# Patient Record
Sex: FEMALE | Race: OTHER | HISPANIC OR LATINO | Employment: OTHER | URBAN - METROPOLITAN AREA
[De-identification: names, ages, dates, MRNs, and addresses within clinical notes are randomized per-mention and may not be internally consistent; named-entity substitution may affect disease eponyms.]

---

## 2014-07-01 LAB — HM COLONOSCOPY: NORMAL

## 2017-04-06 ENCOUNTER — GENERIC CONVERSION - ENCOUNTER (OUTPATIENT)
Dept: FAMILY MEDICINE CLINIC | Facility: HOSPITAL | Age: 72
End: 2017-04-06

## 2017-05-16 ENCOUNTER — GENERIC CONVERSION - ENCOUNTER (OUTPATIENT)
Dept: FAMILY MEDICINE CLINIC | Facility: HOSPITAL | Age: 72
End: 2017-05-16

## 2017-10-31 DIAGNOSIS — R30.0 DYSURIA: ICD-10-CM

## 2017-10-31 DIAGNOSIS — Z12.31 ENCOUNTER FOR SCREENING MAMMOGRAM FOR MALIGNANT NEOPLASM OF BREAST: ICD-10-CM

## 2017-11-13 ENCOUNTER — GENERIC CONVERSION - ENCOUNTER (OUTPATIENT)
Dept: OTHER | Facility: OTHER | Age: 72
End: 2017-11-13

## 2017-11-15 ENCOUNTER — APPOINTMENT (OUTPATIENT)
Dept: LAB | Facility: HOSPITAL | Age: 72
End: 2017-11-15
Payer: COMMERCIAL

## 2017-11-15 ENCOUNTER — TRANSCRIBE ORDERS (OUTPATIENT)
Dept: ADMINISTRATIVE | Facility: HOSPITAL | Age: 72
End: 2017-11-15

## 2017-11-15 DIAGNOSIS — R30.0 DYSURIA: ICD-10-CM

## 2017-11-15 LAB
BACTERIA UR QL AUTO: ABNORMAL /HPF
BILIRUB UR QL STRIP: NEGATIVE
CLARITY UR: CLEAR
COLOR UR: YELLOW
GLUCOSE UR STRIP-MCNC: NEGATIVE MG/DL
HGB UR QL STRIP.AUTO: NEGATIVE
HYALINE CASTS #/AREA URNS LPF: ABNORMAL /LPF
KETONES UR STRIP-MCNC: NEGATIVE MG/DL
LEUKOCYTE ESTERASE UR QL STRIP: ABNORMAL
MUCOUS THREADS UR QL AUTO: ABNORMAL
NITRITE UR QL STRIP: NEGATIVE
NON-SQ EPI CELLS URNS QL MICRO: ABNORMAL /HPF
PH UR STRIP.AUTO: 6 [PH] (ref 4.5–8)
PROT UR STRIP-MCNC: NEGATIVE MG/DL
RBC #/AREA URNS AUTO: ABNORMAL /HPF
SP GR UR STRIP.AUTO: 1.02 (ref 1–1.03)
UROBILINOGEN UR QL STRIP.AUTO: 0.2 E.U./DL
WBC #/AREA URNS AUTO: ABNORMAL /HPF

## 2017-11-15 PROCEDURE — 81001 URINALYSIS AUTO W/SCOPE: CPT

## 2018-01-22 VITALS
DIASTOLIC BLOOD PRESSURE: 62 MMHG | HEIGHT: 61 IN | SYSTOLIC BLOOD PRESSURE: 132 MMHG | TEMPERATURE: 97.3 F | WEIGHT: 156 LBS | HEART RATE: 72 BPM | BODY MASS INDEX: 29.45 KG/M2

## 2018-01-29 ENCOUNTER — TELEPHONE (OUTPATIENT)
Dept: FAMILY MEDICINE CLINIC | Facility: HOSPITAL | Age: 73
End: 2018-01-29

## 2018-01-29 PROBLEM — M81.0 OSTEOPOROSIS: Status: ACTIVE | Noted: 2017-11-14

## 2018-01-29 PROBLEM — E66.9 OBESITY: Status: ACTIVE | Noted: 2017-11-14

## 2018-01-29 PROBLEM — H26.9 CATARACT: Status: ACTIVE | Noted: 2017-11-14

## 2018-01-29 PROBLEM — N95.2 ATROPHIC VAGINITIS: Status: ACTIVE | Noted: 2017-11-14

## 2018-01-29 PROBLEM — H40.9 GLAUCOMA: Status: ACTIVE | Noted: 2017-11-13

## 2018-01-29 RX ORDER — GABAPENTIN 600 MG/1
600 TABLET ORAL
Qty: 30 TABLET | Refills: 3 | Status: CANCELLED | OUTPATIENT
Start: 2018-01-29

## 2018-01-29 RX ORDER — MONTELUKAST SODIUM 10 MG/1
10 TABLET ORAL DAILY
Qty: 30 TABLET | Refills: 3 | Status: CANCELLED | OUTPATIENT
Start: 2018-01-29

## 2018-01-29 RX ORDER — TIMOLOL MALEATE 6.8 MG/ML
1 SOLUTION/ DROPS OPHTHALMIC EVERY 12 HOURS
COMMUNITY
Start: 2017-11-13 | End: 2018-01-30 | Stop reason: SDUPTHER

## 2018-01-29 RX ORDER — LOSARTAN POTASSIUM 25 MG/1
1 TABLET ORAL DAILY
COMMUNITY
Start: 2017-11-13 | End: 2018-01-30 | Stop reason: SDUPTHER

## 2018-01-29 RX ORDER — LOSARTAN POTASSIUM 25 MG/1
25 TABLET ORAL DAILY
Qty: 30 TABLET | Refills: 3 | Status: CANCELLED | OUTPATIENT
Start: 2018-01-29

## 2018-01-29 RX ORDER — OMEPRAZOLE 40 MG/1
1 CAPSULE, DELAYED RELEASE ORAL DAILY
COMMUNITY
Start: 2012-08-14 | End: 2018-01-30 | Stop reason: SDUPTHER

## 2018-01-29 RX ORDER — TRAMADOL HYDROCHLORIDE 50 MG/1
TABLET ORAL
COMMUNITY
Start: 2017-11-13 | End: 2018-01-30 | Stop reason: SDUPTHER

## 2018-01-29 RX ORDER — GABAPENTIN 600 MG/1
1 TABLET ORAL
COMMUNITY
Start: 2017-11-13 | End: 2018-01-30 | Stop reason: SDUPTHER

## 2018-01-29 RX ORDER — ALBUTEROL SULFATE 90 UG/1
2 AEROSOL, METERED RESPIRATORY (INHALATION)
COMMUNITY
Start: 2012-10-23 | End: 2018-01-30 | Stop reason: SDUPTHER

## 2018-01-29 RX ORDER — GLYCOPYRROLATE 2 MG/1
2 TABLET ORAL 2 TIMES DAILY
Qty: 30 TABLET | Refills: 3 | Status: CANCELLED | OUTPATIENT
Start: 2018-01-29

## 2018-01-29 RX ORDER — HYDROCHLOROTHIAZIDE 12.5 MG/1
12.5 TABLET ORAL DAILY
Qty: 30 TABLET | Refills: 3 | Status: CANCELLED | OUTPATIENT
Start: 2018-01-29

## 2018-01-29 RX ORDER — ALENDRONATE SODIUM 70 MG/1
1 TABLET ORAL WEEKLY
COMMUNITY
Start: 2017-11-13 | End: 2018-01-30 | Stop reason: SDUPTHER

## 2018-01-29 RX ORDER — ATORVASTATIN CALCIUM 20 MG/1
1 TABLET, FILM COATED ORAL
COMMUNITY
Start: 2017-11-13 | End: 2018-01-30 | Stop reason: SDUPTHER

## 2018-01-29 RX ORDER — GLYCOPYRROLATE 2 MG/1
1 TABLET ORAL 2 TIMES DAILY
COMMUNITY
Start: 2012-09-24 | End: 2018-01-30 | Stop reason: SDUPTHER

## 2018-01-29 RX ORDER — LEVOTHYROXINE SODIUM 88 UG/1
1 TABLET ORAL DAILY
COMMUNITY
Start: 2012-08-28 | End: 2018-01-30 | Stop reason: SDUPTHER

## 2018-01-29 RX ORDER — LEVOTHYROXINE SODIUM 88 UG/1
88 TABLET ORAL DAILY
Qty: 30 TABLET | Refills: 3 | Status: CANCELLED | OUTPATIENT
Start: 2018-01-29

## 2018-01-29 RX ORDER — TRAMADOL HYDROCHLORIDE 50 MG/1
50 TABLET ORAL EVERY 6 HOURS PRN
Qty: 30 TABLET | Refills: 0 | Status: CANCELLED | OUTPATIENT
Start: 2018-01-29

## 2018-01-29 RX ORDER — MONTELUKAST SODIUM 10 MG/1
1 TABLET ORAL DAILY
COMMUNITY
Start: 2012-08-14 | End: 2018-01-30 | Stop reason: SDUPTHER

## 2018-01-29 RX ORDER — HYDROCHLOROTHIAZIDE 12.5 MG/1
1 TABLET ORAL DAILY
COMMUNITY
Start: 2012-09-04 | End: 2018-01-30 | Stop reason: SDUPTHER

## 2018-01-29 RX ORDER — OMEPRAZOLE 40 MG/1
40 CAPSULE, DELAYED RELEASE ORAL DAILY
Qty: 30 CAPSULE | Refills: 3 | Status: CANCELLED | OUTPATIENT
Start: 2018-01-29

## 2018-01-29 RX ORDER — ALBUTEROL SULFATE 90 UG/1
2 AEROSOL, METERED RESPIRATORY (INHALATION) EVERY 4 HOURS PRN
Qty: 1 INHALER | Refills: 1 | Status: CANCELLED | OUTPATIENT
Start: 2018-01-29

## 2018-01-29 RX ORDER — ATORVASTATIN CALCIUM 20 MG/1
20 TABLET, FILM COATED ORAL DAILY
Qty: 30 TABLET | Refills: 3 | Status: CANCELLED | OUTPATIENT
Start: 2018-01-29

## 2018-01-30 ENCOUNTER — OFFICE VISIT (OUTPATIENT)
Dept: FAMILY MEDICINE CLINIC | Facility: HOSPITAL | Age: 73
End: 2018-01-30
Payer: COMMERCIAL

## 2018-01-30 VITALS
SYSTOLIC BLOOD PRESSURE: 142 MMHG | WEIGHT: 156 LBS | TEMPERATURE: 97.7 F | HEIGHT: 60 IN | HEART RATE: 78 BPM | BODY MASS INDEX: 30.63 KG/M2 | DIASTOLIC BLOOD PRESSURE: 78 MMHG

## 2018-01-30 DIAGNOSIS — J45.909 UNCOMPLICATED ASTHMA, UNSPECIFIED ASTHMA SEVERITY, UNSPECIFIED WHETHER PERSISTENT: ICD-10-CM

## 2018-01-30 DIAGNOSIS — M81.0 OSTEOPOROSIS, UNSPECIFIED OSTEOPOROSIS TYPE, UNSPECIFIED PATHOLOGICAL FRACTURE PRESENCE: Primary | ICD-10-CM

## 2018-01-30 DIAGNOSIS — H40.9 GLAUCOMA, UNSPECIFIED GLAUCOMA TYPE, UNSPECIFIED LATERALITY: ICD-10-CM

## 2018-01-30 DIAGNOSIS — M47.816 LUMBAR SPONDYLOSIS: ICD-10-CM

## 2018-01-30 DIAGNOSIS — R73.01 IFG (IMPAIRED FASTING GLUCOSE): ICD-10-CM

## 2018-01-30 DIAGNOSIS — I10 ESSENTIAL HYPERTENSION: ICD-10-CM

## 2018-01-30 DIAGNOSIS — G89.4 CHRONIC PAIN SYNDROME: Primary | ICD-10-CM

## 2018-01-30 DIAGNOSIS — E78.5 DYSLIPIDEMIA: ICD-10-CM

## 2018-01-30 DIAGNOSIS — E03.9 HYPOTHYROIDISM, UNSPECIFIED TYPE: ICD-10-CM

## 2018-01-30 DIAGNOSIS — G89.4 CHRONIC PAIN SYNDROME: ICD-10-CM

## 2018-01-30 DIAGNOSIS — K21.9 GERD WITHOUT ESOPHAGITIS: ICD-10-CM

## 2018-01-30 PROCEDURE — 99214 OFFICE O/P EST MOD 30 MIN: CPT | Performed by: INTERNAL MEDICINE

## 2018-01-30 RX ORDER — ALBUTEROL SULFATE 90 UG/1
2 AEROSOL, METERED RESPIRATORY (INHALATION) EVERY 4 HOURS PRN
Qty: 6.7 G | Refills: 6 | Status: SHIPPED | OUTPATIENT
Start: 2018-01-30 | End: 2018-10-24 | Stop reason: SDUPTHER

## 2018-01-30 RX ORDER — MONTELUKAST SODIUM 10 MG/1
10 TABLET ORAL DAILY
Qty: 30 TABLET | Refills: 6 | Status: SHIPPED | OUTPATIENT
Start: 2018-01-30 | End: 2018-10-24 | Stop reason: SDUPTHER

## 2018-01-30 RX ORDER — ATORVASTATIN CALCIUM 20 MG/1
20 TABLET, FILM COATED ORAL DAILY
Qty: 30 TABLET | Refills: 6 | Status: SHIPPED | OUTPATIENT
Start: 2018-01-30 | End: 2018-03-20 | Stop reason: SDUPTHER

## 2018-01-30 RX ORDER — OMEPRAZOLE 40 MG/1
40 CAPSULE, DELAYED RELEASE ORAL DAILY
Qty: 30 CAPSULE | Refills: 6 | Status: SHIPPED | OUTPATIENT
Start: 2018-01-30 | End: 2018-10-24 | Stop reason: SDUPTHER

## 2018-01-30 RX ORDER — TIMOLOL MALEATE 6.8 MG/ML
1 SOLUTION/ DROPS OPHTHALMIC EVERY 12 HOURS
Qty: 5 ML | Refills: 6 | Status: SHIPPED | OUTPATIENT
Start: 2018-01-30

## 2018-01-30 RX ORDER — GABAPENTIN 800 MG/1
800 TABLET ORAL 2 TIMES DAILY
Qty: 60 TABLET | Refills: 2 | Status: SHIPPED | OUTPATIENT
Start: 2018-01-30 | End: 2018-06-18 | Stop reason: SDUPTHER

## 2018-01-30 RX ORDER — MELOXICAM 7.5 MG/1
7.5 TABLET ORAL DAILY
Qty: 30 TABLET | Refills: 2 | Status: SHIPPED | OUTPATIENT
Start: 2018-01-30 | End: 2018-06-18 | Stop reason: SDUPTHER

## 2018-01-30 RX ORDER — GABAPENTIN 600 MG/1
600 TABLET ORAL
Qty: 30 TABLET | Refills: 6 | Status: SHIPPED | OUTPATIENT
Start: 2018-01-30 | End: 2018-01-30 | Stop reason: SDUPTHER

## 2018-01-30 RX ORDER — GLYCOPYRROLATE 2 MG/1
2 TABLET ORAL 2 TIMES DAILY
Qty: 60 TABLET | Refills: 6 | Status: SHIPPED | OUTPATIENT
Start: 2018-01-30 | End: 2018-10-24 | Stop reason: SDUPTHER

## 2018-01-30 RX ORDER — LOSARTAN POTASSIUM 25 MG/1
25 TABLET ORAL DAILY
Qty: 30 TABLET | Refills: 6 | Status: SHIPPED | OUTPATIENT
Start: 2018-01-30 | End: 2018-03-26 | Stop reason: SDUPTHER

## 2018-01-30 RX ORDER — LEVOTHYROXINE SODIUM 88 UG/1
88 TABLET ORAL DAILY
Qty: 30 TABLET | Refills: 6 | Status: SHIPPED | OUTPATIENT
Start: 2018-01-30 | End: 2018-04-19 | Stop reason: SDUPTHER

## 2018-01-30 RX ORDER — ALENDRONATE SODIUM 70 MG/1
70 TABLET ORAL WEEKLY
Qty: 4 TABLET | Refills: 6 | Status: SHIPPED | OUTPATIENT
Start: 2018-01-30 | End: 2018-10-24 | Stop reason: SDUPTHER

## 2018-01-30 RX ORDER — TRAMADOL HYDROCHLORIDE 50 MG/1
50 TABLET ORAL EVERY 12 HOURS PRN
Qty: 60 TABLET | Refills: 2 | Status: SHIPPED | OUTPATIENT
Start: 2018-01-30 | End: 2018-01-30

## 2018-01-30 RX ORDER — HYDROCHLOROTHIAZIDE 12.5 MG/1
12.5 TABLET ORAL DAILY
Qty: 30 TABLET | Refills: 6 | Status: SHIPPED | OUTPATIENT
Start: 2018-01-30 | End: 2018-10-24 | Stop reason: SDUPTHER

## 2018-01-30 NOTE — ASSESSMENT & PLAN NOTE
BP up today but was good at last appt -may be up d/t pain - con't current meds for now - recheck BP in 6 wks and if still elevated will need to increase Losartan, low sodium diet/exercise/wgt loss enouraged

## 2018-01-30 NOTE — ASSESSMENT & PLAN NOTE
Increase Gabapentin to 800 mg bid and needs to get in with Pain Mgt, will try trial of Meloxicam as pt feels Tramadol is not helping at all

## 2018-01-30 NOTE — ASSESSMENT & PLAN NOTE
Due for labs - order given, low sugar/carb diet encouraged as was increase in exercise/activity level

## 2018-01-30 NOTE — PROGRESS NOTES
Assessment/Plan:    Hypothyroidism  Due for TFT's - order given, con't current Levothyroxine for now    IFG (impaired fasting glucose)  Due for labs - order given, low sugar/carb diet encouraged as was increase in exercise/activity level    Hypertension  BP up today but was good at last appt -may be up d/t pain - con't current meds for now - recheck BP in 6 wks and if still elevated will need to increase Losartan, low sodium diet/exercise/wgt loss enouraged    Lumbar spondylosis  Pain acute on chronic the last week - no benefit with Tramadol so will stop and try trial of Meloxicam, will increase Gabapentin to 800 mg 1 tab PO q am and 1 tab PO q pm, will need to see pain mgt, re-eval in 6 wks, may benefit from PT    Chronic pain syndrome  Increase Gabapentin to 800 mg bid and needs to get in with Pain Mgt, will try trial of Meloxicam as pt feels Tramadol is not helping at all       Diagnoses and all orders for this visit:    Chronic pain syndrome  -     gabapentin (NEURONTIN) 800 mg tablet; Take 1 tablet (800 mg total) by mouth 2 (two) times a day  -     Ambulatory referral to Pain Management; Future  -     CBC and differential  -     meloxicam (MOBIC) 7 5 mg tablet; Take 1 tablet (7 5 mg total) by mouth daily    Lumbar spondylosis    Essential hypertension  -     CBC and differential  -     Comprehensive metabolic panel  -     Lipid panel    IFG (impaired fasting glucose)  -     CBC and differential  -     Comprehensive metabolic panel  -     Hemoglobin A1c  -     Lipid panel    Hypothyroidism, unspecified type  -     TSH, 3rd generation; Future  -     T4, free; Future  -     TSH, 3rd generation  -     T4, free      Order for mammo given    UTD on Dexa till 2019    Number for Dr Jennifer Mejia given for DR [facility]    Subjective:      Patient ID: Polo Barnett is a 74 y o  female  HPI Pt here for follow up appt    Last visit in Oct was her re-establish care visit  She was con't to note chronic LBP    She has had 2 back surgeries in the past -chart reviewed and she has had Lumbar decompression of L4/5/S1  She had MRI in Oct 2016 which showed multilevel spondylitic changes  She has been using Tramadol for her pain twice daily  Even with the Tramadol her pain is still a 10/10  She is taking Gabapentin 800 mg qhs  She feels the medication is not helping at all  She has had worsening pain the past week or so  She notes no recent fall/injury/new activity  Her pain is right at lower spine and goes up and down the L low back and will intermittently go down the LLE  She notes weakness and numbness to the LLE but no pins and needles  She notes no loss of bowel/bladder incontinence  She notes no rashes  Bp up a bit today  She is in pain with above back issues  She notes no Ha's but has had some HA's and double vision  She has not had an eye exam in 2-3 yrs  Chart review showed that her Bw for IFG was July 2017 - she is due now  Diet was reviewed and she does not eat a lot of sugars but does have a high carb diet  She does no formal exercise  Her wgt is unchanged from last appt  D/w pt that previous records had no mammo since April and MRI since Aug 2016  She is due for mammo but is UTD on Dexa    D/w pt and family that no colonoscopy was in the chart either - pt states she had one done  Dgtr thinks she is due now  Review of Systems   Constitutional: Positive for chills  Negative for fatigue, fever and unexpected weight change  HENT: Negative for congestion and sore throat  Eyes: Positive for discharge  Negative for pain  Respiratory: Positive for cough  Negative for shortness of breath and wheezing  Cardiovascular: Negative for chest pain, palpitations and leg swelling  Gastrointestinal: Negative for abdominal pain, blood in stool, constipation, diarrhea and nausea  Endocrine: Negative for polydipsia and polyuria  Genitourinary: Negative for difficulty urinating and dysuria  Musculoskeletal: Positive for back pain  Negative for neck pain  Skin: Negative for rash and wound  Neurological: Positive for dizziness, numbness and headaches  Negative for syncope and light-headedness  Hematological: Negative for adenopathy  Psychiatric/Behavioral: Negative for behavioral problems and confusion  Objective:     Physical Exam   Constitutional: She is oriented to person, place, and time  She appears well-developed and well-nourished  HENT:   Head: Normocephalic and atraumatic  Eyes: Pupils are equal, round, and reactive to light  Neck: Neck supple  Cardiovascular: Normal rate, regular rhythm and normal heart sounds  Exam reveals no friction rub  No murmur heard  Pulmonary/Chest: Effort normal and breath sounds normal  No respiratory distress  She has no wheezes  She has no rales  Abdominal: Soft  She exhibits no distension  There is no tenderness  There is no guarding  Musculoskeletal: She exhibits no edema  Tender to palp along L2-4 lumbar spinous processes and paraspinal musculature B/L, 4/5 B/L hip flexors but 5/5 otherwise LE muscle strength, + SLR test and crossed SLR test B/L LE   Neurological: She is alert and oriented to person, place, and time  She displays normal reflexes  No cranial nerve deficit  2/4 patellar reflexes B/L LE    Sensation intact to light touch B/L LE   Skin: Skin is warm  No rash noted  Psychiatric: She has a normal mood and affect   Her behavior is normal

## 2018-01-30 NOTE — ASSESSMENT & PLAN NOTE
Pain acute on chronic the last week - no benefit with Tramadol so will stop and try trial of Meloxicam, will increase Gabapentin to 800 mg 1 tab PO q am and 1 tab PO q pm, will need to see pain mgt, re-eval in 6 wks, may benefit from PT

## 2018-03-20 DIAGNOSIS — E78.5 DYSLIPIDEMIA: ICD-10-CM

## 2018-03-20 RX ORDER — ATORVASTATIN CALCIUM 20 MG/1
20 TABLET, FILM COATED ORAL DAILY
Qty: 90 TABLET | Refills: 1 | Status: SHIPPED | OUTPATIENT
Start: 2018-03-20 | End: 2018-11-15 | Stop reason: SDUPTHER

## 2018-03-26 DIAGNOSIS — I10 ESSENTIAL HYPERTENSION: ICD-10-CM

## 2018-03-26 RX ORDER — LOSARTAN POTASSIUM 25 MG/1
25 TABLET ORAL DAILY
Qty: 90 TABLET | Refills: 0 | Status: SHIPPED | OUTPATIENT
Start: 2018-03-26 | End: 2018-09-04 | Stop reason: SDUPTHER

## 2018-04-03 LAB
ALBUMIN SERPL-MCNC: 3.9 G/DL (ref 3.6–5.1)
ALBUMIN/GLOB SERPL: 1.4 (CALC) (ref 1–2.5)
ALP SERPL-CCNC: 130 U/L (ref 33–130)
ALT SERPL-CCNC: 13 U/L (ref 6–29)
AST SERPL-CCNC: 14 U/L (ref 10–35)
BASOPHILS # BLD AUTO: 50 CELLS/UL (ref 0–200)
BASOPHILS NFR BLD AUTO: 0.7 %
BILIRUB SERPL-MCNC: 0.4 MG/DL (ref 0.2–1.2)
BUN SERPL-MCNC: 27 MG/DL (ref 7–25)
BUN/CREAT SERPL: 26 (CALC) (ref 6–22)
CALCIUM SERPL-MCNC: 9.7 MG/DL (ref 8.6–10.4)
CHLORIDE SERPL-SCNC: 109 MMOL/L (ref 98–110)
CHOLEST SERPL-MCNC: 128 MG/DL
CHOLEST/HDLC SERPL: 2.7 (CALC)
CO2 SERPL-SCNC: 26 MMOL/L (ref 20–31)
CREAT SERPL-MCNC: 1.02 MG/DL (ref 0.6–0.93)
EOSINOPHIL # BLD AUTO: 475 CELLS/UL (ref 15–500)
EOSINOPHIL NFR BLD AUTO: 6.6 %
ERYTHROCYTE [DISTWIDTH] IN BLOOD BY AUTOMATED COUNT: 12.7 % (ref 11–15)
GLOBULIN SER CALC-MCNC: 2.7 G/DL (CALC) (ref 1.9–3.7)
GLUCOSE SERPL-MCNC: 93 MG/DL (ref 65–99)
HBA1C MFR BLD: 5.6 % OF TOTAL HGB
HCT VFR BLD AUTO: 38.5 % (ref 35–45)
HDLC SERPL-MCNC: 48 MG/DL
HGB BLD-MCNC: 13 G/DL (ref 11.7–15.5)
LDLC SERPL CALC-MCNC: 64 MG/DL (CALC)
LYMPHOCYTES # BLD AUTO: 2815 CELLS/UL (ref 850–3900)
LYMPHOCYTES NFR BLD AUTO: 39.1 %
MCH RBC QN AUTO: 31.6 PG (ref 27–33)
MCHC RBC AUTO-ENTMCNC: 33.8 G/DL (ref 32–36)
MCV RBC AUTO: 93.4 FL (ref 80–100)
MONOCYTES # BLD AUTO: 648 CELLS/UL (ref 200–950)
MONOCYTES NFR BLD AUTO: 9 %
NEUTROPHILS # BLD AUTO: 3211 CELLS/UL (ref 1500–7800)
NEUTROPHILS NFR BLD AUTO: 44.6 %
NONHDLC SERPL-MCNC: 80 MG/DL (CALC)
PLATELET # BLD AUTO: 181 THOUSAND/UL (ref 140–400)
PMV BLD REES-ECKER: 13 FL (ref 7.5–12.5)
POTASSIUM SERPL-SCNC: 4.8 MMOL/L (ref 3.5–5.3)
PROT SERPL-MCNC: 6.6 G/DL (ref 6.1–8.1)
RBC # BLD AUTO: 4.12 MILLION/UL (ref 3.8–5.1)
SL AMB EGFR AFRICAN AMERICAN: 63 ML/MIN/1.73M2
SL AMB EGFR NON AFRICAN AMERICAN: 54 ML/MIN/1.73M2
SODIUM SERPL-SCNC: 141 MMOL/L (ref 135–146)
T4 FREE SERPL-MCNC: 1.3 NG/DL (ref 0.8–1.8)
TRIGL SERPL-MCNC: 75 MG/DL
TSH SERPL-ACNC: 1.34 MIU/L (ref 0.4–4.5)
WBC # BLD AUTO: 7.2 THOUSAND/UL (ref 3.8–10.8)

## 2018-04-10 ENCOUNTER — HOSPITAL ENCOUNTER (OUTPATIENT)
Dept: BONE DENSITY | Facility: IMAGING CENTER | Age: 73
Discharge: HOME | End: 2018-04-10
Payer: COMMERCIAL

## 2018-04-10 DIAGNOSIS — Z12.39 ENCOUNTER FOR OTHER SCREENING FOR MALIGNANT NEOPLASM OF BREAST: ICD-10-CM

## 2018-04-10 PROCEDURE — 77067 SCR MAMMO BI INCL CAD: CPT

## 2018-04-19 ENCOUNTER — TELEPHONE (OUTPATIENT)
Dept: FAMILY MEDICINE CLINIC | Facility: HOSPITAL | Age: 73
End: 2018-04-19

## 2018-04-19 DIAGNOSIS — E03.9 HYPOTHYROIDISM, UNSPECIFIED TYPE: ICD-10-CM

## 2018-04-19 RX ORDER — LEVOTHYROXINE SODIUM 88 UG/1
88 TABLET ORAL DAILY
Qty: 30 TABLET | Refills: 0 | Status: SHIPPED | OUTPATIENT
Start: 2018-04-19 | End: 2018-05-30 | Stop reason: SDUPTHER

## 2018-04-19 NOTE — TELEPHONE ENCOUNTER
Patient needs brand name Synthroid, not generic  I can't put it in to send to you - it keeps bumping it back to the generic  Can you send?  [facility]/T

## 2018-04-24 ENCOUNTER — CONSULT (OUTPATIENT)
Dept: PAIN MEDICINE | Facility: CLINIC | Age: 73
End: 2018-04-24
Payer: COMMERCIAL

## 2018-04-24 VITALS
SYSTOLIC BLOOD PRESSURE: 128 MMHG | HEART RATE: 72 BPM | DIASTOLIC BLOOD PRESSURE: 78 MMHG | HEIGHT: 60 IN | TEMPERATURE: 98.2 F | BODY MASS INDEX: 32.59 KG/M2 | WEIGHT: 166 LBS

## 2018-04-24 DIAGNOSIS — M54.16 LUMBAR RADICULOPATHY: ICD-10-CM

## 2018-04-24 DIAGNOSIS — M25.562 CHRONIC PAIN OF LEFT KNEE: ICD-10-CM

## 2018-04-24 DIAGNOSIS — G89.29 CHRONIC BILATERAL LOW BACK PAIN WITH LEFT-SIDED SCIATICA: Primary | ICD-10-CM

## 2018-04-24 DIAGNOSIS — R29.898 WEAKNESS OF LEFT LEG: ICD-10-CM

## 2018-04-24 DIAGNOSIS — R26.9 GAIT ABNORMALITY: ICD-10-CM

## 2018-04-24 DIAGNOSIS — M17.12 PRIMARY OSTEOARTHRITIS OF LEFT KNEE: ICD-10-CM

## 2018-04-24 DIAGNOSIS — M96.1 LUMBAR POST-LAMINECTOMY SYNDROME: ICD-10-CM

## 2018-04-24 DIAGNOSIS — G89.4 CHRONIC PAIN SYNDROME: ICD-10-CM

## 2018-04-24 DIAGNOSIS — G89.29 CHRONIC PAIN OF LEFT KNEE: ICD-10-CM

## 2018-04-24 DIAGNOSIS — M54.42 CHRONIC BILATERAL LOW BACK PAIN WITH LEFT-SIDED SCIATICA: Primary | ICD-10-CM

## 2018-04-24 DIAGNOSIS — M47.816 LUMBAR SPONDYLOSIS: ICD-10-CM

## 2018-04-24 PROCEDURE — 99204 OFFICE O/P NEW MOD 45 MIN: CPT | Performed by: NURSE PRACTITIONER

## 2018-04-24 RX ORDER — PREDNISONE 10 MG/1
10 TABLET ORAL DAILY
Qty: 21 TABLET | Refills: 0 | Status: SHIPPED | OUTPATIENT
Start: 2018-04-24 | End: 2018-05-29

## 2018-04-24 RX ORDER — NORTRIPTYLINE HYDROCHLORIDE 25 MG/1
CAPSULE ORAL
Qty: 30 CAPSULE | Refills: 1 | Status: SHIPPED | OUTPATIENT
Start: 2018-04-24 | End: 2018-05-29 | Stop reason: SDUPTHER

## 2018-04-24 RX ORDER — TRAMADOL HYDROCHLORIDE 50 MG/1
50 TABLET ORAL EVERY 6 HOURS PRN
COMMUNITY
End: 2018-04-24
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
## 2018-05-01 ENCOUNTER — APPOINTMENT (OUTPATIENT)
Dept: RADIOLOGY | Facility: CLINIC | Age: 73
End: 2018-05-01
Payer: COMMERCIAL

## 2018-05-01 ENCOUNTER — OFFICE VISIT (OUTPATIENT)
Dept: OBGYN CLINIC | Facility: CLINIC | Age: 73
End: 2018-05-01
Payer: COMMERCIAL

## 2018-05-01 VITALS — DIASTOLIC BLOOD PRESSURE: 74 MMHG | SYSTOLIC BLOOD PRESSURE: 130 MMHG | HEIGHT: 60 IN | HEART RATE: 71 BPM

## 2018-05-01 DIAGNOSIS — M25.562 CHRONIC PAIN OF LEFT KNEE: ICD-10-CM

## 2018-05-01 DIAGNOSIS — M17.12 PRIMARY OSTEOARTHRITIS OF LEFT KNEE: ICD-10-CM

## 2018-05-01 DIAGNOSIS — M25.562 LEFT KNEE PAIN, UNSPECIFIED CHRONICITY: ICD-10-CM

## 2018-05-01 DIAGNOSIS — G89.29 CHRONIC PAIN OF LEFT KNEE: ICD-10-CM

## 2018-05-01 DIAGNOSIS — M70.50 ANSERINE BURSITIS: Primary | ICD-10-CM

## 2018-05-01 PROCEDURE — 73564 X-RAY EXAM KNEE 4 OR MORE: CPT

## 2018-05-01 PROCEDURE — 20610 DRAIN/INJ JOINT/BURSA W/O US: CPT | Performed by: ORTHOPAEDIC SURGERY

## 2018-05-01 PROCEDURE — 99203 OFFICE O/P NEW LOW 30 MIN: CPT | Performed by: ORTHOPAEDIC SURGERY

## 2018-05-01 RX ORDER — BETAMETHASONE SODIUM PHOSPHATE AND BETAMETHASONE ACETATE 3; 3 MG/ML; MG/ML
6 INJECTION, SUSPENSION INTRA-ARTICULAR; INTRALESIONAL; INTRAMUSCULAR; SOFT TISSUE
Status: COMPLETED | OUTPATIENT
Start: 2018-05-01 | End: 2018-05-01

## 2018-05-01 RX ORDER — LIDOCAINE HYDROCHLORIDE 10 MG/ML
5 INJECTION, SOLUTION INFILTRATION; PERINEURAL
Status: COMPLETED | OUTPATIENT
Start: 2018-05-01 | End: 2018-05-01

## 2018-05-01 RX ADMIN — LIDOCAINE HYDROCHLORIDE 5 ML: 10 INJECTION, SOLUTION INFILTRATION; PERINEURAL at 11:47

## 2018-05-01 RX ADMIN — BETAMETHASONE SODIUM PHOSPHATE AND BETAMETHASONE ACETATE 6 MG: 3; 3 INJECTION, SUSPENSION INTRA-ARTICULAR; INTRALESIONAL; INTRAMUSCULAR; SOFT TISSUE at 11:47

## 2018-05-01 NOTE — ASSESSMENT & PLAN NOTE
Moderate osteoarthritis of the left knee, primarily of the patellofemoral joint  I think a lot of her locking sensation is coming from the arthritis or from some instability from the pain she is having in her knee, though there is a small chance that she has some underlying meniscal tear  We will see how she does with the bursa injection physical therapy  I will see her back in 6-8 weeks if she fails to have significant improvement

## 2018-05-01 NOTE — ASSESSMENT & PLAN NOTE
40-year-old female with left knee bursitis  I discussed the diagnosis with the patient and her mother  I recommended physical therapy as well as a cortisone injection  Please refer to the procedure note  I will see her back as needed

## 2018-05-01 NOTE — PROGRESS NOTES
Assessment:     1  Anserine bursitis    2  Chronic pain of left knee    3  Primary osteoarthritis of left knee          Plan:     Problem List Items Addressed This Visit        Musculoskeletal and Integument    Primary osteoarthritis of left knee     Moderate osteoarthritis of the left knee, primarily of the patellofemoral joint  I think a lot of her locking sensation is coming from the arthritis or from some instability from the pain she is having in her knee, though there is a small chance that she has some underlying meniscal tear  We will see how she does with the bursa injection physical therapy  I will see her back in 6-8 weeks if she fails to have significant improvement  Relevant Orders    Ambulatory referral to Physical Therapy    Anserine bursitis - Primary     43-year-old female with left knee bursitis  I discussed the diagnosis with the patient and her mother  I recommended physical therapy as well as a cortisone injection  Please refer to the procedure note  I will see her back as needed  Relevant Orders    XR knee 4+ vw left injury    Ambulatory referral to Physical Therapy       Other    Chronic pain of left knee           Patient ID: Erum Gasca is a 74 y o  female  Chief Complaint:  Left knee pain    HPI:  43-year-old female with pain in her left knee  She has had pain for close to two years in that knee  It has been slowly worsening  There was no specific injury  She states that at times she has trouble straightening the knee out another time she has trouble bending it  It hurts her the most around the proximal medial tibia, but diffusely she has pain  Ice has been painful for her as she has not been using it  She has recently seen pain management who started her on prednisone for a short period of time as well as nortriptyline  She feels a heat and burning in her leg at times  She has a lot of difficulty at night  She has been using a cane for the last year    She has not had any injections or done any physical therapy  Patient's medical intake form was reviewed  Allergy:  Allergies   Allergen Reactions    Aspirin GI Intolerance    Iodinated Diagnostic Agents        Medications:  all current active meds have been reviewed    Past Medical History:  Past Medical History:   Diagnosis Date    Abnormal finding on mammography     right    Arthritis     Asthma     Depression     Dyslipidemia     Dysuria     Elevated serum alkaline phosphatase level     Esophageal reflux     History of stomach ulcers     Hypercalcemia     Hypercholesterolemia     Hyperkalemia     Hypertension     Hypothyroid     Lower back pain     Migraine     Osteoarthritis     Osteopenia     Thyroid disease     Tremor        Past Surgical History:  Past Surgical History:   Procedure Laterality Date    BACK SURGERY      lower back    BILATERAL OOPHORECTOMY       SECTION      CHOLECYSTECTOMY      HYSTERECTOMY         Family History:  Family History   Problem Relation Age of Onset    Hypertension Family     Stroke Family     Heart disease Family     Thyroid disease Family        Social History:  History   Alcohol Use    Yes     Comment: social     History   Drug use: Unknown     History   Smoking Status    Former Smoker   Smokeless Tobacco    Never Used           ROS:  Review of Systems   Constitutional: Negative  HENT: Positive for nosebleeds  Eyes: Positive for visual disturbance  Respiratory: Negative  Cardiovascular: Positive for chest pain and palpitations  Gastrointestinal: Negative  Endocrine: Negative  Genitourinary: Negative  Musculoskeletal: Positive for arthralgias and myalgias  Skin: Negative  Allergic/Immunologic: Negative  Neurological: Negative  Hematological: Negative  Psychiatric/Behavioral: The patient is nervous/anxious          Objective:  BP Readings from Last 1 Encounters:   18 130/74      Wt Readings from Last 1 Encounters:   04/24/18 75 3 kg (166 lb)        BMI:   Estimated body mass index is 32 42 kg/m² as calculated from the following:    Height as of 4/24/18: 5' (1 524 m)  Weight as of 4/24/18: 75 3 kg (166 lb)  EXAM:   Physical Exam   Constitutional: She is oriented to person, place, and time  She appears well-developed and well-nourished  No distress  HENT:   Head: Normocephalic and atraumatic  Eyes: Right eye exhibits no discharge  Left eye exhibits no discharge  Neck: Normal range of motion  Neck supple  No tracheal deviation present  Cardiovascular: Normal rate and regular rhythm  Pulmonary/Chest: Effort normal and breath sounds normal  No respiratory distress  She exhibits no tenderness  Abdominal: Soft  She exhibits no distension  There is no tenderness  Musculoskeletal:        Right knee: She exhibits no effusion  Left knee: She exhibits no effusion  Neurological: She is alert and oriented to person, place, and time  Skin: Skin is warm and dry  She is not diaphoretic  No erythema  Psychiatric: She has a normal mood and affect  Her behavior is normal    Vitals reviewed  Right Knee Exam   Right knee exam is normal     Tenderness   The patient is experiencing no tenderness  Range of Motion   The patient has normal right knee ROM  Muscle Strength     The patient has normal right knee strength      Tests   Andrea:  Medial - negative Lateral - negative  Lachman:  Anterior - negative      Drawer:       Posterior - negative  Varus: negative  Valgus: negative  Pivot Shift: negative  Patellar Apprehension: negative    Other   Erythema: absent  Sensation: normal  Pulse: present  Swelling: none  Other tests: no effusion present      Left Knee Exam     Tenderness   Left knee tenderness location: Diffuse tenderness throughout the knee, the most tenderness appears to be around the pes bursa, diffuse weakness throughout the knee on active range of motion, appears to be primarily limited by pain  Range of Motion   Extension: 5   Flexion: 110     Tests   Andrea:  Left knee positive medial Andrea test:  pain with Andrea testing  Lachman:  Anterior - negative      Drawer:       Posterior - negative  Varus: negative  Valgus: negative  Pivot Shift: negative  Patellar Apprehension: negative    Other   Erythema: absent  Sensation: normal  Pulse: present  Swelling: none  Effusion: no effusion present    Comments:  Severe crepitus on knee range of motion the              Radiographs:  I have personally reviewed pertinent films in PACS and my interpretation is Moderate osteoarthritis of the medial compartment and the patellofemoral compartment with narrowing and osteophytes      Large joint arthrocentesis  Date/Time: 5/1/2018 11:47 AM  Consent given by: patient  Timeout: Immediately prior to procedure a time out was called to verify the correct patient, procedure, equipment, support staff and site/side marked as required   Supporting Documentation  Indications: pain   Procedure Details  Location: knee - L knee  Preparation: Patient was prepped and draped in the usual sterile fashion  Needle size: 22 G  Ultrasound guidance: no  Approach: superior  Medications administered: 5 mL lidocaine 1 %; 6 mg betamethasone acetate-betamethasone sodium phosphate 6 (3-3) mg/mL    Patient tolerance: patient tolerated the procedure well with no immediate complications  Dressing:  Sterile dressing applied

## 2018-05-22 ENCOUNTER — HOSPITAL ENCOUNTER (OUTPATIENT)
Dept: MRI IMAGING | Facility: HOSPITAL | Age: 73
Discharge: HOME | End: 2018-05-22
Payer: COMMERCIAL

## 2018-05-22 DIAGNOSIS — M54.42 CHRONIC BILATERAL LOW BACK PAIN WITH LEFT-SIDED SCIATICA: ICD-10-CM

## 2018-05-22 DIAGNOSIS — G89.29 CHRONIC BILATERAL LOW BACK PAIN WITH LEFT-SIDED SCIATICA: ICD-10-CM

## 2018-05-22 DIAGNOSIS — M47.816 LUMBAR SPONDYLOSIS: ICD-10-CM

## 2018-05-22 DIAGNOSIS — M96.1 LUMBAR POST-LAMINECTOMY SYNDROME: ICD-10-CM

## 2018-05-22 DIAGNOSIS — M54.16 LUMBAR RADICULOPATHY: ICD-10-CM

## 2018-05-22 PROCEDURE — 72148 MRI LUMBAR SPINE W/O DYE: CPT

## 2018-05-24 ENCOUNTER — TELEPHONE (OUTPATIENT)
Dept: PAIN MEDICINE | Facility: CLINIC | Age: 73
End: 2018-05-24

## 2018-05-24 NOTE — TELEPHONE ENCOUNTER
s/w pt, per pt, she does not understand english very well and asked that the writer s/w her daughter, Anjum Neri at   Advised pt, will contact isatu as requested

## 2018-05-24 NOTE — TELEPHONE ENCOUNTER
Can you please call the patient and advise her that her lumbar spine MRI did show a worsening disc herniation at the L5-S1 level below her surgery with worsening Right>left foraminal stenosis  The previous surgery site is stable, but it does show mod deg changes with worsening Left foraminal stenosis at L3-4 & L4-5 levels  This could possibly explain her left knee pain, but I still feel that she has a separate knee issue as well  At this point we could try a Left L3 & L4 TFESI to see if that helps and/or we could refer her to one of our nuerosurgeons to make sure her spine is stable and to see if she is a SCStim candidate  Thank you

## 2018-05-24 NOTE — TELEPHONE ENCOUNTER
S/w pt's daughter, Steffi Dru of above and explained options  Advised isatu, if pt had additional questions, this office can cb w/ a  or schedule an ov to discuss further  Isatu verbalized understanding  Will relay the info as discussed and advise the pt to cb to fu

## 2018-05-29 ENCOUNTER — OFFICE VISIT (OUTPATIENT)
Dept: PAIN MEDICINE | Facility: CLINIC | Age: 73
End: 2018-05-29
Payer: COMMERCIAL

## 2018-05-29 VITALS
BODY MASS INDEX: 32.79 KG/M2 | SYSTOLIC BLOOD PRESSURE: 142 MMHG | WEIGHT: 167 LBS | HEIGHT: 60 IN | TEMPERATURE: 98.2 F | HEART RATE: 72 BPM | DIASTOLIC BLOOD PRESSURE: 80 MMHG

## 2018-05-29 DIAGNOSIS — M25.562 CHRONIC PAIN OF LEFT KNEE: ICD-10-CM

## 2018-05-29 DIAGNOSIS — G89.29 CHRONIC BILATERAL LOW BACK PAIN WITH LEFT-SIDED SCIATICA: Primary | ICD-10-CM

## 2018-05-29 DIAGNOSIS — G89.4 CHRONIC PAIN SYNDROME: ICD-10-CM

## 2018-05-29 DIAGNOSIS — M47.816 LUMBAR SPONDYLOSIS: ICD-10-CM

## 2018-05-29 DIAGNOSIS — M17.12 PRIMARY OSTEOARTHRITIS OF LEFT KNEE: ICD-10-CM

## 2018-05-29 DIAGNOSIS — M54.42 CHRONIC BILATERAL LOW BACK PAIN WITH LEFT-SIDED SCIATICA: Primary | ICD-10-CM

## 2018-05-29 DIAGNOSIS — M96.1 LUMBAR POST-LAMINECTOMY SYNDROME: ICD-10-CM

## 2018-05-29 DIAGNOSIS — M54.16 LUMBAR RADICULOPATHY: ICD-10-CM

## 2018-05-29 DIAGNOSIS — G89.29 CHRONIC PAIN OF LEFT KNEE: ICD-10-CM

## 2018-05-29 PROCEDURE — 99215 OFFICE O/P EST HI 40 MIN: CPT | Performed by: NURSE PRACTITIONER

## 2018-05-29 RX ORDER — NORTRIPTYLINE HYDROCHLORIDE 25 MG/1
CAPSULE ORAL
Qty: 60 CAPSULE | Refills: 1 | Status: SHIPPED | OUTPATIENT
Start: 2018-05-29 | End: 2018-07-10 | Stop reason: SDUPTHER

## 2018-05-29 NOTE — PROGRESS NOTES
Assessment:  1  Chronic bilateral low back pain with left-sided sciatica    2  Chronic pain of left knee    3  Primary osteoarthritis of left knee    4  Lumbar spondylosis    5  Lumbar radiculopathy    6  Chronic pain syndrome    7  Lumbar post-laminectomy syndrome        Plan:  While the patient and her daughters were in the office today, I did have a thorough conversation with him regarding her medication regimen and treatment plan options  While the patient was in the office I did thoroughly review the results of her most recent MRI of the lumbar sacral spine and explained to her that at this point it does appear that there is a worsening herniated disc at the L5-S1 level push towards the right, however, this point since she denies very minimal or very occasional right lower extremity radicular symptoms, I am not convinced that is causing her pain  However, her MRI also showed worsening left L3 and L4 foraminal stenosis which could also explain her left lower extremity radicular symptoms and knee pain, especially since she just had a left knee injection with Dr Rickey Agosto, which the patient reported provided minimal to no relief of her left knee pain  At this point time I discussed with the patient that we could consider proceeding with a left L3 and L4 transforaminal epidural steroid injection with Dr Jacque Stevens for both diagnostic and hopefully therapeutic purposes  I did explain to the patient at the pending on the results of the injection it may need to be repeated  The patient was agreeable and verbalized an understanding  Complete risks and benefits including bleeding, infection, tissue reaction, nerve injury and allergic reaction were discussed  The approach was demonstrated using models and literature was provided  Verbal and written consent was obtained        I also discussed with the patient that at this point time I feel would be in her best interest to follow up with Dr Silver Weldon for a neurosurgical opinion to discuss possible surgical options verses her candidacy for spinal cord stimulation in the future  I did briefly review the basic underlying theory of spinal cord stimulation with the patient and her family, however, at this point they are going to proceed with the consultation and we will go from there as appropriate and with what the patient is comfortable with  However, I did make sure that they understand that if spinal cord stimulation is an option, it is not changing the structure, but rather a tool to manage the pain  The patient and her daughters verbalized understanding  With regards to her medication regimen, explained to the patient at this point time since she did notice some relief with the nortriptyline, but was on a subtherapeutic dose, we are going to restart the nortriptyline at 25 mg a day and work up to 50 mg between now and her next office visit in 2 months  I advised the patient that if they experience any side effects or issues with the changes in their medication regiment, they should give our office a call to discuss  I also advised the patient not to drive or operate machinery until they see how the changes in the medication regimen affects them  The patient was agreeable and verbalized an understanding  The patient will follow-up in 8 weeks for medication prescription refill and reevaluation  The patient was advised to contact the office should their symptoms worsen in the interim  The patient was agreeable and verbalized an understanding  I attest that I have spent at least 40 minutes face to face with the patient and her 2 daughters and that at least 50% of the time was educating and/or discussing the patient's symptoms and treatment plan options until the patient and her family were satisfied with the plan  Please note that the patient speaks mostly [place]ian and that both of her daughters help to interpret the entire time        History of Present Illness: The patient is a 74 y o  female last seen on 4/24/18 who presents for a follow up office visit in regards to chronic pain secondary to lumbar post-laminectomy syndrome  The patient currently reports that since her last office visit she continues with the left-sided greater than right low back and left lower extremity radicular symptoms and presents today with her 2 daughters to review the results of her most recent MRI of the lumbosacral spine and discuss her treatment plan options  Since her last office visit she did follow-up with Dr Sherry Estrada and had a left knee injection with minimal relief  At this point Dr Sherry Estrada also feels that her left leg and knee pain is most likely stemming from her lumbar sacral spine  The patient did proceed with the Medrol Dosepak as we had discussed at her last office visit with very minimal improvement  She also tried the Nortriptyline and did feel it helped a little bit, without side effects, however, she ran out of them before her appointment today as she did in understand she was was to call before she was out of the medication  She presents today with her daughters to discuss her treatment plan  I have personally reviewed and/or updated the patient's past medical history, past surgical history, family history, social history, current medications, allergies, and vital signs today  Review of Systems:    Review of Systems   Respiratory: Positive for shortness of breath  Cardiovascular: Positive for chest pain  Gastrointestinal: Positive for constipation  Negative for diarrhea, nausea and vomiting  Musculoskeletal: Positive for gait problem  Negative for arthralgias, joint swelling (joint stiffness) and myalgias  Skin: Negative for rash  Neurological: Positive for dizziness  Negative for seizures and weakness  All other systems reviewed and are negative          Past Medical History:   Diagnosis Date    Abnormal finding on mammography     right  Arthritis     Asthma     Depression     Dyslipidemia     Dysuria     Elevated serum alkaline phosphatase level     Esophageal reflux     History of stomach ulcers     Hypercalcemia     Hypercholesterolemia     Hyperkalemia     Hypertension     Hypothyroid     Lower back pain     Migraine     Osteoarthritis     Osteopenia     Thyroid disease     Tremor        Past Surgical History:   Procedure Laterality Date    BACK SURGERY      lower back    BILATERAL OOPHORECTOMY       SECTION      CHOLECYSTECTOMY      HYSTERECTOMY         Family History   Problem Relation Age of Onset    Hypertension Family     Stroke Family     Heart disease Family     Thyroid disease Family        Social History     Occupational History    unemployed      Social History Main Topics    Smoking status: Former Smoker    Smokeless tobacco: Never Used    Alcohol use Yes      Comment: social    Drug use: Unknown    Sexual activity: Not on file         Current Outpatient Prescriptions:     albuterol (VENTOLIN HFA) 90 mcg/act inhaler, Inhale 2 puffs every 4 (four) hours as needed for wheezing or shortness of breath, Disp: 6 7 g, Rfl: 6    alendronate (FOSAMAX) 70 mg tablet, Take 1 tablet (70 mg total) by mouth once a week, Disp: 4 tablet, Rfl: 6    atorvastatin (LIPITOR) 20 mg tablet, Take 1 tablet (20 mg total) by mouth daily, Disp: 90 tablet, Rfl: 1    bimatoprost (LUMIGAN) 0 01 % ophthalmic drops, Administer 1 drop to both eyes daily, Disp: 5 mL, Rfl: 6    gabapentin (NEURONTIN) 800 mg tablet, Take 1 tablet (800 mg total) by mouth 2 (two) times a day, Disp: 60 tablet, Rfl: 2    glycopyrrolate (ROBINUL) 2 MG tablet, Take 1 tablet (2 mg total) by mouth 2 (two) times a day, Disp: 60 tablet, Rfl: 6    hydrochlorothiazide (HYDRODIURIL) 12 5 mg tablet, Take 1 tablet (12 5 mg total) by mouth daily, Disp: 30 tablet, Rfl: 6    levothyroxine 88 mcg tablet, Take 1 tablet (88 mcg total) by mouth daily, Disp: 30 tablet, Rfl: 0    losartan (COZAAR) 25 mg tablet, Take 1 tablet (25 mg total) by mouth daily, Disp: 90 tablet, Rfl: 0    meloxicam (MOBIC) 7 5 mg tablet, Take 1 tablet (7 5 mg total) by mouth daily, Disp: 30 tablet, Rfl: 2    Misc  Devices ([facility]) MISC, by Does not apply route continuous Rolling walker for gait dysfunction, pain, and left leg/knee weakness  , Disp: 1 each, Rfl: 0    montelukast (SINGULAIR) 10 mg tablet, Take 1 tablet (10 mg total) by mouth daily, Disp: 30 tablet, Rfl: 6    nortriptyline (PAMELOR) 25 mg capsule, Take 1 pill every other night x 1 week, then 1 PO Hs , Disp: 30 capsule, Rfl: 1    omeprazole (PriLOSEC) 40 MG capsule, Take 1 capsule (40 mg total) by mouth daily, Disp: 30 capsule, Rfl: 6    predniSONE 10 mg tablet, Take 1 tablet (10 mg total) by mouth daily Take as directed on sheet given in the office  , Disp: 21 tablet, Rfl: 0    Timolol Maleate 0 5 % (DAILY) SOLN, Apply 1 drop to eye every 12 (twelve) hours, Disp: 5 mL, Rfl: 6    Allergies   Allergen Reactions    Aspirin GI Intolerance    Iodinated Diagnostic Agents        Physical Exam:    There were no vitals taken for this visit  Constitutional:normal, well developed, well nourished, alert, in no distress and non-toxic and no overt pain behavior  Eyes:anicteric  HEENT:grossly intact  Neck:supple, symmetric, trachea midline and no masses   Pulmonary:even and unlabored  Cardiovascular:No edema or pitting edema present  Skin:Normal without rashes or lesions and well hydrated  Psychiatric:Mood and affect appropriate  Neurologic:Cranial Nerves II-XII grossly intact  Musculoskeletal:The patient's gait with slightly antalgic, painful, but steady with the use of a single cane  Imaging  No orders to display         No orders of the defined types were placed in this encounter

## 2018-05-29 NOTE — H&P
Assessment:  1  Chronic bilateral low back pain with left-sided sciatica    2  Chronic pain of left knee    3  Primary osteoarthritis of left knee    4  Lumbar spondylosis    5  Lumbar radiculopathy    6  Chronic pain syndrome    7  Lumbar post-laminectomy syndrome        Plan:  While the patient and her daughters were in the office today, I did have a thorough conversation with him regarding her medication regimen and treatment plan options  While the patient was in the office I did thoroughly review the results of her most recent MRI of the lumbar sacral spine and explained to her that at this point it does appear that there is a worsening herniated disc at the L5-S1 level push towards the right, however, this point since she denies very minimal or very occasional right lower extremity radicular symptoms, I am not convinced that is causing her pain  However, her MRI also showed worsening left L3 and L4 foraminal stenosis which could also explain her left lower extremity radicular symptoms and knee pain, especially since she just had a left knee injection with Dr Joy Haynes, which the patient reported provided minimal to no relief of her left knee pain  At this point time I discussed with the patient that we could consider proceeding with a left L3 and L4 transforaminal epidural steroid injection with Dr Rick Bailey for both diagnostic and hopefully therapeutic purposes  I did explain to the patient at the pending on the results of the injection it may need to be repeated  The patient was agreeable and verbalized an understanding  Complete risks and benefits including bleeding, infection, tissue reaction, nerve injury and allergic reaction were discussed  The approach was demonstrated using models and literature was provided  Verbal and written consent was obtained        I also discussed with the patient that at this point time I feel would be in her best interest to follow up with Dr Fe Garay for a neurosurgical opinion to discuss possible surgical options verses her candidacy for spinal cord stimulation in the future  I did briefly review the basic underlying theory of spinal cord stimulation with the patient and her family, however, at this point they are going to proceed with the consultation and we will go from there as appropriate and with what the patient is comfortable with  However, I did make sure that they understand that if spinal cord stimulation is an option, it is not changing the structure, but rather a tool to manage the pain  The patient and her daughters verbalized understanding  With regards to her medication regimen, explained to the patient at this point time since she did notice some relief with the nortriptyline, but was on a subtherapeutic dose, we are going to restart the nortriptyline at 25 mg a day and work up to 50 mg between now and her next office visit in 2 months  I advised the patient that if they experience any side effects or issues with the changes in their medication regiment, they should give our office a call to discuss  I also advised the patient not to drive or operate machinery until they see how the changes in the medication regimen affects them  The patient was agreeable and verbalized an understanding  The patient will follow-up in 8 weeks for medication prescription refill and reevaluation  The patient was advised to contact the office should their symptoms worsen in the interim  The patient was agreeable and verbalized an understanding  I attest that I have spent at least 40 minutes face to face with the patient and her 2 daughters and that at least 50% of the time was educating and/or discussing the patient's symptoms and treatment plan options until the patient and her family were satisfied with the plan  Please note that the patient speaks mostly Sami and that both of her daughters help to interpret the entire time        History of Present Illness: The patient is a 74 y o  female last seen on 4/24/18 who presents for a follow up office visit in regards to chronic pain secondary to lumbar post-laminectomy syndrome  The patient currently reports that since her last office visit she continues with the left-sided greater than right low back and left lower extremity radicular symptoms and presents today with her 2 daughters to review the results of her most recent MRI of the lumbosacral spine and discuss her treatment plan options  Since her last office visit she did follow-up with Dr Bienvenido Locke and had a left knee injection with minimal relief  At this point Dr Bienvenido Locke also feels that her left leg and knee pain is most likely stemming from her lumbar sacral spine  The patient did proceed with the Medrol Dosepak as we had discussed at her last office visit with very minimal improvement  She also tried the Nortriptyline and did feel it helped a little bit, without side effects, however, she ran out of them before her appointment today as she did in understand she was was to call before she was out of the medication  She presents today with her daughters to discuss her treatment plan  I have personally reviewed and/or updated the patient's past medical history, past surgical history, family history, social history, current medications, allergies, and vital signs today  Review of Systems:    Review of Systems   Respiratory: Positive for shortness of breath  Cardiovascular: Positive for chest pain  Gastrointestinal: Positive for constipation  Negative for diarrhea, nausea and vomiting  Musculoskeletal: Positive for gait problem  Negative for arthralgias, joint swelling (joint stiffness) and myalgias  Skin: Negative for rash  Neurological: Positive for dizziness  Negative for seizures and weakness  All other systems reviewed and are negative          Past Medical History:   Diagnosis Date    Abnormal finding on mammography     right  Arthritis     Asthma     Depression     Dyslipidemia     Dysuria     Elevated serum alkaline phosphatase level     Esophageal reflux     History of stomach ulcers     Hypercalcemia     Hypercholesterolemia     Hyperkalemia     Hypertension     Hypothyroid     Lower back pain     Migraine     Osteoarthritis     Osteopenia     Thyroid disease     Tremor        Past Surgical History:   Procedure Laterality Date    BACK SURGERY      lower back    BILATERAL OOPHORECTOMY       SECTION      CHOLECYSTECTOMY      HYSTERECTOMY         Family History   Problem Relation Age of Onset    Hypertension Family     Stroke Family     Heart disease Family     Thyroid disease Family        Social History     Occupational History    unemployed      Social History Main Topics    Smoking status: Former Smoker    Smokeless tobacco: Never Used    Alcohol use Yes      Comment: social    Drug use: Unknown    Sexual activity: Not on file         Current Outpatient Prescriptions:     albuterol (VENTOLIN HFA) 90 mcg/act inhaler, Inhale 2 puffs every 4 (four) hours as needed for wheezing or shortness of breath, Disp: 6 7 g, Rfl: 6    alendronate (FOSAMAX) 70 mg tablet, Take 1 tablet (70 mg total) by mouth once a week, Disp: 4 tablet, Rfl: 6    atorvastatin (LIPITOR) 20 mg tablet, Take 1 tablet (20 mg total) by mouth daily, Disp: 90 tablet, Rfl: 1    bimatoprost (LUMIGAN) 0 01 % ophthalmic drops, Administer 1 drop to both eyes daily, Disp: 5 mL, Rfl: 6    gabapentin (NEURONTIN) 800 mg tablet, Take 1 tablet (800 mg total) by mouth 2 (two) times a day, Disp: 60 tablet, Rfl: 2    glycopyrrolate (ROBINUL) 2 MG tablet, Take 1 tablet (2 mg total) by mouth 2 (two) times a day, Disp: 60 tablet, Rfl: 6    hydrochlorothiazide (HYDRODIURIL) 12 5 mg tablet, Take 1 tablet (12 5 mg total) by mouth daily, Disp: 30 tablet, Rfl: 6    levothyroxine 88 mcg tablet, Take 1 tablet (88 mcg total) by mouth daily, Disp: 30 tablet, Rfl: 0    losartan (COZAAR) 25 mg tablet, Take 1 tablet (25 mg total) by mouth daily, Disp: 90 tablet, Rfl: 0    meloxicam (MOBIC) 7 5 mg tablet, Take 1 tablet (7 5 mg total) by mouth daily, Disp: 30 tablet, Rfl: 2    Misc  Devices ([facility]) MISC, by Does not apply route continuous Rolling walker for gait dysfunction, pain, and left leg/knee weakness  , Disp: 1 each, Rfl: 0    montelukast (SINGULAIR) 10 mg tablet, Take 1 tablet (10 mg total) by mouth daily, Disp: 30 tablet, Rfl: 6    nortriptyline (PAMELOR) 25 mg capsule, Take 1 pill every other night x 1 week, then 1 PO Hs , Disp: 30 capsule, Rfl: 1    omeprazole (PriLOSEC) 40 MG capsule, Take 1 capsule (40 mg total) by mouth daily, Disp: 30 capsule, Rfl: 6    predniSONE 10 mg tablet, Take 1 tablet (10 mg total) by mouth daily Take as directed on sheet given in the office  , Disp: 21 tablet, Rfl: 0    Timolol Maleate 0 5 % (DAILY) SOLN, Apply 1 drop to eye every 12 (twelve) hours, Disp: 5 mL, Rfl: 6    Allergies   Allergen Reactions    Aspirin GI Intolerance    Iodinated Diagnostic Agents        Physical Exam:    There were no vitals taken for this visit  Constitutional:normal, well developed, well nourished, alert, in no distress and non-toxic and no overt pain behavior  Eyes:anicteric  HEENT:grossly intact  Neck:supple, symmetric, trachea midline and no masses   Pulmonary:even and unlabored  Cardiovascular:No edema or pitting edema present  Skin:Normal without rashes or lesions and well hydrated  Psychiatric:Mood and affect appropriate  Neurologic:Cranial Nerves II-XII grossly intact  Musculoskeletal:The patient's gait with slightly antalgic, painful, but steady with the use of a single cane  Imaging  No orders to display         No orders of the defined types were placed in this encounter

## 2018-05-30 ENCOUNTER — TELEPHONE (OUTPATIENT)
Dept: PAIN MEDICINE | Facility: CLINIC | Age: 73
End: 2018-05-30

## 2018-05-30 DIAGNOSIS — E03.9 HYPOTHYROIDISM, UNSPECIFIED TYPE: ICD-10-CM

## 2018-05-30 RX ORDER — LEVOTHYROXINE SODIUM 88 MCG
TABLET ORAL
Qty: 90 TABLET | Refills: 1 | Status: SHIPPED | OUTPATIENT
Start: 2018-05-30 | End: 2018-12-16 | Stop reason: SDUPTHER

## 2018-06-01 NOTE — TELEPHONE ENCOUNTER
Received ok from Dr Jazmine Best to schedule on 6/7/18  Scheduled pt for L3 L4 Tfesi on 6/7/18  Went over pre-procedure instructions below:    Pts daughter denies pt taking prescription blood thinners    Nothing to eat or drink 1 hr prior to procedure  Need to arrange transportation  Proper clothing for procedure  If ill or placed on antibiotics please call to reschedule

## 2018-06-01 NOTE — TELEPHONE ENCOUNTER
Spoke with pt's daughter to get her scheduled for TFESI  I offered 6/11/18 next day available  Pts daughter stated pt is in severe pain and would like to come sooner  Sent message to Dr Sintia Greenberg to see if they can come for 6/7/18   Awaiting response

## 2018-06-01 NOTE — TELEPHONE ENCOUNTER
Patient's daughter Christa Maciel called to schedule her mom for an injection  Please call Christa Maciel at [phone] as her mom speaks very little english  Thank you!

## 2018-06-07 ENCOUNTER — HOSPITAL ENCOUNTER (OUTPATIENT)
Dept: RADIOLOGY | Facility: CLINIC | Age: 73
Discharge: HOME | End: 2018-06-07
Attending: ANESTHESIOLOGY
Payer: COMMERCIAL

## 2018-06-07 VITALS
SYSTOLIC BLOOD PRESSURE: 159 MMHG | RESPIRATION RATE: 20 BRPM | DIASTOLIC BLOOD PRESSURE: 82 MMHG | HEART RATE: 78 BPM | OXYGEN SATURATION: 98 % | TEMPERATURE: 97.7 F

## 2018-06-07 DIAGNOSIS — M54.42 CHRONIC BILATERAL LOW BACK PAIN WITH LEFT-SIDED SCIATICA: ICD-10-CM

## 2018-06-07 DIAGNOSIS — G89.29 CHRONIC BILATERAL LOW BACK PAIN WITH LEFT-SIDED SCIATICA: ICD-10-CM

## 2018-06-07 DIAGNOSIS — M96.1 LUMBAR POST-LAMINECTOMY SYNDROME: ICD-10-CM

## 2018-06-07 DIAGNOSIS — M54.16 LUMBAR RADICULOPATHY: ICD-10-CM

## 2018-06-07 PROCEDURE — 64483 NJX AA&/STRD TFRM EPI L/S 1: CPT | Performed by: ANESTHESIOLOGY

## 2018-06-07 PROCEDURE — A9579 GAD-BASE MR CONTRAST NOS,1ML: HCPCS | Performed by: ANESTHESIOLOGY

## 2018-06-07 PROCEDURE — 64484 NJX AA&/STRD TFRM EPI L/S EA: CPT | Performed by: ANESTHESIOLOGY

## 2018-06-07 RX ORDER — PAPAVERINE HCL 150 MG
20 CAPSULE, EXTENDED RELEASE ORAL ONCE
Status: COMPLETED | OUTPATIENT
Start: 2018-06-07 | End: 2018-06-07

## 2018-06-07 RX ORDER — LIDOCAINE HYDROCHLORIDE 10 MG/ML
5 INJECTION, SOLUTION EPIDURAL; INFILTRATION; INTRACAUDAL; PERINEURAL ONCE
Status: COMPLETED | OUTPATIENT
Start: 2018-06-07 | End: 2018-06-07

## 2018-06-07 RX ADMIN — LIDOCAINE HYDROCHLORIDE 5 ML: 20 INJECTION, SOLUTION EPIDURAL; INFILTRATION; INTRACAUDAL at 13:18

## 2018-06-07 RX ADMIN — GADOPENTETATE DIMEGLUMINE 1 ML: 469.01 INJECTION INTRAVENOUS at 13:13

## 2018-06-07 RX ADMIN — DEXAMETHASONE SODIUM PHOSPHATE 20 MG: 10 INJECTION, SOLUTION INTRAMUSCULAR; INTRAVENOUS at 13:15

## 2018-06-07 RX ADMIN — LIDOCAINE HYDROCHLORIDE 5 ML: 10 INJECTION, SOLUTION EPIDURAL; INFILTRATION; INTRACAUDAL; PERINEURAL at 13:15

## 2018-06-07 NOTE — DISCHARGE INSTRUCTIONS
Epidural Steroid Injection   WHAT YOU NEED TO KNOW:   An epidural steroid injection (KAYLA) is a procedure to inject steroid medicine into the epidural space  The epidural space is between your spinal cord and vertebrae  Steroids reduce inflammation and fluid buildup in your spine that may be causing pain  You may be given pain medicine along with the steroids  ACTIVITY  · Do not drive or operate machinery today  · No strenuous activity today - bending, lifting, etc   · You may resume normal activites starting tomorrow - start slowly and as tolerated  · You may shower today, but no tub baths or hot tubs  · You may have numbness for several hours from the local anesthetic  Please use caution and common sense, especially with weight-bearing activities  CARE OF THE INJECTION SITE  · If you have soreness or pain, apply ice to the area today (20 minutes on/20 minutes off)  · Starting tomorrow, you may use warm, moist heat or ice if needed  · You may have an increase or change in your discomfort for 36-48 hours after your treatment  · Apply ice and continue with any pain medication you have been prescribed  · Notify [facility] if you have any of the following: redness, drainage, swelling, headache, stiff neck or fever above 100°F     SPECIAL INSTRUCTIONS  · Our office will contact you in approximately 7 days for a progress report  MEDICATIONS  · Continue to take all routine medications  · Our office may have instructed you to hold some medications  If you have a problem specifically related to your procedure, please call our office at [phone]  Problems not related to your procedure should be directed to your primary care physician

## 2018-06-07 NOTE — H&P
History of Present Illness: The patient is a 74 y o  female who presents with complaints of low back and leg pain      Patient Active Problem List   Diagnosis    Tremor    Atrophic vaginitis    Osteoporosis    Osteoarthritis    Obesity    Migraine headache    Hypothyroidism    Hypertension    Glaucoma    Esophageal reflux    Dyslipidemia    Cataract    Asthma    Lumbar spondylosis    IFG (impaired fasting glucose)    Chronic pain syndrome    Chronic bilateral low back pain with left-sided sciatica    Lumbar post-laminectomy syndrome    Lumbar radiculopathy    Chronic pain of left knee    Primary osteoarthritis of left knee    Gait abnormality    Weakness of left leg    Anserine bursitis       Past Medical History:   Diagnosis Date    Abnormal finding on mammography     right    Arthritis     Asthma     Depression     Dyslipidemia     Dysuria     Elevated serum alkaline phosphatase level     Esophageal reflux     History of stomach ulcers     Hypercalcemia     Hypercholesterolemia     Hyperkalemia     Hypertension     Hypothyroid     Lower back pain     Migraine     Osteoarthritis     Osteopenia     Thyroid disease     Tremor        Past Surgical History:   Procedure Laterality Date    BACK SURGERY      lower back    BILATERAL OOPHORECTOMY       SECTION      CHOLECYSTECTOMY      HYSTERECTOMY           Current Outpatient Prescriptions:     albuterol (VENTOLIN HFA) 90 mcg/act inhaler, Inhale 2 puffs every 4 (four) hours as needed for wheezing or shortness of breath, Disp: 6 7 g, Rfl: 6    alendronate (FOSAMAX) 70 mg tablet, Take 1 tablet (70 mg total) by mouth once a week, Disp: 4 tablet, Rfl: 6    atorvastatin (LIPITOR) 20 mg tablet, Take 1 tablet (20 mg total) by mouth daily, Disp: 90 tablet, Rfl: 1    bimatoprost (LUMIGAN) 0 01 % ophthalmic drops, Administer 1 drop to both eyes daily, Disp: 5 mL, Rfl: 6    gabapentin (NEURONTIN) 800 mg tablet, Take 1 tablet (800 mg total) by mouth 2 (two) times a day, Disp: 60 tablet, Rfl: 2    glycopyrrolate (ROBINUL) 2 MG tablet, Take 1 tablet (2 mg total) by mouth 2 (two) times a day, Disp: 60 tablet, Rfl: 6    hydrochlorothiazide (HYDRODIURIL) 12 5 mg tablet, Take 1 tablet (12 5 mg total) by mouth daily, Disp: 30 tablet, Rfl: 6    losartan (COZAAR) 25 mg tablet, Take 1 tablet (25 mg total) by mouth daily, Disp: 90 tablet, Rfl: 0    meloxicam (MOBIC) 7 5 mg tablet, Take 1 tablet (7 5 mg total) by mouth daily, Disp: 30 tablet, Rfl: 2    montelukast (SINGULAIR) 10 mg tablet, Take 1 tablet (10 mg total) by mouth daily, Disp: 30 tablet, Rfl: 6    nortriptyline (PAMELOR) 25 mg capsule, Take 1 pill every other night x 1 week, then 1 PO Hs x 1 week, then 2 PO HS , Disp: 60 capsule, Rfl: 1    omeprazole (PriLOSEC) 40 MG capsule, Take 1 capsule (40 mg total) by mouth daily, Disp: 30 capsule, Rfl: 6    SYNTHROID 88 MCG tablet, TAKE 1 TABLET BY MOUTH ONCE DAILY, Disp: 90 tablet, Rfl: 1    Timolol Maleate 0 5 % (DAILY) SOLN, Apply 1 drop to eye every 12 (twelve) hours, Disp: 5 mL, Rfl: 6    Misc  Devices ([facility]) MISC, by Does not apply route continuous Rolling walker for gait dysfunction, pain, and left leg/knee weakness  , Disp: 1 each, Rfl: 0    Allergies   Allergen Reactions    Iodinated Diagnostic Agents Anaphylaxis    Aspirin GI Intolerance       Physical Exam:   Vitals:    06/07/18 1258   BP: 120/72   Pulse: 73   Resp: 20   Temp: 97 7 °F (36 5 °C)   SpO2: 97%     General: Awake, Alert, Oriented x 3, Mood and affect appropriate  Respiratory: Respirations even and unlabored  Cardiovascular: Peripheral pulses intact; no edema  Musculoskeletal Exam:  Decreased range of motion lumbar spine    ASA Score:III    Patient/Chart Verification  Patient ID Verified: Verbal  ID Band Applied: No  Consents Confirmed: Procedural, To be obtained in the Pre-Procedure area  H&P( within 30 days) Verified:  To be obtained in the Pre-Procedure area  Interval H&P(within 24 hr) Complete (required for Outpatients and Surgery Admit only): To be obtained in the Pre-Procedure area  Allergies Reviewed: Yes  Anticoag/NSAID held?: NA  Currently on antibiotics?: No  Does Patient Have a Prosthetic Device/Implant: Yes, see comment (lumbar fusion screws and plates)    Assessment:   1  Chronic bilateral low back pain with left-sided sciatica    2  Lumbar radiculopathy    3   Lumbar post-laminectomy syndrome        Plan: Left L3 & L4 TFESI

## 2018-06-15 ENCOUNTER — TELEPHONE (OUTPATIENT)
Dept: PAIN MEDICINE | Facility: CLINIC | Age: 73
End: 2018-06-15

## 2018-06-15 NOTE — TELEPHONE ENCOUNTER
S/p she states that after 3 days the pain is back and has no 0% releif with a pain level of 10 is having difficulty walking  Sp LT  L3&4 JORGE w/SL on 06/07/2018 in [place]  F/u with DG on 07/24/2018

## 2018-06-18 DIAGNOSIS — G89.4 CHRONIC PAIN SYNDROME: ICD-10-CM

## 2018-06-19 RX ORDER — GABAPENTIN 800 MG/1
TABLET ORAL
Qty: 60 TABLET | Refills: 0 | Status: SHIPPED | OUTPATIENT
Start: 2018-06-19 | End: 2018-07-16 | Stop reason: SDUPTHER

## 2018-06-19 RX ORDER — MELOXICAM 7.5 MG/1
TABLET ORAL
Qty: 30 TABLET | Refills: 0 | Status: SHIPPED | OUTPATIENT
Start: 2018-06-19 | End: 2018-07-16 | Stop reason: SDUPTHER

## 2018-06-19 RX ORDER — PREDNISONE 10 MG/1
TABLET ORAL
Qty: 21 TABLET | Refills: 0 | OUTPATIENT
Start: 2018-06-19

## 2018-06-26 ENCOUNTER — OFFICE VISIT (OUTPATIENT)
Dept: FAMILY MEDICINE CLINIC | Facility: HOSPITAL | Age: 73
End: 2018-06-26
Payer: COMMERCIAL

## 2018-06-26 VITALS
WEIGHT: 164 LBS | HEIGHT: 59 IN | HEART RATE: 79 BPM | DIASTOLIC BLOOD PRESSURE: 80 MMHG | BODY MASS INDEX: 33.06 KG/M2 | TEMPERATURE: 96.7 F | SYSTOLIC BLOOD PRESSURE: 138 MMHG

## 2018-06-26 DIAGNOSIS — R73.01 IFG (IMPAIRED FASTING GLUCOSE): Primary | ICD-10-CM

## 2018-06-26 DIAGNOSIS — E78.5 DYSLIPIDEMIA: ICD-10-CM

## 2018-06-26 DIAGNOSIS — G89.4 CHRONIC PAIN SYNDROME: ICD-10-CM

## 2018-06-26 DIAGNOSIS — R07.89 ATYPICAL CHEST PAIN: ICD-10-CM

## 2018-06-26 DIAGNOSIS — E03.9 HYPOTHYROIDISM, UNSPECIFIED TYPE: ICD-10-CM

## 2018-06-26 DIAGNOSIS — I10 ESSENTIAL HYPERTENSION: ICD-10-CM

## 2018-06-26 PROBLEM — Z87.11 HISTORY OF STOMACH ULCERS: Status: RESOLVED | Noted: 2018-06-26 | Resolved: 2018-06-26

## 2018-06-26 PROBLEM — E83.52 HYPERCALCEMIA: Status: RESOLVED | Noted: 2018-06-26 | Resolved: 2018-06-26

## 2018-06-26 PROBLEM — F32.A DEPRESSION: Status: RESOLVED | Noted: 2018-06-26 | Resolved: 2018-06-26

## 2018-06-26 PROBLEM — M85.80 OSTEOPENIA: Status: RESOLVED | Noted: 2018-06-26 | Resolved: 2018-06-26

## 2018-06-26 PROCEDURE — 99214 OFFICE O/P EST MOD 30 MIN: CPT | Performed by: INTERNAL MEDICINE

## 2018-06-26 PROCEDURE — 3079F DIAST BP 80-89 MM HG: CPT | Performed by: INTERNAL MEDICINE

## 2018-06-26 PROCEDURE — 93000 ELECTROCARDIOGRAM COMPLETE: CPT | Performed by: INTERNAL MEDICINE

## 2018-06-26 PROCEDURE — 3075F SYST BP GE 130 - 139MM HG: CPT | Performed by: INTERNAL MEDICINE

## 2018-06-26 NOTE — ASSESSMENT & PLAN NOTE
HDL a bit low otherwise FLP at goal - encouraged to keep as active as back pain allows, con't current statin, recheck FLP annually

## 2018-06-26 NOTE — PROGRESS NOTES
Assessment/Plan:    Hypothyroidism  Clinically euthyroid, TFT's nml range, con't current levothyroxine    IFG (impaired fasting glucose)  FBS/A1C back to normal - con't watching sugars/carbs and try and get wgt down, recheck Bw annually    Hypertension  BP at goal, con't current meds    Dyslipidemia  HDL a bit low otherwise FLP at goal - encouraged to keep as active as back pain allows, con't current statin, recheck FLP annually    Chronic pain syndrome  Con't pain meds and f/u as per pain mgt, has upcoming appt with Neurosurgery and will await consult       Diagnoses and all orders for this visit:    IFG (impaired fasting glucose)    Dyslipidemia    Hypothyroidism, unspecified type    Essential hypertension    Chronic pain syndrome    Atypical chest pain  Comments:  Symptoms atypical and likely related to chronic pain, ECG done today and no acute ischemic changes noted, advised to call with new/worse symptoms  Orders:  -     POCT ECG      Mammo done April 2018    Dexa done April 2017    [place] done July 2014 - good for 10 yrs    Subjective:      Patient ID: Paco Griffith is a 74 y o  female  HPI Pt here for follow up appt    BW results from March 2018 were reviewed - FBS/A1C 93/5 6, BUN/Cr up at 27/1 02 (GFR 54), rest of CMP/TSH/FT4/CBC was wnl, FLP with low HDL at 48 but rest of panel wnl  Def of nml vs IFG vs DM was d/w pt in detail  Diet/exercise was reviewed - wgt is down 3 lbs from May 2018  Goal FLP was reviewed  Pt is taking her Atorvastatin daily w/o SE  She notes no recent stroke/TIA symptoms/CP  Pt is taking her Levothyroxine daily as directed  She has had no significant wgt changes/fatigue/hair loss/skin changes/tremor/palp  BP at goal today and meds were reviewed and no changes have occurred  She denies missing doses of meds or SE with the meds  She does not check her BP outside the office  She notes no frequent Ha's/double vision/CP   She has had some dizziness and it seems to occur at any time - even just sitting at rest      Pt has seen Pain Mgt and Ortho for her chronic pain and L knee pain  She had steroid injection of the knee w/o any benefit  She had epidural injection of her back with very short term improvement of her pain  She had MRI of lumbar spine which showed worsening disc herniation of L5-S1 and worsening L foraminal narrowing at L3-4 and L4-5  She was referred to Neurosurgery and has appt early July  She is taking her Gabapentin twice daily with Meloxicam daily and Nortriptyline qhs  She states she has had a little benefit with the pain regimen  Today her pain is a 9/10  She notes numbness and tingling in LLE  She has no loss of bowel or bladder control  She has noted some substernal CP intermittently  The pain primarily occurs when her back pain is really bad  The pain makes her catch her breath and she feels SOB  The pain does not radiate and she denies diaphoresis/N/V  Mammo was done April 2018    Dexa April 2017    She had [place] in [place] in 2014 - good for 10 yrs    Review of Systems   Constitutional: Negative for chills and fever  HENT: Negative for congestion and sore throat  Eyes: Negative for pain and discharge  Respiratory: Positive for shortness of breath  Negative for cough and wheezing  Cardiovascular: Positive for chest pain  Negative for palpitations and leg swelling  Gastrointestinal: Negative for abdominal pain, constipation, diarrhea and nausea  Endocrine: Negative for polydipsia and polyuria  Genitourinary: Negative for difficulty urinating and dysuria  Musculoskeletal: Positive for back pain  Negative for neck pain  Skin: Negative for rash and wound  Neurological: Positive for dizziness  Negative for syncope and headaches  Hematological: Negative for adenopathy  Psychiatric/Behavioral: Negative for behavioral problems and confusion           Objective:    /80   Pulse 79   Temp (!) 96 7 °F (35 9 °C)   Ht 4' 11" (1 499 m)   Wt 74 4 kg (164 lb)   BMI 33 12 kg/m²      Physical Exam   Constitutional: She appears well-developed and well-nourished  No distress  HENT:   Head: Normocephalic and atraumatic  Eyes: Conjunctivae are normal  Right eye exhibits no discharge  Left eye exhibits no discharge  Neck: Neck supple  No tracheal deviation present  Cardiovascular: Normal rate, regular rhythm and normal heart sounds  Exam reveals no friction rub  No murmur heard  Pulmonary/Chest: Effort normal and breath sounds normal  No respiratory distress  She has no wheezes  She has no rales  Abdominal: Soft  She exhibits no distension  There is no tenderness  There is no guarding  Musculoskeletal: She exhibits no edema  Neurological: She is alert  She exhibits normal muscle tone  Skin: Skin is warm  No rash noted  Psychiatric: She has a normal mood and affect  Her behavior is normal    Nursing note and vitals reviewed

## 2018-07-09 ENCOUNTER — OFFICE VISIT (OUTPATIENT)
Dept: NEUROSURGERY | Facility: CLINIC | Age: 73
End: 2018-07-09
Payer: COMMERCIAL

## 2018-07-09 VITALS
SYSTOLIC BLOOD PRESSURE: 168 MMHG | TEMPERATURE: 97.5 F | HEIGHT: 59 IN | DIASTOLIC BLOOD PRESSURE: 69 MMHG | RESPIRATION RATE: 16 BRPM | HEART RATE: 78 BPM | WEIGHT: 164 LBS | BODY MASS INDEX: 33.06 KG/M2

## 2018-07-09 DIAGNOSIS — M54.16 LUMBAR RADICULOPATHY: ICD-10-CM

## 2018-07-09 DIAGNOSIS — M47.816 LUMBAR SPONDYLOSIS: ICD-10-CM

## 2018-07-09 DIAGNOSIS — G89.4 CHRONIC PAIN SYNDROME: ICD-10-CM

## 2018-07-09 DIAGNOSIS — M96.1 POST LAMINECTOMY SYNDROME: Primary | ICD-10-CM

## 2018-07-09 DIAGNOSIS — M96.1 LUMBAR POST-LAMINECTOMY SYNDROME: ICD-10-CM

## 2018-07-09 PROCEDURE — 99203 OFFICE O/P NEW LOW 30 MIN: CPT | Performed by: NEUROLOGICAL SURGERY

## 2018-07-09 NOTE — PROGRESS NOTES
Assessment/Plan:    No problem-specific Assessment & Plan notes found for this encounter  Diagnoses and all orders for this visit:    Post laminectomy syndrome    Lumbar spondylosis  -     Ambulatory referral to Neurosurgery    Lumbar radiculopathy  -     Ambulatory referral to Neurosurgery    Chronic pain syndrome  -     Ambulatory referral to Neurosurgery    Lumbar post-laminectomy syndrome  -     Ambulatory referral to Neurosurgery        Summary: This is a 67-year-old female with chronic pain involving her lower back, bilateral hips, and left greater than right lower extremity  History of two lumbar decompression surgeries  Referred by Dr Asim Caldwell for surgery versus stimulator evaluation  MRI of the lumbar spine with postoperative changes from a wide decompressive laminectomy  There is no appreciable central or foraminal stenosis  Alignment well maintained  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  Her pain pattern is chronic and was unchanged from her surgeries  In addition, I believe her symptoms are consistent with arachnoiditis and post laminectomy syndrome  I do not appreciate a surgical pathology  This was discussed in detail with the patient, her , and her daughter being used as an   She is an appropriate candidate for spinal cord stimulation  Spinal cord stimulation, spinal cord stimulation trial, and expected risks and benefits were reviewed with the patient and family  They will follow up with Dr Asim Caldwell for a trial     There return to see me for permanent implant if successful  Additional discussion also held in reference to her right knee complaints and they report they have scheduled follow-up with an orthopedic surgeon  Subjective:      Patient ID: Makayla Hayes is a 74 y o  female      HPI     This is a very pleasant 67-year-old female with a greater than 3 year history of worsening lower back and left greater than right lower extremity pain  She describes a dull and aching lower back pain which is always present and can be sharp with activity  It radiates into her bilateral hips  She also reports having a left greater than right lateral leg pain that extends to her ankle  She has a non- painful numbness and tingling of her feet  She underwent 2 lumbar decompression surgeries in [facility] in 2016 without relief of her current chronic pain  Her symptoms started approximately 1 year prior to that  She is referred by Dr Maye Eaton for surgery for spinal cord stimulator evaluation  The patient is largely [place]-speaking and her daughter interpreted  She does have baseline right knee pain  There are times where her knee loy and she has fallen  Denies bowel or bladder changes  The following portions of the patient's history were reviewed and updated as appropriate: allergies, current medications, past family history, past medical history, past social history and past surgical history  Review of Systems   Constitutional: Positive for fatigue  HENT: Positive for trouble swallowing  Eyes: Negative  Respiratory:        Controlled asthma   Cardiovascular: Negative  Gastrointestinal: Positive for constipation  Endocrine: Negative  Genitourinary: Negative  Musculoskeletal: Positive for back pain (center LBP across waist worse on left radiating down outer bilateral leg)  Skin: Negative  Allergic/Immunologic: Negative  Neurological: Positive for dizziness, weakness (left leg weakness), numbness (left hand and foot) and headaches  Hematological: Negative  Psychiatric/Behavioral: Negative  Objective:      /69 (BP Location: Left arm, Patient Position: Sitting, Cuff Size: Standard)   Pulse 78   Temp 97 5 °F (36 4 °C) (Tympanic)   Resp 16   Ht 4' 11" (1 499 m)   Wt 74 4 kg (164 lb)   BMI 33 12 kg/m²          Physical Exam   Constitutional: She appears well-developed  HENT:   Head: Normocephalic  Eyes: Pupils are equal, round, and reactive to light  Neck: Normal range of motion  Pulmonary/Chest: Effort normal    Musculoskeletal: Normal range of motion  Neurological: She has normal strength  No sensory deficit  Psychiatric: She has a normal mood and affect  Her behavior is normal          Results:      Reviewed imaging and report of MRI of the lumbar spine May 2018  There are postoperative changes from a wide decompressive laminectomy from L3 through S1  There is a mild central disc protrusion at L5-S1  There is no appreciable central or foraminal stenosis  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

## 2018-07-10 ENCOUNTER — HOSPITAL ENCOUNTER (OUTPATIENT)
Dept: RADIOLOGY | Facility: HOSPITAL | Age: 73
Discharge: HOME | End: 2018-07-10
Payer: COMMERCIAL

## 2018-07-10 ENCOUNTER — OFFICE VISIT (OUTPATIENT)
Dept: PAIN MEDICINE | Facility: CLINIC | Age: 73
End: 2018-07-10
Payer: COMMERCIAL

## 2018-07-10 ENCOUNTER — APPOINTMENT (OUTPATIENT)
Dept: RADIOLOGY | Facility: CLINIC | Age: 73
End: 2018-07-10
Payer: COMMERCIAL

## 2018-07-10 VITALS
WEIGHT: 166 LBS | DIASTOLIC BLOOD PRESSURE: 68 MMHG | SYSTOLIC BLOOD PRESSURE: 154 MMHG | HEIGHT: 59 IN | HEART RATE: 84 BPM | BODY MASS INDEX: 33.47 KG/M2

## 2018-07-10 DIAGNOSIS — M54.16 LUMBAR RADICULOPATHY: ICD-10-CM

## 2018-07-10 DIAGNOSIS — G89.29 CHRONIC PAIN OF LEFT KNEE: ICD-10-CM

## 2018-07-10 DIAGNOSIS — G89.29 CHRONIC BILATERAL LOW BACK PAIN WITH LEFT-SIDED SCIATICA: ICD-10-CM

## 2018-07-10 DIAGNOSIS — M96.1 LUMBAR POST-LAMINECTOMY SYNDROME: ICD-10-CM

## 2018-07-10 DIAGNOSIS — M47.816 LUMBAR SPONDYLOSIS: ICD-10-CM

## 2018-07-10 DIAGNOSIS — G89.4 CHRONIC PAIN SYNDROME: ICD-10-CM

## 2018-07-10 DIAGNOSIS — M54.42 CHRONIC BILATERAL LOW BACK PAIN WITH LEFT-SIDED SCIATICA: ICD-10-CM

## 2018-07-10 DIAGNOSIS — M54.42 CHRONIC BILATERAL LOW BACK PAIN WITH LEFT-SIDED SCIATICA: Primary | ICD-10-CM

## 2018-07-10 DIAGNOSIS — G89.29 CHRONIC BILATERAL LOW BACK PAIN WITH LEFT-SIDED SCIATICA: Primary | ICD-10-CM

## 2018-07-10 DIAGNOSIS — R26.9 GAIT ABNORMALITY: ICD-10-CM

## 2018-07-10 DIAGNOSIS — M17.12 PRIMARY OSTEOARTHRITIS OF LEFT KNEE: ICD-10-CM

## 2018-07-10 DIAGNOSIS — M25.562 CHRONIC PAIN OF LEFT KNEE: ICD-10-CM

## 2018-07-10 PROCEDURE — 72072 X-RAY EXAM THORAC SPINE 3VWS: CPT

## 2018-07-10 PROCEDURE — 99215 OFFICE O/P EST HI 40 MIN: CPT | Performed by: NURSE PRACTITIONER

## 2018-07-10 RX ORDER — NORTRIPTYLINE HYDROCHLORIDE 25 MG/1
CAPSULE ORAL
Qty: 60 CAPSULE | Refills: 1 | Status: SHIPPED | OUTPATIENT
Start: 2018-07-10 | End: 2018-07-31 | Stop reason: SDUPTHER

## 2018-07-10 NOTE — PROGRESS NOTES
Assessment:  1  Chronic bilateral low back pain with left-sided sciatica    2  Chronic pain of left knee    3  Chronic pain syndrome    4  Gait abnormality    5  Lumbar post-laminectomy syndrome    6  Primary osteoarthritis of left knee    7  Lumbar spondylosis    8  Lumbar radiculopathy        Plan:  While the patient and her daughter were in the office today, I did have a very long and thorough conversation with them regarding her medication regimen and treatment plan  I discussed with the patient that at this point time since she is noting significant pain and she did notice at least a week's relief from the previous injection, I feel would be beneficial to proceed with a repeat left L3 and L4 transforaminal epidural steroid injection with Dr Angle Alvarado  I explained to the patient that hopefully this will provide some relief in the meantime while we continue to work on other options such as the spinal cord stimulator  The patient was agreeable and verbalized an understanding  Complete risks and benefits including bleeding, infection, tissue reaction, nerve injury and allergic reaction were discussed  The approach was demonstrated using models and literature was provided  Verbal and written consent was obtained  I also discussed with the patient at this point time since she is on a subtherapeutic dose of the nortriptyline with some improvement, at this point I would like her to increase the nortriptyline to 50 mg daily for the next 2 months and see how she does  She can also continue with the gabapentin as prescribed  I advised the patient that if they experience any side effects or issues with the changes in their medication regiment, they should give our office a call to discuss  I also advised the patient not to drive or operate machinery until they see how the changes in the medication regimen affects them  The patient was agreeable and verbalized an understanding       I had a very thorough conversation with the patient regarding the spinal cord stimulator trial and possible permanent implantation  I explained that there is pre-authorization process, including the need to proceed with an education class and psychological evaluation, both of which must be completed, before we can consider proceeding with the spinal cord stimulator trial pre-authorization process with the insurance company  I spent a significant amount of time reviewing the basic theory of spinal cord stimulation as well as the hope that it would provide at least moderate improvement and improve the patient's pain and overall functions with regards to perfomring activities of daily living and leisure activities with as little pain as possible  I feel that with regards to the stimulator process, I answered the patient's questions to the best of my abilities and until the patient was thoroughly satisfied  While the patient was in the office today, a stimulator booklet was sent home for the patient and family to review  I encouraged that before any decisions regarding the stimulator were made that they also proceed with the stimulator education class and proceed from there with regards to proceeding with the psychological evaluation  I advised the patient that if they are interested in proceeding with the spinal cord stimulator trial they need to then proceed with the psychological evaluation so we can try to expedite the insurance pre-authorization process, which can take at least 4-6 weeks to obtain approval  The patient verbalized an understanding  I also had a thorough conversation with the patient and explained that overall the expectations of the spinal cord stimulator are not to cure the pain, but rather to at least provide moderate/stable relief, allowing the patient to be more active and functional on a daily basis    I stressed that the goal of the stimulator and our treatment is NOT to proved 100% relief of their pain, but, hopefully at least 50% relief or more  I also explained that if they have a successful trial and eventually proceed with a permanent implant, that utilizing the full potential of the stimulator can require continuous re-programming and education and I explained that the stimulator is/will be an ongoing and evolving learning process for the patient  The patient was agreeable and verbalized an understanding  At this point it is the patient's wishes to proceed with the prior authorization process for the spinal cord stimulator trial  While the patient was in the office today, she did sign the consent form and was signed up for the stimulator education class  The patient was also given a prescription for a psychological evaluation and a list of local providers would perform the assessment  I advised the patient that once she has completed the class and the psychological assessment, we can then submit her insurance for authorization, however, I did explain to the patient that it may take 6-8 weeks to get approval and as soon as we do our office will be in touch with her to schedule the trial The patient was agreeable and verbalized an understanding  The patient will follow-up in 8 weeks for medication prescription refill and reevaluation  The patient was advised to contact the office should their symptoms worsen in the interim  The patient was agreeable and verbalized an understanding  History of Present Illness: The patient is a 74 y o  female last seen on 6/7/18 Left L3 & L4 TFESI who presents for a follow up office visit in regards to chronic pain secondary to lumbar postlaminectomy syndrome  The patient currently reports that her pain symptoms have worsened as she did feel that the previous epidural steroid injection was moderately to significantly helpful for about a week and then the pain came back to her baseline and she feels it is actually worsening at this point    Since her last office visit she did follow-up with Dr Gianluca Ng, who at this point does not feel that there are any good surgical options as referred her back to our office to discuss proceeding with the spinal cord stimulator trial   Since her last office visit she did restart the Nortriptyline and is taking 25 mg at bedtime, with minimal relief and is still having issues sleeping at nighttime  She also continues to have left knee pain and had seen Dr Tomas Morales for evaluation and a left knee injection, without relief  At this point even though she continues to have occasional falls because of the knee weakness, she feels her back and leg pain has to be addressed and is not sure how to proceed with what treatment 1st     Please note that the patient speaks mostly [place] and her daughter interpreted for her the entire time  Current pain medications includes:  Gabapentin 800 mg t i d  and nortriptyline 25 mg at bedtime  The patient reports that this regimen is providing 20% pain relief  The patient is reporting no side effects, sleepiness from this pain medication regimen  I have personally reviewed and/or updated the patient's past medical history, past surgical history, family history, social history, current medications, allergies, and vital signs today  Review of Systems:    Review of Systems   Respiratory: Positive for shortness of breath  Cardiovascular: Positive for chest pain  Gastrointestinal: Positive for constipation  Negative for diarrhea, nausea and vomiting  Musculoskeletal: Positive for gait problem  Negative for arthralgias, joint swelling (joint stiffness) and myalgias  Skin: Negative for rash  Neurological: Positive for dizziness  Negative for seizures and weakness  All other systems reviewed and are negative          Past Medical History:   Diagnosis Date    Asthma     Depression     Dyslipidemia     Esophageal reflux     History of stomach ulcers     Hypercalcemia     Hypertension     Osteoarthritis     Osteopenia     Tremor        Past Surgical History:   Procedure Laterality Date    BACK SURGERY      lower back    BILATERAL OOPHORECTOMY       SECTION      CHOLECYSTECTOMY      HYSTERECTOMY         Family History   Problem Relation Age of Onset    Hypertension Family     Stroke Family     Heart disease Family     Thyroid disease Family        Social History     Occupational History    unemployed      Social History Main Topics    Smoking status: Former Smoker    Smokeless tobacco: Never Used    Alcohol use Yes      Comment: social    Drug use: Unknown    Sexual activity: Not on file         Current Outpatient Prescriptions:     albuterol (VENTOLIN HFA) 90 mcg/act inhaler, Inhale 2 puffs every 4 (four) hours as needed for wheezing or shortness of breath, Disp: 6 7 g, Rfl: 6    alendronate (FOSAMAX) 70 mg tablet, Take 1 tablet (70 mg total) by mouth once a week, Disp: 4 tablet, Rfl: 6    atorvastatin (LIPITOR) 20 mg tablet, Take 1 tablet (20 mg total) by mouth daily, Disp: 90 tablet, Rfl: 1    bimatoprost (LUMIGAN) 0 01 % ophthalmic drops, Administer 1 drop to both eyes daily, Disp: 5 mL, Rfl: 6    gabapentin (NEURONTIN) 800 mg tablet, TAKE ONE TABLET BY MOUTH TWICE DAILY, Disp: 60 tablet, Rfl: 0    glycopyrrolate (ROBINUL) 2 MG tablet, Take 1 tablet (2 mg total) by mouth 2 (two) times a day, Disp: 60 tablet, Rfl: 6    hydrochlorothiazide (HYDRODIURIL) 12 5 mg tablet, Take 1 tablet (12 5 mg total) by mouth daily, Disp: 30 tablet, Rfl: 6    losartan (COZAAR) 25 mg tablet, Take 1 tablet (25 mg total) by mouth daily, Disp: 90 tablet, Rfl: 0    meloxicam (MOBIC) 7 5 mg tablet, TAKE ONE TABLET BY MOUTH ONCE DAILY, Disp: 30 tablet, Rfl: 0    Misc  Devices ([facility]) MISC, by Does not apply route continuous Rolling walker for gait dysfunction, pain, and left leg/knee weakness  , Disp: 1 each, Rfl: 0    montelukast (SINGULAIR) 10 mg tablet, Take 1 tablet (10 mg total) by mouth daily, Disp: 30 tablet, Rfl: 6    nortriptyline (PAMELOR) 25 mg capsule, Take 1 pill every other night x 1 week, then 1 PO Hs x 1 week, then 2 PO HS , Disp: 60 capsule, Rfl: 1    omeprazole (PriLOSEC) 40 MG capsule, Take 1 capsule (40 mg total) by mouth daily, Disp: 30 capsule, Rfl: 6    SYNTHROID 88 MCG tablet, TAKE 1 TABLET BY MOUTH ONCE DAILY, Disp: 90 tablet, Rfl: 1    Timolol Maleate 0 5 % (DAILY) SOLN, Apply 1 drop to eye every 12 (twelve) hours, Disp: 5 mL, Rfl: 6    Allergies   Allergen Reactions    Iodinated Diagnostic Agents Anaphylaxis    Aspirin GI Intolerance       Physical Exam:    There were no vitals taken for this visit  Constitutional:normal, well developed, well nourished, alert, in no distress and non-toxic and no overt pain behavior  Eyes:anicteric  HEENT:grossly intact  Neck:supple, symmetric, trachea midline and no masses   Pulmonary:even and unlabored  Cardiovascular:No edema or pitting edema present  Skin:Normal without rashes or lesions and well hydrated  Psychiatric:Mood and affect appropriate  Neurologic:Cranial Nerves II-XII grossly intact  Musculoskeletal:Slightly antalgic, but steady gait with the use of a single cane  Imaging  No orders to display         No orders of the defined types were placed in this encounter

## 2018-07-10 NOTE — H&P
Assessment:  1  Chronic bilateral low back pain with left-sided sciatica    2  Chronic pain of left knee    3  Chronic pain syndrome    4  Gait abnormality    5  Lumbar post-laminectomy syndrome    6  Primary osteoarthritis of left knee    7  Lumbar spondylosis    8  Lumbar radiculopathy        Plan:  While the patient and her daughter were in the office today, I did have a very long and thorough conversation with them regarding her medication regimen and treatment plan  I discussed with the patient that at this point time since she is noting significant pain and she did notice at least a week's relief from the previous injection, I feel would be beneficial to proceed with a repeat left L3 and L4 transforaminal epidural steroid injection with Dr Barbie Kay  I explained to the patient that hopefully this will provide some relief in the meantime while we continue to work on other options such as the spinal cord stimulator  The patient was agreeable and verbalized an understanding  Complete risks and benefits including bleeding, infection, tissue reaction, nerve injury and allergic reaction were discussed  The approach was demonstrated using models and literature was provided  Verbal and written consent was obtained  I also discussed with the patient at this point time since she is on a subtherapeutic dose of the nortriptyline with some improvement, at this point I would like her to increase the nortriptyline to 50 mg daily for the next 2 months and see how she does  She can also continue with the gabapentin as prescribed  I advised the patient that if they experience any side effects or issues with the changes in their medication regiment, they should give our office a call to discuss  I also advised the patient not to drive or operate machinery until they see how the changes in the medication regimen affects them  The patient was agreeable and verbalized an understanding       I had a very thorough conversation with the patient regarding the spinal cord stimulator trial and possible permanent implantation  I explained that there is pre-authorization process, including the need to proceed with an education class and psychological evaluation, both of which must be completed, before we can consider proceeding with the spinal cord stimulator trial pre-authorization process with the insurance company  I spent a significant amount of time reviewing the basic theory of spinal cord stimulation as well as the hope that it would provide at least moderate improvement and improve the patient's pain and overall functions with regards to perfomring activities of daily living and leisure activities with as little pain as possible  I feel that with regards to the stimulator process, I answered the patient's questions to the best of my abilities and until the patient was thoroughly satisfied  While the patient was in the office today, a stimulator booklet was sent home for the patient and family to review  I encouraged that before any decisions regarding the stimulator were made that they also proceed with the stimulator education class and proceed from there with regards to proceeding with the psychological evaluation  I advised the patient that if they are interested in proceeding with the spinal cord stimulator trial they need to then proceed with the psychological evaluation so we can try to expedite the insurance pre-authorization process, which can take at least 4-6 weeks to obtain approval  The patient verbalized an understanding  I also had a thorough conversation with the patient and explained that overall the expectations of the spinal cord stimulator are not to cure the pain, but rather to at least provide moderate/stable relief, allowing the patient to be more active and functional on a daily basis    I stressed that the goal of the stimulator and our treatment is NOT to proved 100% relief of their pain, but, hopefully at least 50% relief or more  I also explained that if they have a successful trial and eventually proceed with a permanent implant, that utilizing the full potential of the stimulator can require continuous re-programming and education and I explained that the stimulator is/will be an ongoing and evolving learning process for the patient  The patient was agreeable and verbalized an understanding  At this point it is the patient's wishes to proceed with the prior authorization process for the spinal cord stimulator trial  While the patient was in the office today, she did sign the consent form and was signed up for the stimulator education class  The patient was also given a prescription for a psychological evaluation and a list of local providers would perform the assessment  I advised the patient that once she has completed the class and the psychological assessment, we can then submit her insurance for authorization, however, I did explain to the patient that it may take 6-8 weeks to get approval and as soon as we do our office will be in touch with her to schedule the trial The patient was agreeable and verbalized an understanding  The patient will follow-up in 8 weeks for medication prescription refill and reevaluation  The patient was advised to contact the office should their symptoms worsen in the interim  The patient was agreeable and verbalized an understanding  History of Present Illness: The patient is a 74 y o  female last seen on 6/7/18 Left L3 & L4 TFESI who presents for a follow up office visit in regards to chronic pain secondary to lumbar postlaminectomy syndrome  The patient currently reports that her pain symptoms have worsened as she did feel that the previous epidural steroid injection was moderately to significantly helpful for about a week and then the pain came back to her baseline and she feels it is actually worsening at this point    Since her last office visit she did follow-up with Dr Winnie Castillo, who at this point does not feel that there are any good surgical options as referred her back to our office to discuss proceeding with the spinal cord stimulator trial   Since her last office visit she did restart the Nortriptyline and is taking 25 mg at bedtime, with minimal relief and is still having issues sleeping at nighttime  She also continues to have left knee pain and had seen Dr Tomeka Delarosa for evaluation and a left knee injection, without relief  At this point even though she continues to have occasional falls because of the knee weakness, she feels her back and leg pain has to be addressed and is not sure how to proceed with what treatment 1st     Please note that the patient speaks mostly Sur[place]amese and her daughter interpreted for her the entire time  Current pain medications includes:  Gabapentin 800 mg t i d  and nortriptyline 25 mg at bedtime  The patient reports that this regimen is providing 20% pain relief  The patient is reporting no side effects, sleepiness from this pain medication regimen  I have personally reviewed and/or updated the patient's past medical history, past surgical history, family history, social history, current medications, allergies, and vital signs today  Review of Systems:    Review of Systems   Respiratory: Positive for shortness of breath  Cardiovascular: Positive for chest pain  Gastrointestinal: Positive for constipation  Negative for diarrhea, nausea and vomiting  Musculoskeletal: Positive for gait problem  Negative for arthralgias, joint swelling (joint stiffness) and myalgias  Skin: Negative for rash  Neurological: Positive for dizziness  Negative for seizures and weakness  All other systems reviewed and are negative          Past Medical History:   Diagnosis Date    Asthma     Depression     Dyslipidemia     Esophageal reflux     History of stomach ulcers     Hypercalcemia     Hypertension     Osteoarthritis     Osteopenia     Tremor        Past Surgical History:   Procedure Laterality Date    BACK SURGERY      lower back    BILATERAL OOPHORECTOMY       SECTION      CHOLECYSTECTOMY      HYSTERECTOMY         Family History   Problem Relation Age of Onset    Hypertension Family     Stroke Family     Heart disease Family     Thyroid disease Family        Social History     Occupational History    unemployed      Social History Main Topics    Smoking status: Former Smoker    Smokeless tobacco: Never Used    Alcohol use Yes      Comment: social    Drug use: Unknown    Sexual activity: Not on file         Current Outpatient Prescriptions:     albuterol (VENTOLIN HFA) 90 mcg/act inhaler, Inhale 2 puffs every 4 (four) hours as needed for wheezing or shortness of breath, Disp: 6 7 g, Rfl: 6    alendronate (FOSAMAX) 70 mg tablet, Take 1 tablet (70 mg total) by mouth once a week, Disp: 4 tablet, Rfl: 6    atorvastatin (LIPITOR) 20 mg tablet, Take 1 tablet (20 mg total) by mouth daily, Disp: 90 tablet, Rfl: 1    bimatoprost (LUMIGAN) 0 01 % ophthalmic drops, Administer 1 drop to both eyes daily, Disp: 5 mL, Rfl: 6    gabapentin (NEURONTIN) 800 mg tablet, TAKE ONE TABLET BY MOUTH TWICE DAILY, Disp: 60 tablet, Rfl: 0    glycopyrrolate (ROBINUL) 2 MG tablet, Take 1 tablet (2 mg total) by mouth 2 (two) times a day, Disp: 60 tablet, Rfl: 6    hydrochlorothiazide (HYDRODIURIL) 12 5 mg tablet, Take 1 tablet (12 5 mg total) by mouth daily, Disp: 30 tablet, Rfl: 6    losartan (COZAAR) 25 mg tablet, Take 1 tablet (25 mg total) by mouth daily, Disp: 90 tablet, Rfl: 0    meloxicam (MOBIC) 7 5 mg tablet, TAKE ONE TABLET BY MOUTH ONCE DAILY, Disp: 30 tablet, Rfl: 0    Misc  Devices ([facility]) MISC, by Does not apply route continuous Rolling walker for gait dysfunction, pain, and left leg/knee weakness  , Disp: 1 each, Rfl: 0    montelukast (SINGULAIR) 10 mg tablet, Take 1 tablet (10 mg total) by mouth daily, Disp: 30 tablet, Rfl: 6    nortriptyline (PAMELOR) 25 mg capsule, Take 1 pill every other night x 1 week, then 1 PO Hs x 1 week, then 2 PO HS , Disp: 60 capsule, Rfl: 1    omeprazole (PriLOSEC) 40 MG capsule, Take 1 capsule (40 mg total) by mouth daily, Disp: 30 capsule, Rfl: 6    SYNTHROID 88 MCG tablet, TAKE 1 TABLET BY MOUTH ONCE DAILY, Disp: 90 tablet, Rfl: 1    Timolol Maleate 0 5 % (DAILY) SOLN, Apply 1 drop to eye every 12 (twelve) hours, Disp: 5 mL, Rfl: 6    Allergies   Allergen Reactions    Iodinated Diagnostic Agents Anaphylaxis    Aspirin GI Intolerance       Physical Exam:    There were no vitals taken for this visit  Constitutional:normal, well developed, well nourished, alert, in no distress and non-toxic and no overt pain behavior  Eyes:anicteric  HEENT:grossly intact  Neck:supple, symmetric, trachea midline and no masses   Pulmonary:even and unlabored  Cardiovascular:No edema or pitting edema present  Skin:Normal without rashes or lesions and well hydrated  Psychiatric:Mood and affect appropriate  Neurologic:Cranial Nerves II-XII grossly intact  Musculoskeletal:Slightly antalgic, but steady gait with the use of a single cane  Imaging  No orders to display         No orders of the defined types were placed in this encounter

## 2018-07-12 ENCOUNTER — TELEPHONE (OUTPATIENT)
Dept: PAIN MEDICINE | Facility: CLINIC | Age: 73
End: 2018-07-12

## 2018-07-12 NOTE — TELEPHONE ENCOUNTER
Can you please call the patient and advise her that her thoracic spine x-rays showed mild deg changes/OA and mild scoliosis  She should proceed with the thoracic spine MRI and the rest of the SCStim trial pre-auth process that we discussed at her recent OV  Thank you

## 2018-07-12 NOTE — TELEPHONE ENCOUNTER
S/w pt, advised of above  Pt requested that the writer s/w her daughter, Shayla Gaitan  S/w effie  Advised of above  Effie verbalized understanding and relayed info as discussed  Per Raghav Cancino, her sisters have been accompanying the pt to her appts  Stated taht her sisters may be calling back to review info  Provided cb number and office hours for any questions pt or her family may have

## 2018-07-13 ENCOUNTER — TELEPHONE (OUTPATIENT)
Dept: FAMILY MEDICINE CLINIC | Facility: HOSPITAL | Age: 73
End: 2018-07-13

## 2018-07-13 NOTE — TELEPHONE ENCOUNTER
DAUGHTER IS STATING THAT HER MOTHER IS VERY FLORENCE AND IRRITABLE - DR NGUYEN HAD PRESCRIBED CYMBALTA FOR HERSELF AND WAS WONDERING IF THIS COULD ALSO HELP HER MOTHER - PLEASE ADVISE

## 2018-07-16 ENCOUNTER — HOSPITAL ENCOUNTER (OUTPATIENT)
Dept: RADIOLOGY | Facility: CLINIC | Age: 73
Discharge: HOME | End: 2018-07-16
Attending: ANESTHESIOLOGY
Payer: COMMERCIAL

## 2018-07-16 VITALS
HEART RATE: 75 BPM | TEMPERATURE: 97.8 F | SYSTOLIC BLOOD PRESSURE: 152 MMHG | OXYGEN SATURATION: 94 % | RESPIRATION RATE: 18 BRPM | DIASTOLIC BLOOD PRESSURE: 74 MMHG

## 2018-07-16 DIAGNOSIS — M54.16 LUMBAR RADICULOPATHY: ICD-10-CM

## 2018-07-16 DIAGNOSIS — M96.1 LUMBAR POST-LAMINECTOMY SYNDROME: ICD-10-CM

## 2018-07-16 DIAGNOSIS — M54.42 CHRONIC BILATERAL LOW BACK PAIN WITH LEFT-SIDED SCIATICA: ICD-10-CM

## 2018-07-16 DIAGNOSIS — G89.29 CHRONIC BILATERAL LOW BACK PAIN WITH LEFT-SIDED SCIATICA: ICD-10-CM

## 2018-07-16 DIAGNOSIS — G89.4 CHRONIC PAIN SYNDROME: ICD-10-CM

## 2018-07-16 PROCEDURE — 64484 NJX AA&/STRD TFRM EPI L/S EA: CPT | Performed by: ANESTHESIOLOGY

## 2018-07-16 PROCEDURE — 64483 NJX AA&/STRD TFRM EPI L/S 1: CPT | Performed by: ANESTHESIOLOGY

## 2018-07-16 PROCEDURE — A9579 GAD-BASE MR CONTRAST NOS,1ML: HCPCS | Performed by: ANESTHESIOLOGY

## 2018-07-16 RX ORDER — LIDOCAINE HYDROCHLORIDE 10 MG/ML
5 INJECTION, SOLUTION EPIDURAL; INFILTRATION; INTRACAUDAL; PERINEURAL ONCE
Status: COMPLETED | OUTPATIENT
Start: 2018-07-16 | End: 2018-07-16

## 2018-07-16 RX ORDER — PAPAVERINE HCL 150 MG
20 CAPSULE, EXTENDED RELEASE ORAL ONCE
Status: COMPLETED | OUTPATIENT
Start: 2018-07-16 | End: 2018-07-16

## 2018-07-16 RX ADMIN — LIDOCAINE HYDROCHLORIDE 2 ML: 20 INJECTION, SOLUTION EPIDURAL; INFILTRATION; INTRACAUDAL at 09:48

## 2018-07-16 RX ADMIN — LIDOCAINE HYDROCHLORIDE 4 ML: 10 INJECTION, SOLUTION EPIDURAL; INFILTRATION; INTRACAUDAL; PERINEURAL at 09:47

## 2018-07-16 RX ADMIN — GADOPENTETATE DIMEGLUMINE 2 ML: 469.01 INJECTION INTRAVENOUS at 09:48

## 2018-07-16 RX ADMIN — DEXAMETHASONE SODIUM PHOSPHATE 20 MG: 10 INJECTION, SOLUTION INTRAMUSCULAR; INTRAVENOUS at 09:49

## 2018-07-16 NOTE — DISCHARGE INSTRUCTIONS
Epidural Steroid Injection   WHAT YOU NEED TO KNOW:   An epidural steroid injection (KAYLA) is a procedure to inject steroid medicine into the epidural space  The epidural space is between your spinal cord and vertebrae  Steroids reduce inflammation and fluid buildup in your spine that may be causing pain  You may be given pain medicine along with the steroids  ACTIVITY  · Do not drive or operate machinery today  · No strenuous activity today - bending, lifting, etc   · You may resume normal activites starting tomorrow - start slowly and as tolerated  · You may shower today, but no tub baths or hot tubs  · You may have numbness for several hours from the local anesthetic  Please use caution and common sense, especially with weight-bearing activities  CARE OF THE INJECTION SITE  · If you have soreness or pain, apply ice to the area today (20 minutes on/20 minutes off)  · Starting tomorrow, you may use warm, moist heat or ice if needed  · You may have an increase or change in your discomfort for 36-48 hours after your treatment  · Apply ice and continue with any pain medication you have been prescribed  · Notify [facility] if you have any of the following: redness, drainage, swelling, headache, stiff neck or fever above 100°F     SPECIAL INSTRUCTIONS  · Our office will contact you in approximately 7 days for a progress report  MEDICATIONS  · Continue to take all routine medications  · Our office may have instructed you to hold some medications  If you have a problem specifically related to your procedure, please call our office at [phone]  Problems not related to your procedure should be directed to your primary care physician

## 2018-07-16 NOTE — H&P
History of Present Illness: The patient is a 74 y o  female who presents with complaints of low back and left leg pain      Patient Active Problem List   Diagnosis    Tremor    Atrophic vaginitis    Osteoporosis    Obesity    Migraine headache    Hypothyroidism    Hypertension    Glaucoma    Esophageal reflux    Dyslipidemia    Cataract    Asthma    Lumbar spondylosis    IFG (impaired fasting glucose)    Chronic pain syndrome    Chronic bilateral low back pain with left-sided sciatica    Lumbar post-laminectomy syndrome    Lumbar radiculopathy    Chronic pain of left knee    Primary osteoarthritis of left knee    Gait abnormality    Weakness of left leg    Anserine bursitis       Past Medical History:   Diagnosis Date    Asthma     Depression     Dyslipidemia     Esophageal reflux     History of stomach ulcers     Hypercalcemia     Hypertension     Osteoarthritis     Osteopenia     Tremor        Past Surgical History:   Procedure Laterality Date    BACK SURGERY      lower back    BILATERAL OOPHORECTOMY       SECTION      CHOLECYSTECTOMY      HYSTERECTOMY           Current Outpatient Prescriptions:     albuterol (VENTOLIN HFA) 90 mcg/act inhaler, Inhale 2 puffs every 4 (four) hours as needed for wheezing or shortness of breath, Disp: 6 7 g, Rfl: 6    alendronate (FOSAMAX) 70 mg tablet, Take 1 tablet (70 mg total) by mouth once a week, Disp: 4 tablet, Rfl: 6    atorvastatin (LIPITOR) 20 mg tablet, Take 1 tablet (20 mg total) by mouth daily, Disp: 90 tablet, Rfl: 1    bimatoprost (LUMIGAN) 0 01 % ophthalmic drops, Administer 1 drop to both eyes daily, Disp: 5 mL, Rfl: 6    gabapentin (NEURONTIN) 800 mg tablet, TAKE ONE TABLET BY MOUTH TWICE DAILY, Disp: 60 tablet, Rfl: 0    glycopyrrolate (ROBINUL) 2 MG tablet, Take 1 tablet (2 mg total) by mouth 2 (two) times a day, Disp: 60 tablet, Rfl: 6    hydrochlorothiazide (HYDRODIURIL) 12 5 mg tablet, Take 1 tablet (12 5 mg total) by mouth daily, Disp: 30 tablet, Rfl: 6    losartan (COZAAR) 25 mg tablet, Take 1 tablet (25 mg total) by mouth daily, Disp: 90 tablet, Rfl: 0    meloxicam (MOBIC) 7 5 mg tablet, TAKE ONE TABLET BY MOUTH ONCE DAILY, Disp: 30 tablet, Rfl: 0    montelukast (SINGULAIR) 10 mg tablet, Take 1 tablet (10 mg total) by mouth daily, Disp: 30 tablet, Rfl: 6    nortriptyline (PAMELOR) 25 mg capsule, Take 1 after dinner and 1 PO HS , Disp: 60 capsule, Rfl: 1    omeprazole (PriLOSEC) 40 MG capsule, Take 1 capsule (40 mg total) by mouth daily, Disp: 30 capsule, Rfl: 6    SYNTHROID 88 MCG tablet, TAKE 1 TABLET BY MOUTH ONCE DAILY, Disp: 90 tablet, Rfl: 1    Timolol Maleate 0 5 % (DAILY) SOLN, Apply 1 drop to eye every 12 (twelve) hours, Disp: 5 mL, Rfl: 6    Current Facility-Administered Medications:     dexamethasone (PF) (DECADRON) injection 20 mg, 20 mg, Intra-articular, Once, Reji Evans DO    lidocaine (PF) (XYLOCAINE-MPF) 1 % injection 5 mL, 5 mL, Intra-articular, Once, Reji Evans,     lidocaine (PF) (XYLOCAINE-MPF) 2 % injection 5 mL, 5 mL, Intra-articular, Once, Reji Evans, DO    Allergies   Allergen Reactions    Iodinated Diagnostic Agents Anaphylaxis    Aspirin GI Intolerance       Physical Exam:   Vitals:    07/16/18 0932   BP: 156/76   Pulse: 74   Resp: 20   Temp: 97 8 °F (36 6 °C)   SpO2: 96%     General: Awake, Alert, Oriented x 3, Mood and affect appropriate  Respiratory: Respirations even and unlabored  Cardiovascular: Peripheral pulses intact; no edema  Musculoskeletal Exam:  Decreased range of motion lumbar spine    ASA Score: III    Patient/Chart Verification  Patient ID Verified: Verbal  ID Band Applied: No  Consents Confirmed: Procedural  H&P( within 30 days) Verified: To be obtained in the Pre-Procedure area  Interval H&P(within 24 hr) Complete (required for Outpatients and Surgery Admit only): To be obtained in the Pre-Procedure area  Allergies Reviewed:  Yes  Anticoag/NSAID held?: NA  Currently on antibiotics?: No    Assessment:   1  Chronic bilateral low back pain with left-sided sciatica    2  Lumbar post-laminectomy syndrome    3   Lumbar radiculopathy        Plan: Left L3 & L4 TEFSI

## 2018-07-17 RX ORDER — MELOXICAM 7.5 MG/1
TABLET ORAL
Qty: 30 TABLET | Refills: 0 | Status: SHIPPED | OUTPATIENT
Start: 2018-07-17 | End: 2018-11-20 | Stop reason: ALTCHOICE

## 2018-07-17 RX ORDER — GABAPENTIN 800 MG/1
TABLET ORAL
Qty: 60 TABLET | Refills: 0 | Status: SHIPPED | OUTPATIENT
Start: 2018-07-17 | End: 2018-10-24 | Stop reason: SDUPTHER

## 2018-07-23 ENCOUNTER — TELEPHONE (OUTPATIENT)
Dept: PAIN MEDICINE | Facility: CLINIC | Age: 73
End: 2018-07-23

## 2018-07-23 NOTE — TELEPHONE ENCOUNTER
Telephone note   Reason for the call    Post Op F/u [facility] [place]    Pt states that she is doing great 0 pain and 100% relief     S/P LT L3& L4 TFESI on 7/16/18 w/[facility] in [place]  Any F/u and What date

## 2018-07-24 ENCOUNTER — OFFICE VISIT (OUTPATIENT)
Dept: FAMILY MEDICINE CLINIC | Facility: HOSPITAL | Age: 73
End: 2018-07-24
Payer: COMMERCIAL

## 2018-07-24 VITALS
WEIGHT: 169 LBS | BODY MASS INDEX: 33.18 KG/M2 | HEIGHT: 60 IN | HEART RATE: 76 BPM | DIASTOLIC BLOOD PRESSURE: 60 MMHG | SYSTOLIC BLOOD PRESSURE: 132 MMHG | TEMPERATURE: 97.9 F

## 2018-07-24 DIAGNOSIS — F41.9 ANXIETY AND DEPRESSION: Primary | ICD-10-CM

## 2018-07-24 DIAGNOSIS — F32.A ANXIETY AND DEPRESSION: Primary | ICD-10-CM

## 2018-07-24 PROCEDURE — 3725F SCREEN DEPRESSION PERFORMED: CPT | Performed by: INTERNAL MEDICINE

## 2018-07-24 PROCEDURE — 99214 OFFICE O/P EST MOD 30 MIN: CPT | Performed by: INTERNAL MEDICINE

## 2018-07-24 RX ORDER — BUPROPION HYDROCHLORIDE 150 MG/1
150 TABLET ORAL DAILY
Qty: 30 TABLET | Refills: 1 | Status: SHIPPED | OUTPATIENT
Start: 2018-07-24 | End: 2018-08-06 | Stop reason: ALTCHOICE

## 2018-07-24 NOTE — TELEPHONE ENCOUNTER
I called pt and she asked if I could call her daughters Carmenza Silver or Mark [place]  I left a message on both lines to cb to schedul a f/u  While documenting this Pt's daughter [place] called and said she will call back to schedule once she is with her mom

## 2018-07-24 NOTE — ASSESSMENT & PLAN NOTE
Pt admits mood is an issue and is agreeable to medication, did d/w pt and dgtr that I am concerned with SI but pt agrees to call with worsening mood/SI or if she develops a plan, will avoid SSRI's and SNRI's d/t risk of Serotonin Syndrome with current TCA as she is having benefit with the TCA, will try trial of WBXL - rx sent to pharmacy, number for Psych given as she needs eval for SCS as well, watch closely and re-eval in 4 wks, again urged to call with worsening mood/SI; d/w pt that she had to take med daily and that it can take 4-6 wks to get max benefit of med, advised not to stop on her own and to call if SE occur

## 2018-07-25 ENCOUNTER — TELEPHONE (OUTPATIENT)
Dept: PAIN MEDICINE | Facility: CLINIC | Age: 73
End: 2018-07-25

## 2018-07-25 NOTE — TELEPHONE ENCOUNTER
Pt to be an [employer] SCS Trial with Dr Kaylin Jonas  Pt signed release of info  Pt completed thoracic MRI  Pt received script for psych eval with list of providers  Pt did not show for education scheduled 7/24/18   Email to Lindsay from [employer] [address] to contact pt to r/s  Clinical info excluding psych eval faxed to [facility]

## 2018-07-31 ENCOUNTER — OFFICE VISIT (OUTPATIENT)
Dept: PAIN MEDICINE | Facility: CLINIC | Age: 73
End: 2018-07-31
Payer: COMMERCIAL

## 2018-07-31 VITALS
HEIGHT: 60 IN | BODY MASS INDEX: 32.79 KG/M2 | DIASTOLIC BLOOD PRESSURE: 60 MMHG | WEIGHT: 167 LBS | SYSTOLIC BLOOD PRESSURE: 138 MMHG | HEART RATE: 80 BPM

## 2018-07-31 DIAGNOSIS — M54.16 LUMBAR RADICULOPATHY: ICD-10-CM

## 2018-07-31 DIAGNOSIS — G89.29 CHRONIC BILATERAL LOW BACK PAIN WITH LEFT-SIDED SCIATICA: Primary | ICD-10-CM

## 2018-07-31 DIAGNOSIS — G89.29 CHRONIC PAIN OF LEFT KNEE: ICD-10-CM

## 2018-07-31 DIAGNOSIS — M96.1 LUMBAR POST-LAMINECTOMY SYNDROME: ICD-10-CM

## 2018-07-31 DIAGNOSIS — R26.9 GAIT ABNORMALITY: ICD-10-CM

## 2018-07-31 DIAGNOSIS — G89.4 CHRONIC PAIN SYNDROME: ICD-10-CM

## 2018-07-31 DIAGNOSIS — M17.12 PRIMARY OSTEOARTHRITIS OF LEFT KNEE: ICD-10-CM

## 2018-07-31 DIAGNOSIS — M25.562 CHRONIC PAIN OF LEFT KNEE: ICD-10-CM

## 2018-07-31 DIAGNOSIS — M54.42 CHRONIC BILATERAL LOW BACK PAIN WITH LEFT-SIDED SCIATICA: Primary | ICD-10-CM

## 2018-07-31 DIAGNOSIS — M47.816 LUMBAR SPONDYLOSIS: ICD-10-CM

## 2018-07-31 PROCEDURE — 99213 OFFICE O/P EST LOW 20 MIN: CPT | Performed by: NURSE PRACTITIONER

## 2018-07-31 RX ORDER — NORTRIPTYLINE HYDROCHLORIDE 25 MG/1
CAPSULE ORAL
Qty: 60 CAPSULE | Refills: 3 | Status: SHIPPED | OUTPATIENT
Start: 2018-07-31 | End: 2018-11-09 | Stop reason: SDUPTHER

## 2018-07-31 NOTE — PROGRESS NOTES
Assessment:  1  Chronic bilateral low back pain with left-sided sciatica    2  Chronic pain syndrome    3  Gait abnormality    4  Lumbar post-laminectomy syndrome    5  Lumbar radiculopathy    6  Lumbar spondylosis    7  Chronic pain of left knee    8  Primary osteoarthritis of left knee        Plan:  While the patient and her family were in the office today, I did explain to the patient that at this point time we will need to reschedule her for the stimulator education class as the education class and the psychological evaluation need to be completed before we can obtain insurance authorization to proceed with the stimulator trial, which is still the patient's wishes  While the patient was in the office today, we did reschedule her for the education class and I explained to them that if they were unable to find a psychiatrist or psychologist that can perform the assessment, they are to let us know before she does the education class as after the education class we can have her come up and perform a written exam and see if her insurance company will except that as well  The patient was agreeable and verbalized an understanding  With regards to her medication regimen, I explained to the patient and her daughter that at this point time since she is noting at least moderate improvement overall with her medication regimen, we will continue the nortriptyline as prescribed she is to continue take the gabapentin as prescribed by her primary care provider  The patient was agreeable and verbalized an understanding  The patient will follow-up in 4 months for medication prescription refill and reevaluation  The patient was advised to contact the office should their symptoms worsen in the interim  The patient was agreeable and verbalized an understanding  History of Present Illness:     The patient is a 74 y o  female last seen on 7/16/18 Left L3&L4 TFESI who presents for a follow up office visit in regards to chronic pain secondary to lumbar post-laminectomy syndrome  The patient currently reports that since her last office visit she does feel that between the repeat left L3 and L4 transforaminal epidural steroid injection with Dr Maye Eaton and the increase in the nortriptyline to the 50 mg a day, she is noting at least moderate overall improvement of her pain symptoms  However, she reports that she continues to have difficulties with activities of daily living and would like to continue to try to work on the spinal cord stimulator  She did proceed with a thoracic spine imaging, however, has not yet completed the stimulator education or psych evaluation  The patient's daughter, who interpreted for her the entire time, explained that they have been trying to find a psychiatrist or psychologist that takes her insurance, however, no one on the list that we gave her takes the patient's insurance  At this point they are going to call her insurance and get some recommendations for psychologist or psychiatrist for her to see for the stim clearance  The patient and her daughter present today to discuss her medication regimen treatment plan  Current pain medications includes:  Nortriptyline 25 mg after dinner and 25 mg at bedtime  She also continues with gabapentin 800 mg t i d  as prescribed by her primary care provider  The patient reports that this regimen is providing 50% pain relief  The patient is reporting no side effects from this pain medication regimen  I have personally reviewed and/or updated the patient's past medical history, past surgical history, family history, social history, current medications, allergies, and vital signs today  Review of Systems:    Review of Systems   Respiratory: Positive for shortness of breath  Cardiovascular: Negative for chest pain  Gastrointestinal: Positive for constipation and nausea  Negative for diarrhea and vomiting     Musculoskeletal: Positive for gait problem and joint swelling (joint stiffness)  Negative for arthralgias and myalgias  Skin: Negative for rash  Neurological: Positive for dizziness  Negative for seizures and weakness  All other systems reviewed and are negative          Past Medical History:   Diagnosis Date    Asthma     Depression     Dyslipidemia     Esophageal reflux     History of stomach ulcers     Hypercalcemia     Hypertension     Osteoarthritis     Osteopenia     Tremor        Past Surgical History:   Procedure Laterality Date    BACK SURGERY      lower back    BILATERAL OOPHORECTOMY       SECTION      CHOLECYSTECTOMY      HYSTERECTOMY         Family History   Problem Relation Age of Onset    Hypertension Family     Stroke Family     Heart disease Family     Thyroid disease Family        Social History     Occupational History    unemployed      Social History Main Topics    Smoking status: Former Smoker    Smokeless tobacco: Never Used    Alcohol use Yes      Comment: social    Drug use: Unknown    Sexual activity: Not on file         Current Outpatient Prescriptions:     albuterol (VENTOLIN HFA) 90 mcg/act inhaler, Inhale 2 puffs every 4 (four) hours as needed for wheezing or shortness of breath, Disp: 6 7 g, Rfl: 6    alendronate (FOSAMAX) 70 mg tablet, Take 1 tablet (70 mg total) by mouth once a week, Disp: 4 tablet, Rfl: 6    atorvastatin (LIPITOR) 20 mg tablet, Take 1 tablet (20 mg total) by mouth daily, Disp: 90 tablet, Rfl: 1    bimatoprost (LUMIGAN) 0 01 % ophthalmic drops, Administer 1 drop to both eyes daily, Disp: 5 mL, Rfl: 6    buPROPion (WELLBUTRIN XL) 150 mg 24 hr tablet, Take 1 tablet (150 mg total) by mouth daily for 30 days, Disp: 30 tablet, Rfl: 1    gabapentin (NEURONTIN) 800 mg tablet, TAKE 1 TABLET BY MOUTH TWICE DAILY, Disp: 60 tablet, Rfl: 0    glycopyrrolate (ROBINUL) 2 MG tablet, Take 1 tablet (2 mg total) by mouth 2 (two) times a day, Disp: 60 tablet, Rfl: 6   hydrochlorothiazide (HYDRODIURIL) 12 5 mg tablet, Take 1 tablet (12 5 mg total) by mouth daily, Disp: 30 tablet, Rfl: 6    losartan (COZAAR) 25 mg tablet, Take 1 tablet (25 mg total) by mouth daily, Disp: 90 tablet, Rfl: 0    meloxicam (MOBIC) 7 5 mg tablet, TAKE 1 TABLET BY MOUTH ONCE DAILY, Disp: 30 tablet, Rfl: 0    montelukast (SINGULAIR) 10 mg tablet, Take 1 tablet (10 mg total) by mouth daily, Disp: 30 tablet, Rfl: 6    nortriptyline (PAMELOR) 25 mg capsule, Take 1 after dinner and 1 PO HS , Disp: 60 capsule, Rfl: 1    omeprazole (PriLOSEC) 40 MG capsule, Take 1 capsule (40 mg total) by mouth daily, Disp: 30 capsule, Rfl: 6    SYNTHROID 88 MCG tablet, TAKE 1 TABLET BY MOUTH ONCE DAILY, Disp: 90 tablet, Rfl: 1    Timolol Maleate 0 5 % (DAILY) SOLN, Apply 1 drop to eye every 12 (twelve) hours, Disp: 5 mL, Rfl: 6    Allergies   Allergen Reactions    Iodinated Diagnostic Agents Anaphylaxis    Aspirin GI Intolerance       Physical Exam:    There were no vitals taken for this visit  Constitutional:normal, well developed, well nourished, alert, in no distress and non-toxic and no overt pain behavior  Eyes:anicteric  HEENT:grossly intact  Neck:supple, symmetric, trachea midline and no masses   Pulmonary:even and unlabored  Cardiovascular:No edema or pitting edema present  Skin:Normal without rashes or lesions and well hydrated  Psychiatric:Mood and affect appropriate  Neurologic:Cranial Nerves II-XII grossly intact  Musculoskeletal:Slightly antalgic, but steady gait with the use of a single cane  Imaging  No orders to display         No orders of the defined types were placed in this encounter

## 2018-08-06 ENCOUNTER — TELEPHONE (OUTPATIENT)
Dept: PAIN MEDICINE | Facility: MEDICAL CENTER | Age: 73
End: 2018-08-06

## 2018-08-06 ENCOUNTER — TELEPHONE (OUTPATIENT)
Dept: PAIN MEDICINE | Facility: CLINIC | Age: 73
End: 2018-08-06

## 2018-08-06 ENCOUNTER — HOSPITAL ENCOUNTER (EMERGENCY)
Facility: HOSPITAL | Age: 73
Discharge: HOME | End: 2018-08-06
Attending: EMERGENCY MEDICINE
Payer: COMMERCIAL

## 2018-08-06 VITALS
SYSTOLIC BLOOD PRESSURE: 146 MMHG | HEIGHT: 60 IN | HEART RATE: 82 BPM | WEIGHT: 167 LBS | TEMPERATURE: 97.2 F | RESPIRATION RATE: 20 BRPM | DIASTOLIC BLOOD PRESSURE: 67 MMHG | BODY MASS INDEX: 32.79 KG/M2 | OXYGEN SATURATION: 98 %

## 2018-08-06 DIAGNOSIS — G89.4 CHRONIC PAIN SYNDROME: ICD-10-CM

## 2018-08-06 DIAGNOSIS — G89.29 CHRONIC PAIN OF LEFT KNEE: ICD-10-CM

## 2018-08-06 DIAGNOSIS — M25.562 CHRONIC PAIN OF LEFT KNEE: ICD-10-CM

## 2018-08-06 DIAGNOSIS — M54.42 CHRONIC BILATERAL LOW BACK PAIN WITH LEFT-SIDED SCIATICA: Primary | ICD-10-CM

## 2018-08-06 DIAGNOSIS — G89.29 ACUTE EXACERBATION OF CHRONIC LOW BACK PAIN: Primary | ICD-10-CM

## 2018-08-06 DIAGNOSIS — M54.50 ACUTE EXACERBATION OF CHRONIC LOW BACK PAIN: Primary | ICD-10-CM

## 2018-08-06 DIAGNOSIS — G89.29 CHRONIC BILATERAL LOW BACK PAIN WITH LEFT-SIDED SCIATICA: Primary | ICD-10-CM

## 2018-08-06 PROCEDURE — 96372 THER/PROPH/DIAG INJ SC/IM: CPT

## 2018-08-06 PROCEDURE — 99283 EMERGENCY DEPT VISIT LOW MDM: CPT

## 2018-08-06 RX ORDER — KETOROLAC TROMETHAMINE 30 MG/ML
15 INJECTION, SOLUTION INTRAMUSCULAR; INTRAVENOUS ONCE
Status: COMPLETED | OUTPATIENT
Start: 2018-08-06 | End: 2018-08-06

## 2018-08-06 RX ORDER — METHOCARBAMOL 500 MG/1
500 TABLET, FILM COATED ORAL ONCE
Status: COMPLETED | OUTPATIENT
Start: 2018-08-06 | End: 2018-08-06

## 2018-08-06 RX ORDER — TRAMADOL HYDROCHLORIDE 50 MG/1
TABLET ORAL
Qty: 45 TABLET | Refills: 1 | Status: SHIPPED | OUTPATIENT
Start: 2018-08-06 | End: 2018-11-09

## 2018-08-06 RX ORDER — PREDNISONE 10 MG/1
TABLET ORAL
Qty: 21 TABLET | Refills: 0 | Status: SHIPPED | OUTPATIENT
Start: 2018-08-06 | End: 2018-08-28

## 2018-08-06 RX ORDER — ACETAMINOPHEN 325 MG/1
650 TABLET ORAL ONCE
Status: COMPLETED | OUTPATIENT
Start: 2018-08-06 | End: 2018-08-06

## 2018-08-06 RX ADMIN — ACETAMINOPHEN 650 MG: 325 TABLET, FILM COATED ORAL at 13:17

## 2018-08-06 RX ADMIN — METHOCARBAMOL 500 MG: 500 TABLET ORAL at 13:17

## 2018-08-06 RX ADMIN — KETOROLAC TROMETHAMINE 15 MG: 30 INJECTION, SOLUTION INTRAMUSCULAR at 13:17

## 2018-08-06 NOTE — DISCHARGE INSTRUCTIONS
Dolor crónico de espalda   LO QUE NECESITA SABER:   Dolor de espalda crónico es aquel que dura 3 meses o más  Puede incluir el dolor que no se ha controlado o no mejore con el tratamiento  Puede que pappas dolor de espalda cause debilidad o dolor que se propaga a frances brazos o piernas  INSTRUCCIONES SOBRE [facility]:   Regrese a la anshul de emergencias si:   · Usted tiene dolor intenso  · Usted tiene signos nuevos de adormecimiento o debilidad, sobre todo en la parte inferior de la espalda, piernas, brazos o genitales  · Usted pierde control de pappas vejiga o heces  · Usted tiene fiebre o pérdida de peso repentina  Pregúntele a pappas Petra Alexandro vitaminas y minerales son adecuados para usted  · Usted tiene un dolor nuevo o peor  · Usted tiene preguntas o inquietudes acerca de pappas condición o cuidado  Medicamentos:   · AINEs (Analgésicos antiinflamatorios no esteroides)  ayudan a disminuir la inflamación y el dolor  Soco medicamento se puede comprar con o sin receta médica  Soco medicamento puede causar sangrado estomacal o problemas en los riñones en ciertas personas  Si usted evens un medicamento anticoagulante, asegúrese de preguntarle a pappas médico si los CASE son seguros para usted  Crystal siempre la etiqueta y siga cuidadosamente las instrucciones antes de usar soco medicamento  · El acetaminofén  [employer]  Está disponible sin receta médica  Pregunte la cantidad y la frecuencia con que debe tomarlos  [address]  El acetaminofén puede causar daño en el hígado cuando no se evens de forma correcta  · Un medicamento con receta para el dolor  también se puede administrar para disminuir el dolor  No espere hasta que el dolor sea severo antes de mary soco medicamento  · Relajantes musculares  ayudan a disminuir los espasmos musculares y el dolor de espalda  · [place] frances medicamentos ricky se le haya indicado    Consulte con pappas médico si usted anna que pappas medicamento no le está ayudando o si presenta efectos secundarios  Infórmele si es alérgico a cualquier medicamento  Mantenga ade lista actualizada de los [place], las vitaminas y los productos herbales que evens  Incluya los siguientes datos de los medicamentos: cantidad, frecuencia y motivo de administración  Traiga con usted la lista o los envases de la píldoras a frances citas de seguimiento  Lleve la lista de los medicamentos con usted en elvi de ade emergencia  Acuda a frances consultas de control con hall médico según le indicaron  Es probable que lo refieran a un especialista en medicina del deporte o un especialista en luis dorsal  Anote frances preguntas para que se acuerde de hacerlas martin frances visitas  Controle hall dolor crónico de espalda:   · El calor  ayuda a disminuir dolor y espasmos musculares  Aplique calor en hall espalda por 15 a 20 minutos cada 2 horas o con la frecuencia posible  · Manténgase activo  lo más que pueda sin causar [employer]  Pregúntele a hall médico cuáles ejercicios son correctos para usted  No se siente ni se acueste martin largos períodos  Debido a que un movimiento brusco podría empeorar hall dolor de espalda  Evite levantar objetos hasta que ya no tenga dolor  · Un fisioterapeuta  puede enseñarle ejercicios para ayudar a mejorar el movimiento y la fuerza, y para disminuir el dolor  © 2017 [address] Information is for End User's use only and may not be sold, redistributed or otherwise used for commercial purposes  All illustrations and images included in CareNotes® are the copyrighted property of A D A M , Inc  or Kash Ceron  Esta información es sólo para uso en educación  Hall intención no es darle un consejo médico sobre enfermedades o tratamientos  Colsulte con hall Cande Quintanillaot farmacéutico antes de seguir cualquier régimen médico para saber si es seguro y efectivo para usted

## 2018-08-06 NOTE — TELEPHONE ENCOUNTER
I am going to order a titrating dose of oral prednisone 10 mg, she is to take 2 PO TID on the first day and decrease it by one pill daily until gone  I e-scribed that to her pharmacy  She is to call once she has completed the prednisone  I can prescribe Tramadol 50 mg BID PRN for pain as well to try to help with the pain  I did e-scribe both of the scripts to the pharmacy  Continue the Nortriptyline and Gabapentin as prescribed  Thank you

## 2018-08-06 NOTE — TELEPHONE ENCOUNTER
Pt's daughter requested that her cell # be added to the pt's contact info - [phone]  schuyler Khan/ administrative staff

## 2018-08-06 NOTE — TELEPHONE ENCOUNTER
Pt daughter is calling back stating she is taking pt to the ER and wanted to advise [facility] office

## 2018-08-06 NOTE — TELEPHONE ENCOUNTER
S/w pt's daughter, Jesus Worrell, in the office  Advised of above  Per Jesus Worrell, pt s scheduled for psych eval and eduction on 8/14  Jesus Worrell is aware - no copay for this

## 2018-08-06 NOTE — TELEPHONE ENCOUNTER
Forwarding to [facility] clerical staff  Pt's daughter requested that her cell # be added to the pt's contact info - [phone]  schuyler Feliciano/ administrative staff

## 2018-08-06 NOTE — ED PROVIDER NOTES
History  Chief Complaint   Patient presents with    Back Pain     Low back pain x 2 days, reports pain x 5 years and has received injections at pain managment  Family called pain management to see if they could see her today, reports being placed on hold and did not want to wait because the pain got too bad     75 yo male w/ hx of chronic back pain secondary to post laminectomy syndrome presents to the Emergency Department for worsening of her chronic LBP x2d  She is under the care of pain management, having received an epidural injection 3 weeks prior, and pain had been improving during that time  She denies traumatic injury or inciting event causing new pain  She reports no change to her chronic L thigh radicular pain, denies paresthesias, weakness, fever, vomiting or loss of bowel/bladder control  Denies hx of cancer or IVDU  Prior to Admission Medications   Prescriptions Last Dose Informant Patient Reported? Taking?    SYNTHROID 88 MCG tablet  Other (Specify) No Yes   Sig: TAKE 1 TABLET BY MOUTH ONCE DAILY   Timolol Maleate 0 5 % (DAILY) SOLN  Other (Specify) No Yes   Sig: Apply 1 drop to eye every 12 (twelve) hours   albuterol (VENTOLIN HFA) 90 mcg/act inhaler  Other (Specify) No Yes   Sig: Inhale 2 puffs every 4 (four) hours as needed for wheezing or shortness of breath   alendronate (FOSAMAX) 70 mg tablet  Other (Specify) No Yes   Sig: Take 1 tablet (70 mg total) by mouth once a week   atorvastatin (LIPITOR) 20 mg tablet  Other (Specify) No Yes   Sig: Take 1 tablet (20 mg total) by mouth daily   bimatoprost (LUMIGAN) 0 01 % ophthalmic drops  Other (Specify) No Yes   Sig: Administer 1 drop to both eyes daily   gabapentin (NEURONTIN) 800 mg tablet  Other (Specify) No Yes   Sig: TAKE 1 TABLET BY MOUTH TWICE DAILY   glycopyrrolate (ROBINUL) 2 MG tablet  Other (Specify) No Yes   Sig: Take 1 tablet (2 mg total) by mouth 2 (two) times a day   hydrochlorothiazide (HYDRODIURIL) 12 5 mg tablet  Other (Specify) No Yes   Sig: Take 1 tablet (12 5 mg total) by mouth daily   losartan (COZAAR) 25 mg tablet  Other (Specify) No Yes   Sig: Take 1 tablet (25 mg total) by mouth daily   meloxicam (MOBIC) 7 5 mg tablet  Other (Specify) No Yes   Sig: TAKE 1 TABLET BY MOUTH ONCE DAILY   montelukast (SINGULAIR) 10 mg tablet  Other (Specify) No Yes   Sig: Take 1 tablet (10 mg total) by mouth daily   nortriptyline (PAMELOR) 25 mg capsule  Other (Specify) No Yes   Sig: Take 1 after dinner and 1 PO HS    omeprazole (PriLOSEC) 40 MG capsule  Other (Specify) No Yes   Sig: Take 1 capsule (40 mg total) by mouth daily      Facility-Administered Medications: None       Past Medical History:   Diagnosis Date    Asthma     Depression     Dyslipidemia     Esophageal reflux     History of stomach ulcers     Hypercalcemia     Hypertension     Osteoarthritis     Osteopenia     Tremor        Past Surgical History:   Procedure Laterality Date    BACK SURGERY      lower back    BILATERAL OOPHORECTOMY       SECTION      CHOLECYSTECTOMY      HYSTERECTOMY         Family History   Problem Relation Age of Onset    Hypertension Family     Stroke Family     Heart disease Family     Thyroid disease Family      I have reviewed and agree with the history as documented  Social History   Substance Use Topics    Smoking status: Former Smoker    Smokeless tobacco: Never Used    Alcohol use Yes      Comment: social        Review of Systems   Constitutional: Negative for appetite change, chills, diaphoresis and fever  Respiratory: Negative for shortness of breath  Cardiovascular: Negative for chest pain  Gastrointestinal: Negative for diarrhea, nausea and vomiting  Genitourinary: Negative for difficulty urinating  Musculoskeletal: Positive for back pain  Negative for arthralgias and myalgias  Skin: Negative for color change and rash  Allergic/Immunologic: Negative for immunocompromised state     Neurological: Negative for weakness and numbness  Psychiatric/Behavioral: Negative for confusion  Physical Exam  Physical Exam   Constitutional: She is oriented to person, place, and time  She appears well-developed and well-nourished  She appears distressed (tearful, in pain)  HENT:   Head: Normocephalic and atraumatic  Eyes: Pupils are equal, round, and reactive to light  No scleral icterus  Cardiovascular: Normal rate and regular rhythm  Exam reveals no gallop and no friction rub  No murmur heard  Pulmonary/Chest: No respiratory distress  She has no wheezes  She has no rales  Abdominal: Soft  Bowel sounds are normal  She exhibits no distension and no mass  There is no tenderness  Musculoskeletal:        Lumbar back: She exhibits decreased range of motion  She exhibits no tenderness, no bony tenderness, no swelling, no edema and no pain  No pain w/ lumbar percussion  No skin changes or erythema  PROM of the BLE elicits increased lumbar pain   Neurological: She is alert and oriented to person, place, and time  She has normal strength  BLE strength 5/5 in all fields and symmetric  BLE sensation intact in all fields and symmetric   Skin: Skin is warm and dry  Capillary refill takes less than 2 seconds  She is not diaphoretic  Psychiatric: She has a normal mood and affect  Her behavior is normal    Vitals reviewed        Vital Signs  ED Triage Vitals [08/06/18 1159]   Temperature Pulse Respirations Blood Pressure SpO2   (!) 97 2 °F (36 2 °C) (!) 110 20 134/62 97 %      Temp Source Heart Rate Source Patient Position - Orthostatic VS BP Location FiO2 (%)   Tympanic Monitor -- Left arm --      Pain Score       Worst Possible Pain           Vitals:    08/06/18 1350 08/06/18 1355 08/06/18 1400 08/06/18 1405   BP:   146/67    Pulse: 89 88 83 82       Visual Acuity      ED Medications  Medications   ketorolac (TORADOL) injection 15 mg (15 mg Intramuscular Given 8/6/18 1317)   acetaminophen (TYLENOL) tablet 650 mg (650 mg Oral Given 8/6/18 1317)   methocarbamol (ROBAXIN) tablet 500 mg (500 mg Oral Given 8/6/18 1317)       Diagnostic Studies  Results Reviewed     None                 No orders to display              Procedures  Procedures       Phone Contacts  ED Phone Contact    ED Course  ED Course as of Aug 06 1952   [facility] Aug 06, 2018   1341 Pt pain improving  Will continue to monitor  MDM  Number of Diagnoses or Management Options  Acute exacerbation of chronic low back pain: established and worsening  Diagnosis management comments: 75 yo female w/ chronic back pain presents w/ acutely worse pain  She did undergo an epidural pain injection 3 weeks ago, however I do not have clinical suspicion for epidural abscess  She has no new neurologic findings warranting emergent imaging  Symptoms improved after treatment in the ED  Discharged to home without new prescriptions, recommend pain management f/u       Amount and/or Complexity of Data Reviewed  Decide to obtain previous medical records or to obtain history from someone other than the patient: yes  Obtain history from someone other than the patient: yes  Review and summarize past medical records: yes      CritCare Time    Disposition  Final diagnoses:   Acute exacerbation of chronic low back pain     Time reflects when diagnosis was documented in both MDM as applicable and the Disposition within this note     Time User Action Codes Description Comment    8/6/2018  1:56 PM Sunny Shah Add [M54 5,  G89 29] Acute exacerbation of chronic low back pain       ED Disposition     ED Disposition Condition Comment    Discharge  Arina Rey discharge to home/self care      Condition at discharge: Stable        Follow-up Information     Follow up With Specialties Details Why Contact Info    [facility], Nurse Practitioner Schedule an appointment as soon as possible for a visit  [address] [place] [id number]  [phone]            Discharge Medication List as of 8/6/2018  1:57 PM      CONTINUE these medications which have NOT CHANGED    Details   albuterol (VENTOLIN HFA) 90 mcg/act inhaler Inhale 2 puffs every 4 (four) hours as needed for wheezing or shortness of breath, Starting Tue 1/30/2018, Normal      alendronate (FOSAMAX) 70 mg tablet Take 1 tablet (70 mg total) by mouth once a week, Starting Tue 1/30/2018, Normal      atorvastatin (LIPITOR) 20 mg tablet Take 1 tablet (20 mg total) by mouth daily, Starting Tue 3/20/2018, Normal      bimatoprost (LUMIGAN) 0 01 % ophthalmic drops Administer 1 drop to both eyes daily, Starting Tue 1/30/2018, Normal      gabapentin (NEURONTIN) 800 mg tablet TAKE 1 TABLET BY MOUTH TWICE DAILY, Normal      glycopyrrolate (ROBINUL) 2 MG tablet Take 1 tablet (2 mg total) by mouth 2 (two) times a day, Starting Tue 1/30/2018, Normal      hydrochlorothiazide (HYDRODIURIL) 12 5 mg tablet Take 1 tablet (12 5 mg total) by mouth daily, Starting Tue 1/30/2018, Normal      losartan (COZAAR) 25 mg tablet Take 1 tablet (25 mg total) by mouth daily, Starting Mon 3/26/2018, Normal      meloxicam (MOBIC) 7 5 mg tablet TAKE 1 TABLET BY MOUTH ONCE DAILY, Normal      montelukast (SINGULAIR) 10 mg tablet Take 1 tablet (10 mg total) by mouth daily, Starting Tue 1/30/2018, Normal      nortriptyline (PAMELOR) 25 mg capsule Take 1 after dinner and 1 PO HS , Normal      omeprazole (PriLOSEC) 40 MG capsule Take 1 capsule (40 mg total) by mouth daily, Starting Tue 1/30/2018, Normal      SYNTHROID 88 MCG tablet TAKE 1 TABLET BY MOUTH ONCE DAILY, Normal      Timolol Maleate 0 5 % (DAILY) SOLN Apply 1 drop to eye every 12 (twelve) hours, Starting Tue 1/30/2018, Normal           No discharge procedures on file      ED Provider  Electronically Signed by           uYniel Bonilla PA-C  08/06/18 8186

## 2018-08-06 NOTE — TELEPHONE ENCOUNTER
S/w pt's daughter, isatu, advised of above  No nsaids w/ oral steroids  Tramadol may cause drowsiness, do not drive or operate machinery until you know how this may affect you  Cb as directed, sooner if questions or concerns arise  Isatu verbalized understanding and appreciation

## 2018-08-06 NOTE — TELEPHONE ENCOUNTER
Pt's daughter, Shabana Burks, called stating that the patient is having increased pain and had a difficult time over the weekend  Asking if Dr Yesica Thomas would be able to see her in the office or if she should take her to the ER  Please call Shabana Burks and advise  She can be reached at [phone]

## 2018-08-06 NOTE — TELEPHONE ENCOUNTER
S/w pt's daughter, Godfrey Lord, in the office  States pt was seen in the ED for intense pain iin her L buttock x 2-3 days  Per Godfrey Lord, no obvious cause  No relief w/ gabapentin or nortriptylline as prescribed  States pt was given an injection of something for pain an is worried about what will happen when the medication wears off  Flory Humphries, schuyler d/w DG and cb to advise  For now, ok to use rest, ice / heat and medications as prescribed / directed  Dara verbalized understanding and appreciation  Requested a cb on her cell [phone]

## 2018-08-06 NOTE — TELEPHONE ENCOUNTER
Can you please call the patient and advise her that we received the results of her thoracic spine MRI and it should mild deg disc disease and OA, but no sig stenosis or herniations noted  No reason at this point we can not continue with the SCStim trial authoirzation process  Is she scheduled for the class? Has she been able to schedule the psych eval or does she want to take the Miranda on the same day as her education class, as long as one of her daughters helps her with it  This is not an OV with our office and she will NOT be charged a copay  Thank you

## 2018-08-28 ENCOUNTER — OFFICE VISIT (OUTPATIENT)
Dept: FAMILY MEDICINE CLINIC | Facility: HOSPITAL | Age: 73
End: 2018-08-28
Payer: COMMERCIAL

## 2018-08-28 VITALS
BODY MASS INDEX: 32.79 KG/M2 | WEIGHT: 167 LBS | TEMPERATURE: 98 F | SYSTOLIC BLOOD PRESSURE: 130 MMHG | HEART RATE: 73 BPM | DIASTOLIC BLOOD PRESSURE: 68 MMHG | HEIGHT: 60 IN

## 2018-08-28 DIAGNOSIS — F32.A ANXIETY AND DEPRESSION: Primary | ICD-10-CM

## 2018-08-28 DIAGNOSIS — F41.9 ANXIETY AND DEPRESSION: Primary | ICD-10-CM

## 2018-08-28 PROCEDURE — 99213 OFFICE O/P EST LOW 20 MIN: CPT | Performed by: INTERNAL MEDICINE

## 2018-08-28 RX ORDER — BUPROPION HYDROCHLORIDE 300 MG/1
300 TABLET ORAL DAILY
Qty: 30 TABLET | Refills: 1 | Status: SHIPPED | OUTPATIENT
Start: 2018-08-28 | End: 2018-10-25 | Stop reason: SDUPTHER

## 2018-08-28 NOTE — PROGRESS NOTES
Assessment/Plan:    Anxiety and depression  Mood - depression > anxiety - better with starting WBXL - mood could still be better so will increase from 150 mg to 300 mg daily, recheck mood in 6 wks, call with SE/worsening mood/SI/panic attacks, has had some improvement in family stressors which has helped as well       Diagnoses and all orders for this visit:    Anxiety and depression  -     buPROPion (WELLBUTRIN XL) 300 mg 24 hr tablet; Take 1 tablet (300 mg total) by mouth daily for 30 days      Dexa not due till April 2019    Mammo done April 18      [facility] July 2014 - good for 10 yrs    Subjective:      Patient ID: Pati Almendarez is a 74 y o  female  HPI Pt here for mood check  Last visit pt was noting issues with anxiety and depression and we subsequently started her on WBXL (No SSRI/SNRI d/t concurrent TCA use)  She was counseled on meditation/yoga/deep breathing and exercise as other ways to help with mood as well  She was recommended to f/u with psych - number given  She is taking the med daily as directed and reports no SE  She feels mood is better - antoinette the depression - she feels less tearful and is not crying as much  She still feels anxious  She feels mood could still be better  She is sleeping "so so"  She notes no SI or panic attacks  She states some of her family stressors have improved a little  Review of Systems   Constitutional: Positive for unexpected weight change  Negative for chills and fever  HENT: Negative for congestion and sore throat  Respiratory: Negative for cough, shortness of breath and wheezing  Cardiovascular: Negative for chest pain, palpitations and leg swelling  Gastrointestinal: Positive for constipation  Negative for abdominal pain, blood in stool, diarrhea and nausea  Musculoskeletal: Positive for back pain  Negative for neck pain  Skin: Negative for rash and wound  Neurological: Negative for dizziness, syncope and headaches     Hematological: Negative for adenopathy  Psychiatric/Behavioral: Positive for dysphoric mood and sleep disturbance  Negative for behavioral problems and confusion  The patient is nervous/anxious  Objective:    /68   Pulse 73   Temp 98 °F (36 7 °C)   Ht 5' (1 524 m)   Wt 75 8 kg (167 lb)   BMI 32 61 kg/m²      Physical Exam   Constitutional: She appears well-developed and well-nourished  No distress  HENT:   Head: Normocephalic and atraumatic  Eyes: Conjunctivae are normal  Right eye exhibits no discharge  Left eye exhibits no discharge  Neck: Neck supple  No tracheal deviation present  Cardiovascular: Normal rate, regular rhythm and normal heart sounds  Exam reveals no friction rub  No murmur heard  Pulmonary/Chest: Effort normal and breath sounds normal  No respiratory distress  She has no wheezes  She has no rales  Musculoskeletal: She exhibits no edema  Neurological: She is alert  She exhibits normal muscle tone  Skin: Skin is warm  No rash noted  Psychiatric: She has a normal mood and affect  Her behavior is normal    Nursing note and vitals reviewed

## 2018-08-28 NOTE — ASSESSMENT & PLAN NOTE
Mood - depression > anxiety - better with starting WBXL - mood could still be better so will increase from 150 mg to 300 mg daily, recheck mood in 6 wks, call with SE/worsening mood/SI/panic attacks, has had some improvement in family stressors which has helped as well

## 2018-08-29 NOTE — TELEPHONE ENCOUNTER
Received authorization confirmation from [employer]  [facility] #[id number], valid 8/17/18-10/1/18  Per Dr Tanay Still trial to be done in procedure suite  Attempt to reach daughter to schedule, LM for return call

## 2018-08-31 DIAGNOSIS — M54.16 LUMBAR RADICULOPATHY: ICD-10-CM

## 2018-08-31 DIAGNOSIS — M96.1 POSTLAMINECTOMY SYNDROME, LUMBAR REGION: ICD-10-CM

## 2018-08-31 DIAGNOSIS — Z79.01 CHRONIC ANTICOAGULATION: ICD-10-CM

## 2018-08-31 DIAGNOSIS — G89.4 CHRONIC PAIN SYNDROME: Primary | ICD-10-CM

## 2018-08-31 NOTE — TELEPHONE ENCOUNTER
Spoke with daughter and patient is scheduled as follows:  9/6/18 @ 0815 for consents  9/20/18 arriving @ 1000 for 1100 trial, procedure suite  9/25/18 @ 1130 w/ Mikie for lead removal  No PCN allergy  Pt takes Meloxicam but denies any other blood thinning meds  Email sent to [employer] to inform them of trial dates

## 2018-09-01 ENCOUNTER — APPOINTMENT (OUTPATIENT)
Dept: RADIOLOGY | Facility: CLINIC | Age: 73
End: 2018-09-01
Payer: COMMERCIAL

## 2018-09-01 ENCOUNTER — OFFICE VISIT (OUTPATIENT)
Dept: URGENT CARE | Facility: CLINIC | Age: 73
End: 2018-09-01
Payer: COMMERCIAL

## 2018-09-01 VITALS
WEIGHT: 167 LBS | HEART RATE: 78 BPM | OXYGEN SATURATION: 98 % | BODY MASS INDEX: 32.79 KG/M2 | RESPIRATION RATE: 16 BRPM | TEMPERATURE: 97.4 F | SYSTOLIC BLOOD PRESSURE: 141 MMHG | HEIGHT: 60 IN | DIASTOLIC BLOOD PRESSURE: 60 MMHG

## 2018-09-01 DIAGNOSIS — M25.572 ACUTE LEFT ANKLE PAIN: ICD-10-CM

## 2018-09-01 DIAGNOSIS — M25.562 ACUTE PAIN OF LEFT KNEE: Primary | ICD-10-CM

## 2018-09-01 DIAGNOSIS — G89.4 CHRONIC PAIN SYNDROME: ICD-10-CM

## 2018-09-01 DIAGNOSIS — G89.29 CHRONIC PAIN OF LEFT KNEE: ICD-10-CM

## 2018-09-01 DIAGNOSIS — R10.9 ABDOMINAL PAIN, UNSPECIFIED ABDOMINAL LOCATION: ICD-10-CM

## 2018-09-01 DIAGNOSIS — M54.42 CHRONIC BILATERAL LOW BACK PAIN WITH LEFT-SIDED SCIATICA: ICD-10-CM

## 2018-09-01 DIAGNOSIS — G89.29 CHRONIC BILATERAL LOW BACK PAIN WITH LEFT-SIDED SCIATICA: ICD-10-CM

## 2018-09-01 DIAGNOSIS — M25.562 CHRONIC PAIN OF LEFT KNEE: ICD-10-CM

## 2018-09-01 PROCEDURE — 73610 X-RAY EXAM OF ANKLE: CPT

## 2018-09-01 PROCEDURE — 99213 OFFICE O/P EST LOW 20 MIN: CPT | Performed by: FAMILY MEDICINE

## 2018-09-01 PROCEDURE — G0463 HOSPITAL OUTPT CLINIC VISIT: HCPCS | Performed by: FAMILY MEDICINE

## 2018-09-01 PROCEDURE — 73562 X-RAY EXAM OF KNEE 3: CPT

## 2018-09-01 RX ORDER — PREDNISONE 10 MG/1
TABLET ORAL
Qty: 21 TABLET | Refills: 0 | Status: SHIPPED | OUTPATIENT
Start: 2018-09-02 | End: 2018-09-06 | Stop reason: ALTCHOICE

## 2018-09-01 RX ADMIN — Medication 10 MG: at 15:31

## 2018-09-01 NOTE — PROGRESS NOTES
[facility]        NAME: Mateus Linda is a 74 y o  female  : 1945    MRN: [id number]  DATE: 2018  TIME: 3:31 PM    Assessment and Plan   Acute pain of left knee [M25 562]  1  Acute pain of left knee  dexamethasone (DECADRON) injection 10 mg    predniSONE 10 mg tablet   2  Abdominal pain, unspecified abdominal location  XR knee 3 vw left non injury    XR ankle 3+ vw left   3  Chronic pain of left knee  dexamethasone (DECADRON) injection 10 mg    predniSONE 10 mg tablet   4  Acute left ankle pain  dexamethasone (DECADRON) injection 10 mg    predniSONE 10 mg tablet         Patient Instructions   Patient Instructions   Degenerative changes in the left knee and ankle on x-ray  Patient requesting something for pain, states she is out of tramadol  Review of the records indicates on  she was given tramadol twice daily as needed with 1 refill by pain management  No narcotic analgesics provided today  Will provide prednisone taper starting tomorrow, Decadron given IM in care now today        Proceed to  ER if symptoms worsen  Chief Complaint     Chief Complaint   Patient presents with    Leg Pain     Started thursday with left knee down to ankle pain  10/10 ankle pain  Ankle needs to be straight  Unable to twist ankle  Unable to weight bear on left foot  Denies injury  History of Present Illness       Patient presents with acute left knee ankle and foot pain, she denies any trauma or falls, no injury  He had thing she can recall is quickly rising from a sitting to a standing position  Patient has a history of chronic low back pain with radiculopathy for which she sees pain management, per her daughter she is to get a spinal nerve stimulator placed  Patient denies numbness or tingling from the knee into the foot she states her current pain is different from her chronic pain  There is no swelling she has difficulty with weight-bearing due to severe pain     Review of patient records indicates she takes tramadol for chronic pain but states she has been out of that        Review of Systems   Review of Systems   Musculoskeletal: Positive for arthralgias and gait problem  Skin: Negative  Neurological: Negative for weakness and numbness           Current Medications       Current Outpatient Prescriptions:     albuterol (VENTOLIN HFA) 90 mcg/act inhaler, Inhale 2 puffs every 4 (four) hours as needed for wheezing or shortness of breath, Disp: 6 7 g, Rfl: 6    alendronate (FOSAMAX) 70 mg tablet, Take 1 tablet (70 mg total) by mouth once a week, Disp: 4 tablet, Rfl: 6    atorvastatin (LIPITOR) 20 mg tablet, Take 1 tablet (20 mg total) by mouth daily, Disp: 90 tablet, Rfl: 1    bimatoprost (LUMIGAN) 0 01 % ophthalmic drops, Administer 1 drop to both eyes daily, Disp: 5 mL, Rfl: 6    buPROPion (WELLBUTRIN XL) 300 mg 24 hr tablet, Take 1 tablet (300 mg total) by mouth daily for 30 days, Disp: 30 tablet, Rfl: 1    gabapentin (NEURONTIN) 800 mg tablet, TAKE 1 TABLET BY MOUTH TWICE DAILY, Disp: 60 tablet, Rfl: 0    glycopyrrolate (ROBINUL) 2 MG tablet, Take 1 tablet (2 mg total) by mouth 2 (two) times a day, Disp: 60 tablet, Rfl: 6    hydrochlorothiazide (HYDRODIURIL) 12 5 mg tablet, Take 1 tablet (12 5 mg total) by mouth daily, Disp: 30 tablet, Rfl: 6    losartan (COZAAR) 25 mg tablet, Take 1 tablet (25 mg total) by mouth daily, Disp: 90 tablet, Rfl: 0    meloxicam (MOBIC) 7 5 mg tablet, TAKE 1 TABLET BY MOUTH ONCE DAILY, Disp: 30 tablet, Rfl: 0    montelukast (SINGULAIR) 10 mg tablet, Take 1 tablet (10 mg total) by mouth daily, Disp: 30 tablet, Rfl: 6    nortriptyline (PAMELOR) 25 mg capsule, Take 1 after dinner and 1 PO HS , Disp: 60 capsule, Rfl: 3    omeprazole (PriLOSEC) 40 MG capsule, Take 1 capsule (40 mg total) by mouth daily, Disp: 30 capsule, Rfl: 6    [START ON 9/2/2018] predniSONE 10 mg tablet, Take 6 tablets today, 5 tomorrow, 4 the next day, 3 the next day, 2 the following and 1 the last day with food, Disp: 21 tablet, Rfl: 0    SYNTHROID 88 MCG tablet, TAKE 1 TABLET BY MOUTH ONCE DAILY, Disp: 90 tablet, Rfl: 1    Timolol Maleate 0 5 % (DAILY) SOLN, Apply 1 drop to eye every 12 (twelve) hours, Disp: 5 mL, Rfl: 6    traMADol (ULTRAM) 50 mg tablet, Take 1 PO BID PRN for pain , Disp: 45 tablet, Rfl: 1    Current Facility-Administered Medications:     dexamethasone (DECADRON) injection 10 mg, 10 mg, Intramuscular, Once, Nancy Presley DO    Current Allergies     Allergies as of 2018 - Reviewed 2018   Allergen Reaction Noted    Iodinated diagnostic agents Anaphylaxis 2012    Aspirin GI Intolerance 2012            The following portions of the patient's history were reviewed and updated as appropriate: allergies, current medications, past family history, past medical history, past social history, past surgical history and problem list      Past Medical History:   Diagnosis Date    Asthma     Depression     Dyslipidemia     Esophageal reflux     History of stomach ulcers     Hypercalcemia     Hypertension     Osteoarthritis     Osteopenia     Tremor        Past Surgical History:   Procedure Laterality Date    BACK SURGERY      lower back    BILATERAL OOPHORECTOMY       SECTION      CHOLECYSTECTOMY      HYSTERECTOMY         Family History   Problem Relation Age of Onset    Hypertension Family     Stroke Family     Heart disease Family     Thyroid disease Family          Medications have been verified  Objective   /60   Pulse 78   Temp (!) 97 4 °F (36 3 °C)   Resp 16   Ht 5' (1 524 m)   Wt 75 8 kg (167 lb)   SpO2 98%   BMI 32 61 kg/m²        Physical Exam     Physical Exam   Musculoskeletal: She exhibits tenderness  She exhibits no deformity  Significant pain to light palpation of the left knee and left ankle    Severe pain with valgus and varus stress and all range of motion    Severe tenderness to palpation of the left ankle bilateral malleolar and retrocalcaneal tenderness to palpation  There is no swelling no ecchymosis no erythema

## 2018-09-01 NOTE — PATIENT INSTRUCTIONS
Degenerative changes in the left knee and ankle on x-ray  Patient requesting something for pain, states she is out of tramadol  Review of the records indicates on August 6th she was given tramadol twice daily as needed with 1 refill by pain management     No narcotic analgesics provided today  Will provide prednisone taper starting tomorrow, Decadron given IM in care now today

## 2018-09-03 RX ORDER — TRAMADOL HYDROCHLORIDE 50 MG/1
TABLET ORAL
Qty: 45 TABLET | Refills: 1 | OUTPATIENT
Start: 2018-09-03

## 2018-09-04 DIAGNOSIS — I10 ESSENTIAL HYPERTENSION: ICD-10-CM

## 2018-09-04 RX ORDER — LOSARTAN POTASSIUM 25 MG/1
25 TABLET ORAL DAILY
Qty: 90 TABLET | Refills: 3 | Status: SHIPPED | OUTPATIENT
Start: 2018-09-04 | End: 2019-08-06 | Stop reason: SDUPTHER

## 2018-09-06 ENCOUNTER — OFFICE VISIT (OUTPATIENT)
Dept: PAIN MEDICINE | Facility: CLINIC | Age: 73
End: 2018-09-06
Payer: COMMERCIAL

## 2018-09-06 VITALS
BODY MASS INDEX: 32.59 KG/M2 | DIASTOLIC BLOOD PRESSURE: 66 MMHG | HEIGHT: 60 IN | SYSTOLIC BLOOD PRESSURE: 130 MMHG | WEIGHT: 166 LBS

## 2018-09-06 DIAGNOSIS — F41.9 ANXIETY: ICD-10-CM

## 2018-09-06 DIAGNOSIS — G89.4 CHRONIC PAIN SYNDROME: ICD-10-CM

## 2018-09-06 DIAGNOSIS — M96.1 LUMBAR POST-LAMINECTOMY SYNDROME: Primary | ICD-10-CM

## 2018-09-06 PROCEDURE — 99214 OFFICE O/P EST MOD 30 MIN: CPT | Performed by: ANESTHESIOLOGY

## 2018-09-06 RX ORDER — LORAZEPAM 1 MG/1
TABLET ORAL
Qty: 2 TABLET | Refills: 0 | Status: SHIPPED | OUTPATIENT
Start: 2018-09-06 | End: 2018-11-09

## 2018-09-06 NOTE — PROGRESS NOTES
Assessment:  1  Lumbar post-laminectomy syndrome    2  Chronic pain syndrome    3  Anxiety        Plan:  I discussed with the patient and her daughter who was in attendance that at this point we will proceed with the spinal cord stimulator trial as scheduled  While the patient was in the office today we did thoroughly review the spinal cord stimulator trial process and discussed what to expect before, during, and after the trial  The pre-procedure instructions were thoroughly reviewed and a copy was sent home with the patient  The patient was given prescriptions for pre-procedure blood work today with the instructions to have them completed at least one week prior to the trial     The patient is somewhat anxious regard the procedure  I did write a prescription for lorazepam 1 mg to take one hour prior to the procedure and the patient understands the need a  for the procedure  The patient was given ample time to ask questions in regards to the spinal cord stimulator trial and the patient's questions were answered until they were thoroughly satisfied  Complete risks and benefits including bleeding, infection, tissue reaction, nerve injury and allergic reaction were discussed  The approach was demonstrated using models  Verbal and written consent was obtained  My impressions and treatment recommendations were discussed in detail with the patient who verbalized understanding and had no further questions  Discharge instructions were provided  I personally saw and examined the patient and I agree with the above discussed plan of care        New Medications Ordered This Visit   Medications    LORazepam (ATIVAN) 1 mg tablet     Sig: Take 1 tablet 60 minutes prior to procedure, may take an additional tablet 30 minutes prior to procedure, needs  day of procedure     Dispense:  2 tablet     Refill:  0       History of Present Illness:    William Irvin is a 74 y o  female who presents today after spinal cord stimulator education, psychological evaluation discuss any further questions in review the consents and potential risks of the procedure  I have personally reviewed and/or updated the patient's past medical history, past surgical history, family history, social history, current medications, allergies, and vital signs today  Review of Systems:    Review of Systems   Constitutional: Negative for fever and unexpected weight change  HENT: Negative for trouble swallowing  Eyes: Negative for visual disturbance  Respiratory: Negative for shortness of breath and wheezing  Cardiovascular: Negative for chest pain and palpitations  Gastrointestinal: Negative for constipation, diarrhea, nausea and vomiting  Endocrine: Negative for cold intolerance, heat intolerance and polydipsia  Genitourinary: Negative for difficulty urinating and frequency  Musculoskeletal: Negative for arthralgias, gait problem, joint swelling and myalgias  Skin: Negative for rash  Neurological: Negative for dizziness, seizures, syncope, weakness and headaches  Hematological: Does not bruise/bleed easily  Psychiatric/Behavioral: Negative for dysphoric mood  All other systems reviewed and are negative        Patient Active Problem List   Diagnosis    Tremor    Atrophic vaginitis    Osteoporosis    Obesity    Migraine headache    Hypothyroidism    Hypertension    Glaucoma    Esophageal reflux    Dyslipidemia    Cataract    Asthma    Lumbar spondylosis    IFG (impaired fasting glucose)    Chronic pain syndrome    Chronic bilateral low back pain with left-sided sciatica    Lumbar post-laminectomy syndrome    Lumbar radiculopathy    Chronic pain of left knee    Primary osteoarthritis of left knee    Gait abnormality    Weakness of left leg    Anserine bursitis    Anxiety and depression       Past Medical History:   Diagnosis Date    Asthma     Depression     Dyslipidemia     Esophageal reflux  History of stomach ulcers     Hypercalcemia     Hypertension     Osteoarthritis     Osteopenia     Tremor        Past Surgical History:   Procedure Laterality Date    BACK SURGERY      lower back    BILATERAL OOPHORECTOMY       SECTION      CHOLECYSTECTOMY      HYSTERECTOMY         Family History   Problem Relation Age of Onset    Hypertension Family     Stroke Family     Heart disease Family     Thyroid disease Family        Social History     Occupational History    unemployed      Social History Main Topics    Smoking status: Never Smoker    Smokeless tobacco: Never Used    Alcohol use Yes      Comment: social    Drug use: No    Sexual activity: Not on file       Current Outpatient Prescriptions on File Prior to Visit   Medication Sig    albuterol (VENTOLIN HFA) 90 mcg/act inhaler Inhale 2 puffs every 4 (four) hours as needed for wheezing or shortness of breath    alendronate (FOSAMAX) 70 mg tablet Take 1 tablet (70 mg total) by mouth once a week    atorvastatin (LIPITOR) 20 mg tablet Take 1 tablet (20 mg total) by mouth daily    bimatoprost (LUMIGAN) 0 01 % ophthalmic drops Administer 1 drop to both eyes daily    buPROPion (WELLBUTRIN XL) 300 mg 24 hr tablet Take 1 tablet (300 mg total) by mouth daily for 30 days    gabapentin (NEURONTIN) 800 mg tablet TAKE 1 TABLET BY MOUTH TWICE DAILY    glycopyrrolate (ROBINUL) 2 MG tablet Take 1 tablet (2 mg total) by mouth 2 (two) times a day    hydrochlorothiazide (HYDRODIURIL) 12 5 mg tablet Take 1 tablet (12 5 mg total) by mouth daily    losartan (COZAAR) 25 mg tablet Take 1 tablet (25 mg total) by mouth daily    meloxicam (MOBIC) 7 5 mg tablet TAKE 1 TABLET BY MOUTH ONCE DAILY    montelukast (SINGULAIR) 10 mg tablet Take 1 tablet (10 mg total) by mouth daily    nortriptyline (PAMELOR) 25 mg capsule Take 1 after dinner and 1 PO HS      omeprazole (PriLOSEC) 40 MG capsule Take 1 capsule (40 mg total) by mouth daily    SYNTHROID 88 MCG tablet TAKE 1 TABLET BY MOUTH ONCE DAILY    Timolol Maleate 0 5 % (DAILY) SOLN Apply 1 drop to eye every 12 (twelve) hours    traMADol (ULTRAM) 50 mg tablet Take 1 PO BID PRN for pain   [DISCONTINUED] predniSONE 10 mg tablet Take 6 tablets today, 5 tomorrow, 4 the next day, 3 the next day, 2 the following and 1 the last day with food (Patient not taking: Reported on 9/6/2018 )     No current facility-administered medications on file prior to visit  Allergies   Allergen Reactions    Iodinated Diagnostic Agents Anaphylaxis    Aspirin GI Intolerance       Physical Exam:    /66   Ht 5' (1 524 m)   Wt 75 3 kg (166 lb)   BMI 32 42 kg/m²     Constitutional: normal, well developed, well nourished, alert, in no distress and non-toxic and no overt pain behavior   and overweight  Eyes: anicteric  HEENT: grossly intact  Neck: supple, symmetric, trachea midline and no masses   Pulmonary:even and unlabored  Cardiovascular:No edema or pitting edema present  Skin:Normal without rashes or lesions and well hydrated  Psychiatric:Mood and affect appropriate  Neurologic:Cranial Nerves II-XII grossly intact  Musculoskeletal:normal

## 2018-09-07 LAB
ALBUMIN SERPL-MCNC: 4 G/DL (ref 3.5–4.8)
ALBUMIN/GLOB SERPL: 1.6 {RATIO} (ref 1.2–2.2)
ALP SERPL-CCNC: 118 IU/L (ref 39–117)
ALT SERPL-CCNC: 11 IU/L (ref 0–32)
APTT PPP: 29 SEC (ref 24–33)
AST SERPL-CCNC: 11 IU/L (ref 0–40)
BILIRUB SERPL-MCNC: 0.2 MG/DL (ref 0–1.2)
BUN SERPL-MCNC: 18 MG/DL (ref 8–27)
BUN/CREAT SERPL: 15 (ref 12–28)
CALCIUM SERPL-MCNC: 10 MG/DL (ref 8.7–10.3)
CHLORIDE SERPL-SCNC: 105 MMOL/L (ref 96–106)
CO2 SERPL-SCNC: 24 MMOL/L (ref 20–29)
CREAT SERPL-MCNC: 1.17 MG/DL (ref 0.57–1)
ERYTHROCYTE [DISTWIDTH] IN BLOOD BY AUTOMATED COUNT: 13.6 % (ref 12.3–15.4)
GLOBULIN SER-MCNC: 2.5 G/DL (ref 1.5–4.5)
GLUCOSE SERPL-MCNC: 94 MG/DL (ref 65–99)
HCT VFR BLD AUTO: 38.1 % (ref 34–46.6)
HGB BLD-MCNC: 12.9 G/DL (ref 11.1–15.9)
INR PPP: 1 (ref 0.8–1.2)
MCH RBC QN AUTO: 30.9 PG (ref 26.6–33)
MCHC RBC AUTO-ENTMCNC: 33.9 G/DL (ref 31.5–35.7)
MCV RBC AUTO: 91 FL (ref 79–97)
PLATELET # BLD AUTO: 215 X10E3/UL (ref 150–379)
POTASSIUM SERPL-SCNC: 4.6 MMOL/L (ref 3.5–5.2)
PROT SERPL-MCNC: 6.5 G/DL (ref 6–8.5)
PROTHROMBIN TIME: 10.4 SEC (ref 9.1–12)
RBC # BLD AUTO: 4.17 X10E6/UL (ref 3.77–5.28)
SL AMB EGFR AFRICAN AMERICAN: 53 ML/MIN/1.73
SL AMB EGFR NON AFRICAN AMERICAN: 46 ML/MIN/1.73
SODIUM SERPL-SCNC: 142 MMOL/L (ref 134–144)
WBC # BLD AUTO: 7.5 X10E3/UL (ref 3.4–10.8)

## 2018-09-20 NOTE — TELEPHONE ENCOUNTER
S/w  Pt and her daughter to make them aware that her procedure was being cancelled for today 9/20/2018 and if she needs to take her Meloxicam do so  pcoc & lead removal appts both cancelled in [facility]  Pt and dtr notified that Renate Maldonado our [employer] will be reaching back out to them to reschedule procedure  [employer] rep emailed as well

## 2018-09-25 ENCOUNTER — OFFICE VISIT (OUTPATIENT)
Dept: FAMILY MEDICINE CLINIC | Facility: HOSPITAL | Age: 73
End: 2018-09-25
Payer: COMMERCIAL

## 2018-09-25 VITALS
BODY MASS INDEX: 32.79 KG/M2 | DIASTOLIC BLOOD PRESSURE: 78 MMHG | WEIGHT: 167 LBS | HEIGHT: 60 IN | SYSTOLIC BLOOD PRESSURE: 132 MMHG | HEART RATE: 70 BPM | TEMPERATURE: 98 F

## 2018-09-25 DIAGNOSIS — G89.29 CHRONIC PAIN OF LEFT KNEE: ICD-10-CM

## 2018-09-25 DIAGNOSIS — R00.2 PALPITATION: ICD-10-CM

## 2018-09-25 DIAGNOSIS — R07.9 CHEST PAIN, UNSPECIFIED TYPE: ICD-10-CM

## 2018-09-25 DIAGNOSIS — F41.9 ANXIETY AND DEPRESSION: Primary | ICD-10-CM

## 2018-09-25 DIAGNOSIS — F32.A ANXIETY AND DEPRESSION: Primary | ICD-10-CM

## 2018-09-25 DIAGNOSIS — M25.562 CHRONIC PAIN OF LEFT KNEE: ICD-10-CM

## 2018-09-25 PROCEDURE — 99214 OFFICE O/P EST MOD 30 MIN: CPT | Performed by: INTERNAL MEDICINE

## 2018-09-25 PROCEDURE — 93000 ELECTROCARDIOGRAM COMPLETE: CPT | Performed by: INTERNAL MEDICINE

## 2018-09-25 NOTE — ASSESSMENT & PLAN NOTE
Mood better with increase in WBXL - wishes for no changes in current regimen, cont current WBXL and her Pamelor

## 2018-09-25 NOTE — ASSESSMENT & PLAN NOTE
Was seen in Urgent Care early Sept and had knee injection - improvement in pain since then, will need Ortho f/u if new/worse symptoms occur

## 2018-09-25 NOTE — PROGRESS NOTES
Assessment/Plan:    Chronic pain of left knee  Was seen in Urgent Care early Sept and had knee injection - improvement in pain since then, will need Ortho f/u if new/worse symptoms occur    Anxiety and depression  Mood better with increase in WBXL - wishes for no changes in current regimen, cont current WBXL and her Pamelor       Diagnoses and all orders for this visit:    Anxiety and depression    Chronic pain of left knee    Palpitation  -     POCT ECG  -     NM myocardial perfusion spect (rx stress and/or rest); Future  - Nml labs including TSH in March 2018, call with new/worse symptoms  - Avoid caffeine and watch for triggers (poss stress/anxiety related)    Chest pain, unspecified type  -     NM myocardial perfusion spect (rx stress and/or rest); Future  - ECG done today and was negative for ischemia  - RF for CVD reviewed and d/t mult RF and typical angina will check stress test  - To ER with new/worse symptoms      Mammo done 4/18    Dexa done 4/17    [facility] 2014 - good for 10 yrs    BW 3/18    Subjective:      Patient ID: Saba Haro is a 74 y o  female  HPI Pt here for follow up appt with her dgtr  Last visit pt was continuing to note depression > anxiety despite starting WBXL  WBXL was subsequently increased to 300 mg q day and pt is here today for mood check  She states she is doing much better with the medication  She feels less sad and tearful  She is not as much anxious but still is not sleeping well  She con't to feel her social environment is improving  Pt was seen in Urgent Care 9/1/18 for worsening L knee pain  She had a joint injection done and had great benefit with the injection  She notes some knee pain now with the current damp weather  Pt has noted SOB with exertion and has had some throat and chest pain with exertion as well  She states the symptoms have been occurring for 3-4 mos  She notes no radiation to arm  She notes dizziness and diaphoresis when it occurs  She also feels the heart is beating fast when it occurs  She has had no associated vomiting but has had some nausea  Dexa due 4/19    Mammo due 4/19    [place] done 7/14 - repeat 10 yrs    Review of Systems   Constitutional: Negative for chills and fever  HENT: Negative for congestion and sore throat  Eyes: Negative for pain and visual disturbance  Respiratory: Positive for shortness of breath  Negative for cough and wheezing  Cardiovascular: Positive for chest pain  Negative for palpitations and leg swelling  Gastrointestinal: Positive for nausea  Negative for abdominal pain and vomiting  Endocrine: Negative for polydipsia and polyuria  Genitourinary: Negative for difficulty urinating and dysuria  Musculoskeletal: Positive for arthralgias and back pain  Negative for neck pain  Skin: Negative for rash and wound  Neurological: Positive for dizziness  Negative for syncope and headaches  Hematological: Negative for adenopathy  Psychiatric/Behavioral: Positive for sleep disturbance  Negative for confusion and dysphoric mood  The patient is not nervous/anxious  See above HPI         Objective:    /78   Pulse 70   Temp 98 °F (36 7 °C)   Ht 5' (1 524 m)   Wt 75 8 kg (167 lb)   BMI 32 61 kg/m²      Physical Exam   Constitutional: She appears well-developed and well-nourished  No distress  HENT:   Head: Normocephalic and atraumatic  Eyes: Conjunctivae are normal  Right eye exhibits no discharge  Left eye exhibits no discharge  Neck: Neck supple  No tracheal deviation present  Cardiovascular: Normal rate, regular rhythm and normal heart sounds  Exam reveals no friction rub  No murmur heard  Pulmonary/Chest: Effort normal and breath sounds normal  No respiratory distress  She has no wheezes  She has no rales  Abdominal: Soft  She exhibits no distension  There is no tenderness  There is no guarding  obese   Musculoskeletal: She exhibits no edema     Neurological: She is alert  She exhibits normal muscle tone  Skin: Skin is warm and dry  No rash noted  Psychiatric: She has a normal mood and affect  Her behavior is normal    Nursing note and vitals reviewed

## 2018-09-28 ENCOUNTER — HOSPITAL ENCOUNTER (OUTPATIENT)
Dept: NON INVASIVE DIAGNOSTICS | Facility: HOSPITAL | Age: 73
Discharge: HOME | End: 2018-09-28
Payer: COMMERCIAL

## 2018-09-28 ENCOUNTER — HOSPITAL ENCOUNTER (OUTPATIENT)
Dept: RADIOLOGY | Facility: HOSPITAL | Age: 73
Discharge: HOME | End: 2018-09-28
Payer: COMMERCIAL

## 2018-09-28 DIAGNOSIS — R07.9 CHEST PAIN, UNSPECIFIED TYPE: ICD-10-CM

## 2018-09-28 DIAGNOSIS — R00.2 PALPITATION: ICD-10-CM

## 2018-09-28 PROCEDURE — 93016 CV STRESS TEST SUPVJ ONLY: CPT | Performed by: INTERNAL MEDICINE

## 2018-09-28 PROCEDURE — A9502 TC99M TETROFOSMIN: HCPCS

## 2018-09-28 PROCEDURE — 93018 CV STRESS TEST I&R ONLY: CPT | Performed by: INTERNAL MEDICINE

## 2018-09-28 PROCEDURE — 78452 HT MUSCLE IMAGE SPECT MULT: CPT

## 2018-09-28 PROCEDURE — 93017 CV STRESS TEST TRACING ONLY: CPT

## 2018-09-28 PROCEDURE — 78452 HT MUSCLE IMAGE SPECT MULT: CPT | Performed by: INTERNAL MEDICINE

## 2018-09-28 RX ADMIN — REGADENOSON 0.4 MG: 0.08 INJECTION, SOLUTION INTRAVENOUS at 10:35

## 2018-10-02 NOTE — TELEPHONE ENCOUNTER
Current auth [employer] #[id number], valid 18-10/1/18  ) is now   Email to [employer] asking them to obtain a new authorization  Spoke with patient's daughter, Resa Cheadle, and advised her of such  Audrey Magallonaniyah said patient is in [place] until early November  Advised daughter I will call once I have auth and we will get patient scheduled for after her return

## 2018-10-17 NOTE — TELEPHONE ENCOUNTER
Received new authorization confirmation from [employer] #[id number], valid 10/4/18-11/18/18   Spoke with patient's daughter, Abdulaziz Clark, and patient is scheduled as follows:  11/9/18 @ [phone] [id number] for consents  11/12/18 arriving @ 0940 for 1040 trial  11/16/18 @ 0830 w/ Mikie for lead removal  No PCN allergy  Pt takes Meloxicam but no other blood thinning meds  Email sent to [employer] to inform them of trial dates

## 2018-10-24 DIAGNOSIS — M96.1 POSTLAMINECTOMY SYNDROME, LUMBAR REGION: ICD-10-CM

## 2018-10-24 DIAGNOSIS — Z79.01 CHRONIC ANTICOAGULATION: ICD-10-CM

## 2018-10-24 DIAGNOSIS — M81.0 OSTEOPOROSIS, UNSPECIFIED OSTEOPOROSIS TYPE, UNSPECIFIED PATHOLOGICAL FRACTURE PRESENCE: ICD-10-CM

## 2018-10-24 DIAGNOSIS — I10 ESSENTIAL HYPERTENSION: ICD-10-CM

## 2018-10-24 DIAGNOSIS — K21.9 GERD WITHOUT ESOPHAGITIS: ICD-10-CM

## 2018-10-24 DIAGNOSIS — M54.16 LUMBAR RADICULOPATHY: ICD-10-CM

## 2018-10-24 DIAGNOSIS — H40.9 GLAUCOMA, UNSPECIFIED GLAUCOMA TYPE, UNSPECIFIED LATERALITY: ICD-10-CM

## 2018-10-24 DIAGNOSIS — G89.4 CHRONIC PAIN SYNDROME: ICD-10-CM

## 2018-10-24 DIAGNOSIS — G89.4 CHRONIC PAIN SYNDROME: Primary | ICD-10-CM

## 2018-10-24 DIAGNOSIS — J45.909 UNCOMPLICATED ASTHMA, UNSPECIFIED ASTHMA SEVERITY, UNSPECIFIED WHETHER PERSISTENT: ICD-10-CM

## 2018-10-24 RX ORDER — HYDROCHLOROTHIAZIDE 12.5 MG/1
12.5 TABLET ORAL DAILY
Qty: 30 TABLET | Refills: 6 | Status: SHIPPED | OUTPATIENT
Start: 2018-10-24 | End: 2019-08-06 | Stop reason: SDUPTHER

## 2018-10-24 RX ORDER — OMEPRAZOLE 40 MG/1
40 CAPSULE, DELAYED RELEASE ORAL DAILY
Qty: 30 CAPSULE | Refills: 6 | Status: SHIPPED | OUTPATIENT
Start: 2018-10-24 | End: 2021-09-27 | Stop reason: SDUPTHER

## 2018-10-24 RX ORDER — ALBUTEROL SULFATE 90 UG/1
2 AEROSOL, METERED RESPIRATORY (INHALATION) EVERY 4 HOURS PRN
Qty: 6.7 G | Refills: 6 | Status: SHIPPED | OUTPATIENT
Start: 2018-10-24 | End: 2019-01-08 | Stop reason: SDUPTHER

## 2018-10-24 RX ORDER — GLYCOPYRROLATE 2 MG/1
2 TABLET ORAL 2 TIMES DAILY
Qty: 60 TABLET | Refills: 6 | Status: SHIPPED | OUTPATIENT
Start: 2018-10-24 | End: 2019-09-04 | Stop reason: SDUPTHER

## 2018-10-24 RX ORDER — ALENDRONATE SODIUM 70 MG/1
70 TABLET ORAL WEEKLY
Qty: 4 TABLET | Refills: 6 | Status: SHIPPED | OUTPATIENT
Start: 2018-10-24 | End: 2021-10-12 | Stop reason: SDUPTHER

## 2018-10-24 RX ORDER — GABAPENTIN 800 MG/1
800 TABLET ORAL 2 TIMES DAILY
Qty: 60 TABLET | Refills: 0 | Status: SHIPPED | OUTPATIENT
Start: 2018-10-24 | End: 2018-11-09 | Stop reason: SDUPTHER

## 2018-10-24 RX ORDER — MONTELUKAST SODIUM 10 MG/1
10 TABLET ORAL DAILY
Qty: 30 TABLET | Refills: 6 | Status: SHIPPED | OUTPATIENT
Start: 2018-10-24 | End: 2019-09-04 | Stop reason: SDUPTHER

## 2018-10-24 RX ORDER — TIMOLOL MALEATE 6.8 MG/ML
1 SOLUTION/ DROPS OPHTHALMIC EVERY 12 HOURS
Qty: 5 ML | Refills: 6 | OUTPATIENT
Start: 2018-10-24

## 2018-10-25 DIAGNOSIS — F32.A ANXIETY AND DEPRESSION: ICD-10-CM

## 2018-10-25 DIAGNOSIS — F41.9 ANXIETY AND DEPRESSION: ICD-10-CM

## 2018-10-25 RX ORDER — BUPROPION HYDROCHLORIDE 300 MG/1
300 TABLET ORAL DAILY
Qty: 30 TABLET | Refills: 3 | Status: SHIPPED | OUTPATIENT
Start: 2018-10-25 | End: 2019-02-05 | Stop reason: HOSPADM

## 2018-10-25 NOTE — TELEPHONE ENCOUNTER
Please notify pt that all her meds were refilled as requested EXCEPT the eye drops - she has to get them from eye doc

## 2018-10-31 ENCOUNTER — TELEPHONE (OUTPATIENT)
Dept: PAIN MEDICINE | Facility: CLINIC | Age: 73
End: 2018-10-31

## 2018-10-31 NOTE — TELEPHONE ENCOUNTER
SOVS changed to 11/9/18 luca/ Mikie for H&P as the trial is scheduled for 11/12/18 and the authorization expires before I can get her scheduled for another day

## 2018-10-31 NOTE — TELEPHONE ENCOUNTER
I called and s/w pt's daughter about r/s Cindy's appt on 11/9/18 for Ezequiel Mullins will not be in the office that day  Floresita Bustamante is away and will be flying in Thursday 11/8/18 in the evening  I told the pt's daughter that I would have someone call her regarding her mom's appt  I did cx the appt off Dr [facility] schedule

## 2018-11-09 ENCOUNTER — OFFICE VISIT (OUTPATIENT)
Dept: PAIN MEDICINE | Facility: CLINIC | Age: 73
End: 2018-11-09
Payer: COMMERCIAL

## 2018-11-09 VITALS
HEART RATE: 76 BPM | SYSTOLIC BLOOD PRESSURE: 140 MMHG | HEIGHT: 60 IN | WEIGHT: 168 LBS | BODY MASS INDEX: 32.98 KG/M2 | DIASTOLIC BLOOD PRESSURE: 78 MMHG

## 2018-11-09 DIAGNOSIS — M54.16 LUMBAR RADICULOPATHY: ICD-10-CM

## 2018-11-09 DIAGNOSIS — R26.9 GAIT ABNORMALITY: ICD-10-CM

## 2018-11-09 DIAGNOSIS — R29.898 WEAKNESS OF LEFT LEG: ICD-10-CM

## 2018-11-09 DIAGNOSIS — M54.42 CHRONIC BILATERAL LOW BACK PAIN WITH LEFT-SIDED SCIATICA: Primary | ICD-10-CM

## 2018-11-09 DIAGNOSIS — M96.1 LUMBAR POST-LAMINECTOMY SYNDROME: ICD-10-CM

## 2018-11-09 DIAGNOSIS — M25.562 CHRONIC PAIN OF LEFT KNEE: ICD-10-CM

## 2018-11-09 DIAGNOSIS — G89.29 CHRONIC PAIN OF LEFT KNEE: ICD-10-CM

## 2018-11-09 DIAGNOSIS — G89.29 CHRONIC BILATERAL LOW BACK PAIN WITH LEFT-SIDED SCIATICA: Primary | ICD-10-CM

## 2018-11-09 DIAGNOSIS — M47.816 LUMBAR SPONDYLOSIS: ICD-10-CM

## 2018-11-09 DIAGNOSIS — M17.12 PRIMARY OSTEOARTHRITIS OF LEFT KNEE: ICD-10-CM

## 2018-11-09 DIAGNOSIS — G89.4 CHRONIC PAIN SYNDROME: ICD-10-CM

## 2018-11-09 PROCEDURE — 99214 OFFICE O/P EST MOD 30 MIN: CPT | Performed by: NURSE PRACTITIONER

## 2018-11-09 RX ORDER — NORTRIPTYLINE HYDROCHLORIDE 25 MG/1
CAPSULE ORAL
Qty: 60 CAPSULE | Refills: 2 | Status: SHIPPED | OUTPATIENT
Start: 2018-11-09 | End: 2018-12-18 | Stop reason: SDUPTHER

## 2018-11-09 RX ORDER — GABAPENTIN 800 MG/1
800 TABLET ORAL 2 TIMES DAILY
Qty: 60 TABLET | Refills: 2 | Status: SHIPPED | OUTPATIENT
Start: 2018-11-09 | End: 2018-12-18 | Stop reason: SDUPTHER

## 2018-11-09 NOTE — PROGRESS NOTES
Assessment:  1  Chronic bilateral low back pain with left-sided sciatica    2  Lumbar radiculopathy    3  Weakness of left leg    4  Lumbar spondylosis    5  Primary osteoarthritis of left knee    6  Chronic pain of left knee    7  Chronic pain syndrome    8  Gait abnormality    9  Lumbar post-laminectomy syndrome        Plan:  While the patient and her family were in the office today, I did review with her that this point I feel it is in her best interest to proceed with the spinal cord stimulator trial as scheduled  I did review the pre procedure instructions with the patient and her family and I reviewed our risk sheet  I thoroughly explained the preprocedure process and what to expect before during and after the trial    I again reminded both the patient and her family that the idea of the spinal cord stimulator is not to take away all of her pain, however, to improve her pain enough to significantly enhance her overall quality of life and activities of daily living to make the pain more tolerable and manageable  I did review the possible side effects and risks with the patient and her family until they verbalized understanding  With regards to her medication regimen, explained to the patient at this point time since she is noting some improvement and relieved with the Gabapentin Nortriptyline, we will continue them as prescribed for now  The patient was agreeable and verbalized an understanding  I discussed with the patient that at this point time she can followup with our office on an as-needed basis  I did review the patient that if her pain symptoms should change, worsen, and/or if she would experience any new symptoms as she would like to be evaluated for, she should give our office a call  The patient was agreeable and verbalized an understanding       I attest that I have spent at least 25 minutes face to face with the patient and that at least 50% of the time was educating and/or discussing the patient's symptoms and treatment plan options until the patient was satisfied with the plan  Please note that the patient speaks mostly Cam[place]onian and that her daughter did interpret for her the entire time and I made sure that they understood while we were discussing and that the patient was comfortable and confident in proceeding  History of Present Illness: The patient is a 74 y o  female last seen on 9/6/18 who presents for a follow up office visit in regards to chronic pain secondary to lumbar post-laminectomy syndrome and osteoarthritis of the left knee  The patient currently reports that at this point she continues with her left-sided greater than right low back and left lower extremity radicular symptoms as well as left knee pain  The patient was recently visiting her home in [facility] and her pain became severe and had to go to the emergency room and had an injection in the emergency room there  The patient and her family present today as she is scheduled for her spinal cord stimulator trial with Dr Christel Rock on Monday November 12, 2018  The patient and her family are hopeful that trial would be successful and that she will be a permanent stimulator candidate  At this point time the patient reports that she is not using the tramadol as she did not find it helpful but does continue with the gabapentin and nortriptyline as prescribed  Current pain medications includes:  Gabapentin 800 mg b i d  and nortriptyline 25 mg b i d  The patient reports that this regimen is providing minimal to moderate pain relief  The patient is reporting no side effects from this pain medication regimen  I have personally reviewed and/or updated the patient's past medical history, past surgical history, family history, social history, current medications, allergies, and vital signs today  Review of Systems:    Review of Systems   Respiratory: Positive for shortness of breath  Cardiovascular: Negative for chest pain  Gastrointestinal: Positive for constipation, nausea and vomiting  Negative for diarrhea  Musculoskeletal: Positive for gait problem and joint swelling (joint stiffness)  Negative for arthralgias and myalgias  Skin: Negative for rash  Neurological: Negative for dizziness, seizures and weakness  All other systems reviewed and are negative          Past Medical History:   Diagnosis Date    Asthma     Depression     Dyslipidemia     Esophageal reflux     History of stomach ulcers     Hypercalcemia     Hypertension     Osteopenia     Tremor        Past Surgical History:   Procedure Laterality Date    BACK SURGERY      lower back    BILATERAL OOPHORECTOMY       SECTION      CHOLECYSTECTOMY      HYSTERECTOMY         Family History   Problem Relation Age of Onset    Hypertension Family     Stroke Family     Heart disease Family     Thyroid disease Family        Social History     Occupational History    unemployed      Social History Main Topics    Smoking status: Never Smoker    Smokeless tobacco: Never Used    Alcohol use Yes      Comment: social    Drug use: No    Sexual activity: Not on file         Current Outpatient Prescriptions:     albuterol (VENTOLIN HFA) 90 mcg/act inhaler, Inhale 2 puffs every 4 (four) hours as needed for wheezing or shortness of breath, Disp: 6 7 g, Rfl: 6    alendronate (FOSAMAX) 70 mg tablet, Take 1 tablet (70 mg total) by mouth once a week, Disp: 4 tablet, Rfl: 6    atorvastatin (LIPITOR) 20 mg tablet, Take 1 tablet (20 mg total) by mouth daily, Disp: 90 tablet, Rfl: 1    bimatoprost (LUMIGAN) 0 01 % ophthalmic drops, Administer 1 drop to both eyes daily, Disp: 5 mL, Rfl: 6    buPROPion (WELLBUTRIN XL) 300 mg 24 hr tablet, Take 1 tablet (300 mg total) by mouth daily for 30 days, Disp: 30 tablet, Rfl: 3    gabapentin (NEURONTIN) 800 mg tablet, Take 1 tablet (800 mg total) by mouth 2 (two) times a day, Disp: 60 tablet, Rfl: 2    glycopyrrolate (ROBINUL) 2 MG tablet, Take 1 tablet (2 mg total) by mouth 2 (two) times a day, Disp: 60 tablet, Rfl: 6    hydrochlorothiazide (HYDRODIURIL) 12 5 mg tablet, Take 1 tablet (12 5 mg total) by mouth daily, Disp: 30 tablet, Rfl: 6    losartan (COZAAR) 25 mg tablet, Take 1 tablet (25 mg total) by mouth daily, Disp: 90 tablet, Rfl: 3    meloxicam (MOBIC) 7 5 mg tablet, TAKE 1 TABLET BY MOUTH ONCE DAILY, Disp: 30 tablet, Rfl: 0    montelukast (SINGULAIR) 10 mg tablet, Take 1 tablet (10 mg total) by mouth daily, Disp: 30 tablet, Rfl: 6    nortriptyline (PAMELOR) 25 mg capsule, Take 2 PO HS , Disp: 60 capsule, Rfl: 2    omeprazole (PriLOSEC) 40 MG capsule, Take 1 capsule (40 mg total) by mouth daily, Disp: 30 capsule, Rfl: 6    SYNTHROID 88 MCG tablet, TAKE 1 TABLET BY MOUTH ONCE DAILY, Disp: 90 tablet, Rfl: 1    Timolol Maleate 0 5 % (DAILY) SOLN, Apply 1 drop to eye every 12 (twelve) hours, Disp: 5 mL, Rfl: 6    Allergies   Allergen Reactions    Iodinated Diagnostic Agents Anaphylaxis    Aspirin GI Intolerance       Physical Exam:    /78   Pulse 76   Ht 5' (1 524 m)   Wt 76 2 kg (168 lb)   BMI 32 81 kg/m²     Constitutional:normal, well developed, well nourished, alert, in no distress and non-toxic and no overt pain behavior  and Abdomen was soft, nondistended, nontender upon palpation with hypoactive bowel sounds in all 4 quadrants  Eyes:anicteric  HEENT:grossly intact  Neck:supple, symmetric, trachea midline and no masses   Pulmonary:even and unlabored and Lung sounds were clear to auscultation in all fields    Cardiovascular:No edema or pitting edema present and Normal S1 and S2 rate and rhythm noted upon auscultation and exam   Skin:Normal without rashes or lesions and well hydrated  Psychiatric:Mood and affect appropriate  Neurologic:Cranial Nerves II-XII grossly intact  Musculoskeletal:Slightly antalgic, painful, but steady gait without the use of any assistive devices  Imaging  No orders to display         No orders of the defined types were placed in this encounter

## 2018-11-10 LAB
ALBUMIN SERPL-MCNC: 4.2 G/DL (ref 3.5–4.8)
ALBUMIN/GLOB SERPL: 1.6 {RATIO} (ref 1.2–2.2)
ALP SERPL-CCNC: 116 IU/L (ref 39–117)
ALT SERPL-CCNC: 14 IU/L (ref 0–32)
APTT PPP: 28 SEC (ref 24–33)
AST SERPL-CCNC: 15 IU/L (ref 0–40)
BILIRUB SERPL-MCNC: 0.2 MG/DL (ref 0–1.2)
BUN SERPL-MCNC: 21 MG/DL (ref 8–27)
BUN/CREAT SERPL: 21 (ref 12–28)
CALCIUM SERPL-MCNC: 10 MG/DL (ref 8.7–10.3)
CHLORIDE SERPL-SCNC: 106 MMOL/L (ref 96–106)
CO2 SERPL-SCNC: 25 MMOL/L (ref 20–29)
CREAT SERPL-MCNC: 0.98 MG/DL (ref 0.57–1)
ERYTHROCYTE [DISTWIDTH] IN BLOOD BY AUTOMATED COUNT: 14.1 % (ref 12.3–15.4)
GLOBULIN SER-MCNC: 2.6 G/DL (ref 1.5–4.5)
GLUCOSE SERPL-MCNC: 88 MG/DL (ref 65–99)
HCT VFR BLD AUTO: 39.6 % (ref 34–46.6)
HGB BLD-MCNC: 12.8 G/DL (ref 11.1–15.9)
INR PPP: 1 (ref 0.8–1.2)
MCH RBC QN AUTO: 30.9 PG (ref 26.6–33)
MCHC RBC AUTO-ENTMCNC: 32.3 G/DL (ref 31.5–35.7)
MCV RBC AUTO: 96 FL (ref 79–97)
PLATELET # BLD AUTO: 213 X10E3/UL (ref 150–379)
POTASSIUM SERPL-SCNC: 4.5 MMOL/L (ref 3.5–5.2)
PROT SERPL-MCNC: 6.8 G/DL (ref 6–8.5)
PROTHROMBIN TIME: 10.5 SEC (ref 9.1–12)
RBC # BLD AUTO: 4.14 X10E6/UL (ref 3.77–5.28)
SL AMB EGFR AFRICAN AMERICAN: 65 ML/MIN/1.73
SL AMB EGFR NON AFRICAN AMERICAN: 57 ML/MIN/1.73
SODIUM SERPL-SCNC: 144 MMOL/L (ref 134–144)
WBC # BLD AUTO: 8.1 X10E3/UL (ref 3.4–10.8)

## 2018-11-12 ENCOUNTER — HOSPITAL ENCOUNTER (OUTPATIENT)
Dept: RADIOLOGY | Facility: CLINIC | Age: 73
Discharge: HOME | End: 2018-11-12

## 2018-11-12 VITALS
DIASTOLIC BLOOD PRESSURE: 85 MMHG | SYSTOLIC BLOOD PRESSURE: 133 MMHG | OXYGEN SATURATION: 98 % | TEMPERATURE: 97.6 F | HEART RATE: 68 BPM | RESPIRATION RATE: 18 BRPM

## 2018-11-12 DIAGNOSIS — G89.4 CHRONIC PAIN SYNDROME: ICD-10-CM

## 2018-11-12 DIAGNOSIS — M54.16 LUMBAR RADICULOPATHY: ICD-10-CM

## 2018-11-12 DIAGNOSIS — M96.1 POSTLAMINECTOMY SYNDROME, LUMBAR REGION: ICD-10-CM

## 2018-11-12 NOTE — H&P
Assessment:  1  Chronic bilateral low back pain with left-sided sciatica    2  Lumbar radiculopathy    3  Weakness of left leg    4  Lumbar spondylosis    5  Primary osteoarthritis of left knee    6  Chronic pain of left knee    7  Chronic pain syndrome    8  Gait abnormality    9  Lumbar post-laminectomy syndrome        Plan:  While the patient and her family were in the office today, I did review with her that this point I feel it is in her best interest to proceed with the spinal cord stimulator trial as scheduled  I did review the pre procedure instructions with the patient and her family and I reviewed our risk sheet  I thoroughly explained the preprocedure process and what to expect before during and after the trial    I again reminded both the patient and her family that the idea of the spinal cord stimulator is not to take away all of her pain, however, to improve her pain enough to significantly enhance her overall quality of life and activities of daily living to make the pain more tolerable and manageable  I did review the possible side effects and risks with the patient and her family until they verbalized understanding  With regards to her medication regimen, explained to the patient at this point time since she is noting some improvement and relieved with the Gabapentin Nortriptyline, we will continue them as prescribed for now  The patient was agreeable and verbalized an understanding  I discussed with the patient that at this point time she can followup with our office on an as-needed basis  I did review the patient that if her pain symptoms should change, worsen, and/or if she would experience any new symptoms as she would like to be evaluated for, she should give our office a call  The patient was agreeable and verbalized an understanding  History of Present Illness:     The patient is a 74 y o  female last seen on 9/6/18 who presents for a follow up office visit in regards to chronic pain secondary to lumbar post-laminectomy syndrome and osteoarthritis of the left knee  The patient currently reports that at this point she continues with her left-sided greater than right low back and left lower extremity radicular symptoms as well as left knee pain  The patient was recently visiting her home in [facility] and her pain became severe and had to go to the emergency room and had an injection in the emergency room there  The patient and her family present today as she is scheduled for her spinal cord stimulator trial with Dr Gay Sexton on Monday November 12, 2018  The patient and her family are hopeful that trial would be successful and that she will be a permanent stimulator candidate  At this point time the patient reports that she is not using the tramadol as she did not find it helpful but does continue with the gabapentin and nortriptyline as prescribed  Current pain medications includes:  Gabapentin 800 mg b i d  and nortriptyline 25 mg b i d  The patient reports that this regimen is providing minimal to moderate pain relief  The patient is reporting no side effects from this pain medication regimen  I have personally reviewed and/or updated the patient's past medical history, past surgical history, family history, social history, current medications, allergies, and vital signs today  Review of Systems:    Review of Systems   Respiratory: Positive for shortness of breath  Cardiovascular: Negative for chest pain  Gastrointestinal: Positive for constipation, nausea and vomiting  Negative for diarrhea  Musculoskeletal: Positive for gait problem and joint swelling (joint stiffness)  Negative for arthralgias and myalgias  Skin: Negative for rash  Neurological: Negative for dizziness, seizures and weakness  All other systems reviewed and are negative          Past Medical History:   Diagnosis Date    Asthma     Depression     Dyslipidemia     Esophageal reflux     History of stomach ulcers     Hypercalcemia     Hypertension     Osteopenia     Tremor        Past Surgical History:   Procedure Laterality Date    BACK SURGERY      lower back    BILATERAL OOPHORECTOMY       SECTION      CHOLECYSTECTOMY      HYSTERECTOMY         Family History   Problem Relation Age of Onset    Hypertension Family     Stroke Family     Heart disease Family     Thyroid disease Family        Social History     Occupational History    unemployed      Social History Main Topics    Smoking status: Never Smoker    Smokeless tobacco: Never Used    Alcohol use Yes      Comment: social    Drug use: No    Sexual activity: Not on file         Current Outpatient Prescriptions:     albuterol (VENTOLIN HFA) 90 mcg/act inhaler, Inhale 2 puffs every 4 (four) hours as needed for wheezing or shortness of breath, Disp: 6 7 g, Rfl: 6    alendronate (FOSAMAX) 70 mg tablet, Take 1 tablet (70 mg total) by mouth once a week, Disp: 4 tablet, Rfl: 6    atorvastatin (LIPITOR) 20 mg tablet, Take 1 tablet (20 mg total) by mouth daily, Disp: 90 tablet, Rfl: 1    bimatoprost (LUMIGAN) 0 01 % ophthalmic drops, Administer 1 drop to both eyes daily, Disp: 5 mL, Rfl: 6    buPROPion (WELLBUTRIN XL) 300 mg 24 hr tablet, Take 1 tablet (300 mg total) by mouth daily for 30 days, Disp: 30 tablet, Rfl: 3    gabapentin (NEURONTIN) 800 mg tablet, Take 1 tablet (800 mg total) by mouth 2 (two) times a day, Disp: 60 tablet, Rfl: 2    glycopyrrolate (ROBINUL) 2 MG tablet, Take 1 tablet (2 mg total) by mouth 2 (two) times a day, Disp: 60 tablet, Rfl: 6    hydrochlorothiazide (HYDRODIURIL) 12 5 mg tablet, Take 1 tablet (12 5 mg total) by mouth daily, Disp: 30 tablet, Rfl: 6    losartan (COZAAR) 25 mg tablet, Take 1 tablet (25 mg total) by mouth daily, Disp: 90 tablet, Rfl: 3    meloxicam (MOBIC) 7 5 mg tablet, TAKE 1 TABLET BY MOUTH ONCE DAILY, Disp: 30 tablet, Rfl: 0    montelukast (SINGULAIR) 10 mg tablet, Take 1 tablet (10 mg total) by mouth daily, Disp: 30 tablet, Rfl: 6    nortriptyline (PAMELOR) 25 mg capsule, Take 2 PO HS , Disp: 60 capsule, Rfl: 2    omeprazole (PriLOSEC) 40 MG capsule, Take 1 capsule (40 mg total) by mouth daily, Disp: 30 capsule, Rfl: 6    SYNTHROID 88 MCG tablet, TAKE 1 TABLET BY MOUTH ONCE DAILY, Disp: 90 tablet, Rfl: 1    Timolol Maleate 0 5 % (DAILY) SOLN, Apply 1 drop to eye every 12 (twelve) hours, Disp: 5 mL, Rfl: 6    Allergies   Allergen Reactions    Iodinated Diagnostic Agents Anaphylaxis    Aspirin GI Intolerance       Physical Exam:    /78   Pulse 76   Ht 5' (1 524 m)   Wt 76 2 kg (168 lb)   BMI 32 81 kg/m²      Constitutional:normal, well developed, well nourished, alert, in no distress and non-toxic and no overt pain behavior  and Abdomen was soft, nondistended, nontender upon palpation with hypoactive bowel sounds in all 4 quadrants  Eyes:anicteric  HEENT:grossly intact  Neck:supple, symmetric, trachea midline and no masses   Pulmonary:even and unlabored and Lung sounds were clear to auscultation in all fields  Cardiovascular:No edema or pitting edema present and Normal S1 and S2 rate and rhythm noted upon auscultation and exam   Skin:Normal without rashes or lesions and well hydrated  Psychiatric:Mood and affect appropriate  Neurologic:Cranial Nerves II-XII grossly intact  Musculoskeletal:Slightly antalgic, painful, but steady gait without the use of any assistive devices  Imaging  No orders to display         No orders of the defined types were placed in this encounter

## 2018-11-12 NOTE — TELEPHONE ENCOUNTER
Dr Zee Guillen was not able to get an IV started on patient for today's trial  Patient is now scheduled for 11/16/18 trial at 1300, will be going to hospital for IV before and her lead removal is 11/20/18 @ 1115 for lead removal  Attempt to reach [facility] (), left a detailed message with above info and also informed daughter to have patient continue hold Meloxicam and asked if daughter could call me back to confirm dates and times  Email sent to [facility] to give them updated information as well

## 2018-11-12 NOTE — NURSING NOTE
Attempted to place IV line multiple times in bilateral extremities c/ multiple providers  Patient to be rescheduled for placement in the [facility]  SL aware and coordinating of care

## 2018-11-12 NOTE — H&P
History of Present Illness: The patient is a [address] y o  female who presents with complaints of low back and bilateral leg pain      Patient Active Problem List   Diagnosis    Tremor    Atrophic vaginitis    Osteoporosis    Obesity    Migraine headache    Hypothyroidism    Hypertension    Glaucoma    Esophageal reflux    Dyslipidemia    Cataract    Asthma    Lumbar spondylosis    IFG (impaired fasting glucose)    Chronic pain syndrome    Chronic bilateral low back pain with left-sided sciatica    Lumbar post-laminectomy syndrome    Lumbar radiculopathy    Chronic pain of left knee    Primary osteoarthritis of left knee    Gait abnormality    Weakness of left leg    Anserine bursitis    Anxiety and depression       Past Medical History:   Diagnosis Date    Asthma     Depression     Dyslipidemia     Esophageal reflux     History of stomach ulcers     Hypercalcemia     Hypertension     Osteopenia     Tremor        Past Surgical History:   Procedure Laterality Date    BACK SURGERY      lower back    BILATERAL OOPHORECTOMY       SECTION      CHOLECYSTECTOMY      HYSTERECTOMY           Current Outpatient Prescriptions:     albuterol (VENTOLIN HFA) 90 mcg/act inhaler, Inhale 2 puffs every 4 (four) hours as needed for wheezing or shortness of breath, Disp: 6 7 g, Rfl: 6    alendronate (FOSAMAX) 70 mg tablet, Take 1 tablet (70 mg total) by mouth once a week, Disp: 4 tablet, Rfl: 6    atorvastatin (LIPITOR) 20 mg tablet, Take 1 tablet (20 mg total) by mouth daily, Disp: 90 tablet, Rfl: 1    bimatoprost (LUMIGAN) 0 01 % ophthalmic drops, Administer 1 drop to both eyes daily, Disp: 5 mL, Rfl: 6    buPROPion (WELLBUTRIN XL) 300 mg 24 hr tablet, Take 1 tablet (300 mg total) by mouth daily for 30 days, Disp: 30 tablet, Rfl: 3    gabapentin (NEURONTIN) 800 mg tablet, Take 1 tablet (800 mg total) by mouth 2 (two) times a day, Disp: 60 tablet, Rfl: 2    glycopyrrolate (ROBINUL) 2 MG tablet, Take 1 tablet (2 mg total) by mouth 2 (two) times a day, Disp: 60 tablet, Rfl: 6    hydrochlorothiazide (HYDRODIURIL) 12 5 mg tablet, Take 1 tablet (12 5 mg total) by mouth daily, Disp: 30 tablet, Rfl: 6    losartan (COZAAR) 25 mg tablet, Take 1 tablet (25 mg total) by mouth daily, Disp: 90 tablet, Rfl: 3    meloxicam (MOBIC) 7 5 mg tablet, TAKE 1 TABLET BY MOUTH ONCE DAILY, Disp: 30 tablet, Rfl: 0    montelukast (SINGULAIR) 10 mg tablet, Take 1 tablet (10 mg total) by mouth daily, Disp: 30 tablet, Rfl: 6    nortriptyline (PAMELOR) 25 mg capsule, Take 2 PO HS , Disp: 60 capsule, Rfl: 2    omeprazole (PriLOSEC) 40 MG capsule, Take 1 capsule (40 mg total) by mouth daily, Disp: 30 capsule, Rfl: 6    SYNTHROID 88 MCG tablet, TAKE 1 TABLET BY MOUTH ONCE DAILY, Disp: 90 tablet, Rfl: 1    Timolol Maleate 0 5 % (DAILY) SOLN, Apply 1 drop to eye every 12 (twelve) hours, Disp: 5 mL, Rfl: 6    Current Facility-Administered Medications:     lidocaine-epinephrine (XYLOCAINE-MPF/EPINEPHRINE) 1%-1:200,000 injection 1 mL, 1 mL, Infiltration, Once, Reji Evans,     Allergies   Allergen Reactions    Iodinated Diagnostic Agents Anaphylaxis    Aspirin GI Intolerance       Physical Exam:   Vitals:    11/12/18 0946   BP: 133/85   Pulse: 68   Resp: 18   Temp: 97 6 °F (36 4 °C)   SpO2: 98%     General: Awake, Alert, Oriented x 3, Mood and affect appropriate  Respiratory: Respirations even and unlabored  Cardiovascular: Peripheral pulses intact; no edema  Musculoskeletal Exam:  Decreased range of motion lumbar spine  ASA Score: II    Patient/Chart Verification  Patient ID Verified: Verbal  Consents Confirmed: Procedural, To be obtained in the Pre-Procedure area  H&P( within 30 days) Verified: To be obtained in the Pre-Procedure area  Interval H&P(within 24 hr) Complete (required for Outpatients and Surgery Admit only): To be obtained in the Pre-Procedure area  Allergies Reviewed:  Yes  Anticoag/NSAID held?: Yes (Meloxciam held for 3 days)  Currently on antibiotics?: No    Assessment:   1  Chronic pain syndrome    2  Postlaminectomy syndrome, lumbar region    3   Lumbar radiculopathy        Plan: [employer] SCS Trial

## 2018-11-13 NOTE — TELEPHONE ENCOUNTER
Received vm from patient's daughter confirming appointments for trial and lead removal and that they will be there

## 2018-11-15 DIAGNOSIS — E78.5 DYSLIPIDEMIA: ICD-10-CM

## 2018-11-16 ENCOUNTER — TELEPHONE (OUTPATIENT)
Dept: PAIN MEDICINE | Facility: CLINIC | Age: 73
End: 2018-11-16

## 2018-11-16 ENCOUNTER — HOSPITAL ENCOUNTER (OUTPATIENT)
Dept: RADIOLOGY | Facility: CLINIC | Age: 73
Discharge: HOME | End: 2018-11-16
Attending: ANESTHESIOLOGY | Admitting: ANESTHESIOLOGY
Payer: COMMERCIAL

## 2018-11-16 VITALS
HEART RATE: 75 BPM | TEMPERATURE: 98.2 F | OXYGEN SATURATION: 97 % | DIASTOLIC BLOOD PRESSURE: 108 MMHG | RESPIRATION RATE: 18 BRPM | SYSTOLIC BLOOD PRESSURE: 175 MMHG

## 2018-11-16 DIAGNOSIS — G89.4 CHRONIC PAIN SYNDROME: Primary | ICD-10-CM

## 2018-11-16 PROCEDURE — C1897 LEAD, NEUROSTIM TEST KIT: HCPCS

## 2018-11-16 PROCEDURE — 63650 IMPLANT NEUROELECTRODES: CPT | Performed by: ANESTHESIOLOGY

## 2018-11-16 PROCEDURE — 96374 THER/PROPH/DIAG INJ IV PUSH: CPT

## 2018-11-16 PROCEDURE — C1787 PATIENT PROGR, NEUROSTIM: HCPCS

## 2018-11-16 RX ORDER — CEPHALEXIN 500 MG/1
500 CAPSULE ORAL EVERY 6 HOURS SCHEDULED
Qty: 28 CAPSULE | Refills: 0 | Status: SHIPPED | OUTPATIENT
Start: 2018-11-16 | End: 2018-11-23

## 2018-11-16 RX ORDER — ATORVASTATIN CALCIUM 20 MG/1
TABLET, FILM COATED ORAL
Qty: 90 TABLET | Refills: 1 | Status: SHIPPED | OUTPATIENT
Start: 2018-11-16 | End: 2018-11-17 | Stop reason: SDUPTHER

## 2018-11-16 RX ADMIN — CEFAZOLIN SODIUM 2000 MG: 2 SOLUTION INTRAVENOUS at 12:11

## 2018-11-16 RX ADMIN — LIDOCAINE HYDROCHLORIDE 5 ML: 10; .005 INJECTION, SOLUTION EPIDURAL; INFILTRATION; INTRACAUDAL; PERINEURAL at 12:56

## 2018-11-16 NOTE — TELEPHONE ENCOUNTER
**To be called on 11/19/18    Pt is s/p [employer] SCS trial by Dr Zulema Soto  Pt is scheduled for lead removal on 11/20 to arrive at 11:00

## 2018-11-16 NOTE — H&P
History of Present Illness: The patient is a 74 y o  female who presents with complaints of low back and left leg pain      Patient Active Problem List   Diagnosis    Tremor    Atrophic vaginitis    Osteoporosis    Obesity    Migraine headache    Hypothyroidism    Hypertension    Glaucoma    Esophageal reflux    Dyslipidemia    Cataract    Asthma    Lumbar spondylosis    IFG (impaired fasting glucose)    Chronic pain syndrome    Chronic bilateral low back pain with left-sided sciatica    Lumbar post-laminectomy syndrome    Lumbar radiculopathy    Chronic pain of left knee    Primary osteoarthritis of left knee    Gait abnormality    Weakness of left leg    Anserine bursitis    Anxiety and depression       Past Medical History:   Diagnosis Date    Asthma     Depression     Dyslipidemia     Esophageal reflux     History of stomach ulcers     Hypercalcemia     Hypertension     Osteopenia     Tremor        Past Surgical History:   Procedure Laterality Date    BACK SURGERY      lower back    BILATERAL OOPHORECTOMY       SECTION      CHOLECYSTECTOMY      HYSTERECTOMY           Current Outpatient Prescriptions:     albuterol (VENTOLIN HFA) 90 mcg/act inhaler, Inhale 2 puffs every 4 (four) hours as needed for wheezing or shortness of breath, Disp: 6 7 g, Rfl: 6    alendronate (FOSAMAX) 70 mg tablet, Take 1 tablet (70 mg total) by mouth once a week, Disp: 4 tablet, Rfl: 6    atorvastatin (LIPITOR) 20 mg tablet, TAKE 1 TABLET BY MOUTH ONCE DAILY, Disp: 90 tablet, Rfl: 1    bimatoprost (LUMIGAN) 0 01 % ophthalmic drops, Administer 1 drop to both eyes daily, Disp: 5 mL, Rfl: 6    buPROPion (WELLBUTRIN XL) 300 mg 24 hr tablet, Take 1 tablet (300 mg total) by mouth daily for 30 days, Disp: 30 tablet, Rfl: 3    gabapentin (NEURONTIN) 800 mg tablet, Take 1 tablet (800 mg total) by mouth 2 (two) times a day, Disp: 60 tablet, Rfl: 2    glycopyrrolate (ROBINUL) 2 MG tablet, Take 1 tablet (2 mg total) by mouth 2 (two) times a day, Disp: 60 tablet, Rfl: 6    hydrochlorothiazide (HYDRODIURIL) 12 5 mg tablet, Take 1 tablet (12 5 mg total) by mouth daily, Disp: 30 tablet, Rfl: 6    losartan (COZAAR) 25 mg tablet, Take 1 tablet (25 mg total) by mouth daily, Disp: 90 tablet, Rfl: 3    meloxicam (MOBIC) 7 5 mg tablet, TAKE 1 TABLET BY MOUTH ONCE DAILY, Disp: 30 tablet, Rfl: 0    montelukast (SINGULAIR) 10 mg tablet, Take 1 tablet (10 mg total) by mouth daily, Disp: 30 tablet, Rfl: 6    nortriptyline (PAMELOR) 25 mg capsule, Take 2 PO HS , Disp: 60 capsule, Rfl: 2    omeprazole (PriLOSEC) 40 MG capsule, Take 1 capsule (40 mg total) by mouth daily, Disp: 30 capsule, Rfl: 6    SYNTHROID 88 MCG tablet, TAKE 1 TABLET BY MOUTH ONCE DAILY, Disp: 90 tablet, Rfl: 1    Timolol Maleate 0 5 % (DAILY) SOLN, Apply 1 drop to eye every 12 (twelve) hours, Disp: 5 mL, Rfl: 6    Current Facility-Administered Medications:     ceFAZolin (ANCEF) IVPB (premix) 2,000 mg, 2,000 mg, Intravenous, Once, Reji Evans DO, Last Rate: 100 mL/hr at 11/16/18 1211, 2,000 mg at 11/16/18 1211    lidocaine-epinephrine (XYLOCAINE-MPF/EPINEPHRINE) 1%-1:200,000 injection 30 mL, 30 mL, Infiltration, Once, Reji Evans DO    Allergies   Allergen Reactions    Iodinated Diagnostic Agents Anaphylaxis    Aspirin GI Intolerance       Physical Exam:   Vitals:    11/16/18 1211   BP: 132/71   Pulse: 78   Resp: 18   Temp: 98 2 °F (36 8 °C)   SpO2: 98%     General: Awake, Alert, Oriented x 3, Mood and affect appropriate  Respiratory: Respirations even and unlabored  Cardiovascular: Peripheral pulses intact; no edema  Musculoskeletal Exam:  Decreased range of motion lumbar spine    ASA Score: II    Patient/Chart Verification  Patient ID Verified: Verbal  Consents Confirmed: Procedural, To be obtained in the Pre-Procedure area  H&P( within 30 days) Verified:  To be obtained in the Pre-Procedure area  Interval H&P(within 24 hr) Complete (required for Outpatients and Surgery Admit only): To be obtained in the Pre-Procedure area  Allergies Reviewed: Yes  Anticoag/NSAID held?: NA  Currently on antibiotics?: No    Assessment: No diagnosis found      Plan: [facility] Trial

## 2018-11-16 NOTE — DISCHARGE INSTR - LAB
SPINE AND PAIN: SPINAL CORD STIMULATION/PERIPHERAL NERVE STIMULATION TRIAL/ PERMANENT IMPLANT DISCHARGE INSTRUCTIONS    ACTIVITY RESTRICTIONS DURING TRIAL PERIOD  · Do not bend or twist at the waist   · Do not lift anything that weighs over 5 pounds  · When you lie down, "log roll" (keep spine aligned) to change positions  Do not sleep on your stomach  · Limit stair climbing and strenuous activity  CARE OF THE INJECTION SITE  · CHECK THE DRESSING DAILY  It should intact  · Notify the [facility] if you have any of the following: redness, drainage, swelling, chills or fever over 100°F   · Do not change dressing, only reinforce edges if necessary  · Keep the dressing dry  Do not shower, (sponge baths only) until evaluated in office  SPECIAL INSTRUCTIONS  · Take your temperature daily  · Follow-up for lead removal scheduled for 11/20/18 to arrive at 11 am   · The  box is expensive  DO NOT drop or get wet  · Do not pull on the cable or leads  Do not disconnect the cable and lead  · Do activities that normally cause you to have pain and try different  settings  · Obtain post procedure x-ray shortly after procedure if ordered by physician  MEDICATIONS  · Continue to take all routine medications  · Our office may have instructed you to hold some medications  · Take antiobiotic as prescribed: Keflex four times a day for 7 days with meals  If you have any problems specifically related to your procedure, please call our office at [phone]  Problems not related to your procedure should be directed at your primary care physician  Contact the company representative with any questions regarding settings or operation of the external  box

## 2018-11-17 DIAGNOSIS — E78.5 DYSLIPIDEMIA: ICD-10-CM

## 2018-11-18 RX ORDER — ATORVASTATIN CALCIUM 20 MG/1
20 TABLET, FILM COATED ORAL DAILY
Qty: 90 TABLET | Refills: 1 | Status: SHIPPED | OUTPATIENT
Start: 2018-11-18 | End: 2019-12-19 | Stop reason: SDUPTHER

## 2018-11-19 NOTE — TELEPHONE ENCOUNTER
S/w pt's daughter, Cecil Brizuela  Pt w/ + relief w/ SCS  States pt continues w/ L kandy pain / locking  Per Lindsay @ [facility], was advised not to try different settings  Confirmed abx, denied s/s infection, states the dressing is c/d/i  Lior Savin to cb if questions / concerns arise  Confirmed 11/20 ov w/ DG  Tory Copeland verbalized understanding and appreciation

## 2018-11-20 ENCOUNTER — OFFICE VISIT (OUTPATIENT)
Dept: PAIN MEDICINE | Facility: CLINIC | Age: 73
End: 2018-11-20

## 2018-11-20 VITALS
HEIGHT: 60 IN | BODY MASS INDEX: 33.18 KG/M2 | HEART RATE: 74 BPM | WEIGHT: 169 LBS | DIASTOLIC BLOOD PRESSURE: 72 MMHG | SYSTOLIC BLOOD PRESSURE: 140 MMHG

## 2018-11-20 DIAGNOSIS — G89.4 CHRONIC PAIN SYNDROME: ICD-10-CM

## 2018-11-20 DIAGNOSIS — M96.1 LUMBAR POST-LAMINECTOMY SYNDROME: Primary | ICD-10-CM

## 2018-11-20 PROCEDURE — 99024 POSTOP FOLLOW-UP VISIT: CPT | Performed by: ANESTHESIOLOGY

## 2018-11-20 NOTE — PROGRESS NOTES
Assessment:  1  Lumbar post-laminectomy syndrome    2  Chronic pain syndrome        Plan:  Please see spinal cord stimulator removal procedure note below  At the patient had a successful trial please see history of presenting illness, will refer her to Neurosurgery for discussion and evaluation of permanent spinal cord stimulator placement  My impressions and treatment recommendations were discussed in detail with the patient who verbalized understanding and had no further questions  Discharge instructions were provided  I personally saw and examined the patient and I agree with the above discussed plan of care  Orders Placed This Encounter   Procedures    Ambulatory referral to Neurosurgery     Standing Status:   Future     Standing Expiration Date:   5/20/2019     Referral Priority:   Routine     Referral Type:   Consult - AMB     Referral Reason:   Specialty Services Required     Referred to Provider:   Mone Ellis MD     Requested Specialty:   Neurosurgery     Number of Visits Requested:   1     Expiration Date:   11/20/2019     No orders of the defined types were placed in this encounter  History of Present Illness:    David Simmons is a 74 y o  female who presents and follow-up from spinal cord stimulator placement utilizing [employer]  Cindy presenting with back and left leg pain and a full mapping of time stimulation overall her painful areas was successful  Alexa and her daughter said her pain was almost 100% relieved  She is able to walk and ambulate perform her daily living activities overall mood was improved she was happy to try to do more activities  She still has pain in her knee  She slept better during the trial     I have personally reviewed and/or updated the patient's past medical history, past surgical history, family history, social history, current medications, allergies, and vital signs today       Review of Systems:    Review of Systems   Respiratory: Negative for shortness of breath  Cardiovascular: Negative for chest pain  Gastrointestinal: Negative for constipation, diarrhea, nausea and vomiting  Musculoskeletal: Positive for gait problem (Difficulty walking, Decreased ROM)  Negative for arthralgias, joint swelling and myalgias  Skin: Negative for rash  Neurological: Negative for dizziness, seizures and weakness  All other systems reviewed and are negative        Patient Active Problem List   Diagnosis    Tremor    Atrophic vaginitis    Osteoporosis    Obesity    Migraine headache    Hypothyroidism    Hypertension    Glaucoma    Esophageal reflux    Dyslipidemia    Cataract    Asthma    Lumbar spondylosis    IFG (impaired fasting glucose)    Chronic pain syndrome    Chronic bilateral low back pain with left-sided sciatica    Lumbar post-laminectomy syndrome    Lumbar radiculopathy    Chronic pain of left knee    Primary osteoarthritis of left knee    Gait abnormality    Weakness of left leg    Anserine bursitis    Anxiety and depression       Past Medical History:   Diagnosis Date    Asthma     Depression     Dyslipidemia     Esophageal reflux     History of stomach ulcers     Hypercalcemia     Hypertension     Osteopenia     Tremor        Past Surgical History:   Procedure Laterality Date    BACK SURGERY      lower back    BILATERAL OOPHORECTOMY       SECTION      CHOLECYSTECTOMY      HYSTERECTOMY         Family History   Problem Relation Age of Onset    Hypertension Family     Stroke Family     Heart disease Family     Thyroid disease Family        Social History     Occupational History    unemployed      Social History Main Topics    Smoking status: Never Smoker    Smokeless tobacco: Never Used    Alcohol use Yes      Comment: social    Drug use: No    Sexual activity: Not on file       Current Outpatient Prescriptions on File Prior to Visit   Medication Sig    albuterol (VENTOLIN HFA) 90 mcg/act inhaler Inhale 2 puffs every 4 (four) hours as needed for wheezing or shortness of breath    alendronate (FOSAMAX) 70 mg tablet Take 1 tablet (70 mg total) by mouth once a week    atorvastatin (LIPITOR) 20 mg tablet Take 1 tablet (20 mg total) by mouth daily    bimatoprost (LUMIGAN) 0 01 % ophthalmic drops Administer 1 drop to both eyes daily    buPROPion (WELLBUTRIN XL) 300 mg 24 hr tablet Take 1 tablet (300 mg total) by mouth daily for 30 days    cephalexin (KEFLEX) 500 mg capsule Take 1 capsule (500 mg total) by mouth every 6 (six) hours for 7 days    gabapentin (NEURONTIN) 800 mg tablet Take 1 tablet (800 mg total) by mouth 2 (two) times a day    glycopyrrolate (ROBINUL) 2 MG tablet Take 1 tablet (2 mg total) by mouth 2 (two) times a day    hydrochlorothiazide (HYDRODIURIL) 12 5 mg tablet Take 1 tablet (12 5 mg total) by mouth daily    losartan (COZAAR) 25 mg tablet Take 1 tablet (25 mg total) by mouth daily    montelukast (SINGULAIR) 10 mg tablet Take 1 tablet (10 mg total) by mouth daily    nortriptyline (PAMELOR) 25 mg capsule Take 2 PO HS   omeprazole (PriLOSEC) 40 MG capsule Take 1 capsule (40 mg total) by mouth daily    SYNTHROID 88 MCG tablet TAKE 1 TABLET BY MOUTH ONCE DAILY    Timolol Maleate 0 5 % (DAILY) SOLN Apply 1 drop to eye every 12 (twelve) hours    [DISCONTINUED] meloxicam (MOBIC) 7 5 mg tablet TAKE 1 TABLET BY MOUTH ONCE DAILY     No current facility-administered medications on file prior to visit  Allergies   Allergen Reactions    Iodinated Diagnostic Agents Anaphylaxis    Aspirin GI Intolerance       Physical Exam:    /72   Pulse 74   Ht 5' (1 524 m)   Wt 76 7 kg (169 lb)   BMI 33 01 kg/m²     Constitutional: normal, well developed, well nourished, alert, in no distress and non-toxic and no overt pain behavior   and overweight  Eyes: anicteric  HEENT: grossly intact  Neck: supple, symmetric, trachea midline and no masses   Pulmonary:even and unlabored  Cardiovascular:No edema or pitting edema present  Skin:Normal without rashes or lesions and well hydrated and  No erythema or exudate over the trial area  Psychiatric:Mood and affect appropriate and Normal  Neurologic:Cranial Nerves II-XII grossly intact  Musculoskeletal:normal and antalgic      Removal Percutaneous Epidural Neurostimulator Electrode    Procedure: Removal Percutaneous Epidural Neurostimulator Electrode      Medical Necessity: The stimulator that was placed was a trial lead and must be removed prior to permanent implantation  Findings and Procedure: The risks of the procedure were explained to the patient  The stimulator device was set to [facility]  The dressing over the entrance site for the percutaneous neurostimulator electrode was carefully removed  Steri-strips securing the catheter were removed from the patients back  The exit sites were carefully inspected for evidence of infection  No evidence of cutaneous infection was found  The catheter was then slowly withdrawn holding the end most proximal to skin  The catheter was inspected and was removed intact  The skin area at the exit site was cleaned with alcohol and a small dressing applied  Instructions were given to maintain the dressing for an additional 48 hours      Condition on discharge: stable

## 2018-12-16 DIAGNOSIS — E03.9 HYPOTHYROIDISM, UNSPECIFIED TYPE: ICD-10-CM

## 2018-12-16 RX ORDER — LEVOTHYROXINE SODIUM 88 MCG
TABLET ORAL
Qty: 90 TABLET | Refills: 1 | Status: SHIPPED | OUTPATIENT
Start: 2018-12-16 | End: 2019-06-04 | Stop reason: SDUPTHER

## 2018-12-18 ENCOUNTER — OFFICE VISIT (OUTPATIENT)
Dept: PAIN MEDICINE | Facility: CLINIC | Age: 73
End: 2018-12-18
Payer: COMMERCIAL

## 2018-12-18 VITALS
BODY MASS INDEX: 32.67 KG/M2 | RESPIRATION RATE: 16 BRPM | HEART RATE: 76 BPM | SYSTOLIC BLOOD PRESSURE: 132 MMHG | HEIGHT: 60 IN | DIASTOLIC BLOOD PRESSURE: 70 MMHG | WEIGHT: 166.4 LBS

## 2018-12-18 DIAGNOSIS — M17.12 PRIMARY OSTEOARTHRITIS OF LEFT KNEE: ICD-10-CM

## 2018-12-18 DIAGNOSIS — G89.29 CHRONIC BILATERAL LOW BACK PAIN WITH LEFT-SIDED SCIATICA: Primary | ICD-10-CM

## 2018-12-18 DIAGNOSIS — M47.816 LUMBAR SPONDYLOSIS: ICD-10-CM

## 2018-12-18 DIAGNOSIS — M25.562 CHRONIC PAIN OF LEFT KNEE: ICD-10-CM

## 2018-12-18 DIAGNOSIS — M54.16 LUMBAR RADICULOPATHY: ICD-10-CM

## 2018-12-18 DIAGNOSIS — G89.29 CHRONIC PAIN OF LEFT KNEE: ICD-10-CM

## 2018-12-18 DIAGNOSIS — M96.1 LUMBAR POST-LAMINECTOMY SYNDROME: ICD-10-CM

## 2018-12-18 DIAGNOSIS — G89.4 CHRONIC PAIN SYNDROME: ICD-10-CM

## 2018-12-18 DIAGNOSIS — M54.42 CHRONIC BILATERAL LOW BACK PAIN WITH LEFT-SIDED SCIATICA: Primary | ICD-10-CM

## 2018-12-18 PROCEDURE — 99214 OFFICE O/P EST MOD 30 MIN: CPT | Performed by: NURSE PRACTITIONER

## 2018-12-18 RX ORDER — GABAPENTIN 800 MG/1
800 TABLET ORAL 2 TIMES DAILY
Qty: 60 TABLET | Refills: 2 | Status: SHIPPED | OUTPATIENT
Start: 2018-12-18 | End: 2019-03-19 | Stop reason: SDUPTHER

## 2018-12-18 RX ORDER — NORTRIPTYLINE HYDROCHLORIDE 25 MG/1
CAPSULE ORAL
Qty: 60 CAPSULE | Refills: 2 | Status: SHIPPED | OUTPATIENT
Start: 2018-12-18 | End: 2019-03-19 | Stop reason: SDUPTHER

## 2018-12-18 NOTE — PROGRESS NOTES
Assessment:  1  Chronic bilateral low back pain with left-sided sciatica    2  Lumbar spondylosis    3  Chronic pain syndrome    4  Chronic pain of left knee    5  Lumbar post-laminectomy syndrome    6  Lumbar radiculopathy    7  Primary osteoarthritis of left knee        Plan:  While the patient and her family were in the office today, I did have a thorough conversation with them regarding her medication regimen and treatment plan  At this point I encouraged her to follow up with neuro surgery for her consultation as scheduled in January and hopefully she can be scheduled before her next office visit with our office for her permanent implantation  The patient and her family were agreeable and verbalized understanding  With regards to her medication regimen, at this point I feel it is beneficial continue with the gabapentin and nortriptyline as prescribed, however, since a regular tramadol seems to help her pain at times, but causes some side effects, I am going to change it to Ultracet TID PRN so that way maybe it will help her pain but not cause the side effects  I advised the patient that if they experience any side effects or issues with the changes in their medication regiment, they should give our office a call to discuss  I also advised the patient not to drive or operate machinery until they see how the changes in the medication regimen affects them  The patient was agreeable and verbalized an understanding  [place] Prescription Drug Monitoring Program report was reviewed and was appropriate      There are risks associated with opioid medications, including dependence, addiction and tolerance  The patient understands and agrees to use these medications only as prescribed  Potential side effects of the medications include, but are not limited to, constipation, drowsiness, addiction, impaired judgment and risk of fatal overdose if not taken as prescribed   The patient was warned against driving while taking sedation medications  Sharing medications is a felony  At this point in time, the patient is showing no signs of addiction, abuse, diversion or suicidal ideation  The patient will follow-up in 12 weeks for medication prescription refill and reevaluation  The patient was advised to contact the office should their symptoms worsen in the interim  The patient was agreeable and verbalized an understanding  History of Present Illness: The patient is a 74 y o  female last seen on 11/20/18 who presents for a follow up office visit in regards to chronic pain secondary to lumbar post-laminectomy syndrome  The patient currently reports that since her last office visit her back and leg pain has continued to worsen as she is status post her spinal cord stimulator trial with Dr Olimpia Aparicio, which was significantly successful and at this point is scheduled for a consultation with Dr Fabiana Arteaga on January 8, 2019 to discuss proceeding with the permanent spinal cord stimulator implantation  The patient and her family are hopeful that they will be able to be scheduled for the permanent implantation sooner than later  Current pain medications includes:  Gabapentin 800 mg t i d , nortriptyline 50 mg at bedtime, and tramadol 50 mg p r n     The patient reports that this regimen is providing minimal to moderate pain relief  The patient is reporting dizziness and occasional sleepiness, from this pain medication regimen, especially if she takes a whole tramadol  I have personally reviewed and/or updated the patient's past medical history, past surgical history, family history, social history, current medications, allergies, and vital signs today  Tramadol last taken 12/18 AM         Review of Systems:    Review of Systems   Respiratory: Positive for shortness of breath  Cardiovascular: Negative for chest pain  Gastrointestinal: Positive for constipation and nausea   Negative for diarrhea and vomiting  Musculoskeletal: Positive for back pain, gait problem, joint swelling and myalgias  Negative for arthralgias  Skin: Negative for rash  Neurological: Positive for dizziness  Negative for seizures and weakness  All other systems reviewed and are negative          Past Medical History:   Diagnosis Date    Asthma     Depression     Dyslipidemia     Esophageal reflux     History of stomach ulcers     Hypercalcemia     Hypertension     Osteopenia     Tremor        Past Surgical History:   Procedure Laterality Date    BACK SURGERY      lower back    BILATERAL OOPHORECTOMY       SECTION      CHOLECYSTECTOMY      HYSTERECTOMY         Family History   Problem Relation Age of Onset    Hypertension Family     Stroke Family     Heart disease Family     Thyroid disease Family        Social History     Occupational History    unemployed      Social History Main Topics    Smoking status: Never Smoker    Smokeless tobacco: Never Used    Alcohol use Yes      Comment: social    Drug use: No    Sexual activity: Not on file         Current Outpatient Prescriptions:     albuterol (VENTOLIN HFA) 90 mcg/act inhaler, Inhale 2 puffs every 4 (four) hours as needed for wheezing or shortness of breath, Disp: 6 7 g, Rfl: 6    alendronate (FOSAMAX) 70 mg tablet, Take 1 tablet (70 mg total) by mouth once a week, Disp: 4 tablet, Rfl: 6    atorvastatin (LIPITOR) 20 mg tablet, Take 1 tablet (20 mg total) by mouth daily, Disp: 90 tablet, Rfl: 1    bimatoprost (LUMIGAN) 0 01 % ophthalmic drops, Administer 1 drop to both eyes daily, Disp: 5 mL, Rfl: 6    buPROPion (WELLBUTRIN XL) 300 mg 24 hr tablet, Take 1 tablet (300 mg total) by mouth daily for 30 days, Disp: 30 tablet, Rfl: 3    gabapentin (NEURONTIN) 800 mg tablet, Take 1 tablet (800 mg total) by mouth 2 (two) times a day, Disp: 60 tablet, Rfl: 2    glycopyrrolate (ROBINUL) 2 MG tablet, Take 1 tablet (2 mg total) by mouth 2 (two) times a day, Disp: 60 tablet, Rfl: 6    hydrochlorothiazide (HYDRODIURIL) 12 5 mg tablet, Take 1 tablet (12 5 mg total) by mouth daily, Disp: 30 tablet, Rfl: 6    losartan (COZAAR) 25 mg tablet, Take 1 tablet (25 mg total) by mouth daily, Disp: 90 tablet, Rfl: 3    montelukast (SINGULAIR) 10 mg tablet, Take 1 tablet (10 mg total) by mouth daily, Disp: 30 tablet, Rfl: 6    nortriptyline (PAMELOR) 25 mg capsule, Take 2 PO HS , Disp: 60 capsule, Rfl: 2    omeprazole (PriLOSEC) 40 MG capsule, Take 1 capsule (40 mg total) by mouth daily, Disp: 30 capsule, Rfl: 6    SYNTHROID 88 MCG tablet, TAKE 1 TABLET BY MOUTH ONCE DAILY, Disp: 90 tablet, Rfl: 1    Timolol Maleate 0 5 % (DAILY) SOLN, Apply 1 drop to eye every 12 (twelve) hours, Disp: 5 mL, Rfl: 6    Allergies   Allergen Reactions    Iodinated Diagnostic Agents Anaphylaxis    Aspirin GI Intolerance       Physical Exam:    /70   Pulse 76   Resp 16   Ht 5' (1 524 m)   Wt 75 5 kg (166 lb 6 4 oz)   BMI 32 50 kg/m²     Constitutional:normal, well developed, well nourished, alert, in no distress and non-toxic and no overt pain behavior  Eyes:anicteric  HEENT:grossly intact  Neck:supple, symmetric, trachea midline and no masses   Pulmonary:even and unlabored  Cardiovascular:No edema or pitting edema present  Skin:Normal without rashes or lesions and well hydrated  Psychiatric:Mood and affect appropriate  Neurologic:Cranial Nerves II-XII grossly intact  Musculoskeletal:Slightly antalgic, but steady gait without the use of any assistive devices  Imaging  No orders to display         No orders of the defined types were placed in this encounter

## 2019-01-08 ENCOUNTER — OFFICE VISIT (OUTPATIENT)
Dept: FAMILY MEDICINE CLINIC | Facility: HOSPITAL | Age: 74
End: 2019-01-08
Payer: COMMERCIAL

## 2019-01-08 VITALS
DIASTOLIC BLOOD PRESSURE: 78 MMHG | SYSTOLIC BLOOD PRESSURE: 138 MMHG | BODY MASS INDEX: 32.79 KG/M2 | HEIGHT: 60 IN | TEMPERATURE: 98 F | HEART RATE: 91 BPM | WEIGHT: 167 LBS

## 2019-01-08 DIAGNOSIS — E78.5 DYSLIPIDEMIA: ICD-10-CM

## 2019-01-08 DIAGNOSIS — R07.9 CHEST PAIN, UNSPECIFIED TYPE: ICD-10-CM

## 2019-01-08 DIAGNOSIS — J45.909 UNCOMPLICATED ASTHMA, UNSPECIFIED ASTHMA SEVERITY, UNSPECIFIED WHETHER PERSISTENT: ICD-10-CM

## 2019-01-08 DIAGNOSIS — F32.A ANXIETY AND DEPRESSION: ICD-10-CM

## 2019-01-08 DIAGNOSIS — J98.8 RESPIRATORY INFECTION: Primary | ICD-10-CM

## 2019-01-08 DIAGNOSIS — F41.9 ANXIETY AND DEPRESSION: ICD-10-CM

## 2019-01-08 DIAGNOSIS — I10 ESSENTIAL HYPERTENSION: ICD-10-CM

## 2019-01-08 DIAGNOSIS — R73.01 IFG (IMPAIRED FASTING GLUCOSE): ICD-10-CM

## 2019-01-08 DIAGNOSIS — G89.4 CHRONIC PAIN SYNDROME: ICD-10-CM

## 2019-01-08 PROCEDURE — 99214 OFFICE O/P EST MOD 30 MIN: CPT | Performed by: INTERNAL MEDICINE

## 2019-01-08 RX ORDER — CEFUROXIME AXETIL 500 MG/1
500 TABLET ORAL EVERY 12 HOURS SCHEDULED
Qty: 14 TABLET | Refills: 0 | Status: SHIPPED | OUTPATIENT
Start: 2019-01-08 | End: 2019-01-15

## 2019-01-08 RX ORDER — PREDNISONE 10 MG/1
TABLET ORAL
Qty: 25 TABLET | Refills: 0 | Status: SHIPPED | OUTPATIENT
Start: 2019-01-08 | End: 2019-01-14

## 2019-01-08 RX ORDER — ALBUTEROL SULFATE 90 UG/1
2 AEROSOL, METERED RESPIRATORY (INHALATION) EVERY 4 HOURS PRN
Qty: 6.7 G | Refills: 3 | Status: SHIPPED | OUTPATIENT
Start: 2019-01-08

## 2019-01-08 NOTE — ASSESSMENT & PLAN NOTE
Had improvement with temporary spinal cord stimulator - has appt with neurosurgery next week to discuss permanent stimulator placement, con't meds and f/u as per Pain Mgt

## 2019-01-08 NOTE — ASSESSMENT & PLAN NOTE
Asthma - mild intermittent with acute exacerbation triggered by viral infection - con't rescue inhaler prn - rx refilled, add Advair and Prednisone with abx, call with new/worse symptoms, re-eval when back to baseline and determine if PFT's needed

## 2019-01-08 NOTE — PROGRESS NOTES
Assessment/Plan:    Asthma  Asthma - mild intermittent with acute exacerbation triggered by viral infection - con't rescue inhaler prn - rx refilled, add Advair and Prednisone with abx, call with new/worse symptoms, re-eval when back to baseline and determine if PFT's needed    Anxiety and depression  Mood at goal with current meds - pt feels no changes are needed - con't current regimen and call with new/worse symptoms    Chronic pain syndrome  Had improvement with temporary spinal cord stimulator - has appt with neurosurgery next week to discuss permanent stimulator placement, con't meds and f/u as per Pain Mgt       Diagnoses and all orders for this visit:    Respiratory infection  Comments:  D/t duration of symptoms and abnormal exam will start Ceftin and Prednisone and add Advair to her rescue inhaler - urged to call asap if symptoms worsen  Orders:  -     fluticasone-salmeterol (ADVAIR) 100-50 mcg/dose inhaler; Inhale 1 puff 2 (two) times a day Rinse mouth after use  -     predniSONE 10 mg tablet; 3 tab PO x 1 then 2 tab PO bid x 3 days then 1 tab PO bid x 3 days then 1 tab Po q day x 3 days then stop  -     cefuroxime (CEFTIN) 500 mg tablet; Take 1 tablet (500 mg total) by mouth every 12 (twelve) hours for 7 days  -     CBC and differential; Future  -     CBC and differential    Uncomplicated asthma, unspecified asthma severity, unspecified whether persistent  -     albuterol (VENTOLIN HFA) 90 mcg/act inhaler; Inhale 2 puffs every 4 (four) hours as needed for wheezing or shortness of breath  -     CBC and differential; Future  -     Comprehensive metabolic panel; Future  -     Hemoglobin A1C; Future  -     Lipid panel; Future  -     TSH, 3rd generation with Free T4 reflex;  Future  -     CBC and differential  -     Comprehensive metabolic panel  -     Hemoglobin A1C  -     Lipid panel  -     TSH, 3rd generation with Free T4 reflex    Chest pain, unspecified type  Comments:  Improved since Sept - had stress test and no acute ischemia noted and EF 80% - call if symptoms relapse or red flag Cardio symptoms occur  Orders:  -     CBC and differential; Future  -     Comprehensive metabolic panel; Future  -     Hemoglobin A1C; Future  -     Lipid panel; Future  -     TSH, 3rd generation with Free T4 reflex; Future  -     CBC and differential  -     Comprehensive metabolic panel  -     Hemoglobin A1C  -     Lipid panel  -     TSH, 3rd generation with Free T4 reflex    Anxiety and depression  -     CBC and differential; Future  -     Comprehensive metabolic panel; Future  -     Hemoglobin A1C; Future  -     Lipid panel; Future  -     TSH, 3rd generation with Free T4 reflex; Future  -     CBC and differential  -     Comprehensive metabolic panel  -     Hemoglobin A1C  -     Lipid panel  -     TSH, 3rd generation with Free T4 reflex    Chronic pain syndrome  -     CBC and differential; Future  -     Comprehensive metabolic panel; Future  -     Hemoglobin A1C; Future  -     Lipid panel; Future  -     TSH, 3rd generation with Free T4 reflex; Future  -     CBC and differential  -     Comprehensive metabolic panel  -     Hemoglobin A1C  -     Lipid panel  -     TSH, 3rd generation with Free T4 reflex    IFG (impaired fasting glucose)  -     CBC and differential; Future  -     Comprehensive metabolic panel; Future  -     Hemoglobin A1C; Future  -     Lipid panel; Future  -     TSH, 3rd generation with Free T4 reflex; Future  -     CBC and differential  -     Comprehensive metabolic panel  -     Hemoglobin A1C  -     Lipid panel  -     TSH, 3rd generation with Free T4 reflex    Essential hypertension  -     CBC and differential; Future  -     Comprehensive metabolic panel; Future  -     Hemoglobin A1C; Future  -     Lipid panel; Future  -     TSH, 3rd generation with Free T4 reflex;  Future  -     CBC and differential  -     Comprehensive metabolic panel  -     Hemoglobin A1C  -     Lipid panel  -     TSH, 3rd generation with Free T4 reflex    Dyslipidemia  -     CBC and differential; Future  -     Comprehensive metabolic panel; Future  -     Hemoglobin A1C; Future  -     Lipid panel; Future  -     TSH, 3rd generation with Free T4 reflex; Future  -     CBC and differential  -     Comprehensive metabolic panel  -     Hemoglobin A1C  -     Lipid panel  -     TSH, 3rd generation with Free T4 reflex      [place] 2014 - 8 yrs    Mammo 4/18    Dexa 4/17    BW 3/18 - orders given to be done 3/19    Subjective:      Patient ID: Ya Chin is a 74 y o  female  HPI Pt here for follow up appt    Pt has noted cough and SOB x 2 wks  She did have loss of voice and chest tightness  She has had no fever but has had chills  She notes no ear pain  She con't to have cough and SOB and does wheeze intermittently  Her throat is still sore  She notes no N/V/D/rashes  She has been using Robitussin and Singulair  She feels she is slowly improving  She has dx of asthma and has been using her rescue inhaler more frequently - antoinette at night - approx 4 times at night  She has had no sick contacts  She does not smoke  Pt had her stress test in Sept after last appt when she was noting Cp and palpitations  Results reviewed with pt in detail - EF 80% and no induced ischemia was noted  She has had no frequent CP except with ongoing URI symptoms 2 wks ago  She notes no LE edema but does note her SOB is worse at night related to current resp symptoms  Pt con't to note her mood is doing well with Wellbutrin  She does not feel any med changes are needed  She still has issues with sleeping d/t her back and LE symptoms    Pt saw Dr Marina De Anda in Dec   She was referred to Neurosurgery to discuss permanent spinal cord stimulator as she had great improvement with temporary stimulator  She is taking her Gabapentin and Nortriptyline as directed  Her Tramadol was changed to Ultracet which does seem to be working better w/o SE  She was told to f/u in 3 mos   Her appt with Neurosurgery is next week  [place] 2014 - 8 yrs    Mammo 4/18    Dexa 4/17    BW 3/18    Review of Systems   Constitutional: Positive for chills  Negative for fatigue and fever  HENT: Positive for congestion and sore throat  Negative for ear pain  Eyes: Negative for pain and visual disturbance  Respiratory: Positive for cough, shortness of breath and wheezing  Cardiovascular: Negative for chest pain, palpitations and leg swelling  Gastrointestinal: Negative for abdominal pain, constipation, diarrhea and nausea  Endocrine: Negative for polydipsia and polyuria  Genitourinary: Negative for difficulty urinating and dysuria  Musculoskeletal: Positive for back pain  Negative for neck pain  Skin: Negative for rash and wound  Neurological: Negative for dizziness, syncope, light-headedness and headaches  Hematological: Negative for adenopathy  Psychiatric/Behavioral: Negative for behavioral problems and confusion  Objective:    /78   Pulse 91   Temp 98 °F (36 7 °C)   Ht 5' (1 524 m)   Wt 75 8 kg (167 lb)   BMI 32 61 kg/m²      Physical Exam   Constitutional: She appears well-developed and well-nourished  No distress  HENT:   Head: Normocephalic and atraumatic  Right Ear: External ear normal    Left Ear: External ear normal    Mouth/Throat: Oropharynx is clear and moist  No oropharyngeal exudate  Eyes: Conjunctivae are normal  Right eye exhibits no discharge  Left eye exhibits no discharge  Neck: Neck supple  No tracheal deviation present  Cardiovascular: Normal rate, regular rhythm and normal heart sounds  Exam reveals no friction rub  No murmur heard  Pulmonary/Chest: No respiratory distress  She has wheezes  She has no rales  SOB with activity and rare wheeze noted R base   Abdominal: Soft  She exhibits no distension  There is no tenderness  There is no guarding  Musculoskeletal: She exhibits no edema     Stiff gait with slight limp   Neurological: She is alert  She exhibits normal muscle tone  Skin: Skin is warm and dry  No rash noted  Psychiatric: She has a normal mood and affect  Her behavior is normal    Nursing note and vitals reviewed

## 2019-01-08 NOTE — ASSESSMENT & PLAN NOTE
Mood at goal with current meds - pt feels no changes are needed - con't current regimen and call with new/worse symptoms

## 2019-01-14 ENCOUNTER — OFFICE VISIT (OUTPATIENT)
Dept: NEUROSURGERY | Facility: CLINIC | Age: 74
End: 2019-01-14
Payer: COMMERCIAL

## 2019-01-14 VITALS
WEIGHT: 167 LBS | DIASTOLIC BLOOD PRESSURE: 80 MMHG | BODY MASS INDEX: 32.79 KG/M2 | SYSTOLIC BLOOD PRESSURE: 136 MMHG | TEMPERATURE: 97.4 F | HEIGHT: 60 IN | RESPIRATION RATE: 16 BRPM | HEART RATE: 81 BPM

## 2019-01-14 DIAGNOSIS — M96.1 LUMBAR POST-LAMINECTOMY SYNDROME: ICD-10-CM

## 2019-01-14 DIAGNOSIS — G89.4 CHRONIC PAIN SYNDROME: Primary | ICD-10-CM

## 2019-01-14 PROCEDURE — 99213 OFFICE O/P EST LOW 20 MIN: CPT | Performed by: NEUROLOGICAL SURGERY

## 2019-01-14 RX ORDER — CHLORHEXIDINE GLUCONATE 0.12 MG/ML
15 RINSE ORAL ONCE
Status: CANCELLED | OUTPATIENT
Start: 2019-02-05 | End: 2019-01-14

## 2019-01-14 RX ORDER — CEFAZOLIN SODIUM 2 G/50ML
2000 SOLUTION INTRAVENOUS ONCE
Status: CANCELLED | OUTPATIENT
Start: 2019-02-05 | End: 2019-01-14

## 2019-01-14 NOTE — PROGRESS NOTES
Assessment/Plan:    No problem-specific Assessment & Plan notes found for this encounter  Patient with successful spinal cord stimulator trial abbott  MRI thoracic is patent  100% relief with system    OR for thoracic lami for placement of a spinal cord stimulator and generator  Diagnoses and all orders for this visit:    Chronic pain syndrome  -     Ambulatory referral to Neurosurgery  -     Case request operating room: [address] [place] [address] Av ([facility]) W IMPLANTABLE PULSE GENERATOR, LEFT GLUTE; Standing  -     Case request operating room: LAMINECTOMY THORACIC FOR INSERTION DORSAL COLUMN SPINAL CORD STIMULATOR ([facility]) W IMPLANTABLE PULSE GENERATOR, LEFT GLUTE  -     UA w Reflex to Microscopic w Reflex to Culture  -     Comprehensive metabolic panel; Future  -     CBC and differential; Future  -     APTT; Future  -     Protime-INR; Future  -     HEMOGLOBIN A1C W/ EAG ESTIMATION; Future  -     Comprehensive metabolic panel  -     CBC and differential  -     APTT  -     Protime-INR  -     HEMOGLOBIN A1C W/ EAG ESTIMATION    Lumbar post-laminectomy syndrome  -     Ambulatory referral to Neurosurgery  -     Case request operating room: LAMINECTOMY THORACIC FOR INSERTION DORSAL COLUMN SPINAL CORD STIMULATOR ([facility]) W IMPLANTABLE PULSE GENERATOR, LEFT GLUTE; Standing  -     Case request operating room: LAMINECTOMY THORACIC FOR INSERTION DORSAL COLUMN SPINAL CORD STIMULATOR ([facility]) W IMPLANTABLE PULSE GENERATOR, LEFT GLUTE  -     UA w Reflex to Microscopic w Reflex to Culture  -     Comprehensive metabolic panel; Future  -     CBC and differential; Future  -     APTT; Future  -     Protime-INR; Future  -     HEMOGLOBIN A1C W/ EAG ESTIMATION;  Future  -     Comprehensive metabolic panel  -     CBC and differential  -     APTT  -     Protime-INR  -     HEMOGLOBIN A1C W/ EAG ESTIMATION    Other orders  -     Diet NPO; Sips with meds; Standing  -     Void on call to OR; Standing  - Insert peripheral IV; Standing  -     Nursing Communication Use 2 CHG cloths, have the patient wash his/her body from the neck down or have staff wash entire body (from neck down) if patient is unable; Standing  -     chlorhexidine (PERIDEX) 0 12 % oral rinse 15 mL; Swish and spit 15 mL once   -     ceFAZolin (ANCEF) IVPB (premix) 2,000 mg; Infuse 2,000 mg into a venous catheter once           Subjective:      Patient ID: Gil Soriano is a 74 y o  female  Pleasant female with low back pain and bilateral leg pain worse on the left than the right  She had a successful trial 100% relief of symptoms  She reported improvement in sleep and ambulation  She also reported improvement in activities  She does note a history of prior lumbar surgery  She denies any significant neurologic deficits  The following portions of the patient's history were reviewed and updated as appropriate:   She  has a past medical history of Asthma; Depression; Dyslipidemia; Esophageal reflux; History of stomach ulcers; Hypercalcemia; Hypertension; Osteopenia; and Tremor    She   Patient Active Problem List    Diagnosis Date Noted    Anxiety and depression 07/24/2018    Anserine bursitis 05/01/2018    Chronic bilateral low back pain with left-sided sciatica 04/24/2018    Lumbar post-laminectomy syndrome 04/24/2018    Lumbar radiculopathy 04/24/2018    Chronic pain of left knee 04/24/2018    Primary osteoarthritis of left knee 04/24/2018    Gait abnormality 04/24/2018    Weakness of left leg 04/24/2018    Lumbar spondylosis 01/30/2018    IFG (impaired fasting glucose) 01/30/2018    Chronic pain syndrome 01/30/2018    Atrophic vaginitis 11/14/2017    Osteoporosis 11/14/2017    Obesity 11/14/2017    Cataract 11/14/2017    Glaucoma 11/13/2017    Tremor 10/18/2012    Hypothyroidism 08/28/2012    Dyslipidemia 08/28/2012    Migraine headache 08/14/2012    Esophageal reflux 08/14/2012    Asthma 08/14/2012    Hypertension 2012     She  has a past surgical history that includes  section; Cholecystectomy; Hysterectomy; Back surgery; and Bilateral oophorectomy  Her family history includes Heart disease in her family; Hypertension in her family; Stroke in her family; Thyroid disease in her family  She  reports that she has never smoked  She has never used smokeless tobacco  She reports that she drinks alcohol  She reports that she does not use drugs  Current Outpatient Prescriptions on File Prior to Visit   Medication Sig    albuterol (VENTOLIN HFA) 90 mcg/act inhaler Inhale 2 puffs every 4 (four) hours as needed for wheezing or shortness of breath    alendronate (FOSAMAX) 70 mg tablet Take 1 tablet (70 mg total) by mouth once a week    atorvastatin (LIPITOR) 20 mg tablet Take 1 tablet (20 mg total) by mouth daily    bimatoprost (LUMIGAN) 0 01 % ophthalmic drops Administer 1 drop to both eyes daily    buPROPion (WELLBUTRIN XL) 300 mg 24 hr tablet Take 1 tablet (300 mg total) by mouth daily for 30 days    cefuroxime (CEFTIN) 500 mg tablet Take 1 tablet (500 mg total) by mouth every 12 (twelve) hours for 7 days    fluticasone-salmeterol (ADVAIR) 100-50 mcg/dose inhaler Inhale 1 puff 2 (two) times a day Rinse mouth after use   gabapentin (NEURONTIN) 800 mg tablet Take 1 tablet (800 mg total) by mouth 2 (two) times a day    glycopyrrolate (ROBINUL) 2 MG tablet Take 1 tablet (2 mg total) by mouth 2 (two) times a day    hydrochlorothiazide (HYDRODIURIL) 12 5 mg tablet Take 1 tablet (12 5 mg total) by mouth daily    losartan (COZAAR) 25 mg tablet Take 1 tablet (25 mg total) by mouth daily    montelukast (SINGULAIR) 10 mg tablet Take 1 tablet (10 mg total) by mouth daily    nortriptyline (PAMELOR) 25 mg capsule Take 2 PO HS      omeprazole (PriLOSEC) 40 MG capsule Take 1 capsule (40 mg total) by mouth daily    SYNTHROID 88 MCG tablet TAKE 1 TABLET BY MOUTH ONCE DAILY    Timolol Maleate 0 5 % (DAILY) SOLN Apply 1 drop to eye every 12 (twelve) hours    traMADol-acetaminophen (ULTRACET) 37 5-325 mg per tablet Take 1 tablet by mouth every 6 (six) hours as needed for moderate pain    [DISCONTINUED] predniSONE 10 mg tablet 3 tab PO x 1 then 2 tab PO bid x 3 days then 1 tab PO bid x 3 days then 1 tab Po q day x 3 days then stop     No current facility-administered medications on file prior to visit       Review of Systems   HENT: Negative  Eyes: Negative  Respiratory:        Asthma   Cardiovascular: Negative  Gastrointestinal: Negative  Endocrine: Negative  Genitourinary: Negative  Musculoskeletal: Positive for back pain (LBP radiating doewn bilateral leg, left worse than right  )  Skin: Negative  Allergic/Immunologic: Negative  Neurological: Positive for dizziness  Hematological: Negative  Psychiatric/Behavioral: Negative  Objective:      /80 (BP Location: Left arm, Patient Position: Sitting)   Pulse 81   Temp (!) 97 4 °F (36 3 °C) (Tympanic)   Resp 16   Ht 5' (1 524 m)   Wt 75 8 kg (167 lb)   BMI 32 61 kg/m²          Physical Exam   Constitutional: She is oriented to person, place, and time  She appears well-developed  HENT:   Head: Normocephalic  Eyes: Pupils are equal, round, and reactive to light  EOM are normal    Cardiovascular: Normal rate  Pulmonary/Chest: Effort normal    Musculoskeletal: Normal range of motion  Neurological: She is alert and oriented to person, place, and time  She has normal strength and normal reflexes  No cranial nerve deficit or sensory deficit

## 2019-01-20 LAB
ALBUMIN SERPL-MCNC: 3.7 G/DL (ref 3.5–4.8)
ALBUMIN/GLOB SERPL: 1.5 {RATIO} (ref 1.2–2.2)
ALP SERPL-CCNC: 107 IU/L (ref 39–117)
ALT SERPL-CCNC: 13 IU/L (ref 0–32)
APTT PPP: 29 SEC (ref 24–33)
AST SERPL-CCNC: 11 IU/L (ref 0–40)
BASOPHILS # BLD AUTO: 0 X10E3/UL (ref 0–0.2)
BASOPHILS NFR BLD AUTO: 0 %
BILIRUB SERPL-MCNC: 0.3 MG/DL (ref 0–1.2)
BUN SERPL-MCNC: 17 MG/DL (ref 8–27)
BUN/CREAT SERPL: 18 (ref 12–28)
CALCIUM SERPL-MCNC: 9.3 MG/DL (ref 8.7–10.3)
CHLORIDE SERPL-SCNC: 108 MMOL/L (ref 96–106)
CO2 SERPL-SCNC: 22 MMOL/L (ref 20–29)
CREAT SERPL-MCNC: 0.97 MG/DL (ref 0.57–1)
EOSINOPHIL # BLD AUTO: 0.4 X10E3/UL (ref 0–0.4)
EOSINOPHIL NFR BLD AUTO: 7 %
ERYTHROCYTE [DISTWIDTH] IN BLOOD BY AUTOMATED COUNT: 14 % (ref 12.3–15.4)
EST. AVERAGE GLUCOSE BLD GHB EST-MCNC: 120 MG/DL
GLOBULIN SER-MCNC: 2.4 G/DL (ref 1.5–4.5)
GLUCOSE SERPL-MCNC: 84 MG/DL (ref 65–99)
HBA1C MFR BLD: 5.8 % (ref 4.8–5.6)
HCT VFR BLD AUTO: 35.9 % (ref 34–46.6)
HGB BLD-MCNC: 12 G/DL (ref 11.1–15.9)
IMM GRANULOCYTES # BLD: 0 X10E3/UL (ref 0–0.1)
IMM GRANULOCYTES NFR BLD: 1 %
INR PPP: 1 (ref 0.8–1.2)
LYMPHOCYTES # BLD AUTO: 1.9 X10E3/UL (ref 0.7–3.1)
LYMPHOCYTES NFR BLD AUTO: 35 %
MCH RBC QN AUTO: 31 PG (ref 26.6–33)
MCHC RBC AUTO-ENTMCNC: 33.4 G/DL (ref 31.5–35.7)
MCV RBC AUTO: 93 FL (ref 79–97)
MONOCYTES # BLD AUTO: 0.6 X10E3/UL (ref 0.1–0.9)
MONOCYTES NFR BLD AUTO: 12 %
NEUTROPHILS # BLD AUTO: 2.4 X10E3/UL (ref 1.4–7)
NEUTROPHILS NFR BLD AUTO: 45 %
PLATELET # BLD AUTO: 226 X10E3/UL (ref 150–379)
POTASSIUM SERPL-SCNC: 4.5 MMOL/L (ref 3.5–5.2)
PROT SERPL-MCNC: 6.1 G/DL (ref 6–8.5)
PROTHROMBIN TIME: 10.7 SEC (ref 9.1–12)
RBC # BLD AUTO: 3.87 X10E6/UL (ref 3.77–5.28)
SL AMB EGFR AFRICAN AMERICAN: 66 ML/MIN/1.73
SL AMB EGFR NON AFRICAN AMERICAN: 57 ML/MIN/1.73
SODIUM SERPL-SCNC: 144 MMOL/L (ref 134–144)
WBC # BLD AUTO: 5.5 X10E3/UL (ref 3.4–10.8)

## 2019-01-28 RX ORDER — MELOXICAM 15 MG/1
15 TABLET ORAL DAILY
COMMUNITY
End: 2019-03-28

## 2019-01-28 NOTE — PRE-PROCEDURE INSTRUCTIONS
Pre-Surgery Instructions:   Medication Instructions    albuterol (VENTOLIN HFA) 90 mcg/act inhaler Instructed patient per Anesthesia Guidelines   alendronate (FOSAMAX) 70 mg tablet Instructed patient per Anesthesia Guidelines   atorvastatin (LIPITOR) 20 mg tablet Instructed patient per Anesthesia Guidelines   bimatoprost (LUMIGAN) 0 01 % ophthalmic drops Instructed patient per Anesthesia Guidelines   buPROPion (WELLBUTRIN XL) 300 mg 24 hr tablet Instructed patient per Anesthesia Guidelines   fluticasone-salmeterol (ADVAIR) 100-50 mcg/dose inhaler Instructed patient per Anesthesia Guidelines   gabapentin (NEURONTIN) 800 mg tablet Instructed patient per Anesthesia Guidelines   glycopyrrolate (ROBINUL) 2 MG tablet     hydrochlorothiazide (HYDRODIURIL) 12 5 mg tablet Instructed patient per Anesthesia Guidelines   losartan (COZAAR) 25 mg tablet Instructed patient per Anesthesia Guidelines   meloxicam (MOBIC) 15 mg tablet Instructed patient per Anesthesia Guidelines   montelukast (SINGULAIR) 10 mg tablet Instructed patient per Anesthesia Guidelines   nortriptyline (PAMELOR) 25 mg capsule Instructed patient per Anesthesia Guidelines   omeprazole (PriLOSEC) 40 MG capsule Instructed patient per Anesthesia Guidelines   SYNTHROID 88 MCG tablet Instructed patient per Anesthesia Guidelines   Timolol Maleate 0 5 % (DAILY) SOLN Instructed patient per Anesthesia Guidelines   traMADol-acetaminophen (ULTRACET) 37 5-325 mg per tablet Instructed patient per Anesthesia Guidelines  Patient reports she has the CHG wash from Dr Xiao Simon office and will review the written instructions provided prior to use  Pre-procedure instructions given with some language assistance received from her grandson, Prudencio Tai  No further questions or concerns regarding pre-operative instructions at this time

## 2019-01-29 ENCOUNTER — OFFICE VISIT (OUTPATIENT)
Dept: FAMILY MEDICINE CLINIC | Facility: HOSPITAL | Age: 74
End: 2019-01-29
Payer: COMMERCIAL

## 2019-01-29 VITALS
WEIGHT: 168 LBS | DIASTOLIC BLOOD PRESSURE: 72 MMHG | SYSTOLIC BLOOD PRESSURE: 132 MMHG | HEIGHT: 60 IN | HEART RATE: 86 BPM | BODY MASS INDEX: 32.98 KG/M2 | TEMPERATURE: 98 F

## 2019-01-29 DIAGNOSIS — E03.9 HYPOTHYROIDISM, UNSPECIFIED TYPE: ICD-10-CM

## 2019-01-29 DIAGNOSIS — I10 ESSENTIAL HYPERTENSION: ICD-10-CM

## 2019-01-29 DIAGNOSIS — M96.1 LUMBAR POST-LAMINECTOMY SYNDROME: ICD-10-CM

## 2019-01-29 DIAGNOSIS — G89.4 CHRONIC PAIN SYNDROME: ICD-10-CM

## 2019-01-29 DIAGNOSIS — J45.20 MILD INTERMITTENT ASTHMA WITHOUT COMPLICATION: ICD-10-CM

## 2019-01-29 DIAGNOSIS — Z01.818 PREOPERATIVE CLEARANCE: Primary | ICD-10-CM

## 2019-01-29 LAB
SL AMB  POCT GLUCOSE, UA: NORMAL
SL AMB LEUKOCYTE ESTERASE,UA: NORMAL
SL AMB POCT BILIRUBIN,UA: NORMAL
SL AMB POCT BLOOD,UA: NORMAL
SL AMB POCT CLARITY,UA: CLEAR
SL AMB POCT COLOR,UA: YELLOW
SL AMB POCT KETONES,UA: NORMAL
SL AMB POCT NITRITE,UA: NORMAL
SL AMB POCT PH,UA: 5
SL AMB POCT SPECIFIC GRAVITY,UA: 1.01
SL AMB POCT URINE PROTEIN: NORMAL
SL AMB POCT UROBILINOGEN: NORMAL

## 2019-01-29 PROCEDURE — 3078F DIAST BP <80 MM HG: CPT | Performed by: INTERNAL MEDICINE

## 2019-01-29 PROCEDURE — 99214 OFFICE O/P EST MOD 30 MIN: CPT | Performed by: INTERNAL MEDICINE

## 2019-01-29 PROCEDURE — 81002 URINALYSIS NONAUTO W/O SCOPE: CPT | Performed by: INTERNAL MEDICINE

## 2019-01-29 PROCEDURE — 3075F SYST BP GE 130 - 139MM HG: CPT | Performed by: INTERNAL MEDICINE

## 2019-01-29 NOTE — PROGRESS NOTES
Subjective:     Savana Galvan is a 74 y o  female who presents to the office today for a preoperative consultation at the request of surgeon Dr Harinder Costa who plans on performing thoracic laminectomy for placement of spinal cord stimulator and generator on 2019  Prior anesthesia adverse reactions - None    Exercise capacity - Able to walk 4 blocks or climb 2 flights of stairs without symptoms - Yes    Easy bleeding/bruising - No    Chest pain/palpitation/edema - No    Dyspnea/wheezing/cough - No    Sleep apnea - No    Has h/o intermittent asthma - had recent exacerbation early 2019 -  but has resolved after tx with Advair and Prednisone  She usually uses her rescue inhaler as needed - not usually daily       HPI    Past Medical History:   Diagnosis Date    Asthma     Depression     Dyslipidemia     Esophageal reflux     History of stomach ulcers     Hypercalcemia     Hypertension     Osteopenia     Tremor          Past Surgical History:   Procedure Laterality Date    BACK SURGERY      lower back    BILATERAL OOPHORECTOMY       SECTION      CHOLECYSTECTOMY      HYSTERECTOMY           Family History   Problem Relation Age of Onset    Hypertension Family     Stroke Family     Heart disease Family     Thyroid disease Family          Social History   Substance Use Topics    Smoking status: Never Smoker    Smokeless tobacco: Never Used    Alcohol use Yes      Comment: social       Current Outpatient Prescriptions   Medication Sig Dispense Refill    albuterol (VENTOLIN HFA) 90 mcg/act inhaler Inhale 2 puffs every 4 (four) hours as needed for wheezing or shortness of breath 6 7 g 3    alendronate (FOSAMAX) 70 mg tablet Take 1 tablet (70 mg total) by mouth once a week 4 tablet 6    atorvastatin (LIPITOR) 20 mg tablet Take 1 tablet (20 mg total) by mouth daily 90 tablet 1    bimatoprost (LUMIGAN) 0 01 % ophthalmic drops Administer 1 drop to both eyes daily (Patient taking differently: Administer 1 drop to both eyes 2 (two) times a day  ) 5 mL 6    buPROPion (WELLBUTRIN XL) 300 mg 24 hr tablet Take 1 tablet (300 mg total) by mouth daily for 30 days 30 tablet 3    gabapentin (NEURONTIN) 800 mg tablet Take 1 tablet (800 mg total) by mouth 2 (two) times a day 60 tablet 2    glycopyrrolate (ROBINUL) 2 MG tablet Take 1 tablet (2 mg total) by mouth 2 (two) times a day 60 tablet 6    hydrochlorothiazide (HYDRODIURIL) 12 5 mg tablet Take 1 tablet (12 5 mg total) by mouth daily 30 tablet 6    losartan (COZAAR) 25 mg tablet Take 1 tablet (25 mg total) by mouth daily 90 tablet 3    meloxicam (MOBIC) 15 mg tablet Take 15 mg by mouth daily      montelukast (SINGULAIR) 10 mg tablet Take 1 tablet (10 mg total) by mouth daily (Patient taking differently: Take 10 mg by mouth daily at bedtime  ) 30 tablet 6    nortriptyline (PAMELOR) 25 mg capsule Take 2 PO HS  (Patient taking differently: Take 50 mg by mouth daily at bedtime Take 2 PO HS  ) 60 capsule 2    omeprazole (PriLOSEC) 40 MG capsule Take 1 capsule (40 mg total) by mouth daily (Patient taking differently: Take 40 mg by mouth daily as needed  ) 30 capsule 6    SYNTHROID 88 MCG tablet TAKE 1 TABLET BY MOUTH ONCE DAILY 90 tablet 1    Timolol Maleate 0 5 % (DAILY) SOLN Apply 1 drop to eye every 12 (twelve) hours 5 mL 6    traMADol-acetaminophen (ULTRACET) 37 5-325 mg per tablet Take 1 tablet by mouth every 6 (six) hours as needed for moderate pain 30 tablet 2     No current facility-administered medications for this visit  Iodinated diagnostic agents and Aspirin      Review of Systems  Review of Systems   Constitutional: Negative for chills, fatigue, fever and unexpected weight change  HENT: Negative for congestion and sore throat  Eyes: Negative for pain and visual disturbance  Respiratory: Negative for cough, shortness of breath and wheezing  Cardiovascular: Negative for chest pain, palpitations and leg swelling  Gastrointestinal: Negative for abdominal pain, constipation, diarrhea and nausea  Endocrine: Negative for polydipsia and polyuria  Genitourinary: Negative for difficulty urinating and dysuria  Musculoskeletal: Positive for back pain  Negative for neck pain  Skin: Negative for rash and wound  Neurological: Negative for dizziness, syncope, light-headedness and headaches  Hematological: Negative for adenopathy  Psychiatric/Behavioral: Negative for behavioral problems and confusion  Objective:    /72   Pulse 86   Temp 98 °F (36 7 °C)   Ht 5' (1 524 m)   Wt 76 2 kg (168 lb)   BMI 32 81 kg/m²     Physical Exam   Constitutional: She appears well-developed and well-nourished  No distress  HENT:   Head: Normocephalic and atraumatic  Right Ear: External ear normal    Left Ear: External ear normal    Mouth/Throat: Oropharynx is clear and moist    Eyes: Conjunctivae are normal  Right eye exhibits no discharge  Left eye exhibits no discharge  Neck: Normal range of motion  Neck supple  No tracheal deviation present  Cardiovascular: Normal rate, regular rhythm and normal heart sounds  No murmur heard  Neg carotid bruits B/L   Pulmonary/Chest: Effort normal and breath sounds normal  No respiratory distress  She has no wheezes  She has no rales  Abdominal: She exhibits no distension  There is no tenderness  There is no guarding  Musculoskeletal: She exhibits no edema or deformity  Lymphadenopathy:     She has no cervical adenopathy  Neurological: She is alert  She exhibits normal muscle tone  Skin: Skin is warm and dry  No rash noted  Psychiatric: She has a normal mood and affect  Her behavior is normal    Nursing note and vitals reviewed        Cardiographics  EC18, stress test: 18    Imaging  Chest x-ray: not indicated    Lab Review   Recent labs: 19 - A1C 5 8, PT/PTT/CBC/CMP wnl, Urine dipped in office today was normal    Assessment:    Patient Active Problem List   Diagnosis    Tremor    Atrophic vaginitis    Osteoporosis    Obesity    Migraine headache    Hypothyroidism    Hypertension    Glaucoma    Esophageal reflux    Dyslipidemia    Cataract    Asthma    Lumbar spondylosis    IFG (impaired fasting glucose)    Chronic pain syndrome    Chronic bilateral low back pain with left-sided sciatica    Lumbar post-laminectomy syndrome    Lumbar radiculopathy    Chronic pain of left knee    Primary osteoarthritis of left knee    Gait abnormality    Weakness of left leg    Anserine bursitis    Anxiety and depression          Plan:     Surgical Clearance - Cleared    Risk - Pt low risk for low risk procedure    Discussion/Summary:  (1) preoperative clearance - studies reviewed, urine dip done today - no further w/u needed, advised pt no Meloxicam or OTC NSAID btw now and surgery, can use Tylenol prn pain, am of surgery may take levothyroxine with small sip of water in addition to 2 BP meds an hour later, call with any changes btw now and surgery    (2) Chronic pain d/t lumbar post-laminectomy syndrome - on Meloxicam (held for surgery) and Tramadol prn as well as Gabapentin and Nortriptyline, for permanent SCC    (3) Mild intermittent asthma - recent exacerbation early [place] resolved, now just using rescue inhaler prn w/o regular use, call with new/worse symptoms or regular rescue inhaler use    (4) HTN - BP at goal, con't current meds    (5) Hypothyroid - TFT's at goal, con't current Levothyroxine - take in am with small sip of water      Mammo 4/18    Dexa 4/18    Dahlgren 7/14 - 10 yr follow up    Michelle Alexis DO

## 2019-02-01 ENCOUNTER — TELEPHONE (OUTPATIENT)
Dept: NEUROSURGERY | Facility: CLINIC | Age: 74
End: 2019-02-01

## 2019-02-01 NOTE — TELEPHONE ENCOUNTER
I contacted patient to ask if she still as T/spine MRI Disc - Patient states she does have it, I asked her to take disc to any  Radiology dept to have it uploaded - She said she will not be able to do it prior to day of surgery due to transportation  I advised her to bring disc with her the day of surgery and go to a radiology dept at least a half hour prior to appointment to have uploaded  Patient said that would not be a problem, she will get that done

## 2019-02-04 ENCOUNTER — DOCUMENTATION (OUTPATIENT)
Dept: NEUROSURGERY | Facility: CLINIC | Age: 74
End: 2019-02-04

## 2019-02-04 ENCOUNTER — TELEPHONE (OUTPATIENT)
Dept: NEUROSURGERY | Facility: CLINIC | Age: 74
End: 2019-02-04

## 2019-02-04 DIAGNOSIS — Z98.890 POSTOPERATIVE STATE: Primary | ICD-10-CM

## 2019-02-04 DIAGNOSIS — G89.18 POSTOPERATIVE PAIN AFTER SPINAL SURGERY: ICD-10-CM

## 2019-02-04 DIAGNOSIS — M62.830 SPASM OF BACK MUSCLES: ICD-10-CM

## 2019-02-04 RX ORDER — CEPHALEXIN 500 MG/1
CAPSULE ORAL
Qty: 12 CAPSULE | Refills: 0 | Status: SHIPPED | OUTPATIENT
Start: 2019-02-05 | End: 2019-02-08

## 2019-02-04 RX ORDER — OXYCODONE HYDROCHLORIDE AND ACETAMINOPHEN 5; 325 MG/1; MG/1
TABLET ORAL
Qty: 40 TABLET | Refills: 0 | Status: SHIPPED | OUTPATIENT
Start: 2019-02-04 | End: 2019-02-27 | Stop reason: SDUPTHER

## 2019-02-04 RX ORDER — TIZANIDINE 4 MG/1
TABLET ORAL
Qty: 30 TABLET | Refills: 0 | Status: SHIPPED | OUTPATIENT
Start: 2019-02-05 | End: 2019-02-27 | Stop reason: SDUPTHER

## 2019-02-04 NOTE — TELEPHONE ENCOUNTER
Pre operative call day prior surgery scheduled in the AM w/ DR AlamoAMINECTOMY THORACIC FOR INSERTION DORSAL COLUMN SPINAL CORD STIMULATOR (DCS) W IMPLANTABLE PULSE GENERATOR, LEFT GLUTE (Left Spine Thoracic)-2/5/2019     The following information is confirmed / discussed: Allergies Reviewed --done   Hold medications reviewed: done meloxicam   NPO after MN, night prior surgery reviewed:--reinforced   Medication (s) instructed by healthcare provider to take the morning of surgery w/ sip of water 4 OZ discussed: as per ASU nurse   Post operative scripts electronic transmission:  Percocet cephalexin tizanidine   Bowel Hygiene program as per protocol OTC medication Senna -s , Mirilax , dietary fiber minimum 2 servings per day, water intake-reviewed   PDMP site reviewed accessed and reviewed scheduled drug list printed and scanned into record--done   Pain management script:percocet ,   Pre- operative shower protocol reviewed; Clarify instructions as per protocol, third chlorhexidine shower tonight before surgery, then use JOSE L wipes as per packaging instructions, Use a clean towel and wash cloth starting tonight and continue nightly until seen 2 weeks post operative visit for staple removal  Change bed linens tonight and continue at least 1-2 times weekly  Smoking cessation discussed   Informed will receive a telephone call tonight from a hospital representative with time to report on surgery day: ---reinforced   Informed will receive a f/u call within in 24 -48 hours post-op to assess recovery reinforce instructions, and to answer any questions  Reinforced     Follow-up appointments reviewed  2 week and 6 week   Patient verbalized understanding information provided /discussed

## 2019-02-05 ENCOUNTER — ANESTHESIA (OUTPATIENT)
Dept: PERIOP | Facility: HOSPITAL | Age: 74
End: 2019-02-05
Payer: COMMERCIAL

## 2019-02-05 ENCOUNTER — APPOINTMENT (OUTPATIENT)
Dept: RADIOLOGY | Facility: HOSPITAL | Age: 74
End: 2019-02-05
Payer: COMMERCIAL

## 2019-02-05 ENCOUNTER — ANESTHESIA EVENT (OUTPATIENT)
Dept: PERIOP | Facility: HOSPITAL | Age: 74
End: 2019-02-05
Payer: COMMERCIAL

## 2019-02-05 ENCOUNTER — HOSPITAL ENCOUNTER (OUTPATIENT)
Facility: HOSPITAL | Age: 74
Setting detail: OUTPATIENT SURGERY
Discharge: HOME | End: 2019-02-05
Attending: NEUROLOGICAL SURGERY | Admitting: NEUROLOGICAL SURGERY
Payer: COMMERCIAL

## 2019-02-05 VITALS
OXYGEN SATURATION: 96 % | TEMPERATURE: 97.5 F | BODY MASS INDEX: 30.59 KG/M2 | WEIGHT: 166.25 LBS | DIASTOLIC BLOOD PRESSURE: 63 MMHG | HEART RATE: 59 BPM | HEIGHT: 62 IN | SYSTOLIC BLOOD PRESSURE: 135 MMHG | RESPIRATION RATE: 18 BRPM

## 2019-02-05 PROCEDURE — C1767 GENERATOR, NEURO NON-RECHARG: HCPCS | Performed by: NEUROLOGICAL SURGERY

## 2019-02-05 PROCEDURE — C1787 PATIENT PROGR, NEUROSTIM: HCPCS | Performed by: NEUROLOGICAL SURGERY

## 2019-02-05 PROCEDURE — 72070 X-RAY EXAM THORAC SPINE 2VWS: CPT

## 2019-02-05 PROCEDURE — 63685 INS/RPLC SPI NPG/RCVR POCKET: CPT | Performed by: PHYSICIAN ASSISTANT

## 2019-02-05 PROCEDURE — 63655 IMPLANT NEUROELECTRODES: CPT | Performed by: NEUROLOGICAL SURGERY

## 2019-02-05 PROCEDURE — C1778 LEAD, NEUROSTIMULATOR: HCPCS | Performed by: NEUROLOGICAL SURGERY

## 2019-02-05 PROCEDURE — 95972 ALYS CPLX SP/PN NPGT W/PRGRM: CPT | Performed by: NEUROLOGICAL SURGERY

## 2019-02-05 PROCEDURE — 63655 IMPLANT NEUROELECTRODES: CPT | Performed by: PHYSICIAN ASSISTANT

## 2019-02-05 PROCEDURE — 63685 INS/RPLC SPI NPG/RCVR POCKET: CPT | Performed by: NEUROLOGICAL SURGERY

## 2019-02-05 DEVICE — IMPLANTABLE PULSE GENERATOR
Type: IMPLANTABLE DEVICE | Site: BUTTOCKS | Status: FUNCTIONAL
Brand: PROCLAIM™

## 2019-02-05 DEVICE — 3MM LEAD, 60 CM
Type: IMPLANTABLE DEVICE | Site: SPINE THORACIC | Status: NON-FUNCTIONAL
Brand: PENTA™

## 2019-02-05 RX ORDER — PROPOFOL 10 MG/ML
INJECTION, EMULSION INTRAVENOUS CONTINUOUS PRN
Status: DISCONTINUED | OUTPATIENT
Start: 2019-02-05 | End: 2019-02-05 | Stop reason: SURG

## 2019-02-05 RX ORDER — BUPIVACAINE HYDROCHLORIDE 2.5 MG/ML
INJECTION, SOLUTION INFILTRATION; PERINEURAL AS NEEDED
Status: DISCONTINUED | OUTPATIENT
Start: 2019-02-05 | End: 2019-02-05 | Stop reason: HOSPADM

## 2019-02-05 RX ORDER — ROCURONIUM BROMIDE 10 MG/ML
INJECTION, SOLUTION INTRAVENOUS AS NEEDED
Status: DISCONTINUED | OUTPATIENT
Start: 2019-02-05 | End: 2019-02-05 | Stop reason: SURG

## 2019-02-05 RX ORDER — HYDROMORPHONE HCL/PF 1 MG/ML
0.5 SYRINGE (ML) INJECTION
Status: DISCONTINUED | OUTPATIENT
Start: 2019-02-05 | End: 2019-02-05 | Stop reason: HOSPADM

## 2019-02-05 RX ORDER — ONDANSETRON 2 MG/ML
INJECTION INTRAMUSCULAR; INTRAVENOUS AS NEEDED
Status: DISCONTINUED | OUTPATIENT
Start: 2019-02-05 | End: 2019-02-05 | Stop reason: SURG

## 2019-02-05 RX ORDER — SCOLOPAMINE TRANSDERMAL SYSTEM 1 MG/1
1 PATCH, EXTENDED RELEASE TRANSDERMAL
Status: DISCONTINUED | OUTPATIENT
Start: 2019-02-05 | End: 2019-02-05 | Stop reason: HOSPADM

## 2019-02-05 RX ORDER — DIPHENHYDRAMINE HYDROCHLORIDE 50 MG/ML
12.5 INJECTION INTRAMUSCULAR; INTRAVENOUS ONCE AS NEEDED
Status: DISCONTINUED | OUTPATIENT
Start: 2019-02-05 | End: 2019-02-05 | Stop reason: HOSPADM

## 2019-02-05 RX ORDER — FENTANYL CITRATE/PF 50 MCG/ML
25 SYRINGE (ML) INJECTION
Status: DISCONTINUED | OUTPATIENT
Start: 2019-02-05 | End: 2019-02-05 | Stop reason: HOSPADM

## 2019-02-05 RX ORDER — SODIUM CHLORIDE, SODIUM LACTATE, POTASSIUM CHLORIDE, CALCIUM CHLORIDE 600; 310; 30; 20 MG/100ML; MG/100ML; MG/100ML; MG/100ML
50 INJECTION, SOLUTION INTRAVENOUS CONTINUOUS
Status: DISCONTINUED | OUTPATIENT
Start: 2019-02-05 | End: 2019-02-05 | Stop reason: HOSPADM

## 2019-02-05 RX ORDER — ONDANSETRON 2 MG/ML
4 INJECTION INTRAMUSCULAR; INTRAVENOUS ONCE AS NEEDED
Status: DISCONTINUED | OUTPATIENT
Start: 2019-02-05 | End: 2019-02-05 | Stop reason: HOSPADM

## 2019-02-05 RX ORDER — FENTANYL CITRATE 50 UG/ML
INJECTION, SOLUTION INTRAMUSCULAR; INTRAVENOUS AS NEEDED
Status: DISCONTINUED | OUTPATIENT
Start: 2019-02-05 | End: 2019-02-05 | Stop reason: SURG

## 2019-02-05 RX ORDER — LIDOCAINE HYDROCHLORIDE 10 MG/ML
INJECTION, SOLUTION INFILTRATION; PERINEURAL AS NEEDED
Status: DISCONTINUED | OUTPATIENT
Start: 2019-02-05 | End: 2019-02-05 | Stop reason: SURG

## 2019-02-05 RX ORDER — SODIUM CHLORIDE, SODIUM LACTATE, POTASSIUM CHLORIDE, CALCIUM CHLORIDE 600; 310; 30; 20 MG/100ML; MG/100ML; MG/100ML; MG/100ML
75 INJECTION, SOLUTION INTRAVENOUS CONTINUOUS
Status: DISCONTINUED | OUTPATIENT
Start: 2019-02-05 | End: 2019-02-05

## 2019-02-05 RX ORDER — MIDAZOLAM HYDROCHLORIDE 1 MG/ML
INJECTION INTRAMUSCULAR; INTRAVENOUS AS NEEDED
Status: DISCONTINUED | OUTPATIENT
Start: 2019-02-05 | End: 2019-02-05 | Stop reason: SURG

## 2019-02-05 RX ORDER — CEFAZOLIN SODIUM 2 G/50ML
2000 SOLUTION INTRAVENOUS ONCE
Status: COMPLETED | OUTPATIENT
Start: 2019-02-05 | End: 2019-02-05

## 2019-02-05 RX ORDER — LIDOCAINE HYDROCHLORIDE AND EPINEPHRINE 10; 10 MG/ML; UG/ML
INJECTION, SOLUTION INFILTRATION; PERINEURAL AS NEEDED
Status: DISCONTINUED | OUTPATIENT
Start: 2019-02-05 | End: 2019-02-05 | Stop reason: HOSPADM

## 2019-02-05 RX ORDER — GLYCOPYRROLATE 0.2 MG/ML
INJECTION INTRAMUSCULAR; INTRAVENOUS AS NEEDED
Status: DISCONTINUED | OUTPATIENT
Start: 2019-02-05 | End: 2019-02-05 | Stop reason: SURG

## 2019-02-05 RX ORDER — CHLORHEXIDINE GLUCONATE 0.12 MG/ML
15 RINSE ORAL ONCE
Status: COMPLETED | OUTPATIENT
Start: 2019-02-05 | End: 2019-02-05

## 2019-02-05 RX ORDER — SUCCINYLCHOLINE CHLORIDE 20 MG/ML
INJECTION INTRAMUSCULAR; INTRAVENOUS AS NEEDED
Status: DISCONTINUED | OUTPATIENT
Start: 2019-02-05 | End: 2019-02-05 | Stop reason: SURG

## 2019-02-05 RX ORDER — CEFAZOLIN SODIUM 1 G/50ML
1000 SOLUTION INTRAVENOUS ONCE
Status: DISCONTINUED | OUTPATIENT
Start: 2019-02-05 | End: 2019-02-05

## 2019-02-05 RX ORDER — CHLORHEXIDINE GLUCONATE 0.12 MG/ML
15 RINSE ORAL ONCE
Status: DISCONTINUED | OUTPATIENT
Start: 2019-02-05 | End: 2019-02-05 | Stop reason: HOSPADM

## 2019-02-05 RX ORDER — PROPOFOL 10 MG/ML
INJECTION, EMULSION INTRAVENOUS AS NEEDED
Status: DISCONTINUED | OUTPATIENT
Start: 2019-02-05 | End: 2019-02-05 | Stop reason: SURG

## 2019-02-05 RX ORDER — OXYCODONE HYDROCHLORIDE 5 MG/1
5 TABLET ORAL EVERY 4 HOURS PRN
Status: DISCONTINUED | OUTPATIENT
Start: 2019-02-05 | End: 2019-02-05 | Stop reason: HOSPADM

## 2019-02-05 RX ADMIN — FENTANYL CITRATE 25 MCG: 50 INJECTION, SOLUTION INTRAMUSCULAR; INTRAVENOUS at 10:12

## 2019-02-05 RX ADMIN — FENTANYL CITRATE 25 MCG: 50 INJECTION, SOLUTION INTRAMUSCULAR; INTRAVENOUS at 09:29

## 2019-02-05 RX ADMIN — GLYCOPYRROLATE 0.15 MG: 0.2 INJECTION, SOLUTION INTRAMUSCULAR; INTRAVENOUS at 09:29

## 2019-02-05 RX ADMIN — ONDANSETRON 4 MG: 2 INJECTION, SOLUTION INTRAMUSCULAR; INTRAVENOUS at 10:43

## 2019-02-05 RX ADMIN — PROPOFOL 70 MCG/KG/MIN: 10 INJECTION, EMULSION INTRAVENOUS at 10:09

## 2019-02-05 RX ADMIN — LIDOCAINE HYDROCHLORIDE 15 MG: 10 INJECTION, SOLUTION INFILTRATION; PERINEURAL at 09:29

## 2019-02-05 RX ADMIN — DEXAMETHASONE SODIUM PHOSPHATE 4 MG: 10 INJECTION INTRAMUSCULAR; INTRAVENOUS at 09:38

## 2019-02-05 RX ADMIN — PROPOFOL 50 MG: 10 INJECTION, EMULSION INTRAVENOUS at 09:54

## 2019-02-05 RX ADMIN — SUCCINYLCHOLINE CHLORIDE 100 MG: 20 INJECTION, SOLUTION INTRAMUSCULAR; INTRAVENOUS; PARENTERAL at 09:33

## 2019-02-05 RX ADMIN — SODIUM CHLORIDE, SODIUM LACTATE, POTASSIUM CHLORIDE, AND CALCIUM CHLORIDE 50 ML/HR: .6; .31; .03; .02 INJECTION, SOLUTION INTRAVENOUS at 07:59

## 2019-02-05 RX ADMIN — CEFAZOLIN SODIUM 2000 MG: 2 SOLUTION INTRAVENOUS at 09:25

## 2019-02-05 RX ADMIN — FENTANYL CITRATE 25 MCG: 50 INJECTION, SOLUTION INTRAMUSCULAR; INTRAVENOUS at 12:08

## 2019-02-05 RX ADMIN — PROPOFOL 50 MG: 10 INJECTION, EMULSION INTRAVENOUS at 10:12

## 2019-02-05 RX ADMIN — PROPOFOL 150 MG: 10 INJECTION, EMULSION INTRAVENOUS at 09:32

## 2019-02-05 RX ADMIN — FENTANYL CITRATE 25 MCG: 50 INJECTION, SOLUTION INTRAMUSCULAR; INTRAVENOUS at 12:03

## 2019-02-05 RX ADMIN — CHLORHEXIDINE GLUCONATE 0.12% ORAL RINSE 15 ML: 1.2 LIQUID ORAL at 08:00

## 2019-02-05 RX ADMIN — FENTANYL CITRATE 25 MCG: 50 INJECTION, SOLUTION INTRAMUSCULAR; INTRAVENOUS at 10:43

## 2019-02-05 RX ADMIN — MIDAZOLAM HYDROCHLORIDE 2 MG: 1 INJECTION, SOLUTION INTRAMUSCULAR; INTRAVENOUS at 09:21

## 2019-02-05 RX ADMIN — SCOPALAMINE 1 PATCH: 1 PATCH, EXTENDED RELEASE TRANSDERMAL at 09:12

## 2019-02-05 RX ADMIN — FENTANYL CITRATE 25 MCG: 50 INJECTION, SOLUTION INTRAMUSCULAR; INTRAVENOUS at 12:13

## 2019-02-05 RX ADMIN — ROCURONIUM BROMIDE 5 MG: 10 INJECTION, SOLUTION INTRAVENOUS at 09:32

## 2019-02-05 RX ADMIN — FENTANYL CITRATE 25 MCG: 50 INJECTION, SOLUTION INTRAMUSCULAR; INTRAVENOUS at 10:10

## 2019-02-05 NOTE — H&P (VIEW-ONLY)
Assessment/Plan:    No problem-specific Assessment & Plan notes found for this encounter  Patient with successful spinal cord stimulator trial abbott  MRI thoracic is patent  100% relief with system    OR for thoracic lami for placement of a spinal cord stimulator and generator  Diagnoses and all orders for this visit:    Chronic pain syndrome  -     Ambulatory referral to Neurosurgery  -     Case request operating room: [address] [place] [address] Av (DCS) W IMPLANTABLE PULSE GENERATOR, LEFT GLUTE; Standing  -     Case request operating room: LAMINECTOMY THORACIC FOR INSERTION DORSAL COLUMN SPINAL CORD STIMULATOR (DCS) W IMPLANTABLE PULSE GENERATOR, LEFT GLUTE  -     UA w Reflex to Microscopic w Reflex to Culture  -     Comprehensive metabolic panel; Future  -     CBC and differential; Future  -     APTT; Future  -     Protime-INR; Future  -     HEMOGLOBIN A1C W/ EAG ESTIMATION; Future  -     Comprehensive metabolic panel  -     CBC and differential  -     APTT  -     Protime-INR  -     HEMOGLOBIN A1C W/ EAG ESTIMATION    Lumbar post-laminectomy syndrome  -     Ambulatory referral to Neurosurgery  -     Case request operating room: LAMINECTOMY THORACIC FOR INSERTION DORSAL COLUMN SPINAL CORD STIMULATOR (DCS) W IMPLANTABLE PULSE GENERATOR, LEFT GLUTE; Standing  -     Case request operating room: LAMINECTOMY THORACIC FOR INSERTION DORSAL COLUMN SPINAL CORD STIMULATOR (DCS) W IMPLANTABLE PULSE GENERATOR, LEFT GLUTE  -     UA w Reflex to Microscopic w Reflex to Culture  -     Comprehensive metabolic panel; Future  -     CBC and differential; Future  -     APTT; Future  -     Protime-INR; Future  -     HEMOGLOBIN A1C W/ EAG ESTIMATION;  Future  -     Comprehensive metabolic panel  -     CBC and differential  -     APTT  -     Protime-INR  -     HEMOGLOBIN A1C W/ EAG ESTIMATION    Other orders  -     Diet NPO; Sips with meds; Standing  -     Void on call to OR; Standing  - Insert peripheral IV; Standing  -     Nursing Communication Use 2 CHG cloths, have the patient wash his/her body from the neck down or have staff wash entire body (from neck down) if patient is unable; Standing  -     chlorhexidine (PERIDEX) 0 12 % oral rinse 15 mL; Swish and spit 15 mL once   -     ceFAZolin (ANCEF) IVPB (premix) 2,000 mg; Infuse 2,000 mg into a venous catheter once           Subjective:      Patient ID: Cherise Fagan is a 74 y o  female  Pleasant female with low back pain and bilateral leg pain worse on the left than the right  She had a successful trial 100% relief of symptoms  She reported improvement in sleep and ambulation  She also reported improvement in activities  She does note a history of prior lumbar surgery  She denies any significant neurologic deficits  The following portions of the patient's history were reviewed and updated as appropriate:   She  has a past medical history of Asthma; Depression; Dyslipidemia; Esophageal reflux; History of stomach ulcers; Hypercalcemia; Hypertension; Osteopenia; and Tremor    She   Patient Active Problem List    Diagnosis Date Noted    Anxiety and depression 07/24/2018    Anserine bursitis 05/01/2018    Chronic bilateral low back pain with left-sided sciatica 04/24/2018    Lumbar post-laminectomy syndrome 04/24/2018    Lumbar radiculopathy 04/24/2018    Chronic pain of left knee 04/24/2018    Primary osteoarthritis of left knee 04/24/2018    Gait abnormality 04/24/2018    Weakness of left leg 04/24/2018    Lumbar spondylosis 01/30/2018    IFG (impaired fasting glucose) 01/30/2018    Chronic pain syndrome 01/30/2018    Atrophic vaginitis 11/14/2017    Osteoporosis 11/14/2017    Obesity 11/14/2017    Cataract 11/14/2017    Glaucoma 11/13/2017    Tremor 10/18/2012    Hypothyroidism 08/28/2012    Dyslipidemia 08/28/2012    Migraine headache 08/14/2012    Esophageal reflux 08/14/2012    Asthma 08/14/2012    Hypertension 2012     She  has a past surgical history that includes  section; Cholecystectomy; Hysterectomy; Back surgery; and Bilateral oophorectomy  Her family history includes Heart disease in her family; Hypertension in her family; Stroke in her family; Thyroid disease in her family  She  reports that she has never smoked  She has never used smokeless tobacco  She reports that she drinks alcohol  She reports that she does not use drugs  Current Outpatient Prescriptions on File Prior to Visit   Medication Sig    albuterol (VENTOLIN HFA) 90 mcg/act inhaler Inhale 2 puffs every 4 (four) hours as needed for wheezing or shortness of breath    alendronate (FOSAMAX) 70 mg tablet Take 1 tablet (70 mg total) by mouth once a week    atorvastatin (LIPITOR) 20 mg tablet Take 1 tablet (20 mg total) by mouth daily    bimatoprost (LUMIGAN) 0 01 % ophthalmic drops Administer 1 drop to both eyes daily    buPROPion (WELLBUTRIN XL) 300 mg 24 hr tablet Take 1 tablet (300 mg total) by mouth daily for 30 days    cefuroxime (CEFTIN) 500 mg tablet Take 1 tablet (500 mg total) by mouth every 12 (twelve) hours for 7 days    fluticasone-salmeterol (ADVAIR) 100-50 mcg/dose inhaler Inhale 1 puff 2 (two) times a day Rinse mouth after use   gabapentin (NEURONTIN) 800 mg tablet Take 1 tablet (800 mg total) by mouth 2 (two) times a day    glycopyrrolate (ROBINUL) 2 MG tablet Take 1 tablet (2 mg total) by mouth 2 (two) times a day    hydrochlorothiazide (HYDRODIURIL) 12 5 mg tablet Take 1 tablet (12 5 mg total) by mouth daily    losartan (COZAAR) 25 mg tablet Take 1 tablet (25 mg total) by mouth daily    montelukast (SINGULAIR) 10 mg tablet Take 1 tablet (10 mg total) by mouth daily    nortriptyline (PAMELOR) 25 mg capsule Take 2 PO HS      omeprazole (PriLOSEC) 40 MG capsule Take 1 capsule (40 mg total) by mouth daily    SYNTHROID 88 MCG tablet TAKE 1 TABLET BY MOUTH ONCE DAILY    Timolol Maleate 0 5 % (DAILY) SOLN Apply 1 drop to eye every 12 (twelve) hours    traMADol-acetaminophen (ULTRACET) 37 5-325 mg per tablet Take 1 tablet by mouth every 6 (six) hours as needed for moderate pain    [DISCONTINUED] predniSONE 10 mg tablet 3 tab PO x 1 then 2 tab PO bid x 3 days then 1 tab PO bid x 3 days then 1 tab Po q day x 3 days then stop     No current facility-administered medications on file prior to visit       Review of Systems   HENT: Negative  Eyes: Negative  Respiratory:        Asthma   Cardiovascular: Negative  Gastrointestinal: Negative  Endocrine: Negative  Genitourinary: Negative  Musculoskeletal: Positive for back pain (LBP radiating doewn bilateral leg, left worse than right  )  Skin: Negative  Allergic/Immunologic: Negative  Neurological: Positive for dizziness  Hematological: Negative  Psychiatric/Behavioral: Negative  Objective:      /80 (BP Location: Left arm, Patient Position: Sitting)   Pulse 81   Temp (!) 97 4 °F (36 3 °C) (Tympanic)   Resp 16   Ht 5' (1 524 m)   Wt 75 8 kg (167 lb)   BMI 32 61 kg/m²          Physical Exam   Constitutional: She is oriented to person, place, and time  She appears well-developed  HENT:   Head: Normocephalic  Eyes: Pupils are equal, round, and reactive to light  EOM are normal    Cardiovascular: Normal rate  Pulmonary/Chest: Effort normal    Musculoskeletal: Normal range of motion  Neurological: She is alert and oriented to person, place, and time  She has normal strength and normal reflexes  No cranial nerve deficit or sensory deficit

## 2019-02-05 NOTE — DISCHARGE INSTRUCTIONS
Spinal Cord Stimulator Discharge Instructions    Activity:    1  Do not lift more than 20 pounds for 2 weeks  2  Avoid bending, lifting and twisting for 2 weeks  Surgical incision care:    1  Keep dressings in place for 3 days  2  Keep incisions dry for 3 days  3  May allow clean water to flow over incisions after 3 days  4  Do not immerse the incisions in water for 4 weeks  5  After 3 days, incisions may be left open to air, but should remain clean  6  Do not apply any creams or ointments to the incision, unless otherwise instructed by [address] [place] [address]  7  Contact office if increasing redness, drainage, pain or swelling around the incisions  8  Complete upright xray of thoracic and lumbar spine prior to 6 week post operative appointment  Postoperative medication:    1  [facility] will provide pain medication for the first 2 weeks after surgery as coordinated with your pain specialist    2  If [facility] is providing pain medication, please contact office for questions regarding dosage and modifications  3  If [facility] is not providing pain medication postoperatively, then contact pain specialist for additional instructions and prescriptions  4  No antiplatelet or anticoagulation medication for 2 weeks after surgery, unless otherwise instructed  Please contact [facility] if you have any questions about the effects of any of your medications on blood clotting  5  Do not operate heavy machinery or vehicles while taking sedating medications  *Note that it may take some time for the stimulator to improve chronic pain symptoms  Adjustment of the stimulator program can begin 2 weeks after placement  Please contact the stimulator representative if you have any questions regarding programing

## 2019-02-05 NOTE — ANESTHESIA PREPROCEDURE EVALUATION
Review of Systems/Medical History  Patient summary reviewed  Chart reviewed      Cardiovascular  EKG reviewed, Hypertension ,    Pulmonary  Asthma ,        GI/Hepatic    GERD ,             Endo/Other  History of thyroid disease ,      GYN       Hematology   Musculoskeletal    Arthritis     Neurology    Headaches,    Psychology   Anxiety, Depression ,              Physical Exam    Airway    Mallampati score: II  TM Distance: >3 FB  Neck ROM: full     Dental   No notable dental hx     Cardiovascular  Rhythm: regular, Rate: normal, Cardiovascular exam normal    Pulmonary  Pulmonary exam normal     Other Findings        Anesthesia Plan  ASA Score- 3     Anesthesia Type- general with ASA Monitors  Additional Monitors:   Airway Plan: ETT  Plan Factors-    Induction- intravenous  Postoperative Plan- Plan for postoperative opioid use  Informed Consent- Anesthetic plan and risks discussed with patient  I personally reviewed this patient with the CRNA  Discussed and agreed on the Anesthesia Plan with the CRNA  Natan Ng

## 2019-02-05 NOTE — ANESTHESIA POSTPROCEDURE EVALUATION
Post-Op Assessment Note      CV Status:  Stable    Mental Status:  Somnolent    Hydration Status:  Euvolemic    PONV Controlled:  Controlled    Airway Patency:  Patent    Post Op Vitals Reviewed: Yes          Staff: AnesthesiologistCONSTANCE           BP (P) 166/69 (02/05/19 1115)    Temp      Pulse (P) 90 (02/05/19 1115)   Resp (P) 12 (02/05/19 1115)    SpO2

## 2019-02-05 NOTE — OP NOTE
OPERATIVE REPORT  PATIENT NAME: Gissel Mahmood    :  1945  MRN: [id number]  Pt Location: [facility] OR ROOM 01    SURGERY DATE: 2019    Surgeon(s) and Role:     * Corky Lobato MD - Primary     * Sandra Eric PA-C - Assisting    Preop Diagnosis:  Chronic pain syndrome [G89 4]  Lumbar post-laminectomy syndrome [M96 1]    Post-Op Diagnosis Codes:     * Chronic pain syndrome [G89 4]     * Lumbar post-laminectomy syndrome [M96 1]    Procedure(s) (LRB):  LAMINECTOMY THORACIC FOR INSERTION DORSAL COLUMN SPINAL CORD STIMULATOR (DCS) W IMPLANTABLE PULSE GENERATOR, LEFT GLUTE (Left)    Specimen(s):  * No specimens in log *    Estimated Blood Loss:   20 mL    Drains:       Anesthesia Type:   General    Operative Indications:  Chronic pain syndrome [G89 4]  Lumbar post-laminectomy syndrome [M96 1]      Operative Findings:  See dictated note    Complications:   None    Procedure and Technique:  The patient was taken to operative theater induced under general anesthesia and intubated she was positioned prone a Kevin frame  The sweet spot during the trial was planned at T8-9  An left glute incision was planned for the generator she was prepped and draped in sterile fashion  I made an incision with a 10 blade and performed electrocautery   I dissected down subperiosteally until I exposed the lamina and interspinous spaces  I used a marker to check the level of T10 at the pedicle  I performed a partial laminectomy at T9-10 using a drill, Kerrisons and rongeurs sequentially  Once I exposed the epidural thecal sac I dissected the space using a [place] and then I passed the electrode paddle and confirmed the placement under fluoroscopy of the T8-9 level  We tested the EMG response by altering the following parameters using a neurostimulation device   We programmed the following:    Frequency: 10-60 hertz   Pulse width of 350 us  Amplitudes: 0-10 mA   Contacts: midline contacts alternating both right and left of the 20 contact paddle  Configuration: Bipolar with (+) and (-)  Cycling: None  Waveform: Tonic     We obtained the appropriate emg response on the right and leg    The pocket on the left glute was created with an knife and electrocautery  We tunneled that electrodes connected to the impulse generator and this was connected  We then tested impedances which were normal     The leads been anchored down using anchors under serial of fluoroscopy  After all hardware was in place we irrigated each wound  And then closed in layers using 2 0 Vicryl for the subcutaneous tissues and 4-0 monocryl for the skin sterile dressing was placed  Patient was repositioned supine the hospital bed extubated to room air  she was taken to the postop recovery area stable condition  All needle and sponge counts were correct at the procedure  All neuromonitoring remained stable during the procedure       I was present for the entire procedure, A qualified resident physician was not available and A physician assistant was required during the procedure for retraction tissue handling,dissection and suturing    Patient Disposition:  PACU     SIGNATURE: Yolis Rachel MD  DATE: February 5, 2019  TIME: 10:53 AM    [facility]

## 2019-02-06 ENCOUNTER — TELEPHONE (OUTPATIENT)
Dept: NEUROSURGERY | Facility: CLINIC | Age: 74
End: 2019-02-06

## 2019-02-06 NOTE — TELEPHONE ENCOUNTER
Called Nichole Rader to see how she is doing after surgery on 2/6/19  She reports that she is doing very well overall and denies any incisional issues or fevers  Patient denies any bleeding, dizziness, fever, redness, significant pain and swelling  She is having some minor soreness which she expected but states that she is managing okay  Verified date/time/location of her upcoming POV and advised her to call the office with any further questions or concerns, or if any incisional issues or fevers would arise  Went over incision care with patient and she has no further questions at this time  Patient was appreciative for the call

## 2019-02-11 ENCOUNTER — TELEPHONE (OUTPATIENT)
Dept: NEUROSURGERY | Facility: CLINIC | Age: 74
End: 2019-02-11

## 2019-02-11 ENCOUNTER — OFFICE VISIT (OUTPATIENT)
Dept: FAMILY MEDICINE CLINIC | Facility: HOSPITAL | Age: 74
End: 2019-02-11
Payer: COMMERCIAL

## 2019-02-11 ENCOUNTER — TELEPHONE (OUTPATIENT)
Dept: FAMILY MEDICINE CLINIC | Facility: HOSPITAL | Age: 74
End: 2019-02-11

## 2019-02-11 VITALS
SYSTOLIC BLOOD PRESSURE: 138 MMHG | WEIGHT: 166 LBS | DIASTOLIC BLOOD PRESSURE: 78 MMHG | TEMPERATURE: 98.6 F | HEIGHT: 62 IN | HEART RATE: 64 BPM | BODY MASS INDEX: 30.55 KG/M2

## 2019-02-11 DIAGNOSIS — B37.0 ORAL THRUSH: ICD-10-CM

## 2019-02-11 DIAGNOSIS — J02.9 ACUTE SORE THROAT: Primary | ICD-10-CM

## 2019-02-11 PROCEDURE — 99214 OFFICE O/P EST MOD 30 MIN: CPT | Performed by: INTERNAL MEDICINE

## 2019-02-11 PROCEDURE — 1160F RVW MEDS BY RX/DR IN RCRD: CPT | Performed by: INTERNAL MEDICINE

## 2019-02-11 NOTE — TELEPHONE ENCOUNTER
Discussed seeing PCP with daughter and she is agreeable  She also mentions that after removing dressing the pt has a rash from dressing  She questioned applying something  Discussed she could use OTC hydrocortisone cream not ointment, that could be applied to area for the rash and itch, but to rub in totally and to make sure it does not get on the incision  She stated an understanding

## 2019-02-11 NOTE — PROGRESS NOTES
Assessment/Plan:     Diagnoses and all orders for this visit:    Acute sore throat  Comments:  reassured pt and dgtr likely d/t intubation with the procedure, encouraged fluids/gargles/Tylenol, call with new/worse symptoms  Orders:  -     nystatin (MYCOSTATIN) 100,000 units/mL suspension; Apply 5 mL (500,000 Units total) to the mouth or throat 4 (four) times a day    Oral thrush  Comments:  Likely very early/mild thrush - on oral abx - start Nystatin swish and spit - call with new/worse symptoms or any swelling to tongue/uvula/SOB  Orders:  -     nystatin (MYCOSTATIN) 100,000 units/mL suspension; Apply 5 mL (500,000 Units total) to the mouth or throat 4 (four) times a day          Subjective:      Patient ID: David Simmons is a 74 y o  female  HPI Pt here with c/o sore throat and dry mouth x 6 days  Symptoms started after her recent surgery for her spinal cord stimulator  She notes the sensation of chills and having a fever but has not checked her temp  Her incisions look good  She has had no nasal congestion  She has a cough but it is dry  She is on an oral abx (Cephalexin) from post op period still  She is not using the narcotic medication and only uses muscle relaxer at night     Review of Systems   Constitutional: Positive for appetite change, chills and fatigue  Negative for fever  HENT: Positive for sore throat  Negative for congestion  Respiratory: Positive for cough  Negative for shortness of breath and wheezing  Gastrointestinal: Negative for abdominal pain, diarrhea and nausea  Musculoskeletal: Positive for arthralgias and back pain  Skin: Negative for rash  Objective:    /78 (BP Location: Right arm, Patient Position: Sitting, Cuff Size: Standard)   Pulse 64   Temp 98 6 °F (37 °C)   Ht 5' 2" (1 575 m)   Wt 75 3 kg (166 lb)   BMI 30 36 kg/m²      Physical Exam   Constitutional: She appears well-developed and well-nourished  No distress     HENT:   Head: Normocephalic and atraumatic  Right Ear: External ear normal    Left Ear: External ear normal    Mouth/Throat: No oropharyngeal exudate  Tongue and post pharynx with erythema but no exudate, no tongue/uvula swelling   Eyes: Conjunctivae are normal  Right eye exhibits no discharge  Left eye exhibits no discharge  Neck: Neck supple  No tracheal deviation present  Cardiovascular: Normal rate, regular rhythm and normal heart sounds  Exam reveals no friction rub  No murmur heard  Pulmonary/Chest: Effort normal and breath sounds normal  No respiratory distress  She has no wheezes  She has no rales  Musculoskeletal: She exhibits no edema  Stiff, slow gait unassisted   Lymphadenopathy:     She has cervical adenopathy  Neurological: She is alert  She exhibits normal muscle tone  Skin: Skin is warm  No rash noted  Both incision clean, dry, and intact, mid thoracic incision with mild irritation but no significant erythema/warmth/drainage   Psychiatric: She has a normal mood and affect  Her behavior is normal    Nursing note and vitals reviewed

## 2019-02-11 NOTE — TELEPHONE ENCOUNTER
Daughter left a message reporting the pt's mouth inside is very sore  Returned call encouraging her to call back but also suggesting she consult the PCP to evaluate and treat as needed

## 2019-02-11 NOTE — TELEPHONE ENCOUNTER
Nystatin Suspension on backorder indefinitely  Can something else be sent to [facility]? Pharmacist also suggested that [facility] does compound their own of this, we can always send there     Please advise, TY

## 2019-02-12 DIAGNOSIS — J02.9 ACUTE SORE THROAT: Primary | ICD-10-CM

## 2019-02-12 DIAGNOSIS — B37.0 ORAL THRUSH: ICD-10-CM

## 2019-02-12 RX ORDER — CLOTRIMAZOLE 10 MG/1
10 LOZENGE ORAL; TOPICAL 4 TIMES DAILY
Qty: 28 TABLET | Refills: 0 | Status: SHIPPED | OUTPATIENT
Start: 2019-02-12 | End: 2019-04-23

## 2019-02-15 ENCOUNTER — DOCUMENTATION (OUTPATIENT)
Dept: NEUROSURGERY | Facility: CLINIC | Age: 74
End: 2019-02-15

## 2019-02-15 ENCOUNTER — TELEPHONE (OUTPATIENT)
Dept: NEUROSURGERY | Facility: CLINIC | Age: 74
End: 2019-02-15

## 2019-02-15 NOTE — TELEPHONE ENCOUNTER
Spoke with pt's daughter who reports the pt is still experiencing a lot of discomfort from her SCS surgery  She is almost 2 weeks post-op  When questioned, the daughter reports the pt is taking just 1 tab and is using the muscle relaxer  Reviewed that the order is for 1-2 tabs q 6 hr prn, and suggested she try taking 2 tabs to see if she gets more relief  She does'nt need to take this way every time just when her discomfort seems worse  She stated an understanding  Encouraged her to call back with any future questions or concerns

## 2019-02-18 ENCOUNTER — OFFICE VISIT (OUTPATIENT)
Dept: NEUROSURGERY | Facility: CLINIC | Age: 74
End: 2019-02-18

## 2019-02-18 VITALS
HEART RATE: 84 BPM | WEIGHT: 166 LBS | BODY MASS INDEX: 30.55 KG/M2 | DIASTOLIC BLOOD PRESSURE: 70 MMHG | SYSTOLIC BLOOD PRESSURE: 179 MMHG | RESPIRATION RATE: 16 BRPM | TEMPERATURE: 97.9 F | HEIGHT: 62 IN

## 2019-02-18 DIAGNOSIS — Z96.89 STATUS POST INSERTION OF SPINAL CORD STIMULATOR: ICD-10-CM

## 2019-02-18 DIAGNOSIS — M96.1 LUMBAR POST-LAMINECTOMY SYNDROME: ICD-10-CM

## 2019-02-18 DIAGNOSIS — Z48.89 AFTERCARE FOLLOWING SURGERY: ICD-10-CM

## 2019-02-18 DIAGNOSIS — G89.4 CHRONIC PAIN SYNDROME: Primary | ICD-10-CM

## 2019-02-18 PROCEDURE — 99024 POSTOP FOLLOW-UP VISIT: CPT | Performed by: NURSE PRACTITIONER

## 2019-02-18 NOTE — PROGRESS NOTES
Assessment/Plan:  Diagnoses and all orders for this visit:    Status post insertion of spinal cord stimulator    Aftercare following surgery    Chronic pain syndrome    Lumbar post-laminectomy syndrome        Subjective:      Patient ID: Pamela Jay is a 74 y o  female  HPI  She presents accompanied by daughter for 2 week postoperative visit for incision inspection , and to meet with [facility] for device programing  She is status post surgery on 2/5/2019 by Dr Clau Sims for [address] (DCS) W IMPLANTABLE PULSE GENERATOR, LEFT GLUTE (Left Spine Thoracic)  She has a long standing history of chronic pain involving her bilateral lower back, chronic left knee pain and bilateral lower extremity pain  She was referred by Dr Aiden Tai completing a successful spinal cord stimulator trial which delivered almost 100 % efficacy, She was referred after trail for consultation with Dr Clau Sims, after evaluation patient was deemed and appropriate candidate to proceed with permanent thoracic spinal cord stimulation implant  Her chronic pain symptoms were medically managed on Gabapentin , nortriptyline, and tramadol delivering minimal pain relief; she continues treating with gabapentin and nortriptyline  She reports since surgery SCS system does not deliver efficacy for relief of bilateral low back pain and bilateral lower extremity pain that radiates down to ankles, remains worse in the left knee/leg  Reports continues w/ aching and cramping sensation in upper back near thoracic incision  She is not taking tizanidine reports it makes her feel "funny in the head", likely experiencing muscular disomfort in thoracic area   She reports no bowel movement for several days  Incisions in thoracic area and left buttock closure with dissolvable sutures that remain intact   Both incisions are clean , dry and intact, without erythema, dehiscence, drainage or s/s of infection, well healed with well approximated wound edges  Cut knots on ends of dissolvable sutures, she tolerated procedure well  The following instructions are reviewed in detail and continue thru next 4 weeks (6 week post op)    Decrease tizanidine to 2 mg every 8 - every 12 hours , can restart meloxicam is now 2 week post op  She stopped percocet  Bowel hygiene program again reviewed in detail  reports will start today  Request she call me by Thursday with an update on condition, both daughter and patient agree  At 2 weeks postop can resume restricted medications such as ASA, products containing ASA, NSAID, fish oils, and OTC products or as previously directed  Resume driving 2 week postoperatively, currently she does not drive daughter transports her  Continue to observe incisions for redness, drainage, swelling dehiscence, increased pain, fever >/= 101, warmth to touch incision or skin surrounding, if occur call or report to office immediately for reassessment  Continue showers using clean towel and wash cloth with OTC antibacterial body wash, pat dry after showering continue protocol for an additional 2 weeks  Do not apply lotions, creams, powder, or ointments to surgical incisions  Activity as tolerated, no bending, lifting  greater than 10 lbs, turning, stretching, no pulling, pushing  ambulation as tolerated  Refrain from strenuous activity, bending, and  twisting back  No Immersion in water such as swimming, hot tub, or tub bath  Informed of follow-up appointment in 4 weeks with DR Roberta Solorio, bring SCS equipment nadeem meet with REP for SCS assessment and programing as indicated      If  there is any significant change in her neurologic status, call and/or return to the office immediately for reassessment   Will discuss during 6 week postoperative  visit need for therapy consult for strengthening  She met with product rep for device programing and teaching    Rep reports after session was unable to add programs, patient did not bring her device, refer to attachment for programing report  Explained to patient chronic pain relief may not be immediate after reprogramming can take  Up to 72 hours to feel effect  Contact Rep immediately if there is any change in efficacy or concern over SCS system  Contact office if unable to reach Rep or if the reps   intervention does  not resolve issue  Impressions and treatment recommendations were discussed in detail with the patient/daughter both verbalized understanding, all questions were answered and contact information was given in the event future questions arise  The following portions of the patient's history were reviewed and updated as appropriate:   She  has a past medical history of Asthma, Depression, Dyslipidemia, Esophageal reflux, History of stomach ulcers, Hypercalcemia, Hypertension, Osteopenia, and Tremor  She   Patient Active Problem List    Diagnosis Date Noted    Anxiety and depression 2018    Anserine bursitis 2018    Chronic bilateral low back pain with left-sided sciatica 2018    Lumbar post-laminectomy syndrome 2018    Lumbar radiculopathy 2018    Chronic pain of left knee 2018    Primary osteoarthritis of left knee 2018    Gait abnormality 2018    Weakness of left leg 2018    Lumbar spondylosis 2018    IFG (impaired fasting glucose) 2018    Chronic pain syndrome 2018    Atrophic vaginitis 2017    Osteoporosis 2017    Obesity 2017    Cataract 2017    Glaucoma 2017    Tremor 10/18/2012    Hypothyroidism 2012    Dyslipidemia 2012    Migraine headache 2012    Esophageal reflux 2012    Asthma 2012    Hypertension 2012     She  has a past surgical history that includes  section; Cholecystectomy;  Hysterectomy; Back surgery; Bilateral oophorectomy; and pr surg implnt neuroelect,epidural (Left, 2/5/2019)  Her family history includes Heart disease in her family; Hypertension in her family; Stroke in her family; Thyroid disease in her family  She  reports that she has never smoked  She has never used smokeless tobacco  She reports that she drinks alcohol  She reports that she does not use drugs    Current Outpatient Medications   Medication Sig Dispense Refill    gabapentin (NEURONTIN) 800 mg tablet Take 1 tablet (800 mg total) by mouth 2 (two) times a day 60 tablet 2    omeprazole (PriLOSEC) 40 MG capsule Take 1 capsule (40 mg total) by mouth daily (Patient taking differently: Take 40 mg by mouth daily as needed  ) 30 capsule 6    SYNTHROID 88 MCG tablet TAKE 1 TABLET BY MOUTH ONCE DAILY 90 tablet 1    Timolol Maleate 0 5 % (DAILY) SOLN Apply 1 drop to eye every 12 (twelve) hours 5 mL 6    albuterol (VENTOLIN HFA) 90 mcg/act inhaler Inhale 2 puffs every 4 (four) hours as needed for wheezing or shortness of breath 6 7 g 3    alendronate (FOSAMAX) 70 mg tablet Take 1 tablet (70 mg total) by mouth once a week 4 tablet 6    atorvastatin (LIPITOR) 20 mg tablet Take 1 tablet (20 mg total) by mouth daily 90 tablet 1    bimatoprost (LUMIGAN) 0 01 % ophthalmic drops Administer 1 drop to both eyes daily (Patient taking differently: Administer 1 drop to both eyes 2 (two) times a day  ) 5 mL 6    clotrimazole (MYCELEX) 10 mg cathleen Take 1 tablet (10 mg total) by mouth 4 (four) times a day 28 tablet 0    glycopyrrolate (ROBINUL) 2 MG tablet Take 1 tablet (2 mg total) by mouth 2 (two) times a day 60 tablet 6    hydrochlorothiazide (HYDRODIURIL) 12 5 mg tablet Take 1 tablet (12 5 mg total) by mouth daily 30 tablet 6    losartan (COZAAR) 25 mg tablet Take 1 tablet (25 mg total) by mouth daily 90 tablet 3    meloxicam (MOBIC) 15 mg tablet Take 15 mg by mouth daily      montelukast (SINGULAIR) 10 mg tablet Take 1 tablet (10 mg total) by mouth daily (Patient taking differently: Take 10 mg by mouth daily at bedtime  ) 30 tablet 6    nortriptyline (PAMELOR) 25 mg capsule Take 2 PO HS  (Patient taking differently: Take 50 mg by mouth daily at bedtime Take 2 PO HS  ) 60 capsule 2    nystatin (MYCOSTATIN) 100,000 units/mL suspension Apply 5 mL (500,000 Units total) to the mouth or throat 4 (four) times a day 60 mL 1    oxyCODONE-acetaminophen (PERCOCET) 5-325 mg per tablet May take 1 or 2 tablets by mouth every 6  hours as needed for pain start 2/5/2019 after surgery (Patient not taking: Reported on 2/18/2019) 40 tablet 0    tiZANidine (ZANAFLEX) 4 mg tablet May take one tablet (4mg) by mouth every 8 hours as needed for spasm start 2/5/2019 (Patient not taking: Reported on 2/18/2019) 30 tablet 0     No current facility-administered medications for this visit  She is allergic to iodinated diagnostic agents and aspirin       Review of Systems   Constitutional: Positive for fatigue and fever (last fever reported 4 days ago)  HENT: Positive for sore throat and trouble swallowing  Eyes: Negative  Respiratory: Negative  Cardiovascular: Negative  Gastrointestinal: Positive for nausea and vomiting (last reported last night after taking pill for pain)  Endocrine: Negative  Musculoskeletal: Positive for back pain  Skin: Negative  Allergic/Immunologic: Negative  Neurological: Positive for dizziness  Objective:      BP (!) 179/70 (BP Location: Left arm)   Pulse 84   Temp 97 9 °F (36 6 °C) (Tympanic)   Resp 16   Ht 5' 2" (1 575 m)   Wt 75 3 kg (166 lb)   BMI 30 36 kg/m²          Physical Exam   Constitutional: She is oriented to person, place, and time  She appears well-nourished  Obese short stature  HENT:   Head: Normocephalic and atraumatic  Eyes: Conjunctivae and EOM are normal  Right eye exhibits no discharge  Left eye exhibits no discharge  No scleral icterus  Neck: Normal range of motion  Neck supple     Cardiovascular: Normal rate and regular rhythm  Pulmonary/Chest: Effort normal  No respiratory distress  Abdominal:   Obese round abdomen   Musculoskeletal: She exhibits no edema, tenderness or deformity  Neurological: She is alert and oriented to person, place, and time  No cranial nerve deficit  Coordination normal    Skin: Skin is warm and dry  Psychiatric: She has a normal mood and affect   Her behavior is normal

## 2019-02-27 ENCOUNTER — DOCUMENTATION (OUTPATIENT)
Dept: NEUROSURGERY | Facility: CLINIC | Age: 74
End: 2019-02-27

## 2019-02-27 ENCOUNTER — TELEPHONE (OUTPATIENT)
Dept: NEUROSURGERY | Facility: CLINIC | Age: 74
End: 2019-02-27

## 2019-02-27 DIAGNOSIS — M62.830 SPASM OF BACK MUSCLES: ICD-10-CM

## 2019-02-27 DIAGNOSIS — Z98.890 POSTOPERATIVE STATE: ICD-10-CM

## 2019-02-27 DIAGNOSIS — G89.18 POSTOPERATIVE PAIN AFTER SPINAL SURGERY: ICD-10-CM

## 2019-02-27 RX ORDER — OXYCODONE HYDROCHLORIDE AND ACETAMINOPHEN 5; 325 MG/1; MG/1
TABLET ORAL
Qty: 21 TABLET | Refills: 0 | Status: SHIPPED | OUTPATIENT
Start: 2019-02-27 | End: 2019-03-14 | Stop reason: SDUPTHER

## 2019-02-27 RX ORDER — TIZANIDINE 4 MG/1
TABLET ORAL
Qty: 21 TABLET | Refills: 0 | Status: SHIPPED | OUTPATIENT
Start: 2019-02-27 | End: 2019-03-14 | Stop reason: SDUPTHER

## 2019-02-27 NOTE — TELEPHONE ENCOUNTER
Telephoned patient as a f/u form message form daughter patient in a lot of pain  She is taking bowel hygiene protocol, bowels moving  Patient reports pain  in upper back (thoracic incision area)  worse at night, after taking percocet one lats night pain was better  She is not taking percocet regularly ( 4 remaining tablets --will reorder )  Reports she is taking tizanidine (2 remaining tablets will reorder) as prescribed it is helping a little for burning and aching in her upper back  Se reports SCS is helping a little to relieve lower back pain  During conversation she coughed several times ---loud harsh , tight dry cough---reports she used her inhaler about 1 hour before my call but not helping  (albuterol rescue)  Se admits is taking singular as ordered  She denies fever admits to chills, denies expectorant,  Denies chest discomfort, is not a smoker --she is encouraged to contact PCP to update and likely assess her in office , may need cough suppressant-- agrees to call

## 2019-02-28 ENCOUNTER — TELEPHONE (OUTPATIENT)
Dept: FAMILY MEDICINE CLINIC | Facility: HOSPITAL | Age: 74
End: 2019-02-28

## 2019-02-28 NOTE — TELEPHONE ENCOUNTER
PATIENT'S COUGH IS WORSE - DOES SHE NEED TO BE SEEN AGAIN OR ARE THERE OTHER SUGGESTIONS OTC OR CAN A SCRIPT BE CALLED IN?  PLEASE CALL BACK

## 2019-02-28 NOTE — TELEPHONE ENCOUNTER
Spoke with patient - states she is feeling better otherwise, just has a lingering cough that is gradually improving  Advised a cough can last 4-6 weeks and to try OTC cough suppressants  If not better in the next few weeks at all to let us know

## 2019-03-14 DIAGNOSIS — M62.830 SPASM OF BACK MUSCLES: ICD-10-CM

## 2019-03-14 DIAGNOSIS — Z98.890 POSTOPERATIVE STATE: ICD-10-CM

## 2019-03-14 DIAGNOSIS — J02.9 ACUTE SORE THROAT: ICD-10-CM

## 2019-03-14 DIAGNOSIS — B37.0 ORAL THRUSH: ICD-10-CM

## 2019-03-14 DIAGNOSIS — G89.18 POSTOPERATIVE PAIN AFTER SPINAL SURGERY: ICD-10-CM

## 2019-03-15 ENCOUNTER — DOCUMENTATION (OUTPATIENT)
Dept: NEUROSURGERY | Facility: CLINIC | Age: 74
End: 2019-03-15

## 2019-03-15 ENCOUNTER — TELEPHONE (OUTPATIENT)
Dept: NEUROSURGERY | Facility: CLINIC | Age: 74
End: 2019-03-15

## 2019-03-15 NOTE — PROGRESS NOTES
Patient is requesting refills of both her tizanidine and her oxycodone  See attached [place] PDMP  Patient is 5 5 weeks s/p SCS placement and is scheduled to f/u with Dr Jonnie Alvarez on 3/18  Thank you

## 2019-03-18 ENCOUNTER — OFFICE VISIT (OUTPATIENT)
Dept: NEUROSURGERY | Facility: CLINIC | Age: 74
End: 2019-03-18

## 2019-03-18 VITALS
BODY MASS INDEX: 30.55 KG/M2 | WEIGHT: 166 LBS | TEMPERATURE: 97.7 F | RESPIRATION RATE: 18 BRPM | HEIGHT: 62 IN | DIASTOLIC BLOOD PRESSURE: 90 MMHG | SYSTOLIC BLOOD PRESSURE: 180 MMHG

## 2019-03-18 DIAGNOSIS — G58.0 INTERCOSTAL NEUROPATHY: ICD-10-CM

## 2019-03-18 DIAGNOSIS — Z48.89 AFTERCARE FOLLOWING SURGERY: ICD-10-CM

## 2019-03-18 DIAGNOSIS — G89.4 CHRONIC PAIN SYNDROME: Primary | ICD-10-CM

## 2019-03-18 PROCEDURE — 99024 POSTOP FOLLOW-UP VISIT: CPT | Performed by: NEUROLOGICAL SURGERY

## 2019-03-18 RX ORDER — OXYCODONE HYDROCHLORIDE AND ACETAMINOPHEN 5; 325 MG/1; MG/1
TABLET ORAL
Qty: 21 TABLET | Refills: 0 | Status: SHIPPED | OUTPATIENT
Start: 2019-03-18 | End: 2019-03-19 | Stop reason: SDUPTHER

## 2019-03-18 RX ORDER — TIZANIDINE 4 MG/1
TABLET ORAL
Qty: 21 TABLET | Refills: 0 | Status: SHIPPED | OUTPATIENT
Start: 2019-03-18 | End: 2019-03-19 | Stop reason: SDUPTHER

## 2019-03-18 NOTE — PROGRESS NOTES
Assessment/Plan:    No problem-specific Assessment & Plan notes found for this encounter  Patient is 6 weeks post op having severe right sided rib pain notes that as burning radiating in a band like pattern  She reports no relief since surgery  She reports bilateral stimulation but slightly more on the left side from programing  Recommend steroid taper for possible nerve root irritation  She denies any weakness of the lower extremity or significant bowel bladder control issues  I recommended turning off the stimulator, if the steroid taper is not working we will call on Thursday and order a CT for further investigation  Diagnoses and all orders for this visit:    Aftercare following surgery          Subjective:      Patient ID: Autumn Valadez is a 74 y o  female  6 weeks post op not doing well  She reports severe band like pain on the left  Limited to no efficacy of the stimulator she request removal of the stimulator  HPI    The following portions of the patient's history were reviewed and updated as appropriate:   She  has a past medical history of Asthma, Depression, Dyslipidemia, Esophageal reflux, History of stomach ulcers, Hypercalcemia, Hypertension, Osteopenia, and Tremor    She   Patient Active Problem List    Diagnosis Date Noted    Anxiety and depression 07/24/2018    Anserine bursitis 05/01/2018    Chronic bilateral low back pain with left-sided sciatica 04/24/2018    Lumbar post-laminectomy syndrome 04/24/2018    Lumbar radiculopathy 04/24/2018    Chronic pain of left knee 04/24/2018    Primary osteoarthritis of left knee 04/24/2018    Gait abnormality 04/24/2018    Weakness of left leg 04/24/2018    Lumbar spondylosis 01/30/2018    IFG (impaired fasting glucose) 01/30/2018    Chronic pain syndrome 01/30/2018    Atrophic vaginitis 11/14/2017    Osteoporosis 11/14/2017    Obesity 11/14/2017    Cataract 11/14/2017    Glaucoma 11/13/2017    Tremor 10/18/2012    Hypothyroidism 2012    Dyslipidemia 2012    Migraine headache 2012    Esophageal reflux 2012    Asthma 2012    Hypertension 2012     She  has a past surgical history that includes  section; Cholecystectomy; Hysterectomy; Back surgery; Bilateral oophorectomy; and pr surg implnt neuroelect,epidural (Left, 2019)  Her family history includes Heart disease in her family; Hypertension in her family; Stroke in her family; Thyroid disease in her family  She  reports that she has never smoked  She has never used smokeless tobacco  She reports that she drinks alcohol  She reports that she does not use drugs  Current Outpatient Medications on File Prior to Visit   Medication Sig    albuterol (VENTOLIN HFA) 90 mcg/act inhaler Inhale 2 puffs every 4 (four) hours as needed for wheezing or shortness of breath    alendronate (FOSAMAX) 70 mg tablet Take 1 tablet (70 mg total) by mouth once a week    atorvastatin (LIPITOR) 20 mg tablet Take 1 tablet (20 mg total) by mouth daily    bimatoprost (LUMIGAN) 0 01 % ophthalmic drops Administer 1 drop to both eyes daily (Patient taking differently: Administer 1 drop to both eyes 2 (two) times a day  )    clotrimazole (MYCELEX) 10 mg cathleen Take 1 tablet (10 mg total) by mouth 4 (four) times a day    gabapentin (NEURONTIN) 800 mg tablet Take 1 tablet (800 mg total) by mouth 2 (two) times a day    glycopyrrolate (ROBINUL) 2 MG tablet Take 1 tablet (2 mg total) by mouth 2 (two) times a day    hydrochlorothiazide (HYDRODIURIL) 12 5 mg tablet Take 1 tablet (12 5 mg total) by mouth daily    losartan (COZAAR) 25 mg tablet Take 1 tablet (25 mg total) by mouth daily    meloxicam (MOBIC) 15 mg tablet Take 15 mg by mouth daily    montelukast (SINGULAIR) 10 mg tablet Take 1 tablet (10 mg total) by mouth daily (Patient taking differently: Take 10 mg by mouth daily at bedtime  )    nortriptyline (PAMELOR) 25 mg capsule Take 2 PO HS  (Patient taking differently: Take 50 mg by mouth daily at bedtime Take 2 PO HS  )    nystatin (MYCOSTATIN) 100,000 units/mL suspension Apply 5 mL (500,000 Units total) to the mouth or throat 4 (four) times a day    omeprazole (PriLOSEC) 40 MG capsule Take 1 capsule (40 mg total) by mouth daily (Patient taking differently: Take 40 mg by mouth daily as needed  )    oxyCODONE-acetaminophen (PERCOCET) 5-325 mg per tablet May take 1 tablets by mouth every 8  hours as needed for pain    SYNTHROID 88 MCG tablet TAKE 1 TABLET BY MOUTH ONCE DAILY    Timolol Maleate 0 5 % (DAILY) SOLN Apply 1 drop to eye every 12 (twelve) hours    tiZANidine (ZANAFLEX) 4 mg tablet May take one tablet (4mg) by mouth every 8 hours as needed     No current facility-administered medications on file prior to visit       Review of Systems   Musculoskeletal: Positive for back pain (severe pain at thoracic insicion per pt with muscle spasms from left rib cage to front abdomin )  Objective:      BP (!) 180/90 (BP Location: Left arm, Patient Position: Sitting)   Temp 97 7 °F (36 5 °C) (Tympanic)   Resp 18   Ht 5' 2" (1 575 m)   Wt 75 3 kg (166 lb)   BMI 30 36 kg/m²          Physical Exam   Constitutional: She is oriented to person, place, and time  She appears well-developed  HENT:   Head: Normocephalic  Eyes: Pupils are equal, round, and reactive to light  EOM are normal    Cardiovascular: Normal rate  Pulmonary/Chest: Effort normal    Neurological: She is alert and oriented to person, place, and time     Normal gait  Incisions well healed

## 2019-03-19 ENCOUNTER — APPOINTMENT (OUTPATIENT)
Dept: RADIOLOGY | Facility: CLINIC | Age: 74
End: 2019-03-19
Payer: COMMERCIAL

## 2019-03-19 ENCOUNTER — OFFICE VISIT (OUTPATIENT)
Dept: PAIN MEDICINE | Facility: CLINIC | Age: 74
End: 2019-03-19
Payer: COMMERCIAL

## 2019-03-19 VITALS
HEART RATE: 61 BPM | WEIGHT: 172 LBS | BODY MASS INDEX: 31.46 KG/M2 | DIASTOLIC BLOOD PRESSURE: 62 MMHG | SYSTOLIC BLOOD PRESSURE: 138 MMHG

## 2019-03-19 DIAGNOSIS — Z98.890 POSTOPERATIVE STATE: ICD-10-CM

## 2019-03-19 DIAGNOSIS — M54.42 CHRONIC BILATERAL LOW BACK PAIN WITH LEFT-SIDED SCIATICA: Primary | ICD-10-CM

## 2019-03-19 DIAGNOSIS — G89.29 CHRONIC BILATERAL LOW BACK PAIN WITH LEFT-SIDED SCIATICA: Primary | ICD-10-CM

## 2019-03-19 DIAGNOSIS — M25.562 CHRONIC PAIN OF LEFT KNEE: ICD-10-CM

## 2019-03-19 DIAGNOSIS — M17.12 PRIMARY OSTEOARTHRITIS OF LEFT KNEE: ICD-10-CM

## 2019-03-19 DIAGNOSIS — R29.898 WEAKNESS OF LEFT LEG: ICD-10-CM

## 2019-03-19 DIAGNOSIS — M62.830 SPASM OF BACK MUSCLES: ICD-10-CM

## 2019-03-19 DIAGNOSIS — R07.81 RIB PAIN ON LEFT SIDE: ICD-10-CM

## 2019-03-19 DIAGNOSIS — G89.4 CHRONIC PAIN SYNDROME: ICD-10-CM

## 2019-03-19 DIAGNOSIS — M96.1 LUMBAR POST-LAMINECTOMY SYNDROME: ICD-10-CM

## 2019-03-19 DIAGNOSIS — R26.9 GAIT ABNORMALITY: ICD-10-CM

## 2019-03-19 DIAGNOSIS — G89.29 CHRONIC PAIN OF LEFT KNEE: ICD-10-CM

## 2019-03-19 DIAGNOSIS — M47.816 LUMBAR SPONDYLOSIS: ICD-10-CM

## 2019-03-19 DIAGNOSIS — M54.16 LUMBAR RADICULOPATHY: ICD-10-CM

## 2019-03-19 DIAGNOSIS — G89.18 POSTOPERATIVE PAIN AFTER SPINAL SURGERY: ICD-10-CM

## 2019-03-19 PROCEDURE — 71111 X-RAY EXAM RIBS/CHEST4/> VWS: CPT

## 2019-03-19 PROCEDURE — 99214 OFFICE O/P EST MOD 30 MIN: CPT | Performed by: NURSE PRACTITIONER

## 2019-03-19 RX ORDER — TIZANIDINE 4 MG/1
TABLET ORAL
Qty: 60 TABLET | Refills: 0 | Status: SHIPPED | OUTPATIENT
Start: 2019-03-19 | End: 2019-04-30 | Stop reason: SDUPTHER

## 2019-03-19 RX ORDER — OXYCODONE HYDROCHLORIDE AND ACETAMINOPHEN 5; 325 MG/1; MG/1
TABLET ORAL
Qty: 75 TABLET | Refills: 0 | Status: SHIPPED | OUTPATIENT
Start: 2019-03-19 | End: 2019-04-23

## 2019-03-19 RX ORDER — NORTRIPTYLINE HYDROCHLORIDE 25 MG/1
CAPSULE ORAL
Qty: 60 CAPSULE | Refills: 0 | Status: SHIPPED | OUTPATIENT
Start: 2019-03-19 | End: 2019-04-30 | Stop reason: SDUPTHER

## 2019-03-19 RX ORDER — GABAPENTIN 800 MG/1
TABLET ORAL
Qty: 75 TABLET | Refills: 0 | Status: SHIPPED | OUTPATIENT
Start: 2019-03-19 | End: 2019-04-23

## 2019-03-19 NOTE — PROGRESS NOTES
Assessment:  1  Chronic bilateral low back pain with left-sided sciatica    2  Lumbar radiculopathy    3  Weakness of left leg    4  Lumbar spondylosis    5  Chronic pain of left knee    6  Chronic pain syndrome    7  Gait abnormality    8  Lumbar post-laminectomy syndrome    9  Rib pain on left side    10  Primary osteoarthritis of left knee    11  Postoperative state    12  Spasm of back muscles    13  Postoperative pain after spinal surgery        Plan:  While the patient and her daughter were in the office today, I did have a thorough conversation with them regarding her medication regimen and treatment plan  I did try to spend a significant amount of time explained to them that what she is feeling can be normal, especially with the burning pain in her thoracic spine, however, that the rib pain isn't always and normal side effect and at this point I feel would be beneficial, for good measure, to proceed with x-rays of the bilateral ribs just to make sure there isn't any kind of my new fracture or something else going on that could be causing part of the problem  I explained to the patient and her daughter that once we receive the results of the x-rays, our office will give her a call to review the results  I also explained that I would getting contact with the [address] [place] [address] team to maybe see if they can meet with her in the next week or so to see if they can do any reprogramming to decrease some of the burning in her rib area, especially if the rib x-rays are negative  I also advised her that I would make sure that they are available at her next office visit in 4 weeks with our office for re-evaluation as I want to keep a close eye on her to help make sure we are helping with the progression of improvement  The patient and her daughter were agreeable and verbalized understanding      I did try to review with the patient and her daughter that what she is experiencing can be normal with the permanent implantation as sometimes to just takes time and an adjustment of her medications to get everything to come back down at least to her baseline level and then hopefully in time we can get the stimulator to start controlling more of her overall pain  I did explain to her that she did have a surgery and that there is a healing process and that is how this is different than the trial itself  At this point time I advised her I feel it is in her best interest to proceed with the titrating dose of oral steroids as prescribed by Dr Dot Martin  She is to follow up with him on Thursday as he discussed to let him know how she is doing at that point he may order a CT scan of the thoracic spine just to make sure that there is nothing wrong with the lead or anything going on in her thoracic spine that could be causing the significant pain  The patient and her daughter were agreeable and verbalized understanding  I did explain to the patient and her daughter that at this point I feel that there is just a significant neuropathic component and it would be beneficial to increase her gabapentin from 1600 mg a day to 2000 mg a day by adding 400 mg at bedtime, which should also hopefully help her sleep better at nighttime as well  I also encouraged her to use her Percocet as prescribed because it is prescribed every 8 hours or in no more than 3 pills a day  I did explain to the patient that if she wants she can take 1 pill in the morning and 1-2 pills at bedtime as may be taking 2 pills at bedtime would also help her sleep better  I explained to the patient and her daughter that I feel that some of the issue is that she is not sleeping well and she is exhausted, making it much more difficult to deal with the current pain and issues  The patient is to continue with the nortriptyline and tizanidine as prescribed    I advised the patient that if they experience any side effects or issues with the changes in their medication regiment, they should give our office a call to discuss  I also advised the patient not to drive or operate machinery until they see how the changes in the medication regimen affects them  The patient was agreeable and verbalized an understanding  I did explain to the patient and her daughter that if by Thursday or Friday of this week she is in starting to see some improvement in her pain symptoms with regards to at least being able to sleep a little bit better and that it is at least a little bit more tolerable, they are to call our office and I may actually have her further increase the gabapentin by another 400 mg at bedtime as well  The patient was agreeable and verbalized an understanding  [place] Prescription Drug Monitoring Program report was reviewed and was appropriate     There are risks associated with opioid medications, including dependence, addiction and tolerance  The patient understands and agrees to use these medications only as prescribed  Potential side effects of the medications include, but are not limited to, constipation, drowsiness, addiction, impaired judgment and risk of fatal overdose if not taken as prescribed  The patient was warned against driving while taking sedation medications  Sharing medications is a felony  At this point in time, the patient is showing no signs of addiction, abuse, diversion or suicidal ideation  The patient will follow-up in 4 weeks for medication prescription refill and reevaluation  The patient was advised to contact the office should their symptoms worsen in the interim  The patient was agreeable and verbalized an understanding  History of Present Illness: The patient is a 74 y o  female last seen on 12/18/18 who presents for a follow up office visit in regards to chronic pain secondary to lumbar post-laminectomy syndrome    The patient currently reports that since her permanent Thoracic lead spinal cord stimulator implantation with Dr Calixto Going on February 5, 2019 she has not seen any improvement in her pain and is now having a significant burning pain in her thoracic area where the lead was placed and continues to report left-sided greater than right rib pain and burning  The patient and her daughter would both quite visibly upset and distraught over the fact that the procedure was supposed to help the pain but is making the patient's pain worse  The patient's daughter was translating for her mother who was very upset and crying and the patient is not able to sleep for very long and when she does get into a comfortable position any movement causes the pain becomes severe and she has to jump out of bed and is screaming in pain at times  Again, the patient continues to describe the pain as a burning stabbing kind of pain in her thoracic spine with burning pain wrapping around her left side greater than right ribs and abdomen  She did see Dr Calixto Going yesterday, who according to the patient and her daughter, felt that what she was experiencing could be normal for the surgery and that she has to give it time so they started her on a steroid taper yesterday and gave her another small prescription for the p r n  Percocet and tizanidine  Currently the patient is taking the Percocet and tizanidine twice a day and continues with her Gabapentin Nortriptyline as prescribed with minimal relief of this new pain  Both the patient and her daughter were quite upset and wants to know what we can do about her pain and felt that this possibility of an outcome or this as part of the process, was not thoroughly explained to them prior to the surgery  Current pain medications includes:  Nortriptyline 25 mg 2 pills at bedtime, gabapentin 800 mg b i d , tizanidine 4 mg b i d , and Percocet 5/3251 p o  b i d  The patient reports that this regimen is providing minimal pain relief  The patient is reporting no side effects from this pain medication regimen      I have personally reviewed and/or updated the patient's past medical history, past surgical history, family history, social history, current medications, allergies, and vital signs today  Review of Systems:    Review of Systems   Respiratory: Negative for shortness of breath  Cardiovascular: Positive for chest pain  Gastrointestinal: Positive for constipation  Negative for diarrhea, nausea and vomiting  Musculoskeletal: Positive for gait problem (Decreased ROM) and joint swelling (joint stiffness)  Negative for arthralgias and myalgias  Skin: Negative for rash  Neurological: Positive for dizziness  Negative for seizures and weakness  All other systems reviewed and are negative          Past Medical History:   Diagnosis Date    Asthma     Depression     Dyslipidemia     Esophageal reflux     History of stomach ulcers     Hypercalcemia     Hypertension     Osteopenia     Tremor        Past Surgical History:   Procedure Laterality Date    BACK SURGERY      lower back    BILATERAL OOPHORECTOMY       SECTION      CHOLECYSTECTOMY      HYSTERECTOMY      ID SURG IMPLNT Izella Green Left 2019    Procedure: LAMINECTOMY THORACIC FOR INSERTION DORSAL COLUMN SPINAL CORD STIMULATOR (DCS) W IMPLANTABLE PULSE GENERATOR, LEFT GLUTE;  Surgeon: Pk Olvera MD;  Location: [facility] OR;  Service: Neurosurgery       Family History   Problem Relation Age of Onset    Hypertension Family     Stroke Family     Heart disease Family     Thyroid disease Family        Social History     Occupational History    Occupation: unemployed   Tobacco Use    Smoking status: Never Smoker    Smokeless tobacco: Never Used   Substance and Sexual Activity    Alcohol use: Yes     Comment: social    Drug use: No    Sexual activity: Not on file         Current Outpatient Medications:     albuterol (VENTOLIN HFA) 90 mcg/act inhaler, Inhale 2 puffs every 4 (four) hours as needed for wheezing or shortness of breath, Disp: 6 7 g, Rfl: 3    alendronate (FOSAMAX) 70 mg tablet, Take 1 tablet (70 mg total) by mouth once a week, Disp: 4 tablet, Rfl: 6    atorvastatin (LIPITOR) 20 mg tablet, Take 1 tablet (20 mg total) by mouth daily, Disp: 90 tablet, Rfl: 1    bimatoprost (LUMIGAN) 0 01 % ophthalmic drops, Administer 1 drop to both eyes daily (Patient taking differently: Administer 1 drop to both eyes 2 (two) times a day  ), Disp: 5 mL, Rfl: 6    clotrimazole (MYCELEX) 10 mg cathleen, Take 1 tablet (10 mg total) by mouth 4 (four) times a day, Disp: 28 tablet, Rfl: 0    dexamethasone (DECADRON) 2 mg tablet, 2 mg tabs by mouth- 4 tabs  2 x per day for  2 days , 2 tabs  per day for 2 days , 2mg  2 times per day for 1 day,  2mg daily for 2 days, Disp: 28 tablet, Rfl: 0    gabapentin (NEURONTIN) 800 mg tablet, Take 1 tablet (800 mg total) by mouth 2 (two) times a day, Disp: 60 tablet, Rfl: 2    glycopyrrolate (ROBINUL) 2 MG tablet, Take 1 tablet (2 mg total) by mouth 2 (two) times a day, Disp: 60 tablet, Rfl: 6    hydrochlorothiazide (HYDRODIURIL) 12 5 mg tablet, Take 1 tablet (12 5 mg total) by mouth daily, Disp: 30 tablet, Rfl: 6    losartan (COZAAR) 25 mg tablet, Take 1 tablet (25 mg total) by mouth daily, Disp: 90 tablet, Rfl: 3    meloxicam (MOBIC) 15 mg tablet, Take 15 mg by mouth daily, Disp: , Rfl:     montelukast (SINGULAIR) 10 mg tablet, Take 1 tablet (10 mg total) by mouth daily (Patient taking differently: Take 10 mg by mouth daily at bedtime  ), Disp: 30 tablet, Rfl: 6    nortriptyline (PAMELOR) 25 mg capsule, Take 2 PO HS   (Patient taking differently: Take 50 mg by mouth daily at bedtime Take 2 PO HS  ), Disp: 60 capsule, Rfl: 2    nystatin (MYCOSTATIN) 100,000 units/mL suspension, Apply 5 mL (500,000 Units total) to the mouth or throat 4 (four) times a day, Disp: 60 mL, Rfl: 0    omeprazole (PriLOSEC) 40 MG capsule, Take 1 capsule (40 mg total) by mouth daily (Patient taking differently: Take 40 mg by mouth daily as needed  ), Disp: 30 capsule, Rfl: 6    oxyCODONE-acetaminophen (PERCOCET) 5-325 mg per tablet, May take 1 tablets by mouth every 8  hours as needed for pain, Disp: 21 tablet, Rfl: 0    SYNTHROID 88 MCG tablet, TAKE 1 TABLET BY MOUTH ONCE DAILY, Disp: 90 tablet, Rfl: 1    Timolol Maleate 0 5 % (DAILY) SOLN, Apply 1 drop to eye every 12 (twelve) hours, Disp: 5 mL, Rfl: 6    tiZANidine (ZANAFLEX) 4 mg tablet, May take one tablet (4mg) by mouth every 8 hours as needed, Disp: 21 tablet, Rfl: 0    Allergies   Allergen Reactions    Iodinated Diagnostic Agents Anaphylaxis     Contrast Dye    Aspirin GI Intolerance       Physical Exam:    There were no vitals taken for this visit  Constitutional:The patient seemed quite agitated, very uncomfortable, and was crying throughout most of the office visit  Eyes:anicteric  HEENT:grossly intact  Neck:supple, symmetric, trachea midline and no masses   Pulmonary:even and unlabored  Cardiovascular:No edema or pitting edema present  Skin:Normal without rashes or lesions and well hydrated  Psychiatric:Mood and affect appropriate  Neurologic:Cranial Nerves II-XII grossly intact  Musculoskeletal:Slightly antalgic, but steady gait without the use of any assistive devices  Imaging  No orders to display         No orders of the defined types were placed in this encounter

## 2019-03-19 NOTE — PATIENT INSTRUCTIONS
CHANGES:      1  Increase GABAPENTIN 800 m in AM and 1 and 1/2 at Bed time  2  Increase Percocet to 1 in AM and 1-2 at bed time  3  Continue Nortriptyline & Tizanidine as prescribed  4  Over The Counter Lidocaine Patches: 4% On 12 hours/Off 12 hours  5  Finish Out steroids and call Dr Esteban Beltran on Thursday  6  F/U OV in 4 weeks

## 2019-03-21 ENCOUNTER — TELEPHONE (OUTPATIENT)
Dept: NEUROSURGERY | Facility: CLINIC | Age: 74
End: 2019-03-21

## 2019-03-21 DIAGNOSIS — M54.9 UPPER BACK PAIN ON LEFT SIDE: ICD-10-CM

## 2019-03-21 DIAGNOSIS — M54.9 ACUTE UPPER BACK PAIN: ICD-10-CM

## 2019-03-21 DIAGNOSIS — Z98.890 POSTOPERATIVE STATE: ICD-10-CM

## 2019-03-21 DIAGNOSIS — Z96.89 STATUS POST INSERTION OF SPINAL CORD STIMULATOR: Primary | ICD-10-CM

## 2019-03-21 NOTE — TELEPHONE ENCOUNTER
Telephoned patient as a f/u to office appointment Monday 3/18/2019 6 week postoperative visit --she is s/p thoracic SCS implant 2/5/2019 --  She reported no SCS efficacy , she also complained of pain in thoracic area site of incision and burning, When lying on left side has pain in area as well as left ribs/pleuretic area  Reports she can only lie on right side  The upper back pain increases when getting in and OOB  Dexamethasone prescribed short course term use  Questioned regarding medication effect --reports she feels a little better the pain in her upper back as described above is a little better but continues  It is difficult to obtain a numeric value for efficacy but she reports the pain as an 8/10  Informed patient I would contact her daughter, Joe Clark, to discuss  and inform of need to order test       As discussed w/ DR Anayeli Huynh on Monday if no efficacy proceed with ordering CT Thoracici area --no contrast no myelo  Telephoned daughter Liliya Hancock ---informed will proceed with  CT , patient continues with complaint of pain minimal efficacy achieved from dexamethasone  Explained she will receive call from office for scheduling      CT Thoracic spine scheduled   4/2/2019 10:30 AM  - 30 min

## 2019-03-24 ENCOUNTER — TELEPHONE (OUTPATIENT)
Dept: PAIN MEDICINE | Facility: CLINIC | Age: 74
End: 2019-03-24

## 2019-03-26 ENCOUNTER — TRANSCRIBE ORDERS (OUTPATIENT)
Dept: ADMINISTRATIVE | Facility: HOSPITAL | Age: 74
End: 2019-03-26

## 2019-03-26 ENCOUNTER — APPOINTMENT (OUTPATIENT)
Dept: LAB | Facility: HOSPITAL | Age: 74
End: 2019-03-26
Attending: NEUROLOGICAL SURGERY
Payer: COMMERCIAL

## 2019-03-26 ENCOUNTER — HOSPITAL ENCOUNTER (OUTPATIENT)
Dept: CT IMAGING | Facility: HOSPITAL | Age: 74
Discharge: HOME | End: 2019-03-26
Payer: COMMERCIAL

## 2019-03-26 DIAGNOSIS — M54.9 ACUTE UPPER BACK PAIN: ICD-10-CM

## 2019-03-26 DIAGNOSIS — Z01.818 ENCOUNTER FOR PREADMISSION TESTING: ICD-10-CM

## 2019-03-26 DIAGNOSIS — Z96.89 STATUS POST INSERTION OF SPINAL CORD STIMULATOR: ICD-10-CM

## 2019-03-26 DIAGNOSIS — M54.9 UPPER BACK PAIN ON LEFT SIDE: ICD-10-CM

## 2019-03-26 DIAGNOSIS — Z01.818 ENCOUNTER FOR PREADMISSION TESTING: Primary | ICD-10-CM

## 2019-03-26 LAB
ALBUMIN SERPL BCP-MCNC: 3.1 G/DL (ref 3.5–5)
ALP SERPL-CCNC: 101 U/L (ref 46–116)
ALT SERPL W P-5'-P-CCNC: 22 U/L (ref 12–78)
ANION GAP SERPL CALCULATED.3IONS-SCNC: 9 MMOL/L (ref 4–13)
APTT PPP: 32 SECONDS (ref 26–38)
AST SERPL W P-5'-P-CCNC: 12 U/L (ref 5–45)
BASOPHILS # BLD MANUAL: 0 THOUSAND/UL (ref 0–0.1)
BASOPHILS NFR MAR MANUAL: 0 % (ref 0–1)
BILIRUB SERPL-MCNC: 0.4 MG/DL (ref 0.2–1)
BILIRUB UR QL STRIP: NEGATIVE
BUN SERPL-MCNC: 19 MG/DL (ref 5–25)
CALCIUM SERPL-MCNC: 9.3 MG/DL (ref 8.3–10.1)
CHLORIDE SERPL-SCNC: 108 MMOL/L (ref 100–108)
CLARITY UR: CLEAR
CO2 SERPL-SCNC: 27 MMOL/L (ref 21–32)
COLOR UR: YELLOW
CREAT SERPL-MCNC: 1.1 MG/DL (ref 0.6–1.3)
EOSINOPHIL # BLD MANUAL: 1.02 THOUSAND/UL (ref 0–0.4)
EOSINOPHIL NFR BLD MANUAL: 11 % (ref 0–6)
ERYTHROCYTE [DISTWIDTH] IN BLOOD BY AUTOMATED COUNT: 13.6 % (ref 11.6–15.1)
GFR SERPL CREATININE-BSD FRML MDRD: 49 ML/MIN/1.73SQ M
GLUCOSE SERPL-MCNC: 162 MG/DL (ref 65–140)
GLUCOSE UR STRIP-MCNC: NEGATIVE MG/DL
HCT VFR BLD AUTO: 36.5 % (ref 34.8–46.1)
HGB BLD-MCNC: 11.5 G/DL (ref 11.5–15.4)
HGB UR QL STRIP.AUTO: NEGATIVE
INR PPP: 1.11 (ref 0.86–1.17)
KETONES UR STRIP-MCNC: NEGATIVE MG/DL
LEUKOCYTE ESTERASE UR QL STRIP: NEGATIVE
LYMPHOCYTES # BLD AUTO: 2.95 THOUSAND/UL (ref 0.6–4.47)
LYMPHOCYTES # BLD AUTO: 32 % (ref 14–44)
MCH RBC QN AUTO: 30.8 PG (ref 26.8–34.3)
MCHC RBC AUTO-ENTMCNC: 31.5 G/DL (ref 31.4–37.4)
MCV RBC AUTO: 98 FL (ref 82–98)
MONOCYTES # BLD AUTO: 0.37 THOUSAND/UL (ref 0–1.22)
MONOCYTES NFR BLD: 4 % (ref 4–12)
NEUTROPHILS # BLD MANUAL: 4.62 THOUSAND/UL (ref 1.85–7.62)
NEUTS BAND NFR BLD MANUAL: 3 % (ref 0–8)
NEUTS SEG NFR BLD AUTO: 47 % (ref 43–75)
NITRITE UR QL STRIP: NEGATIVE
NRBC BLD AUTO-RTO: 0 /100 WBCS
PH UR STRIP.AUTO: 6 [PH]
PLATELET # BLD AUTO: 183 THOUSANDS/UL (ref 149–390)
PLATELET BLD QL SMEAR: ADEQUATE
PMV BLD AUTO: 12.4 FL (ref 8.9–12.7)
POTASSIUM SERPL-SCNC: 4.1 MMOL/L (ref 3.5–5.3)
PROT SERPL-MCNC: 6.1 G/DL (ref 6.4–8.2)
PROT UR STRIP-MCNC: NEGATIVE MG/DL
PROTHROMBIN TIME: 13.7 SECONDS (ref 11.8–14.2)
RBC # BLD AUTO: 3.73 MILLION/UL (ref 3.81–5.12)
RBC MORPH BLD: NORMAL
SODIUM SERPL-SCNC: 144 MMOL/L (ref 136–145)
SP GR UR STRIP.AUTO: 1.01 (ref 1–1.03)
TOTAL CELLS COUNTED SPEC: 100
UROBILINOGEN UR QL STRIP.AUTO: 0.2 E.U./DL
VARIANT LYMPHS # BLD AUTO: 3 %
WBC # BLD AUTO: 9.23 THOUSAND/UL (ref 4.31–10.16)

## 2019-03-26 PROCEDURE — 85730 THROMBOPLASTIN TIME PARTIAL: CPT

## 2019-03-26 PROCEDURE — 81003 URINALYSIS AUTO W/O SCOPE: CPT | Performed by: NEUROLOGICAL SURGERY

## 2019-03-26 PROCEDURE — 36415 COLL VENOUS BLD VENIPUNCTURE: CPT

## 2019-03-26 PROCEDURE — 85007 BL SMEAR W/DIFF WBC COUNT: CPT

## 2019-03-26 PROCEDURE — 72128 CT CHEST SPINE W/O DYE: CPT

## 2019-03-26 PROCEDURE — 85027 COMPLETE CBC AUTOMATED: CPT

## 2019-03-26 PROCEDURE — 80053 COMPREHEN METABOLIC PANEL: CPT

## 2019-03-26 PROCEDURE — 85610 PROTHROMBIN TIME: CPT

## 2019-03-26 RX ORDER — CHLORHEXIDINE GLUCONATE 0.12 MG/ML
15 RINSE ORAL ONCE
Status: CANCELLED | OUTPATIENT
Start: 2019-03-26 | End: 2019-03-26

## 2019-03-26 RX ORDER — VANCOMYCIN HYDROCHLORIDE 1 G/200ML
1000 INJECTION, SOLUTION INTRAVENOUS ONCE
Status: CANCELLED | OUTPATIENT
Start: 2019-03-26 | End: 2019-03-26

## 2019-03-26 NOTE — TELEPHONE ENCOUNTER
Dr Jonnie Alvarez reviewed rib xrays images  ordered by pain management 3/19 --images concerning for migration of thoracici SACS lead into left gutter  This migration is consistent with patient complaint of acute onset of Left sided rib cage pain over approximately past 1 5 weeks  Dr Jonnie Alvarez request schedule CT thoracic vertebrae ASAP today or tomorrow  Will plan for surgeyr on Friday to reposition current lead , or guerrero to smaller lead or percutaneous  Telephoned her daughter, Dawn Acevedo, first received VM, them called patient  Telephoned patient reports she continues with left sided rib cage pain, a little better after treatment with dexamethasone but persist with movement , position change getting in and OOB, and lying ob left side  Updated patient on plan, reschedule CT thoracic ASAP today if possible and surgery on Friday @ [facility] --but need CT imagining for surgery      Ct rescheduled for today at 1100 @ [facility]   Will discuss with OR --OR Friday

## 2019-03-26 NOTE — TELEPHONE ENCOUNTER
Patient and daughter picked up hygiene items , consent signed      Dr Mary Jane Bain notified of ST thoracic spine ----significant changes

## 2019-03-26 NOTE — TELEPHONE ENCOUNTER
Telephoned patient and daughter --verify adequate pain medication and antispasmodic--has adequeate supply   Continue to keep SCS system off  Explained in detail to both daughter Jefe Abreu ) and patient lead migration and potential causes  Non compliance w/ BLT'S, forceful coughing --multi-factoral ---need activity restriction x 6 weeks postop---   Need to complete preoperative lab work today at [facility] while at facility for [facility]  Patient and daughter will come to [facility] today   hygiene supplies  Dr Otis Gonzalez will discuss with patient and daughter --and secure consent  Verified with both daughter Stephanie Parry) and patient she has not taken meloxicam for at least 1 5 weeks,using other medication  For pain management Neurontin,tizanidina and Percocet  ----directed to remain off medication until further notice for me /NSX  Patient and daughters verbalize and understanding of information discussed      Notified [facility] Rep  Via e-mail of events

## 2019-03-27 ENCOUNTER — TELEPHONE (OUTPATIENT)
Dept: NEUROSURGERY | Facility: CLINIC | Age: 74
End: 2019-03-27

## 2019-03-27 ENCOUNTER — PREP FOR PROCEDURE (OUTPATIENT)
Dept: NEUROSURGERY | Facility: CLINIC | Age: 74
End: 2019-03-27

## 2019-03-27 DIAGNOSIS — G89.4 CHRONIC PAIN SYNDROME: ICD-10-CM

## 2019-03-27 DIAGNOSIS — Z48.89 AFTERCARE FOLLOWING SURGERY: Primary | ICD-10-CM

## 2019-03-27 DIAGNOSIS — G58.0 INTERCOSTAL NEUROPATHY: ICD-10-CM

## 2019-03-27 NOTE — TELEPHONE ENCOUNTER
Daughter, Yuri Lewis, telephoned she request a hospital bed for use in home after surgery Friday  I discussed this w/ LYNDSAY , the other daughter yesterday that she did not meet medical necessity for the same and they may likely have to pay out of pocket  This daughter does not seem to understand medical necessity of condition  Ordered hospital bed and sent to 2 DME vendors --daughter does not know of any  [employer]  , I spoke w/ No  She will return fax provider medical necessity form--  [id number] [facility] --daughter found in phone book   await reply and medical necessity form for provider completion     Returned call to Yuri Lewis updated forms faxed

## 2019-03-27 NOTE — TELEPHONE ENCOUNTER
Daughter reports her Mother's first name is misspelled on her stim ID card, and requests a new one    Discussed with Shanna Ocasio, who will notify Rep to provide at pts surgery Fri

## 2019-03-28 ENCOUNTER — TELEPHONE (OUTPATIENT)
Dept: NEUROSURGERY | Facility: CLINIC | Age: 74
End: 2019-03-28

## 2019-03-28 ENCOUNTER — DOCUMENTATION (OUTPATIENT)
Dept: NEUROSURGERY | Facility: CLINIC | Age: 74
End: 2019-03-28

## 2019-03-28 DIAGNOSIS — Z98.890 POSTOPERATIVE STATE: Primary | ICD-10-CM

## 2019-03-28 RX ORDER — BUPROPION HYDROCHLORIDE 300 MG/1
300 TABLET ORAL DAILY
COMMUNITY
End: 2019-06-04

## 2019-03-28 RX ORDER — CEPHALEXIN 500 MG/1
CAPSULE ORAL
Qty: 12 CAPSULE | Refills: 0 | Status: SHIPPED | OUTPATIENT
Start: 2019-03-29 | End: 2019-04-01

## 2019-03-28 NOTE — TELEPHONE ENCOUNTER
Medical record documentation for hospital bed request required process to determine  approval decision     Received a call from Josr De La Vega at [facility] requesting a chart note that states that patient requires a at home hospital bed that indicates a medical condition that      1 - Requires positioning in a way that an ordinary bed would not provide--  Her postoperative activity restrictions will be no bending, no lifting greater than 10 lBS , no twisting and no bending  Position of comfort in and OOB     2 -  The head needs to be elevated more then 30 degrees due to CHF, COPD or aspiration issues  Patient does not have any medical requirement for HOB elevation at 30 degrees  Nor does this surgical procedure require specific parameters for head of bed elevation  Position of comfort after surgery     3 -  The note would also need to say the patient requires frequent change in body position or immediate need for change in positioning  There is no medical condition that  requires prescribed frequent body position change  Position of comfort and change body position as patient deems necessary        Discussed with daughters Jessica Quesada previously when asked for hospital bed patient may not meet criteria/guidelines for insurance approval to provide payment therefore cost for a hospital bed would be out of pocket  He Fischer is the daughter requesting bed again she understands there may be an out of pocket cost  for hospital bed if patient's condition does not meet insurance coverage requirement  Please call He Fischer, daughter, with decision for approval or decline-- [phone]

## 2019-03-28 NOTE — TELEPHONE ENCOUNTER
Pre operative call day prior surgery scheduled in the AM w/ Dr Jonnie Alvarez Procedure: Wilhemenia Ernie, REPLACEMENT WITH PERCUTANEOUS ELECTRODES OR SMALLER PADDLE (N/A Buttocks)    Discussion/Review    Allergies ---Reviewed   Hold medications --- Reviewed   NPO after MN, night prior surgery ---Reviewed    Medication (s) instructed by healthcare provider to take the morning of surgery w/ sip of water 4 OZ discussed:as per [facility] nurse     Post operative scripts electronic transmission: cephalexin     PDMP site reviewed accessed and reviewed scheduled drug list printed and scanned into record--done     Pain management script:has scripts form pain management but will take differently postoperatively    tizanidine 4 mg po every 98 hours as needed for upper back muscle spasm,  Percocet 1 tabs every 4 hrs or 2 tabs every 6 hours not to exceed 8 tablets per day ---explained in detail to daughter Barbara Trevizo , explained acetaminophen toxicity  Pre- operative shower protocol reviewed; Clarify instructions as per protocol, third chlorhexidine shower tonight before surgery, then use JOSE L wipes as per packaging instructions, Use a clean towel and wash cloth starting tonight and continue nightly until seen 2 weeks post operative visit for incision check removal  Change bed linens tonight and continue at least 1-2 times weekly  --reinforced     Informed will receive a telephone call tonight from a hospital representative with time to report on surgery day: pending call     Informed will receive a f/u call within in 24 -48 hours post-op to assess recovery reinforce instructions, and to answer any questions  Follow-up appointments reviewed ---refer to discharge document     Barbara Engle  paying out of pocket for hospital bed --to be delivered today     Patient/daughter Barbara Trevizo  verbalized understanding information provided /discussed

## 2019-03-28 NOTE — TELEPHONE ENCOUNTER
Spoke with daughter , Rhiannon , she was notified by [employer] /DME---gave her credit card information, informed bed may not be covered under insurance   Plan for bed delivery tonight

## 2019-03-28 NOTE — TELEPHONE ENCOUNTER
Received a call from [facility] at [facility] requesting a chart note that states that patient requires a at home hospital bed that indicates a medical condition that     1 - Requires positioning in a way that an ordinary bed would not provide    2 -  the head needs to be elevated more then 30 degrees due to CHF, COPD or aspiration issues  3 -  The note would also need to say the patient requires frequent change in body position or immediate need for change in positioning  The note would need to say one either  #1 or #2 which ever apply's as well as # 3  This note can not be sent in a form of a letter of medical necessity, it would need to be sent in a office note form  [facility] also said if this request is approved, the insurance would only allow a semi-automatic bed and if the patient wants a fully automatic bed she would need to pay the difference      This information needs to be faxed to [phone]   Fax # [phone]

## 2019-03-29 ENCOUNTER — APPOINTMENT (OUTPATIENT)
Dept: RADIOLOGY | Facility: HOSPITAL | Age: 74
End: 2019-03-29
Payer: COMMERCIAL

## 2019-03-29 ENCOUNTER — HOSPITAL ENCOUNTER (OUTPATIENT)
Facility: HOSPITAL | Age: 74
Setting detail: OUTPATIENT SURGERY
Discharge: HOME | End: 2019-03-29
Attending: NEUROLOGICAL SURGERY | Admitting: NEUROLOGICAL SURGERY
Payer: COMMERCIAL

## 2019-03-29 ENCOUNTER — ANESTHESIA EVENT (OUTPATIENT)
Dept: PERIOP | Facility: HOSPITAL | Age: 74
End: 2019-03-29
Payer: COMMERCIAL

## 2019-03-29 ENCOUNTER — ANESTHESIA (OUTPATIENT)
Dept: PERIOP | Facility: HOSPITAL | Age: 74
End: 2019-03-29
Payer: COMMERCIAL

## 2019-03-29 VITALS
OXYGEN SATURATION: 94 % | WEIGHT: 166 LBS | DIASTOLIC BLOOD PRESSURE: 71 MMHG | TEMPERATURE: 97.2 F | HEART RATE: 65 BPM | HEIGHT: 60 IN | SYSTOLIC BLOOD PRESSURE: 117 MMHG | RESPIRATION RATE: 18 BRPM | BODY MASS INDEX: 32.59 KG/M2

## 2019-03-29 LAB — GLUCOSE SERPL-MCNC: 82 MG/DL (ref 65–140)

## 2019-03-29 PROCEDURE — C1713 ANCHOR/SCREW BN/BN,TIS/BN: HCPCS | Performed by: NEUROLOGICAL SURGERY

## 2019-03-29 PROCEDURE — 95972 ALYS CPLX SP/PN NPGT W/PRGRM: CPT | Performed by: NEUROLOGICAL SURGERY

## 2019-03-29 PROCEDURE — 82948 REAGENT STRIP/BLOOD GLUCOSE: CPT

## 2019-03-29 PROCEDURE — 63688 REV/RMV IMP SP NPG/R DTCH CN: CPT | Performed by: NEUROLOGICAL SURGERY

## 2019-03-29 PROCEDURE — 63664 REVISE SPINE ELTRD PLATE: CPT | Performed by: NEUROLOGICAL SURGERY

## 2019-03-29 PROCEDURE — C1778 LEAD, NEUROSTIMULATOR: HCPCS | Performed by: NEUROLOGICAL SURGERY

## 2019-03-29 PROCEDURE — 72070 X-RAY EXAM THORAC SPINE 2VWS: CPT

## 2019-03-29 DEVICE — ANCHOR NEUROSTIM SWIFT LOCK: Type: IMPLANTABLE DEVICE | Site: BACK | Status: FUNCTIONAL

## 2019-03-29 DEVICE — LEAD OCTRODE 60CM: Type: IMPLANTABLE DEVICE | Site: BACK | Status: FUNCTIONAL

## 2019-03-29 RX ORDER — LIDOCAINE HYDROCHLORIDE 10 MG/ML
INJECTION, SOLUTION INFILTRATION; PERINEURAL AS NEEDED
Status: DISCONTINUED | OUTPATIENT
Start: 2019-03-29 | End: 2019-03-29 | Stop reason: SURG

## 2019-03-29 RX ORDER — EPHEDRINE SULFATE 50 MG/ML
INJECTION INTRAVENOUS AS NEEDED
Status: DISCONTINUED | OUTPATIENT
Start: 2019-03-29 | End: 2019-03-29 | Stop reason: SURG

## 2019-03-29 RX ORDER — ACETAMINOPHEN 325 MG/1
975 TABLET ORAL ONCE
Status: COMPLETED | OUTPATIENT
Start: 2019-03-29 | End: 2019-03-29

## 2019-03-29 RX ORDER — SODIUM CHLORIDE 9 MG/ML
INJECTION, SOLUTION INTRAVENOUS CONTINUOUS PRN
Status: DISCONTINUED | OUTPATIENT
Start: 2019-03-29 | End: 2019-03-29 | Stop reason: SURG

## 2019-03-29 RX ORDER — CEFAZOLIN SODIUM 2 G/50ML
2000 SOLUTION INTRAVENOUS ONCE
Status: COMPLETED | OUTPATIENT
Start: 2019-03-29 | End: 2019-03-29

## 2019-03-29 RX ORDER — HYDROMORPHONE HCL/PF 1 MG/ML
0.5 SYRINGE (ML) INJECTION
Status: DISCONTINUED | OUTPATIENT
Start: 2019-03-29 | End: 2019-03-29 | Stop reason: HOSPADM

## 2019-03-29 RX ORDER — VANCOMYCIN HYDROCHLORIDE 1 G/20ML
INJECTION, POWDER, LYOPHILIZED, FOR SOLUTION INTRAVENOUS AS NEEDED
Status: DISCONTINUED | OUTPATIENT
Start: 2019-03-29 | End: 2019-03-29 | Stop reason: HOSPADM

## 2019-03-29 RX ORDER — ONDANSETRON 2 MG/ML
4 INJECTION INTRAMUSCULAR; INTRAVENOUS ONCE AS NEEDED
Status: DISCONTINUED | OUTPATIENT
Start: 2019-03-29 | End: 2019-03-29 | Stop reason: HOSPADM

## 2019-03-29 RX ORDER — HYDROMORPHONE HYDROCHLORIDE 2 MG/ML
INJECTION, SOLUTION INTRAMUSCULAR; INTRAVENOUS; SUBCUTANEOUS AS NEEDED
Status: DISCONTINUED | OUTPATIENT
Start: 2019-03-29 | End: 2019-03-29 | Stop reason: SURG

## 2019-03-29 RX ORDER — FENTANYL CITRATE 50 UG/ML
INJECTION, SOLUTION INTRAMUSCULAR; INTRAVENOUS AS NEEDED
Status: DISCONTINUED | OUTPATIENT
Start: 2019-03-29 | End: 2019-03-29 | Stop reason: SURG

## 2019-03-29 RX ORDER — SCOLOPAMINE TRANSDERMAL SYSTEM 1 MG/1
1 PATCH, EXTENDED RELEASE TRANSDERMAL
Status: DISCONTINUED | OUTPATIENT
Start: 2019-03-29 | End: 2019-03-29 | Stop reason: HOSPADM

## 2019-03-29 RX ORDER — OXYCODONE HYDROCHLORIDE AND ACETAMINOPHEN 5; 325 MG/1; MG/1
1 TABLET ORAL EVERY 4 HOURS PRN
Status: DISCONTINUED | OUTPATIENT
Start: 2019-03-29 | End: 2019-03-29 | Stop reason: HOSPADM

## 2019-03-29 RX ORDER — METOCLOPRAMIDE HYDROCHLORIDE 5 MG/ML
10 INJECTION INTRAMUSCULAR; INTRAVENOUS ONCE AS NEEDED
Status: DISCONTINUED | OUTPATIENT
Start: 2019-03-29 | End: 2019-03-29 | Stop reason: HOSPADM

## 2019-03-29 RX ORDER — FENTANYL CITRATE/PF 50 MCG/ML
50 SYRINGE (ML) INJECTION
Status: DISCONTINUED | OUTPATIENT
Start: 2019-03-29 | End: 2019-03-29 | Stop reason: HOSPADM

## 2019-03-29 RX ORDER — PROPOFOL 10 MG/ML
INJECTION, EMULSION INTRAVENOUS CONTINUOUS PRN
Status: DISCONTINUED | OUTPATIENT
Start: 2019-03-29 | End: 2019-03-29 | Stop reason: SURG

## 2019-03-29 RX ORDER — METHYLPREDNISOLONE ACETATE 40 MG/ML
INJECTION, SUSPENSION INTRA-ARTICULAR; INTRALESIONAL; INTRAMUSCULAR; SOFT TISSUE AS NEEDED
Status: DISCONTINUED | OUTPATIENT
Start: 2019-03-29 | End: 2019-03-29 | Stop reason: HOSPADM

## 2019-03-29 RX ORDER — DIPHENHYDRAMINE HYDROCHLORIDE 50 MG/ML
12.5 INJECTION INTRAMUSCULAR; INTRAVENOUS ONCE AS NEEDED
Status: DISCONTINUED | OUTPATIENT
Start: 2019-03-29 | End: 2019-03-29 | Stop reason: HOSPADM

## 2019-03-29 RX ORDER — CHLORHEXIDINE GLUCONATE 0.12 MG/ML
15 RINSE ORAL ONCE
Status: COMPLETED | OUTPATIENT
Start: 2019-03-29 | End: 2019-03-29

## 2019-03-29 RX ORDER — HYDROMORPHONE HCL/PF 1 MG/ML
0.2 SYRINGE (ML) INJECTION
Status: DISCONTINUED | OUTPATIENT
Start: 2019-03-29 | End: 2019-03-29 | Stop reason: HOSPADM

## 2019-03-29 RX ORDER — DEXAMETHASONE SODIUM PHOSPHATE 10 MG/ML
INJECTION, SOLUTION INTRAMUSCULAR; INTRAVENOUS AS NEEDED
Status: DISCONTINUED | OUTPATIENT
Start: 2019-03-29 | End: 2019-03-29 | Stop reason: SURG

## 2019-03-29 RX ORDER — SODIUM CHLORIDE, SODIUM LACTATE, POTASSIUM CHLORIDE, CALCIUM CHLORIDE 600; 310; 30; 20 MG/100ML; MG/100ML; MG/100ML; MG/100ML
100 INJECTION, SOLUTION INTRAVENOUS CONTINUOUS
Status: DISCONTINUED | OUTPATIENT
Start: 2019-03-29 | End: 2019-03-29 | Stop reason: HOSPADM

## 2019-03-29 RX ORDER — MIDAZOLAM HYDROCHLORIDE 1 MG/ML
INJECTION INTRAMUSCULAR; INTRAVENOUS AS NEEDED
Status: DISCONTINUED | OUTPATIENT
Start: 2019-03-29 | End: 2019-03-29 | Stop reason: SURG

## 2019-03-29 RX ORDER — ONDANSETRON 2 MG/ML
4 INJECTION INTRAMUSCULAR; INTRAVENOUS EVERY 6 HOURS PRN
Status: DISCONTINUED | OUTPATIENT
Start: 2019-03-29 | End: 2019-03-29 | Stop reason: HOSPADM

## 2019-03-29 RX ORDER — PROPOFOL 10 MG/ML
INJECTION, EMULSION INTRAVENOUS AS NEEDED
Status: DISCONTINUED | OUTPATIENT
Start: 2019-03-29 | End: 2019-03-29 | Stop reason: SURG

## 2019-03-29 RX ORDER — OXYCODONE HYDROCHLORIDE 5 MG/1
10 TABLET ORAL ONCE
Status: COMPLETED | OUTPATIENT
Start: 2019-03-29 | End: 2019-03-29

## 2019-03-29 RX ORDER — ONDANSETRON 2 MG/ML
INJECTION INTRAMUSCULAR; INTRAVENOUS AS NEEDED
Status: DISCONTINUED | OUTPATIENT
Start: 2019-03-29 | End: 2019-03-29 | Stop reason: SURG

## 2019-03-29 RX ADMIN — EPHEDRINE SULFATE 10 MG: 50 INJECTION, SOLUTION INTRAVENOUS at 11:35

## 2019-03-29 RX ADMIN — ONDANSETRON 4 MG: 2 INJECTION INTRAMUSCULAR; INTRAVENOUS at 12:16

## 2019-03-29 RX ADMIN — DEXAMETHASONE SODIUM PHOSPHATE 4 MG: 10 INJECTION, SOLUTION INTRAMUSCULAR; INTRAVENOUS at 11:57

## 2019-03-29 RX ADMIN — SODIUM CHLORIDE: 0.9 INJECTION, SOLUTION INTRAVENOUS at 11:02

## 2019-03-29 RX ADMIN — SCOPALAMINE 1 PATCH: 1 PATCH, EXTENDED RELEASE TRANSDERMAL at 10:46

## 2019-03-29 RX ADMIN — CEFAZOLIN SODIUM 2000 MG: 2 SOLUTION INTRAVENOUS at 11:06

## 2019-03-29 RX ADMIN — OXYCODONE HYDROCHLORIDE 10 MG: 5 TABLET ORAL at 10:46

## 2019-03-29 RX ADMIN — PROPOFOL 140 MCG/KG/MIN: 10 INJECTION, EMULSION INTRAVENOUS at 11:15

## 2019-03-29 RX ADMIN — CHLORHEXIDINE GLUCONATE 15 ML: 1.2 RINSE ORAL at 10:47

## 2019-03-29 RX ADMIN — ACETAMINOPHEN 975 MG: 325 TABLET, FILM COATED ORAL at 10:45

## 2019-03-29 RX ADMIN — PROPOFOL 150 MG: 10 INJECTION, EMULSION INTRAVENOUS at 11:15

## 2019-03-29 RX ADMIN — FENTANYL CITRATE 100 MCG: 50 INJECTION INTRAMUSCULAR; INTRAVENOUS at 11:15

## 2019-03-29 RX ADMIN — FENTANYL CITRATE 50 MCG: 50 INJECTION INTRAMUSCULAR; INTRAVENOUS at 13:28

## 2019-03-29 RX ADMIN — GABAPENTIN 800 MG: 100 CAPSULE ORAL at 10:46

## 2019-03-29 RX ADMIN — LIDOCAINE HYDROCHLORIDE ANHYDROUS 50 MG: 10 INJECTION, SOLUTION INFILTRATION at 11:15

## 2019-03-29 RX ADMIN — HYDROMORPHONE HYDROCHLORIDE 0.5 MG: 2 INJECTION, SOLUTION INTRAMUSCULAR; INTRAVENOUS; SUBCUTANEOUS at 12:03

## 2019-03-29 RX ADMIN — MIDAZOLAM HYDROCHLORIDE 2 MG: 1 INJECTION, SOLUTION INTRAMUSCULAR; INTRAVENOUS at 11:10

## 2019-03-29 RX ADMIN — SUGAMMADEX 300 MG: 100 INJECTION, SOLUTION INTRAVENOUS at 11:52

## 2019-03-29 NOTE — ANESTHESIA PREPROCEDURE EVALUATION
Review of Systems/Medical History  Patient summary reviewed  Chart reviewed  No history of anesthetic complications     Cardiovascular  Hypertension ,    Pulmonary  Asthma ,        GI/Hepatic    GERD ,        Negative  ROS        Endo/Other  History of thyroid disease , hypothyroidism,   Obesity    GYN  Negative gynecology ROS          Hematology  Negative hematology ROS      Musculoskeletal  Back pain , lumbar pain, Sciatica,   Comment: S/p lumbar laminectomy c/b postlaminectomy syndrome s/p stimulator placement which caused worsening of pain, especially in thoracic region Arthritis     Neurology    Motor deficit , left lower extremity weakness, Headaches,   Comment: Tremor Psychology   Anxiety, Depression ,   Chronic opioid dependence Chronic pain,            Physical Exam    Airway    Mallampati score: II  TM Distance: >3 FB  Neck ROM: full     Dental   No notable dental hx     Cardiovascular  Rhythm: regular, Rate: normal, Cardiovascular exam normal    Pulmonary  Pulmonary exam normal Breath sounds clear to auscultation,     Other Findings        Anesthesia Plan  ASA Score- 3     Anesthesia Type- general with ASA Monitors  Additional Monitors:   Airway Plan: ETT  Plan Factors-    Induction- intravenous  Postoperative Plan- Plan for postoperative opioid use  Informed Consent- Anesthetic plan and risks discussed with patient and daughter  I personally reviewed this patient with the CRNA  Discussed and agreed on the Anesthesia Plan with the CRNA  Darrian Viera           Recent labs personally reviewed:  Lab Results   Component Value Date    WBC 9 23 03/26/2019    HGB 11 5 03/26/2019     03/26/2019     Lab Results   Component Value Date    K 4 1 03/26/2019    BUN 19 03/26/2019    CREATININE 1 10 03/26/2019     Lab Results   Component Value Date    PTT 32 03/26/2019      Lab Results   Component Value Date    INR 1 11 03/26/2019     Lab Results   Component Value Date    HGBA1C 5 8 (H) 01/19/2019 Dom Eli MD, have personally seen and evaluated the patient prior to anesthetic care  I have reviewed the pre-anesthetic record, and other medical records if appropriate to the anesthetic care  If a CRNA is involved in the case, I have reviewed the CRNA assessment, if present, and agree  Risks/benefits and alternatives discussed with patient including possible PONV, sore throat, and possibility of rare anesthetic and surgical emergencies

## 2019-03-29 NOTE — ANESTHESIA POSTPROCEDURE EVALUATION
Post-Op Assessment Note    CV Status:  Stable    Pain management: adequate     Mental Status:  Alert and awake   Hydration Status:  Euvolemic   PONV Controlled:  Controlled   Airway Patency:  Patent   Post Op Vitals Reviewed: Yes      Staff: CRNA   Comments: vss report rn          BP     Temp      Pulse     Resp      SpO2

## 2019-04-01 ENCOUNTER — TELEPHONE (OUTPATIENT)
Dept: NEUROSURGERY | Facility: CLINIC | Age: 74
End: 2019-04-01

## 2019-04-09 ENCOUNTER — CLINICAL SUPPORT (OUTPATIENT)
Dept: NEUROSURGERY | Facility: CLINIC | Age: 74
End: 2019-04-09

## 2019-04-09 ENCOUNTER — DOCUMENTATION (OUTPATIENT)
Dept: NEUROSURGERY | Facility: CLINIC | Age: 74
End: 2019-04-09

## 2019-04-09 VITALS — TEMPERATURE: 97.9 F | DIASTOLIC BLOOD PRESSURE: 68 MMHG | SYSTOLIC BLOOD PRESSURE: 148 MMHG

## 2019-04-09 DIAGNOSIS — Z98.890 POST-OPERATIVE STATE: Primary | ICD-10-CM

## 2019-04-09 PROCEDURE — 99024 POSTOP FOLLOW-UP VISIT: CPT

## 2019-04-16 ENCOUNTER — TELEPHONE (OUTPATIENT)
Dept: PAIN MEDICINE | Facility: CLINIC | Age: 74
End: 2019-04-16

## 2019-04-23 ENCOUNTER — OFFICE VISIT (OUTPATIENT)
Dept: FAMILY MEDICINE CLINIC | Facility: HOSPITAL | Age: 74
End: 2019-04-23
Payer: COMMERCIAL

## 2019-04-23 VITALS
WEIGHT: 164 LBS | HEIGHT: 60 IN | HEART RATE: 72 BPM | TEMPERATURE: 97.8 F | DIASTOLIC BLOOD PRESSURE: 68 MMHG | SYSTOLIC BLOOD PRESSURE: 138 MMHG | BODY MASS INDEX: 32.2 KG/M2

## 2019-04-23 DIAGNOSIS — Z13.820 SCREENING FOR OSTEOPOROSIS: Primary | ICD-10-CM

## 2019-04-23 DIAGNOSIS — Z13.6 SCREENING FOR AAA (ABDOMINAL AORTIC ANEURYSM): ICD-10-CM

## 2019-04-23 DIAGNOSIS — M81.0 AGE-RELATED OSTEOPOROSIS WITHOUT CURRENT PATHOLOGICAL FRACTURE: ICD-10-CM

## 2019-04-23 DIAGNOSIS — Z23 NEED FOR PNEUMOCOCCAL VACCINE: ICD-10-CM

## 2019-04-23 DIAGNOSIS — R42 POSTURAL DIZZINESS WITH PRESYNCOPE: ICD-10-CM

## 2019-04-23 DIAGNOSIS — K59.00 CONSTIPATION, UNSPECIFIED CONSTIPATION TYPE: ICD-10-CM

## 2019-04-23 DIAGNOSIS — F32.A ANXIETY AND DEPRESSION: ICD-10-CM

## 2019-04-23 DIAGNOSIS — Z00.00 MEDICARE ANNUAL WELLNESS VISIT, INITIAL: ICD-10-CM

## 2019-04-23 DIAGNOSIS — Z82.49 FAMILY HISTORY OF ABDOMINAL AORTIC ANEURYSM (AAA): ICD-10-CM

## 2019-04-23 DIAGNOSIS — E78.5 DYSLIPIDEMIA: Primary | ICD-10-CM

## 2019-04-23 DIAGNOSIS — F41.9 ANXIETY AND DEPRESSION: ICD-10-CM

## 2019-04-23 DIAGNOSIS — R55 POSTURAL DIZZINESS WITH PRESYNCOPE: ICD-10-CM

## 2019-04-23 DIAGNOSIS — G89.4 CHRONIC PAIN SYNDROME: ICD-10-CM

## 2019-04-23 DIAGNOSIS — R29.90 ABNORMAL NEUROLOGICAL EXAM: ICD-10-CM

## 2019-04-23 PROCEDURE — 90670 PCV13 VACCINE IM: CPT | Performed by: INTERNAL MEDICINE

## 2019-04-23 PROCEDURE — 1125F AMNT PAIN NOTED PAIN PRSNT: CPT | Performed by: INTERNAL MEDICINE

## 2019-04-23 PROCEDURE — 1160F RVW MEDS BY RX/DR IN RCRD: CPT | Performed by: INTERNAL MEDICINE

## 2019-04-23 PROCEDURE — 1170F FXNL STATUS ASSESSED: CPT | Performed by: INTERNAL MEDICINE

## 2019-04-23 PROCEDURE — G0009 ADMIN PNEUMOCOCCAL VACCINE: HCPCS | Performed by: INTERNAL MEDICINE

## 2019-04-23 PROCEDURE — 99215 OFFICE O/P EST HI 40 MIN: CPT | Performed by: INTERNAL MEDICINE

## 2019-04-23 PROCEDURE — G0438 PPPS, INITIAL VISIT: HCPCS | Performed by: INTERNAL MEDICINE

## 2019-04-23 RX ORDER — DOCUSATE SODIUM 100 MG/1
100 CAPSULE, LIQUID FILLED ORAL 2 TIMES DAILY
Qty: 30 CAPSULE | Refills: 3 | Status: SHIPPED | OUTPATIENT
Start: 2019-04-23 | End: 2022-04-29

## 2019-04-23 RX ORDER — GABAPENTIN 800 MG/1
TABLET ORAL
Qty: 75 TABLET | Refills: 0
Start: 2019-04-23 | End: 2019-07-18 | Stop reason: SDUPTHER

## 2019-04-28 LAB
ALBUMIN SERPL-MCNC: 4.2 G/DL (ref 3.5–4.8)
ALBUMIN/GLOB SERPL: 1.6 {RATIO} (ref 1.2–2.2)
ALP SERPL-CCNC: 127 IU/L (ref 39–117)
ALT SERPL-CCNC: 11 IU/L (ref 0–32)
AST SERPL-CCNC: 15 IU/L (ref 0–40)
BASOPHILS # BLD AUTO: 0 X10E3/UL (ref 0–0.2)
BASOPHILS NFR BLD AUTO: 1 %
BILIRUB SERPL-MCNC: <0.2 MG/DL (ref 0–1.2)
BUN SERPL-MCNC: 15 MG/DL (ref 8–27)
BUN/CREAT SERPL: 15 (ref 12–28)
CALCIUM SERPL-MCNC: 9.6 MG/DL (ref 8.7–10.3)
CHLORIDE SERPL-SCNC: 106 MMOL/L (ref 96–106)
CHOLEST SERPL-MCNC: 182 MG/DL (ref 100–199)
CHOLEST/HDLC SERPL: 3.9 RATIO (ref 0–4.4)
CO2 SERPL-SCNC: 21 MMOL/L (ref 20–29)
CREAT SERPL-MCNC: 0.97 MG/DL (ref 0.57–1)
EOSINOPHIL # BLD AUTO: 0.4 X10E3/UL (ref 0–0.4)
EOSINOPHIL NFR BLD AUTO: 7 %
ERYTHROCYTE [DISTWIDTH] IN BLOOD BY AUTOMATED COUNT: 13.8 % (ref 12.3–15.4)
EST. AVERAGE GLUCOSE BLD GHB EST-MCNC: 120 MG/DL
GLOBULIN SER-MCNC: 2.6 G/DL (ref 1.5–4.5)
GLUCOSE SERPL-MCNC: 106 MG/DL (ref 65–99)
HBA1C MFR BLD: 5.8 % (ref 4.8–5.6)
HCT VFR BLD AUTO: 37.5 % (ref 34–46.6)
HDLC SERPL-MCNC: 47 MG/DL
HGB BLD-MCNC: 12.5 G/DL (ref 11.1–15.9)
IMM GRANULOCYTES # BLD: 0 X10E3/UL (ref 0–0.1)
IMM GRANULOCYTES NFR BLD: 1 %
LDLC SERPL CALC-MCNC: 110 MG/DL (ref 0–99)
LYMPHOCYTES # BLD AUTO: 1.5 X10E3/UL (ref 0.7–3.1)
LYMPHOCYTES NFR BLD AUTO: 31 %
MCH RBC QN AUTO: 31.3 PG (ref 26.6–33)
MCHC RBC AUTO-ENTMCNC: 33.3 G/DL (ref 31.5–35.7)
MCV RBC AUTO: 94 FL (ref 79–97)
MONOCYTES # BLD AUTO: 0.5 X10E3/UL (ref 0.1–0.9)
MONOCYTES NFR BLD AUTO: 9 %
NEUTROPHILS # BLD AUTO: 2.6 X10E3/UL (ref 1.4–7)
NEUTROPHILS NFR BLD AUTO: 51 %
PLATELET # BLD AUTO: 251 X10E3/UL (ref 150–379)
POTASSIUM SERPL-SCNC: 4.3 MMOL/L (ref 3.5–5.2)
PROT SERPL-MCNC: 6.8 G/DL (ref 6–8.5)
RBC # BLD AUTO: 4 X10E6/UL (ref 3.77–5.28)
SL AMB EGFR AFRICAN AMERICAN: 66 ML/MIN/1.73
SL AMB EGFR NON AFRICAN AMERICAN: 57 ML/MIN/1.73
SL AMB VLDL CHOLESTEROL CALC: 25 MG/DL (ref 5–40)
SODIUM SERPL-SCNC: 142 MMOL/L (ref 134–144)
TRIGL SERPL-MCNC: 127 MG/DL (ref 0–149)
TSH SERPL DL<=0.005 MIU/L-ACNC: 1.59 UIU/ML (ref 0.45–4.5)
WBC # BLD AUTO: 5 X10E3/UL (ref 3.4–10.8)

## 2019-04-30 ENCOUNTER — TELEPHONE (OUTPATIENT)
Dept: PAIN MEDICINE | Facility: CLINIC | Age: 74
End: 2019-04-30

## 2019-04-30 DIAGNOSIS — Z98.890 POSTOPERATIVE STATE: ICD-10-CM

## 2019-04-30 DIAGNOSIS — M62.830 SPASM OF BACK MUSCLES: ICD-10-CM

## 2019-04-30 DIAGNOSIS — M17.12 PRIMARY OSTEOARTHRITIS OF LEFT KNEE: ICD-10-CM

## 2019-04-30 DIAGNOSIS — M54.16 LUMBAR RADICULOPATHY: ICD-10-CM

## 2019-04-30 DIAGNOSIS — M47.816 LUMBAR SPONDYLOSIS: ICD-10-CM

## 2019-04-30 RX ORDER — NORTRIPTYLINE HYDROCHLORIDE 25 MG/1
CAPSULE ORAL
Qty: 60 CAPSULE | Refills: 1 | Status: SHIPPED | OUTPATIENT
Start: 2019-04-30 | End: 2019-08-07 | Stop reason: SDUPTHER

## 2019-04-30 RX ORDER — TIZANIDINE 4 MG/1
TABLET ORAL
Qty: 60 TABLET | Refills: 1 | Status: SHIPPED | OUTPATIENT
Start: 2019-04-30 | End: 2019-07-18 | Stop reason: SDUPTHER

## 2019-05-10 DIAGNOSIS — F32.A ANXIETY AND DEPRESSION: ICD-10-CM

## 2019-05-10 DIAGNOSIS — F41.9 ANXIETY AND DEPRESSION: ICD-10-CM

## 2019-05-11 ENCOUNTER — TELEPHONE (OUTPATIENT)
Dept: FAMILY MEDICINE CLINIC | Facility: HOSPITAL | Age: 74
End: 2019-05-11

## 2019-05-12 RX ORDER — BUPROPION HYDROCHLORIDE 300 MG/1
TABLET ORAL
Qty: 30 TABLET | Refills: 3 | Status: SHIPPED | OUTPATIENT
Start: 2019-05-12 | End: 2019-06-04 | Stop reason: SDUPTHER

## 2019-05-13 ENCOUNTER — OFFICE VISIT (OUTPATIENT)
Dept: NEUROSURGERY | Facility: CLINIC | Age: 74
End: 2019-05-13

## 2019-05-13 ENCOUNTER — APPOINTMENT (OUTPATIENT)
Dept: RADIOLOGY | Facility: CLINIC | Age: 74
End: 2019-05-13
Payer: COMMERCIAL

## 2019-05-13 VITALS
RESPIRATION RATE: 16 BRPM | BODY MASS INDEX: 32.2 KG/M2 | TEMPERATURE: 98.4 F | HEIGHT: 60 IN | WEIGHT: 164 LBS | SYSTOLIC BLOOD PRESSURE: 136 MMHG | HEART RATE: 70 BPM | DIASTOLIC BLOOD PRESSURE: 70 MMHG

## 2019-05-13 DIAGNOSIS — Z48.89 AFTERCARE FOLLOWING SURGERY: ICD-10-CM

## 2019-05-13 DIAGNOSIS — Z48.89 AFTERCARE FOLLOWING SURGERY: Primary | ICD-10-CM

## 2019-05-13 PROCEDURE — 72070 X-RAY EXAM THORAC SPINE 2VWS: CPT

## 2019-05-13 PROCEDURE — 99024 POSTOP FOLLOW-UP VISIT: CPT | Performed by: NEUROLOGICAL SURGERY

## 2019-05-21 ENCOUNTER — HOSPITAL ENCOUNTER (OUTPATIENT)
Dept: BONE DENSITY | Facility: IMAGING CENTER | Age: 74
Discharge: HOME | End: 2019-05-21
Payer: COMMERCIAL

## 2019-05-21 ENCOUNTER — APPOINTMENT (OUTPATIENT)
Dept: ULTRASOUND IMAGING | Facility: HOSPITAL | Age: 74
End: 2019-05-21
Payer: COMMERCIAL

## 2019-05-21 VITALS — WEIGHT: 163 LBS | BODY MASS INDEX: 32 KG/M2 | HEIGHT: 60 IN

## 2019-05-21 DIAGNOSIS — Z13.820 SCREENING FOR OSTEOPOROSIS: ICD-10-CM

## 2019-05-21 DIAGNOSIS — Z12.39 SCREENING FOR MALIGNANT NEOPLASM OF BREAST: ICD-10-CM

## 2019-05-21 PROCEDURE — 77080 DXA BONE DENSITY AXIAL: CPT

## 2019-05-21 PROCEDURE — 77067 SCR MAMMO BI INCL CAD: CPT

## 2019-05-22 ENCOUNTER — HOSPITAL ENCOUNTER (OUTPATIENT)
Dept: ULTRASOUND IMAGING | Facility: HOSPITAL | Age: 74
Discharge: HOME | End: 2019-05-22
Payer: COMMERCIAL

## 2019-05-22 DIAGNOSIS — Z82.49 FAMILY HISTORY OF ABDOMINAL AORTIC ANEURYSM (AAA): ICD-10-CM

## 2019-05-22 DIAGNOSIS — Z13.6 SCREENING FOR AAA (ABDOMINAL AORTIC ANEURYSM): ICD-10-CM

## 2019-05-22 PROCEDURE — 76706 US ABDL AORTA SCREEN AAA: CPT

## 2019-06-04 ENCOUNTER — OFFICE VISIT (OUTPATIENT)
Dept: FAMILY MEDICINE CLINIC | Facility: HOSPITAL | Age: 74
End: 2019-06-04
Payer: COMMERCIAL

## 2019-06-04 VITALS
HEIGHT: 60 IN | SYSTOLIC BLOOD PRESSURE: 128 MMHG | TEMPERATURE: 97.8 F | DIASTOLIC BLOOD PRESSURE: 72 MMHG | HEART RATE: 69 BPM | BODY MASS INDEX: 32.39 KG/M2 | WEIGHT: 165 LBS

## 2019-06-04 DIAGNOSIS — R42 DIZZINESS: Primary | ICD-10-CM

## 2019-06-04 DIAGNOSIS — I10 ESSENTIAL HYPERTENSION: ICD-10-CM

## 2019-06-04 DIAGNOSIS — E78.5 DYSLIPIDEMIA: ICD-10-CM

## 2019-06-04 DIAGNOSIS — F41.9 ANXIETY AND DEPRESSION: ICD-10-CM

## 2019-06-04 DIAGNOSIS — G89.4 CHRONIC PAIN SYNDROME: ICD-10-CM

## 2019-06-04 DIAGNOSIS — R39.198 DIFFICULTY URINATING: ICD-10-CM

## 2019-06-04 DIAGNOSIS — R73.01 IFG (IMPAIRED FASTING GLUCOSE): ICD-10-CM

## 2019-06-04 DIAGNOSIS — F32.A ANXIETY AND DEPRESSION: ICD-10-CM

## 2019-06-04 DIAGNOSIS — E03.9 HYPOTHYROIDISM, UNSPECIFIED TYPE: ICD-10-CM

## 2019-06-04 PROCEDURE — 3074F SYST BP LT 130 MM HG: CPT | Performed by: INTERNAL MEDICINE

## 2019-06-04 PROCEDURE — 3078F DIAST BP <80 MM HG: CPT | Performed by: INTERNAL MEDICINE

## 2019-06-04 PROCEDURE — 1036F TOBACCO NON-USER: CPT | Performed by: INTERNAL MEDICINE

## 2019-06-04 PROCEDURE — 4040F PNEUMOC VAC/ADMIN/RCVD: CPT | Performed by: INTERNAL MEDICINE

## 2019-06-04 PROCEDURE — 99214 OFFICE O/P EST MOD 30 MIN: CPT | Performed by: INTERNAL MEDICINE

## 2019-06-04 RX ORDER — LEVOTHYROXINE SODIUM 88 MCG
88 TABLET ORAL DAILY
Qty: 90 TABLET | Refills: 1 | Status: SHIPPED | OUTPATIENT
Start: 2019-06-04 | End: 2021-09-27 | Stop reason: SDUPTHER

## 2019-06-04 RX ORDER — BUPROPION HYDROCHLORIDE 300 MG/1
300 TABLET ORAL DAILY
Qty: 90 TABLET | Refills: 2 | Status: SHIPPED | OUTPATIENT
Start: 2019-06-04 | End: 2019-12-11 | Stop reason: SDUPTHER

## 2019-06-25 ENCOUNTER — TELEPHONE (OUTPATIENT)
Dept: PAIN MEDICINE | Facility: CLINIC | Age: 74
End: 2019-06-25

## 2019-07-08 ENCOUNTER — TELEPHONE (OUTPATIENT)
Dept: PAIN MEDICINE | Facility: MEDICAL CENTER | Age: 74
End: 2019-07-08

## 2019-07-08 DIAGNOSIS — M62.830 SPASM OF BACK MUSCLES: ICD-10-CM

## 2019-07-08 DIAGNOSIS — Z98.890 POSTOPERATIVE STATE: ICD-10-CM

## 2019-07-08 DIAGNOSIS — G89.4 CHRONIC PAIN SYNDROME: ICD-10-CM

## 2019-07-08 NOTE — TELEPHONE ENCOUNTER
Medication Name:  Gabapentin     Dosage of Med:  800 mg     Frequency of Med:  1 cap in AM and 1 at HS     Remaining Medication:    Daughter not sure will call back     Pharmacy and Location:  [address]       Pt   Preferred Callback Phone Number:    [phone]            Medication Name:  Tizanidine     Dosage of Med:  4 mg     Frequency of Med:  1 tab every 12 hours PRN     Remaining Medication:  Not sure will call back     Pharmacy and Location:  [facility]

## 2019-07-10 NOTE — TELEPHONE ENCOUNTER
LMOM to cb at the cb # provided  Provided cb# and office hours       Note:   Ok to s/w daughters; Darcie Romero and , Anabel Swenson  Last rx:  Gabapentin 800 mg 1 po bid #75 / 0 on 4/23 per Dr Ardyth Brittle  Tizanidine 4 mg 1 po q 12 h prn #60 / 1 on 4/30 per DG  Last ov 3/19, next ov 8/7

## 2019-07-10 NOTE — TELEPHONE ENCOUNTER
S/w pt's daughter, Matthew Solorio  Discussed gabapentin rx of april  Advised nadia, this rx should have run out over 1 month ago  Questioned how the pt is taking this medication as it is most effective and low risk of se's when taken consistently  Per Matthew Solorio, pt has auto refill via [facility], does not understand how she is running out of medication  Advised nadia, this office does not accept refill requests from pharmacies due to the nature of the medications we work with  Pt / family is to call to request refills and provide updates  Questioned if the pt is having any change in her pain symptoms  Per Benoit Kowalski, will d/w the pt and cb to advise

## 2019-07-18 RX ORDER — GABAPENTIN 800 MG/1
TABLET ORAL
Qty: 60 TABLET | Refills: 0 | Status: SHIPPED | OUTPATIENT
Start: 2019-07-18 | End: 2019-08-07 | Stop reason: SDUPTHER

## 2019-07-18 RX ORDER — TIZANIDINE 4 MG/1
TABLET ORAL
Qty: 60 TABLET | Refills: 0 | Status: SHIPPED | OUTPATIENT
Start: 2019-07-18 | End: 2019-08-07 | Stop reason: SDUPTHER

## 2019-07-18 NOTE — TELEPHONE ENCOUNTER
Attempted to reach pt at home #  LM w/ male - please ask the pt to call back, this is her doctor's office  Provided cb number and office hours  Attempted to contact pt's daughter, Carin Citizen at [phone]  LMOM to cb  Provided cb number and office hours       2nd attempt

## 2019-07-18 NOTE — TELEPHONE ENCOUNTER
I sent a 30 day supply of both meds to her pharmacy as that should get her to her f/u OV with me next month  Thank you

## 2019-07-18 NOTE — TELEPHONE ENCOUNTER
S/w pt's daughter, Kendell Morning  Confirmed:  Gabapentin 800 mg 1 po bid #75 / 0 on 4/23 per Dr Cecil Carroll  Tizanidine 4 mg 1 po q 12 h prn #60 / 1 on 4/30 per DG  Last ov 3/19, next ov 8/7  Per vin, pt takes both of these medications with + relief, no se's  Per Kendell Morning, pt has not had any break in rx, stated that she has been picking up refills at [facility], [facility] d/w DG  Anticipate rx's will be sent to [facility] to get to her next ov on 8/7  This office will cb if there is any question or change in the plan as dscussed  Rolinda Morning verbalized understanding and appreciation

## 2019-07-25 NOTE — TELEPHONE ENCOUNTER
Attempted to call the patient and LMOM to CB 
Attempted to rc to pt / isatu  Left a detailed message on machine advising of above as per release of info on file advising of above  Provided cb number and office hours for questions / concerns 
Can you please call the patient and advise her that her rib x-rays were normal and there was no evidence for fractures  How is she feeling? Is there any improvement in the pain since her OV last week?
Did she follow up with Dr Angelia Rich to be scheduled for the CT scan of the thoracic spine to check the lead? Is she using the lidocaine patches? Did she finish the prednisone?    Did she increase the gabapentin and Percocet the way I advised her to?
S/w pt's daughter, Aura Lesches, per release of info on file  States pt is scheduled for surgery on friday to replace the lead s/t migration  Out pt procedure  Aura Lesches questioned how to get a hospital bed for her mother - states that getting her in and out of bed is very difficult  El lunsford, schuyler d/w DG and cb with any additional info  Anticipate she may need to fu w/ Neurosurgery re: hospital bed  Dara verbalized understanding and appreciation 
S/w the patient and reviewed and she stated she still continues with pain and there is no improvement from last office visit and she continues on the prescribed meds as ordered 
Yes, she should talk to neurosurgery to see if they can help  Thank you 
spouse

## 2019-08-06 ENCOUNTER — OFFICE VISIT (OUTPATIENT)
Dept: FAMILY MEDICINE CLINIC | Facility: HOSPITAL | Age: 74
End: 2019-08-06
Payer: COMMERCIAL

## 2019-08-06 VITALS
HEART RATE: 70 BPM | WEIGHT: 167 LBS | BODY MASS INDEX: 33.67 KG/M2 | HEIGHT: 59 IN | TEMPERATURE: 96.1 F | SYSTOLIC BLOOD PRESSURE: 152 MMHG | DIASTOLIC BLOOD PRESSURE: 74 MMHG

## 2019-08-06 DIAGNOSIS — R73.01 IFG (IMPAIRED FASTING GLUCOSE): Primary | ICD-10-CM

## 2019-08-06 DIAGNOSIS — E78.5 DYSLIPIDEMIA: ICD-10-CM

## 2019-08-06 DIAGNOSIS — I10 ESSENTIAL HYPERTENSION: ICD-10-CM

## 2019-08-06 PROBLEM — Z48.89 AFTERCARE FOLLOWING SURGERY: Status: RESOLVED | Noted: 2019-03-27 | Resolved: 2019-08-06

## 2019-08-06 LAB
CHOLEST SERPL-MCNC: 169 MG/DL (ref 100–199)
CHOLEST/HDLC SERPL: 3.9 RATIO (ref 0–4.4)
EST. AVERAGE GLUCOSE BLD GHB EST-MCNC: 120 MG/DL
GLUCOSE SERPL-MCNC: 90 MG/DL (ref 65–99)
HBA1C MFR BLD: 5.8 % (ref 4.8–5.6)
HDLC SERPL-MCNC: 43 MG/DL
LDLC SERPL CALC-MCNC: 108 MG/DL (ref 0–99)
SL AMB VLDL CHOLESTEROL CALC: 18 MG/DL (ref 5–40)
TRIGL SERPL-MCNC: 89 MG/DL (ref 0–149)

## 2019-08-06 PROCEDURE — 99214 OFFICE O/P EST MOD 30 MIN: CPT | Performed by: INTERNAL MEDICINE

## 2019-08-06 RX ORDER — HYDROCHLOROTHIAZIDE 12.5 MG/1
12.5 TABLET ORAL DAILY
Qty: 90 TABLET | Refills: 3 | Status: SHIPPED | OUTPATIENT
Start: 2019-08-06 | End: 2019-12-03

## 2019-08-06 RX ORDER — LOSARTAN POTASSIUM 25 MG/1
25 TABLET ORAL DAILY
Qty: 90 TABLET | Refills: 3 | Status: SHIPPED | OUTPATIENT
Start: 2019-08-06 | End: 2021-09-27 | Stop reason: SDUPTHER

## 2019-08-06 NOTE — ASSESSMENT & PLAN NOTE
FLP improved but LDL still a bit elevated at 108 - con't current statin for now but encouraged to watch diet and increase activity and get wgt down, recheck labs in 6 mo - will order at next appt

## 2019-08-06 NOTE — ASSESSMENT & PLAN NOTE
BP very elevated today - med list reviewed and notation made by Losartan that pt was not taking - pt unsure if she is but dgtr thinks she is - will call pharmacy when it opens at 9 am - if she is having it filled regularly will increase dose - if she is not taking it will restart the rx, unfortunately pt is leaving for [facility] on Sunday and will not be back [place] the states till Nov

## 2019-08-06 NOTE — ASSESSMENT & PLAN NOTE
Urged to con't watching diet and start walking/increase activity level and get wgt down, recheck BW in 6 mo - WILL ORDER AT NEXT APPT

## 2019-08-06 NOTE — PROGRESS NOTES
Assessment/Plan:    IFG (impaired fasting glucose)  Urged to con't watching diet and start walking/increase activity level and get wgt down, recheck BW in 6 mo - WILL ORDER AT NEXT APPT    Dyslipidemia  FLP improved but LDL still a bit elevated at 108 - con't current statin for now but encouraged to watch diet and increase activity and get wgt down, recheck labs in 6 mo - will order at next appt    Hypertension  BP very elevated today - med list reviewed and notation made by Losartan that pt was not taking - pt unsure if she is but dgtr thinks she is - will call pharmacy when it opens at 9 am - if she is having it filled regularly will increase dose - if she is not taking it will restart the rx, unfortunately pt is leaving for [facility] on Sunday and will not be back in the [facility] [place] till Nov Diagnoses and all orders for this visit:    IFG (impaired fasting glucose)    Dyslipidemia    Essential hypertension  -     losartan (COZAAR) 25 mg tablet; Take 1 tablet (25 mg total) by mouth daily  -     hydrochlorothiazide (HYDRODIURIL) 12 5 mg tablet; Take 1 tablet (12 5 mg total) by mouth daily      Colonoscopy 7/14 - 10 yrs    Mammo 5/19    Dexa 5/19 - osteopenia    BW 4/19 and 8/19      ADDENDUM: [facility] CALLED AND PT HAS NOT HAD LOSARTAN FILLED SINCE November 2018 AND HCTZ NOT FILLED SINCE June 2019 - D/W PTS DGTR - WILL RESTART BOTH MEDS - RX SENT TO [facility]    Subjective:      Patient ID: Gama Garcia is a 66 y o  female  HPI Pt here for follow up appt and BW results    BW results were d/w pt in detail: FBS/A1C 90/5 8, FLP with LDL borderline at 108 and HDL low at 43, TC good at 169  and TG good at 89  Def of nml vs IFG vs DM was d/w pt in detail  Diet/exercise was reviewed - wgt up 2 lbs from June 2019  She does not eat a lot of red meats or junk food  She does no formal exercise  Goal FLP was d/w pt in detail  Diet/exercise reviewed as noted above    She is taking her Atorvastatin daily as directed w/o Se  She notes no stroke/TIA symptoms/CP  BP at goal today and meds were reviewed and med list states she is not taking Losartan but pt thinks she is  She denies missing doses of meds or SE with the meds  She does not check her BP outside the office  She notes no frequent HA's/double vision/CP  She still has intermittent dizziness but it has improved  Colonoscopy 7/14 - 10 yrs    Mammo 5/19    Dexa 5/19 - osteopenia    BW 4/19      Review of Systems   Constitutional: Negative for chills and fever  HENT: Negative for congestion and sore throat  Eyes: Negative for pain and visual disturbance  Respiratory: Negative for cough, shortness of breath and wheezing  Cardiovascular: Negative for chest pain and palpitations  Gastrointestinal: Negative for abdominal pain, constipation, diarrhea and nausea  Endocrine: Negative for polydipsia and polyuria  Genitourinary: Negative for difficulty urinating and dysuria  Musculoskeletal: Positive for arthralgias and back pain  Negative for neck pain  Skin: Negative for rash and wound  Neurological: Positive for dizziness  Negative for headaches  Hematological: Negative for adenopathy  Psychiatric/Behavioral: Negative for behavioral problems and confusion  Objective:    /74   Pulse 70   Temp (!) 96 1 °F (35 6 °C)   Ht 4' 11" (1 499 m)   Wt 75 8 kg (167 lb)   BMI 33 73 kg/m²      Physical Exam   Constitutional: She appears well-developed and well-nourished  No distress  HENT:   Head: Normocephalic and atraumatic  Eyes: Conjunctivae are normal  Right eye exhibits no discharge  Left eye exhibits no discharge  Neck: Neck supple  No tracheal deviation present  Cardiovascular: Normal rate, regular rhythm and normal heart sounds  Exam reveals no friction rub  No murmur heard  Pulmonary/Chest: Effort normal and breath sounds normal  No respiratory distress  She has no wheezes  She has no rales  Abdominal: Soft   She exhibits no distension  There is no tenderness  There is no rebound and no guarding  Musculoskeletal: She exhibits no edema  Neurological: She is alert  She exhibits normal muscle tone  Skin: Skin is warm and dry  No rash noted  Psychiatric: She has a normal mood and affect  Her behavior is normal    Nursing note and vitals reviewed

## 2019-08-07 ENCOUNTER — OFFICE VISIT (OUTPATIENT)
Dept: PAIN MEDICINE | Facility: CLINIC | Age: 74
End: 2019-08-07
Payer: COMMERCIAL

## 2019-08-07 VITALS
DIASTOLIC BLOOD PRESSURE: 76 MMHG | HEIGHT: 59 IN | WEIGHT: 168 LBS | HEART RATE: 66 BPM | SYSTOLIC BLOOD PRESSURE: 136 MMHG | BODY MASS INDEX: 33.87 KG/M2

## 2019-08-07 DIAGNOSIS — M96.1 LUMBAR POST-LAMINECTOMY SYNDROME: ICD-10-CM

## 2019-08-07 DIAGNOSIS — M25.562 CHRONIC PAIN OF LEFT KNEE: ICD-10-CM

## 2019-08-07 DIAGNOSIS — G89.4 CHRONIC PAIN SYNDROME: Primary | ICD-10-CM

## 2019-08-07 DIAGNOSIS — G89.29 CHRONIC PAIN OF LEFT KNEE: ICD-10-CM

## 2019-08-07 DIAGNOSIS — M47.816 LUMBAR SPONDYLOSIS: ICD-10-CM

## 2019-08-07 DIAGNOSIS — M62.830 SPASM OF BACK MUSCLES: ICD-10-CM

## 2019-08-07 DIAGNOSIS — M54.42 CHRONIC BILATERAL LOW BACK PAIN WITH LEFT-SIDED SCIATICA: ICD-10-CM

## 2019-08-07 DIAGNOSIS — M17.12 PRIMARY OSTEOARTHRITIS OF LEFT KNEE: ICD-10-CM

## 2019-08-07 DIAGNOSIS — Z98.890 POSTOPERATIVE STATE: ICD-10-CM

## 2019-08-07 DIAGNOSIS — M54.16 LUMBAR RADICULOPATHY: ICD-10-CM

## 2019-08-07 DIAGNOSIS — G89.29 CHRONIC BILATERAL LOW BACK PAIN WITH LEFT-SIDED SCIATICA: ICD-10-CM

## 2019-08-07 LAB
APPEARANCE UR: CLEAR
BACTERIA UR CULT: NORMAL
BACTERIA URNS QL MICRO: NORMAL
BILIRUB UR QL STRIP: NEGATIVE
COLOR UR: YELLOW
EPI CELLS #/AREA URNS HPF: NORMAL /HPF (ref 0–10)
GLUCOSE UR QL: NEGATIVE
HGB UR QL STRIP: NEGATIVE
KETONES UR QL STRIP: NEGATIVE
LEUKOCYTE ESTERASE UR QL STRIP: ABNORMAL
Lab: NO GROWTH
MICRO URNS: ABNORMAL
MUCOUS THREADS URNS QL MICRO: PRESENT
NITRITE UR QL STRIP: NEGATIVE
PH UR STRIP: 6.5 [PH] (ref 5–7.5)
PROT UR QL STRIP: NEGATIVE
RBC #/AREA URNS HPF: NORMAL /HPF (ref 0–2)
SL AMB URINALYSIS REFLEX: ABNORMAL
SP GR UR: 1.01 (ref 1–1.03)
UROBILINOGEN UR STRIP-ACNC: 0.2 MG/DL (ref 0.2–1)
WBC #/AREA URNS HPF: NORMAL /HPF (ref 0–5)

## 2019-08-07 PROCEDURE — 99214 OFFICE O/P EST MOD 30 MIN: CPT | Performed by: NURSE PRACTITIONER

## 2019-08-07 RX ORDER — NORTRIPTYLINE HYDROCHLORIDE 25 MG/1
CAPSULE ORAL
Qty: 60 CAPSULE | Refills: 3 | Status: SHIPPED | OUTPATIENT
Start: 2019-08-07 | End: 2019-12-03 | Stop reason: SDUPTHER

## 2019-08-07 RX ORDER — GABAPENTIN 800 MG/1
TABLET ORAL
Qty: 60 TABLET | Refills: 3 | Status: SHIPPED | OUTPATIENT
Start: 2019-08-07 | End: 2019-12-03 | Stop reason: SDUPTHER

## 2019-08-07 RX ORDER — TIZANIDINE 4 MG/1
TABLET ORAL
Qty: 60 TABLET | Refills: 3 | Status: SHIPPED | OUTPATIENT
Start: 2019-08-07 | End: 2019-12-03 | Stop reason: SDUPTHER

## 2019-08-07 NOTE — PROGRESS NOTES
Assessment:  1  Chronic pain syndrome    2  Chronic bilateral low back pain with left-sided sciatica    3  Lumbar radiculopathy    4  Chronic pain of left knee    5  Lumbar post-laminectomy syndrome    6  Lumbar spondylosis    7  Primary osteoarthritis of left knee    8  Postoperative state    9  Spasm of back muscles        Plan:  While the patient and her daughter were in the office today, I did have a thorough conversation with him regarding her chronic pain syndrome, symptoms, and the expectations of the spinal cord stimulator over the next year  At this point I advised the patient and her daughter that I would make sure that the [address] [place] [id number] team were at her next office visit in 4 months just in case she would need any reprogramming and I thoroughly encouraged them that they have any issues with the stimulator at any time they are to call the [address] [place] [address] team to see if they can walk them through things over the phone or meet with them for reprogramming or adjustments  The patient and her daughter were agreeable and verbalized understanding  With regards to her medication regimen, I explained to the patient that since she is noting significant and stable relief, for now, we will continue the gabapentin, nortriptyline, and tizanidine as prescribed  However, hopefully in time it would be in her best long-term interest to see if we can eventually decrease some of her medications to see what she really still needs to be on as our goal is always try to manage her on the least amount of medications possible  However, for now, to help encourage the healing process I would like her to continue the medications as prescribed  The patient was agreeable and verbalized an understanding  The patient will follow-up in 4 months for medication prescription refill and reevaluation  The patient was advised to contact the office should their symptoms worsen in the interim   The patient was agreeable and verbalized an understanding  History of Present Illness: The patient is a 66 y o  female last seen on 3/19/19 who presents for a follow up office visit in regards to chronic pain syndrome secondary to lumbar post-laminectomy syndrome  The patient currently reports that since her last office visit overall her pain symptoms have definitely improved as she did undergo the Thoracic lead spinal cord stimulator revision surgery and ever since then she feels that her pain is much more manageable and is able to be more active and do more things and is pleased  The patient and her daughter report that she still has some burning pain at the Thoracic lead insertion site, but nothing that she can handle and wants to know if this was normal   The patient reports that since her last office visit she has stopped any kind of opioid pain medications and is just taking the medications we are prescribing and feels between the medications and the stimulator she is doing much better  The patient reports that she is able to stand at the sink and do dishes for longer period of time and cook a meal with manageable pain  The patient reports she has even back to dancing with her , which was 1 of her goals before the stimulator implantation  At this point the patient does not feel that she needs any stimulator reprogramming and is relatively comfortable with using the  and turning the stimulation up and down as needed  Current pain medications includes:  Gabapentin 800 mg b i d , nortriptyline 50 mg at bedtime  The patient reports that this regimen is providing 80% pain relief  The patient is reporting no side effects from this pain medication regimen  I have personally reviewed and/or updated the patient's past medical history, past surgical history, family history, social history, current medications, allergies, and vital signs today         Review of Systems:    Review of Systems   Respiratory: Negative for shortness of breath  Cardiovascular: Negative for chest pain  Gastrointestinal: Positive for constipation  Negative for diarrhea, nausea and vomiting  Musculoskeletal: Positive for gait problem and joint swelling (joint stiffness)  Negative for arthralgias and myalgias  Skin: Negative for rash  Neurological: Negative for dizziness, seizures and weakness  All other systems reviewed and are negative          Past Medical History:   Diagnosis Date    Asthma     Depression     Disease of thyroid gland     Dyslipidemia     Esophageal reflux     History of stomach ulcers     Hypercalcemia     Hypertension     Osteopenia     Tremor        Past Surgical History:   Procedure Laterality Date    BACK SURGERY      lower back    BILATERAL OOPHORECTOMY       SECTION      CHOLECYSTECTOMY      HYSTERECTOMY      age 43 per MRS    OOPHORECTOMY Bilateral     age 43 per MRS    CO SURG IMPLNT NEUROELECT,EPIDURAL Left 2019    Procedure: LAMINECTOMY THORACIC FOR INSERTION DORSAL COLUMN SPINAL CORD STIMULATOR (DCS) W IMPLANTABLE PULSE GENERATOR, LEFT GLUTE;  Surgeon: Ivelisse Lyle MD;  Location: [facility];  Service: Neurosurgery    ROTATOR CUFF REPAIR Right     SPINAL STIMULATOR PLACEMENT N/A 3/29/2019    Procedure: REVISION SPINAL CORD STIMULATOR PADDLE ELECTRODE, REPLACEMENT WITH PERCUTANEOUS ELECTRODES OR SMALLER PADDLE;  Surgeon: Ivelisse Lyle MD;  Location[facility];  Service: Neurosurgery       Family History   Problem Relation Age of Onset    Hypertension Family     Stroke Family     Heart disease Family     Thyroid disease Family     No Known Problems Sister     No Known Problems Daughter     No Known Problems Sister     No Known Problems Sister     No Known Problems Sister     No Known Problems Sister     No Known Problems Daughter     No Known Problems Daughter        Social History     Occupational History    Occupation: unemployed   Tobacco Use    Smoking status: Former Smoker     Last attempt to quit: 1991     Years since quittin 3    Smokeless tobacco: Never Used   Substance and Sexual Activity    Alcohol use: Not Currently    Drug use: Never    Sexual activity: Not on file         Current Outpatient Medications:     albuterol (VENTOLIN HFA) 90 mcg/act inhaler, Inhale 2 puffs every 4 (four) hours as needed for wheezing or shortness of breath, Disp: 6 7 g, Rfl: 3    alendronate (FOSAMAX) 70 mg tablet, Take 1 tablet (70 mg total) by mouth once a week, Disp: 4 tablet, Rfl: 6    atorvastatin (LIPITOR) 20 mg tablet, Take 1 tablet (20 mg total) by mouth daily, Disp: 90 tablet, Rfl: 1    bimatoprost (LUMIGAN) 0 01 % ophthalmic drops, Administer 1 drop to both eyes daily (Patient taking differently: Administer 1 drop to both eyes 2 (two) times a day  ), Disp: 5 mL, Rfl: 6    buPROPion (WELLBUTRIN XL) 300 mg 24 hr tablet, Take 1 tablet (300 mg total) by mouth daily, Disp: 90 tablet, Rfl: 2    docusate sodium (COLACE) 100 mg capsule, Take 1 capsule (100 mg total) by mouth 2 (two) times a day, Disp: 30 capsule, Rfl: 3    gabapentin (NEURONTIN) 800 mg tablet, Take 1 in AM and 1 HS , Disp: 60 tablet, Rfl: 3    glycopyrrolate (ROBINUL) 2 MG tablet, Take 1 tablet (2 mg total) by mouth 2 (two) times a day, Disp: 60 tablet, Rfl: 6    hydrochlorothiazide (HYDRODIURIL) 12 5 mg tablet, Take 1 tablet (12 5 mg total) by mouth daily, Disp: 90 tablet, Rfl: 3    losartan (COZAAR) 25 mg tablet, Take 1 tablet (25 mg total) by mouth daily, Disp: 90 tablet, Rfl: 3    montelukast (SINGULAIR) 10 mg tablet, Take 1 tablet (10 mg total) by mouth daily (Patient taking differently: Take 10 mg by mouth as needed ), Disp: 30 tablet, Rfl: 6    nortriptyline (PAMELOR) 25 mg capsule, Take 2 PO HS , Disp: 60 capsule, Rfl: 3    omeprazole (PriLOSEC) 40 MG capsule, Take 1 capsule (40 mg total) by mouth daily (Patient taking differently: Take 40 mg by mouth daily as needed  ), Disp: 30 capsule, Rfl: 6    SYNTHROID 88 MCG tablet, Take 1 tablet (88 mcg total) by mouth daily, Disp: 90 tablet, Rfl: 1    Timolol Maleate 0 5 % (DAILY) SOLN, Apply 1 drop to eye every 12 (twelve) hours, Disp: 5 mL, Rfl: 6    tiZANidine (ZANAFLEX) 4 mg tablet, May take one tablet (4mg) by mouth every 12 hours as needed, Disp: 60 tablet, Rfl: 3    Allergies   Allergen Reactions    Iodinated Diagnostic Agents Anaphylaxis     Contrast Dye    Aspirin GI Intolerance       Physical Exam:    /76 (BP Location: Left arm, Patient Position: Sitting, Cuff Size: Standard)   Pulse 66   Ht 4' 11" (1 499 m)   Wt 76 2 kg (168 lb)   BMI 33 93 kg/m²     Constitutional:normal, well developed, well nourished, alert, in no distress and non-toxic and no overt pain behavior  Eyes:anicteric  HEENT:grossly intact  Neck:supple, symmetric, trachea midline and no masses   Pulmonary:even and unlabored  Cardiovascular:No edema or pitting edema present  Skin:Normal without rashes or lesions and well hydrated  Psychiatric:Mood and affect appropriate  Neurologic:Cranial Nerves II-XII grossly intact  Musculoskeletal:The patient's gait is slightly antalgic, but steady without the use of any assistive devices  Imaging  No orders to display         No orders of the defined types were placed in this encounter

## 2019-09-04 DIAGNOSIS — J45.909 UNCOMPLICATED ASTHMA, UNSPECIFIED ASTHMA SEVERITY, UNSPECIFIED WHETHER PERSISTENT: ICD-10-CM

## 2019-09-04 DIAGNOSIS — G89.4 CHRONIC PAIN SYNDROME: ICD-10-CM

## 2019-09-04 RX ORDER — GLYCOPYRROLATE 2 MG/1
TABLET ORAL
Qty: 60 TABLET | Refills: 6 | Status: SHIPPED | OUTPATIENT
Start: 2019-09-04 | End: 2022-03-16 | Stop reason: SDUPTHER

## 2019-09-04 RX ORDER — MONTELUKAST SODIUM 10 MG/1
10 TABLET ORAL AS NEEDED
Qty: 30 TABLET | Refills: 5 | Status: SHIPPED | OUTPATIENT
Start: 2019-09-04 | End: 2019-09-05 | Stop reason: SDUPTHER

## 2019-09-05 DIAGNOSIS — J45.909 UNCOMPLICATED ASTHMA, UNSPECIFIED ASTHMA SEVERITY, UNSPECIFIED WHETHER PERSISTENT: ICD-10-CM

## 2019-09-05 RX ORDER — MONTELUKAST SODIUM 10 MG/1
10 TABLET ORAL AS NEEDED
Qty: 30 TABLET | Refills: 5 | Status: SHIPPED | OUTPATIENT
Start: 2019-09-05 | End: 2021-11-18 | Stop reason: SDUPTHER

## 2019-09-09 ENCOUNTER — TELEPHONE (OUTPATIENT)
Dept: PAIN MEDICINE | Facility: CLINIC | Age: 74
End: 2019-09-09

## 2019-12-03 ENCOUNTER — OFFICE VISIT (OUTPATIENT)
Dept: PAIN MEDICINE | Facility: CLINIC | Age: 74
End: 2019-12-03
Payer: COMMERCIAL

## 2019-12-03 ENCOUNTER — OFFICE VISIT (OUTPATIENT)
Dept: FAMILY MEDICINE CLINIC | Facility: HOSPITAL | Age: 74
End: 2019-12-03
Payer: COMMERCIAL

## 2019-12-03 VITALS
SYSTOLIC BLOOD PRESSURE: 132 MMHG | BODY MASS INDEX: 33.47 KG/M2 | TEMPERATURE: 98 F | HEIGHT: 59 IN | HEART RATE: 75 BPM | DIASTOLIC BLOOD PRESSURE: 68 MMHG | WEIGHT: 166 LBS

## 2019-12-03 VITALS
BODY MASS INDEX: 33.26 KG/M2 | DIASTOLIC BLOOD PRESSURE: 68 MMHG | HEART RATE: 72 BPM | WEIGHT: 165 LBS | HEIGHT: 59 IN | SYSTOLIC BLOOD PRESSURE: 130 MMHG

## 2019-12-03 DIAGNOSIS — G89.29 CHRONIC PAIN OF LEFT KNEE: ICD-10-CM

## 2019-12-03 DIAGNOSIS — R29.898 WEAKNESS OF LEFT LEG: ICD-10-CM

## 2019-12-03 DIAGNOSIS — R26.9 GAIT ABNORMALITY: ICD-10-CM

## 2019-12-03 DIAGNOSIS — M47.816 LUMBAR SPONDYLOSIS: ICD-10-CM

## 2019-12-03 DIAGNOSIS — M25.562 CHRONIC PAIN OF LEFT KNEE: ICD-10-CM

## 2019-12-03 DIAGNOSIS — M17.12 PRIMARY OSTEOARTHRITIS OF LEFT KNEE: ICD-10-CM

## 2019-12-03 DIAGNOSIS — E78.5 DYSLIPIDEMIA: ICD-10-CM

## 2019-12-03 DIAGNOSIS — R73.01 IFG (IMPAIRED FASTING GLUCOSE): ICD-10-CM

## 2019-12-03 DIAGNOSIS — M54.42 CHRONIC BILATERAL LOW BACK PAIN WITH LEFT-SIDED SCIATICA: ICD-10-CM

## 2019-12-03 DIAGNOSIS — F32.A ANXIETY AND DEPRESSION: ICD-10-CM

## 2019-12-03 DIAGNOSIS — E03.9 HYPOTHYROIDISM, UNSPECIFIED TYPE: ICD-10-CM

## 2019-12-03 DIAGNOSIS — Z23 NEED FOR INFLUENZA VACCINATION: ICD-10-CM

## 2019-12-03 DIAGNOSIS — G89.29 CHRONIC BILATERAL LOW BACK PAIN WITH LEFT-SIDED SCIATICA: ICD-10-CM

## 2019-12-03 DIAGNOSIS — F41.9 ANXIETY AND DEPRESSION: ICD-10-CM

## 2019-12-03 DIAGNOSIS — G89.4 CHRONIC PAIN SYNDROME: ICD-10-CM

## 2019-12-03 DIAGNOSIS — M54.16 LUMBAR RADICULOPATHY: ICD-10-CM

## 2019-12-03 DIAGNOSIS — M62.830 SPASM OF BACK MUSCLES: ICD-10-CM

## 2019-12-03 DIAGNOSIS — I10 ESSENTIAL HYPERTENSION: Primary | ICD-10-CM

## 2019-12-03 DIAGNOSIS — Z98.890 POSTOPERATIVE STATE: ICD-10-CM

## 2019-12-03 DIAGNOSIS — G89.4 CHRONIC PAIN SYNDROME: Primary | ICD-10-CM

## 2019-12-03 DIAGNOSIS — M96.1 LUMBAR POST-LAMINECTOMY SYNDROME: ICD-10-CM

## 2019-12-03 PROCEDURE — 90662 IIV NO PRSV INCREASED AG IM: CPT | Performed by: INTERNAL MEDICINE

## 2019-12-03 PROCEDURE — G0008 ADMIN INFLUENZA VIRUS VAC: HCPCS | Performed by: INTERNAL MEDICINE

## 2019-12-03 PROCEDURE — 99214 OFFICE O/P EST MOD 30 MIN: CPT | Performed by: NURSE PRACTITIONER

## 2019-12-03 PROCEDURE — 3078F DIAST BP <80 MM HG: CPT | Performed by: INTERNAL MEDICINE

## 2019-12-03 PROCEDURE — 99214 OFFICE O/P EST MOD 30 MIN: CPT | Performed by: INTERNAL MEDICINE

## 2019-12-03 PROCEDURE — 3075F SYST BP GE 130 - 139MM HG: CPT | Performed by: INTERNAL MEDICINE

## 2019-12-03 PROCEDURE — 1036F TOBACCO NON-USER: CPT | Performed by: INTERNAL MEDICINE

## 2019-12-03 RX ORDER — NORTRIPTYLINE HYDROCHLORIDE 25 MG/1
CAPSULE ORAL
Qty: 180 CAPSULE | Refills: 1 | Status: SHIPPED | OUTPATIENT
Start: 2019-12-03 | End: 2022-04-29

## 2019-12-03 RX ORDER — GABAPENTIN 800 MG/1
TABLET ORAL
Qty: 180 TABLET | Refills: 1 | Status: SHIPPED | OUTPATIENT
Start: 2019-12-03 | End: 2021-09-27 | Stop reason: SDUPTHER

## 2019-12-03 RX ORDER — TIZANIDINE 4 MG/1
TABLET ORAL
Qty: 180 TABLET | Refills: 1 | Status: SHIPPED | OUTPATIENT
Start: 2019-12-03 | End: 2021-09-10

## 2019-12-03 NOTE — PROGRESS NOTES
Assessment/Plan:    Hypertension  BP upper end of nml but acceptable, not taking HCTZ d/t dry mouth - acceptable on just Losartan - med list updated, con't healthy diet and keep active, recheck BP in 6 mo    Chronic pain syndrome  Stable with current regimen, just saw Pain Mgt today and no changes to meds were made as per pt and family, con't meds and f/u as per Pain Mgt    Anxiety and depression  Mood controlled on current regimen, con't current meds, call with new/worse mood/SI/panic attacks    Dyslipidemia  FLP due in the spring - order given, con't current statin for now    Hypothyroidism  TFT's due in spring - order given, con't current levothyroxine for now    IFG (impaired fasting glucose)  Encouraged to con't with low sugar/carb diet and keep active, labs due in the spring - order given       Diagnoses and all orders for this visit:    Essential hypertension  -     CBC and differential; Future  -     Comprehensive metabolic panel; Future  -     CBC and differential  -     Comprehensive metabolic panel    Chronic pain syndrome  -     CBC and differential; Future  -     CBC and differential    Anxiety and depression  -     CBC and differential; Future  -     CBC and differential    IFG (impaired fasting glucose)  -     CBC and differential; Future  -     Comprehensive metabolic panel; Future  -     Hemoglobin A1C; Future  -     CBC and differential  -     Comprehensive metabolic panel  -     Hemoglobin A1C    Hypothyroidism, unspecified type  -     CBC and differential; Future  -     TSH, 3rd generation; Future  -     T4, free; Future  -     CBC and differential  -     TSH, 3rd generation  -     T4, free    Dyslipidemia  -     CBC and differential; Future  -     Lipid panel;  Future  -     CBC and differential  -     Lipid panel      Colonoscopy 7/14 - 10 yrs    Mammo 5/19    Dexa 5/19 - osteopenia    BW 8/19    Pt is agreeable to flu vaccine - given today    She had Prevnar 4/19      Subjective:      Patient ID: Lynnebenjamin Virgen is a 66 y o  female  HPI Pt here for follow up appt    Last visit in Aug pts BP was elevated  Med list was not brought to appt and pt was unsure what BP meds she was taking  Pharmacy was called and it was apparent pt had not filled her Losartan since Nov 2018 and her HCTZ since June 2019  She was told to restart both meds and is here today for a BP check  BP at goal today and meds were reviewed - she is taking the losartan as directed but stopped the HCTZ d/t dry mouth  She denies missing doses of meds or SE with the meds  She does not check her BP outside the office  She notes no frequent Ha's/dizziness/double vision/CP  Pt con't to follow with pain mgt for her chronic pain syndrome with chronic LBP/OA of knee  She had an appt today and no meds were changed  She is taking her Gabapentin and Robinul and Nortriptyline as directed  She has Tizanidine to use as needed  She is taking her Wellbutrin daily as directed  She was asked how mood was doing and she states "they are good"  She denies feeling down or sad on a regular basis  She denies feeing anxious and irritable  She is sleeping well  Colonoscopy 7/14 - 10 yrs    Mammo 5/19    Dexa 5/19 - osteopenia    BW 8/19    Pt is agreeable to flu vaccine    She had Prevnar 4/19      Review of Systems   Constitutional: Negative for chills and fever  HENT: Negative for congestion and sore throat  Eyes: Negative for pain and visual disturbance  Respiratory: Negative for cough, shortness of breath and wheezing  Cardiovascular: Negative for chest pain, palpitations and leg swelling  Gastrointestinal: Negative for abdominal pain, blood in stool, constipation, diarrhea and nausea  Endocrine: Negative for polydipsia and polyuria  Genitourinary: Negative for difficulty urinating and dysuria  Musculoskeletal: Positive for arthralgias and back pain  Negative for neck pain  Skin: Negative for rash and wound  Neurological: Negative for dizziness, light-headedness and headaches  Hematological: Negative for adenopathy  Psychiatric/Behavioral: Negative for behavioral problems, confusion, dysphoric mood and sleep disturbance  The patient is not nervous/anxious  Objective:    /68 (BP Location: Right arm, Patient Position: Sitting, Cuff Size: Standard)   Pulse 75   Temp 98 °F (36 7 °C)   Ht 4' 11" (1 499 m)   Wt 75 3 kg (166 lb)   BMI 33 53 kg/m²      Physical Exam   Constitutional: She appears well-developed and well-nourished  No distress  HENT:   Head: Normocephalic and atraumatic  Eyes: Conjunctivae are normal  Right eye exhibits no discharge  Left eye exhibits no discharge  Neck: Neck supple  No tracheal deviation present  Cardiovascular: Normal rate, regular rhythm and normal heart sounds  Exam reveals no friction rub  No murmur heard  Pulmonary/Chest: Effort normal and breath sounds normal  No respiratory distress  She has no wheezes  She has no rales  Abdominal: Soft  She exhibits no distension  There is no tenderness  There is no rebound and no guarding  Musculoskeletal: She exhibits no edema  Neurological: She is alert  She exhibits normal muscle tone  Skin: Skin is warm and dry  No rash noted  Psychiatric: She has a normal mood and affect  Her behavior is normal    Nursing note and vitals reviewed

## 2019-12-03 NOTE — ASSESSMENT & PLAN NOTE
BP upper end of nml but acceptable, not taking HCTZ d/t dry mouth - acceptable on just Losartan - med list updated, con't healthy diet and keep active, recheck BP in 6 mo

## 2019-12-03 NOTE — ASSESSMENT & PLAN NOTE
Stable with current regimen, just saw Pain Mgt today and no changes to meds were made as per pt and family, con't meds and f/u as per Pain Mgt

## 2019-12-03 NOTE — PROGRESS NOTES
Assessment:  1  Chronic pain syndrome    2  Chronic bilateral low back pain with left-sided sciatica    3  Lumbar post-laminectomy syndrome    4  Gait abnormality    5  Chronic pain of left knee    6  Lumbar radiculopathy    7  Weakness of left leg    8  Lumbar spondylosis    9  Primary osteoarthritis of left knee    10  Postoperative state    11  Spasm of back muscles        Plan:  While the patient and her daughter were in the office today, I did have a thorough conversation with the patient regarding her chronic pain syndrome, symptoms, and medication regimen  I did explain to the patient at this point since she is noting significant and stable relief, I feel it is in her best interest to continue to use the spinal cord stimulator and whether she is here in the [place] or in [facility], she should follow-up with the [employer] represent is as needed as they do have represented is in [facility] and I advised the patient and her daughter to call the [place] number so they can get the patient in contact with someone in [facility] so she needs any reprogramming or help with the stimulator while she is there she could contact them  The patient and her daughter were agreeable and verbalized understanding  With regards to her medication regimen, I explained to the patient and her daughter that since she is noting significant and stable overall relief and I feel that her medication regimen is reasonable appropriate, we will continue with the tizanidine, nortriptyline, and gabapentin as prescribed  Our hope in the future is that maybe we could decrease the medications slowly over time, but for now since she is doing so well and it is her wishes, we will continue the medications as prescribed  The patient was agreeable and verbalized an understanding           I encouraged the patient to continue to slowly and steadily increase her activity, as tolerated, allowing pain be her guide and did remind the patient and her daughter that there still may be some limitations, but I am happy to see that her goals are being met as a result of the spinal cord stimulator  The patient and her daughter were agreeable and verbalized understanding  The patient will follow-up in 6 months for medication prescription refill and reevaluation  The patient was advised to contact the office should their symptoms worsen in the interim  The patient was agreeable and verbalized an understanding  Please note the patient speaks mostly Lithuanian and her daughter did interpret for her the entire time  History of Present Illness: The patient is a 66 y o  female last seen on 8/7/19 who presents for a follow up office visit in regards to chronic pain syndrome secondary to lumbar post-laminectomy syndrome and chronic left knee pain with primary osteoarthritis of the left knee  The patient currently reports that since her last office visit overall her pain symptoms have definitely continue to improve as she feels that between her spinal cord stimulator adjustments and her medication regimen, she is noting significant, if not almost complete relief of her chronic low back and leg pain  The patient is very pleased to report that she has been able to get back to her normal activities and 1 of her biggest goals of getting the stimulator was to be able to dance with her  and family and has been back to doing that regularly with very minimal and manageable pain  The patient feels that currently the spinal cord stimulator does not need any we adjustment and at this point would like to go back to [facility] for several months and want to know if we felt it was okay at this time  Current pain medications includes:  Tizanidine 4 mg b i d , nortriptyline 50 mg at bedtime, and gabapentin 800 mg b i d     The patient reports that this regimen is providing 100% pain relief    The patient is reporting no side effects from this pain medication regimen  I have personally reviewed and/or updated the patient's past medical history, past surgical history, family history, social history, current medications, allergies, and vital signs today  Review of Systems:    Review of Systems   Respiratory: Negative for shortness of breath  Cardiovascular: Negative for chest pain  Gastrointestinal: Negative for constipation, diarrhea, nausea and vomiting  Musculoskeletal: Negative for arthralgias, gait problem, joint swelling and myalgias  Skin: Negative for rash  Neurological: Negative for dizziness, seizures and weakness  All other systems reviewed and are negative          Past Medical History:   Diagnosis Date    Asthma     Depression     Disease of thyroid gland     Dyslipidemia     Esophageal reflux     History of stomach ulcers     Hypercalcemia     Hypertension     Osteopenia     Tremor        Past Surgical History:   Procedure Laterality Date    BACK SURGERY      lower back    BILATERAL OOPHORECTOMY       SECTION      CHOLECYSTECTOMY      HYSTERECTOMY      age 43 per MRS    OOPHORECTOMY Bilateral     age 43 per MRS    NH SURG IMPLNT NEUROELECT,EPIDURAL Left 2019    Procedure: LAMINECTOMY THORACIC FOR INSERTION DORSAL COLUMN SPINAL CORD STIMULATOR (DCS) W IMPLANTABLE PULSE GENERATOR, LEFT GLUTE;  Surgeon: Jackie Mcdowell MD;  Location: [facility];  Service: Neurosurgery    ROTATOR CUFF REPAIR Right     SPINAL STIMULATOR PLACEMENT N/A 3/29/2019    Procedure: REVISION SPINAL CORD STIMULATOR PADDLE ELECTRODE, REPLACEMENT WITH PERCUTANEOUS ELECTRODES OR SMALLER PADDLE;  Surgeon: Jackie Mcdowell MD;  [facility];  Service: Neurosurgery       Family History   Problem Relation Age of Onset    Hypertension Family     Stroke Family     Heart disease Family     Thyroid disease Family     No Known Problems Sister     No Known Problems Daughter     No Known Problems Sister     No Known Problems Sister  No Known Problems Sister     No Known Problems Sister     No Known Problems Daughter     No Known Problems Daughter        Social History     Occupational History    Occupation: unemployed   Tobacco Use    Smoking status: Former Smoker     Last attempt to quit: 1991     Years since quittin 6    Smokeless tobacco: Never Used   Substance and Sexual Activity    Alcohol use: Not Currently    Drug use: Never    Sexual activity: Not on file         Current Outpatient Medications:     albuterol (VENTOLIN HFA) 90 mcg/act inhaler, Inhale 2 puffs every 4 (four) hours as needed for wheezing or shortness of breath, Disp: 6 7 g, Rfl: 3    alendronate (FOSAMAX) 70 mg tablet, Take 1 tablet (70 mg total) by mouth once a week, Disp: 4 tablet, Rfl: 6    atorvastatin (LIPITOR) 20 mg tablet, Take 1 tablet (20 mg total) by mouth daily, Disp: 90 tablet, Rfl: 1    bimatoprost (LUMIGAN) 0 01 % ophthalmic drops, Administer 1 drop to both eyes daily (Patient taking differently: Administer 1 drop to both eyes 2 (two) times a day  ), Disp: 5 mL, Rfl: 6    buPROPion (WELLBUTRIN XL) 300 mg 24 hr tablet, Take 1 tablet (300 mg total) by mouth daily, Disp: 90 tablet, Rfl: 2    docusate sodium (COLACE) 100 mg capsule, Take 1 capsule (100 mg total) by mouth 2 (two) times a day, Disp: 30 capsule, Rfl: 3    gabapentin (NEURONTIN) 800 mg tablet, Take 1 in AM and 1 HS , Disp: 180 tablet, Rfl: 1    glycopyrrolate (ROBINUL) 2 MG tablet, TAKE 1 TABLET BY MOUTH TWICE DAILY, Disp: 60 tablet, Rfl: 6    losartan (COZAAR) 25 mg tablet, Take 1 tablet (25 mg total) by mouth daily, Disp: 90 tablet, Rfl: 3    montelukast (SINGULAIR) 10 mg tablet, Take 1 tablet (10 mg total) by mouth as needed (allergies), Disp: 30 tablet, Rfl: 5    nortriptyline (PAMELOR) 25 mg capsule, Take 2 PO HS , Disp: 180 capsule, Rfl: 1    omeprazole (PriLOSEC) 40 MG capsule, Take 1 capsule (40 mg total) by mouth daily (Patient taking differently: Take 40 mg by mouth daily as needed  ), Disp: 30 capsule, Rfl: 6    SYNTHROID 88 MCG tablet, Take 1 tablet (88 mcg total) by mouth daily, Disp: 90 tablet, Rfl: 1    Timolol Maleate 0 5 % (DAILY) SOLN, Apply 1 drop to eye every 12 (twelve) hours, Disp: 5 mL, Rfl: 6    tiZANidine (ZANAFLEX) 4 mg tablet, May take one tablet (4mg) by mouth every 12 hours as needed, Disp: 180 tablet, Rfl: 1    Allergies   Allergen Reactions    Iodinated Diagnostic Agents Anaphylaxis     Contrast Dye    Aspirin GI Intolerance       Physical Exam:    /68 (BP Location: Left arm, Patient Position: Sitting, Cuff Size: Standard)   Pulse 72   Ht 4' 11" (1 499 m)   Wt 74 8 kg (165 lb)   BMI 33 33 kg/m²     Constitutional:normal, well developed, well nourished, alert, in no distress and non-toxic and no overt pain behavior  and overweight  Eyes:anicteric  HEENT:grossly intact  Neck:supple, symmetric, trachea midline and no masses   Pulmonary:even and unlabored  Cardiovascular:No edema or pitting edema present  Skin:Normal without rashes or lesions and well hydrated  Psychiatric:Mood and affect appropriate  Neurologic:Cranial Nerves II-XII grossly intact  Musculoskeletal:The patient's gait was slightly antalgic, but steady without the use of any assistive devices  Imaging  No orders to display         No orders of the defined types were placed in this encounter

## 2019-12-11 DIAGNOSIS — F41.9 ANXIETY AND DEPRESSION: ICD-10-CM

## 2019-12-11 DIAGNOSIS — F32.A ANXIETY AND DEPRESSION: ICD-10-CM

## 2019-12-11 RX ORDER — BUPROPION HYDROCHLORIDE 300 MG/1
300 TABLET ORAL DAILY
Qty: 90 TABLET | Refills: 1 | Status: SHIPPED | OUTPATIENT
Start: 2019-12-11 | End: 2021-07-06 | Stop reason: SDUPTHER

## 2019-12-19 DIAGNOSIS — E78.5 DYSLIPIDEMIA: ICD-10-CM

## 2019-12-19 RX ORDER — ATORVASTATIN CALCIUM 20 MG/1
TABLET, FILM COATED ORAL
Qty: 90 TABLET | Refills: 2 | Status: SHIPPED | OUTPATIENT
Start: 2019-12-19 | End: 2021-09-27 | Stop reason: SDUPTHER

## 2020-05-11 ENCOUNTER — TELEPHONE (OUTPATIENT)
Dept: PAIN MEDICINE | Facility: CLINIC | Age: 75
End: 2020-05-11

## 2021-02-12 DIAGNOSIS — Z23 ENCOUNTER FOR IMMUNIZATION: ICD-10-CM

## 2021-07-06 DIAGNOSIS — F32.A ANXIETY AND DEPRESSION: ICD-10-CM

## 2021-07-06 DIAGNOSIS — F41.9 ANXIETY AND DEPRESSION: ICD-10-CM

## 2021-07-06 RX ORDER — BUPROPION HYDROCHLORIDE 300 MG/1
300 TABLET ORAL DAILY
Qty: 90 TABLET | Refills: 0 | Status: SHIPPED | OUTPATIENT
Start: 2021-07-06 | End: 2021-12-26

## 2021-08-10 ENCOUNTER — TELEPHONE (OUTPATIENT)
Dept: FAMILY MEDICINE CLINIC | Facility: HOSPITAL | Age: 76
End: 2021-08-10

## 2021-08-10 NOTE — TELEPHONE ENCOUNTER
Patient's daughter reporting patient is not urinating or moving her bowels normally  She has no urge to urinate and must force it out  She has no pain or burning associated  Also, eye lashes on one side are falling out  Pls advise on how to schedule this - Dr Gustavo Ruiz doesn't have any openings for a while and not sure if she should be seen by someone else

## 2021-08-11 NOTE — TELEPHONE ENCOUNTER
What do you recommend? Do you want her following up with you in one of your [facility] spots or can she see someone else?

## 2021-08-13 ENCOUNTER — OFFICE VISIT (OUTPATIENT)
Dept: FAMILY MEDICINE CLINIC | Facility: HOSPITAL | Age: 76
End: 2021-08-13
Payer: COMMERCIAL

## 2021-08-13 VITALS
WEIGHT: 165.2 LBS | BODY MASS INDEX: 33.3 KG/M2 | HEART RATE: 76 BPM | DIASTOLIC BLOOD PRESSURE: 62 MMHG | HEIGHT: 59 IN | SYSTOLIC BLOOD PRESSURE: 144 MMHG | TEMPERATURE: 97.8 F

## 2021-08-13 DIAGNOSIS — R10.31 RIGHT LOWER QUADRANT ABDOMINAL PAIN: ICD-10-CM

## 2021-08-13 DIAGNOSIS — K59.00 CONSTIPATION, UNSPECIFIED CONSTIPATION TYPE: ICD-10-CM

## 2021-08-13 DIAGNOSIS — R30.0 BURNING WITH URINATION: Primary | ICD-10-CM

## 2021-08-13 DIAGNOSIS — N89.8 FOUL SMELLING VAGINAL DISCHARGE: ICD-10-CM

## 2021-08-13 LAB
SL AMB  POCT GLUCOSE, UA: ABNORMAL
SL AMB LEUKOCYTE ESTERASE,UA: 3
SL AMB POCT BILIRUBIN,UA: ABNORMAL
SL AMB POCT BLOOD,UA: ABNORMAL
SL AMB POCT CLARITY,UA: ABNORMAL
SL AMB POCT COLOR,UA: YELLOW
SL AMB POCT KETONES,UA: ABNORMAL
SL AMB POCT NITRITE,UA: ABNORMAL
SL AMB POCT PH,UA: 6
SL AMB POCT SPECIFIC GRAVITY,UA: 1.02
SL AMB POCT URINE PROTEIN: ABNORMAL
SL AMB POCT UROBILINOGEN: ABNORMAL

## 2021-08-13 PROCEDURE — 81002 URINALYSIS NONAUTO W/O SCOPE: CPT | Performed by: INTERNAL MEDICINE

## 2021-08-13 PROCEDURE — 99214 OFFICE O/P EST MOD 30 MIN: CPT | Performed by: INTERNAL MEDICINE

## 2021-08-13 RX ORDER — FLUCONAZOLE 150 MG/1
150 TABLET ORAL ONCE
Qty: 1 TABLET | Refills: 0 | Status: SHIPPED | OUTPATIENT
Start: 2021-08-13 | End: 2021-11-01

## 2021-08-13 NOTE — PROGRESS NOTES
Assessment/Plan:       Diagnoses and all orders for this visit:    Burning with urination  Comments:  D/w pt and dgtr that US only + leukocytes - will send for UC and await results, encouraged fluids and call with new/worse symptoms, will check KUB as well   Orders:  -     POCT urine dip  -     Urine culture; Future  -     US kidney and bladder; Future    Foul smelling vaginal discharge  Comments:  Rx for Diflucan sent, if not better needs GYN/vaginal exam, no red flag GYN symptoms - call if they occur - D/w pt in detail  Orders:  -     fluconazole (DIFLUCAN) 150 mg tablet; Take 1 tablet (150 mg total) by mouth once for 1 dose    Right lower quadrant abdominal pain  Comments: With dysuria/difficulty urinating will check kidney/bladder US and KUB, has some constipation as well and will tx according, close f/u-to ED with new/worse  sx  Orders:  -     XR abdomen 1 view kub; Future  -     US kidney and bladder; Future    Constipation, unspecified constipation type  Comments:  Start OTC MiraLax daily x 3 days if not better add Colace q day x 3 days then increase to bid, encouraged plenty of fluids and high fiber diet, call with red flag GI symptoms          Subjective:      Patient ID: Alok Mack is a 64 y o  female  HPI Pt 10 min late to appt    Pt here with dgtr with urinary complaints x 2 mos  She notes burning with urination and foul smelling urine  She notes no blood in the urine she has some flank pain and intermittent abd pain at bedtime  She notes no N/V  She notes no leakage or incontinence to urine  She notes issues starting stream and feels she has to "push" to start urine stream   She notes some vaginal discharge - fouls smelling discharge noted  She notes no vaginal pain/bleeding  She has had issues with constipation  She notes no F/C/blood in stool/black stools  She has not used anything for her symptoms  Review of Systems   Constitutional: Positive for appetite change   Negative for chills and fever  HENT: Negative for congestion and sore throat  Eyes: Negative for pain and visual disturbance  Respiratory: Negative for cough and shortness of breath  Cardiovascular: Negative for chest pain and palpitations  Gastrointestinal: Positive for abdominal pain and constipation  Negative for blood in stool, diarrhea, nausea and vomiting  Endocrine: Negative for polydipsia and polyuria  Genitourinary: Positive for decreased urine volume, difficulty urinating and dysuria  Negative for flank pain, hematuria, vaginal bleeding and vaginal pain  Musculoskeletal: Positive for neck pain  Negative for back pain  Skin: Negative for rash and wound  Neurological: Negative for dizziness and light-headedness  Psychiatric/Behavioral: Negative for behavioral problems and confusion  Objective:    /62   Pulse 76   Temp 97 8 °F (36 6 °C) (Tympanic)   Ht 4' 11" (1 499 m)   Wt 74 9 kg (165 lb 3 2 oz)   BMI 33 37 kg/m²      Physical Exam  Vitals and nursing note reviewed  Constitutional:       General: She is not in acute distress  Appearance: She is well-developed  She is not ill-appearing  HENT:      Head: Normocephalic and atraumatic  Eyes:      General:         Right eye: No discharge  Left eye: No discharge  Conjunctiva/sclera: Conjunctivae normal    Neck:      Trachea: No tracheal deviation  Cardiovascular:      Rate and Rhythm: Normal rate and regular rhythm  Heart sounds: Normal heart sounds  No murmur heard  No friction rub  Pulmonary:      Effort: Pulmonary effort is normal  No respiratory distress  Breath sounds: Normal breath sounds  No wheezing, rhonchi or rales  Abdominal:      General: Abdomen is flat  There is no distension  Palpations: Abdomen is soft  Tenderness: There is right CVA tenderness  There is no left CVA tenderness, guarding or rebound        Comments: R lower quadrant pain with palp   Musculoskeletal: General: No deformity or signs of injury  Cervical back: Neck supple  Skin:     General: Skin is warm  Coloration: Skin is not pale  Findings: No rash  Neurological:      General: No focal deficit present  Mental Status: She is alert  Motor: No abnormal muscle tone  Psychiatric:         Behavior: Behavior normal          Thought Content:  Thought content normal          Judgment: Judgment normal

## 2021-08-14 LAB
BACTERIA UR CULT: NORMAL
Lab: NORMAL

## 2021-08-16 ENCOUNTER — HOSPITAL ENCOUNTER (OUTPATIENT)
Dept: RADIOLOGY | Facility: HOSPITAL | Age: 76
Discharge: HOME | End: 2021-08-16
Payer: COMMERCIAL

## 2021-08-16 DIAGNOSIS — R10.31 RIGHT LOWER QUADRANT ABDOMINAL PAIN: ICD-10-CM

## 2021-08-16 PROCEDURE — 74018 RADEX ABDOMEN 1 VIEW: CPT

## 2021-08-19 ENCOUNTER — HOSPITAL ENCOUNTER (OUTPATIENT)
Dept: ULTRASOUND IMAGING | Facility: HOSPITAL | Age: 76
Discharge: HOME | End: 2021-08-19
Payer: COMMERCIAL

## 2021-08-19 DIAGNOSIS — R10.31 RIGHT LOWER QUADRANT ABDOMINAL PAIN: ICD-10-CM

## 2021-08-19 DIAGNOSIS — R30.0 BURNING WITH URINATION: ICD-10-CM

## 2021-08-19 PROCEDURE — 76770 US EXAM ABDO BACK WALL COMP: CPT

## 2021-09-10 ENCOUNTER — OFFICE VISIT (OUTPATIENT)
Dept: FAMILY MEDICINE CLINIC | Facility: HOSPITAL | Age: 76
End: 2021-09-10
Payer: COMMERCIAL

## 2021-09-10 ENCOUNTER — HOSPITAL ENCOUNTER (OUTPATIENT)
Dept: RADIOLOGY | Facility: HOSPITAL | Age: 76
Discharge: HOME | End: 2021-09-10
Payer: COMMERCIAL

## 2021-09-10 VITALS
HEIGHT: 59 IN | WEIGHT: 167.2 LBS | BODY MASS INDEX: 33.71 KG/M2 | HEART RATE: 66 BPM | SYSTOLIC BLOOD PRESSURE: 154 MMHG | DIASTOLIC BLOOD PRESSURE: 62 MMHG | TEMPERATURE: 95.4 F

## 2021-09-10 DIAGNOSIS — E78.5 DYSLIPIDEMIA: ICD-10-CM

## 2021-09-10 DIAGNOSIS — M81.0 OSTEOPOROSIS, UNSPECIFIED OSTEOPOROSIS TYPE, UNSPECIFIED PATHOLOGICAL FRACTURE PRESENCE: ICD-10-CM

## 2021-09-10 DIAGNOSIS — R73.01 IFG (IMPAIRED FASTING GLUCOSE): ICD-10-CM

## 2021-09-10 DIAGNOSIS — M25.512 CHRONIC LEFT SHOULDER PAIN: Primary | ICD-10-CM

## 2021-09-10 DIAGNOSIS — G89.29 CHRONIC LEFT SHOULDER PAIN: ICD-10-CM

## 2021-09-10 DIAGNOSIS — G89.29 NECK PAIN, CHRONIC: ICD-10-CM

## 2021-09-10 DIAGNOSIS — M54.2 NECK PAIN, CHRONIC: ICD-10-CM

## 2021-09-10 DIAGNOSIS — E03.9 HYPOTHYROIDISM, UNSPECIFIED TYPE: ICD-10-CM

## 2021-09-10 DIAGNOSIS — R42 DIZZINESS: ICD-10-CM

## 2021-09-10 DIAGNOSIS — G89.29 CHRONIC LEFT SHOULDER PAIN: Primary | ICD-10-CM

## 2021-09-10 DIAGNOSIS — M25.512 CHRONIC LEFT SHOULDER PAIN: ICD-10-CM

## 2021-09-10 DIAGNOSIS — L65.9 HAIR LOSS: ICD-10-CM

## 2021-09-10 PROCEDURE — 72050 X-RAY EXAM NECK SPINE 4/5VWS: CPT

## 2021-09-10 PROCEDURE — 73030 X-RAY EXAM OF SHOULDER: CPT

## 2021-09-10 PROCEDURE — 99214 OFFICE O/P EST MOD 30 MIN: CPT | Performed by: INTERNAL MEDICINE

## 2021-09-10 PROCEDURE — 93000 ELECTROCARDIOGRAM COMPLETE: CPT | Performed by: INTERNAL MEDICINE

## 2021-09-10 NOTE — PROGRESS NOTES
Assessment/Plan:    Hypothyroidism  Overdue for labs - order given, con't current Synthroid for now    IFG (impaired fasting glucose)  Overdue for labs - order given, will follow    Osteoporosis  Due for Dexa - to review at next appt, will check Vit D levels with labs - order given, con't current supplements for now    Dyslipidemia  FLP due - order given, con't current statin for now       Diagnoses and all orders for this visit:    Chronic left shoulder pain  Comments:  Essentially nml AROM but + drop arm test, will start with Xray and PT, will follow, may need MRI and/or Ortho eval, call with new/worse symptoms  Orders:  -     XR shoulder 2+ vw left; Future  -     Ambulatory referral to Physical Therapy; Future  -     CBC and differential  -     Comprehensive metabolic panel  -     Hemoglobin A1C  -     Lipid panel  -     TSH, 3rd generation with Free T4 reflex  -     Iron; Future  -     Ferritin; Future  -     Vitamin D 25 hydroxy; Future  -     Iron  -     Ferritin  -     Vitamin D 25 hydroxy    Neck pain, chronic  Comments:  May be the cause of the shoulder pain, decrease sensation LUE, start with Xray and PT, will follow and likely need MRI, call with new/worse symptoms  Orders:  -     XR spine cervical complete 4 or 5 vw non injury; Future  -     Ambulatory referral to Physical Therapy; Future  -     CBC and differential  -     Comprehensive metabolic panel  -     Hemoglobin A1C  -     Lipid panel  -     TSH, 3rd generation with Free T4 reflex  -     Iron; Future  -     Ferritin; Future  -     Vitamin D 25 hydroxy;  Future  -     Iron  -     Ferritin  -     Vitamin D 25 hydroxy    Dizziness  Comments:  Nonfocal neuro exam except LUE numbness and generalized symmetric weakness, ECG only notable for sinus keith and new RBBB, symptoms c/w BPPV, urged to keep hydrated and change position slowly, call with red flag Neuro symptoms - d/w pt in detail  Orders:  -     CBC and differential  -     Comprehensive metabolic panel  -     Hemoglobin A1C  -     Lipid panel  -     TSH, 3rd generation with Free T4 reflex  -     Iron; Future  -     Ferritin; Future  -     Vitamin D 25 hydroxy; Future  -     Iron  -     Ferritin  -     Vitamin D 25 hydroxy    Hair loss  Comments:  Localized to just R upper lashes - suspect pulling them herself with stressors with , will check iron studies/CBC/TSH and follow  Orders:  -     TSH, 3rd generation with Free T4 reflex  -     Iron; Future  -     Ferritin; Future  -     Vitamin D 25 hydroxy; Future  -     Iron  -     Ferritin  -     Vitamin D 25 hydroxy    IFG (impaired fasting glucose)  -     CBC and differential  -     Comprehensive metabolic panel  -     Hemoglobin A1C  -     Lipid panel  -     TSH, 3rd generation with Free T4 reflex  -     Iron; Future  -     Ferritin; Future  -     Vitamin D 25 hydroxy; Future  -     Iron  -     Ferritin  -     Vitamin D 25 hydroxy    Hypothyroidism, unspecified type  -     CBC and differential  -     Comprehensive metabolic panel  -     Hemoglobin A1C  -     Lipid panel  -     TSH, 3rd generation with Free T4 reflex  -     Iron; Future  -     Ferritin; Future  -     Vitamin D 25 hydroxy; Future  -     Iron  -     Ferritin  -     Vitamin D 25 hydroxy    Osteoporosis, unspecified osteoporosis type, unspecified pathological fracture presence  -     CBC and differential  -     Comprehensive metabolic panel  -     Hemoglobin A1C  -     Lipid panel  -     TSH, 3rd generation with Free T4 reflex  -     Iron; Future  -     Ferritin; Future  -     Vitamin D 25 hydroxy; Future  -     Iron  -     Ferritin  -     Vitamin D 25 hydroxy    Dyslipidemia  -     CBC and differential  -     Comprehensive metabolic panel  -     Hemoglobin A1C  -     Lipid panel  -     TSH, 3rd generation with Free T4 reflex  -     Iron; Future  -     Ferritin; Future  -     Vitamin D 25 hydroxy;  Future  -     Iron  -     Ferritin  -     Vitamin D 25 hydroxy      Mammo 5/19    Dexa 5/19    BW - long overdue    Colonoscopy 7/14 - 10 yrs      Subjective:      Patient ID: Himanshu Garcia is a 64 y o  female  HPI Pt here with family for mult medical concerns - family serving as an interpretor as pt is primarily [address] speaking    Pt noting issues with loss of R eye lashes in June  She has had no major illnesses herself but her  was dx with cancer in May  She notes some hairless on her head but only mild  She notes L shoulder pain for "years"  She states it was not addressed d/t mult other joint issues  She notes no specific fall/injury/trauma provoking the event  She has constant pain at the L shoulder and describes it as "a mild pain" in the am and during the day it worsens and she has decrease in mobility d/t the pain  Pain radiates up to the back of the L cervical region  She has had increase in HA's as well  She has had weakness and dropping objects  She has been using OTC pain meds  She is on Gabapentin/Nortriptyline for her chronic pain  She notes intermittent dizziness and HA's  She has dizziness when she wakes up  She describes the dizziness as "spinning spells"  Tends to occur when she gets up or if she turns her head quickly  She has had 1 fall from the dizziness  She notes no double vision/blurry vision/loss of vision/she notes no focal weakness  She notes some weakness/ tingling to LUE  She notes slurred speech  Mammo 5/19    Dexa 5/19    BW - long overdue    Colonoscopy 7/14 - 10 yrs    Review of Systems   Constitutional: Positive for fatigue  Negative for chills, fever and unexpected weight change  HENT: Negative for congestion and sore throat  Eyes: Positive for visual disturbance  Negative for pain  Respiratory: Negative for cough and shortness of breath  Cardiovascular: Negative for chest pain and palpitations  Gastrointestinal: Negative for abdominal pain, constipation, diarrhea, nausea and vomiting     Genitourinary: Negative for difficulty urinating and dysuria  Musculoskeletal: Positive for arthralgias, back pain, joint swelling and neck pain  Skin: Negative for rash and wound  Neurological: Positive for dizziness, weakness, numbness and headaches  Negative for light-headedness  Hematological: Does not bruise/bleed easily  Psychiatric/Behavioral: Negative for behavioral problems and confusion  Objective:    /62   Pulse 66   Temp (!) 95 4 °F (35 2 °C) (Tympanic)   Ht 4' 11" (1 499 m)   Wt 75 8 kg (167 lb 3 2 oz)   BMI 33 77 kg/m²      Physical Exam  Vitals and nursing note reviewed  Constitutional:       General: She is not in acute distress  Appearance: She is well-developed  She is obese  She is not ill-appearing  HENT:      Head: Normocephalic and atraumatic  Right Ear: Tympanic membrane and external ear normal  There is no impacted cerumen  Left Ear: Tympanic membrane and external ear normal    Eyes:      General:         Right eye: No discharge  Left eye: No discharge  Extraocular Movements: Extraocular movements intact  Conjunctiva/sclera: Conjunctivae normal       Pupils: Pupils are equal, round, and reactive to light  Neck:      Vascular: No carotid bruit  Trachea: No tracheal deviation  Cardiovascular:      Rate and Rhythm: Normal rate and regular rhythm  Heart sounds: Normal heart sounds  No murmur heard  No friction rub  Pulmonary:      Effort: Pulmonary effort is normal  No respiratory distress  Breath sounds: Normal breath sounds  No wheezing, rhonchi or rales  Abdominal:      General: There is no distension  Palpations: Abdomen is soft  Tenderness: There is no abdominal tenderness  There is no guarding or rebound  Musculoskeletal:      Cervical back: Neck supple        Comments: Cervical spine: no pain with palp of spinous processes  L shoulder: decrease AROM with internal rotation but otherwise nml, + drop arm test  4/5 overall muscle strength and symmetric   Skin:     General: Skin is warm  Coloration: Skin is not pale  Findings: No rash  Comments: Loss of lashes R upper lid, thin lashes R lower lid, nml lashes L eye   Neurological:      Mental Status: She is alert  Cranial Nerves: No cranial nerve deficit  Sensory: Sensory deficit present  Motor: Weakness present  No abnormal muscle tone  Gait: Gait normal       Deep Tendon Reflexes: Reflexes normal       Comments: CN II-XII intact, sensation decreased to light touch LUE throughout, 2/4 patellar reflexes B/L LE, nml FN and HS   Psychiatric:         Behavior: Behavior normal          Thought Content:  Thought content normal          Judgment: Judgment normal       Comments: Flat affect

## 2021-09-10 NOTE — ASSESSMENT & PLAN NOTE
Due for Dexa - to review at next appt, will check Vit D levels with labs - order given, con't current supplements for now

## 2021-09-12 LAB
25(OH)D3+25(OH)D2 SERPL-MCNC: 27.6 NG/ML (ref 30–100)
ALBUMIN SERPL-MCNC: 4 G/DL (ref 3.7–4.7)
ALBUMIN/GLOB SERPL: 1.7 {RATIO} (ref 1.2–2.2)
ALP SERPL-CCNC: 131 IU/L (ref 48–121)
ALT SERPL-CCNC: 16 IU/L (ref 0–32)
AST SERPL-CCNC: 12 IU/L (ref 0–40)
BASOPHILS # BLD AUTO: 0.1 X10E3/UL (ref 0–0.2)
BASOPHILS NFR BLD AUTO: 1 %
BILIRUB SERPL-MCNC: 0.3 MG/DL (ref 0–1.2)
BUN SERPL-MCNC: 25 MG/DL (ref 8–27)
BUN/CREAT SERPL: 22 (ref 12–28)
CALCIUM SERPL-MCNC: 9.8 MG/DL (ref 8.7–10.3)
CHLORIDE SERPL-SCNC: 110 MMOL/L (ref 96–106)
CHOLEST SERPL-MCNC: 121 MG/DL (ref 100–199)
CHOLEST/HDLC SERPL: 3 RATIO (ref 0–4.4)
CO2 SERPL-SCNC: 19 MMOL/L (ref 20–29)
CREAT SERPL-MCNC: 1.13 MG/DL (ref 0.57–1)
EOSINOPHIL # BLD AUTO: 0.4 X10E3/UL (ref 0–0.4)
EOSINOPHIL NFR BLD AUTO: 7 %
ERYTHROCYTE [DISTWIDTH] IN BLOOD BY AUTOMATED COUNT: 13.2 % (ref 11.7–15.4)
EST. AVERAGE GLUCOSE BLD GHB EST-MCNC: 131 MG/DL
FERRITIN SERPL-MCNC: 52 NG/ML (ref 15–150)
GLOBULIN SER-MCNC: 2.3 G/DL (ref 1.5–4.5)
GLUCOSE SERPL-MCNC: 94 MG/DL (ref 65–99)
HBA1C MFR BLD: 6.2 % (ref 4.8–5.6)
HCT VFR BLD AUTO: 36 % (ref 34–46.6)
HDLC SERPL-MCNC: 41 MG/DL
HGB BLD-MCNC: 11.9 G/DL (ref 11.1–15.9)
IMM GRANULOCYTES # BLD: 0 X10E3/UL (ref 0–0.1)
IMM GRANULOCYTES NFR BLD: 0 %
IRON SERPL-MCNC: 79 UG/DL (ref 27–139)
LDLC SERPL CALC-MCNC: 60 MG/DL (ref 0–99)
LYMPHOCYTES # BLD AUTO: 1.9 X10E3/UL (ref 0.7–3.1)
LYMPHOCYTES NFR BLD AUTO: 32 %
MCH RBC QN AUTO: 30.4 PG (ref 26.6–33)
MCHC RBC AUTO-ENTMCNC: 33.1 G/DL (ref 31.5–35.7)
MCV RBC AUTO: 92 FL (ref 79–97)
MONOCYTES # BLD AUTO: 0.5 X10E3/UL (ref 0.1–0.9)
MONOCYTES NFR BLD AUTO: 9 %
NEUTROPHILS # BLD AUTO: 3 X10E3/UL (ref 1.4–7)
NEUTROPHILS NFR BLD AUTO: 51 %
PLATELET # BLD AUTO: 188 X10E3/UL (ref 150–450)
POTASSIUM SERPL-SCNC: 4.4 MMOL/L (ref 3.5–5.2)
PROT SERPL-MCNC: 6.3 G/DL (ref 6–8.5)
RBC # BLD AUTO: 3.91 X10E6/UL (ref 3.77–5.28)
SL AMB EGFR AFRICAN AMERICAN: 54 ML/MIN/1.73
SL AMB EGFR NON AFRICAN AMERICAN: 47 ML/MIN/1.73
SL AMB VLDL CHOLESTEROL CALC: 20 MG/DL (ref 5–40)
SODIUM SERPL-SCNC: 142 MMOL/L (ref 134–144)
TRIGL SERPL-MCNC: 110 MG/DL (ref 0–149)
TSH SERPL DL<=0.005 MIU/L-ACNC: 3.95 UIU/ML (ref 0.45–4.5)
WBC # BLD AUTO: 5.8 X10E3/UL (ref 3.4–10.8)

## 2021-09-27 DIAGNOSIS — I10 ESSENTIAL HYPERTENSION: ICD-10-CM

## 2021-09-27 DIAGNOSIS — E03.9 HYPOTHYROIDISM, UNSPECIFIED TYPE: ICD-10-CM

## 2021-09-27 DIAGNOSIS — E78.5 DYSLIPIDEMIA: ICD-10-CM

## 2021-09-27 DIAGNOSIS — G89.4 CHRONIC PAIN SYNDROME: ICD-10-CM

## 2021-09-27 DIAGNOSIS — K21.9 GERD WITHOUT ESOPHAGITIS: ICD-10-CM

## 2021-09-27 DIAGNOSIS — H40.9 GLAUCOMA, UNSPECIFIED GLAUCOMA TYPE, UNSPECIFIED LATERALITY: ICD-10-CM

## 2021-09-27 RX ORDER — BIMATOPROST 0.01 %
1 DROPS OPHTHALMIC (EYE) DAILY
Qty: 5 ML | Refills: 6 | OUTPATIENT
Start: 2021-09-27

## 2021-09-27 RX ORDER — GABAPENTIN 800 MG/1
TABLET ORAL
Qty: 180 TABLET | Refills: 1 | Status: SHIPPED | OUTPATIENT
Start: 2021-09-27 | End: 2022-02-11

## 2021-09-27 RX ORDER — LOSARTAN POTASSIUM 25 MG/1
25 TABLET ORAL DAILY
Qty: 90 TABLET | Refills: 1 | Status: SHIPPED | OUTPATIENT
Start: 2021-09-27 | End: 2021-10-12

## 2021-09-27 RX ORDER — OMEPRAZOLE 40 MG/1
40 CAPSULE, DELAYED RELEASE ORAL DAILY
Qty: 30 CAPSULE | Refills: 6 | Status: SHIPPED | OUTPATIENT
Start: 2021-09-27 | End: 2022-07-10 | Stop reason: SDUPTHER

## 2021-09-27 RX ORDER — LEVOTHYROXINE SODIUM 88 MCG
88 TABLET ORAL DAILY
Qty: 90 TABLET | Refills: 1 | Status: SHIPPED | OUTPATIENT
Start: 2021-09-27 | End: 2022-02-11

## 2021-09-27 RX ORDER — ATORVASTATIN CALCIUM 20 MG/1
20 TABLET, FILM COATED ORAL DAILY
Qty: 90 TABLET | Refills: 1 | Status: SHIPPED | OUTPATIENT
Start: 2021-09-27 | End: 2022-02-11

## 2021-09-27 RX ORDER — TIMOLOL MALEATE 6.8 MG/ML
1 SOLUTION/ DROPS OPHTHALMIC EVERY 12 HOURS
Qty: 5 ML | Refills: 6 | OUTPATIENT
Start: 2021-09-27

## 2021-09-27 NOTE — TELEPHONE ENCOUNTER
Also requested the following but not on the patients current medlist:     caltrate, centrum silver, diclofenac, ferrous sulfate, folic acid, apap arthritis, sulindac, tizanidine, vit d3

## 2021-10-12 ENCOUNTER — OFFICE VISIT (OUTPATIENT)
Dept: FAMILY MEDICINE CLINIC | Facility: HOSPITAL | Age: 76
End: 2021-10-12
Payer: COMMERCIAL

## 2021-10-12 VITALS
DIASTOLIC BLOOD PRESSURE: 62 MMHG | HEART RATE: 64 BPM | SYSTOLIC BLOOD PRESSURE: 148 MMHG | HEIGHT: 59 IN | BODY MASS INDEX: 33.38 KG/M2 | WEIGHT: 165.6 LBS | TEMPERATURE: 97.9 F

## 2021-10-12 DIAGNOSIS — E03.9 HYPOTHYROIDISM, UNSPECIFIED TYPE: ICD-10-CM

## 2021-10-12 DIAGNOSIS — I10 PRIMARY HYPERTENSION: ICD-10-CM

## 2021-10-12 DIAGNOSIS — E78.5 DYSLIPIDEMIA: ICD-10-CM

## 2021-10-12 DIAGNOSIS — F32.A ANXIETY AND DEPRESSION: ICD-10-CM

## 2021-10-12 DIAGNOSIS — R73.01 IFG (IMPAIRED FASTING GLUCOSE): Primary | ICD-10-CM

## 2021-10-12 DIAGNOSIS — Z12.31 ENCOUNTER FOR SCREENING MAMMOGRAM FOR MALIGNANT NEOPLASM OF BREAST: ICD-10-CM

## 2021-10-12 DIAGNOSIS — F41.9 ANXIETY AND DEPRESSION: ICD-10-CM

## 2021-10-12 DIAGNOSIS — Z00.00 MEDICARE ANNUAL WELLNESS VISIT, SUBSEQUENT: ICD-10-CM

## 2021-10-12 DIAGNOSIS — E66.9 OBESITY (BMI 30.0-34.9): ICD-10-CM

## 2021-10-12 DIAGNOSIS — F33.9 DEPRESSION, RECURRENT (HCC): ICD-10-CM

## 2021-10-12 DIAGNOSIS — Z23 ENCOUNTER FOR IMMUNIZATION: ICD-10-CM

## 2021-10-12 DIAGNOSIS — M81.0 OSTEOPOROSIS, UNSPECIFIED OSTEOPOROSIS TYPE, UNSPECIFIED PATHOLOGICAL FRACTURE PRESENCE: ICD-10-CM

## 2021-10-12 DIAGNOSIS — E28.39 MENOPAUSE OVARIAN FAILURE: ICD-10-CM

## 2021-10-12 DIAGNOSIS — E55.9 VITAMIN D DEFICIENCY: ICD-10-CM

## 2021-10-12 PROCEDURE — 90732 PPSV23 VACC 2 YRS+ SUBQ/IM: CPT

## 2021-10-12 PROCEDURE — G0439 PPPS, SUBSEQ VISIT: HCPCS | Performed by: INTERNAL MEDICINE

## 2021-10-12 PROCEDURE — 90662 IIV NO PRSV INCREASED AG IM: CPT

## 2021-10-12 PROCEDURE — G0008 ADMIN INFLUENZA VIRUS VAC: HCPCS

## 2021-10-12 PROCEDURE — 99214 OFFICE O/P EST MOD 30 MIN: CPT | Performed by: INTERNAL MEDICINE

## 2021-10-12 PROCEDURE — G0009 ADMIN PNEUMOCOCCAL VACCINE: HCPCS

## 2021-10-12 RX ORDER — LOSARTAN POTASSIUM 50 MG/1
50 TABLET ORAL DAILY
COMMUNITY
Start: 2021-09-26 | End: 2022-03-17 | Stop reason: SDUPTHER

## 2021-10-12 RX ORDER — MELATONIN
1000 DAILY
Qty: 30 TABLET | Refills: 5 | Status: SHIPPED | OUTPATIENT
Start: 2021-10-12 | End: 2022-07-10 | Stop reason: SDUPTHER

## 2021-10-12 RX ORDER — ALENDRONATE SODIUM 70 MG/1
70 TABLET ORAL WEEKLY
Qty: 4 TABLET | Refills: 6 | Status: SHIPPED | OUTPATIENT
Start: 2021-10-12 | End: 2022-05-26

## 2021-10-31 DIAGNOSIS — N89.8 FOUL SMELLING VAGINAL DISCHARGE: ICD-10-CM

## 2021-11-01 RX ORDER — FLUCONAZOLE 150 MG/1
150 TABLET ORAL ONCE
Qty: 1 TABLET | Refills: 0 | Status: SHIPPED | OUTPATIENT
Start: 2021-11-01 | End: 2021-12-26

## 2021-11-11 NOTE — ASSESSMENT & PLAN NOTE
[facility]     Emergency Department     Faculty Attestation        I performed a history and physical examination of the patient and discussed management with the resident. I reviewed the residents note and agree with the documented findings and plan of care. Any areas of disagreement are noted on the chart. I was personally present for the key portions of any procedures. I have documented in the chart those procedures where I was not present during the key portions. I have reviewed the emergency nurses triage note. I agree with the chief complaint, past medical history, past surgical history, allergies, medications, social and family history as documented unless otherwise noted below. For mid-level providers such as nurse practitioners as well as physicians assistants:    I have personally seen and evaluated the patient. I find the patient's history and physical exam are consistent with NP/PA documentation. I agree with the care provided, treatment rendered, disposition, & follow-up plan. Additional findings are as noted. Vital Signs: BP (!) 160/93   Pulse 92   Temp 98.1 °F (36.7 °C) (Oral)   Resp 18   Ht 5' 5\" (1.651 m)   Wt 240 lb (108.9 kg)   LMP 01/24/2021 (Approximate)   SpO2 99%   BMI 39.94 kg/m²   PCP:  Non-Staff Physician    Pertinent Comments:     Patient is 2 days postpartum presents with lower extremity swelling. She states she had in the hospital and has persisted. No chest pain cough or shortness of breath she is hypertensive here but has no headache or right upper quadrant tenderness. Exam there is symmetrical lower extremity edema but no erythema warmth or signs suggest infection.   She is resting comfortably no acute distress her hypertension will discuss with OB, lower extremity Dopplers, reassessment      Critical Care  None          Monique Pettit MD    Attending Emergency Medicine Physician              Erlinda Mckenzie Mood controlled on current regimen, con't current meds, call with new/worse mood/SI/panic attacks Kalpana Mukherjee MD  11/11/21 [id number]

## 2021-11-17 ENCOUNTER — HOSPITAL ENCOUNTER (OUTPATIENT)
Dept: MAMMOGRAPHY | Facility: IMAGING CENTER | Age: 76
Discharge: HOME | End: 2021-11-17
Payer: COMMERCIAL

## 2021-11-17 ENCOUNTER — HOSPITAL ENCOUNTER (OUTPATIENT)
Dept: BONE DENSITY | Facility: IMAGING CENTER | Age: 76
Discharge: HOME | End: 2021-11-17
Payer: COMMERCIAL

## 2021-11-17 VITALS — HEIGHT: 60 IN | BODY MASS INDEX: 32.39 KG/M2 | WEIGHT: 165 LBS

## 2021-11-17 DIAGNOSIS — Z12.31 ENCOUNTER FOR SCREENING MAMMOGRAM FOR MALIGNANT NEOPLASM OF BREAST: ICD-10-CM

## 2021-11-17 DIAGNOSIS — E28.39 MENOPAUSE OVARIAN FAILURE: ICD-10-CM

## 2021-11-17 PROCEDURE — 77067 SCR MAMMO BI INCL CAD: CPT

## 2021-11-17 PROCEDURE — 77063 BREAST TOMOSYNTHESIS BI: CPT

## 2021-11-17 PROCEDURE — 77080 DXA BONE DENSITY AXIAL: CPT

## 2021-11-29 DIAGNOSIS — M54.42 CHRONIC BILATERAL LOW BACK PAIN WITH LEFT-SIDED SCIATICA: ICD-10-CM

## 2021-11-29 DIAGNOSIS — G89.29 CHRONIC BILATERAL LOW BACK PAIN WITH LEFT-SIDED SCIATICA: ICD-10-CM

## 2021-11-30 RX ORDER — TIZANIDINE 2 MG/1
TABLET ORAL
Qty: 30 TABLET | Refills: 0 | OUTPATIENT
Start: 2021-11-30

## 2021-12-09 ENCOUNTER — VBI (OUTPATIENT)
Dept: ADMINISTRATIVE | Facility: OTHER | Age: 76
End: 2021-12-09

## 2021-12-25 ENCOUNTER — PATIENT MESSAGE (OUTPATIENT)
Dept: FAMILY MEDICINE CLINIC | Facility: HOSPITAL | Age: 76
End: 2021-12-25

## 2021-12-25 DIAGNOSIS — F41.9 ANXIETY AND DEPRESSION: ICD-10-CM

## 2021-12-25 DIAGNOSIS — N89.8 FOUL SMELLING VAGINAL DISCHARGE: ICD-10-CM

## 2021-12-25 DIAGNOSIS — M54.42 CHRONIC BILATERAL LOW BACK PAIN WITH LEFT-SIDED SCIATICA: ICD-10-CM

## 2021-12-25 DIAGNOSIS — F32.A ANXIETY AND DEPRESSION: ICD-10-CM

## 2021-12-25 DIAGNOSIS — G89.29 CHRONIC BILATERAL LOW BACK PAIN WITH LEFT-SIDED SCIATICA: ICD-10-CM

## 2021-12-26 RX ORDER — BUPROPION HYDROCHLORIDE 300 MG/1
TABLET ORAL
Qty: 90 TABLET | Refills: 0 | Status: SHIPPED | OUTPATIENT
Start: 2021-12-26 | End: 2022-02-11

## 2021-12-26 RX ORDER — FLUCONAZOLE 150 MG/1
TABLET ORAL
Qty: 1 TABLET | Refills: 0 | Status: SHIPPED | OUTPATIENT
Start: 2021-12-26 | End: 2021-12-27

## 2021-12-27 RX ORDER — TIZANIDINE 2 MG/1
2 TABLET ORAL EVERY 8 HOURS PRN
Qty: 30 TABLET | Refills: 0 | Status: SHIPPED | OUTPATIENT
Start: 2021-12-27 | End: 2022-02-11

## 2022-01-12 ENCOUNTER — TELEPHONE (OUTPATIENT)
Dept: FAMILY MEDICINE CLINIC | Facility: HOSPITAL | Age: 77
End: 2022-01-12

## 2022-01-12 NOTE — TELEPHONE ENCOUNTER
PASSED AWAY - WILL BE FLYING HIM BACK TO [facility] FOR THE BURIAL - DAUGHTER ASKING FOR SOMETHING TO CALM HER NERVES TO GET THROUGH THE  - PLEASE ADVISE

## 2022-01-13 NOTE — TELEPHONE ENCOUNTER
Very small amt of lorazepam was sent but has to be used with extreme caution d/t her age and other sedating meds - would not take at same time as her muscle relaxer (Tizanidine) and ensure no driving and not alone when she does take it as can increase risk for falls/confusion    Will not refill after this rx if finished, this is a one time rx

## 2022-01-14 DIAGNOSIS — Z91.89 AT INCREASED RISK OF EXPOSURE TO COVID-19 VIRUS: Primary | ICD-10-CM

## 2022-01-15 PROCEDURE — U0003 INFECTIOUS AGENT DETECTION BY NUCLEIC ACID (DNA OR RNA); SEVERE ACUTE RESPIRATORY SYNDROME CORONAVIRUS 2 (SARS-COV-2) (CORONAVIRUS DISEASE [COVID-19]), AMPLIFIED PROBE TECHNIQUE, MAKING USE OF HIGH THROUGHPUT TECHNOLOGIES AS DESCRIBED BY CMS-2020-01-R: HCPCS | Performed by: INTERNAL MEDICINE

## 2022-01-15 PROCEDURE — U0005 INFEC AGEN DETEC AMPLI PROBE: HCPCS | Performed by: INTERNAL MEDICINE

## 2022-02-04 ENCOUNTER — OFFICE VISIT (OUTPATIENT)
Dept: FAMILY MEDICINE CLINIC | Facility: HOSPITAL | Age: 77
End: 2022-02-04
Payer: COMMERCIAL

## 2022-02-04 VITALS
OXYGEN SATURATION: 98 % | TEMPERATURE: 96.2 F | SYSTOLIC BLOOD PRESSURE: 146 MMHG | HEART RATE: 98 BPM | DIASTOLIC BLOOD PRESSURE: 76 MMHG | WEIGHT: 164 LBS | HEIGHT: 60 IN | BODY MASS INDEX: 32.2 KG/M2

## 2022-02-04 DIAGNOSIS — F33.9 DEPRESSION, RECURRENT (HCC): ICD-10-CM

## 2022-02-04 DIAGNOSIS — M54.6 ACUTE RIGHT-SIDED THORACIC BACK PAIN: Primary | ICD-10-CM

## 2022-02-04 DIAGNOSIS — G47.00 INSOMNIA, UNSPECIFIED TYPE: ICD-10-CM

## 2022-02-04 DIAGNOSIS — R07.89 ATYPICAL CHEST PAIN: ICD-10-CM

## 2022-02-04 DIAGNOSIS — R73.01 IFG (IMPAIRED FASTING GLUCOSE): ICD-10-CM

## 2022-02-04 DIAGNOSIS — E55.9 VITAMIN D DEFICIENCY: ICD-10-CM

## 2022-02-04 DIAGNOSIS — F43.21 GRIEF REACTION: ICD-10-CM

## 2022-02-04 PROCEDURE — 99214 OFFICE O/P EST MOD 30 MIN: CPT | Performed by: INTERNAL MEDICINE

## 2022-02-04 PROCEDURE — 93000 ELECTROCARDIOGRAM COMPLETE: CPT | Performed by: INTERNAL MEDICINE

## 2022-02-04 RX ORDER — TRAZODONE HYDROCHLORIDE 50 MG/1
25 TABLET ORAL
Qty: 30 TABLET | Refills: 1 | Status: SHIPPED | OUTPATIENT
Start: 2022-02-04 | End: 2022-04-29 | Stop reason: SDUPTHER

## 2022-02-04 NOTE — PROGRESS NOTES
Assessment/Plan:    Depression, recurrent (HCC)  D/w pt and family that she has baseline depression and grief on top of that, she is going through nml phases of grieving, will defer med changes for now, encouraged to keep conversation open with family and friends, call with new/worse symptoms/SI    Insomnia  Trial of Trazodone 25 mg PO qhs, re-eval in 4-6 wks, call with SE/new/worse mood       Diagnoses and all orders for this visit:    Acute right-sided thoracic back pain  Comments:  Likely muscular as worse with deep breath/moving, heat/Tylenol/massage, call with new/worse/persistent symptoms, on Gabapentin and Nortriptyline for her chronic pain, will monitor    Atypical chest pain  Comments:  Pain occurs when she is upset - feel it is likely anxiety/grief related, ECG in office today w/o ischemia or arrhythmia but no bifascicular block was noted,  Will check stress test - order given, red flag CV symptoms reviewed - to ED if they occur  Orders:  -     POCT ECG  - NM myocardial perfusion spect (rx stress and/or rest)    Depression, recurrent ([address] )    Grief reaction  Comments:  Reassured pt and dgtr that her sadness/anger is all normal, support groups reviewed, family feels she has enough support with family/friends so will defer for now - call with new/worse mood, will follow closely          Subjective:      Patient ID: Socorro Carlson is a 64 y o  female  HPI Pt here with family w/concern over back pain    Pt has chronic back pain but has had new back pain  Pain is currently R lateral thoracic/rib region  The pain does not radiate  Pain is worse with taking a deep breath  She feels difficulty breathing when she lays down  She states she feels she is not getting enough air when she lays down  She notes L sided CP for a week  She notes the pain is always there in the afternoon and is worse with walking and with crying  She notes the symptoms radiate to her neck    The pain will last "for a while" until she calms down  Pain is primarily when she is upset  She feels angry/frustated and sad  She is not sleeping well  Her  of 61 yrs passed recently  She notes associated HA with the pain but denies SOB/diaphoresis/N/dizziness  She notes no LE edema but has had some R leg swelling and bruising in the the recent past   She notes some bruising at the R ankle  She denies any fall/injury/new activity  She notes no ankle pain itself  She is not on a ASA  She notes no nose bleeds/bleeding gums/blood in stool/urine  Review of Systems   Constitutional: Negative for chills and fever  Eyes: Negative for pain  Respiratory: Negative for cough, shortness of breath and wheezing  Cardiovascular: Positive for chest pain and leg swelling  Negative for palpitations  Gastrointestinal: Negative for abdominal pain, diarrhea, nausea and vomiting  Musculoskeletal: Positive for back pain  Negative for neck pain  Skin: Negative for rash and wound  Bruising R distal LE   Neurological: Positive for dizziness and headaches  Hematological: Does not bruise/bleed easily  Psychiatric/Behavioral: Positive for dysphoric mood  Objective:    /76   Pulse 98   Temp (!) 96 2 °F (35 7 °C) (Tympanic)   Ht 5' (1 524 m)   Wt 74 4 kg (164 lb)   SpO2 98%   BMI 32 03 kg/m²      Physical Exam  Vitals and nursing note reviewed  Constitutional:       General: She is not in acute distress  Appearance: She is well-developed  She is not ill-appearing  HENT:      Head: Normocephalic and atraumatic  Eyes:      General:         Right eye: No discharge  Left eye: No discharge  Conjunctiva/sclera: Conjunctivae normal    Neck:      Trachea: No tracheal deviation  Comments: Neg JVD  Cardiovascular:      Rate and Rhythm: Normal rate and regular rhythm  Heart sounds: Normal heart sounds  No murmur heard  No friction rub        Comments: Neg LE edema B/L  Pulmonary:      Effort: Pulmonary effort is normal  No respiratory distress  Breath sounds: Normal breath sounds  No wheezing, rhonchi or rales  Abdominal:      General: There is no distension  Palpations: Abdomen is soft  Tenderness: There is no abdominal tenderness  There is no guarding or rebound  Musculoskeletal:         General: No swelling, tenderness, deformity or signs of injury  Cervical back: Neck supple  Comments: R ankle and knee:  nml PROM and no pain with AROM, no tenderness at bruising, neg David's sign, no calf tenderness/redness/warmth noted   Skin:     General: Skin is warm  Coloration: Skin is not pale  Findings: Bruising present  No rash  Comments: Bruising around R medical ankle and R knee   Neurological:      General: No focal deficit present  Mental Status: She is alert  Motor: No abnormal muscle tone  Psychiatric:         Behavior: Behavior normal          Thought Content:  Thought content normal          Judgment: Judgment normal       Comments: crying

## 2022-02-04 NOTE — ASSESSMENT & PLAN NOTE
D/w pt and family that she has baseline depression and grief on top of that, she is going through nml phases of grieving, will defer med changes for now, encouraged to keep conversation open with family and friends, call with new/worse symptoms/SI

## 2022-02-11 ENCOUNTER — HOSPITAL ENCOUNTER (OUTPATIENT)
Dept: RADIOLOGY | Facility: HOSPITAL | Age: 77
Discharge: HOME | End: 2022-02-11
Payer: COMMERCIAL

## 2022-02-11 ENCOUNTER — HOSPITAL ENCOUNTER (OUTPATIENT)
Dept: NON INVASIVE DIAGNOSTICS | Facility: HOSPITAL | Age: 77
Discharge: HOME | End: 2022-02-11
Payer: COMMERCIAL

## 2022-02-11 VITALS — BODY MASS INDEX: 32.2 KG/M2 | HEIGHT: 60 IN | WEIGHT: 164 LBS

## 2022-02-11 DIAGNOSIS — E78.5 DYSLIPIDEMIA: ICD-10-CM

## 2022-02-11 DIAGNOSIS — G89.29 CHRONIC BILATERAL LOW BACK PAIN WITH LEFT-SIDED SCIATICA: ICD-10-CM

## 2022-02-11 DIAGNOSIS — G89.4 CHRONIC PAIN SYNDROME: ICD-10-CM

## 2022-02-11 DIAGNOSIS — E03.9 HYPOTHYROIDISM, UNSPECIFIED TYPE: ICD-10-CM

## 2022-02-11 DIAGNOSIS — R07.89 ATYPICAL CHEST PAIN: ICD-10-CM

## 2022-02-11 DIAGNOSIS — M54.42 CHRONIC BILATERAL LOW BACK PAIN WITH LEFT-SIDED SCIATICA: ICD-10-CM

## 2022-02-11 DIAGNOSIS — F32.A ANXIETY AND DEPRESSION: ICD-10-CM

## 2022-02-11 DIAGNOSIS — F41.9 ANXIETY AND DEPRESSION: ICD-10-CM

## 2022-02-11 DIAGNOSIS — N89.8 FOUL SMELLING VAGINAL DISCHARGE: ICD-10-CM

## 2022-02-11 LAB
CHEST PAIN STATEMENT: NORMAL
MAX DIASTOLIC BP: 66 MMHG
MAX HEART RATE: 103 BPM
MAX PREDICTED HEART RATE: 142 BPM
MAX. SYSTOLIC BP: 204 MMHG
PROTOCOL NAME: NORMAL
REASON FOR TERMINATION: NORMAL
TARGET HR FORMULA: NORMAL
TEST INDICATION: NORMAL
TIME IN EXERCISE PHASE: NORMAL

## 2022-02-11 PROCEDURE — 78452 HT MUSCLE IMAGE SPECT MULT: CPT

## 2022-02-11 PROCEDURE — A9502 TC99M TETROFOSMIN: HCPCS

## 2022-02-11 PROCEDURE — 93017 CV STRESS TEST TRACING ONLY: CPT

## 2022-02-11 PROCEDURE — G1004 CDSM NDSC: HCPCS

## 2022-02-11 RX ORDER — FLUCONAZOLE 150 MG/1
TABLET ORAL
Qty: 1 TABLET | Refills: 0 | OUTPATIENT
Start: 2022-02-11

## 2022-02-11 RX ORDER — GABAPENTIN 800 MG/1
TABLET ORAL
Qty: 180 TABLET | Refills: 0 | Status: SHIPPED | OUTPATIENT
Start: 2022-02-11 | End: 2022-05-26

## 2022-02-11 RX ORDER — ATORVASTATIN CALCIUM 20 MG/1
TABLET, FILM COATED ORAL
Qty: 90 TABLET | Refills: 0 | Status: SHIPPED | OUTPATIENT
Start: 2022-02-11 | End: 2022-05-26

## 2022-02-11 RX ORDER — BUPROPION HYDROCHLORIDE 300 MG/1
TABLET ORAL
Qty: 90 TABLET | Refills: 0 | Status: SHIPPED | OUTPATIENT
Start: 2022-02-11 | End: 2022-05-26

## 2022-02-11 RX ORDER — LEVOTHYROXINE SODIUM 88 MCG
TABLET ORAL
Qty: 90 TABLET | Refills: 0 | Status: SHIPPED | OUTPATIENT
Start: 2022-02-11 | End: 2022-05-26

## 2022-02-11 RX ORDER — TIZANIDINE 2 MG/1
TABLET ORAL
Qty: 30 TABLET | Refills: 0 | Status: SHIPPED | OUTPATIENT
Start: 2022-02-11 | End: 2022-03-20

## 2022-02-11 RX ADMIN — REGADENOSON 0.4 MG: 0.08 INJECTION, SOLUTION INTRAVENOUS at 10:21

## 2022-02-13 LAB
MAX HR PERCENT: 72 %
NUC STRESS EJECTION FRACTION: 82 %
RATE PRESSURE PRODUCT: NORMAL
SL CV REST NUCLEAR ISOTOPE DOSE: 10.5 MCI
SL CV STRESS NUCLEAR ISOTOPE DOSE: 30.5 MCI
SL CV STRESS RECOVERY BP: NORMAL MMHG
SL CV STRESS RECOVERY HR: 76 BPM
SL CV STRESS RECOVERY O2 SAT: 100 %
STRESS ANGINA INDEX: 1
STRESS BASELINE BP: NORMAL MMHG
STRESS BASELINE HR: 66 BPM
STRESS O2 SAT REST: 99 %
STRESS PEAK HR: 103 BPM
STRESS PERCENT HR: 72 %
STRESS POST ESTIMATED WORKLOAD: 1 METS
STRESS POST EXERCISE DUR MIN: 10 MIN
STRESS POST EXERCISE DUR SEC: 7 SEC
STRESS POST O2 SAT PEAK: 86 %
STRESS POST PEAK BP: 204 MMHG
STRESS/REST PERFUSION RATIO: 0.87

## 2022-02-13 PROCEDURE — 78452 HT MUSCLE IMAGE SPECT MULT: CPT | Performed by: INTERNAL MEDICINE

## 2022-02-13 PROCEDURE — 93016 CV STRESS TEST SUPVJ ONLY: CPT | Performed by: INTERNAL MEDICINE

## 2022-02-13 PROCEDURE — 93018 CV STRESS TEST I&R ONLY: CPT | Performed by: INTERNAL MEDICINE

## 2022-03-18 DIAGNOSIS — G89.29 CHRONIC BILATERAL LOW BACK PAIN WITH LEFT-SIDED SCIATICA: ICD-10-CM

## 2022-03-18 DIAGNOSIS — M54.42 CHRONIC BILATERAL LOW BACK PAIN WITH LEFT-SIDED SCIATICA: ICD-10-CM

## 2022-03-20 RX ORDER — TIZANIDINE 2 MG/1
TABLET ORAL
Qty: 30 TABLET | Refills: 0 | Status: SHIPPED | OUTPATIENT
Start: 2022-03-20 | End: 2022-04-29 | Stop reason: SDUPTHER

## 2022-03-24 ENCOUNTER — TELEPHONE (OUTPATIENT)
Dept: FAMILY MEDICINE CLINIC | Facility: HOSPITAL | Age: 77
End: 2022-03-24

## 2022-03-24 NOTE — TELEPHONE ENCOUNTER
Patient is currently in [facility] - has a bp of 169/57 and heavy tingling on left side of face  Patient was advised to local ED

## 2022-04-29 ENCOUNTER — HOSPITAL ENCOUNTER (OUTPATIENT)
Dept: RADIOLOGY | Facility: HOSPITAL | Age: 77
Discharge: HOME | End: 2022-04-29
Payer: COMMERCIAL

## 2022-04-29 ENCOUNTER — OFFICE VISIT (OUTPATIENT)
Dept: FAMILY MEDICINE CLINIC | Facility: HOSPITAL | Age: 77
End: 2022-04-29
Payer: COMMERCIAL

## 2022-04-29 VITALS
HEIGHT: 60 IN | TEMPERATURE: 96.1 F | BODY MASS INDEX: 31.92 KG/M2 | OXYGEN SATURATION: 98 % | WEIGHT: 162.6 LBS | SYSTOLIC BLOOD PRESSURE: 128 MMHG | HEART RATE: 76 BPM | DIASTOLIC BLOOD PRESSURE: 58 MMHG

## 2022-04-29 DIAGNOSIS — I10 PRIMARY HYPERTENSION: ICD-10-CM

## 2022-04-29 DIAGNOSIS — R06.00 DOE (DYSPNEA ON EXERTION): ICD-10-CM

## 2022-04-29 DIAGNOSIS — M25.531 WRIST PAIN, ACUTE, RIGHT: Primary | ICD-10-CM

## 2022-04-29 DIAGNOSIS — M25.531 WRIST PAIN, ACUTE, RIGHT: ICD-10-CM

## 2022-04-29 DIAGNOSIS — R06.81 WITNESSED EPISODE OF APNEA: ICD-10-CM

## 2022-04-29 DIAGNOSIS — F32.A ANXIETY AND DEPRESSION: ICD-10-CM

## 2022-04-29 DIAGNOSIS — R73.01 IFG (IMPAIRED FASTING GLUCOSE): ICD-10-CM

## 2022-04-29 DIAGNOSIS — G47.00 INSOMNIA, UNSPECIFIED TYPE: ICD-10-CM

## 2022-04-29 DIAGNOSIS — F41.9 ANXIETY AND DEPRESSION: ICD-10-CM

## 2022-04-29 DIAGNOSIS — G89.29 CHRONIC BILATERAL LOW BACK PAIN WITH LEFT-SIDED SCIATICA: ICD-10-CM

## 2022-04-29 DIAGNOSIS — M54.42 CHRONIC BILATERAL LOW BACK PAIN WITH LEFT-SIDED SCIATICA: ICD-10-CM

## 2022-04-29 DIAGNOSIS — R40.0 DAYTIME SOMNOLENCE: ICD-10-CM

## 2022-04-29 DIAGNOSIS — R20.0 LEFT FACIAL NUMBNESS: ICD-10-CM

## 2022-04-29 PROCEDURE — 3078F DIAST BP <80 MM HG: CPT | Performed by: INTERNAL MEDICINE

## 2022-04-29 PROCEDURE — 1036F TOBACCO NON-USER: CPT | Performed by: INTERNAL MEDICINE

## 2022-04-29 PROCEDURE — 1160F RVW MEDS BY RX/DR IN RCRD: CPT | Performed by: INTERNAL MEDICINE

## 2022-04-29 PROCEDURE — 3074F SYST BP LT 130 MM HG: CPT | Performed by: INTERNAL MEDICINE

## 2022-04-29 PROCEDURE — 99215 OFFICE O/P EST HI 40 MIN: CPT | Performed by: INTERNAL MEDICINE

## 2022-04-29 PROCEDURE — 73110 X-RAY EXAM OF WRIST: CPT

## 2022-04-29 PROCEDURE — 71046 X-RAY EXAM CHEST 2 VIEWS: CPT

## 2022-04-29 RX ORDER — TIZANIDINE 2 MG/1
2 TABLET ORAL EVERY 8 HOURS PRN
Qty: 30 TABLET | Refills: 0 | Status: SHIPPED | OUTPATIENT
Start: 2022-04-29 | End: 2022-05-26

## 2022-04-29 RX ORDER — TRAZODONE HYDROCHLORIDE 50 MG/1
25 TABLET ORAL
Qty: 30 TABLET | Refills: 2 | Status: SHIPPED | OUTPATIENT
Start: 2022-04-29

## 2022-04-29 RX ORDER — PREDNISONE 10 MG/1
TABLET ORAL
Qty: 24 TABLET | Refills: 0 | Status: SHIPPED | OUTPATIENT
Start: 2022-04-29 | End: 2022-05-18 | Stop reason: ALTCHOICE

## 2022-04-29 NOTE — PROGRESS NOTES
Assessment/Plan:    IFG (impaired fasting glucose)  Never did BW - order given and urged to do prior to next appt    Hypertension  BP at goal, con't current meds    Chronic bilateral low back pain with left-sided sciatica  At baseline, med list reviewed in details and refills provided upon request    Anxiety and depression  Mood much better, feels no changes to meds needed, con't current regimen    Insomnia  Much better w/Trazodone, no SE noted, wishes for no changes, con't current meds       Diagnoses and all orders for this visit:    Wrist pain, acute, right  Comments:  Acute arthropathy w/o known injury - d/t tenderness on exam will check Xrays and try trial of Prednisone, SE reviewed, advised ice and Tylenol, check CRP/RF and  Orders:  -     XR wrist 3+ vw right; Future  -     C-reactive protein; Future  -     Rheumatoid Arthritis Factor; Future  -     C-reactive protein  -     Rheumatoid Arthritis Factor  -     predniSONE 10 mg tablet; 3 tab PO x 1 then 2 tab PO bid x 3 days then 1 tab PO bid x 3 days then 1 tab PO q day x 3 days then stop    Left facial numbness  Comments:  Reports eval in [place] and negative CT head - no way to get results, will proceed with MRI brain, to ED with red flag Neuro symptoms  Orders:  -     MRI brain wo contrast; Future    CRAWLEY (dyspnea on exertion)  Comments:  Persistent, neg stress in Feb, check Echo/CXR/home sleep study and will follow - may need PFT's further Pulm eval if no cause noted, call with new/worse symptoms  Orders:  -     Echo complete w/ contrast if indicated; Future  -     XR chest pa & lateral; Future    Witnessed episode of apnea  Comments:  Mult s/sx c/w SAGE - check home sleep study, will follow  Orders:  -     Home Study; Future    Daytime somnolence  Comments:  mult s/sx of SAGE - will eval with home sleep study - order given  Orders:  -     Home Study; Future    Insomnia, unspecified type  -     traZODone (DESYREL) 50 mg tablet;  Take 0 5 tablets (25 mg total) by mouth daily at bedtime    Anxiety and depression    Chronic bilateral low back pain with left-sided sciatica  -     tiZANidine (ZANAFLEX) 2 mg tablet; Take 1 tablet (2 mg total) by mouth every 8 (eight) hours as needed for muscle spasms    Insomnia, unspecified type      IFG (impaired fasting glucose)    Primary hypertension      Colonoscopy 7/14 - 10 yrs    Mammo 11/21    Dexa 11/21 - osteopenia, on Fosamax    BW 9/21 - order was given to be done 3/11/22 but no results in chart        Subjective:      Patient ID: Nita Arnold is a 76 y o  female  HPI Pt here for follow up appt    Pt notes 1 wk of R wrist pain  She denies any specific fall/injury/trauma/new activity  Pain started abruptly upon awakening one morning  She notes no neck pain  Pain starts at R wrist and can go up to the elbow  She notes a little numbness at the 2nd to 4th digit  She notes difficulty holding objects d/t pain but NOT weakness  The pain is constant and is sharp  Pain is worse putting it down below 90 degrees or trying to grasp objects  She has tried an OTC cream with benefit and is wearing a wrist brace which also helps  She notes some mild redness/warmth/swelling  She notes no similar episodes like this before  Had a ED visit in [place] for numbness in face and CP  She went to ED but had no eval after waiting for 9 hrs  She has had some numbness but not as bad as the episode resulting in ED visit  Numbness is intermittent but occurring daily - once daily  She notes numbness in L side of face from forehead/nose/cheek/chin  She notes no rash/facial droop/slurred speech/problems with word finding or getting words out  She was told she had "inflammation of the brain"  She states she had a CT scan  Pt was started on Trazodone for sleep last visit  She states she is taking 1/2 tab at bedtime and is sleeping well  She still has good and bad days wit her depression   She has days when she gets tearful thinking of her  who recently passed  She does get frustrated easily  Pt was noting CP at last visit  She had a stress test and it was negative for ischemia  She has had increase in CRAWLEY - has to rest just toweling dry from the shower  She notes no SOB at rest   She is able to walk on flat ground but has issues going up stairs or up an incline  She has had no cough/F/C/cold symptoms  She notes no LE edema but has had some orthopnea "only sometimes"  She does snore and has had some apnea  She does not wake feeling rested and feels tired during the day  She notes chronic HA's and some issues with concentration and memory  BP at goal today and meds were reviewed and no changes have occurred  She denies missing doses of meds or SE with the meds  She does not check her BP outside the office  She notes no frequent Ha's/dizziness/double vision/CP  Review of Systems   Constitutional: Negative for chills and fever  HENT: Positive for sore throat  Negative for congestion  Eyes: Negative for pain and visual disturbance  Respiratory: Positive for shortness of breath and wheezing  Negative for cough  Cardiovascular: Positive for chest pain  Negative for palpitations and leg swelling  Gastrointestinal: Negative for abdominal pain, constipation and diarrhea  Genitourinary: Negative for difficulty urinating and dysuria  Musculoskeletal: Positive for arthralgias, back pain, gait problem and joint swelling  Skin: Negative for rash and wound  Neurological: Positive for numbness and headaches  Negative for dizziness and weakness  Hematological: Does not bruise/bleed easily  Psychiatric/Behavioral: Positive for dysphoric mood  Negative for sleep disturbance  Objective:    /58   Pulse 76   Temp (!) 96 1 °F (35 6 °C) (Tympanic)   Ht 5' (1 524 m)   Wt 73 8 kg (162 lb 9 6 oz)   SpO2 98%   BMI 31 76 kg/m²      Physical Exam  Vitals and nursing note reviewed     Constitutional:       General: She is not in acute distress  Appearance: She is well-developed  She is obese  She is not ill-appearing  HENT:      Head: Normocephalic and atraumatic  Right Ear: Tympanic membrane and external ear normal  There is no impacted cerumen  Left Ear: Tympanic membrane and external ear normal  There is no impacted cerumen  Mouth/Throat:      Mouth: Mucous membranes are moist       Pharynx: Oropharynx is clear  Eyes:      General:         Right eye: No discharge  Left eye: No discharge  Extraocular Movements: Extraocular movements intact  Conjunctiva/sclera: Conjunctivae normal       Pupils: Pupils are equal, round, and reactive to light  Neck:      Vascular: No carotid bruit  Trachea: No tracheal deviation  Cardiovascular:      Rate and Rhythm: Normal rate and regular rhythm  Heart sounds: Normal heart sounds  No murmur heard  No friction rub  Pulmonary:      Effort: Pulmonary effort is normal  No respiratory distress  Breath sounds: No wheezing or rhonchi  Comments: R basilar BS abnormal but not rhonchi  Abdominal:      General: There is no distension  Palpations: Abdomen is soft  Tenderness: There is no abdominal tenderness  There is no guarding or rebound  Musculoskeletal:         General: Swelling and tenderness present  Cervical back: Neck supple  Comments: R wrist with very mild swelling and redness at dorsum 2nd-3rd finger wrist, tenderness to lateral and dorsal wrist   Lymphadenopathy:      Cervical: No cervical adenopathy  Skin:     General: Skin is warm  Findings: Erythema present  No bruising or rash  Neurological:      Mental Status: She is alert  Cranial Nerves: Cranial nerve deficit present  Sensory: Sensory deficit present  Motor: Weakness present  No abnormal muscle tone        Gait: Gait normal       Deep Tendon Reflexes: Reflexes normal       Comments: CN II - XII intact other then decreased sensation to light touch V1/V2/V3 L face, 4/5 muscle strength globally, sensation intact to light touch B/L UE and LE, 2/4 bicep and patellar reflexes B/L LE, unable to do HS d/t knee and back pain   Psychiatric:         Behavior: Behavior normal          Thought Content:  Thought content normal          Judgment: Judgment normal

## 2022-05-01 LAB
25(OH)D3+25(OH)D2 SERPL-MCNC: 31.3 NG/ML (ref 30–100)
BUN SERPL-MCNC: 24 MG/DL (ref 8–27)
BUN/CREAT SERPL: 25 (ref 12–28)
CALCIUM SERPL-MCNC: 10.5 MG/DL (ref 8.7–10.3)
CHLORIDE SERPL-SCNC: 106 MMOL/L (ref 96–106)
CO2 SERPL-SCNC: 22 MMOL/L (ref 20–29)
CREAT SERPL-MCNC: 0.95 MG/DL (ref 0.57–1)
CRP SERPL-MCNC: 5 MG/L (ref 0–10)
EGFR: 61 ML/MIN/1.73
EST. AVERAGE GLUCOSE BLD GHB EST-MCNC: 128 MG/DL
GLUCOSE SERPL-MCNC: 88 MG/DL (ref 65–99)
HBA1C MFR BLD: 6.1 % (ref 4.8–5.6)
POTASSIUM SERPL-SCNC: 4.8 MMOL/L (ref 3.5–5.2)
RHEUMATOID FACT SERPL-ACNC: <10 IU/ML
SODIUM SERPL-SCNC: 141 MMOL/L (ref 134–144)

## 2022-05-11 ENCOUNTER — HOSPITAL ENCOUNTER (OUTPATIENT)
Dept: NON INVASIVE DIAGNOSTICS | Facility: HOSPITAL | Age: 77
Discharge: HOME | End: 2022-05-11
Payer: COMMERCIAL

## 2022-05-11 VITALS
SYSTOLIC BLOOD PRESSURE: 128 MMHG | WEIGHT: 162 LBS | BODY MASS INDEX: 31.8 KG/M2 | DIASTOLIC BLOOD PRESSURE: 58 MMHG | HEIGHT: 60 IN | HEART RATE: 63 BPM

## 2022-05-11 DIAGNOSIS — R06.00 DOE (DYSPNEA ON EXERTION): ICD-10-CM

## 2022-05-11 PROCEDURE — 93306 TTE W/DOPPLER COMPLETE: CPT

## 2022-05-13 DIAGNOSIS — I34.0 MITRAL VALVE INSUFFICIENCY, UNSPECIFIED ETIOLOGY: Primary | ICD-10-CM

## 2022-05-13 LAB
AORTIC ROOT: 3 CM
AORTIC VALVE MEAN VELOCITY: 15.8 M/S
APICAL FOUR CHAMBER EJECTION FRACTION: 70 %
AV AREA BY CONTINUOUS VTI: 1.4 CM2
AV AREA PEAK VELOCITY: 1.3 CM2
AV LVOT MEAN GRADIENT: 3 MMHG
AV LVOT PEAK GRADIENT: 5 MMHG
AV MEAN GRADIENT: 11 MMHG
AV PEAK GRADIENT: 21 MMHG
AV REGURGITATION PRESSURE HALF TIME: 528 MS
AV VALVE AREA: 1.39 CM2
AV VELOCITY RATIO: 0.48
DOP CALC AO PEAK VEL: 2.27 M/S
DOP CALC AO VTI: 47.56 CM
DOP CALC LVOT AREA: 2.83 CM2
DOP CALC LVOT DIAMETER: 1.9 CM
DOP CALC LVOT PEAK VEL VTI: 23.31 CM
DOP CALC LVOT PEAK VEL: 1.08 M/S
DOP CALC LVOT STROKE INDEX: 38 ML/M2
DOP CALC LVOT STROKE VOLUME: 66.06 CM3
DOP CALC MV VTI: 41.84 CM
E WAVE DECELERATION TIME: 276 MS
FRACTIONAL SHORTENING: 38 % (ref 28–44)
INTERVENTRICULAR SEPTUM IN DIASTOLE (PARASTERNAL SHORT AXIS VIEW): 1 CM
INTERVENTRICULAR SEPTUM: 1 CM (ref 0.6–1.1)
LAAS-AP2: 22.5 CM2
LAAS-AP4: 21.8 CM2
LEFT ATRIUM SIZE: 3.5 CM
LEFT INTERNAL DIMENSION IN SYSTOLE: 2.6 CM (ref 2.1–4)
LEFT VENTRICLE DIASTOLIC VOLUME (MOD BIPLANE): 92 ML
LEFT VENTRICLE SYSTOLIC VOLUME (MOD BIPLANE): 29 ML
LEFT VENTRICULAR INTERNAL DIMENSION IN DIASTOLE: 4.2 CM (ref 3.5–6)
LEFT VENTRICULAR POSTERIOR WALL IN END DIASTOLE: 0.9 CM
LEFT VENTRICULAR STROKE VOLUME: 53 ML
LV EF: 69 %
LVSV (TEICH): 53 ML
MV E'TISSUE VEL-LAT: 6 CM/S
MV E'TISSUE VEL-SEP: 6 CM/S
MV EROA: 0.5 CM2
MV MEAN GRADIENT: 2 MMHG
MV PEAK A VEL: 1.08 M/S
MV PEAK E VEL: 108 CM/S
MV PEAK GRADIENT: 7 MMHG
MV STENOSIS PRESSURE HALF TIME: 80 MS
MV VALVE AREA BY CONTINUITY EQUATION: 1.58 CM2
MV VALVE AREA P 1/2 METHOD: 2.75 CM2
PISA MRMAX VEL: 0.36 M/S
PISA RADIUS: 1.1 CM
RIGHT ATRIAL 2D VOLUME: 39 ML
RIGHT ATRIUM AREA SYSTOLE A4C: 15.4 CM2
RIGHT VENTRICLE ID DIMENSION: 3.1 CM
SL CV AV DECELERATION TIME RETROGRADE: 1820 MS
SL CV AV PEAK GRADIENT RETROGRADE: 80 MMHG
SL CV LEFT ATRIUM LENGTH A2C: 6.1 CM
SL CV LV EF: 55
SL CV PED ECHO LEFT VENTRICLE DIASTOLIC VOLUME (MOD BIPLANE) 2D: 78 ML
SL CV PED ECHO LEFT VENTRICLE SYSTOLIC VOLUME (MOD BIPLANE) 2D: 25 ML
TR MAX PG: 22 MMHG
TR PEAK VELOCITY: 2.3 M/S
TRICUSPID VALVE PEAK REGURGITATION VELOCITY: 2.33 M/S

## 2022-05-13 PROCEDURE — 93306 TTE W/DOPPLER COMPLETE: CPT | Performed by: INTERNAL MEDICINE

## 2022-05-14 NOTE — PROGRESS NOTES
Cardiology Consultation     Deb Castillo  [id number]  1945  [facility] [address] [facility]  [address] [place] [address]  [facility] [place] [id number] [phone]  1  Pure hypercholesterolemia  Echo complete w/ contrast if indicated    losartan (COZAAR) 100 MG tablet   2  Essential (primary) hypertension  Echo complete w/ contrast if indicated    losartan (COZAAR) 100 MG tablet   3  Mitral valve insufficiency, unspecified etiology  Ambulatory Referral to Cardiology    Echo complete w/ contrast if indicated    losartan (COZAAR) 100 MG tablet   4  RBBB  Echo complete w/ contrast if indicated    losartan (COZAAR) 100 MG tablet   5  Aortic valve disease  Echo complete w/ contrast if indicated    losartan (COZAAR) 100 MG tablet     Patient Active Problem List   Diagnosis    Tremor    Atrophic vaginitis    Osteoporosis    Obesity    Migraine headache    Hypothyroidism    Hypertension    Glaucoma    Esophageal reflux    Dyslipidemia    Cataract    Asthma    Lumbar spondylosis    IFG (impaired fasting glucose)    Chronic pain syndrome    Chronic bilateral low back pain with left-sided sciatica    Lumbar post-laminectomy syndrome    Lumbar radiculopathy    Chronic pain of left knee    Primary osteoarthritis of left knee    Gait abnormality    Weakness of left leg    Anserine bursitis    Depression, recurrent (HCC)    Anxiety and depression    Rib pain on left side    Intercostal neuropathy    Constipation    Vitamin D deficiency    Insomnia       HPI patient here for cardiology evaluation  Patient has HTN, HLD and DM  Pharmacologic nuclear stress test done February 2022 demonstrated no ischemia  Echocardiogram done May 2022 demonstrated preserved LV systolic function with LVEF of 55%  There was mild LAE  There was moderate to severe MR  Mild mixed aortic valve disease was noted    Lipid profile done September 2021 demonstrated total cholesterol of 121 with an HDL of 41 and a calculated LDL of 60  EKG done 2022 demonstrated sinus rhythm with right bundle branch block  Patient's daughter is here today  The patient lost her  in January  She has obviously been depressed in reference to this  She is spending the summer in [place] where he has buried  She does get intermittent dyspnea  She has had no chest pain  Interestingly her family history is positive for heart disease in reference to two brothers and a sister having bypass surgery  It is unclear whether they had valve surgery  They were all smokers  Patient has smoked when she was young but essentially is a nonsmoker      PMH-  Past Medical History:   Diagnosis Date    Asthma     Dyslipidemia     Esophageal reflux     History of stomach ulcers     Hypercalcemia     Hypertension     Osteopenia     Tremor         SOCIAL HISTORY-  Social History     Socioeconomic History    Marital status: /Civil Union     Spouse name: Not on file    Number of children: Not on file    Years of education: Not on file    Highest education level: Not on file   Occupational History    Occupation: unemployed   Tobacco Use    Smoking status: Former Smoker     Quit date: 1991     Years since quittin 0    Smokeless tobacco: Never Used   Vaping Use    Vaping Use: Never used   Substance and Sexual Activity    Alcohol use: Not Currently    Drug use: Never    Sexual activity: Not on file   Other Topics Concern    Not on file   Social History Narrative    Not on file     Social Determinants of Health     Financial Resource Strain: Not on file   Food Insecurity: Not on file   Transportation Needs: Not on file   Physical Activity: Not on file   Stress: Not on file   Social Connections: Not on file   Intimate Partner Violence: Not on file   Housing Stability: Not on file        FAMILY HISTORY-  Family History   Problem Relation Age of Onset  Hypertension Family     Stroke Family     Heart disease Family     Thyroid disease Family     No Known Problems Sister     No Known Problems Daughter     No Known Problems Sister     No Known Problems Sister     No Known Problems Sister     No Known Problems Sister     No Known Problems Daughter     No Known Problems Daughter     No Known Problems Mother     No Known Problems Father     No Known Problems Maternal Grandmother     No Known Problems Maternal Grandfather     No Known Problems Paternal Grandmother     No Known Problems Paternal Grandfather        SURGICAL HISTORY-  Past Surgical History:   Procedure Laterality Date    BACK SURGERY      lower back    BILATERAL OOPHORECTOMY      BREAST BIOPSY Right     long ago, benign     SECTION      CHOLECYSTECTOMY      HYSTERECTOMY      age 43 per MRS    OOPHORECTOMY Bilateral     age 43 per MRS    UT SURG IMPLNT NEUROELECT,EPIDURAL Left 2019    Procedure: LAMINECTOMY THORACIC FOR INSERTION DORSAL COLUMN SPINAL CORD STIMULATOR (DCS) W IMPLANTABLE PULSE GENERATOR, LEFT GLUTE;  Surgeon: Malachi Hansen MD;  Location: [facility];  Service: Neurosurgery    ROTATOR CUFF REPAIR Right     SPINAL STIMULATOR PLACEMENT N/A 3/29/2019    Procedure: REVISION SPINAL CORD STIMULATOR PADDLE ELECTRODE, REPLACEMENT WITH PERCUTANEOUS ELECTRODES OR SMALLER PADDLE;  Surgeon: Malachi Hansen MD;  Location[facility];  Service: Neurosurgery         Current Outpatient Medications:     albuterol (VENTOLIN HFA) 90 mcg/act inhaler, Inhale 2 puffs every 4 (four) hours as needed for wheezing or shortness of breath, Disp: 6 7 g, Rfl: 3    alendronate (FOSAMAX) 70 mg tablet, Take 1 tablet (70 mg total) by mouth once a week, Disp: 4 tablet, Rfl: 6    atorvastatin (LIPITOR) 20 mg tablet, Take 1 tablet by mouth once daily, Disp: 90 tablet, Rfl: 0    bimatoprost (LUMIGAN) 0 01 % ophthalmic drops, Administer 1 drop to both eyes daily (Patient taking differently: Administer 1 drop to both eyes in the morning and 1 drop in the evening ), Disp: 5 mL, Rfl: 6    buPROPion (WELLBUTRIN XL) 300 mg 24 hr tablet, Take 1 tablet by mouth once daily, Disp: 90 tablet, Rfl: 0    cholecalciferol (VITAMIN D3) 1,000 units tablet, Take 1 tablet (1,000 Units total) by mouth daily, Disp: 30 tablet, Rfl: 5    gabapentin (NEURONTIN) 800 mg tablet, TAKE 1 TABLET BY MOUTH IN THE MORNING AND 1 TABLET AT BEDTIME, Disp: 180 tablet, Rfl: 0    glycopyrrolate (ROBINUL) 2 MG tablet, Take 1 tablet (2 mg total) by mouth 2 (two) times a day, Disp: 60 tablet, Rfl: 2    losartan (COZAAR) 100 MG tablet, Take 1 tablet (100 mg total) by mouth in the morning , Disp: 90 tablet, Rfl: 3    montelukast (SINGULAIR) 10 mg tablet, Take 1 tablet (10 mg total) by mouth as needed (allergies), Disp: 30 tablet, Rfl: 5    omeprazole (PriLOSEC) 40 MG capsule, Take 1 capsule (40 mg total) by mouth daily, Disp: 30 capsule, Rfl: 6    Synthroid 88 MCG tablet, Take 1 tablet by mouth once daily, Disp: 90 tablet, Rfl: 0    Timolol Maleate 0 5 % (DAILY) SOLN, Apply 1 drop to eye every 12 (twelve) hours, Disp: 5 mL, Rfl: 6    tiZANidine (ZANAFLEX) 2 mg tablet, Take 1 tablet (2 mg total) by mouth every 8 (eight) hours as needed for muscle spasms, Disp: 30 tablet, Rfl: 0    traZODone (DESYREL) 50 mg tablet, Take 0 5 tablets (25 mg total) by mouth daily at bedtime, Disp: 30 tablet, Rfl: 2  Allergies   Allergen Reactions    Iodinated Diagnostic Agents Anaphylaxis     Contrast Dye    Aspirin GI Intolerance     Vitals:    05/18/22 0951   BP: 146/68   BP Location: Right arm   Patient Position: Sitting   Cuff Size: Standard   Pulse: 68   Weight: 74 8 kg (165 lb)   Height: 5' (1 524 m)         Review of Systems:  Review of Systems   All other systems reviewed and are negative  Physical Exam:  Physical Exam  Vitals reviewed  Constitutional:       Appearance: She is well-developed     HENT:      Head: Normocephalic and atraumatic  Eyes:      Conjunctiva/sclera: Conjunctivae normal       Pupils: Pupils are equal, round, and reactive to light  Cardiovascular:      Rate and Rhythm: Normal rate  Heart sounds: Normal heart sounds  Pulmonary:      Effort: Pulmonary effort is normal       Breath sounds: Normal breath sounds  Musculoskeletal:      Cervical back: Normal range of motion and neck supple  Skin:     General: Skin is warm and dry  Neurological:      Mental Status: She is alert and oriented to person, place, and time  Discussion/Summary:  Patient's blood pressure was a little high today  I have asked her to increase her losartan to 100 mg per day  We will check surveillance echocardiograms in reference to her LV function and MR  I have encouraged the patient to exercise and to lose weight  I have asked her to modify her diet in reference to salt, fat and sugar intake  I have asked her to be compliant with her medications  Her daughter will help in this regard  I have asked him to call if there is a problem in the interim otherwise I will see her in follow-up in six months time in sooner as is necessary

## 2022-05-18 ENCOUNTER — CONSULT (OUTPATIENT)
Dept: CARDIOLOGY CLINIC | Facility: CLINIC | Age: 77
End: 2022-05-18
Payer: COMMERCIAL

## 2022-05-18 VITALS
HEART RATE: 68 BPM | HEIGHT: 60 IN | WEIGHT: 165 LBS | DIASTOLIC BLOOD PRESSURE: 68 MMHG | BODY MASS INDEX: 32.39 KG/M2 | SYSTOLIC BLOOD PRESSURE: 146 MMHG

## 2022-05-18 DIAGNOSIS — E78.00 PURE HYPERCHOLESTEROLEMIA: Primary | ICD-10-CM

## 2022-05-18 DIAGNOSIS — I34.0 MITRAL VALVE INSUFFICIENCY, UNSPECIFIED ETIOLOGY: ICD-10-CM

## 2022-05-18 DIAGNOSIS — I45.10 RBBB: ICD-10-CM

## 2022-05-18 DIAGNOSIS — I10 ESSENTIAL (PRIMARY) HYPERTENSION: ICD-10-CM

## 2022-05-18 DIAGNOSIS — I35.9 AORTIC VALVE DISEASE: ICD-10-CM

## 2022-05-18 PROCEDURE — 3078F DIAST BP <80 MM HG: CPT | Performed by: INTERNAL MEDICINE

## 2022-05-18 PROCEDURE — 3077F SYST BP >= 140 MM HG: CPT | Performed by: INTERNAL MEDICINE

## 2022-05-18 PROCEDURE — 99215 OFFICE O/P EST HI 40 MIN: CPT | Performed by: INTERNAL MEDICINE

## 2022-05-18 RX ORDER — LOSARTAN POTASSIUM 100 MG/1
100 TABLET ORAL DAILY
Qty: 90 TABLET | Refills: 3 | Status: SHIPPED | OUTPATIENT
Start: 2022-05-18 | End: 2022-07-10 | Stop reason: SDUPTHER

## 2022-05-18 NOTE — PATIENT INSTRUCTIONS
Please increase dose of losartan to 100 mg per day  Will schedule follow-up echo  Please call if there is a problem  I will see you in follow-up

## 2022-05-26 DIAGNOSIS — G89.29 CHRONIC BILATERAL LOW BACK PAIN WITH LEFT-SIDED SCIATICA: ICD-10-CM

## 2022-05-26 DIAGNOSIS — M54.42 CHRONIC BILATERAL LOW BACK PAIN WITH LEFT-SIDED SCIATICA: ICD-10-CM

## 2022-05-26 DIAGNOSIS — E78.5 DYSLIPIDEMIA: ICD-10-CM

## 2022-05-26 DIAGNOSIS — F32.A ANXIETY AND DEPRESSION: ICD-10-CM

## 2022-05-26 DIAGNOSIS — M81.0 OSTEOPOROSIS, UNSPECIFIED OSTEOPOROSIS TYPE, UNSPECIFIED PATHOLOGICAL FRACTURE PRESENCE: ICD-10-CM

## 2022-05-26 DIAGNOSIS — G89.4 CHRONIC PAIN SYNDROME: ICD-10-CM

## 2022-05-26 DIAGNOSIS — E03.9 HYPOTHYROIDISM, UNSPECIFIED TYPE: ICD-10-CM

## 2022-05-26 DIAGNOSIS — J45.909 UNCOMPLICATED ASTHMA, UNSPECIFIED ASTHMA SEVERITY, UNSPECIFIED WHETHER PERSISTENT: ICD-10-CM

## 2022-05-26 DIAGNOSIS — F41.9 ANXIETY AND DEPRESSION: ICD-10-CM

## 2022-05-26 RX ORDER — ALENDRONATE SODIUM 70 MG/1
TABLET ORAL
Qty: 4 TABLET | Refills: 0 | Status: SHIPPED | OUTPATIENT
Start: 2022-05-26 | End: 2022-07-10 | Stop reason: SDUPTHER

## 2022-05-26 RX ORDER — LEVOTHYROXINE SODIUM 88 MCG
TABLET ORAL
Qty: 90 TABLET | Refills: 0 | Status: SHIPPED | OUTPATIENT
Start: 2022-05-26 | End: 2022-07-10 | Stop reason: SDUPTHER

## 2022-05-26 RX ORDER — TIZANIDINE 2 MG/1
TABLET ORAL
Qty: 30 TABLET | Refills: 0 | Status: SHIPPED | OUTPATIENT
Start: 2022-05-26 | End: 2022-07-10 | Stop reason: SDUPTHER

## 2022-05-26 RX ORDER — GABAPENTIN 800 MG/1
TABLET ORAL
Qty: 180 TABLET | Refills: 0 | Status: SHIPPED | OUTPATIENT
Start: 2022-05-26 | End: 2022-07-10 | Stop reason: SDUPTHER

## 2022-05-26 RX ORDER — BUPROPION HYDROCHLORIDE 300 MG/1
TABLET ORAL
Qty: 90 TABLET | Refills: 0 | Status: SHIPPED | OUTPATIENT
Start: 2022-05-26 | End: 2022-07-10 | Stop reason: SDUPTHER

## 2022-05-26 RX ORDER — ATORVASTATIN CALCIUM 20 MG/1
TABLET, FILM COATED ORAL
Qty: 90 TABLET | Refills: 0 | Status: SHIPPED | OUTPATIENT
Start: 2022-05-26 | End: 2022-06-14

## 2022-05-26 RX ORDER — MONTELUKAST SODIUM 10 MG/1
TABLET ORAL
Qty: 30 TABLET | Refills: 0 | Status: SHIPPED | OUTPATIENT
Start: 2022-05-26 | End: 2022-07-10 | Stop reason: SDUPTHER

## 2022-06-09 ENCOUNTER — TELEPHONE (OUTPATIENT)
Dept: SLEEP CENTER | Facility: CLINIC | Age: 77
End: 2022-06-09

## 2022-06-09 NOTE — TELEPHONE ENCOUNTER
----- Message from Venkata Babin MD sent at 6/9/2022 10:29 AM EDT -----  Approved  ----- Message -----  From: Nancy Lee  Sent: 2/3/1182   9:40 AM EDT  To: Sleep Medicine [place] Provider    This sleep study needs approval      If approved please sign and return to clerical pool  If denied please include reasons why  Also provide alternative testing if warranted  Please sign and return to clerical pool

## 2022-06-14 ENCOUNTER — TELEPHONE (OUTPATIENT)
Dept: FAMILY MEDICINE CLINIC | Facility: HOSPITAL | Age: 77
End: 2022-06-14

## 2022-06-14 DIAGNOSIS — E78.5 DYSLIPIDEMIA: ICD-10-CM

## 2022-06-14 DIAGNOSIS — R20.0 LEFT FACIAL NUMBNESS: Primary | ICD-10-CM

## 2022-06-14 RX ORDER — ATORVASTATIN CALCIUM 20 MG/1
TABLET, FILM COATED ORAL
Qty: 90 TABLET | Refills: 0 | Status: SHIPPED | OUTPATIENT
Start: 2022-06-14 | End: 2022-07-10 | Stop reason: SDUPTHER

## 2022-06-14 NOTE — TELEPHONE ENCOUNTER
MRI at [facility], [phone] called  MRI of brain without contrast cannot be done, pt has stimulator in place  Please have Dr Taisha Lopez order another type of imaging  Call tech at MRI

## 2022-06-20 ENCOUNTER — HOSPITAL ENCOUNTER (OUTPATIENT)
Dept: CT IMAGING | Facility: HOSPITAL | Age: 77
Discharge: HOME | End: 2022-06-20
Payer: COMMERCIAL

## 2022-06-20 DIAGNOSIS — R20.0 LEFT FACIAL NUMBNESS: ICD-10-CM

## 2022-06-20 PROCEDURE — 70450 CT HEAD/BRAIN W/O DYE: CPT

## 2022-06-20 PROCEDURE — G1004 CDSM NDSC: HCPCS

## 2022-07-01 ENCOUNTER — TELEPHONE (OUTPATIENT)
Dept: NEUROSURGERY | Facility: CLINIC | Age: 77
End: 2022-07-01

## 2022-07-01 NOTE — TELEPHONE ENCOUNTER
OUR OFFICE RECEIVED A CALL FROM SURINDER-RINA ABOUT SCHEDULING AN APPOINTMENT FOR THE PATIENT TO DISCUSS REMOVING THE SCS SYSTEM AS IT IS NOT WORKING FOR THE PATIENT  NO REFERRAL IN CHART  LAST SEEN BY DR Sheri VANEGAS ON 5/13/2019  S/P REVISION SPINAL CORD STIMULATOR PADDLE ELECTRODE, REPLACEMENT WITH PERCUTANEOUS ELECTRODES OR SMALLER PADDLE 3/29/2019 BY DR Lucio Mckeon  S/P LAMINECTOMY THORACIC FOR INSERTION DORSAL COLUMN SPINAL CORD STIMULATOR (DCS) W IMPLANTABLE PULSE GENERATOR, LEFT GLUTE 2/5/2019 BY DR Lucio Mckeon    I CALLED BACK [phone] AND LM FOR SURINDER TO CALL BACK TO GET MORE INFORMATION AND ENTER REFERRAL

## 2022-07-09 ENCOUNTER — APPOINTMENT (EMERGENCY)
Dept: RADIOLOGY | Facility: HOSPITAL | Age: 77
End: 2022-07-09
Payer: COMMERCIAL

## 2022-07-09 ENCOUNTER — HOSPITAL ENCOUNTER (EMERGENCY)
Facility: HOSPITAL | Age: 77
Discharge: HOME | End: 2022-07-09
Attending: EMERGENCY MEDICINE
Payer: COMMERCIAL

## 2022-07-09 VITALS
OXYGEN SATURATION: 98 % | SYSTOLIC BLOOD PRESSURE: 159 MMHG | DIASTOLIC BLOOD PRESSURE: 76 MMHG | RESPIRATION RATE: 18 BRPM | TEMPERATURE: 96.8 F | HEART RATE: 65 BPM

## 2022-07-09 DIAGNOSIS — R07.9 CHEST PAIN: Primary | ICD-10-CM

## 2022-07-09 DIAGNOSIS — R06.01 ORTHOPNEA: ICD-10-CM

## 2022-07-09 LAB
2HR DELTA HS TROPONIN: -1 NG/L
ALBUMIN SERPL BCP-MCNC: 3.7 G/DL (ref 3.5–5)
ALP SERPL-CCNC: 265 U/L (ref 46–116)
ALT SERPL W P-5'-P-CCNC: 16 U/L (ref 12–78)
ANION GAP SERPL CALCULATED.3IONS-SCNC: 9 MMOL/L (ref 4–13)
AST SERPL W P-5'-P-CCNC: 10 U/L (ref 5–45)
ATRIAL RATE: 77 BPM
BASOPHILS # BLD AUTO: 0.04 THOUSANDS/ΜL (ref 0–0.1)
BASOPHILS NFR BLD AUTO: 1 % (ref 0–1)
BILIRUB SERPL-MCNC: 0.6 MG/DL (ref 0.2–1)
BUN SERPL-MCNC: 17 MG/DL (ref 5–25)
CALCIUM SERPL-MCNC: 9.7 MG/DL (ref 8.3–10.1)
CARDIAC TROPONIN I PNL SERPL HS: 10 NG/L
CARDIAC TROPONIN I PNL SERPL HS: 9 NG/L
CHLORIDE SERPL-SCNC: 108 MMOL/L (ref 100–108)
CO2 SERPL-SCNC: 26 MMOL/L (ref 21–32)
CREAT SERPL-MCNC: 1.25 MG/DL (ref 0.6–1.3)
EOSINOPHIL # BLD AUTO: 0.8 THOUSAND/ΜL (ref 0–0.61)
EOSINOPHIL NFR BLD AUTO: 12 % (ref 0–6)
ERYTHROCYTE [DISTWIDTH] IN BLOOD BY AUTOMATED COUNT: 13.5 % (ref 11.6–15.1)
GFR SERPL CREATININE-BSD FRML MDRD: 41 ML/MIN/1.73SQ M
GLUCOSE SERPL-MCNC: 153 MG/DL (ref 65–140)
HCT VFR BLD AUTO: 37.4 % (ref 34.8–46.1)
HGB BLD-MCNC: 12.4 G/DL (ref 11.5–15.4)
IMM GRANULOCYTES # BLD AUTO: 0.04 THOUSAND/UL (ref 0–0.2)
IMM GRANULOCYTES NFR BLD AUTO: 1 % (ref 0–2)
LYMPHOCYTES # BLD AUTO: 1.72 THOUSANDS/ΜL (ref 0.6–4.47)
LYMPHOCYTES NFR BLD AUTO: 25 % (ref 14–44)
MCH RBC QN AUTO: 31.6 PG (ref 26.8–34.3)
MCHC RBC AUTO-ENTMCNC: 33.2 G/DL (ref 31.4–37.4)
MCV RBC AUTO: 95 FL (ref 82–98)
MONOCYTES # BLD AUTO: 0.44 THOUSAND/ΜL (ref 0.17–1.22)
MONOCYTES NFR BLD AUTO: 7 % (ref 4–12)
NEUTROPHILS # BLD AUTO: 3.77 THOUSANDS/ΜL (ref 1.85–7.62)
NEUTS SEG NFR BLD AUTO: 54 % (ref 43–75)
NRBC BLD AUTO-RTO: 0 /100 WBCS
NT-PROBNP SERPL-MCNC: 531 PG/ML
P AXIS: 60 DEGREES
PLATELET # BLD AUTO: 185 THOUSANDS/UL (ref 149–390)
PMV BLD AUTO: 11.3 FL (ref 8.9–12.7)
POTASSIUM SERPL-SCNC: 3.7 MMOL/L (ref 3.5–5.3)
PR INTERVAL: 178 MS
PROT SERPL-MCNC: 6.9 G/DL (ref 6.4–8.2)
QRS AXIS: 107 DEGREES
QRSD INTERVAL: 140 MS
QT INTERVAL: 426 MS
QTC INTERVAL: 482 MS
RBC # BLD AUTO: 3.93 MILLION/UL (ref 3.81–5.12)
SODIUM SERPL-SCNC: 143 MMOL/L (ref 136–145)
T WAVE AXIS: 62 DEGREES
VENTRICULAR RATE: 77 BPM
WBC # BLD AUTO: 6.81 THOUSAND/UL (ref 4.31–10.16)

## 2022-07-09 PROCEDURE — 99285 EMERGENCY DEPT VISIT HI MDM: CPT

## 2022-07-09 PROCEDURE — 85025 COMPLETE CBC W/AUTO DIFF WBC: CPT | Performed by: EMERGENCY MEDICINE

## 2022-07-09 PROCEDURE — 93005 ELECTROCARDIOGRAM TRACING: CPT

## 2022-07-09 PROCEDURE — 71045 X-RAY EXAM CHEST 1 VIEW: CPT

## 2022-07-09 PROCEDURE — 93010 ELECTROCARDIOGRAM REPORT: CPT | Performed by: INTERNAL MEDICINE

## 2022-07-09 PROCEDURE — 80053 COMPREHEN METABOLIC PANEL: CPT | Performed by: EMERGENCY MEDICINE

## 2022-07-09 PROCEDURE — 84484 ASSAY OF TROPONIN QUANT: CPT | Performed by: EMERGENCY MEDICINE

## 2022-07-09 PROCEDURE — 99285 EMERGENCY DEPT VISIT HI MDM: CPT | Performed by: EMERGENCY MEDICINE

## 2022-07-09 PROCEDURE — 36415 COLL VENOUS BLD VENIPUNCTURE: CPT | Performed by: EMERGENCY MEDICINE

## 2022-07-09 PROCEDURE — 83880 ASSAY OF NATRIURETIC PEPTIDE: CPT | Performed by: EMERGENCY MEDICINE

## 2022-07-09 RX ORDER — ASPIRIN 81 MG/1
324 TABLET, CHEWABLE ORAL ONCE
Status: COMPLETED | OUTPATIENT
Start: 2022-07-09 | End: 2022-07-09

## 2022-07-09 RX ORDER — PANTOPRAZOLE SODIUM 40 MG/1
40 TABLET, DELAYED RELEASE ORAL ONCE
Status: COMPLETED | OUTPATIENT
Start: 2022-07-09 | End: 2022-07-09

## 2022-07-09 RX ADMIN — ASPIRIN 81 MG CHEWABLE TABLET 324 MG: 81 TABLET CHEWABLE at 11:26

## 2022-07-09 RX ADMIN — PANTOPRAZOLE SODIUM 40 MG: 40 TABLET, DELAYED RELEASE ORAL at 12:00

## 2022-07-09 NOTE — ED PROVIDER NOTES
History  Chief Complaint   Patient presents with    Chest Pain     With SOB  Pt is unable to sleep laying down, no hx of CHF per daughter  76year old female presents for evaluation of 3 days of worsening central chest pressure associated with orthopnea  Patient had a similar episode 2 months ago and had an echocardiogram on 5/11/22 which showed EF 57%, normal diastolic function and moderate to severe mitral valve regurg  No cough, congestion, fevers or chills  History provided by:  Patient, medical records and relative  Chest Pain  Pain location:  Substernal area  Pain quality: pressure    Pain radiates to:  Does not radiate  Pain severity:  Severe  Onset quality:  Gradual  Duration:  3 days  Timing:  Constant  Progression:  Worsening  Chronicity:  New  Relieved by:  Nothing  Worsened by:  Nothing tried  Ineffective treatments:  None tried  Associated symptoms: shortness of breath (with lying flat)    Associated symptoms: no abdominal pain, no cough, no dizziness, no fever, no headache, no nausea and not vomiting    Risk factors: high cholesterol and hypertension    Risk factors: no smoking (former smoker)        Prior to Admission Medications   Prescriptions Last Dose Informant Patient Reported? Taking? Synthroid 88 MCG tablet   No No   Sig: Take 1 tablet by mouth once daily   Timolol Maleate 0 5 % (DAILY) SOLN  Child No No   Sig: Apply 1 drop to eye every 12 (twelve) hours   albuterol (VENTOLIN HFA) 90 mcg/act inhaler  Child No No   Sig: Inhale 2 puffs every 4 (four) hours as needed for wheezing or shortness of breath   alendronate (FOSAMAX) 70 mg tablet   No No   Sig: Take 1 tablet by mouth once a week   atorvastatin (LIPITOR) 20 mg tablet   No No   Sig: Take 1 tablet by mouth once daily   bimatoprost (LUMIGAN) 0 01 % ophthalmic drops  Child No No   Sig: Administer 1 drop to both eyes daily   Patient taking differently: Administer 1 drop to both eyes in the morning and 1 drop in the evening  buPROPion (WELLBUTRIN XL) 300 mg 24 hr tablet   No No   Sig: Take 1 tablet by mouth once daily   cholecalciferol (VITAMIN D3) 1,000 units tablet  Child No No   Sig: Take 1 tablet (1,000 Units total) by mouth daily   gabapentin (NEURONTIN) 800 mg tablet   No No   Sig: TAKE 1 TABLET BY MOUTH IN THE MORNING AND 1 TABLET AT BEDTIME   glycopyrrolate (ROBINUL) 2 MG tablet  Child No No   Sig: Take 1 tablet (2 mg total) by mouth 2 (two) times a day   losartan (COZAAR) 100 MG tablet   No No   Sig: Take 1 tablet (100 mg total) by mouth in the morning    montelukast (SINGULAIR) 10 mg tablet   No No   Sig: TAKE 1 TABLET BY MOUTH AS NEEDED (ALLERGIES)   omeprazole (PriLOSEC) 40 MG capsule  Child No No   Sig: Take 1 capsule (40 mg total) by mouth daily   tiZANidine (ZANAFLEX) 2 mg tablet   No No   Sig: TAKE 1 TABLET BY MOUTH EVERY 8 HOURS AS NEEDED FOR MUSCLE SPASM   traZODone (DESYREL) 50 mg tablet  Child No No   Sig: Take 0 5 tablets (25 mg total) by mouth daily at bedtime      Facility-Administered Medications: None       Past Medical History:   Diagnosis Date    Asthma     Dyslipidemia     Esophageal reflux     History of stomach ulcers     Hypercalcemia     Hypertension     Osteopenia     Tremor        Past Surgical History:   Procedure Laterality Date    BACK SURGERY      lower back    BILATERAL OOPHORECTOMY      BREAST BIOPSY Right     long ago, benign     SECTION      CHOLECYSTECTOMY      HYSTERECTOMY      age 43 per MRS    OOPHORECTOMY Bilateral     age 43 per MRS    OK SURG IMPLNT NEUROELECT,EPIDURAL Left 2019    Procedure: LAMINECTOMY THORACIC FOR INSERTION DORSAL COLUMN SPINAL CORD STIMULATOR (DCS) W IMPLANTABLE PULSE GENERATOR, LEFT GLUTE;  Surgeon: Phi Cunningham MD;  Location:  MAIN OR;  Service: Neurosurgery    ROTATOR CUFF REPAIR Right     SPINAL STIMULATOR PLACEMENT N/A 3/29/2019    Procedure: REVISION SPINAL CORD STIMULATOR PADDLE ELECTRODE, REPLACEMENT WITH PERCUTANEOUS ELECTRODES OR SMALLER PADDLE;  Surgeon: Yolis Rachel MD;  [facility];  Service: Neurosurgery       Family History   Problem Relation Age of Onset    Hypertension Family     Stroke Family     Heart disease Family     Thyroid disease Family     No Known Problems Sister     No Known Problems Daughter     No Known Problems Sister     No Known Problems Sister     No Known Problems Sister     No Known Problems Sister     No Known Problems Daughter     No Known Problems Daughter     No Known Problems Mother     No Known Problems Father     No Known Problems Maternal Grandmother     No Known Problems Maternal Grandfather     No Known Problems Paternal Grandmother     No Known Problems Paternal Grandfather      I have reviewed and agree with the history as documented  E-Cigarette/Vaping    E-Cigarette Use Never User      E-Cigarette/Vaping Substances    Nicotine No     Flavoring No      Social History     Tobacco Use    Smoking status: Former Smoker     Quit date: 1991     Years since quittin 2    Smokeless tobacco: Never Used   Vaping Use    Vaping Use: Never used   Substance Use Topics    Alcohol use: Not Currently    Drug use: Never       Review of Systems   Constitutional: Negative for appetite change, chills and fever  HENT: Negative for congestion and sore throat  Respiratory: Positive for shortness of breath (with lying flat)  Negative for cough and chest tightness  Cardiovascular: Positive for chest pain  Negative for leg swelling  Gastrointestinal: Negative for abdominal pain, constipation, diarrhea, nausea and vomiting  Musculoskeletal: Negative for myalgias  Skin: Negative for rash and wound  Neurological: Negative for dizziness, syncope and headaches  All other systems reviewed and are negative  Physical Exam  Physical Exam  Vitals and nursing note reviewed  Constitutional:       General: She is not in acute distress       Appearance: She is well-developed  She is not toxic-appearing or diaphoretic  HENT:      Head: Normocephalic and atraumatic  Right Ear: External ear normal       Left Ear: External ear normal       Nose: Nose normal    Eyes:      General: No scleral icterus  Cardiovascular:      Rate and Rhythm: Normal rate and regular rhythm  Heart sounds: Murmur heard  Systolic murmur is present with a grade of 3/6  Pulmonary:      Effort: Pulmonary effort is normal  No respiratory distress  Breath sounds: Normal breath sounds  Abdominal:      General: There is no distension  Palpations: Abdomen is soft  Tenderness: There is no abdominal tenderness  Musculoskeletal:         General: No deformity  Normal range of motion  Skin:     General: Skin is warm and dry  Findings: No rash  Neurological:      General: No focal deficit present  Mental Status: She is alert        Gait: Gait normal    Psychiatric:         Mood and Affect: Mood normal          Vital Signs  ED Triage Vitals   Temperature Pulse Respirations Blood Pressure SpO2   07/09/22 1028 07/09/22 1028 07/09/22 1028 07/09/22 1029 07/09/22 1028   (!) 96 8 °F (36 °C) 70 16 (!) 193/83 99 %      Temp src Heart Rate Source Patient Position - Orthostatic VS BP Location FiO2 (%)   -- 07/09/22 1200 -- -- --    Monitor         Pain Score       07/09/22 1329       6           Vitals:    07/09/22 1238 07/09/22 1300 07/09/22 1330 07/09/22 1400   BP: (!) 160/109 157/72 (!) 176/73 (!) 175/70   Pulse: 60 58 60 61         Visual Acuity      ED Medications  Medications   aspirin chewable tablet 324 mg (324 mg Oral Given 7/9/22 1126)   pantoprazole (PROTONIX) EC tablet 40 mg (40 mg Oral Given 7/9/22 1200)       Diagnostic Studies  Results Reviewed     Procedure Component Value Units Date/Time    HS Troponin I 2hr [[id number]]  (Normal) Collected: 07/09/22 1328    Lab Status: Final result Specimen: Blood from Arm, Left Updated: 07/09/22 1500     hs TnI 2hr 9 ng/L Delta 2hr hsTnI -1 ng/L     HS Troponin I 4hr [[id number]]     Lab Status: No result Specimen: Blood     HS Troponin 0hr (reflex protocol) [[id number]]  (Normal) Collected: 07/09/22 1046    Lab Status: Final result Specimen: Blood from Arm, Left Updated: 07/09/22 1120     hs TnI 0hr 10 ng/L     Comprehensive metabolic panel [[id number]]  (Abnormal) Collected: 07/09/22 1046    Lab Status: Final result Specimen: Blood from Arm, Left Updated: 07/09/22 1120     Sodium 143 mmol/L      Potassium 3 7 mmol/L      Chloride 108 mmol/L      CO2 26 mmol/L      ANION GAP 9 mmol/L      BUN 17 mg/dL      Creatinine 1 25 mg/dL      Glucose 153 mg/dL      Calcium 9 7 mg/dL      AST 10 U/L      ALT 16 U/L      Alkaline Phosphatase 265 U/L      Total Protein 6 9 g/dL      Albumin 3 7 g/dL      Total Bilirubin 0 60 mg/dL      eGFR 41 ml/min/1 73sq m     Narrative:      [place] guidelines for Chronic Kidney Disease (CKD):     Stage 1 with normal or high GFR (GFR > 90 mL/min/1 73 square meters)    Stage 2 Mild CKD (GFR = 60-89 mL/min/1 73 square meters)    Stage 3A Moderate CKD (GFR = 45-59 mL/min/1 73 square meters)    Stage 3B Moderate CKD (GFR = 30-44 mL/min/1 73 square meters)    Stage 4 Severe CKD (GFR = 15-29 mL/min/1 73 square meters)    Stage 5 End Stage CKD (GFR <15 mL/min/1 73 square meters)  Note: GFR calculation is accurate only with a steady state creatinine    NT-BNP PRO [[id number]]  (Abnormal) Collected: 07/09/22 1046    Lab Status: Final result Specimen: Blood from Arm, Left Updated: 07/09/22 1120     NT-proBNP 531 pg/mL     CBC and differential [[id number]]  (Abnormal) Collected: 07/09/22 1046    Lab Status: Final result Specimen: Blood from Arm, Left Updated: 07/09/22 1052     WBC 6 81 Thousand/uL      RBC 3 93 Million/uL      Hemoglobin 12 4 g/dL      Hematocrit 37 4 %      MCV 95 fL      MCH 31 6 pg      MCHC 33 2 g/dL      RDW 13 5 %      MPV 11 3 fL      Platelets 385 Thousands/uL nRBC 0 /100 WBCs      Neutrophils Relative 54 %      Immat GRANS % 1 %      Lymphocytes Relative 25 %      Monocytes Relative 7 %      Eosinophils Relative 12 %      Basophils Relative 1 %      Neutrophils Absolute 3 77 Thousands/µL      Immature Grans Absolute 0 04 Thousand/uL      Lymphocytes Absolute 1 72 Thousands/µL      Monocytes Absolute 0 44 Thousand/µL      Eosinophils Absolute 0 80 Thousand/µL      Basophils Absolute 0 04 Thousands/µL                  XR chest 1 view portable   ED Interpretation by Sourav Potter MD (07/09 1121)   No acute pulmonary pathology                 Procedures  ECG 12 Lead Documentation Only    Date/Time: 7/9/2022 12:37 PM  Performed by: Sourav Potter MD  Authorized by: Sourav Potter MD     Indications / Diagnosis:  Chest pain  ECG reviewed by me, the ED Provider: yes    Patient location:  ED  Previous ECG:     Previous ECG:  Compared to current    Comparison ECG info:  2/11/22 normal sinus rhythm with RBBB    Similarity:  No change  Interpretation:     Interpretation: abnormal    Rate:     ECG rate:  77    ECG rate assessment: normal    Rhythm:     Rhythm: sinus rhythm    Ectopy:     Ectopy: PAC    QRS:     QRS axis:  Right    QRS intervals: Wide  Conduction:     Conduction: abnormal      Abnormal conduction: complete RBBB    ST segments:     ST segments:  Normal  T waves:     T waves: inverted      Inverted:  V2 and V3             ED Course  ED Course as of 07/09/22 1509   Sat Jul 09, 2022   1230 Chest pain improved   Awaiting repeat troponin at 2 hr             HEART Risk Score    Flowsheet Row Most Recent Value   Heart Score Risk Calculator    History 1 Filed at: 07/09/2022 1147   ECG 1 Filed at: 07/09/2022 1147   Age 2 Filed at: 07/09/2022 1147   Risk Factors 2 Filed at: 07/09/2022 1147   Troponin 0 Filed at: 07/09/2022 1147   HEART Score 6 Filed at: 07/09/2022 1147                        SBIRT 22yo+    Flowsheet Row Most Recent Value SBIRT (25 yo +)    In order to provide better care to our patients, we are screening all of our patients for alcohol and drug use  Would it be okay to ask you these screening questions? Yes Filed at: 07/09/2022 1105   Initial Alcohol Screen: US AUDIT-C     1  How often do you have a drink containing alcohol? 0 Filed at: 07/09/2022 1105   2  How many drinks containing alcohol do you have on a typical day you are drinking? 0 Filed at: 07/09/2022 1105   3a  Male UNDER 65: How often do you have five or more drinks on one occasion? 0 Filed at: 07/09/2022 1105   3b  FEMALE Any Age, or MALE 65+: How often do you have 4 or more drinks on one occassion? 0 Filed at: 07/09/2022 1105   Audit-C Score 0 Filed at: 07/09/2022 1105   YULISSA: How many times in the past year have you    Used an illegal drug or used a prescription medication for non-medical reasons? Never Filed at: 07/09/2022 1105                    MDM  Number of Diagnoses or Management Options  Chest pain: new and requires workup  Orthopnea: new and requires workup  Diagnosis management comments: 76year old female presents for evaluation of chest pain associated with orthopnea  EF 55% on recent echo  HEART score 6  CXR unremarkable  Labs unremarkable with minimal elevation in BNP  RBBB on EKG which was present during her stress test 5 months ago  Aspirin given with improvement in chest pain  Normal oxygen saturation on room air  Cardiology follow up  Return precautions provided         Amount and/or Complexity of Data Reviewed  Clinical lab tests: ordered and reviewed  Tests in the radiology section of CPT®: ordered  Independent visualization of images, tracings, or specimens: yes    Patient Progress  Patient progress: stable      Disposition  Final diagnoses:   Chest pain   Orthopnea     Time reflects when diagnosis was documented in both MDM as applicable and the Disposition within this note     Time User Action Codes Description Comment    7/9/2022 12:39 PM Janeece Model Add [R07 9] Chest pain     7/9/2022 12:41 PM Janeece Model Add [R06 01] Orthopnea       ED Disposition     ED Disposition   Discharge    Condition   Stable    Date/Time   Sat Jul 9, 2022  3:00 PM    Comment   Cindy Dress discharge to home/self care  Follow-up Information     Follow up With Specialties Details Why Contact Info Additional [facility] Schedule an appointment as soon as possible for a visit in 1 week for re-evaluation [address] [facility] [id number]  [address]  Cardiology [facility], [address] [place], [place], [address] Emergency Department Emergency Medicine Go to  If symptoms worsen [address] [place], [id number]  [address] [facility] Emergency Department, [address] [facility] [address]          Patient's Medications   Discharge Prescriptions    No medications on file       No discharge procedures on file      PDMP Review       Value Time User    PDMP Reviewed  Yes 1/12/2022  8:40 PM Arielle Ramirez DO          ED Provider  Electronically Signed by           Yenifer Mata MD  07/09/22 4245

## 2022-07-10 DIAGNOSIS — I34.0 MITRAL VALVE INSUFFICIENCY, UNSPECIFIED ETIOLOGY: ICD-10-CM

## 2022-07-10 DIAGNOSIS — I10 ESSENTIAL (PRIMARY) HYPERTENSION: ICD-10-CM

## 2022-07-10 DIAGNOSIS — E78.00 PURE HYPERCHOLESTEROLEMIA: ICD-10-CM

## 2022-07-10 DIAGNOSIS — I45.10 RBBB: ICD-10-CM

## 2022-07-10 DIAGNOSIS — I35.9 AORTIC VALVE DISEASE: ICD-10-CM

## 2022-07-11 RX ORDER — LOSARTAN POTASSIUM 100 MG/1
100 TABLET ORAL DAILY
Qty: 90 TABLET | Refills: 0 | Status: SHIPPED | OUTPATIENT
Start: 2022-07-11

## 2022-07-18 ENCOUNTER — OFFICE VISIT (OUTPATIENT)
Dept: NEUROSURGERY | Facility: CLINIC | Age: 77
End: 2022-07-18
Payer: COMMERCIAL

## 2022-07-18 VITALS
OXYGEN SATURATION: 96 % | HEART RATE: 66 BPM | BODY MASS INDEX: 32.2 KG/M2 | DIASTOLIC BLOOD PRESSURE: 60 MMHG | WEIGHT: 164 LBS | HEIGHT: 60 IN | TEMPERATURE: 97.1 F | SYSTOLIC BLOOD PRESSURE: 130 MMHG

## 2022-07-18 DIAGNOSIS — G89.4 CHRONIC PAIN SYNDROME: ICD-10-CM

## 2022-07-18 DIAGNOSIS — T85.192A MALFUNCTION OF SPINAL CORD STIMULATOR (HCC): Primary | ICD-10-CM

## 2022-07-18 PROCEDURE — 99204 OFFICE O/P NEW MOD 45 MIN: CPT | Performed by: NEUROLOGICAL SURGERY

## 2022-07-18 PROCEDURE — 3075F SYST BP GE 130 - 139MM HG: CPT | Performed by: NEUROLOGICAL SURGERY

## 2022-07-18 PROCEDURE — 1160F RVW MEDS BY RX/DR IN RCRD: CPT | Performed by: NEUROLOGICAL SURGERY

## 2022-07-18 PROCEDURE — 3078F DIAST BP <80 MM HG: CPT | Performed by: NEUROLOGICAL SURGERY

## 2022-07-18 RX ORDER — CHLORHEXIDINE GLUCONATE 0.12 MG/ML
15 RINSE ORAL ONCE
Status: CANCELLED | OUTPATIENT
Start: 2022-07-18 | End: 2022-07-18

## 2022-07-18 RX ORDER — GABAPENTIN 300 MG/1
300 CAPSULE ORAL ONCE
Status: CANCELLED | OUTPATIENT
Start: 2022-07-18 | End: 2022-07-18

## 2022-07-18 RX ORDER — ACETAMINOPHEN 325 MG/1
975 TABLET ORAL ONCE
Status: CANCELLED | OUTPATIENT
Start: 2022-07-18 | End: 2022-07-18

## 2022-07-18 RX ORDER — CEFAZOLIN SODIUM 2 G/50ML
2000 SOLUTION INTRAVENOUS ONCE
Status: CANCELLED | OUTPATIENT
Start: 2022-09-06 | End: 2022-07-18

## 2022-07-18 NOTE — PROGRESS NOTES
Office Note - Neurosurgery   Socorro Carlson 76 y o  female MRN: [id number]      Assessment:    Patient is stable  68-year-old woman with [employer] spinal cord stimulator system which is no longer functioning appropriately  At present, she does not have any significant pain after undergoing left knee surgery  She finds the left buttock IPG uncomfortable in its position and since impedances are higher in the system, she is not able to undergo MRI  We discussed removing the IPG verses removing the entire system in the relative risks, benefits alternatives to each  After some discussion, she has to proceed with reopening of thoracic and left buttock incisions for removal of spinal cord stimulator system  The goal of surgery would be to improve the sensitivity around the IPG and to be able to obtain MRI in the future  The risks of surgery reviewed in detail  1  Risk of general anesthetic  Risk of infection bleeding  2   Risk of spinal cord injury, CSF leak and paraplegia and incontinence  3   Risk of retained fragments which would negate the ability to undergo MRI in the future  Expected postoperative course, including activity restrictions, expected pain and postoperative medication were reviewed  Patient provided verbal consent to surgical procedure and signed consent form: Yes  She understands that her body habitus places her at slightly higher risk of wound healing issues and infection  History, physical examination and diagnostic tests were reviewed and questions answered  Diagnosis, care plan and treatment options were discussed  The patient and daughter understand instructions and will follow up as directed      Plan:    Follow-up:  Surgery    Problem List Items Addressed This Visit        Other    Chronic pain syndrome    Relevant Orders    Case request operating room: Reopening of thoracic and left buttock incisions for removal of spinal cord stimulator system (Completed)    ROBERTO CARLOS [place] Reflex to Microscopic w Reflex to Culture    APTT    Protime-INR    HEMOGLOBIN A1C W/ EAG ESTIMATION      Other Visit Diagnoses     Malfunction of spinal cord stimulator (HCC)    -  Primary    Relevant Orders    Case request operating room: Reopening of thoracic and left buttock incisions for removal of spinal cord stimulator system (Completed)    UA w Reflex to Microscopic w Reflex to Culture    APTT    Protime-INR    HEMOGLOBIN A1C W/ EAG ESTIMATION          Subjective/Objective     Chief Complaint    Discomfort around left buttock IPG site  HPI    Pleasant 70-year-old woman accompanied by her daughter today  In 2019 she underwent revision of her spinal cord stimulator system with placement of new percutaneous leads  Afterwards the system was effective in managing her pain for short period of time  She then underwent left knee surgery and has not had pain since  The system is not been functioning since 2019 and it has not been able to be program secondary to high impedances  Given the discomfort around the left IPG site, she presents today with her daughter to discuss removal of the entire system  NIKHIL TEJEDA personally reviewed and updated  Review of Systems   Constitutional: Negative  HENT: Negative  Eyes: Negative  Respiratory: Positive for shortness of breath  Cardiovascular: Positive for chest pain  Gastrointestinal: Negative  Endocrine: Negative  Genitourinary: Negative  Musculoskeletal: Negative  Skin: Negative  Allergic/Immunologic: Negative  Neurological: Negative  Hematological: Negative  Psychiatric/Behavioral: Negative          Family History    Family History   Problem Relation Age of Onset    Hypertension Family     Stroke Family     Heart disease Family     Thyroid disease Family     No Known Problems Sister     No Known Problems Daughter     No Known Problems Sister     No Known Problems Sister     No Known Problems Sister     No Known Problems Sister     No Known Problems Daughter     No Known Problems Daughter     No Known Problems Mother     No Known Problems Father     No Known Problems Maternal Grandmother     No Known Problems Maternal Grandfather     No Known Problems Paternal Grandmother     No Known Problems Paternal Grandfather        Social History    Social History     Socioeconomic History    Marital status:       Spouse name: Not on file    Number of children: Not on file    Years of education: Not on file    Highest education level: Not on file   Occupational History    Occupation: unemployed   Tobacco Use    Smoking status: Former Smoker     Quit date: 1991     Years since quittin 2    Smokeless tobacco: Never Used   Vaping Use    Vaping Use: Never used   Substance and Sexual Activity    Alcohol use: Not Currently    Drug use: Never    Sexual activity: Not on file   Other Topics Concern    Not on file   Social History Narrative    Not on file     Social Determinants of Health     Financial Resource Strain: Not on file   Food Insecurity: Not on file   Transportation Needs: Not on file   Physical Activity: Not on file   Stress: Not on file   Social Connections: Not on file   Intimate Partner Violence: Not on file   Housing Stability: Not on file       Past Medical History    Past Medical History:   Diagnosis Date    Asthma     Dyslipidemia     Esophageal reflux     History of stomach ulcers     Hypercalcemia     Hypertension     Osteopenia     Tremor        Surgical History    Past Surgical History:   Procedure Laterality Date    BACK SURGERY      lower back    BILATERAL OOPHORECTOMY      BREAST BIOPSY Right     long ago, benign     SECTION      CHOLECYSTECTOMY      HYSTERECTOMY      age 43 per MRS    OOPHORECTOMY Bilateral     age 43 per MRS    RI SURG IMPLNT NEUROELECT,EPIDURAL Left 2019    Procedure: LAMINECTOMY THORACIC FOR INSERTION DORSAL COLUMN SPINAL CORD STIMULATOR (DCS) W IMPLANTABLE PULSE GENERATOR, LEFT GLUTE;  Surgeon: Willa Olsen MD;  Location: [facility];  Service: Neurosurgery    ROTATOR CUFF REPAIR Right     SPINAL STIMULATOR PLACEMENT N/A 3/29/2019    Procedure: REVISION SPINAL CORD STIMULATOR PADDLE ELECTRODE, REPLACEMENT WITH PERCUTANEOUS ELECTRODES OR SMALLER PADDLE;  Surgeon: Willa Olsen MD;  [facility];  Service: Neurosurgery       Medications      Current Outpatient Medications:     albuterol (VENTOLIN HFA) 90 mcg/act inhaler, Inhale 2 puffs every 4 (four) hours as needed for wheezing or shortness of breath, Disp: 6 7 g, Rfl: 3    alendronate (FOSAMAX) 70 mg tablet, Take 1 tablet (70 mg total) by mouth once a week, Disp: 4 tablet, Rfl: 2    atorvastatin (LIPITOR) 20 mg tablet, Take 1 tablet (20 mg total) by mouth daily, Disp: 90 tablet, Rfl: 0    bimatoprost (LUMIGAN) 0 01 % ophthalmic drops, Administer 1 drop to both eyes daily (Patient taking differently: Administer 1 drop to both eyes 2 (two) times a day), Disp: 5 mL, Rfl: 6    buPROPion (WELLBUTRIN XL) 300 mg 24 hr tablet, Take 1 tablet (300 mg total) by mouth daily, Disp: 90 tablet, Rfl: 0    cholecalciferol (VITAMIN D3) 1,000 units tablet, Take 1 tablet (1,000 Units total) by mouth daily, Disp: 30 tablet, Rfl: 2    gabapentin (NEURONTIN) 800 mg tablet, 1 tab PO q am and 1 tab PO qhs, Disp: 180 tablet, Rfl: 0    glycopyrrolate (ROBINUL) 2 MG tablet, Take 1 tablet (2 mg total) by mouth 2 (two) times a day, Disp: 60 tablet, Rfl: 2    losartan (COZAAR) 100 MG tablet, Take 1 tablet (100 mg total) by mouth daily, Disp: 90 tablet, Rfl: 0    montelukast (SINGULAIR) 10 mg tablet, Take 1 tablet (10 mg total) by mouth daily at bedtime, Disp: 30 tablet, Rfl: 2    omeprazole (PriLOSEC) 40 MG capsule, Take 1 capsule (40 mg total) by mouth daily, Disp: 30 capsule, Rfl: 2    Synthroid 88 MCG tablet, Take 1 tablet (88 mcg total) by mouth daily, Disp: 90 tablet, Rfl: 0    Timolol Maleate 0 5 % (DAILY) SOLN, Apply 1 drop to eye every 12 (twelve) hours, Disp: 5 mL, Rfl: 6    tiZANidine (ZANAFLEX) 2 mg tablet, Take 1 tablet (2 mg total) by mouth every 8 (eight) hours as needed for muscle spasms, Disp: 30 tablet, Rfl: 0    traZODone (DESYREL) 50 mg tablet, Take 0 5 tablets (25 mg total) by mouth daily at bedtime, Disp: 30 tablet, Rfl: 2    Allergies    Allergies   Allergen Reactions    Iodinated Diagnostic Agents Anaphylaxis     Contrast Dye       The following portions of the patient's history were reviewed and updated as appropriate: allergies, current medications, past family history, past medical history, past social history, past surgical history and problem list     Investigations    I personally reviewed the XRAY results with the patient:    X-ray of the chest dated June 9th, 2022  Percutaneous spinal cord stimulator electrodes in place with relatively superficial anchors in position  Physical Exam    Vitals:  Blood pressure 130/60, pulse 66, temperature (!) 97 1 °F (36 2 °C), temperature source Temporal, height 5' (1 524 m), weight 74 4 kg (164 lb), SpO2 96 %, not currently breastfeeding  ,Body mass index is 32 03 kg/m²  Physical Exam  Vitals reviewed  Constitutional:       General: She is not in acute distress  Eyes:      Extraocular Movements: Extraocular movements intact  Cardiovascular:      Heart sounds: Normal heart sounds  Pulmonary:      Effort: Pulmonary effort is normal  No respiratory distress  Skin:     General: Skin is warm and dry  Comments: Left buttock and midthoracic incisions well healed   Neurological:      Mental Status: She is alert and oriented to person, place, and time  Motor: No weakness  Gait: Gait normal    Psychiatric:         Mood and Affect: Mood normal          Behavior: Behavior normal        Neurologic Exam     Mental Status   Oriented to person, place, and time

## 2022-07-31 DIAGNOSIS — G89.29 CHRONIC BILATERAL LOW BACK PAIN WITH LEFT-SIDED SCIATICA: ICD-10-CM

## 2022-07-31 DIAGNOSIS — M54.42 CHRONIC BILATERAL LOW BACK PAIN WITH LEFT-SIDED SCIATICA: ICD-10-CM

## 2022-08-01 RX ORDER — TIZANIDINE 2 MG/1
TABLET ORAL
Qty: 30 TABLET | Refills: 0 | Status: SHIPPED | OUTPATIENT
Start: 2022-08-01

## 2022-08-17 ENCOUNTER — HOSPITAL ENCOUNTER (OUTPATIENT)
Dept: SLEEP CENTER | Facility: HOSPITAL | Age: 77
Discharge: HOME | End: 2022-08-17
Payer: COMMERCIAL

## 2022-08-17 DIAGNOSIS — R06.81 WITNESSED EPISODE OF APNEA: ICD-10-CM

## 2022-08-17 DIAGNOSIS — R40.0 DAYTIME SOMNOLENCE: ICD-10-CM

## 2022-08-17 PROCEDURE — G0399 HOME SLEEP TEST/TYPE 3 PORTA: HCPCS

## 2022-08-19 ENCOUNTER — OFFICE VISIT (OUTPATIENT)
Dept: FAMILY MEDICINE CLINIC | Facility: HOSPITAL | Age: 77
End: 2022-08-19
Payer: COMMERCIAL

## 2022-08-19 VITALS
WEIGHT: 164 LBS | HEIGHT: 60 IN | DIASTOLIC BLOOD PRESSURE: 58 MMHG | HEART RATE: 62 BPM | TEMPERATURE: 96.7 F | BODY MASS INDEX: 32.2 KG/M2 | SYSTOLIC BLOOD PRESSURE: 132 MMHG

## 2022-08-19 DIAGNOSIS — R73.01 IFG (IMPAIRED FASTING GLUCOSE): ICD-10-CM

## 2022-08-19 DIAGNOSIS — T85.192S MALFUNCTION OF SPINAL CORD STIMULATOR, SEQUELA: ICD-10-CM

## 2022-08-19 DIAGNOSIS — I10 PRIMARY HYPERTENSION: ICD-10-CM

## 2022-08-19 DIAGNOSIS — F33.9 DEPRESSION, RECURRENT (HCC): ICD-10-CM

## 2022-08-19 DIAGNOSIS — G47.00 INSOMNIA, UNSPECIFIED TYPE: ICD-10-CM

## 2022-08-19 DIAGNOSIS — Z01.818 PREOPERATIVE CLEARANCE: Primary | ICD-10-CM

## 2022-08-19 DIAGNOSIS — E03.9 HYPOTHYROIDISM, UNSPECIFIED TYPE: ICD-10-CM

## 2022-08-19 DIAGNOSIS — J45.20 MILD INTERMITTENT ASTHMA WITHOUT COMPLICATION: ICD-10-CM

## 2022-08-19 PROCEDURE — 3075F SYST BP GE 130 - 139MM HG: CPT | Performed by: INTERNAL MEDICINE

## 2022-08-19 PROCEDURE — 3078F DIAST BP <80 MM HG: CPT | Performed by: INTERNAL MEDICINE

## 2022-08-19 PROCEDURE — 99214 OFFICE O/P EST MOD 30 MIN: CPT | Performed by: INTERNAL MEDICINE

## 2022-08-19 RX ORDER — TRAZODONE HYDROCHLORIDE 50 MG/1
25 TABLET ORAL
Qty: 30 TABLET | Refills: 2
Start: 2022-08-19

## 2022-08-19 NOTE — PROGRESS NOTES
Subjective:     Nichole Rader is a 76 y o  female who presents to the office today for a preoperative consultation at the request of surgeon Dr Lima Lowery who plans on performing removal of nonfunctioning SCS on 2022  Prior anesthesia adverse reactions - None    Exercise capacity - Able to walk 4 blocks or climb 2 flights of stairs without symptoms - No, only notes SOB on exertion, at baseline and not new/worse    Easy bleeding/bruising - No    Chest pain/palpitation/edema - yes still with "very slight" CP and palp but no LE edema    Dyspnea/wheezing/cough - yes to SOB with exertion - not new or worse, she notes no cough/wheezing    Sleep apnea - had recent sleep study and results still pending    HPI Pt with nonfunctioning SCS  She saw Neurosurgery and is scheduled for removal of device in early Sept   She did her BW today that the surgeon ordered  She has had ECG/CXR approx a month ago while in ED  She has appt with Cardio later this month for cardiac clearance  Pt was in ED 22 with c/o CP  She notes "very slight" CP since then  She notes some palp, orthopnea, and SOB but notes no LE edema  She notes those symptoms are primarily when she is [place]  She had an Echo 22 - moderate to severe MR, EF 55%  She has appt with Cardio 22  Mood doing better with WBXL  Sleeping better and only using Trazodone prn       Past Medical History:   Diagnosis Date    Asthma     Dyslipidemia     Esophageal reflux     History of stomach ulcers     Hypercalcemia     Hypertension     Osteopenia     Tremor        Past Surgical History:   Procedure Laterality Date    BACK SURGERY      lower back    BILATERAL OOPHORECTOMY      BREAST BIOPSY Right     long ago, benign     SECTION      CHOLECYSTECTOMY      HYSTERECTOMY      age 43 per MRS    OOPHORECTOMY Bilateral     age 43 per MRS    MA SURG IMPLNT NEUROELECT,EPIDURAL Left 2019 Procedure: LAMINECTOMY THORACIC FOR INSERTION DORSAL COLUMN SPINAL CORD STIMULATOR (DCS) W IMPLANTABLE PULSE GENERATOR, LEFT GLUTE;  Surgeon: Lory Savage MD;  [facility];  Service: Neurosurgery    ROTATOR CUFF REPAIR Right     SPINAL STIMULATOR PLACEMENT N/A 3/29/2019    Procedure: REVISION SPINAL CORD STIMULATOR PADDLE ELECTRODE, REPLACEMENT WITH PERCUTANEOUS ELECTRODES OR SMALLER PADDLE;  Surgeon: Lory Savage MD;  [facility];  Service: Neurosurgery       Family History   Problem Relation Age of Onset    Hypertension Family     Stroke Family     Heart disease Family     Thyroid disease Family     No Known Problems Sister     No Known Problems Daughter     No Known Problems Sister     No Known Problems Sister     No Known Problems Sister     No Known Problems Sister     No Known Problems Daughter     No Known Problems Daughter     No Known Problems Mother     No Known Problems Father     No Known Problems Maternal Grandmother     No Known Problems Maternal Grandfather     No Known Problems Paternal Grandmother     No Known Problems Paternal Grandfather        Social History     Tobacco Use    Smoking status: Former Smoker     Start date:      Quit date: 1991     Years since quittin 3    Smokeless tobacco: Never Used   Vaping Use    Vaping Use: Never used   Substance Use Topics    Alcohol use: Not Currently    Drug use: Never       Current Outpatient Medications   Medication Sig Dispense Refill    albuterol (VENTOLIN HFA) 90 mcg/act inhaler Inhale 2 puffs every 4 (four) hours as needed for wheezing or shortness of breath 6 7 g 3    alendronate (FOSAMAX) 70 mg tablet Take 1 tablet (70 mg total) by mouth once a week 4 tablet 2    atorvastatin (LIPITOR) 20 mg tablet Take 1 tablet (20 mg total) by mouth daily 90 tablet 0    bimatoprost (LUMIGAN) 0 01 % ophthalmic drops Administer 1 drop to both eyes daily (Patient taking differently: Administer 1 drop to both eyes 2 (two) times a day) 5 mL 6    buPROPion (WELLBUTRIN XL) 300 mg 24 hr tablet Take 1 tablet (300 mg total) by mouth daily 90 tablet 0    cholecalciferol (VITAMIN D3) 1,000 units tablet Take 1 tablet (1,000 Units total) by mouth daily 30 tablet 2    gabapentin (NEURONTIN) 800 mg tablet 1 tab PO q am and 1 tab PO qhs 180 tablet 0    glycopyrrolate (ROBINUL) 2 MG tablet Take 1 tablet (2 mg total) by mouth 2 (two) times a day 60 tablet 2    losartan (COZAAR) 100 MG tablet Take 1 tablet (100 mg total) by mouth daily 90 tablet 0    montelukast (SINGULAIR) 10 mg tablet Take 1 tablet (10 mg total) by mouth daily at bedtime 30 tablet 2    omeprazole (PriLOSEC) 40 MG capsule Take 1 capsule (40 mg total) by mouth daily 30 capsule 2    Synthroid 88 MCG tablet Take 1 tablet (88 mcg total) by mouth daily 90 tablet 0    Timolol Maleate 0 5 % (DAILY) SOLN Apply 1 drop to eye every 12 (twelve) hours 5 mL 6    tiZANidine (ZANAFLEX) 2 mg tablet TAKE 1 TABLET BY MOUTH EVERY 8 HOURS AS NEEDED FOR MUSCLE SPASM 30 tablet 0    traZODone (DESYREL) 50 mg tablet Take 0 5 tablets (25 mg total) by mouth daily at bedtime 30 tablet 2     No current facility-administered medications for this visit  Allergies:  Iodinated diagnostic agents      Review of Systems  Review of Systems   Constitutional: Negative for appetite change, chills and fever  HENT: Negative for congestion and sore throat  Eyes: Positive for visual disturbance  Negative for pain  Notes some blurry vision intermittently   Respiratory: Positive for shortness of breath  Negative for cough and wheezing  Cardiovascular: Positive for chest pain and palpitations  Negative for leg swelling  Gastrointestinal: Negative for abdominal pain, constipation, diarrhea, nausea and vomiting  Genitourinary: Negative for difficulty urinating, dysuria and hematuria  Musculoskeletal: Positive for back pain and gait problem     Neurological: Positive for dizziness  Negative for headaches  Hematological: Does not bruise/bleed easily  Psychiatric/Behavioral: Positive for dysphoric mood  Negative for sleep disturbance  Objective:    /58   Pulse 62   Temp (!) 96 7 °F (35 9 °C) (Tympanic)   Ht 5' (1 524 m)   Wt 74 4 kg (164 lb)   BMI 32 03 kg/m²     Physical Exam  Vitals and nursing note reviewed  Constitutional:       General: She is not in acute distress  Appearance: She is well-developed  She is obese  She is not ill-appearing  HENT:      Head: Normocephalic and atraumatic  Right Ear: Tympanic membrane and external ear normal  There is no impacted cerumen  Left Ear: Tympanic membrane and external ear normal  There is no impacted cerumen  Eyes:      General:         Right eye: No discharge  Left eye: No discharge  Conjunctiva/sclera: Conjunctivae normal    Neck:      Vascular: No carotid bruit  Trachea: No tracheal deviation  Cardiovascular:      Rate and Rhythm: Normal rate and regular rhythm  Heart sounds: Normal heart sounds  No murmur heard  Pulmonary:      Effort: Pulmonary effort is normal  No respiratory distress  Breath sounds: Normal breath sounds  No wheezing, rhonchi or rales  Abdominal:      General: Bowel sounds are normal  There is no distension  Palpations: Abdomen is soft  Tenderness: There is no abdominal tenderness  There is no guarding or rebound  Musculoskeletal:         General: No deformity or signs of injury  Normal range of motion  Cervical back: Neck supple  Lymphadenopathy:      Cervical: No cervical adenopathy  Skin:     General: Skin is warm and dry  Findings: No bruising or rash  Neurological:      General: No focal deficit present  Mental Status: She is alert  Cranial Nerves: No cranial nerve deficit  Sensory: No sensory deficit  Motor: No weakness or abnormal muscle tone        Coordination: Coordination normal  Gait: Gait normal       Deep Tendon Reflexes: Reflexes normal    Psychiatric:         Mood and Affect: Mood normal          Behavior: Behavior normal          Thought Content:  Thought content normal          Judgment: Judgment normal            Cardiographics  EC22 - RBBB and PVC's present    Imaging  Chest x-ray: 22 - nml    Lab Review   Recent labs: 22 - CBC/CMP/BNP/troponin reviewed    Assessment:    Patient Active Problem List   Diagnosis    Tremor    Atrophic vaginitis    Osteoporosis    Obesity    Migraine headache    Hypothyroidism    Hypertension    Glaucoma    Esophageal reflux    Dyslipidemia    Cataract    Asthma    Lumbar spondylosis    IFG (impaired fasting glucose)    Chronic pain syndrome    Chronic bilateral low back pain with left-sided sciatica    Lumbar post-laminectomy syndrome    Lumbar radiculopathy    Chronic pain of left knee    Primary osteoarthritis of left knee    Gait abnormality    Weakness of left leg    Anserine bursitis    Depression, recurrent (HCC)    Anxiety and depression    Rib pain on left side    Intercostal neuropathy    Constipation    Vitamin D deficiency    Insomnia        Plan:     Surgical Clearance - Medically Cleared, Cardiac Clearance 22 with Dr Charlie Plasencia - Pt medically acceptable risk for low risk surgery    Discussion/Summary:     (1) Preoperative clearance - Pt medically acceptable risk, will review BW/UA after results back, has appt with Dr Wilder Green 22 for cardiac clearance,  No OTC NSAIDs 5 days prior to surgery, call with any changes in healthy btw now and surgery    (2) Malfunctioning SCS- For removal of SCS with Neurosurgery, con't Gabapentin and Tizanidine for pain prn    (3) HTN - BP controlled, con't current meds    (4) Asthma - no current s/sx of exacerbation, con't prn rescue inhaler and Singulair    (5) IFG - A1c ordered by surgeon, will follow    (6) Hypothyroidism - TSH nml 21, con't current levothyroxine    (7) Recurrent depression - mood controlled with current WBXL, sleeping better and now only using Trazodone prn, call with new/worse mood     Colonoscopy 7/14 - 10 yrs    Mammo 11/21    Dexa 11/21 - osteopenia - on Fosamax      Arlyne Cleburne, DO

## 2022-08-21 LAB
APPEARANCE UR: CLEAR
APTT PPP: 28 SEC (ref 24–33)
BACTERIA UR CULT: NORMAL
BACTERIA URNS QL MICRO: NORMAL
BILIRUB UR QL STRIP: NEGATIVE
CASTS URNS QL MICRO: NORMAL /LPF
COLOR UR: YELLOW
EPI CELLS #/AREA URNS HPF: NORMAL /HPF (ref 0–10)
EST. AVERAGE GLUCOSE BLD GHB EST-MCNC: 123 MG/DL
GLUCOSE UR QL: NEGATIVE
HBA1C MFR BLD: 5.9 % (ref 4.8–5.6)
HGB UR QL STRIP: NEGATIVE
INR PPP: 1 (ref 0.9–1.2)
KETONES UR QL STRIP: NEGATIVE
LEUKOCYTE ESTERASE UR QL STRIP: ABNORMAL
Lab: NORMAL
MICRO URNS: ABNORMAL
NITRITE UR QL STRIP: NEGATIVE
PH UR STRIP: 5.5 [PH] (ref 5–7.5)
PROT UR QL STRIP: NEGATIVE
PROTHROMBIN TIME: 10.8 SEC (ref 9.1–12)
RBC #/AREA URNS HPF: NORMAL /HPF (ref 0–2)
SL AMB URINALYSIS REFLEX: ABNORMAL
SP GR UR: 1.01 (ref 1–1.03)
UROBILINOGEN UR STRIP-ACNC: 0.2 MG/DL (ref 0.2–1)
WBC #/AREA URNS HPF: NORMAL /HPF (ref 0–5)

## 2022-08-22 PROCEDURE — 95806 SLEEP STUDY UNATT&RESP EFFT: CPT | Performed by: INTERNAL MEDICINE

## 2022-08-22 NOTE — PROGRESS NOTES
Home Sleep Study Documentation    HOME STUDY DEVICE: Noxturnal no                                           Gabriela G3 yes      Pre-Sleep Home Study:    Set-up and instructions performed by: OVI Villavicencio RPSGT    Technician performed demonstration for Patient: yes    Return demonstration performed by Patient: yes    Written instructions provided to Patient: yes    Patient signed consent form: yes        Post-Sleep Home Study:    Additional comments by Patient: none    Home Sleep Study Failed:no:    Failure reason: N/A    Reported or Detected: N/A    Scored by: OVI Villavicencio RPSGT

## 2022-08-23 DIAGNOSIS — G47.30 SLEEP APNEA, UNSPECIFIED TYPE: Primary | ICD-10-CM

## 2022-08-23 NOTE — PROGRESS NOTES
Cardiology Follow Up    Natalie Isaacs  1945  [id number]  [facility]  [address] [place] [address]  [address] [facility]  [phone]    1  Essential (primary) hypertension     2  Pure hypercholesterolemia     3  Mitral valve insufficiency, unspecified etiology     4  RBBB     5  Nonrheumatic aortic valve stenosis         Interval History:  Patient is here for a follow-up visit  Patient has HTN, HLD and DM  Pharmacologic nuclear stress test done February 2022 demonstrated no ischemia  Echocardiogram done May 2022 demonstrated preserved LV systolic function with LVEF of 55%  There was mild LAE  Moderate to severe MR with mild mixed aortic valve disease was noted  Patient's blood pressure was elevated at time of last visit prompting increase in losartan to 100 mg per day  Patient is followed by neuro surgery in reference to an [employer] spinal cord stimulator system  The device is scheduled to be removed in the future  She is okay with this surgery from my point of view  Patient is here today with her daughter  She has had no chest pain or significant dyspnea      Patient Active Problem List   Diagnosis    Tremor    Atrophic vaginitis    Osteoporosis    Obesity    Migraine headache    Hypothyroidism    Hypertension    Glaucoma    Esophageal reflux    Dyslipidemia    Cataract    Asthma    Lumbar spondylosis    IFG (impaired fasting glucose)    Chronic pain syndrome    Chronic bilateral low back pain with left-sided sciatica    Lumbar post-laminectomy syndrome    Lumbar radiculopathy    Chronic pain of left knee    Primary osteoarthritis of left knee    Gait abnormality    Weakness of left leg    Anserine bursitis    Depression, recurrent (HCC)    Anxiety and depression    Rib pain on left side    Intercostal neuropathy    Constipation    Vitamin D deficiency    Insomnia    SAGE (obstructive sleep apnea)    Witnessed episode of apnea    Daytime somnolence     Past Medical History:   Diagnosis Date    Asthma     Dyslipidemia     Esophageal reflux     History of stomach ulcers     Hypercalcemia     Hypertension     Osteopenia     Tremor      Social History     Socioeconomic History    Marital status:       Spouse name: Not on file    Number of children: Not on file    Years of education: Not on file    Highest education level: Not on file   Occupational History    Occupation: unemployed   Tobacco Use    Smoking status: Former Smoker     Start date:      Quit date: 1991     Years since quittin 3    Smokeless tobacco: Never Used   Vaping Use    Vaping Use: Never used   Substance and Sexual Activity    Alcohol use: Not Currently    Drug use: Never    Sexual activity: Not on file   Other Topics Concern    Not on file   Social History Narrative    Not on file     Social Determinants of Health     Financial Resource Strain: Not on file   Food Insecurity: Not on file   Transportation Needs: Not on file   Physical Activity: Not on file   Stress: Not on file   Social Connections: Not on file   Intimate Partner Violence: Not on file   Housing Stability: Not on file      Family History   Problem Relation Age of Onset    Hypertension Family     Stroke Family     Heart disease Family     Thyroid disease Family     No Known Problems Sister     No Known Problems Daughter     No Known Problems Sister     No Known Problems Sister     No Known Problems Sister     No Known Problems Sister     No Known Problems Daughter     No Known Problems Daughter     No Known Problems Mother     No Known Problems Father     No Known Problems Maternal Grandmother     No Known Problems Maternal Grandfather     No Known Problems Paternal Grandmother     No Known Problems Paternal Grandfather      Past Surgical History:   Procedure Laterality Date    BACK SURGERY      lower back    BILATERAL OOPHORECTOMY      BREAST BIOPSY Right     long ago, benign     SECTION      CHOLECYSTECTOMY      HYSTERECTOMY      age 43 per M[facility]    OOPHORECTOMY Bilateral     age 43 per MRS    CT SURG IMPLNT NEUROELECT,EPIDURAL Left 2019    Procedure: LAMINECTOMY THORACIC FOR INSERTION DORSAL COLUMN SPINAL CORD STIMULATOR (DCS) W IMPLANTABLE PULSE GENERATOR, LEFT GLUTE;  Surgeon: Ariel Vuong MD;  Location: [facility];  Service: Neurosurgery    ROTATOR CUFF REPAIR Right     SPINAL STIMULATOR PLACEMENT N/A 3/29/2019    Procedure: REVISION SPINAL CORD STIMULATOR PADDLE ELECTRODE, REPLACEMENT WITH PERCUTANEOUS ELECTRODES OR SMALLER PADDLE;  Surgeon: Ariel Vuong MD;  Location[facility];  Service: Neurosurgery       Current Outpatient Medications:     albuterol (VENTOLIN HFA) 90 mcg/act inhaler, Inhale 2 puffs every 4 (four) hours as needed for wheezing or shortness of breath, Disp: 6 7 g, Rfl: 3    alendronate (FOSAMAX) 70 mg tablet, Take 1 tablet (70 mg total) by mouth once a week, Disp: 4 tablet, Rfl: 2    atorvastatin (LIPITOR) 20 mg tablet, Take 1 tablet (20 mg total) by mouth daily, Disp: 90 tablet, Rfl: 0    bimatoprost (LUMIGAN) 0 01 % ophthalmic drops, Administer 1 drop to both eyes daily (Patient taking differently: Administer 1 drop to both eyes 2 (two) times a day), Disp: 5 mL, Rfl: 6    buPROPion (WELLBUTRIN XL) 300 mg 24 hr tablet, Take 1 tablet (300 mg total) by mouth daily, Disp: 90 tablet, Rfl: 0    cholecalciferol (VITAMIN D3) 1,000 units tablet, Take 1 tablet (1,000 Units total) by mouth daily, Disp: 30 tablet, Rfl: 2    gabapentin (NEURONTIN) 800 mg tablet, 1 tab PO q am and 1 tab PO qhs, Disp: 180 tablet, Rfl: 0    glycopyrrolate (ROBINUL) 2 MG tablet, Take 1 tablet (2 mg total) by mouth 2 (two) times a day, Disp: 60 tablet, Rfl: 2    losartan (COZAAR) 100 MG tablet, Take 1 tablet (100 mg total) by mouth daily, Disp: 90 tablet, Rfl: 0    montelukast (SINGULAIR) 10 mg tablet, Take 1 tablet (10 mg total) by mouth daily at bedtime, Disp: 30 tablet, Rfl: 2    omeprazole (PriLOSEC) 40 MG capsule, Take 1 capsule (40 mg total) by mouth daily, Disp: 30 capsule, Rfl: 2    Synthroid 88 MCG tablet, Take 1 tablet (88 mcg total) by mouth daily, Disp: 90 tablet, Rfl: 0    Timolol Maleate 0 5 % (DAILY) SOLN, Apply 1 drop to eye every 12 (twelve) hours, Disp: 5 mL, Rfl: 6    tiZANidine (ZANAFLEX) 2 mg tablet, TAKE 1 TABLET BY MOUTH EVERY 8 HOURS AS NEEDED FOR MUSCLE SPASM, Disp: 30 tablet, Rfl: 0    traZODone (DESYREL) 50 mg tablet, Take 0 5 tablets (25 mg total) by mouth daily at bedtime as needed for sleep (Patient not taking: Reported on 8/31/2022), Disp: 30 tablet, Rfl: 2  Allergies   Allergen Reactions    Iodinated Diagnostic Agents Anaphylaxis     Contrast Dye       Labs:not applicable  Imaging: Home Study    Result Date: 8/22/2022  Narrative: Home Study Report Patient Name: Ilan Zhou Patient Number: [id number] Date of Home Study: 8/17/2022 Referring Physician: JACEY Sierra  Interpreting Physician: MD JACEY Witt O B : 1945 STUDY FORMAT: The patient was admitted to the sleep laboratory at the [facility] and oriented to the home sleep study testing procedure and equipment  The home sleep testing study was performed using the [employer] device  A return demonstration by the patient helped to assure correct usage  The following parameters were monitored: p-flow via nasal cannula, body position, oximetry, pulse rate and respiratory effort via thoracic belt  The sleep study was scored following the rules established by the American Academy of Sleep Medicine (AASM)  Hypopneas are defined as a ?30% drop in flow for ? 10 seconds that are associated with ?4% desaturations  ADITYA is the Respiratory Event Index (number of respiratory events per hour) and is a surrogate for the apnea/hypopnea index (AHI)   PATIENT HISTORY: A 58-year-old with past medical history of suspected obstructive sleep apnea  TESTING RESULTS: The test results are from 1 Night  The total time in bed (analysis time) was 633 6 minutes  The patient had a total of 152 respiratory events made up of 121 obstructive apneas, 0 central apneas, 0 mixed apneas and 31 hypopneas resulting in a respiratory event index (ADITYA) of 15 0  The lowest SpO2 recorded is 86%  Impression:  The patient had increased number of sleep disordered breathing events with average AHI of 15 events per hour  Thus, she meets the criteria for the diagnosis moderate obstructive sleep apnea  An in-lab PAP titration study and sleep consultation would be recommended, clinical correlation suggested  Board Certified Sleep Physician  Study Date: 8/17/2022 Patient Name: Marlene Thapa Recording Device: [employer] Sex: F Height: 60 0 in  YOB: 1945 Weight: 164 0 lbs  Age: 76 years B  M I: 32 0 lb/in2 Times and Durations Lights off clock time:  9:01:08 PM Total Recording Time (TRT): 633 6 minutes Lights on clock time: 7:34:44 AM Time In Bed (TIB): 633 6 minutes   Monitoring Time (MT): 607 5 minutes Device and Sensor Details The study was recorded on a [employer] device using 1 RIP effort belt and a pressure based flow sensor  The heart rate is derived from the oximeter sensor and the snore signal is derived from the pressure sensor  The device also records body position and uses it to determine the monitoring time (sleep/wake periods)  Summary ADITYA 15 0 OAI 12 0 DON 0 0 Lowest Desat 86 ADITYA is the number of respiratory events per hour  OAI is the number of obstructive apneas per hour  DON is the number of central apneas per hour  Lowest Desat is the lowest blood oxygen level that lasted at least 2 seconds   RESPIRATORY EVENTS  Index (#/hour) Total # of Events Mean duration  (sec) Max duration  (sec) # of Events by Position      Supine Prone Left Right Up Central Apneas 0 0 0 0 0 0 0 0    0 0 0 Obstructive Apneas 12 0 121 27 8 88 0 107    0 14 0 Mixed Apneas 0 0 0 0 0 0 0 0    0 0 0 Hypopneas 3 1 31 30 0 42 0 14    2 15 0 Apneas + Hypopneas 15 0 152 28 2 88 0 121    2 29 0 RERAs 0 0 0 0 0 0 0 0    0 0 0 Total 15 0 152 28 2 88 0 121    2 29 0 Time in Position 335 2    44 3 228 1 19 9 ADITYA in Position 21 7    2 7 7 6 0 0 Positional Summary  Supine Non-Supine Total # of Events 121 31 00 Total Duration (minutes) 335 2 292 30 Sleep Duration (minutes) 334 9 272 60 ADITYA in Position 21 7 6 82 REISupine / REINon-Supine Ratio 3 18  Positional Sleep Apnea is indicated if ADITYA (supine) >= 2 x ADITYA (non-supine) per Phylliss Elm, Sleep  1984;7(2)  Oximetry Summary  Dur  (min) % TIB <90 % 3 2 0 5 <85 % 0 0 0 0 <80 % 0 0 0 0 <70 % 0 0 0 0 Total Dur (min) < 89 1 0 min Average (%) 94 Total # of Desats 143 Desat Index (#/hour) 14 3 Desat Max (%) 10 Desat Max dur (sec) 88 5 Lowest SpO2 % during sleep 86 Duration of Min SpO2 (sec) 15 Highest SpO2 % during sleep 99 Duration of Max SpO2(sec) 4  Heart Rate Stats Mean HR during sleep 55 8 (BPM) Highest HR during sleep 96  (BPM) Highest HR during TIB  96 (BPM) Lowest HR during sleep 48  (BPM) Lowest HR during TIB 48 (BPM) Snoring Summary Total Snoring Episodes 57 Total Duration with Snoring 8 1 minutes Mean Duration of Snoring 8 5 seconds Percentage of Snoring 1 3 %        Review of Systems:  Review of Systems   All other systems reviewed and are negative  Physical Exam:  /52 (BP Location: Left arm, Patient Position: Sitting, Cuff Size: Standard)   Pulse 64   Ht 5' (1 524 m)   Wt 75 5 kg (166 lb 6 4 oz)   SpO2 97%   BMI 32 50 kg/m²   Physical Exam  Vitals reviewed  Constitutional:       Appearance: She is well-developed  HENT:      Head: Normocephalic and atraumatic  Eyes:      Conjunctiva/sclera: Conjunctivae normal       Pupils: Pupils are equal, round, and reactive to light  Cardiovascular:      Rate and Rhythm: Normal rate        Heart sounds: Murmur heard  Pulmonary:      Effort: Pulmonary effort is normal       Breath sounds: Normal breath sounds  Musculoskeletal:      Cervical back: Normal range of motion and neck supple  Skin:     General: Skin is warm and dry  Neurological:      Mental Status: She is alert and oriented to person, place, and time  Discussion/Summary:I will continue the patient's current medical regimen  The patient appears well compensated  I have asked the patient to call if there is a problem in the interim otherwise I will see the patient in six months time  The patient is due for an echo prior to the next visit to assess LV wall thickness and systolic function

## 2022-08-23 NOTE — H&P (VIEW-ONLY)
Cardiology Follow Up    Kaylah Pablo  1945  [id number]  [facility]  [address] [facility], [facility]  [address] [facility]  [phone]    1  Essential (primary) hypertension     2  Pure hypercholesterolemia     3  Mitral valve insufficiency, unspecified etiology     4  RBBB     5  Nonrheumatic aortic valve stenosis         Interval History:  Patient is here for a follow-up visit  Patient has HTN, HLD and DM  Pharmacologic nuclear stress test done February 2022 demonstrated no ischemia  Echocardiogram done May 2022 demonstrated preserved LV systolic function with LVEF of 55%  There was mild LAE  Moderate to severe MR with mild mixed aortic valve disease was noted  Patient's blood pressure was elevated at time of last visit prompting increase in losartan to 100 mg per day  Patient is followed by neuro surgery in reference to an [employer] spinal cord stimulator system  The device is scheduled to be removed in the future  She is okay with this surgery from my point of view  Patient is here today with her daughter  She has had no chest pain or significant dyspnea      Patient Active Problem List   Diagnosis    Tremor    Atrophic vaginitis    Osteoporosis    Obesity    Migraine headache    Hypothyroidism    Hypertension    Glaucoma    Esophageal reflux    Dyslipidemia    Cataract    Asthma    Lumbar spondylosis    IFG (impaired fasting glucose)    Chronic pain syndrome    Chronic bilateral low back pain with left-sided sciatica    Lumbar post-laminectomy syndrome    Lumbar radiculopathy    Chronic pain of left knee    Primary osteoarthritis of left knee    Gait abnormality    Weakness of left leg    Anserine bursitis    Depression, recurrent (HCC)    Anxiety and depression    Rib pain on left side    Intercostal neuropathy    Constipation    Vitamin D deficiency    Insomnia    SAGE (obstructive sleep apnea)    Witnessed episode of apnea    Daytime somnolence     Past Medical History:   Diagnosis Date    Asthma     Dyslipidemia     Esophageal reflux     History of stomach ulcers     Hypercalcemia     Hypertension     Osteopenia     Tremor      Social History     Socioeconomic History    Marital status:       Spouse name: Not on file    Number of children: Not on file    Years of education: Not on file    Highest education level: Not on file   Occupational History    Occupation: unemployed   Tobacco Use    Smoking status: Former Smoker     Start date:      Quit date: 1991     Years since quittin 3    Smokeless tobacco: Never Used   Vaping Use    Vaping Use: Never used   Substance and Sexual Activity    Alcohol use: Not Currently    Drug use: Never    Sexual activity: Not on file   Other Topics Concern    Not on file   Social History Narrative    Not on file     Social Determinants of Health     Financial Resource Strain: Not on file   Food Insecurity: Not on file   Transportation Needs: Not on file   Physical Activity: Not on file   Stress: Not on file   Social Connections: Not on file   Intimate Partner Violence: Not on file   Housing Stability: Not on file      Family History   Problem Relation Age of Onset    Hypertension Family     Stroke Family     Heart disease Family     Thyroid disease Family     No Known Problems Sister     No Known Problems Daughter     No Known Problems Sister     No Known Problems Sister     No Known Problems Sister     No Known Problems Sister     No Known Problems Daughter     No Known Problems Daughter     No Known Problems Mother     No Known Problems Father     No Known Problems Maternal Grandmother     No Known Problems Maternal Grandfather     No Known Problems Paternal Grandmother     No Known Problems Paternal Grandfather      Past Surgical History:   Procedure Laterality Date    BACK SURGERY      lower back    BILATERAL OOPHORECTOMY      BREAST BIOPSY Right     long ago, benign     SECTION      CHOLECYSTECTOMY      HYSTERECTOMY      age 43 per MRS    OOPHORECTOMY Bilateral     age 43 per MRS    ID SURG IMPLNT NEUROELECT,EPIDURAL Left 2019    Procedure: LAMINECTOMY THORACIC FOR INSERTION DORSAL COLUMN SPINAL CORD STIMULATOR (DCS) W IMPLANTABLE PULSE GENERATOR, LEFT GLUTE;  Surgeon: Latrelle Mcardle, MD;  Location: [facility];  Service: Neurosurgery    ROTATOR CUFF REPAIR Right     SPINAL STIMULATOR PLACEMENT N/A 3/29/2019    Procedure: REVISION SPINAL CORD STIMULATOR PADDLE ELECTRODE, REPLACEMENT WITH PERCUTANEOUS ELECTRODES OR SMALLER PADDLE;  Surgeon: Latrelle Mcardle, MD;  Location: [facility];  Service: Neurosurgery       Current Outpatient Medications:     albuterol (VENTOLIN HFA) 90 mcg/act inhaler, Inhale 2 puffs every 4 (four) hours as needed for wheezing or shortness of breath, Disp: 6 7 g, Rfl: 3    alendronate (FOSAMAX) 70 mg tablet, Take 1 tablet (70 mg total) by mouth once a week, Disp: 4 tablet, Rfl: 2    atorvastatin (LIPITOR) 20 mg tablet, Take 1 tablet (20 mg total) by mouth daily, Disp: 90 tablet, Rfl: 0    bimatoprost (LUMIGAN) 0 01 % ophthalmic drops, Administer 1 drop to both eyes daily (Patient taking differently: Administer 1 drop to both eyes 2 (two) times a day), Disp: 5 mL, Rfl: 6    buPROPion (WELLBUTRIN XL) 300 mg 24 hr tablet, Take 1 tablet (300 mg total) by mouth daily, Disp: 90 tablet, Rfl: 0    cholecalciferol (VITAMIN D3) 1,000 units tablet, Take 1 tablet (1,000 Units total) by mouth daily, Disp: 30 tablet, Rfl: 2    gabapentin (NEURONTIN) 800 mg tablet, 1 tab PO q am and 1 tab PO qhs, Disp: 180 tablet, Rfl: 0    glycopyrrolate (ROBINUL) 2 MG tablet, Take 1 tablet (2 mg total) by mouth 2 (two) times a day, Disp: 60 tablet, Rfl: 2    losartan (COZAAR) 100 MG tablet, Take 1 tablet (100 mg total) by mouth daily, Disp: 90 tablet, Rfl: 0    montelukast (SINGULAIR) 10 mg tablet, Take 1 tablet (10 mg total) by mouth daily at bedtime, Disp: 30 tablet, Rfl: 2    omeprazole (PriLOSEC) 40 MG capsule, Take 1 capsule (40 mg total) by mouth daily, Disp: 30 capsule, Rfl: 2    Synthroid 88 MCG tablet, Take 1 tablet (88 mcg total) by mouth daily, Disp: 90 tablet, Rfl: 0    Timolol Maleate 0 5 % (DAILY) SOLN, Apply 1 drop to eye every 12 (twelve) hours, Disp: 5 mL, Rfl: 6    tiZANidine (ZANAFLEX) 2 mg tablet, TAKE 1 TABLET BY MOUTH EVERY 8 HOURS AS NEEDED FOR MUSCLE SPASM, Disp: 30 tablet, Rfl: 0    traZODone (DESYREL) 50 mg tablet, Take 0 5 tablets (25 mg total) by mouth daily at bedtime as needed for sleep (Patient not taking: Reported on 8/31/2022), Disp: 30 tablet, Rfl: 2  Allergies   Allergen Reactions    Iodinated Diagnostic Agents Anaphylaxis     Contrast Dye       Labs:not applicable  Imaging: Home Study    Result Date: 8/22/2022  Narrative: Home Study Report Patient Name: Octavio Atkins Patient Number: [id number] Date of Home Study: 8/17/2022 Referring Physician: JACEY Blake  Interpreting Physician: MD JACEY Elaine O B : 1945 STUDY FORMAT: The patient was admitted to the sleep laboratory at the [facility] and oriented to the home sleep study testing procedure and equipment  The home sleep testing study was performed using the [employer] device  A return demonstration by the patient helped to assure correct usage  The following parameters were monitored: p-flow via nasal cannula, body position, oximetry, pulse rate and respiratory effort via thoracic belt  The sleep study was scored following the rules established by the American Academy of Sleep Medicine (AASM)  Hypopneas are defined as a ?30% drop in flow for ? 10 seconds that are associated with ?4% desaturations  ADITYA is the Respiratory Event Index (number of respiratory events per hour) and is a surrogate for the apnea/hypopnea index (AHI)   PATIENT HISTORY: A 20-year-old with past medical history of suspected obstructive sleep apnea  TESTING RESULTS: The test results are from 1 Night  The total time in bed (analysis time) was 633 6 minutes  The patient had a total of 152 respiratory events made up of 121 obstructive apneas, 0 central apneas, 0 mixed apneas and 31 hypopneas resulting in a respiratory event index (ADITYA) of 15 0  The lowest SpO2 recorded is 86%  Impression:  The patient had increased number of sleep disordered breathing events with average AHI of 15 events per hour  Thus, she meets the criteria for the diagnosis moderate obstructive sleep apnea  An in-lab PAP titration study and sleep consultation would be recommended, clinical correlation suggested  Board Certified Sleep Physician  Study Date: 8/17/2022 Patient Name: Zoran Hernández Recording Device: Deangelo Aceves Sex: F Height: 60 0 in  YOB: 1945 Weight: 164 0 lbs  Age: 76 years B  M I: 32 0 lb/in2 Times and Durations Lights off clock time:  9:01:08 PM Total Recording Time (TRT): 633 6 minutes Lights on clock time: 7:34:44 AM Time In Bed (TIB): 633 6 minutes   Monitoring Time (MT): 607 5 minutes Device and Sensor Details The study was recorded on a [employer] device using 1 RIP effort belt and a pressure based flow sensor  The heart rate is derived from the oximeter sensor and the snore signal is derived from the pressure sensor  The device also records body position and uses it to determine the monitoring time (sleep/wake periods)  Summary ADITYA 15 0 OAI 12 0 DON 0 0 Lowest Desat 86 ADITYA is the number of respiratory events per hour  OAI is the number of obstructive apneas per hour  DON is the number of central apneas per hour  Lowest Desat is the lowest blood oxygen level that lasted at least 2 seconds   RESPIRATORY EVENTS  Index (#/hour) Total # of Events Mean duration  (sec) Max duration  (sec) # of Events by Position      Supine Prone Left Right Up Central Apneas 0 0 0 0 0 0 0 0    0 0 0 Obstructive Apneas 12 0 121 27 8 88 0 107    0 14 0 Mixed Apneas 0 0 0 0 0 0 0 0    0 0 0 Hypopneas 3 1 31 30 0 42 0 14    2 15 0 Apneas + Hypopneas 15 0 152 28 2 88 0 121    2 29 0 RERAs 0 0 0 0 0 0 0 0    0 0 0 Total 15 0 152 28 2 88 0 121    2 29 0 Time in Position 335 2    44 3 228 1 19 9 ADITYA in Position 21 7    2 7 7 6 0 0 Positional Summary  Supine Non-Supine Total # of Events 121 31 00 Total Duration (minutes) 335 2 292 30 Sleep Duration (minutes) 334 9 272 60 ADITYA in Position 21 7 6 82 REISupine / REINon-Supine Ratio 3 18  Positional Sleep Apnea is indicated if ADITYA (supine) >= 2 x ADITYA (non-supine) per Leonor Georges, Sleep  1984;7(2)  Oximetry Summary  Dur  (min) % TIB <90 % 3 2 0 5 <85 % 0 0 0 0 <80 % 0 0 0 0 <70 % 0 0 0 0 Total Dur (min) < 89 1 0 min Average (%) 94 Total # of Desats 143 Desat Index (#/hour) 14 3 Desat Max (%) 10 Desat Max dur (sec) 88 5 Lowest SpO2 % during sleep 86 Duration of Min SpO2 (sec) 15 Highest SpO2 % during sleep 99 Duration of Max SpO2(sec) 4  Heart Rate Stats Mean HR during sleep 55 8 (BPM) Highest HR during sleep 96  (BPM) Highest HR during TIB  96 (BPM) Lowest HR during sleep 48  (BPM) Lowest HR during TIB 48 (BPM) Snoring Summary Total Snoring Episodes 57 Total Duration with Snoring 8 1 minutes Mean Duration of Snoring 8 5 seconds Percentage of Snoring 1 3 %        Review of Systems:  Review of Systems   All other systems reviewed and are negative  Physical Exam:  /52 (BP Location: Left arm, Patient Position: Sitting, Cuff Size: Standard)   Pulse 64   Ht 5' (1 524 m)   Wt 75 5 kg (166 lb 6 4 oz)   SpO2 97%   BMI 32 50 kg/m²   Physical Exam  Vitals reviewed  Constitutional:       Appearance: She is well-developed  HENT:      Head: Normocephalic and atraumatic  Eyes:      Conjunctiva/sclera: Conjunctivae normal       Pupils: Pupils are equal, round, and reactive to light  Cardiovascular:      Rate and Rhythm: Normal rate        Heart sounds: Murmur heard  Pulmonary:      Effort: Pulmonary effort is normal       Breath sounds: Normal breath sounds  Musculoskeletal:      Cervical back: Normal range of motion and neck supple  Skin:     General: Skin is warm and dry  Neurological:      Mental Status: She is alert and oriented to person, place, and time  Discussion/Summary:I will continue the patient's current medical regimen  The patient appears well compensated  I have asked the patient to call if there is a problem in the interim otherwise I will see the patient in six months time  The patient is due for an echo prior to the next visit to assess LV wall thickness and systolic function

## 2022-08-31 ENCOUNTER — OFFICE VISIT (OUTPATIENT)
Dept: CARDIOLOGY CLINIC | Facility: CLINIC | Age: 77
End: 2022-08-31
Payer: COMMERCIAL

## 2022-08-31 VITALS
WEIGHT: 166.4 LBS | DIASTOLIC BLOOD PRESSURE: 52 MMHG | SYSTOLIC BLOOD PRESSURE: 128 MMHG | BODY MASS INDEX: 32.67 KG/M2 | HEIGHT: 60 IN | HEART RATE: 64 BPM | OXYGEN SATURATION: 97 %

## 2022-08-31 DIAGNOSIS — E78.00 PURE HYPERCHOLESTEROLEMIA: ICD-10-CM

## 2022-08-31 DIAGNOSIS — I45.10 RBBB: ICD-10-CM

## 2022-08-31 DIAGNOSIS — I35.0 NONRHEUMATIC AORTIC VALVE STENOSIS: ICD-10-CM

## 2022-08-31 DIAGNOSIS — I10 ESSENTIAL (PRIMARY) HYPERTENSION: Primary | ICD-10-CM

## 2022-08-31 DIAGNOSIS — I34.0 MITRAL VALVE INSUFFICIENCY, UNSPECIFIED ETIOLOGY: ICD-10-CM

## 2022-08-31 PROCEDURE — 1160F RVW MEDS BY RX/DR IN RCRD: CPT | Performed by: INTERNAL MEDICINE

## 2022-08-31 PROCEDURE — 99214 OFFICE O/P EST MOD 30 MIN: CPT | Performed by: INTERNAL MEDICINE

## 2022-09-01 NOTE — PRE-PROCEDURE INSTRUCTIONS
Pre-Surgery Instructions:   Medication Instructions    albuterol (VENTOLIN HFA) 90 mcg/act inhaler Uses PRN- OK to take day of surgery    alendronate (FOSAMAX) 70 mg tablet Continue normal schedule    atorvastatin (LIPITOR) 20 mg tablet Take night before surgery    bimatoprost (LUMIGAN) 0 01 % ophthalmic drops Take day of surgery   buPROPion (WELLBUTRIN XL) 300 mg 24 hr tablet Take day of surgery   cholecalciferol (VITAMIN D3) 1,000 units tablet Stop 9/1  Do not take morning of surgery    gabapentin (NEURONTIN) 800 mg tablet Take day of surgery   glycopyrrolate (ROBINUL) 2 MG tablet Take day of surgery   losartan (COZAAR) 100 MG tablet Hold day of surgery   montelukast (SINGULAIR) 10 mg tablet Take night before surgery    omeprazole (PriLOSEC) 40 MG capsule Hold day of surgery   Synthroid 88 MCG tablet Take day of surgery   Timolol Maleate 0 5 % (DAILY) SOLN Take day of surgery   tiZANidine (ZANAFLEX) 2 mg tablet Uses PRN- OK to take day of surgery   Covid screening negative as per patient  Fully vaccinated  Instructed to bring vaccination record for recording in epic system  Reviewed showering and medication instructions  Take medications morning of surgery with a small sip of water  Patient verbalized understanding  Advised NPO after MN and ASC will call with scheduled surgical time  Instructed to bring stimulator remote  Patient and daughter verbalized understanding

## 2022-09-02 ENCOUNTER — DOCUMENTATION (OUTPATIENT)
Dept: NEUROSURGERY | Facility: CLINIC | Age: 77
End: 2022-09-02

## 2022-09-02 ENCOUNTER — ANESTHESIA EVENT (OUTPATIENT)
Dept: PERIOP | Facility: HOSPITAL | Age: 77
End: 2022-09-02
Payer: COMMERCIAL

## 2022-09-02 NOTE — ANESTHESIA PREPROCEDURE EVALUATION
Procedure:  Reopening of thoracic and left buttock incisions for removal of spinal cord stimulator system (Left Spine Thoracic)    Relevant Problems   CARDIO   (+) Hypertension   (+) Migraine headache      ENDO   (+) Hypothyroidism      GI/HEPATIC   (+) Esophageal reflux      MUSCULOSKELETAL   (+) Chronic bilateral low back pain with left-sided sciatica   (+) Lumbar spondylosis      NEURO/PSYCH   (+) Anxiety and depression   (+) Chronic bilateral low back pain with left-sided sciatica   (+) Chronic pain syndrome   (+) Migraine headache      PULMONARY   (+) Asthma   (+) SAGE (obstructive sleep apnea)      Other   (+) Dyslipidemia   (+) Glaucoma   (+) Tremor      nuclear stress test February 2022: no ischemia      May 2022 Echo: LVEF of 55%  There was mild LAE  Moderate to severe MR, mild aortic valve disease  Physical Exam    Airway    Mallampati score: II  TM Distance: >3 FB  Neck ROM: full     Dental   No notable dental hx     Cardiovascular      Pulmonary      Other Findings        Anesthesia Plan  ASA Score- 4     Anesthesia Type- general with ASA Monitors  Additional Monitors:   Airway Plan: ETT  Plan Factors-Exercise tolerance (METS): <4 METS  Chart reviewed  Patient summary reviewed  Patient is not a current smoker  Patient did not smoke on day of surgery  Induction- intravenous  Postoperative Plan-   Planned trial extubation    Informed Consent- Anesthetic plan and risks discussed with patient  I personally reviewed this patient with the CRNA  Discussed and agreed on the Anesthesia Plan with the CRNA  Araseli Serra

## 2022-09-06 ENCOUNTER — APPOINTMENT (OUTPATIENT)
Dept: RADIOLOGY | Facility: HOSPITAL | Age: 77
End: 2022-09-06
Payer: COMMERCIAL

## 2022-09-06 ENCOUNTER — TELEPHONE (OUTPATIENT)
Dept: NEUROSURGERY | Facility: CLINIC | Age: 77
End: 2022-09-06

## 2022-09-06 ENCOUNTER — HOSPITAL ENCOUNTER (OUTPATIENT)
Facility: HOSPITAL | Age: 77
Setting detail: OUTPATIENT SURGERY
Discharge: HOME | End: 2022-09-06
Attending: NEUROLOGICAL SURGERY | Admitting: NEUROLOGICAL SURGERY
Payer: COMMERCIAL

## 2022-09-06 ENCOUNTER — ANESTHESIA (OUTPATIENT)
Dept: PERIOP | Facility: HOSPITAL | Age: 77
End: 2022-09-06
Payer: COMMERCIAL

## 2022-09-06 VITALS
RESPIRATION RATE: 18 BRPM | SYSTOLIC BLOOD PRESSURE: 139 MMHG | TEMPERATURE: 97.8 F | HEART RATE: 74 BPM | OXYGEN SATURATION: 98 % | WEIGHT: 165 LBS | DIASTOLIC BLOOD PRESSURE: 59 MMHG | HEIGHT: 60 IN | BODY MASS INDEX: 32.39 KG/M2

## 2022-09-06 PROBLEM — T85.192A MALFUNCTION OF SPINAL CORD STIMULATOR (HCC): Status: ACTIVE | Noted: 2022-09-06

## 2022-09-06 PROCEDURE — 63688 REV/RMV IMP SP NPG/R DTCH CN: CPT | Performed by: NEUROLOGICAL SURGERY

## 2022-09-06 PROCEDURE — 72070 X-RAY EXAM THORAC SPINE 2VWS: CPT

## 2022-09-06 PROCEDURE — 63688 REV/RMV IMP SP NPG/R DTCH CN: CPT | Performed by: PHYSICIAN ASSISTANT

## 2022-09-06 PROCEDURE — 63661 REMOVE SPINE ELTRD PERQ ARAY: CPT | Performed by: NEUROLOGICAL SURGERY

## 2022-09-06 PROCEDURE — 71045 X-RAY EXAM CHEST 1 VIEW: CPT

## 2022-09-06 PROCEDURE — 63661 REMOVE SPINE ELTRD PERQ ARAY: CPT | Performed by: PHYSICIAN ASSISTANT

## 2022-09-06 RX ORDER — SODIUM CHLORIDE, SODIUM LACTATE, POTASSIUM CHLORIDE, CALCIUM CHLORIDE 600; 310; 30; 20 MG/100ML; MG/100ML; MG/100ML; MG/100ML
125 INJECTION, SOLUTION INTRAVENOUS CONTINUOUS
Status: DISCONTINUED | OUTPATIENT
Start: 2022-09-06 | End: 2022-09-06 | Stop reason: HOSPADM

## 2022-09-06 RX ORDER — CHLORHEXIDINE GLUCONATE 0.12 MG/ML
15 RINSE ORAL ONCE
Status: COMPLETED | OUTPATIENT
Start: 2022-09-06 | End: 2022-09-06

## 2022-09-06 RX ORDER — LIDOCAINE HYDROCHLORIDE 20 MG/ML
INJECTION, SOLUTION EPIDURAL; INFILTRATION; INTRACAUDAL; PERINEURAL AS NEEDED
Status: DISCONTINUED | OUTPATIENT
Start: 2022-09-06 | End: 2022-09-06

## 2022-09-06 RX ORDER — ONDANSETRON 2 MG/ML
4 INJECTION INTRAMUSCULAR; INTRAVENOUS ONCE AS NEEDED
Status: DISCONTINUED | OUTPATIENT
Start: 2022-09-06 | End: 2022-09-06 | Stop reason: HOSPADM

## 2022-09-06 RX ORDER — FENTANYL CITRATE 50 UG/ML
INJECTION, SOLUTION INTRAMUSCULAR; INTRAVENOUS AS NEEDED
Status: DISCONTINUED | OUTPATIENT
Start: 2022-09-06 | End: 2022-09-06

## 2022-09-06 RX ORDER — ROCURONIUM BROMIDE 10 MG/ML
INJECTION, SOLUTION INTRAVENOUS AS NEEDED
Status: DISCONTINUED | OUTPATIENT
Start: 2022-09-06 | End: 2022-09-06

## 2022-09-06 RX ORDER — CEFAZOLIN SODIUM 2 G/50ML
2000 SOLUTION INTRAVENOUS ONCE
Status: COMPLETED | OUTPATIENT
Start: 2022-09-06 | End: 2022-09-06

## 2022-09-06 RX ORDER — GLYCOPYRROLATE 0.2 MG/ML
INJECTION INTRAMUSCULAR; INTRAVENOUS AS NEEDED
Status: DISCONTINUED | OUTPATIENT
Start: 2022-09-06 | End: 2022-09-06

## 2022-09-06 RX ORDER — ACETAMINOPHEN 325 MG/1
975 TABLET ORAL ONCE
Status: COMPLETED | OUTPATIENT
Start: 2022-09-06 | End: 2022-09-06

## 2022-09-06 RX ORDER — ACETAMINOPHEN 325 MG/1
975 TABLET ORAL EVERY 8 HOURS PRN
Status: DISCONTINUED | OUTPATIENT
Start: 2022-09-06 | End: 2022-09-06 | Stop reason: HOSPADM

## 2022-09-06 RX ORDER — SODIUM CHLORIDE, SODIUM LACTATE, POTASSIUM CHLORIDE, CALCIUM CHLORIDE 600; 310; 30; 20 MG/100ML; MG/100ML; MG/100ML; MG/100ML
50 INJECTION, SOLUTION INTRAVENOUS CONTINUOUS
Status: CANCELLED | OUTPATIENT
Start: 2022-09-06

## 2022-09-06 RX ORDER — ONDANSETRON 2 MG/ML
4 INJECTION INTRAMUSCULAR; INTRAVENOUS EVERY 6 HOURS PRN
Status: DISCONTINUED | OUTPATIENT
Start: 2022-09-06 | End: 2022-09-06 | Stop reason: HOSPADM

## 2022-09-06 RX ORDER — MIDAZOLAM HYDROCHLORIDE 2 MG/2ML
INJECTION, SOLUTION INTRAMUSCULAR; INTRAVENOUS AS NEEDED
Status: DISCONTINUED | OUTPATIENT
Start: 2022-09-06 | End: 2022-09-06

## 2022-09-06 RX ORDER — SODIUM CHLORIDE 9 MG/ML
100 INJECTION, SOLUTION INTRAVENOUS CONTINUOUS
Status: DISCONTINUED | OUTPATIENT
Start: 2022-09-06 | End: 2022-09-06 | Stop reason: HOSPADM

## 2022-09-06 RX ORDER — GABAPENTIN 300 MG/1
300 CAPSULE ORAL ONCE
Status: COMPLETED | OUTPATIENT
Start: 2022-09-06 | End: 2022-09-06

## 2022-09-06 RX ORDER — HYDRALAZINE HYDROCHLORIDE 20 MG/ML
INJECTION INTRAMUSCULAR; INTRAVENOUS AS NEEDED
Status: DISCONTINUED | OUTPATIENT
Start: 2022-09-06 | End: 2022-09-06

## 2022-09-06 RX ORDER — PHENYLEPHRINE HCL IN 0.9% NACL 1 MG/10 ML
SYRINGE (ML) INTRAVENOUS AS NEEDED
Status: DISCONTINUED | OUTPATIENT
Start: 2022-09-06 | End: 2022-09-06

## 2022-09-06 RX ORDER — FAMOTIDINE 10 MG/ML
20 INJECTION, SOLUTION INTRAVENOUS ONCE
Status: COMPLETED | OUTPATIENT
Start: 2022-09-06 | End: 2022-09-06

## 2022-09-06 RX ORDER — ALBUTEROL SULFATE 2.5 MG/3ML
2.5 SOLUTION RESPIRATORY (INHALATION) ONCE AS NEEDED
Status: DISCONTINUED | OUTPATIENT
Start: 2022-09-06 | End: 2022-09-06 | Stop reason: HOSPADM

## 2022-09-06 RX ORDER — DEXAMETHASONE SODIUM PHOSPHATE 10 MG/ML
INJECTION, SOLUTION INTRAMUSCULAR; INTRAVENOUS AS NEEDED
Status: DISCONTINUED | OUTPATIENT
Start: 2022-09-06 | End: 2022-09-06

## 2022-09-06 RX ORDER — PROPOFOL 10 MG/ML
INJECTION, EMULSION INTRAVENOUS AS NEEDED
Status: DISCONTINUED | OUTPATIENT
Start: 2022-09-06 | End: 2022-09-06

## 2022-09-06 RX ORDER — NEOSTIGMINE METHYLSULFATE 1 MG/ML
INJECTION INTRAVENOUS AS NEEDED
Status: DISCONTINUED | OUTPATIENT
Start: 2022-09-06 | End: 2022-09-06

## 2022-09-06 RX ORDER — ALBUTEROL SULFATE 2.5 MG/3ML
2.5 SOLUTION RESPIRATORY (INHALATION) EVERY 6 HOURS PRN
Status: DISCONTINUED | OUTPATIENT
Start: 2022-09-06 | End: 2022-09-06 | Stop reason: HOSPADM

## 2022-09-06 RX ORDER — TRAMADOL HYDROCHLORIDE 50 MG/1
50 TABLET ORAL EVERY 6 HOURS PRN
Status: DISCONTINUED | OUTPATIENT
Start: 2022-09-06 | End: 2022-09-06 | Stop reason: HOSPADM

## 2022-09-06 RX ORDER — LIDOCAINE HYDROCHLORIDE AND EPINEPHRINE 10; 10 MG/ML; UG/ML
INJECTION, SOLUTION INFILTRATION; PERINEURAL AS NEEDED
Status: DISCONTINUED | OUTPATIENT
Start: 2022-09-06 | End: 2022-09-06 | Stop reason: HOSPADM

## 2022-09-06 RX ORDER — DIPHENHYDRAMINE HYDROCHLORIDE 50 MG/ML
12.5 INJECTION INTRAMUSCULAR; INTRAVENOUS ONCE
Status: COMPLETED | OUTPATIENT
Start: 2022-09-06 | End: 2022-09-06

## 2022-09-06 RX ORDER — ONDANSETRON 2 MG/ML
INJECTION INTRAMUSCULAR; INTRAVENOUS AS NEEDED
Status: DISCONTINUED | OUTPATIENT
Start: 2022-09-06 | End: 2022-09-06

## 2022-09-06 RX ORDER — BUPIVACAINE HYDROCHLORIDE AND EPINEPHRINE 5; 5 MG/ML; UG/ML
INJECTION, SOLUTION EPIDURAL; INTRACAUDAL; PERINEURAL AS NEEDED
Status: DISCONTINUED | OUTPATIENT
Start: 2022-09-06 | End: 2022-09-06 | Stop reason: HOSPADM

## 2022-09-06 RX ADMIN — HYDRALAZINE HYDROCHLORIDE 5 MG: 20 INJECTION, SOLUTION INTRAMUSCULAR; INTRAVENOUS at 08:40

## 2022-09-06 RX ADMIN — ROCURONIUM BROMIDE 30 MG: 10 INJECTION INTRAVENOUS at 07:37

## 2022-09-06 RX ADMIN — Medication 100 MCG: at 07:52

## 2022-09-06 RX ADMIN — CHLORHEXIDINE GLUCONATE 15 ML: 1.2 SOLUTION ORAL at 07:01

## 2022-09-06 RX ADMIN — Medication 50 MCG: at 07:48

## 2022-09-06 RX ADMIN — NEOSTIGMINE METHYLSULFATE 4 MG: 0.5 INJECTION INTRAVENOUS at 08:24

## 2022-09-06 RX ADMIN — LIDOCAINE HYDROCHLORIDE 100 MG: 20 INJECTION, SOLUTION EPIDURAL; INFILTRATION; INTRACAUDAL at 07:37

## 2022-09-06 RX ADMIN — HYDRALAZINE HYDROCHLORIDE 5 MG: 20 INJECTION, SOLUTION INTRAMUSCULAR; INTRAVENOUS at 08:33

## 2022-09-06 RX ADMIN — RACEPINEPHRINE HYDROCHLORIDE 0.5 ML: 11.25 SOLUTION RESPIRATORY (INHALATION) at 09:51

## 2022-09-06 RX ADMIN — CEFAZOLIN SODIUM 2000 MG: 2 SOLUTION INTRAVENOUS at 07:39

## 2022-09-06 RX ADMIN — DEXAMETHASONE SODIUM PHOSPHATE 10 MG: 10 INJECTION, SOLUTION INTRAMUSCULAR; INTRAVENOUS at 07:43

## 2022-09-06 RX ADMIN — ONDANSETRON 4 MG: 2 INJECTION INTRAMUSCULAR; INTRAVENOUS at 07:43

## 2022-09-06 RX ADMIN — SODIUM CHLORIDE, SODIUM LACTATE, POTASSIUM CHLORIDE, AND CALCIUM CHLORIDE 125 ML/HR: .6; .31; .03; .02 INJECTION, SOLUTION INTRAVENOUS at 07:08

## 2022-09-06 RX ADMIN — ALBUTEROL SULFATE 2.5 MG: 2.5 SOLUTION RESPIRATORY (INHALATION) at 09:51

## 2022-09-06 RX ADMIN — FAMOTIDINE 20 MG: 10 INJECTION INTRAVENOUS at 11:06

## 2022-09-06 RX ADMIN — Medication 100 MCG: at 07:56

## 2022-09-06 RX ADMIN — MIDAZOLAM 1 MG: 1 INJECTION INTRAMUSCULAR; INTRAVENOUS at 07:25

## 2022-09-06 RX ADMIN — MIDAZOLAM 1 MG: 1 INJECTION INTRAMUSCULAR; INTRAVENOUS at 07:33

## 2022-09-06 RX ADMIN — PROPOFOL 150 MG: 10 INJECTION, EMULSION INTRAVENOUS at 07:37

## 2022-09-06 RX ADMIN — ACETAMINOPHEN 325MG 975 MG: 325 TABLET ORAL at 07:01

## 2022-09-06 RX ADMIN — GLYCOPYRROLATE 0.8 MG: 0.4 INJECTION INTRAMUSCULAR; INTRAVENOUS at 08:24

## 2022-09-06 RX ADMIN — GABAPENTIN 300 MG: 300 CAPSULE ORAL at 07:00

## 2022-09-06 RX ADMIN — DIPHENHYDRAMINE HYDROCHLORIDE 12.5 MG: 50 INJECTION, SOLUTION INTRAMUSCULAR; INTRAVENOUS at 09:45

## 2022-09-06 RX ADMIN — FENTANYL CITRATE 50 MCG: 50 INJECTION, SOLUTION INTRAMUSCULAR; INTRAVENOUS at 07:37

## 2022-09-06 NOTE — OP NOTE
OPERATIVE REPORT  PATIENT NAME: Richie Rangel    :  1945  MRN: [id number]  Pt Location: [facility] OR ROOM 03    SURGERY DATE: 2022    Surgeon(s) and Role:     * Gloria Armenta MD - Primary     * Felicita Lino PA-C - Assisting    No qualified resident was available  GABI was present for the procedure, and provided essential assistance with proper exposure, retraction, hemostasis, removal of electrode in IPG, and wound closure, which was necessary secondary to the complex nature of this case  Preop Diagnosis:  Malfunction of spinal cord stimulator (HCC) [T85 192A]  Chronic pain syndrome [G89 4]    Post-Op Diagnosis Codes:     * Malfunction of spinal cord stimulator (HCC) [T85 192A]     * Chronic pain syndrome [G89 4]    Procedure:  1  Reopening of thoracic and left buttock incisions for removal of thoracic spinal cord stimulator electrodes and left buttock IPG  Specimen(s):  * No specimens in log *    Estimated Blood Loss:   Minimal    Drains:  * No LDAs found *    Anesthesia Type:   General    Operative Indications:  Malfunction of spinal cord stimulator (HCC) [T85 192A]  Chronic pain syndrome [G89 4]    Operative Findings:  [employer] spinal cord stimulator system removed in its entirety as verified with intraoperative fluoro  Complications:   None    Procedure and Technique:  Clinical Note:    The goals and alternatives to the procedure described above were discussed with patient and family  The risks of surgery were described in detail  1  Risk of general anesthetic, with possible cardiac and respiratory complication  There is risk of infection and bleeding  2  Risk of neurological injury with new pain, weakness or numbness or difficulties with bowel and bladder function  The risk of CSF leak was described  Risk of paraplegia and spinal cord injury  Risk of retained spinal cord stimulator fragments  Once all questions were answered to their satisfaction, they asked to proceed with surgery      Operating Room Note    The patient was brought to the operating room and placed under general anesthetic  Once all appropriate lines were in position, the patient was carefully turned in the prone position onto a Kevin frame  Care was taken to ensure that all pressure points were padded and the neck was in a neutral position  AP fluoroscopy was used to identify the thoracic percutaneous electrodes and anchors in addition to the left buttock IPG  The previous incisions were identified and marked  The skin was then prepped and draped in usual sterile fashion  A full surgical timeout was undertaken identifying the site, side and level of surgery  One percent lidocaine with 100,000 of epinephrine was used to infiltrate the soft tissue  The patient received preoperative antibiotics  10 blade was used to incise the skin at the thoracic incision  Monopolar cautery was used to dissect down to the anchors  The electrodes were removed along with the anchors and there were 8 leads on each electrode after removal from the spinal canal   The electrodes were then cut distally to the anchors  Ten blade was used to incise the skin over the planned buttock incision  Monopolar cautery was used to dissect down to the IPG  The remaining electrodes were pulled from the thoracic incision into the left buttock incision  The IPG and remaining electrodes were then removed from the left buttock incision  AP fluoroscopy was used to confirm removal of entire spinal cord stimulator system  Both incisions were irrigated with antibiotic irrigation  Inverted Vicryl suture was used to approximate the subcutaneous tissues at both incisions  Running 4 0 Monocryl was then used to close both incisions  0 5% Marcaine with 1/100,000 of epinephrine was used to infiltrate the soft tissue  A Miplex was applied to both incisions  The count was correct at the end of the case and there were no complications       The patient was carefully transferred onto the operating gurney and extubated  The patient was transferred to the PACU where they were noted to be hemodynamically stable and neurologically unchanged  The results of surgery were discussed with patient and family       I was present for the entire procedure    Patient Disposition:  PACU       SIGNATURE: Tracey Torres MD  DATE: September 6, 2022  TIME: 8:22 AM

## 2022-09-06 NOTE — INTERVAL H&P NOTE
H&P reviewed  After examining the patient I find no changes in the patients condition since the H&P had been written  Patient personally seen and examined  Neurological examination unchanged compared to last office/progress note, with the following exceptions:    /69   Pulse 65   Temp (!) 97 2 °F (36 2 °C) (Temporal)   Resp 18   Ht 5' (1 524 m)   Wt 74 8 kg (165 lb)   SpO2 97%   BMI 32 22 kg/m²      None  Regular cardiac rate and rhythm  No respiratory distress  Abdomen nontender  Normocephalic  Grossly full power in upper lower extremities  Has stopped antiplatelet/anticoagulation medication as instructed  Post operative instructions and medications have been reviewed with the patient and family  Assessment and Plan:    All questions have been answered to the patient and family satisfaction  Plan to proceed with reopening of thoracic and left buttock incisions for removal of spinal cord stimulator system  They are in agreement with proceeding

## 2022-09-06 NOTE — TELEPHONE ENCOUNTER
09/07/2022-PT DISCHARGED TO HOME    09/06/2022-PT STILL IN HOSPITAL  09/19/2022-2 WK POJOSEMANUEL W/NURSE  10/21/2022-6 WK JUAN CARLOS W/JESSI

## 2022-09-06 NOTE — ANESTHESIA POSTPROCEDURE EVALUATION
Post-Op Assessment Note    CV Status:  Stable    Pain management: adequate     Mental Status:  Alert and awake   Hydration Status:  Euvolemic   PONV Controlled:  Controlled   Airway Patency:  Patent      Post Op Vitals Reviewed: Yes      Staff: CRNA         No complications documented      BP   151/68   Temp  97 9   Pulse 101   Resp   28   SpO2   100

## 2022-09-06 NOTE — DISCHARGE INSTRUCTIONS
Spine Day Surgery Discharge Instructions    Activity:    1  Do not lift more than 20 pounds for 2 weeks  Surgical incision care:    1  Keep dressing in place for 3 days  2  Keep incision dry for 3 days  3  May allow clean water to flow over incision after 3 days  4  Do not immerse the incision in water for 4 weeks  5  After 3 days, incision may be left open to air, but should remain clean  6  Do not apply any creams or ointments to the incision, unless otherwise instructed by [facility]  7  Contact office if increasing redness, drainage, pain or swelling around the incision  Postoperative medication:    1  [employer] [facility] will not provide pain medication for the first 2 weeks after surgery as coordinated with your pain specialist    2  If [employer] is providing pain medication, please contact office for questions regarding dosage and modifications  3  If [facility] is not providing pain medication postoperatively, then contact pain specialist for additional instructions and prescriptions  4  Resume antiplatelet/anticoagulation medication 2 days after surgery, unless you notice new neurological symptoms or bleeding from incision  5  Do not operate heavy machinery or vehicles while taking sedating medications

## 2022-09-08 NOTE — TELEPHONE ENCOUNTER
Called patient to see how she is doing after surgery and I spoke with her daughter Marleny Thorne  She reports she is doing well overall and denies any incisional issues or fevers  Patient was having some incisional pain yesterday but is able to ambulate around the house and complete ADLs with assistance  Educated the patient's daughter about the importance of preventing blood clots and provided measures how to prevent them  Patient's daughter is under the impression that she has moved her bowels since the surgery  Encouraged patient to take an over the counter stool softener, if she is taking narcotic pain medication  Encouraged fiber intake and fluids  Reviewed incision care with the patient's daughter  Advised that after three days she may take a shower and gently wash the surgical site with soap and water  Use clean wash cloth, towels, and clothing  Do not submerge in water until cleared by the surgeon  Do not apply any creams, ointments, or lotions to the site  Daughter is aware to call the office if any redness, swelling, drainage, dehiscence of incision, or fever >100 F occurs  Patient's daughter is aware to call the office if any concerns or questions may arise  Reminded her of her upcoming appointments with the date/time/location  She was appreciative for the call

## 2022-09-19 ENCOUNTER — TELEMEDICINE (OUTPATIENT)
Dept: NEUROSURGERY | Facility: CLINIC | Age: 77
End: 2022-09-19

## 2022-09-19 DIAGNOSIS — Z98.890 POST-OPERATIVE STATE: Primary | ICD-10-CM

## 2022-09-19 PROCEDURE — 99024 POSTOP FOLLOW-UP VISIT: CPT

## 2022-09-22 ENCOUNTER — HOSPITAL ENCOUNTER (OUTPATIENT)
Dept: NON INVASIVE DIAGNOSTICS | Age: 77
Discharge: HOME | End: 2022-09-22
Payer: COMMERCIAL

## 2022-09-22 VITALS
HEIGHT: 60 IN | BODY MASS INDEX: 32.39 KG/M2 | WEIGHT: 165 LBS | DIASTOLIC BLOOD PRESSURE: 59 MMHG | SYSTOLIC BLOOD PRESSURE: 139 MMHG

## 2022-09-22 DIAGNOSIS — I10 ESSENTIAL (PRIMARY) HYPERTENSION: ICD-10-CM

## 2022-09-22 DIAGNOSIS — I34.0 MITRAL VALVE INSUFFICIENCY, UNSPECIFIED ETIOLOGY: ICD-10-CM

## 2022-09-22 DIAGNOSIS — I35.9 AORTIC VALVE DISEASE: ICD-10-CM

## 2022-09-22 DIAGNOSIS — E78.00 PURE HYPERCHOLESTEROLEMIA: ICD-10-CM

## 2022-09-22 DIAGNOSIS — I45.10 RBBB: ICD-10-CM

## 2022-09-22 LAB
AORTIC ROOT: 2.4 CM
AORTIC VALVE MEAN VELOCITY: 18.5 M/S
APICAL FOUR CHAMBER EJECTION FRACTION: 68 %
ASCENDING AORTA: 3.1 CM
AV AREA BY CONTINUOUS VTI: 1.5 CM2
AV AREA PEAK VELOCITY: 1.6 CM2
AV LVOT MEAN GRADIENT: 5 MMHG
AV LVOT PEAK GRADIENT: 9 MMHG
AV MEAN GRADIENT: 16 MMHG
AV PEAK GRADIENT: 29 MMHG
AV VALVE AREA: 1.45 CM2
AV VELOCITY RATIO: 0.52
AVA (PLAN): 1.4 CM2
DOP CALC AO PEAK VEL: 2.9 M/S
DOP CALC AO VTI: 60 CM
DOP CALC LVOT AREA: 2.83 CM2
DOP CALC LVOT DIAMETER: 1.9 CM
DOP CALC LVOT PEAK VEL VTI: 30.66 CM
DOP CALC LVOT PEAK VEL: 1.5 M/S
DOP CALC LVOT STROKE INDEX: 51.7 ML/M2
DOP CALC LVOT STROKE VOLUME: 86.89 CM3
E WAVE DECELERATION TIME: 313 MS
FRACTIONAL SHORTENING: 34 % (ref 28–44)
INTERVENTRICULAR SEPTUM IN DIASTOLE (PARASTERNAL SHORT AXIS VIEW): 1.1 CM
INTERVENTRICULAR SEPTUM: 1.1 CM (ref 0.6–1.1)
LAAS-AP2: 18 CM2
LAAS-AP4: 27.7 CM2
LEFT ATRIUM AREA SYSTOLE SINGLE PLANE A4C: 27 CM2
LEFT ATRIUM SIZE: 4.6 CM
LEFT INTERNAL DIMENSION IN SYSTOLE: 2.5 CM (ref 2.1–4)
LEFT VENTRICLE DIASTOLIC VOLUME (MOD BIPLANE): 92 ML
LEFT VENTRICLE SYSTOLIC VOLUME (MOD BIPLANE): 32 ML
LEFT VENTRICULAR INTERNAL DIMENSION IN DIASTOLE: 3.8 CM (ref 3.5–6)
LEFT VENTRICULAR POSTERIOR WALL IN END DIASTOLE: 1.2 CM
LEFT VENTRICULAR STROKE VOLUME: 38 ML
LV EF: 65 %
LVSV (TEICH): 38 ML
MV E'TISSUE VEL-SEP: 9 CM/S
MV PEAK A VEL: 1.16 M/S
MV PEAK E VEL: 114 CM/S
MV STENOSIS PRESSURE HALF TIME: 91 MS
MV VALVE AREA P 1/2 METHOD: 2.42 CM2
RA PRESSURE ESTIMATED: 3 MMHG
RIGHT ATRIUM AREA SYSTOLE A4C: 17.2 CM2
RIGHT VENTRICLE ID DIMENSION: 3 CM
RV PSP: 31 MMHG
SL CV LEFT ATRIUM LENGTH A2C: 5.8 CM
SL CV LV EF: 70
SL CV PED ECHO LEFT VENTRICLE DIASTOLIC VOLUME (MOD BIPLANE) 2D: 61 ML
SL CV PED ECHO LEFT VENTRICLE SYSTOLIC VOLUME (MOD BIPLANE) 2D: 22 ML
TR MAX PG: 28 MMHG
TR PEAK VELOCITY: 2.7 M/S
TRICUSPID VALVE PEAK REGURGITATION VELOCITY: 2.66 M/S

## 2022-09-22 PROCEDURE — 93306 TTE W/DOPPLER COMPLETE: CPT | Performed by: INTERNAL MEDICINE

## 2022-09-22 PROCEDURE — 93306 TTE W/DOPPLER COMPLETE: CPT

## 2022-09-23 ENCOUNTER — OFFICE VISIT (OUTPATIENT)
Dept: FAMILY MEDICINE CLINIC | Facility: HOSPITAL | Age: 77
End: 2022-09-23
Payer: COMMERCIAL

## 2022-09-23 ENCOUNTER — HOSPITAL ENCOUNTER (OUTPATIENT)
Dept: CT IMAGING | Facility: HOSPITAL | Age: 77
End: 2022-09-23
Payer: COMMERCIAL

## 2022-09-23 ENCOUNTER — APPOINTMENT (OUTPATIENT)
Dept: LAB | Facility: HOSPITAL | Age: 77
End: 2022-09-23
Payer: COMMERCIAL

## 2022-09-23 VITALS
WEIGHT: 168.4 LBS | SYSTOLIC BLOOD PRESSURE: 150 MMHG | HEART RATE: 76 BPM | DIASTOLIC BLOOD PRESSURE: 56 MMHG | HEIGHT: 60 IN | BODY MASS INDEX: 33.06 KG/M2 | TEMPERATURE: 98.1 F

## 2022-09-23 DIAGNOSIS — R10.12 LEFT UPPER QUADRANT ABDOMINAL PAIN: ICD-10-CM

## 2022-09-23 DIAGNOSIS — N18.31 STAGE 3A CHRONIC KIDNEY DISEASE (HCC): ICD-10-CM

## 2022-09-23 DIAGNOSIS — R10.12 LEFT UPPER QUADRANT ABDOMINAL PAIN: Primary | ICD-10-CM

## 2022-09-23 DIAGNOSIS — Z23 ENCOUNTER FOR IMMUNIZATION: ICD-10-CM

## 2022-09-23 LAB
ALBUMIN SERPL BCP-MCNC: 3.8 G/DL (ref 3.5–5)
ALP SERPL-CCNC: 126 U/L (ref 43–122)
ALT SERPL W P-5'-P-CCNC: 18 U/L
ANION GAP SERPL CALCULATED.3IONS-SCNC: 4 MMOL/L (ref 5–14)
AST SERPL W P-5'-P-CCNC: 16 U/L (ref 14–36)
BASOPHILS # BLD AUTO: 0.03 THOUSANDS/ΜL (ref 0–0.1)
BASOPHILS NFR BLD AUTO: 0 % (ref 0–1)
BILIRUB SERPL-MCNC: 0.25 MG/DL (ref 0.2–1)
BUN SERPL-MCNC: 22 MG/DL (ref 5–25)
CALCIUM SERPL-MCNC: 9.6 MG/DL (ref 8.4–10.2)
CHLORIDE SERPL-SCNC: 108 MMOL/L (ref 96–108)
CO2 SERPL-SCNC: 30 MMOL/L (ref 21–32)
CREAT SERPL-MCNC: 1.1 MG/DL (ref 0.6–1.2)
EOSINOPHIL # BLD AUTO: 0.38 THOUSAND/ΜL (ref 0–0.61)
EOSINOPHIL NFR BLD AUTO: 4 % (ref 0–6)
ERYTHROCYTE [DISTWIDTH] IN BLOOD BY AUTOMATED COUNT: 13 % (ref 11.6–15.1)
GFR SERPL CREATININE-BSD FRML MDRD: 47 ML/MIN/1.73SQ M
GLUCOSE P FAST SERPL-MCNC: 127 MG/DL (ref 70–99)
HCT VFR BLD AUTO: 35.2 % (ref 34.8–46.1)
HGB BLD-MCNC: 11.2 G/DL (ref 11.5–15.4)
IMM GRANULOCYTES # BLD AUTO: 0.03 THOUSAND/UL (ref 0–0.2)
IMM GRANULOCYTES NFR BLD AUTO: 0 % (ref 0–2)
LIPASE SERPL-CCNC: 49 U/L (ref 23–300)
LYMPHOCYTES # BLD AUTO: 1.69 THOUSANDS/ΜL (ref 0.6–4.47)
LYMPHOCYTES NFR BLD AUTO: 20 % (ref 14–44)
MCH RBC QN AUTO: 30.5 PG (ref 26.8–34.3)
MCHC RBC AUTO-ENTMCNC: 31.8 G/DL (ref 31.4–37.4)
MCV RBC AUTO: 96 FL (ref 82–98)
MONOCYTES # BLD AUTO: 0.81 THOUSAND/ΜL (ref 0.17–1.22)
MONOCYTES NFR BLD AUTO: 9 % (ref 4–12)
NEUTROPHILS # BLD AUTO: 5.66 THOUSANDS/ΜL (ref 1.85–7.62)
NEUTS SEG NFR BLD AUTO: 67 % (ref 43–75)
NRBC BLD AUTO-RTO: 0 /100 WBCS
PLATELET # BLD AUTO: 198 THOUSANDS/UL (ref 149–390)
PMV BLD AUTO: 11 FL (ref 8.9–12.7)
POTASSIUM SERPL-SCNC: 4.7 MMOL/L (ref 3.5–5.3)
PROT SERPL-MCNC: 6.9 G/DL (ref 6.4–8.4)
RBC # BLD AUTO: 3.67 MILLION/UL (ref 3.81–5.12)
SODIUM SERPL-SCNC: 142 MMOL/L (ref 135–147)
WBC # BLD AUTO: 8.6 THOUSAND/UL (ref 4.31–10.16)

## 2022-09-23 PROCEDURE — 99214 OFFICE O/P EST MOD 30 MIN: CPT | Performed by: INTERNAL MEDICINE

## 2022-09-23 PROCEDURE — 85025 COMPLETE CBC W/AUTO DIFF WBC: CPT

## 2022-09-23 PROCEDURE — 36415 COLL VENOUS BLD VENIPUNCTURE: CPT

## 2022-09-23 PROCEDURE — 3077F SYST BP >= 140 MM HG: CPT | Performed by: INTERNAL MEDICINE

## 2022-09-23 PROCEDURE — 3078F DIAST BP <80 MM HG: CPT | Performed by: INTERNAL MEDICINE

## 2022-09-23 PROCEDURE — 74150 CT ABDOMEN W/O CONTRAST: CPT

## 2022-09-23 PROCEDURE — 80053 COMPREHEN METABOLIC PANEL: CPT

## 2022-09-23 PROCEDURE — G0008 ADMIN INFLUENZA VIRUS VAC: HCPCS

## 2022-09-23 PROCEDURE — 1160F RVW MEDS BY RX/DR IN RCRD: CPT | Performed by: INTERNAL MEDICINE

## 2022-09-23 PROCEDURE — 90662 IIV NO PRSV INCREASED AG IM: CPT

## 2022-09-23 PROCEDURE — 83690 ASSAY OF LIPASE: CPT

## 2022-09-23 NOTE — PROGRESS NOTES
Name: Daniella Saldaña      : 1945      MRN: [id number]  Encounter Provider: Emani Mack DO  Encounter Date: 2022   Encounter department: [facility] [place] [facility]      1  Left upper quadrant abdominal pain  Comments:  Concerning exam with rebound tenderness and looks uncomfortable, STAT CBC/CMP/Lipase and CT abd w/o contrast ordered (d/t borderline kidney fxn), urged bland diet and keep hydrated, encouraged to increase Omeprazole to 40 mg bid x 2 wks then go back to 1 tab PO q day, to ED with new/worse symptoms or with any red flag GI symptoms (reviewed with pt and dgtr in detail)  Orders:  -     CT abdomen wo contrast; Future; Expected date: 2022  -     CBC and differential  -     Comprehensive metabolic panel; Future  -     Lipase; Future  -     Comprehensive metabolic panel  -     Lipase    2  Stage 3a chronic kidney disease ([facility] [address] )  Comments: Will avoid IV contrast d/t borderline CKD, urged to keep hydrated and to avoid NSAIDs        Pt agreeable to flu vaccine      Subjective      HPI Pt here for an acute visit    Pt with approx 1 5 wks of abd pain  She is not able to id any new meds/changes in diet/recent travel/sick contacts  Pain is epigastric and radiates to LUQ  The pain is a constant sharp pain  The pain is worse if she lays on her R side  The pain is worse after eating  She has had no vomiting/nausea  She has had decrease in appetite/F/C/D/C/blood in the stool/black stools  She notes no urinary symptoms/vaginal symptoms  She has tried Pepcid and Olya [place] w/o great benefit  She is up 4 lbs from Aug 2022  She is on Omeprazole  Review of Systems   Constitutional: Positive for appetite change  Negative for chills, fever and unexpected weight change  Eyes: Negative for pain and visual disturbance  Respiratory: Negative for cough and shortness of breath  Cardiovascular: Negative for chest pain and palpitations  Gastrointestinal: Positive for abdominal pain  Negative for blood in stool, constipation, diarrhea, nausea and vomiting  Genitourinary: Negative for difficulty urinating, dysuria, flank pain, vaginal bleeding and vaginal pain  Skin: Negative for rash and wound  Neurological: Negative for dizziness, light-headedness and headaches  Psychiatric/Behavioral: Negative for confusion         Current Outpatient Medications on File Prior to Visit   Medication Sig    albuterol (VENTOLIN HFA) 90 mcg/act inhaler Inhale 2 puffs every 4 (four) hours as needed for wheezing or shortness of breath    alendronate (FOSAMAX) 70 mg tablet Take 1 tablet (70 mg total) by mouth once a week    atorvastatin (LIPITOR) 20 mg tablet Take 1 tablet (20 mg total) by mouth daily    bimatoprost (LUMIGAN) 0 01 % ophthalmic drops Administer 1 drop to both eyes daily (Patient taking differently: Administer 1 drop to both eyes 2 (two) times a day)    buPROPion (WELLBUTRIN XL) 300 mg 24 hr tablet Take 1 tablet (300 mg total) by mouth daily    cholecalciferol (VITAMIN D3) 1,000 units tablet Take 1 tablet (1,000 Units total) by mouth daily    gabapentin (NEURONTIN) 800 mg tablet 1 tab PO q am and 1 tab PO qhs    glycopyrrolate (ROBINUL) 2 MG tablet Take 1 tablet (2 mg total) by mouth 2 (two) times a day    losartan (COZAAR) 100 MG tablet Take 1 tablet (100 mg total) by mouth daily    montelukast (SINGULAIR) 10 mg tablet Take 1 tablet (10 mg total) by mouth daily at bedtime    omeprazole (PriLOSEC) 40 MG capsule Take 1 capsule (40 mg total) by mouth daily    Synthroid 88 MCG tablet Take 1 tablet (88 mcg total) by mouth daily    Timolol Maleate 0 5 % (DAILY) SOLN Apply 1 drop to eye every 12 (twelve) hours    tiZANidine (ZANAFLEX) 2 mg tablet TAKE 1 TABLET BY MOUTH EVERY 8 HOURS AS NEEDED FOR MUSCLE SPASM    traZODone (DESYREL) 50 mg tablet Take 0 5 tablets (25 mg total) by mouth daily at bedtime as needed for sleep (Patient not taking: Reported on 8/31/2022)       Objective     /56   Pulse 76   Temp 98 1 °F (36 7 °C) (Tympanic)   Ht 5' (1 524 m)   Wt 76 4 kg (168 lb 6 4 oz)   BMI 32 89 kg/m²     Physical Exam  Vitals and nursing note reviewed  Constitutional:       General: She is in acute distress  Appearance: She is well-developed  She is obese  She is not ill-appearing  Comments: Mild to mod acute distress   HENT:      Head: Normocephalic and atraumatic  Eyes:      General:         Right eye: No discharge  Left eye: No discharge  Conjunctiva/sclera: Conjunctivae normal    Neck:      Trachea: No tracheal deviation  Cardiovascular:      Rate and Rhythm: Normal rate and regular rhythm  Heart sounds: Normal heart sounds  No murmur heard  No friction rub  Pulmonary:      Effort: Pulmonary effort is normal  No respiratory distress  Breath sounds: Normal breath sounds  No wheezing or rales  Abdominal:      General: There is no distension  Palpations: Abdomen is soft  There is no hepatomegaly or mass  Tenderness: There is abdominal tenderness in the left upper quadrant  There is rebound  There is no right CVA tenderness, left CVA tenderness or guarding  Negative signs include Snow's sign and McBurney's sign  Hernia: No hernia is present  Musculoskeletal:      Cervical back: Neck supple  Skin:     General: Skin is warm  Coloration: Skin is not pale  Findings: No rash  Neurological:      Mental Status: She is alert  Motor: No abnormal muscle tone     Psychiatric:         Mood and Affect: Mood normal          Behavior: Behavior normal        Segundo Fake, DO

## 2022-09-26 DIAGNOSIS — R10.12 LEFT UPPER QUADRANT ABDOMINAL PAIN: Primary | ICD-10-CM

## 2022-10-05 ENCOUNTER — CONSULT (OUTPATIENT)
Dept: GASTROENTEROLOGY | Facility: CLINIC | Age: 77
End: 2022-10-05
Payer: COMMERCIAL

## 2022-10-05 VITALS
WEIGHT: 168 LBS | DIASTOLIC BLOOD PRESSURE: 70 MMHG | OXYGEN SATURATION: 98 % | BODY MASS INDEX: 32.81 KG/M2 | HEART RATE: 74 BPM | TEMPERATURE: 98.9 F | SYSTOLIC BLOOD PRESSURE: 120 MMHG

## 2022-10-05 DIAGNOSIS — D64.9 ANEMIA, UNSPECIFIED TYPE: ICD-10-CM

## 2022-10-05 DIAGNOSIS — R10.12 LEFT UPPER QUADRANT ABDOMINAL PAIN: ICD-10-CM

## 2022-10-05 DIAGNOSIS — Z12.11 COLON CANCER SCREENING: ICD-10-CM

## 2022-10-05 DIAGNOSIS — R13.10 DYSPHAGIA, UNSPECIFIED TYPE: Primary | ICD-10-CM

## 2022-10-05 PROCEDURE — 99204 OFFICE O/P NEW MOD 45 MIN: CPT | Performed by: INTERNAL MEDICINE

## 2022-10-05 NOTE — PROGRESS NOTES
[facility] [address] [facility] - Outpatient Consultation  Antonina Gibbs 76 y o  female MRN: [id number]  Encounter: [id number]          ASSESSMENT AND PLAN:      1  Left upper quadrant abdominal pain  2  GERD  3  Dysphagia    Patient will need EGD to evaluate for peptic ulcer disease, gastritis, H pylori and will need speech evaluation with barium swallow  She would also need esophageal biopsy as well as to look for any strictures or masses in the esophagus causing her symptoms  Patient agreeable with plan of care  Continue omeprazole  4  Elevated alk-phos  Down trending  Repeat LFTs in 6 months and monitor for now  6  Left gluteal fluid collection  Patient currently asymptomatic  She had sacral nerve stimulator which was removed in the past   Does not report any increased swelling in the buttock  Discussed with patient and daughter to keep an eye on the swelling and if signs of inflammation including redness, pain and increased swelling then would need to get in touch with primary care physician to get it drained  7  Colon cancer screening  Patient is average risk screening  Last colonoscopy was 8 years ago in [facility]   Patient says that was normal but I do not have any records in the chart to review  Patient has anemia  She is agreeable to get colonoscopy done  Procedure and prep ordered  - Ambulatory Referral to Gastroenterology  - EGD; Future  - bisacodyl (DULCOLAX) 5 mg EC tablet; Take 4 tablets (20 mg total) by mouth once for 1 dose Colonoscopy prep  Dispense: 4 tablet; Refill: 0  - polyethylene glycol (GOLYTELY) 4000 mL solution; Take 4,000 mL by mouth once for 1 dose Colonoscopy prep  Dispense: 4000 mL; Refill: 0  - FL barium swallow video w speech; Future  - Hepatic function panel;  Future  - Hepatic function panel      ______________________________________________________________________    HPI:  68-year-old status post cholecystectomy hyperlipidemia, hypertension, obesity, hypothyroidism presents for evaluation  Patient is accompanied by daughter who provided translation  Patient denies any nausea vomiting  She has had heartburn for years but it has increased in frequency recently  She is also having swallowing problems  Swallowing problems are mainly with solids  She has good dentition and is able to chew the food properly  She has difficulty initiating swallow and then has choking while swallowing food  She takes liquids to help the bites go down  No regurgitation  She takes omeprazole 40 mg as needed currently  No abdominal pain  Normal bowel movements  No blood in stools  Weight is stable  No family history of esophageal, stomach or colon cancer  No cigarette smoking or alcohol intake  Patient had EGD years ago results unknown  Patient had colonoscopy in [facility] which was normal per patient in 2014  Labs done last month show hemoglobin 11 2 decreased from baseline  TSH normal   Alk-phos 126 decreased from prior value of 265 other LFTs normal   CT abdomen without contrast done last month showed fatty pancreas, subcutaneous loculated fluid collection 4 1 cm in the left gluteal muscle accompanied by inflammation  REVIEW OF SYSTEMS:    CONSTITUTIONAL: Denies any fever, chills, rigors, and weight loss  HEENT: No earache or tinnitus  Denies hearing loss or visual disturbances  CARDIOVASCULAR: No chest pain or palpitations  RESPIRATORY: Denies any cough, hemoptysis, shortness of breath or dyspnea on exertion  GASTROINTESTINAL: As noted in the History of Present Illness  GENITOURINARY: No problems with urination  Denies any hematuria or dysuria  NEUROLOGIC: No dizziness or vertigo, denies headaches  MUSCULOSKELETAL: Denies any muscle or joint pain  SKIN: Denies skin rashes or itching  ENDOCRINE: Denies excessive thirst  Denies intolerance to heat or cold  PSYCHOSOCIAL: Denies depression or anxiety  Denies any recent memory loss  Historical Information   Past Medical History:   Diagnosis Date    Asthma     Chronic pain disorder     Back    Dyslipidemia     Esophageal reflux     Frequent headaches     stress related    History of stomach ulcers     Hypercalcemia     Hyperlipidemia     Hypertension     Osteopenia     Tremor      Past Surgical History:   Procedure Laterality Date    BACK SURGERY      lower back    BILATERAL OOPHORECTOMY      BREAST BIOPSY Right     long ago, benign     SECTION      CHOLECYSTECTOMY      HYSTERECTOMY      age 43 per MRS    JOINT REPLACEMENT      OOPHORECTOMY Bilateral     age 43 per MRS    NC REVISE/REMOVE SPINAL NEUROSTIM/ Left 2022    Procedure: Reopening of thoracic and left buttock incisions for removal of spinal cord stimulator system;  Surgeon: Reyna Gallego MD;  Location:  MAIN OR;  Service: Neurosurgery    NC SURG IMPLNT Zach Higuera Left 2019    Procedure: LAMINECTOMY THORACIC FOR INSERTION DORSAL COLUMN SPINAL CORD STIMULATOR (DCS) W IMPLANTABLE PULSE GENERATOR, LEFT GLUTE;  Surgeon: Leo Campos MD;  Location:  MAIN OR;  Service: Neurosurgery    ROTATOR CUFF REPAIR Right     SPINAL STIMULATOR PLACEMENT N/A 2019    Procedure: REVISION SPINAL CORD STIMULATOR PADDLE ELECTRODE, REPLACEMENT WITH PERCUTANEOUS ELECTRODES OR SMALLER PADDLE;  Surgeon: Leo Campos MD;  Location: [facility] Main OR;  Service: Neurosurgery     Social History   Social History     Substance and Sexual Activity   Alcohol Use Not Currently     Social History     Substance and Sexual Activity   Drug Use Never     Social History     Tobacco Use   Smoking Status Former Smoker    Start date:     Quit date: 1991    Years since quittin 4   Smokeless Tobacco Never Used     Family History   Problem Relation Age of Onset    Hypertension Family     Stroke Family     Heart disease Family     Thyroid disease Family     No Known Problems Sister     No Known Problems Daughter     No Known Problems Sister     No Known Problems Sister     No Known Problems Sister     No Known Problems Sister     No Known Problems Daughter     No Known Problems Daughter     No Known Problems Mother     No Known Problems Father     No Known Problems Maternal Grandmother     No Known Problems Maternal Grandfather     No Known Problems Paternal Grandmother     No Known Problems Paternal Grandfather        Meds/Allergies       Current Outpatient Medications:     bisacodyl (DULCOLAX) 5 mg EC tablet    polyethylene glycol (GOLYTELY) 4000 mL solution    albuterol (VENTOLIN HFA) 90 mcg/act inhaler    alendronate (FOSAMAX) 70 mg tablet    atorvastatin (LIPITOR) 20 mg tablet    bimatoprost (LUMIGAN) 0 01 % ophthalmic drops    buPROPion (WELLBUTRIN XL) 300 mg 24 hr tablet    cholecalciferol (VITAMIN D3) 1,000 units tablet    gabapentin (NEURONTIN) 800 mg tablet    glycopyrrolate (ROBINUL) 2 MG tablet    losartan (COZAAR) 100 MG tablet    montelukast (SINGULAIR) 10 mg tablet    omeprazole (PriLOSEC) 40 MG capsule    Synthroid 88 MCG tablet    Timolol Maleate 0 5 % (DAILY) SOLN    tiZANidine (ZANAFLEX) 2 mg tablet    traZODone (DESYREL) 50 mg tablet    Allergies   Allergen Reactions    Iodinated Diagnostic Agents Anaphylaxis     Contrast Dye           Objective     Blood pressure 120/70, pulse 74, temperature 98 9 °F (37 2 °C), temperature source Tympanic, weight 76 2 kg (168 lb), SpO2 98 %, not currently breastfeeding  Body mass index is 32 81 kg/m²  PHYSICAL EXAM:      General Appearance:   Alert, cooperative, no distress   HEENT:   Normocephalic, atraumatic, anicteric      Neck:  Supple, symmetrical, trachea midline   Lungs:   Clear to auscultation bilaterally; no rales, rhonchi or wheezing; respirations unlabored    Heart[de-identified]   Regular rate and rhythm; no murmur, rub, or gallop     Abdomen:   Soft, non-tender, non-distended; normal bowel sounds; no masses, no organomegaly    Genitalia:   Deferred    Rectal:   Deferred    Extremities:  No cyanosis, clubbing or edema    Pulses:  2+ and symmetric    Skin:  No jaundice, rashes, or lesions    Lymph nodes:  No palpable cervical lymphadenopathy        Lab Results:   No visits with results within 1 Day(s) from this visit  Latest known visit with results is:   Appointment on 09/23/2022   Component Date Value    Lipase 09/23/2022 49     Sodium 09/23/2022 142     Potassium 09/23/2022 4 7     Chloride 09/23/2022 108     CO2 09/23/2022 30     ANION GAP 09/23/2022 4 (A)    BUN 09/23/2022 22     Creatinine 09/23/2022 1 10     Glucose, Fasting 09/23/2022 127 (A)    Calcium 09/23/2022 9 6     AST 09/23/2022 16     ALT 09/23/2022 18     Alkaline Phosphatase 09/23/2022 126 (A)    Total Protein 09/23/2022 6 9     Albumin 09/23/2022 3 8     Total Bilirubin 09/23/2022 0 25     eGFR 09/23/2022 47     WBC 09/23/2022 8 60     RBC 09/23/2022 3 67 (A)    Hemoglobin 09/23/2022 11 2 (A)    Hematocrit 09/23/2022 35 2     MCV 09/23/2022 96     MCH 09/23/2022 30 5     MCHC 09/23/2022 31 8     RDW 09/23/2022 13 0     MPV 09/23/2022 11 0     Platelets 44/35/6712 198     nRBC 09/23/2022 0     Neutrophils Relative 09/23/2022 67     Immat GRANS % 09/23/2022 0     Lymphocytes Relative 09/23/2022 20     Monocytes Relative 09/23/2022 9     Eosinophils Relative 09/23/2022 4     Basophils Relative 09/23/2022 0     Neutrophils Absolute 09/23/2022 5 66     Immature Grans Absolute 09/23/2022 0 03     Lymphocytes Absolute 09/23/2022 1 69     Monocytes Absolute 09/23/2022 0 81     Eosinophils Absolute 09/23/2022 0 38     Basophils Absolute 09/23/2022 0 03          Radiology Results:   CT abdomen wo contrast    Result Date: 9/23/2022  Narrative: CT - ABDOMEN WITHOUT IV CONTRAST INDICATION:   R10 12: Left upper quadrant pain   COMPARISON:  CT abdomen pelvis 12/23/2010 TECHNIQUE: CT examination of the abdomen was performed without intravenous contrast  Axial, sagittal, and coronal 2D reformatted images were created from the source data and submitted for interpretation  Radiation dose length product (DLP) for this visit:  584 mGy-cm   This examination, like all CT scans performed in the [facility], was performed utilizing techniques to minimize radiation dose exposure, including the use of iterative reconstruction and automated exposure control  Enteric contrast was administered  FINDINGS: The lack of intravenous contrast limits evaluation of the viscera  ABDOMEN LOWER CHEST:  Left lower lobe 5 mm nodule (series 2 image 7)  Small hiatal hernia  LIVER/BILIARY TREE:  Unremarkable  GALLBLADDER:  Post cholecystectomy  SPLEEN:  Unremarkable  PANCREAS:  Fatty replacement changes of the pancreas  ADRENAL GLANDS:  Unremarkable  KIDNEYS/URETERS:  Unremarkable  No hydronephrosis  VISUALIZED STOMACH AND BOWEL:  Duodenal diverticulum  No bowel obstruction  VISUALIZED ABDOMINAL CAVITY:  No ascites  No pneumoperitoneum  No lymphadenopathy  VESSELS:  Atherosclerotic changes  No abdominal aortic aneurysm  VISUALIZED BODY WALL:  Subcutaneous loculated fluid collection in the left gluteal region measuring 4 1 x 1 8 cm (series 2 image 52) with surrounding inflammatory change  Calcified granulomata in the buttocks  Small fat-containing umbilical hernia  OSSEOUS STRUCTURES:  No acute fracture or destructive osseous lesion  Degenerative changes of the spine  Impression: 1  No acute unenhanced CT findings to account for left upper quadrant pain  2   Subcutaneous loculated fluid collection in the left gluteal region measuring 4 1 x 1 8 cm with surrounding inflammatory change  3   Left lower lobe 5 mm nodule  Based on current Fleischner Society 2017 Guidelines on incidental pulmonary nodule, no routine follow-up is needed if the patient is considered low risk for lung cancer    If the patient is considered high risk for lung cancer, 12 month follow-up non-contrast chest CT is recommended  The study was marked in [facility] for immediate notification  Workstation performed: [id number]     XR chest portable    Result Date: 9/6/2022  Narrative: CHEST INDICATION:   respiratory distress post op  COMPARISON:  7/9/2022 EXAM PERFORMED/VIEWS:  XR CHEST PORTABLE FINDINGS: Cardiomediastinal silhouette appears unremarkable  The lungs are clear  No pneumothorax or pleural effusion  Osseous structures appear within normal limits for patient age  Impression: No acute cardiopulmonary disease  Workstation performed: [id number]     XR spine thoracic 2 vw    Result Date: 9/9/2022  Narrative: C-ARM - INDICATION: removal spinal cord stim thoracic  Procedure guidance  COMPARISON:  None TECHNIQUE: FLUOROSCOPY TIME:   5 8 seconds 4 FLUOROSCOPIC IMAGES FINDINGS: Fluoroscopic guidance provided for procedure guidance  Osseous and soft tissue detail limited by technique  Impression: Fluoroscopic guidance provided for procedure guidance  Please refer to the separate procedure notes for additional details  Workstation performed: [id number]     Echo complete w/ contrast if indicated    Result Date: 9/22/2022  Narrative: Suresh Barnard  Left Ventricle: Left ventricular cavity size is normal  Wall thickness is mildly increased  The left ventricular ejection fraction is 70% by visual estimation  Systolic function is vigorous  Wall motion is normal  Diastolic function is mildly abnormal, consistent with grade I (abnormal) relaxation    Left Atrium: The atrium is moderately dilated    Aortic Valve: There is mild regurgitation  There is mild stenosis    Mitral Valve: There is annular calcification  There is moderate subvalvular calcification  There is moderate regurgitation with an eccentrically directed jet

## 2022-10-05 NOTE — PATIENT INSTRUCTIONS
Scheduled date of EGD/colonoscopy (as of today):11 09 22  Physician performing EGD/[facility]  Desired bowel prep reviewed with patient:MARISELA 2D PREP  Instructions reviewed with patient by:NENA  Clearances:   N/A    BARIUM SWALLOW video speech scheduled 10/19/22 @[facility]

## 2022-10-19 ENCOUNTER — HOSPITAL ENCOUNTER (OUTPATIENT)
Dept: RADIOLOGY | Facility: HOSPITAL | Age: 77
Discharge: HOME | End: 2022-10-19
Attending: INTERNAL MEDICINE
Payer: COMMERCIAL

## 2022-10-19 DIAGNOSIS — R10.12 LEFT UPPER QUADRANT ABDOMINAL PAIN: ICD-10-CM

## 2022-10-19 DIAGNOSIS — R13.10 DYSPHAGIA, UNSPECIFIED TYPE: ICD-10-CM

## 2022-10-19 DIAGNOSIS — Z12.11 COLON CANCER SCREENING: ICD-10-CM

## 2022-10-19 PROCEDURE — 92611 MOTION FLUOROSCOPY/SWALLOW: CPT

## 2022-10-19 PROCEDURE — 74230 X-RAY XM SWLNG FUNCJ C+: CPT

## 2022-10-19 NOTE — PROCEDURES
Video Swallow Study      Patient Name: Tk Mckenna  Today's Date: 10/19/2022        Past Medical History  Past Medical History:   Diagnosis Date   • Asthma    • Chronic pain disorder     Back   • Dyslipidemia    • Esophageal reflux    • Frequent headaches     stress related   • History of stomach ulcers    • Hypercalcemia    • Hyperlipidemia    • Hypertension    • Osteopenia    • Tremor         Past Surgical History  Past Surgical History:   Procedure Laterality Date   • BACK SURGERY      lower back   • BILATERAL OOPHORECTOMY     • BREAST BIOPSY Right     long ago, benign   •  SECTION     • CHOLECYSTECTOMY     • HYSTERECTOMY      age 43 per MRS   • JOINT REPLACEMENT     • OOPHORECTOMY Bilateral     age 43 per MRS   • [place] REVISE/REMOVE SPINAL NEUROSTIM/ Left 2022    Procedure: Reopening of thoracic and left buttock incisions for removal of spinal cord stimulator system;  Surgeon: Vivek Dillon MD;  Location: [facility];  Service: Neurosurgery   • MS SURG IMPLNT Nadya Guerrero Left 2019    Procedure: LAMINECTOMY THORACIC FOR INSERTION DORSAL COLUMN SPINAL CORD STIMULATOR (DCS) W IMPLANTABLE PULSE GENERATOR, LEFT GLUTE;  Surgeon: Beny Mancini MD;  Location: [facility];  Service: Neurosurgery   • ROTATOR CUFF REPAIR Right    • SPINAL STIMULATOR PLACEMENT N/A 2019    Procedure: REVISION SPINAL CORD STIMULATOR PADDLE ELECTRODE, REPLACEMENT WITH PERCUTANEOUS ELECTRODES OR SMALLER PADDLE;  Surgeon: Beny Mancini MD;  Location[facility];  Service: Neurosurgery       Video Barium Swallow Study    Summary:  Images are on PACS for review  Pt presents w/ min oropharyngeal dysphagia, functional at this time  Min-mild reduced oral control w/ premature spill of liquids to the pyriforms  Hyo-laryngeal excursion and airway closure mild reduced though ultimately adequate  Pharyngeal constriction is adequate, no significant pharyngeal retention   Transient aspiration x1 w/ straw sip of thin, immediately cleared without residue on vocal cords or laryngeal vestibule  No gross aspiration events on study today  PES appears tight w/ slight retropulsion w/ hard solids  Per gross esophageal screen:  Appreciate barium tablet stasis at mid esophagus, cleared w/ subsequent bolus     Strategies:  Effortful swallow improves airway closure    VBS findings and recommendations immediately reviewed w/ pt and pt's daughter w/ use of images to aid in understanding  Education provided on strategies to optimize swallow safety, including the importance of oral care and s/s aspiration to monitor for and notify medical team of should they arise  Pt/pt's daughter verbalized understanding and denied questions at this time  Recommendations:  Diet: Regular, soft selections as needed   Liquids: Thin, avoid straws    Meds: Whole w/ liquid   Strategies: Effortful swallows   Frequent oral care  Upright position  F/u ST tx: No at this time, consider OP if worsening dysphagia   Aspiration Precautions  Reflux Precautions  Consider consult with: -  Results reviewed with: pt, pt's daughter  Repeat VBS as necessary  If a dedicated assessment of the esophagus is desired, consider esophagram/barium swallow or EGD  Goals:  Pt will tolerate least restrictive diet w/out s/s aspiration or oral/pharyngeal difficulties  H&P/pertinent provider notes: (PMH noted above)  Per Dioni Mead MD 10/55/22 HPI:  64-year-old status post cholecystectomy hyperlipidemia, hypertension, obesity, hypothyroidism presents for evaluation        Patient is accompanied by daughter who provided translation  Patient denies any nausea vomiting  She has had heartburn for years but it has increased in frequency recently  She is also having swallowing problems  Swallowing problems are mainly with solids  She has good dentition and is able to chew the food properly    She has difficulty initiating swallow and then has choking while swallowing food  She takes liquids to help the bites go down  No regurgitation  She takes omeprazole 40 mg as needed currently  No abdominal pain  Normal bowel movements  No blood in stools  Weight is stable  No family history of esophageal, stomach or colon cancer  No cigarette smoking or alcohol intake  Patient had EGD years ago results unknown  Patient had colonoscopy in [facility] which was normal per patient in 2014      Labs done last month show hemoglobin 11 2 decreased from baseline  TSH normal   Alk-phos 126 decreased from prior value of 265 other LFTs normal   CT abdomen without contrast done last month showed fatty pancreas, subcutaneous loculated fluid collection 4 1 cm in the left gluteal muscle accompanied by inflammation        Special Studies:  -    Previous VBS:  No         Does the pt have pain? No   If yes, was nursing made aware/was it addressed? N/A       Precautions:  -    Food Allergies:  -   Current Diet:  Regular w/ thin     Dentition:  Natural/adequate    O2 requirement:  RA    Oral mech:  Strength and ROM: WFL     Vocal Quality/Speech:  Clear/adequate  Pt primarily [place] speaking, daughter present to translate     Cognitive status:  Awake, alert       Pt was viewed sitting upright in the lateral and AP positions  Trials administered were consistent with MBSImP Validated Protocol: 5-mL thin liquid x2, 20-mL cup sip thin, 40-mL sequential swallow thin, 5-mL nectar thick, 20-mL cup sip nectar thick, 40-mL sequential swallow nectar thick, 5-mL Honey thick, 5-mL pudding, ½ cookie coated with 3-mL pudding, 5-mL nectar thick in the AP position, and 5-mL pudding in the AP position   Pt was also given thin liquids by straw, as well as a barium tablet with thin liquid    Oral stage:  Lip closure: complete   Mastication: min prolonged   Bolus formation: wfl   Bolus control: mild reduced   Transfer: wfl   Residue: no      Pharyngeal stage:  Tongue base retraction: fair   Velar function:complete   Swallow promptness: mild delay, liquids spill to pyriforms   Epiglottic inversion: complete  Laryngeal elevation/hyoid excursion: mild reduced   Pharyngeal constriction: adequate   Vallecular retention: no   Pyriform retention: no   PPW coating: no   Osteophytes: no   CP prominence: tight CP   Retropulsion from prominence:w/ hard solid   Transient penetration: no   Epiglottic undercoat: no    Penetration: no   Airway closure: delayed, fairly complete   Aspiration: transient w/ straw sip thin   Strategies: effortful swallows improve airway closure   Response to aspiration: throat clear     Screening of Esophageal stage:  Pill stasis at mid esophagus, cleared w/ subsequent swallow/presentation

## 2022-10-20 ENCOUNTER — OFFICE VISIT (OUTPATIENT)
Dept: NEUROSURGERY | Facility: CLINIC | Age: 77
End: 2022-10-20

## 2022-10-20 VITALS
SYSTOLIC BLOOD PRESSURE: 142 MMHG | BODY MASS INDEX: 32.98 KG/M2 | DIASTOLIC BLOOD PRESSURE: 68 MMHG | OXYGEN SATURATION: 98 % | HEART RATE: 68 BPM | WEIGHT: 168 LBS | TEMPERATURE: 97.3 F | HEIGHT: 60 IN

## 2022-10-20 DIAGNOSIS — T85.192S MALFUNCTION OF SPINAL CORD STIMULATOR, SEQUELA: Primary | ICD-10-CM

## 2022-10-20 LAB
ALBUMIN SERPL-MCNC: 4.2 G/DL (ref 3.7–4.7)
ALP SERPL-CCNC: 110 IU/L (ref 44–121)
ALT SERPL-CCNC: 13 IU/L (ref 0–32)
AST SERPL-CCNC: 13 IU/L (ref 0–40)
BILIRUB DIRECT SERPL-MCNC: <0.1 MG/DL (ref 0–0.4)
BILIRUB SERPL-MCNC: 0.2 MG/DL (ref 0–1.2)
PROT SERPL-MCNC: 6.2 G/DL (ref 6–8.5)

## 2022-10-20 PROCEDURE — 99024 POSTOP FOLLOW-UP VISIT: CPT | Performed by: NEUROLOGICAL SURGERY

## 2022-10-20 NOTE — PROGRESS NOTES
Office Note - Neurosurgery   Kaylah Pablo 76 y o  female MRN: [id number]      Assessment:    Patient is gradually improving  61-year-old woman post removal of spinal cord stimulator system  She feels much more comfortable  Incisions are healing well  She will follow up through this office on a p r n  basis  History, physical examination and diagnostic tests were reviewed and questions answered  Diagnosis, care plan and treatment options were discussed  The patient and family understand instructions and will follow up as directed  Plan:    Follow-up: prn    Problem List Items Addressed This Visit        Other    Malfunction of spinal cord stimulator ([address] ) - Primary          Subjective/Objective     Chief Complaint    None  HPI    61-year-old woman post removal of spinal cord stimulator system  Overall she is much more comfortable and pleased with results of surgery  She denies any difficulties with the incisions  NIKHIL TEJEDA personally reviewed and updated  Review of Systems   Constitutional: Negative  HENT: Negative  Eyes: Negative  Gastrointestinal: Negative  Endocrine: Negative  Genitourinary: Negative  Musculoskeletal: Negative  Skin: Positive for wound (very little tender where scs was removed, hard "ball")  Allergic/Immunologic: Negative  Neurological: Negative  Hematological: Negative  Psychiatric/Behavioral: Negative          Family History    Family History   Problem Relation Age of Onset   • Hypertension Family    • Stroke Family    • Heart disease Family    • Thyroid disease Family    • No Known Problems Sister    • No Known Problems Daughter    • No Known Problems Sister    • No Known Problems Sister    • No Known Problems Sister    • No Known Problems Sister    • No Known Problems Daughter    • No Known Problems Daughter    • No Known Problems Mother    • No Known Problems Father    • No Known Problems Maternal Grandmother    • No Known Problems Maternal Grandfather    • No Known Problems Paternal Grandmother    • No Known Problems Paternal Grandfather        Social History    Social History     Socioeconomic History   • Marital status:       Spouse name: Not on file   • Number of children: Not on file   • Years of education: Not on file   • Highest education level: Not on file   Occupational History   • Occupation: unemployed   Tobacco Use   • Smoking status: Former Smoker     Start date:      Quit date: 1991     Years since quittin 5   • Smokeless tobacco: Never Used   Vaping Use   • Vaping Use: Never used   Substance and Sexual Activity   • Alcohol use: Not Currently   • Drug use: Never   • Sexual activity: Not Currently   Other Topics Concern   • Not on file   Social History Narrative   • Not on file     Social Determinants of Health     Financial Resource Strain: Not on file   Food Insecurity: Not on file   Transportation Needs: Not on file   Physical Activity: Not on file   Stress: Not on file   Social Connections: Not on file   Intimate Partner Violence: Not on file   Housing Stability: Not on file       Past Medical History    Past Medical History:   Diagnosis Date   • Asthma    • Chronic pain disorder     Back   • Dyslipidemia    • Esophageal reflux    • Frequent headaches     stress related   • History of stomach ulcers    • Hypercalcemia    • Hyperlipidemia    • Hypertension    • Osteopenia    • Tremor        Surgical History    Past Surgical History:   Procedure Laterality Date   • BACK SURGERY      lower back   • BILATERAL OOPHORECTOMY     • BREAST BIOPSY Right     long ago, benign   •  SECTION     • CHOLECYSTECTOMY     • HYSTERECTOMY      age 43 per MRS   • JOINT REPLACEMENT     • OOPHORECTOMY Bilateral     age 43 per MRS   • [place] REVISE/REMOVE SPINAL NEUROSTIM/ Left 2022    Procedure: Reopening of thoracic and left buttock incisions for removal of spinal cord stimulator system;  Surgeon: Fatuma Alvarado MD; Location: [facility];  Service: Neurosurgery   • [place] SURG IMPLNT NEUROELECT,EPIDURAL Left 02/05/2019    Procedure: LAMINECTOMY THORACIC FOR INSERTION DORSAL COLUMN SPINAL CORD STIMULATOR (DCS) W IMPLANTABLE PULSE GENERATOR, LEFT GLUTE;  Surgeon: Gabriela Jennings MD;  Location: [facility];  Service: Neurosurgery   • ROTATOR CUFF REPAIR Right    • SPINAL STIMULATOR PLACEMENT N/A 03/29/2019    Procedure: REVISION SPINAL CORD STIMULATOR PADDLE ELECTRODE, REPLACEMENT WITH PERCUTANEOUS ELECTRODES OR SMALLER PADDLE;  Surgeon: Gabriela Jennings MD;  [facility];  Service: Neurosurgery       Medications      Current Outpatient Medications:   •  albuterol (VENTOLIN HFA) 90 mcg/act inhaler, Inhale 2 puffs every 4 (four) hours as needed for wheezing or shortness of breath, Disp: 6 7 g, Rfl: 3  •  alendronate (FOSAMAX) 70 mg tablet, Take 1 tablet (70 mg total) by mouth once a week, Disp: 4 tablet, Rfl: 2  •  atorvastatin (LIPITOR) 20 mg tablet, Take 1 tablet (20 mg total) by mouth daily, Disp: 90 tablet, Rfl: 0  •  bimatoprost (LUMIGAN) 0 01 % ophthalmic drops, Administer 1 drop to both eyes daily (Patient taking differently: Administer 1 drop to both eyes 2 (two) times a day), Disp: 5 mL, Rfl: 6  •  buPROPion (WELLBUTRIN XL) 300 mg 24 hr tablet, Take 1 tablet (300 mg total) by mouth daily, Disp: 90 tablet, Rfl: 0  •  cholecalciferol (VITAMIN D3) 1,000 units tablet, Take 1 tablet (1,000 Units total) by mouth daily, Disp: 30 tablet, Rfl: 2  •  gabapentin (NEURONTIN) 800 mg tablet, 1 tab PO q am and 1 tab PO qhs, Disp: 180 tablet, Rfl: 0  •  glycopyrrolate (ROBINUL) 2 MG tablet, Take 1 tablet (2 mg total) by mouth 2 (two) times a day, Disp: 60 tablet, Rfl: 2  •  losartan (COZAAR) 100 MG tablet, Take 1 tablet (100 mg total) by mouth daily, Disp: 90 tablet, Rfl: 0  •  montelukast (SINGULAIR) 10 mg tablet, Take 1 tablet (10 mg total) by mouth daily at bedtime, Disp: 30 tablet, Rfl: 2  •  omeprazole (PriLOSEC) 40 MG capsule, Take 1 capsule (40 mg total) by mouth daily, Disp: 30 capsule, Rfl: 2  •  Synthroid 88 MCG tablet, Take 1 tablet (88 mcg total) by mouth daily, Disp: 90 tablet, Rfl: 0  •  Timolol Maleate 0 5 % (DAILY) SOLN, Apply 1 drop to eye every 12 (twelve) hours, Disp: 5 mL, Rfl: 6  •  tiZANidine (ZANAFLEX) 2 mg tablet, TAKE 1 TABLET BY MOUTH EVERY 8 HOURS AS NEEDED FOR MUSCLE SPASM, Disp: 30 tablet, Rfl: 0  •  traZODone (DESYREL) 50 mg tablet, Take 0 5 tablets (25 mg total) by mouth daily at bedtime as needed for sleep, Disp: 30 tablet, Rfl: 2    Allergies    Allergies   Allergen Reactions   • Iodinated Diagnostic Agents Anaphylaxis     Contrast Dye       The following portions of the patient's history were reviewed and updated as appropriate: allergies, current medications, past family history, past medical history, past social history, past surgical history and problem list     Physical Exam    Vitals:  Blood pressure 142/68, pulse 68, temperature (!) 97 3 °F (36 3 °C), temperature source Temporal, height 5' (1 524 m), weight 76 2 kg (168 lb), SpO2 98 %, not currently breastfeeding  ,Body mass index is 32 81 kg/m²  Physical Exam  Vitals reviewed  Constitutional:       General: She is not in acute distress  Eyes:      Extraocular Movements: Extraocular movements intact  Pulmonary:      Effort: Pulmonary effort is normal  No respiratory distress  Skin:     General: Skin is warm and dry  Comments: Midline thoracic and left buttock incisions well healed  Neurological:      Mental Status: She is alert and oriented to person, place, and time  Gait: Gait normal    Psychiatric:         Mood and Affect: Mood normal          Behavior: Behavior normal        Neurologic Exam     Mental Status   Oriented to person, place, and time

## 2022-11-01 ENCOUNTER — OFFICE VISIT (OUTPATIENT)
Dept: SLEEP CENTER | Facility: HOSPITAL | Age: 77
End: 2022-11-01

## 2022-11-01 VITALS
HEART RATE: 66 BPM | OXYGEN SATURATION: 99 % | SYSTOLIC BLOOD PRESSURE: 110 MMHG | BODY MASS INDEX: 32.98 KG/M2 | DIASTOLIC BLOOD PRESSURE: 60 MMHG | WEIGHT: 168 LBS | HEIGHT: 60 IN

## 2022-11-01 DIAGNOSIS — F45.8 BRUXISM: ICD-10-CM

## 2022-11-01 DIAGNOSIS — F41.9 ANXIETY AND DEPRESSION: ICD-10-CM

## 2022-11-01 DIAGNOSIS — E66.9 OBESITY (BMI 30-39.9): ICD-10-CM

## 2022-11-01 DIAGNOSIS — G47.09 OTHER INSOMNIA: ICD-10-CM

## 2022-11-01 DIAGNOSIS — M96.1 LUMBAR POST-LAMINECTOMY SYNDROME: ICD-10-CM

## 2022-11-01 DIAGNOSIS — I10 PRIMARY HYPERTENSION: ICD-10-CM

## 2022-11-01 DIAGNOSIS — G47.33 OBSTRUCTIVE SLEEP APNEA SYNDROME: Primary | ICD-10-CM

## 2022-11-01 DIAGNOSIS — F32.A ANXIETY AND DEPRESSION: ICD-10-CM

## 2022-11-01 DIAGNOSIS — G89.4 CHRONIC PAIN SYNDROME: ICD-10-CM

## 2022-11-01 RX ORDER — ZOLPIDEM TARTRATE 5 MG/1
5 TABLET ORAL AS NEEDED
Qty: 1 TABLET | Refills: 0 | Status: SHIPPED | OUTPATIENT
Start: 2022-11-01 | End: 2022-11-02

## 2022-11-01 NOTE — PROGRESS NOTES
Review of Systems      Genitourinary need to urinate more than twice a night and hot flashes at night   Cardiology Frequent chest pain or angina,  and palpitations/fluttering feeling in the chest   Gastrointestinal frequent heartburn/acid reflux and abdominal pain or cramping that disturb sleep    Neurology need to move extremities, muscle weakness, numbness/tingling of an extremity, forgetfulness, poor concentration or confusion, , difficulty with memory, loss of consciousness/blacking out and balance problems   Constitutional claustrophobia   Integumentary none   Psychiatry anxiety, depression, aggressiveness or irritability and mood change   Musculoskeletal joint pain, muscle aches, back pain, legs twitching/jerking and leg cramps   Pulmonary shortness of breath with activity, chest tightness, snoring and difficulty breathing when lying flat    ENT throat clearing   Endocrine none   Hematological none

## 2022-11-01 NOTE — PATIENT INSTRUCTIONS
What is SAGE? Obstructive sleep apnea is a common and serious sleep disorder that causes you to stop breathing during sleep  The airway repeatedly becomes blocked, limiting the amount of air that reaches your lungs  When this happens, you may snore loudly or making choking noises as you try to breathe  Your brain and body becomes oxygen deprived and you may wake up  This may happen a few times a night, or in more severe cases, several hundred times a night  Sleep apnea can make you wake up in the morning feeling tired or unrefreshed even though you have had a full night of sleep  During the day, you may feel fatigued, have difficulty concentrating or you may even unintentionally fall asleep  This is because your body is waking up numerous times throughout the night, even though you might not be conscious of each awakening  The lack of oxygen your body receives can have negative long-term consequences for your health  This includes:  High blood pressure  Heart disease  Irregular heart rhythms  Stroke  Pre-diabetes and diabetes  Depression    Testing  An objective evaluation of your sleep may be needed before your board certified sleep physician can make a diagnosis  Options include:   In-lab overnight sleep study  This type of sleep study requires you to stay overnight at a sleep center, in a bed that may resemble a hotel room  You will sleep with sensors hooked up to various parts of your body  These sensors record your brain waves, heartbeat, breathing and movement  An overnight sleep study also provides your doctor with the most complete information about your sleep  Learn more about an overnight sleep study  Home sleep apnea test  Some patients with high risk factors for obstructive sleep apnea and no other medical disorders may be candidates for a home sleep apnea test  The testing equipment differs in that it is less complicated than what is used in an overnight sleep study   As such, does not give all the data an in-lab will and if negative, may not mean you do not have the problem  Treatment for sleep apnea  includes using a continuous positive airway pressure (CPAP) machine to keep your airway open during sleep  A mask is placed over your nose and mouth, or just your nose  The mask is hooked to the CPAP machine that blows a gentle stream of air into the mask when you breathe  This helps keep your airway open so you can breathe more regularly  Extra oxygen may be given to you through the machine  You may be given a mouth device  It looks like a mouth guard or dental retainer and stops your tongue and mouth tissues from blocking your throat while you sleep  Surgery may be needed to remove extra tissues that block your mouth, throat, or nose  Manage sleep apnea:   Do not smoke  Nicotine and other chemicals in cigarettes and cigars can cause lung damage  Ask your healthcare provider for information if you currently smoke and need help to quit  E-cigarettes or smokeless tobacco still contain nicotine  Talk to your healthcare provider before you use these products  Do not drink alcohol or take sedative medicine before you go to sleep  Alcohol and sedatives can relax the muscles and tissues around your throat  This can block the airflow to your lungs  Maintain a healthy weight  Excess tissue around your throat may restrict your breathing  Ask your healthcare provider for information if you need to lose weight  Sleep on your side or use pillows designed to prevent sleep apnea  This prevents your tongue or other tissues from blocking your throat  You can also raise the head of your bed  Driving Safety  Refrain from driving when drowsy  Follow up with your healthcare provider as directed:  Write down your questions so you remember to ask them during your visits  Go to [employer]ASM website for more information: Sleepeducation  org     What is SAGE?    Obstructive sleep apnea is a common and serious sleep disorder that causes you to stop breathing during sleep  The airway repeatedly becomes blocked, limiting the amount of air that reaches your lungs  When this happens, you may snore loudly or making choking noises as you try to breathe  Your brain and body becomes oxygen deprived and you may wake up  This may happen a few times a night, or in more severe cases, several hundred times a night  Sleep apnea can make you wake up in the morning feeling tired or unrefreshed even though you have had a full night of sleep  During the day, you may feel fatigued, have difficulty concentrating or you may even unintentionally fall asleep  This is because your body is waking up numerous times throughout the night, even though you might not be conscious of each awakening  The lack of oxygen your body receives can have negative long-term consequences for your health  This includes:  High blood pressure  Heart disease  Irregular heart rhythms  Stroke  Pre-diabetes and diabetes  Depression    Testing  An objective evaluation of your sleep may be needed before your board certified sleep physician can make a diagnosis  Options include:   In-lab overnight sleep study  This type of sleep study requires you to stay overnight at a sleep center, in a bed that may resemble a hotel room  You will sleep with sensors hooked up to various parts of your body  These sensors record your brain waves, heartbeat, breathing and movement  An overnight sleep study also provides your doctor with the most complete information about your sleep  Learn more about an overnight sleep study  Home sleep apnea test  Some patients with high risk factors for obstructive sleep apnea and no other medical disorders may be candidates for a home sleep apnea test  The testing equipment differs in that it is less complicated than what is used in an overnight sleep study   As such, does not give all the data an in-lab will and if negative, may not mean you do not have the problem  Treatment for sleep apnea  includes using a continuous positive airway pressure (CPAP) machine to keep your airway open during sleep  A mask is placed over your nose and mouth, or just your nose  The mask is hooked to the CPAP machine that blows a gentle stream of air into the mask when you breathe  This helps keep your airway open so you can breathe more regularly  Extra oxygen may be given to you through the machine  You may be given a mouth device  It looks like a mouth guard or dental retainer and stops your tongue and mouth tissues from blocking your throat while you sleep  Surgery may be needed to remove extra tissues that block your mouth, throat, or nose  Manage sleep apnea:   Do not smoke  Nicotine and other chemicals in cigarettes and cigars can cause lung damage  Ask your healthcare provider for information if you currently smoke and need help to quit  E-cigarettes or smokeless tobacco still contain nicotine  Talk to your healthcare provider before you use these products  Do not drink alcohol or take sedative medicine before you go to sleep  Alcohol and sedatives can relax the muscles and tissues around your throat  This can block the airflow to your lungs  Maintain a healthy weight  Excess tissue around your throat may restrict your breathing  Ask your healthcare provider for information if you need to lose weight  Sleep on your side or use pillows designed to prevent sleep apnea  This prevents your tongue or other tissues from blocking your throat  You can also raise the head of your bed  Driving Safety  Refrain from driving when drowsy  Follow up with your healthcare provider as directed:  Write down your questions so you remember to ask them during your visits  Go to AASM website for more information: Sleepeducation  org       What you can do to improve your sleep: (Sleep Hygiene) Basic rules for a good night's sleep    Create a regular sleep schedule    This will help you form a sleep routine  Keep a record of your sleep patterns, and any sleeping problems you have  Bring the record to follow-up visits with healthcare providers  Avoid prolonged use of light-emitting screens before bedtime or watching TV in bed  Avoid forcing sleep  Do not take naps  Naps could make it hard for you to fall asleep at bedtime  Deal with your worries before bedtime  Keep your bedroom cool, quiet, and dark  Turn on white noise, such as a fan, to help you relax  Do not use your bed for any activity that will keep you awake  Do not read, exercise, eat, or watch TV in your bedroom  Get up if you do not fall asleep within 20 minutes  Move to another room and do something relaxing until you become sleepy  Limit caffeine, alcohol, nicotine and food to earlier in the day  Only drink caffeine in the morning  Do not drink alcohol within 6 hours of bedtime  Do not eat a heavy meal right before you go to bed  Avoid smoking, especially in the evening  Exercise regularly  Daily exercise will help you sleep better  Do not exercise within 4 hours of bedtime  Stimulus control therapy rules  1  Go to bed only when sleepy  2  Do not watch television, read, eat, or worry while in bed  Use bed only for sleep and sex  3  Get out of bed if unable to fall asleep within 20 minutes and go to another room  Return to bed only when sleepy  Repeat this step as many times as necessary throughout the night  4  Set an alarm clock to wake up at a fixed time each morning, including weekends  5  Do not take a nap during the day  Data from: [address] [place] [address], [address] [employer] Nonpharmacologic treatments of insomnia  J [facility] Psych[facility] 9915; 53:37  Go to AASM website for more information: Sleepeducation  org     Recommended Reading:  Book by authors [address]   No More sleepless nights          Nursing Support:  When: Monday through Friday 7A-5PM except holidays  Where: Our direct line is [phone]  If you are having a true emergency please call 911  In the event that the line is busy or it is after hours please leave a voice message and we will return your call  Please speak clearly, leaving your full name, birth date, best number to reach you and the reason for your call  Medication refills: We will need the name of the medication, the dosage, the ordering provider, whether you get a 30 or 90 day refill, and the pharmacy name and address  Medications will be ordered by the provider only  Nurses cannot call in prescriptions  Please allow 7 days for medication refills  Physician requested updates: If your provider requested that you call with an update after starting medication, please be ready to provide us the medication and dosage, what time you take your medication, the time you attempt to fall asleep, time you fall asleep, when you wake up, and what time you get out of bed  Sleep Study Results: We will contact you with sleep study results and/or next steps after the physician has reviewed your testing

## 2022-11-01 NOTE — PROGRESS NOTES
Consultation - [address]  76 y o  female  YNU:[birth year]  RLX:[id number]  DOS:11/1/2022    Physician Requesting Consult: Bari Dozier DO             Reason for Consult : At your kind request I saw Ankita Ramirez for initial sleep evaluation today  Home sleep testing was undertaken to evaluate for sleep disordered breathing and patient is here to review results and further options  She is accompanied by her daughter who assisted with interpretation  The study demonstrated : respiratory event index (ADITYA) of 15 0  The lowest SpO2 recorded is 86% and 3 2% of the study was spent with saturations below 90%  The snore index was 1 3%    PFSH, Problem List, Medications & Allergies were reviewed in EMR  Brii Rico  has a past medical history of Asthma, Chronic pain disorder, Dyslipidemia, Esophageal reflux, Frequent headaches, History of stomach ulcers, Hypercalcemia, Hyperlipidemia, Hypertension, Osteopenia, and Tremor  She has a current medication list which includes the following prescription(s): albuterol, alendronate, atorvastatin, bimatoprost, bupropion, cholecalciferol, gabapentin, glycopyrrolate, losartan, montelukast, omeprazole, synthroid, timolol maleate (once-daily), tizanidine, trazodone, and zolpidem  HPI:  Her study was undertaken for family's complaints of snoring and she appears to be gasping  She awakens herself with snoring or gasping  Symptoms have been ongoing for several years  She notes some improvement when she sleeps with head of the bed elevated  Other Complaints: none  Restless Leg Syndrome: has typical symptoms but not disturbing sleep;  Parasomnia: reports teeth grinding during sleep in the past;   Sleep Routine (on average):   Typical Bedtime:  Bed 10:00 p m  Gets OOB:  9:00 a m  TIB:11 hrs  Sleep latency: several hours Sleep Interruptions:>5/nite not always sure of the cause and   struggles to fall back asleep   Awakens: spontaneously  Upon awakening: never feels rested   She estimates getting 8 hrs sleep  Cindy denies Excessive Daytime Sleepiness     She rated herself at Total score: 3 /24 on the [place] Sleepiness Scale  Habits:  reports that she quit smoking about 31 years ago  She started smoking about 34 years ago  She has never used smokeless tobacco  ;   E-Cigarette/Vaping   • E-Cigarette Use Never User     ;  reports no history of drug use ;  reports previous alcohol use  ; Caffeine use:limited ; Exercise routine: none   Family History: Negative for sleep disturbance  ROS - reviewed and as attached  Significant for weight has been stable  She reports some throat clearing, chest tightness and shortness of breath  She has episodic palpitations     She has acid reflux  She has musculoskeletal aches and pains  She has difficulty with memory, feelings of anxiety and depression  EXAM:  /60   Pulse 66   Ht 5' (1 524 m)   Wt 76 2 kg (168 lb)   SpO2 99%   BMI 32 81 kg/m²    General: Well groomed female, well appearing, in no apparent distress  Neurological: Alert and orientated;  cooperative; Cranial nerves intact;    Psychiatric: Speech:clear and coherent; appears depressed and has a constricted affect    Skin: warm and dry; Color& Hydration good; no facial rashes or lesions   HEENT:  Craniofacial anatomy: normal Sinuses: non- tender  TMJ: Normal    Eyes: EOM's intact;  conjunctiva/corneas clear   Ears: Externallyappear normal     Nasal Airway: is patent Septum:intact; Mucosa: normal; Turbinates: normal; Rhinorrhea: None   Mouth: Lips: normal posture; Dentition: missing several and worn down  Mucosa:moist  ; Hard Palate:small maxillary torus   Oropharryx: crowded and AP narrowing Tongue: Mallampati:Class IV, Mobile and MacroglossiaSoft Palate:  redundant  Tonsils: absent  Neck:; Neck Circumference: 14 5 "; Supple; no abnormal masses;  Thyroid:normal  Trachea:central     Lymph: No Cervical or Submandibular Lymhadenopathy  Heart: S1,S2 normal; RRR; no gallop; no murmurs   Lungs: Respiratory Effort:normal  Air entry good bilaterally  No wheezes  No rales  Abdomen: Obese, Soft & non-tender    Extremities: No pedal edema  No clubbing or cyanosis  Musculoskeletal:  Motor normal; Gait:normal        IMPRESSION: Primary/Secondary Sleep Diagnoses (to Medical or Psych conditions) & Comorbidities   1  Obstructive sleep apnea syndrome  Ambulatory Referral to Sleep Medicine    CPAP Study   2  Other insomnia  zolpidem (AMBIEN) 5 mg tablet   3  Bruxism     4  Anxiety and depression     5  Chronic pain syndrome     6  Primary hypertension     7  Lumbar post-laminectomy syndrome     8  Obesity (BMI 30-39  9)          PLAN:   1  I reviewed results of the Sleep study with the patient  2  With respect to above conditions, I counseled on pathophysiology, diagnosis, treatment options, risks and benefits; inter-relationship and effects on symptoms and comorbidities; risks of no treatment; costs and insurance aspects  3  Patient elected positive airway pressure therapy and wanted to undertake a titration study  4  Multi component Cognitive behavioral therapy for Insomnia undertaken - Sleep Restriction, Stimulus control, Relaxation techniques and Sleep hygiene were discussed  5  I also advised on weight reduction  6  Follow-up to be scheduled after the study to review results and to initiate therapy  Sincerely,      Authenticated electronically on 20/57/04   Board Certified Specialist     Portions of the record may have been created with voice recognition software  Occasional wrong word or "sound a like" substitutions may have occurred due to the inherent limitations of voice recognition software  There may also be notations and random deletions of words or characters from malfunctioning software  Read the chart carefully and recognize, using context, where substitutions/deletions have occurred

## 2022-11-05 DIAGNOSIS — G89.29 CHRONIC BILATERAL LOW BACK PAIN WITH LEFT-SIDED SCIATICA: ICD-10-CM

## 2022-11-05 DIAGNOSIS — M54.42 CHRONIC BILATERAL LOW BACK PAIN WITH LEFT-SIDED SCIATICA: ICD-10-CM

## 2022-11-05 DIAGNOSIS — F32.A ANXIETY AND DEPRESSION: ICD-10-CM

## 2022-11-05 DIAGNOSIS — K21.9 GERD WITHOUT ESOPHAGITIS: ICD-10-CM

## 2022-11-05 DIAGNOSIS — G89.4 CHRONIC PAIN SYNDROME: ICD-10-CM

## 2022-11-05 DIAGNOSIS — E03.9 HYPOTHYROIDISM, UNSPECIFIED TYPE: ICD-10-CM

## 2022-11-05 DIAGNOSIS — E78.00 PURE HYPERCHOLESTEROLEMIA: ICD-10-CM

## 2022-11-05 DIAGNOSIS — F41.9 ANXIETY AND DEPRESSION: ICD-10-CM

## 2022-11-05 DIAGNOSIS — I45.10 RBBB: ICD-10-CM

## 2022-11-05 DIAGNOSIS — E78.5 DYSLIPIDEMIA: ICD-10-CM

## 2022-11-05 DIAGNOSIS — I35.9 AORTIC VALVE DISEASE: ICD-10-CM

## 2022-11-05 DIAGNOSIS — E55.9 VITAMIN D DEFICIENCY: ICD-10-CM

## 2022-11-05 DIAGNOSIS — I34.0 MITRAL VALVE INSUFFICIENCY, UNSPECIFIED ETIOLOGY: ICD-10-CM

## 2022-11-05 DIAGNOSIS — J45.909 UNCOMPLICATED ASTHMA, UNSPECIFIED ASTHMA SEVERITY, UNSPECIFIED WHETHER PERSISTENT: ICD-10-CM

## 2022-11-05 DIAGNOSIS — I10 ESSENTIAL (PRIMARY) HYPERTENSION: ICD-10-CM

## 2022-11-06 RX ORDER — MULTIVIT-MIN/IRON/FOLIC ACID/K 18-600-40
CAPSULE ORAL
Qty: 30 TABLET | Refills: 0 | Status: SHIPPED | OUTPATIENT
Start: 2022-11-06

## 2022-11-06 RX ORDER — TIZANIDINE 2 MG/1
TABLET ORAL
Qty: 30 TABLET | Refills: 0 | Status: SHIPPED | OUTPATIENT
Start: 2022-11-06

## 2022-11-06 RX ORDER — BUPROPION HYDROCHLORIDE 300 MG/1
TABLET ORAL
Qty: 90 TABLET | Refills: 0 | Status: SHIPPED | OUTPATIENT
Start: 2022-11-06

## 2022-11-06 RX ORDER — MONTELUKAST SODIUM 10 MG/1
TABLET ORAL
Qty: 30 TABLET | Refills: 0 | Status: SHIPPED | OUTPATIENT
Start: 2022-11-06

## 2022-11-06 RX ORDER — LEVOTHYROXINE SODIUM 88 MCG
TABLET ORAL
Qty: 90 TABLET | Refills: 0 | Status: SHIPPED | OUTPATIENT
Start: 2022-11-06

## 2022-11-06 RX ORDER — OMEPRAZOLE 40 MG/1
CAPSULE, DELAYED RELEASE ORAL
Qty: 30 CAPSULE | Refills: 0 | Status: SHIPPED | OUTPATIENT
Start: 2022-11-06

## 2022-11-06 RX ORDER — ATORVASTATIN CALCIUM 20 MG/1
TABLET, FILM COATED ORAL
Qty: 90 TABLET | Refills: 0 | Status: SHIPPED | OUTPATIENT
Start: 2022-11-06

## 2022-11-06 RX ORDER — GABAPENTIN 800 MG/1
TABLET ORAL
Qty: 180 TABLET | Refills: 0 | Status: SHIPPED | OUTPATIENT
Start: 2022-11-06

## 2022-11-06 RX ORDER — GLYCOPYRROLATE 2 MG/1
TABLET ORAL
Qty: 60 TABLET | Refills: 0 | Status: SHIPPED | OUTPATIENT
Start: 2022-11-06

## 2022-11-07 DIAGNOSIS — M81.0 OSTEOPOROSIS, UNSPECIFIED OSTEOPOROSIS TYPE, UNSPECIFIED PATHOLOGICAL FRACTURE PRESENCE: ICD-10-CM

## 2022-11-07 RX ORDER — LOSARTAN POTASSIUM 100 MG/1
TABLET ORAL
Qty: 90 TABLET | Refills: 1 | Status: SHIPPED | OUTPATIENT
Start: 2022-11-07

## 2022-11-08 ENCOUNTER — ANESTHESIA EVENT (OUTPATIENT)
Dept: ANESTHESIOLOGY | Facility: HOSPITAL | Age: 77
End: 2022-11-08

## 2022-11-08 ENCOUNTER — ANESTHESIA (OUTPATIENT)
Dept: ANESTHESIOLOGY | Facility: HOSPITAL | Age: 77
End: 2022-11-08

## 2022-11-08 RX ORDER — ALENDRONATE SODIUM 70 MG/1
70 TABLET ORAL WEEKLY
Qty: 4 TABLET | Refills: 2 | Status: SHIPPED | OUTPATIENT
Start: 2022-11-08

## 2022-11-08 RX ORDER — SODIUM CHLORIDE 9 MG/ML
125 INJECTION, SOLUTION INTRAVENOUS CONTINUOUS
OUTPATIENT
Start: 2022-11-08

## 2022-11-08 RX ORDER — LIDOCAINE HYDROCHLORIDE 10 MG/ML
0.5 INJECTION, SOLUTION EPIDURAL; INFILTRATION; INTRACAUDAL; PERINEURAL ONCE AS NEEDED
OUTPATIENT
Start: 2022-11-08

## 2022-11-08 NOTE — ANESTHESIA PREPROCEDURE EVALUATION
Procedure:  PRE-OP ONLY    Relevant Problems   CARDIO   (+) Hypertension   (+) Migraine headache   (+) Rib pain on left side      ENDO   (+) Hypothyroidism      GI/HEPATIC   (+) Esophageal reflux      /RENAL   (+) Stage 3a chronic kidney disease (HCC)      MUSCULOSKELETAL   (+) Anserine bursitis   (+) Chronic bilateral low back pain with left-sided sciatica   (+) Lumbar spondylosis   (+) Primary osteoarthritis of left knee      NEURO/PSYCH   (+) Anxiety and depression   (+) Chronic bilateral low back pain with left-sided sciatica   (+) Chronic pain syndrome   (+) Depression, recurrent (HCC)   (+) Migraine headache      PULMONARY   (+) Asthma   (+) SAGE (obstructive sleep apnea)      nuclear stress test February 2022: no ischemia      May 2022 Echo: LVEF of 55%  There was mild LAE  Moderate to severe MR, mild aortic valve disease  Physical Exam    Airway    Mallampati score: II  TM Distance: >3 FB  Neck ROM: full     Dental   No notable dental hx     Cardiovascular      Pulmonary      Other Findings        Anesthesia Plan  ASA Score- 3     Anesthesia Type- IV sedation with anesthesia with ASA Monitors  Additional Monitors:   Airway Plan:           Plan Factors-Exercise tolerance (METS): <4 METS  Chart reviewed  Patient summary reviewed  Patient is not a current smoker  Patient did not smoke on day of surgery  Induction- intravenous  Postoperative Plan-     Informed Consent- Anesthetic plan and risks discussed with patient  I personally reviewed this patient with the CRNA  Discussed and agreed on the Anesthesia Plan with the CRNA  Car Molina

## 2022-11-21 ENCOUNTER — HOSPITAL ENCOUNTER (OUTPATIENT)
Dept: MAMMOGRAPHY | Facility: IMAGING CENTER | Age: 77
Discharge: HOME | End: 2022-11-21

## 2022-11-21 ENCOUNTER — TELEMEDICINE (OUTPATIENT)
Dept: FAMILY MEDICINE CLINIC | Facility: HOSPITAL | Age: 77
End: 2022-11-21

## 2022-11-21 ENCOUNTER — TELEPHONE (OUTPATIENT)
Dept: FAMILY MEDICINE CLINIC | Facility: HOSPITAL | Age: 77
End: 2022-11-21

## 2022-11-21 VITALS — HEIGHT: 60 IN | BODY MASS INDEX: 32.79 KG/M2 | WEIGHT: 167 LBS

## 2022-11-21 VITALS — HEIGHT: 60 IN | BODY MASS INDEX: 32.98 KG/M2 | WEIGHT: 167.99 LBS

## 2022-11-21 DIAGNOSIS — R13.10 DYSPHAGIA, UNSPECIFIED TYPE: ICD-10-CM

## 2022-11-21 DIAGNOSIS — Z12.31 SCREENING MAMMOGRAM FOR HIGH-RISK PATIENT: ICD-10-CM

## 2022-11-21 DIAGNOSIS — E66.9 OBESITY (BMI 30.0-34.9): ICD-10-CM

## 2022-11-21 DIAGNOSIS — F41.9 ANXIETY AND DEPRESSION: ICD-10-CM

## 2022-11-21 DIAGNOSIS — F32.A ANXIETY AND DEPRESSION: ICD-10-CM

## 2022-11-21 DIAGNOSIS — G89.29 CHRONIC BILATERAL LOW BACK PAIN WITH LEFT-SIDED SCIATICA: ICD-10-CM

## 2022-11-21 DIAGNOSIS — Z00.00 MEDICARE ANNUAL WELLNESS VISIT, SUBSEQUENT: ICD-10-CM

## 2022-11-21 DIAGNOSIS — G47.33 OSA (OBSTRUCTIVE SLEEP APNEA): Primary | ICD-10-CM

## 2022-11-21 DIAGNOSIS — K21.9 GASTROESOPHAGEAL REFLUX DISEASE, UNSPECIFIED WHETHER ESOPHAGITIS PRESENT: ICD-10-CM

## 2022-11-21 DIAGNOSIS — M54.42 CHRONIC BILATERAL LOW BACK PAIN WITH LEFT-SIDED SCIATICA: ICD-10-CM

## 2022-11-21 RX ORDER — BUPROPION HYDROCHLORIDE 150 MG/1
150 TABLET ORAL EVERY MORNING
Qty: 90 TABLET | Refills: 1 | Status: SHIPPED | OUTPATIENT
Start: 2022-11-21

## 2022-11-21 NOTE — PROGRESS NOTES
Assessment and Plan:     Problem List Items Addressed This Visit        Digestive    Esophageal reflux     No significant GERD symptoms and abd pain resolved, swallow study reviewed, con't meds/follow up and tx as per GI            Respiratory    SAGE (obstructive sleep apnea) - Primary     Home study reviewed, saw Dr Amrik Danielson for follow up, titration study ordered and scheduled for May, con't healthy diet and keep active and healthy wgt encouraged, will follow            Nervous and Auditory    Chronic bilateral low back pain with left-sided sciatica     Back pain improved, on Gabapentin, s/p SCS removal, doing well, con't current meds and call with new/worse symptoms            Other    Anxiety and depression     Mood not at goal with anniversary of husbands death coming up, not actively suicidal, agreeable to increase WBXL - add WBXL 150 mg 1 tab PO q day to her 300 mg q day = 450 mg q day, also encouraged to take the Trazodone nightly for sleep as it may also help mood,  d/w pt that it takes 4-6 wks to get maximum benefit of med and that med has to be taken every day and to not miss doses of med, call with SE/new/worse mood/SI/panic attacks, re-eval in 6-8 wks or sooner if needed           Relevant Medications    buPROPion (Wellbutrin XL) 150 mg 24 hr tablet   Other Visit Diagnoses     Dysphagia, unspecified type        Swallow study reviewed, needs to f/u with GI and reschedule EGD but is deferring colonoscopy, con't tx and f/u as per GI    Medicare annual wellness visit, subsequent        Obesity (BMI 30 0-34 9)        healthy diet/exercise/healthy wgt encouraged    BMI 32 0-32 9,adult            BMI Counseling: Body mass index is 32 61 kg/m²   The BMI is above normal  Nutrition recommendations include decreasing portion sizes, encouraging healthy choices of fruits and vegetables, decreasing fast food intake, consuming healthier snacks, limiting drinks that contain sugar, moderation in carbohydrate intake, increasing intake of lean protein, reducing intake of saturated and trans fat and reducing intake of cholesterol  Exercise recommendations include exercising 3-5 times per week  No pharmacotherapy was ordered  Rationale for BMI follow-up plan is due to patient being overweight or obese  Preventive health issues were discussed with patient, and age appropriate screening tests were ordered as noted in patient's After Visit Summary  Personalized health advice and appropriate referrals for health education or preventive services given if needed, as noted in patient's After Visit Summary  History of Present Illness:     Patient presents for a Medicare Wellness Visit    HPI Pt contacted via [employer] for follow up appt and AWV    Pt just saw sleep medicine (Dr Amrik Danielson) earlier in Nov for f/u moderate SAGE - OV note reviewed  A titration study was ordered  Pt was counseled on healthy weight  She notes some daytime sleepiness and snores  Pt saw Neurosurgery (Dr Steffi Murphy) in Oct for f/u recent removal of SCS - OV note reviewed  She has no further back pain  She was told f/u prn  Pt saw GI (Dr Ahsan Loomis) in Oct for f/u GERD/dysphagia - OV note reviewed  She notes decrease in appetite "its here and there" but denies abd pain/N/V/D/C/blood in stool/black stools  She had swallow study ordered and results and recommendations reviewed  She was to have EGD for further f/u of dysphagia but cancelled it d/t not feeling well and not wanting the study done  Mental health a bit worse d/t anniversary of husbands death  She has voiced desire to being in [facility] and not here in the states  She has not been able to go back d/t daughter not being able to go back with her as of yet  Her family is very supportive and has good communication  She states sleep is better with Trazodone but hasn't been taking it every night     Patient Care Team:  Margareth Krause, DO as PCP - [id number], DO as PCP - PCP-[facility] ([facility])     Review of Systems:     Review of Systems   Constitutional: Positive for fatigue  Negative for chills, fever and unexpected weight change  HENT: Positive for trouble swallowing  Negative for congestion  Eyes: Negative for pain and discharge  Respiratory: Negative for cough, shortness of breath and wheezing  Cardiovascular: Negative for chest pain and palpitations  Gastrointestinal: Negative for abdominal pain, blood in stool, constipation, diarrhea and nausea  Endocrine: Negative for polydipsia and polyuria  Genitourinary: Negative for difficulty urinating and dysuria  Musculoskeletal: Positive for arthralgias  Negative for back pain and neck pain  Skin: Negative for rash and wound  Neurological: Negative for dizziness, light-headedness and headaches  Hematological: Does not bruise/bleed easily  Psychiatric/Behavioral: Positive for dysphoric mood  Negative for confusion          Problem List:     Patient Active Problem List   Diagnosis   • Tremor   • Atrophic vaginitis   • Osteoporosis   • Obesity   • Migraine headache   • Hypothyroidism   • Hypertension   • Glaucoma   • Esophageal reflux   • Dyslipidemia   • Cataract   • Asthma   • Lumbar spondylosis   • IFG (impaired fasting glucose)   • Chronic pain syndrome   • Chronic bilateral low back pain with left-sided sciatica   • Lumbar post-laminectomy syndrome   • Lumbar radiculopathy   • Chronic pain of left knee   • Primary osteoarthritis of left knee   • Gait abnormality   • Weakness of left leg   • Anserine bursitis   • Depression, recurrent (HCC)   • Anxiety and depression   • Rib pain on left side   • Intercostal neuropathy   • Constipation   • Vitamin D deficiency   • Insomnia   • SAGE (obstructive sleep apnea)   • Daytime somnolence   • Malfunction of spinal cord stimulator (HCC)   • Stage 3a chronic kidney disease (HCC)      Past Medical and Surgical History:     Past Medical History:   Diagnosis Date   • Asthma    • Chronic pain disorder     Back   • Dyslipidemia    • Esophageal reflux    • Frequent headaches     stress related   • History of stomach ulcers    • Hypercalcemia    • Hyperlipidemia    • Hypertension    • Osteopenia    • Tremor      Past Surgical History:   Procedure Laterality Date   • BACK SURGERY      lower back   • BILATERAL OOPHORECTOMY     • BREAST BIOPSY Right     long ago, benign   •  SECTION     • CHOLECYSTECTOMY     • HYSTERECTOMY      age 43 per MRS   • JOINT REPLACEMENT     • OOPHORECTOMY Bilateral     age 43 per MRS   • DC REVISE/REMOVE SPINAL NEUROSTIM/ Left 2022    Procedure: Reopening of thoracic and left buttock incisions for removal of spinal cord stimulator system;  Surgeon: Cuong Tian MD;  Location:  MAIN OR;  Service: Neurosurgery   • DC SURG IMPLNT NEUROELECT,EPIDURAL Left 2019    Procedure: LAMINECTOMY THORACIC FOR INSERTION DORSAL COLUMN SPINAL CORD STIMULATOR (DCS) W IMPLANTABLE PULSE GENERATOR, LEFT GLUTE;  Surgeon: Abel Lawler MD;  Location:  MAIN OR;  Service: Neurosurgery   • ROTATOR CUFF REPAIR Right    • SPINAL STIMULATOR PLACEMENT N/A 2019    Procedure: REVISION SPINAL CORD STIMULATOR PADDLE ELECTRODE, REPLACEMENT WITH PERCUTANEOUS ELECTRODES OR SMALLER PADDLE;  Surgeon: Abel Lawler MD;  [facility];  Service: Neurosurgery      Family History:     Family History   Problem Relation Age of Onset   • Hypertension Family    • Stroke Family    • Heart disease Family    • Thyroid disease Family    • No Known Problems Sister    • No Known Problems Daughter    • No Known Problems Sister    • No Known Problems Sister    • No Known Problems Sister    • No Known Problems Sister    • No Known Problems Daughter    • No Known Problems Daughter    • No Known Problems Mother    • No Known Problems Father    • No Known Problems Maternal Grandmother    • No Known Problems Maternal Grandfather    • No Known Problems Paternal Grandmother    • No Known Problems Paternal Grandfather       Social History:     Social History     Socioeconomic History   • Marital status:      Spouse name: None   • Number of children: None   • Years of education: None   • Highest education level: None   Occupational History   • Occupation: unemployed   Tobacco Use   • Smoking status: Former     Types: Cigarettes     Start date:      Quit date: 1991     Years since quittin 6   • Smokeless tobacco: Never   Vaping Use   • Vaping Use: Never used   Substance and Sexual Activity   • Alcohol use: Not Currently   • Drug use: Never   • Sexual activity: Not Currently   Other Topics Concern   • None   Social History Narrative   • None     Social Determinants of Health     Financial Resource Strain: Low Risk    • Difficulty of Paying Living Expenses: Not hard at all   Food Insecurity: Not on file   Transportation Needs: No Transportation Needs   • Lack of Transportation (Medical): No   • Lack of Transportation (Non-Medical):  No   Physical Activity: Not on file   Stress: Not on file   Social Connections: Not on file   Intimate Partner Violence: Not on file   Housing Stability: Not on file      Medications and Allergies:     Current Outpatient Medications   Medication Sig Dispense Refill   • buPROPion (Wellbutrin XL) 150 mg 24 hr tablet Take 1 tablet (150 mg total) by mouth every morning With 300 mg to equal 450 mg daily 90 tablet 1   • albuterol (Ventolin HFA) 90 mcg/act inhaler Inhale 2 puffs every 4 (four) hours as needed for wheezing or shortness of breath 6 7 g 2   • alendronate (FOSAMAX) 70 mg tablet Take 1 tablet (70 mg total) by mouth once a week 4 tablet 2   • atorvastatin (LIPITOR) 20 mg tablet Take 1 tablet by mouth once daily 90 tablet 0   • bimatoprost (LUMIGAN) 0 01 % ophthalmic drops Administer 1 drop to both eyes daily (Patient taking differently: Administer 1 drop to both eyes 2 (two) times a day) 5 mL 6   • buPROPion (WELLBUTRIN XL) 300 mg 24 hr tablet Take 1 tablet by mouth once daily 90 tablet 0   • gabapentin (NEURONTIN) 800 mg tablet TAKE 1 TABLET BY MOUTH IN THE MORNING AND 1 EVERY DAY AT BEDTIME 180 tablet 0   • glycopyrrolate (ROBINUL) 2 MG tablet Take 1 tablet by mouth twice daily 60 tablet 0   • losartan (COZAAR) 100 MG tablet Take 1 tablet by mouth once daily 90 tablet 1   • montelukast (SINGULAIR) 10 mg tablet TAKE 1 TABLET BY MOUTH ONCE DAILY AT BEDTIME 30 tablet 0   • omeprazole (PriLOSEC) 40 MG capsule Take 1 capsule by mouth once daily 30 capsule 0   • Synthroid 88 MCG tablet Take 1 tablet by mouth once daily 90 tablet 0   • Timolol Maleate 0 5 % (DAILY) SOLN Apply 1 drop to eye every 12 (twelve) hours 5 mL 6   • tiZANidine (ZANAFLEX) 2 mg tablet TAKE 1 TABLET BY MOUTH EVERY 8 HOURS AS NEEDED FOR MUSCLE SPASM 30 tablet 0   • traZODone (DESYREL) 50 mg tablet Take 0 5 tablets (25 mg total) by mouth daily at bedtime as needed for sleep 30 tablet 2   • Vitamin D, Cholecalciferol, 25 MCG (1000 UT) TABS Take 1 tablet by mouth once daily 30 tablet 0   • zolpidem (AMBIEN) 5 mg tablet Take 1 tablet (5 mg total) by mouth as needed for sleep (for use during sleep study) for up to 1 day 1 tablet 0     No current facility-administered medications for this visit       Allergies   Allergen Reactions   • Iodinated Diagnostic Agents Anaphylaxis     Contrast Dye      Immunizations:     Immunization History   Administered Date(s) Administered   • INFLUENZA 09/12/2017, 09/23/2022   • Influenza, high dose seasonal 0 7 mL 12/03/2019, 10/12/2021, 09/23/2022   • Influenza, seasonal, injectable 09/12/2017   • Pneumococcal Conjugate 13-Valent 04/23/2019   • Pneumococcal Polysaccharide PPV23 10/12/2021      Health Maintenance:         Topic Date Due   • Hepatitis C Screening  Never done         Topic Date Due   • Hepatitis B Vaccine (1 of 3 - 3-dose series) Never done   • COVID-19 Vaccine (1) Never done      Medicare Screening Tests and Risk Assessments:     Carlos Crooks is here for her Subsequent Wellness visit  Last Medicare Wellness visit information reviewed, patient interviewed and updates made to the record today  Health Risk Assessment:   Patient rates overall health as good  Patient feels that their physical health rating is same  Patient is satisfied with their life  Eyesight was rated as same  Hearing was rated as same  Patient feels that their emotional and mental health rating is much worse  Patients states they are often angry  Patient states they are sometimes unusually tired/fatigued  Pain experienced in the last 7 days has been none  Patient states that she has experienced no weight loss or gain in last 6 months  Depression Screening:   PHQ-9 Score: 8      Fall Risk Screening: In the past year, patient has experienced: history of falling in past year    Number of falls: 1  Injured during fall?: No    Feels unsteady when standing or walking?: Yes    Worried about falling?: Yes      Urinary Incontinence Screening:   Patient has not leaked urine accidently in the last six months  Home Safety:  Patient has trouble with stairs inside or outside of their home  Patient has working smoke alarms and has working carbon monoxide detector  Home safety hazards include: none  Nutrition:   Current diet is Regular  Medications:   Patient is currently taking over-the-counter supplements  OTC medications include: see medication list  Patient is able to manage medications  Activities of Daily Living (ADLs)/Instrumental Activities of Daily Living (IADLs):   Walk and transfer into and out of bed and chair?: Yes  Dress and groom yourself?: Yes    Bathe or shower yourself?: Yes    Feed yourself?  Yes  Do your laundry/housekeeping?: Yes  Manage your money, pay your bills and track your expenses?: Yes  Make your own meals?: Yes    Do your own shopping?: Yes    Previous Hospitalizations:   Any hospitalizations or ED visits within the last 12 months?: Yes    How many hospitalizations have you had in the last year?: 1-2    Hospitalization Comments: 1 ER visit    Advance Care Planning:   Living will: No    Durable POA for healthcare: No    Advanced directive: No    Five wishes given: No    Patient declined ACP directive: Yes      Cognitive Screening:   Provider or family/friend/caregiver concerned regarding cognition?: No    PREVENTIVE SCREENINGS      Cardiovascular Screening:    General: History Lipid Disorder and Risks and Benefits Discussed      Diabetes Screening:     General: Screening Current and Risks and Benefits Discussed      Colorectal Cancer Screening:     General: Risks and Benefits Discussed and Screening Not Indicated      Breast Cancer Screening:     General: Screening Current and Risks and Benefits Discussed      Cervical Cancer Screening:    General: Screening Not Indicated and Risks and Benefits Discussed      Osteoporosis Screening:    General: History Osteoporosis, Risks and Benefits Discussed and Screening Current      Abdominal Aortic Aneurysm (AAA) Screening:        General: Risks and Benefits Discussed and Screening Current      Lung Cancer Screening:     General: Screening Not Indicated and Risks and Benefits Discussed      Hepatitis C Screening:    General: Risks and Benefits Discussed and Screening Not Indicated    Screening, Brief Intervention, and Referral to Treatment (SBIRT)    Screening  Typical number of drinks in a day: 0  Typical number of drinks in a week: 0  Interpretation: Low risk drinking behavior  Single Item Drug Screening:  How often have you used an illegal drug (including marijuana) or a prescription medication for non-medical reasons in the past year? never    Single Item Drug Screen Score: 0  Interpretation: Negative screen for possible drug use disorder    Other Counseling Topics:   Car/seat belt/driving safety and regular weightbearing exercise       Vision Screening - Comments[de-identified] Unable to obtain, pt not in office     Physical Exam:     Ht 5' (1 524 m)   Wt 75 8 kg (167 lb)   BMI 32 61 kg/m²     Physical Exam  Vitals and nursing note reviewed  Constitutional:       General: She is not in acute distress  Appearance: She is not ill-appearing  HENT:      Head: Normocephalic and atraumatic  Eyes:      General:         Right eye: No discharge  Left eye: No discharge  Conjunctiva/sclera: Conjunctivae normal    Pulmonary:      Effort: Pulmonary effort is normal  No respiratory distress  Skin:     Coloration: Skin is not pale  Findings: No rash  Psychiatric:         Behavior: Behavior normal          Thought Content: Thought content normal          Judgment: Judgment normal           Sean Swann DO  Virtual AWV Consent    Verification of patient location:    Patient is located in the following state in which I hold an active license [place]    Reason for visit is AWV and follow up appt    Encounter provider Sean Swann DO    Provider located at [id number]  [facility] [address]      Recent Visits  No visits were found meeting these conditions  Showing recent visits within past 7 days and meeting all other requirements  Today's Visits  Date Type Provider Dept   11/21/22 Telemedicine Sean Swann DO [facility] Ravi 0   Showing today's visits and meeting all other requirements  Future Appointments  No visits were found meeting these conditions  Showing future appointments within next 150 days and meeting all other requirements       After connecting through [employer], the patient was identified by name and date of birth  Kaylan Duffyers was informed that this is a telemedicine visit and that the visit is being conducted through the [address] [place] [address] Now platform  She agrees to proceed  My office door was closed  No one else was in the room    She acknowledged consent and understanding of privacy and security of the video platform  Melina Loza verbally agrees to participate in [employer]  Pt is aware that [employer] could be limited without vital signs or the ability to perform a full hands-on physical Bob [place] understands she or the provider may request at any time to terminate the video visit and request the patient to seek care or treatment in person  Patient is aware this is a billable service  Virtual AWV Consent    Verification of patient location:    Patient is located in the following state in which I hold an active license [place]    Reason for visit is AWV and follow up appt    Encounter provider Margareth Krause DO    Provider located at [address] [place] [address] Drive [facility]   [address] [facility] [address]      Recent Visits  No visits were found meeting these conditions  Showing recent visits within past 7 days and meeting all other requirements  Today's Visits  Date Type Provider Dept   11/21/22 Telemedicine Margareth Krause DO [facility] Ravi 0   Showing today's visits and meeting all other requirements  Future Appointments  No visits were found meeting these conditions  Showing future appointments within next 150 days and meeting all other requirements       After connecting through [employer], the patient was identified by name and date of birth  Melina Loza was informed that this is a telemedicine visit and that the visit is being conducted through the [address] [place] [address] Now platform  She agrees to proceed  My office door was closed  No one else was in the room  She acknowledged consent and understanding of privacy and security of the video platform  Melina Loza verbally agrees to participate in [employer]   Pt is aware that [employer] could be limited without vital signs or the ability to perform a full hands-on physical Bob [place] understands she or the provider may request at any time to terminate the video visit and request the patient to seek care or treatment in person  Patient is aware this is a billable service

## 2022-11-21 NOTE — ASSESSMENT & PLAN NOTE
No significant GERD symptoms and abd pain resolved, swallow study reviewed, con't meds/follow up and tx as per GI

## 2022-11-21 NOTE — ASSESSMENT & PLAN NOTE
Home study reviewed, saw Dr Marin Morales for follow up, titration study ordered and scheduled for May, con't healthy diet and keep active and healthy wgt encouraged, will follow

## 2022-11-21 NOTE — ASSESSMENT & PLAN NOTE
Mood not at goal with anniversary of husbands death coming up, not actively suicidal, agreeable to increase WBXL - add WBXL 150 mg 1 tab PO q day to her 300 mg q day = 450 mg q day, also encouraged to take the Trazodone nightly for sleep as it may also help mood,  d/w pt that it takes 4-6 wks to get maximum benefit of med and that med has to be taken every day and to not miss doses of med, call with SE/new/worse mood/SI/panic attacks, re-eval in 6-8 wks or sooner if needed

## 2022-11-21 NOTE — PATIENT INSTRUCTIONS
Medicare Preventive Visit Patient Instructions  Thank you for completing your Welcome to Medicare Visit or Medicare Annual Wellness Visit today  Your next wellness visit will be due in one year (11/22/2023)  The screening/preventive services that you may require over the next 5-10 years are detailed below  Some tests may not apply to you based off risk factors and/or age  Screening tests ordered at today's visit but not completed yet may show as past due  Also, please note that scanned in results may not display below  Preventive Screenings:  Service Recommendations Previous Testing/Comments   Colorectal Cancer Screening  * Colonoscopy    * Fecal Occult Blood Test (FOBT)/Fecal Immunochemical Test (FIT)  * Fecal DNA/Cologuard Test  * Flexible Sigmoidoscopy Age: 39-70 years old   Colonoscopy: every 10 years (may be performed more frequently if at higher risk)  OR  FOBT/FIT: every 1 year  OR  Cologuard: every 3 years  OR  Sigmoidoscopy: every 5 years  Screening may be recommended earlier than age 39 if at higher risk for colorectal cancer  Also, an individualized decision between you and your healthcare provider will decide whether screening between the ages of 74-80 would be appropriate  Colonoscopy: 07/01/2014  FOBT/FIT: Not on file  Cologuard: Not on file  Sigmoidoscopy: Not on file          Breast Cancer Screening Age: 36 years old  Frequency: every 1-2 years  Not required if history of left and right mastectomy Mammogram: 11/21/2022    Screening Current   Cervical Cancer Screening Between the ages of 21-29, pap smear recommended once every 3 years  Between the ages of 33-67, can perform pap smear with HPV co-testing every 5 years     Recommendations may differ for women with a history of total hysterectomy, cervical cancer, or abnormal pap smears in past  Pap Smear: Not on file    Screening Not Indicated   Hepatitis C Screening Once for adults born between 1945 and 1965  More frequently in patients at high risk for Hepatitis C Hep C Antibody: Not on file        Diabetes Screening 1-2 times per year if you're at risk for diabetes or have pre-diabetes Fasting glucose: 127 mg/dL (9/23/2022)  A1C: 5 9 % (8/19/2022)  Screening Current   Cholesterol Screening Once every 5 years if you don't have a lipid disorder  May order more often based on risk factors  Lipid panel: 09/11/2021    Screening Not Indicated  History Lipid Disorder     Other Preventive Screenings Covered by Medicare:  1  Abdominal Aortic Aneurysm (AAA) Screening: covered once if your at risk  You're considered to be at risk if you have a family history of AAA  2  Lung Cancer Screening: covers low dose CT scan once per year if you meet all of the following conditions: (1) Age 50-69; (2) No signs or symptoms of lung cancer; (3) Current smoker or have quit smoking within the last 15 years; (4) You have a tobacco smoking history of at least 20 pack years (packs per day multiplied by number of years you smoked); (5) You get a written order from a healthcare provider  3  Glaucoma Screening: covered annually if you're considered high risk: (1) You have diabetes OR (2) Family history of glaucoma OR (3)  aged 48 and older OR (3)  American aged 72 and older  3  Osteoporosis Screening: covered every 2 years if you meet one of the following conditions: (1) You're estrogen deficient and at risk for osteoporosis based off medical history and other findings; (2) Have a vertebral abnormality; (3) On glucocorticoid therapy for more than 3 months; (4) Have primary hyperparathyroidism; (5) On osteoporosis medications and need to assess response to drug therapy  · Last bone density test (DXA Scan): 11/17/2021   5  HIV Screening: covered annually if you're between the age of 15-65  Also covered annually if you are younger than 13 and older than 72 with risk factors for HIV infection   For pregnant patients, it is covered up to 3 times per pregnancy  Immunizations:  Immunization Recommendations   Influenza Vaccine Annual influenza vaccination during flu season is recommended for all persons aged >= 6 months who do not have contraindications   Pneumococcal Vaccine   * Pneumococcal conjugate vaccine = PCV13 (Prevnar 13), PCV15 (Vaxneuvance), PCV20 (Prevnar 20)  * Pneumococcal polysaccharide vaccine = PPSV23 (Pneumovax) Adults 25-60 years old: 1-3 doses may be recommended based on certain risk factors  Adults 72 years old: 1-2 doses may be recommended based off what pneumonia vaccine you previously received   Hepatitis B Vaccine 3 dose series if at intermediate or high risk (ex: diabetes, end stage renal disease, liver disease)   Tetanus (Td) Vaccine - COST NOT COVERED BY MEDICARE PART B Following completion of primary series, a booster dose should be given every 10 years to maintain immunity against tetanus  Td may also be given as tetanus wound prophylaxis  Tdap Vaccine - COST NOT COVERED BY MEDICARE PART B Recommended at least once for all adults  For pregnant patients, recommended with each pregnancy  Shingles Vaccine (Shingrix) - COST NOT COVERED BY MEDICARE PART B  2 shot series recommended in those aged 48 and above     Health Maintenance Due:      Topic Date Due   • Hepatitis C Screening  Never done     Immunizations Due:      Topic Date Due   • Hepatitis B Vaccine (1 of 3 - 3-dose series) Never done   • COVID-19 Vaccine (1) Never done     Advance Directives   What are advance directives? Advance directives are legal documents that state your wishes and plans for medical care  These plans are made ahead of time in case you lose your ability to make decisions for yourself  Advance directives can apply to any medical decision, such as the treatments you want, and if you want to donate organs  What are the types of advance directives? There are many types of advance directives, and each state has rules about how to use them   You may choose a combination of any of the following:  · Living will: This is a written record of the treatment you want  You can also choose which treatments you do not want, which to limit, and which to stop at a certain time  This includes surgery, medicine, IV fluid, and tube feedings  · Durable power of  for healthcare [facility]): This is a written record that states who you want to make healthcare choices for you when you are unable to make them for yourself  This person, called a proxy, is usually a family member or a friend  You may choose more than 1 proxy  · Do not resuscitate (DNR) order:  A DNR order is used in case your heart stops beating or you stop breathing  It is a request not to have certain forms of treatment, such as CPR  A DNR order may be included in other types of advance directives  · Medical directive: This covers the care that you want if you are in a coma, near death, or unable to make decisions for yourself  You can list the treatments you want for each condition  Treatment may include pain medicine, surgery, blood transfusions, dialysis, IV or tube feedings, and a ventilator (breathing machine)  · Values history: This document has questions about your views, beliefs, and how you feel and think about life  This information can help others choose the care that you would choose  Why are advance directives important? An advance directive helps you control your care  Although spoken wishes may be used, it is better to have your wishes written down  Spoken wishes can be misunderstood, or not followed  Treatments may be given even if you do not want them  An advance directive may make it easier for your family to make difficult choices about your care  Fall Prevention    Fall prevention  includes ways to make your home and other areas safer  It also includes ways you can move more carefully to prevent a fall   Health conditions that cause changes in your blood pressure, vision, or muscle strength and coordination may increase your risk for falls  Medicines may also increase your risk for falls if they make you dizzy, weak, or sleepy  Fall prevention tips:   · Stand or sit up slowly  · Use assistive devices as directed  · Wear shoes that fit well and have soles that   · Wear a personal alarm  · Stay active  · Manage your medical conditions  Home Safety Tips:  · Add items to prevent falls in the bathroom  · Keep paths clear  · Install bright lights in your home  · Keep items you use often on shelves within reach  · Paint or place reflective tape on the edges of your stairs  Weight Management   Why it is important to manage your weight:  Being overweight increases your risk of health conditions such as heart disease, high blood pressure, type 2 diabetes, and certain types of cancer  It can also increase your risk for osteoarthritis, sleep apnea, and other respiratory problems  Aim for a slow, steady weight loss  Even a small amount of weight loss can lower your risk of health problems  How to lose weight safely:  A safe and healthy way to lose weight is to eat fewer calories and get regular exercise  You can lose up about 1 pound a week by decreasing the number of calories you eat by 500 calories each day  Healthy meal plan for weight management:  A healthy meal plan includes a variety of foods, contains fewer calories, and helps you stay healthy  A healthy meal plan includes the following:  · Eat whole-grain foods more often  A healthy meal plan should contain fiber  Fiber is the part of grains, fruits, and vegetables that is not broken down by your body  Whole-grain foods are healthy and provide extra fiber in your diet  Some examples of whole-grain foods are whole-wheat breads and pastas, oatmeal, brown rice, and bulgur  · Eat a variety of vegetables every day  Include dark, leafy greens such as spinach, kale, cindy greens, and mustard greens  Eat yellow and orange vegetables such as carrots, sweet potatoes, and winter squash  · Eat a variety of fruits every day  Choose fresh or canned fruit (canned in its own juice or light syrup) instead of juice  Fruit juice has very little or no fiber  · Eat low-fat dairy foods  Drink fat-free (skim) milk or 1% milk  Eat fat-free yogurt and low-fat cottage cheese  Try low-fat cheeses such as mozzarella and other reduced-fat cheeses  · Choose meat and other protein foods that are low in fat  Choose beans or other legumes such as split peas or lentils  Choose fish, skinless poultry (chicken or turkey), or lean cuts of red meat (beef or pork)  Before you cook meat or poultry, cut off any visible fat  · Use less fat and oil  Try baking foods instead of frying them  Add less fat, such as margarine, sour cream, regular salad dressing and mayonnaise to foods  Eat fewer high-fat foods  Some examples of high-fat foods include french fries, doughnuts, ice cream, and cakes  · Eat fewer sweets  Limit foods and drinks that are high in sugar  This includes candy, cookies, regular soda, and sweetened drinks  Exercise:  Exercise at least 30 minutes per day on most days of the week  Some examples of exercise include walking, biking, dancing, and swimming  You can also fit in more physical activity by taking the stairs instead of the elevator or parking farther away from stores  Ask your healthcare provider about the best exercise plan for you  © Copyright [employer] 2018 Information is for End User's use only and may not be sold, redistributed or otherwise used for commercial purposes  All illustrations and images included in CareNotes® are the copyrighted property of [employer]  or [facility] Preventive Visit Patient Instructions  Thank you for completing your Welcome to Medicare Visit or Medicare Annual Wellness Visit today   Your next wellness visit will be due in one year (11/22/2023)  The screening/preventive services that you may require over the next 5-10 years are detailed below  Some tests may not apply to you based off risk factors and/or age  Screening tests ordered at today's visit but not completed yet may show as past due  Also, please note that scanned in results may not display below  Preventive Screenings:  Service Recommendations Previous Testing/Comments   Colorectal Cancer Screening  * Colonoscopy    * Fecal Occult Blood Test (FOBT)/Fecal Immunochemical Test (FIT)  * Fecal DNA/Cologuard Test  * Flexible Sigmoidoscopy Age: 39-70 years old   Colonoscopy: every 10 years (may be performed more frequently if at higher risk)  OR  FOBT/FIT: every 1 year  OR  Cologuard: every 3 years  OR  Sigmoidoscopy: every 5 years  Screening may be recommended earlier than age 39 if at higher risk for colorectal cancer  Also, an individualized decision between you and your healthcare provider will decide whether screening between the ages of 74-80 would be appropriate  Colonoscopy: 07/01/2014  FOBT/FIT: Not on file  Cologuard: Not on file  Sigmoidoscopy: Not on file          Breast Cancer Screening Age: 36 years old  Frequency: every 1-2 years  Not required if history of left and right mastectomy Mammogram: 11/21/2022    Screening Current   Cervical Cancer Screening Between the ages of 21-29, pap smear recommended once every 3 years  Between the ages of 33-67, can perform pap smear with HPV co-testing every 5 years     Recommendations may differ for women with a history of total hysterectomy, cervical cancer, or abnormal pap smears in past  Pap Smear: Not on file    Screening Not Indicated   Hepatitis C Screening Once for adults born between 1945 and 1965  More frequently in patients at high risk for Hepatitis C Hep C Antibody: Not on file        Diabetes Screening 1-2 times per year if you're at risk for diabetes or have pre-diabetes Fasting glucose: 127 mg/dL (9/23/2022)  A1C: 5 9 % (8/19/2022)  Screening Current   Cholesterol Screening Once every 5 years if you don't have a lipid disorder  May order more often based on risk factors  Lipid panel: 09/11/2021    Screening Not Indicated  History Lipid Disorder     Other Preventive Screenings Covered by Medicare:  6  Abdominal Aortic Aneurysm (AAA) Screening: covered once if your at risk  You're considered to be at risk if you have a family history of AAA  7  Lung Cancer Screening: covers low dose CT scan once per year if you meet all of the following conditions: (1) Age 50-69; (2) No signs or symptoms of lung cancer; (3) Current smoker or have quit smoking within the last 15 years; (4) You have a tobacco smoking history of at least 20 pack years (packs per day multiplied by number of years you smoked); (5) You get a written order from a healthcare provider  8  Glaucoma Screening: covered annually if you're considered high risk: (1) You have diabetes OR (2) Family history of glaucoma OR (3)  aged 48 and older OR (3)  American aged 72 and older  5  Osteoporosis Screening: covered every 2 years if you meet one of the following conditions: (1) You're estrogen deficient and at risk for osteoporosis based off medical history and other findings; (2) Have a vertebral abnormality; (3) On glucocorticoid therapy for more than 3 months; (4) Have primary hyperparathyroidism; (5) On osteoporosis medications and need to assess response to drug therapy  · Last bone density test (DXA Scan): 11/17/2021  10  HIV Screening: covered annually if you're between the age of 12-76  Also covered annually if you are younger than 13 and older than 72 with risk factors for HIV infection  For pregnant patients, it is covered up to 3 times per pregnancy      Immunizations:  Immunization Recommendations   Influenza Vaccine Annual influenza vaccination during flu season is recommended for all persons aged >= 6 months who do not have contraindications Pneumococcal Vaccine   * Pneumococcal conjugate vaccine = PCV13 (Prevnar 13), PCV15 (Vaxneuvance), PCV20 (Prevnar 20)  * Pneumococcal polysaccharide vaccine = PPSV23 (Pneumovax) Adults 2364 years old: 1-3 doses may be recommended based on certain risk factors  Adults 72 years old: 1-2 doses may be recommended based off what pneumonia vaccine you previously received   Hepatitis B Vaccine 3 dose series if at intermediate or high risk (ex: diabetes, end stage renal disease, liver disease)   Tetanus (Td) Vaccine - COST NOT COVERED BY MEDICARE PART B Following completion of primary series, a booster dose should be given every 10 years to maintain immunity against tetanus  Td may also be given as tetanus wound prophylaxis  Tdap Vaccine - COST NOT COVERED BY MEDICARE PART B Recommended at least once for all adults  For pregnant patients, recommended with each pregnancy  Shingles Vaccine (Shingrix) - COST NOT COVERED BY MEDICARE PART B  2 shot series recommended in those aged 48 and above     Health Maintenance Due:      Topic Date Due   • Hepatitis C Screening  Never done     Immunizations Due:      Topic Date Due   • Hepatitis B Vaccine (1 of 3 - 3-dose series) Never done   • COVID-19 Vaccine (1) Never done     Advance Directives   What are advance directives? Advance directives are legal documents that state your wishes and plans for medical care  These plans are made ahead of time in case you lose your ability to make decisions for yourself  Advance directives can apply to any medical decision, such as the treatments you want, and if you want to donate organs  What are the types of advance directives? There are many types of advance directives, and each state has rules about how to use them  You may choose a combination of any of the following:  · Living will: This is a written record of the treatment you want   You can also choose which treatments you do not want, which to limit, and which to stop at a certain time  This includes surgery, medicine, IV fluid, and tube feedings  · Durable power of  for healthcare [facility]): This is a written record that states who you want to make healthcare choices for you when you are unable to make them for yourself  This person, called a proxy, is usually a family member or a friend  You may choose more than 1 proxy  · Do not resuscitate (DNR) order:  A DNR order is used in case your heart stops beating or you stop breathing  It is a request not to have certain forms of treatment, such as CPR  A DNR order may be included in other types of advance directives  · Medical directive: This covers the care that you want if you are in a coma, near death, or unable to make decisions for yourself  You can list the treatments you want for each condition  Treatment may include pain medicine, surgery, blood transfusions, dialysis, IV or tube feedings, and a ventilator (breathing machine)  · Values history: This document has questions about your views, beliefs, and how you feel and think about life  This information can help others choose the care that you would choose  Why are advance directives important? An advance directive helps you control your care  Although spoken wishes may be used, it is better to have your wishes written down  Spoken wishes can be misunderstood, or not followed  Treatments may be given even if you do not want them  An advance directive may make it easier for your family to make difficult choices about your care  Fall Prevention    Fall prevention  includes ways to make your home and other areas safer  It also includes ways you can move more carefully to prevent a fall  Health conditions that cause changes in your blood pressure, vision, or muscle strength and coordination may increase your risk for falls  Medicines may also increase your risk for falls if they make you dizzy, weak, or sleepy  Fall prevention tips:   · Stand or sit up slowly  · Use assistive devices as directed  · Wear shoes that fit well and have soles that   · Wear a personal alarm  · Stay active  · Manage your medical conditions  Home Safety Tips:  · Add items to prevent falls in the bathroom  · Keep paths clear  · Install bright lights in your home  · Keep items you use often on shelves within reach  · Paint or place reflective tape on the edges of your stairs  Weight Management   Why it is important to manage your weight:  Being overweight increases your risk of health conditions such as heart disease, high blood pressure, type 2 diabetes, and certain types of cancer  It can also increase your risk for osteoarthritis, sleep apnea, and other respiratory problems  Aim for a slow, steady weight loss  Even a small amount of weight loss can lower your risk of health problems  How to lose weight safely:  A safe and healthy way to lose weight is to eat fewer calories and get regular exercise  You can lose up about 1 pound a week by decreasing the number of calories you eat by 500 calories each day  Healthy meal plan for weight management:  A healthy meal plan includes a variety of foods, contains fewer calories, and helps you stay healthy  A healthy meal plan includes the following:  · Eat whole-grain foods more often  A healthy meal plan should contain fiber  Fiber is the part of grains, fruits, and vegetables that is not broken down by your body  Whole-grain foods are healthy and provide extra fiber in your diet  Some examples of whole-grain foods are whole-wheat breads and pastas, oatmeal, brown rice, and bulgur  · Eat a variety of vegetables every day  Include dark, leafy greens such as spinach, kale, cindy greens, and mustard greens  Eat yellow and orange vegetables such as carrots, sweet potatoes, and winter squash  · Eat a variety of fruits every day    Choose fresh or canned fruit (canned in its own juice or light syrup) instead of juice  Fruit juice has very little or no fiber  · Eat low-fat dairy foods  Drink fat-free (skim) milk or 1% milk  Eat fat-free yogurt and low-fat cottage cheese  Try low-fat cheeses such as mozzarella and other reduced-fat cheeses  · Choose meat and other protein foods that are low in fat  Choose beans or other legumes such as split peas or lentils  Choose fish, skinless poultry (chicken or turkey), or lean cuts of red meat (beef or pork)  Before you cook meat or poultry, cut off any visible fat  · Use less fat and oil  Try baking foods instead of frying them  Add less fat, such as margarine, sour cream, regular salad dressing and mayonnaise to foods  Eat fewer high-fat foods  Some examples of high-fat foods include french fries, doughnuts, ice cream, and cakes  · Eat fewer sweets  Limit foods and drinks that are high in sugar  This includes candy, cookies, regular soda, and sweetened drinks  Exercise:  Exercise at least 30 minutes per day on most days of the week  Some examples of exercise include walking, biking, dancing, and swimming  You can also fit in more physical activity by taking the stairs instead of the elevator or parking farther away from stores  Ask your healthcare provider about the best exercise plan for you  © Copyright [employer] 2018 Information is for End User's use only and may not be sold, redistributed or otherwise used for commercial purposes   All illustrations and images included in CareNotes® are the copyrighted property of [employer]  or [address] [place] [address]

## 2022-11-29 DIAGNOSIS — J45.20 MILD INTERMITTENT ASTHMA WITHOUT COMPLICATION: Primary | ICD-10-CM

## 2022-11-29 RX ORDER — ALBUTEROL SULFATE 90 UG/1
2 AEROSOL, METERED RESPIRATORY (INHALATION) EVERY 6 HOURS PRN
Qty: 8.5 G | Refills: 5 | Status: SHIPPED | OUTPATIENT
Start: 2022-11-29 | End: 2023-05-20

## 2022-11-30 LAB
25(OH)D3+25(OH)D2 SERPL-MCNC: 34.4 NG/ML (ref 30–100)
BUN SERPL-MCNC: 30 MG/DL (ref 8–27)
BUN/CREAT SERPL: 24 (ref 12–28)
CALCIUM SERPL-MCNC: 10.4 MG/DL (ref 8.7–10.3)
CHLORIDE SERPL-SCNC: 107 MMOL/L (ref 96–106)
CO2 SERPL-SCNC: 21 MMOL/L (ref 20–29)
CREAT SERPL-MCNC: 1.27 MG/DL (ref 0.57–1)
EGFR: 43 ML/MIN/1.73
EST. AVERAGE GLUCOSE BLD GHB EST-MCNC: 131 MG/DL
GLUCOSE SERPL-MCNC: 96 MG/DL (ref 70–99)
HBA1C MFR BLD: 6.2 % (ref 4.8–5.6)
POTASSIUM SERPL-SCNC: 4.9 MMOL/L (ref 3.5–5.2)
SODIUM SERPL-SCNC: 142 MMOL/L (ref 134–144)

## 2022-12-01 DIAGNOSIS — E83.52 HYPERCALCEMIA: Primary | ICD-10-CM

## 2023-02-16 NOTE — PROGRESS NOTES
Virtual Brief Visit    Patient is located in the following state in which I hold an active license [place]      Assessment/Plan:    Problem List Items Addressed This Visit    None     Visit Diagnoses     Post-operative state    -  Primary          Recent Visits  No visits were found meeting these conditions  Showing recent visits within past 7 days and meeting all other requirements  Future Appointments  No visits were found meeting these conditions  Showing future appointments within next 150 days and meeting all other requirements                                                                                    Post-Op Visit- Neurosurgery    Socorro Carlson 76 y o  female MRN: [id number]    Chief Complaint:  Patient presents post: Reopening of thoracic and left buttock incisions for removal of spinal cord stimulator system - Left    Assessment:   There were no vitals filed for this visit  Wound Exam: Requested the patient send a picture of her incision for visual assessment  See below:                Discussion/Summary:  Spoke with Ryan Coe the patients daughter who is present with Cindy  She reports she is Doing well postoperatively  Reviewed incision care with patient including daily observation for s/s infection including: increased erythema, edema, drainage, dehiscence of incision or fever >101  Should these be observed, she understands that she is to call and/or return immediately for reassessment  Advised patient to continue cleansing area with mild soap and water and pat dry  Not to apply any lotions, creams, or ointments, & not to submerge in any water for 4 more weeks  She is to maintain activity restrictions until cleared by the surgeon  Activity levels were also reviewed with the patient in detail, she is to lift no greater than 10 pounds and ambulation is encouraged as tolerated  Verified date/time/location of upcoming POV   She is to call the office with any further questions or concerns, or if any incisional issues or fevers would arise  Confirmed need for 6 week with surgery coordinator and was advised that since she had paddle removed a 6 week is usually had  bilateral breast implants  abdominalplasty no

## 2023-03-01 DIAGNOSIS — K21.9 GERD WITHOUT ESOPHAGITIS: ICD-10-CM

## 2023-03-01 RX ORDER — OMEPRAZOLE 40 MG/1
40 CAPSULE, DELAYED RELEASE ORAL DAILY
Qty: 90 CAPSULE | Refills: 1 | Status: SHIPPED | OUTPATIENT
Start: 2023-03-01

## 2023-05-19 DIAGNOSIS — F41.9 ANXIETY AND DEPRESSION: ICD-10-CM

## 2023-05-19 DIAGNOSIS — F32.A ANXIETY AND DEPRESSION: ICD-10-CM

## 2023-05-19 DIAGNOSIS — J45.909 UNCOMPLICATED ASTHMA, UNSPECIFIED ASTHMA SEVERITY, UNSPECIFIED WHETHER PERSISTENT: ICD-10-CM

## 2023-05-19 DIAGNOSIS — J45.20 MILD INTERMITTENT ASTHMA WITHOUT COMPLICATION: ICD-10-CM

## 2023-05-19 DIAGNOSIS — E78.5 DYSLIPIDEMIA: ICD-10-CM

## 2023-05-19 DIAGNOSIS — G89.29 CHRONIC BILATERAL LOW BACK PAIN WITH LEFT-SIDED SCIATICA: ICD-10-CM

## 2023-05-19 DIAGNOSIS — I34.0 MITRAL VALVE INSUFFICIENCY, UNSPECIFIED ETIOLOGY: ICD-10-CM

## 2023-05-19 DIAGNOSIS — I35.9 AORTIC VALVE DISEASE: ICD-10-CM

## 2023-05-19 DIAGNOSIS — I45.10 RBBB: ICD-10-CM

## 2023-05-19 DIAGNOSIS — E03.9 HYPOTHYROIDISM, UNSPECIFIED TYPE: ICD-10-CM

## 2023-05-19 DIAGNOSIS — G89.4 CHRONIC PAIN SYNDROME: ICD-10-CM

## 2023-05-19 DIAGNOSIS — M81.0 OSTEOPOROSIS, UNSPECIFIED OSTEOPOROSIS TYPE, UNSPECIFIED PATHOLOGICAL FRACTURE PRESENCE: ICD-10-CM

## 2023-05-19 DIAGNOSIS — I10 ESSENTIAL (PRIMARY) HYPERTENSION: ICD-10-CM

## 2023-05-19 DIAGNOSIS — E78.00 PURE HYPERCHOLESTEROLEMIA: ICD-10-CM

## 2023-05-19 DIAGNOSIS — M54.42 CHRONIC BILATERAL LOW BACK PAIN WITH LEFT-SIDED SCIATICA: ICD-10-CM

## 2023-05-19 RX ORDER — LOSARTAN POTASSIUM 100 MG/1
TABLET ORAL
Qty: 90 TABLET | Refills: 0 | Status: SHIPPED | OUTPATIENT
Start: 2023-05-19

## 2023-05-20 RX ORDER — ALBUTEROL SULFATE 90 UG/1
AEROSOL, METERED RESPIRATORY (INHALATION)
Qty: 18 G | Refills: 0 | Status: SHIPPED | OUTPATIENT
Start: 2023-05-20

## 2023-05-20 RX ORDER — MONTELUKAST SODIUM 10 MG/1
TABLET ORAL
Qty: 30 TABLET | Refills: 0 | Status: SHIPPED | OUTPATIENT
Start: 2023-05-20

## 2023-05-20 RX ORDER — ATORVASTATIN CALCIUM 20 MG/1
TABLET, FILM COATED ORAL
Qty: 30 TABLET | Refills: 0 | Status: SHIPPED | OUTPATIENT
Start: 2023-05-20

## 2023-05-20 RX ORDER — GLYCOPYRROLATE 2 MG/1
TABLET ORAL
Qty: 60 TABLET | Refills: 0 | Status: SHIPPED | OUTPATIENT
Start: 2023-05-20

## 2023-05-20 RX ORDER — LEVOTHYROXINE SODIUM 88 MCG
TABLET ORAL
Qty: 30 TABLET | Refills: 0 | Status: SHIPPED | OUTPATIENT
Start: 2023-05-20

## 2023-05-21 RX ORDER — BUPROPION HYDROCHLORIDE 150 MG/1
TABLET ORAL
Qty: 90 TABLET | Refills: 0 | Status: SHIPPED | OUTPATIENT
Start: 2023-05-21

## 2023-05-21 RX ORDER — GABAPENTIN 800 MG/1
TABLET ORAL
Qty: 180 TABLET | Refills: 0 | Status: SHIPPED | OUTPATIENT
Start: 2023-05-21

## 2023-05-21 RX ORDER — TIZANIDINE 2 MG/1
TABLET ORAL
Qty: 30 TABLET | Refills: 0 | Status: SHIPPED | OUTPATIENT
Start: 2023-05-21

## 2023-05-21 RX ORDER — ALENDRONATE SODIUM 70 MG/1
TABLET ORAL
Qty: 4 TABLET | Refills: 0 | Status: SHIPPED | OUTPATIENT
Start: 2023-05-21

## 2023-06-26 NOTE — PROGRESS NOTES
Cardiology Follow Up    Leelee Grajeda  1945  [id number]  [facility] [address]  [phone]    1. Essential (primary) hypertension  POCT ECG      2. Pure hypercholesterolemia  POCT ECG      3. Mitral valve insufficiency, unspecified etiology  POCT ECG      4. RBBB  POCT ECG      5. Aortic valve disease  POCT ECG          Interval History: Patient is here for a follow-up visit. Patient has HTN, HLD and DM. Pharmacologic nuclear stress test done February 2022 demonstrated no ischemia. Echocardiogram done September 2022 demonstrated LVEF of 70%. There was moderate LAE. There was mild mixed aortic valve disease. There was MAC with moderate MR. EKG today demonstrates sinus rhythm with right bundle branch block with no significant change compared to prior tracing done February 7, 2022. Patient's systolic pressure is mildly elevated today. In general it has been well controlled. Patient has had no angina or significant dyspnea. She is here today with her daughter who is very attentive.     Patient Active Problem List   Diagnosis   • Tremor   • Atrophic vaginitis   • Osteoporosis   • Obesity   • Migraine headache   • Hypothyroidism   • Hypertension   • Glaucoma   • Esophageal reflux   • Dyslipidemia   • Cataract   • Asthma   • Lumbar spondylosis   • IFG (impaired fasting glucose)   • Chronic pain syndrome   • Chronic bilateral low back pain with left-sided sciatica   • Lumbar post-laminectomy syndrome   • Lumbar radiculopathy   • Chronic pain of left knee   • Primary osteoarthritis of left knee   • Gait abnormality   • Weakness of left leg   • Anserine bursitis   • Depression, recurrent (HCC)   • Anxiety and depression   • Rib pain on left side   • Intercostal neuropathy   • Constipation   • Vitamin D deficiency   • Insomnia   • SAGE (obstructive sleep apnea)   • Daytime somnolence   • Malfunction of spinal cord stimulator ([address])   • Stage 3a chronic kidney disease ([address])     Past Medical History:   Diagnosis Date   • Asthma    • Chronic pain disorder     Back   • Dyslipidemia    • Esophageal reflux    • Frequent headaches     stress related   • History of stomach ulcers    • Hypercalcemia    • Hyperlipidemia    • Hypertension    • Osteopenia    • Tremor      Social History     Socioeconomic History   • Marital status:      Spouse name: Not on file   • Number of children: Not on file   • Years of education: Not on file   • Highest education level: Not on file   Occupational History   • Occupation: unemployed   Tobacco Use   • Smoking status: Former     Types: Cigarettes     Start date: 78     Quit date: 1991     Years since quittin.2   • Smokeless tobacco: Never   Vaping Use   • Vaping Use: Never used   Substance and Sexual Activity   • Alcohol use: Not Currently   • Drug use: Never   • Sexual activity: Not Currently   Other Topics Concern   • Not on file   Social History Narrative   • Not on file     Social Determinants of Health     Financial Resource Strain: Low Risk  (2022)    Overall Financial Resource Strain (CARDIA)    • Difficulty of Paying Living Expenses: Not hard at all   Food Insecurity: Not on file   Transportation Needs: No Transportation Needs (2022)    PRAPARE - Transportation    • Lack of Transportation (Medical): No    • Lack of Transportation (Non-Medical):  No   Physical Activity: Not on file   Stress: Not on file   Social Connections: Not on file   Intimate Partner Violence: Not on file   Housing Stability: Not on file      Family History   Problem Relation Age of Onset   • Hypertension Family    • Stroke Family    • Heart disease Family    • Thyroid disease Family    • No Known Problems Sister    • No Known Problems Daughter    • No Known Problems Sister    • No Known Problems Sister    • No Known Problems Sister    • No Known Problems Sister    • No Known Problems Daughter    • No Known Problems Daughter    • No Known Problems Mother    • No Known Problems Father    • No Known Problems Maternal Grandmother    • No Known Problems Maternal Grandfather    • No Known Problems Paternal Grandmother    • No Known Problems Paternal Grandfather      Past Surgical History:   Procedure Laterality Date   • BACK SURGERY      lower back   • BILATERAL OOPHORECTOMY     • BREAST BIOPSY Right     long ago, benign   •  SECTION     • CHOLECYSTECTOMY     • HYSTERECTOMY      age 43 per MRS   • JOINT REPLACEMENT     • OOPHORECTOMY Bilateral     age 43 per MRS   • NH REVISE/REMOVE SPINAL NEUROSTIM/ Left 2022    Procedure: Reopening of thoracic and left buttock incisions for removal of spinal cord stimulator system;  Surgeon: Tk Myles MD;  Location: [facility];  Service: Neurosurgery   • NH SURG IMPLNT Mil Porras Left 2019    Procedure: LAMINECTOMY THORACIC FOR INSERTION DORSAL COLUMN SPINAL CORD STIMULATOR (DCS) W IMPLANTABLE PULSE GENERATOR, LEFT GLUTE;  Surgeon: Kasandra Washington MD;  Location: [facility];  Service: Neurosurgery   • ROTATOR CUFF REPAIR Right    • SPINAL STIMULATOR PLACEMENT N/A 2019    Procedure: REVISION SPINAL CORD STIMULATOR PADDLE ELECTRODE, REPLACEMENT WITH PERCUTANEOUS ELECTRODES OR SMALLER PADDLE;  Surgeon: Kasandra Washington MD;  Location: [facility];  Service: Neurosurgery       Current Outpatient Medications:   •  albuterol (PROVENTIL HFA,VENTOLIN HFA) 90 mcg/act inhaler, INHALE 2 PUFFS BY MOUTH EVERY 6 HOURS AS NEEDED FOR WHEEZING OR SHORTNESS OF BREATH, Disp: 18 g, Rfl: 0  •  albuterol (Ventolin HFA) 90 mcg/act inhaler, Inhale 2 puffs every 4 (four) hours as needed for wheezing or shortness of breath, Disp: 6.7 g, Rfl: 2  •  alendronate (FOSAMAX) 70 mg tablet, Take 1 tablet by mouth once a week, Disp: 4 tablet, Rfl: 0  •  atorvastatin (LIPITOR) 20 mg tablet, Take 1 tablet by mouth once daily, Disp: 90 tablet, Rfl: 0  • bimatoprost (LUMIGAN) 0.01 % ophthalmic drops, Administer 1 drop to both eyes daily (Patient taking differently: Administer 1 drop to both eyes 2 (two) times a day), Disp: 5 mL, Rfl: 6  •  buPROPion (WELLBUTRIN XL) 150 mg 24 hr tablet, TAKE 1 TABLET BY MOUTH IN THE MORNING, ALONG WITH  MG DOSE (450 MG TOTAL), Disp: 90 tablet, Rfl: 0  •  buPROPion (WELLBUTRIN XL) 300 mg 24 hr tablet, Take 1 tablet by mouth once daily, Disp: 90 tablet, Rfl: 0  •  gabapentin (NEURONTIN) 800 mg tablet, TAKE 1 TABLET BY MOUTH ONCE DAILY IN THE MORNING AND 1 TABLET AT BEDTIME, Disp: 180 tablet, Rfl: 0  •  glycopyrrolate (ROBINUL) 2 MG tablet, Take 1 tablet by mouth twice daily, Disp: 60 tablet, Rfl: 0  •  losartan (COZAAR) 100 MG tablet, Take 1 tablet by mouth once daily, Disp: 90 tablet, Rfl: 0  •  montelukast (SINGULAIR) 10 mg tablet, TAKE 1 TABLET BY MOUTH ONCE DAILY AT BEDTIME, Disp: 30 tablet, Rfl: 0  •  omeprazole (PriLOSEC) 40 MG capsule, Take 1 capsule (40 mg total) by mouth daily, Disp: 90 capsule, Rfl: 1  •  Synthroid 88 MCG tablet, Take 1 tablet by mouth once daily, Disp: 30 tablet, Rfl: 0  •  Timolol Maleate 0.5 % (DAILY) SOLN, Apply 1 drop to eye every 12 (twelve) hours, Disp: 5 mL, Rfl: 6  •  tiZANidine (ZANAFLEX) 2 mg tablet, TAKE 1 TABLET BY MOUTH EVERY 8 HOURS AS NEEDED FOR MUSCLE SPASM, Disp: 30 tablet, Rfl: 0  •  traZODone (DESYREL) 50 mg tablet, Take 0.5 tablets (25 mg total) by mouth daily at bedtime as needed for sleep (Patient not taking: Reported on 7/5/2023), Disp: 30 tablet, Rfl: 2  •  zolpidem (AMBIEN) 5 mg tablet, Take 1 tablet (5 mg total) by mouth as needed for sleep (for use during sleep study) for up to 1 day (Patient not taking: Reported on 7/5/2023), Disp: 1 tablet, Rfl: 0  Allergies   Allergen Reactions   • Iodinated Contrast Media Anaphylaxis     Contrast Dye       Labs:not applicable  Imaging: No results found.     Review of Systems:  Review of Systems   All other systems reviewed and are negative. Physical Exam:  /62 (BP Location: Left arm, Patient Position: Sitting, Cuff Size: Standard)   Pulse 74   Ht 5' (1.524 m)   Wt 75.7 kg (166 lb 12.8 oz)   BMI 32.58 kg/m²   Physical Exam  Vitals reviewed. Constitutional:       Appearance: She is well-developed. HENT:      Head: Normocephalic and atraumatic. Eyes:      Conjunctiva/sclera: Conjunctivae normal.      Pupils: Pupils are equal, round, and reactive to light. Cardiovascular:      Rate and Rhythm: Normal rate. Heart sounds: Normal heart sounds. Pulmonary:      Effort: Pulmonary effort is normal.      Breath sounds: Normal breath sounds. Musculoskeletal:      Cervical back: Normal range of motion and neck supple. Skin:     General: Skin is warm and dry. Neurological:      Mental Status: She is alert and oriented to person, place, and time. Discussion/Summary:I will continue the patient's present medical regimen. The patient appears well compensated. I have asked the patient to call if there is a problem in the interim otherwise I will see the patient in six months time. We will check a follow-up echo at her next visit.

## 2023-07-05 ENCOUNTER — OFFICE VISIT (OUTPATIENT)
Dept: CARDIOLOGY CLINIC | Facility: CLINIC | Age: 78
End: 2023-07-05
Payer: COMMERCIAL

## 2023-07-05 VITALS
HEIGHT: 60 IN | BODY MASS INDEX: 32.75 KG/M2 | HEART RATE: 74 BPM | DIASTOLIC BLOOD PRESSURE: 62 MMHG | SYSTOLIC BLOOD PRESSURE: 148 MMHG | WEIGHT: 166.8 LBS

## 2023-07-05 DIAGNOSIS — I45.10 RBBB: ICD-10-CM

## 2023-07-05 DIAGNOSIS — I10 ESSENTIAL (PRIMARY) HYPERTENSION: Primary | ICD-10-CM

## 2023-07-05 DIAGNOSIS — I35.9 AORTIC VALVE DISEASE: ICD-10-CM

## 2023-07-05 DIAGNOSIS — I34.0 MITRAL VALVE INSUFFICIENCY, UNSPECIFIED ETIOLOGY: ICD-10-CM

## 2023-07-05 DIAGNOSIS — E78.00 PURE HYPERCHOLESTEROLEMIA: ICD-10-CM

## 2023-07-05 PROCEDURE — 3077F SYST BP >= 140 MM HG: CPT | Performed by: INTERNAL MEDICINE

## 2023-07-05 PROCEDURE — 93000 ELECTROCARDIOGRAM COMPLETE: CPT | Performed by: INTERNAL MEDICINE

## 2023-07-05 PROCEDURE — 99214 OFFICE O/P EST MOD 30 MIN: CPT | Performed by: INTERNAL MEDICINE

## 2023-07-05 PROCEDURE — 3078F DIAST BP <80 MM HG: CPT | Performed by: INTERNAL MEDICINE

## 2023-07-05 NOTE — PATIENT INSTRUCTIONS
I will continue the patient's present medical regimen. The patient appears well compensated. I have asked the patient to call if there is a problem in the interim otherwise I will see the patient in six months time.

## 2023-07-10 ENCOUNTER — RA CDI HCC (OUTPATIENT)
Dept: OTHER | Facility: HOSPITAL | Age: 78
End: 2023-07-10

## 2023-07-10 NOTE — PROGRESS NOTES
[facility] [address] [facility] coding opportunities       Chart reviewed, no opportunity found: [address]        Patients Insurance     Medicare Insurance: [employer]

## 2023-07-14 ENCOUNTER — OFFICE VISIT (OUTPATIENT)
Dept: FAMILY MEDICINE CLINIC | Facility: HOSPITAL | Age: 78
End: 2023-07-14
Payer: COMMERCIAL

## 2023-07-14 VITALS
HEIGHT: 60 IN | TEMPERATURE: 96.5 F | DIASTOLIC BLOOD PRESSURE: 60 MMHG | SYSTOLIC BLOOD PRESSURE: 132 MMHG | BODY MASS INDEX: 32.67 KG/M2 | HEART RATE: 74 BPM | WEIGHT: 166.4 LBS

## 2023-07-14 DIAGNOSIS — G47.00 INSOMNIA, UNSPECIFIED TYPE: ICD-10-CM

## 2023-07-14 DIAGNOSIS — I10 PRIMARY HYPERTENSION: ICD-10-CM

## 2023-07-14 DIAGNOSIS — F41.9 ANXIETY AND DEPRESSION: ICD-10-CM

## 2023-07-14 DIAGNOSIS — F33.9 DEPRESSION, RECURRENT (HCC): Primary | ICD-10-CM

## 2023-07-14 DIAGNOSIS — N18.31 CHRONIC KIDNEY DISEASE, STAGE 3A (HCC): ICD-10-CM

## 2023-07-14 DIAGNOSIS — F32.A ANXIETY AND DEPRESSION: ICD-10-CM

## 2023-07-14 DIAGNOSIS — M25.552 HIP PAIN, ACUTE, LEFT: ICD-10-CM

## 2023-07-14 DIAGNOSIS — E78.5 DYSLIPIDEMIA: ICD-10-CM

## 2023-07-14 DIAGNOSIS — R73.01 IFG (IMPAIRED FASTING GLUCOSE): ICD-10-CM

## 2023-07-14 DIAGNOSIS — E03.9 HYPOTHYROIDISM, UNSPECIFIED TYPE: ICD-10-CM

## 2023-07-14 PROCEDURE — 99214 OFFICE O/P EST MOD 30 MIN: CPT | Performed by: INTERNAL MEDICINE

## 2023-07-14 RX ORDER — TRAZODONE HYDROCHLORIDE 50 MG/1
50 TABLET ORAL
Qty: 30 TABLET | Refills: 2 | Status: SHIPPED | OUTPATIENT
Start: 2023-07-14

## 2023-07-14 RX ORDER — BUPROPION HYDROCHLORIDE 300 MG/1
300 TABLET ORAL DAILY
Qty: 90 TABLET | Refills: 1 | Status: SHIPPED | OUTPATIENT
Start: 2023-07-14

## 2023-07-14 NOTE — PROGRESS NOTES
Name: Quan Munoz      : 1945      MRN: [id number]  Encounter Provider: Towana Pallas, DO  Encounter Date: 2023   Encounter department: [address]     1. Depression, recurrent ([address])  Assessment & Plan:  Mood not at goal - admittedly is missing days of WBXL - importance of taking daily and SE of stopping abruptly reviewed, will restart Trazodone 50 mg every night, re-eval in 4 wks or s    Orders:  -     CBC and differential  -     Comprehensive metabolic panel  -     Hemoglobin A1C  -     Lipid panel  -     TSH, 3rd generation with Free T4 reflex  -     Iron; Future  -     Ferritin; Future  -     Vitamin B12  -     Vitamin D 25 hydroxy; Future  -     Iron  -     Ferritin  -     Vitamin D 25 hydroxy    2. Anxiety and depression  Assessment & Plan:  Not at goal today and admittedly is missing doses of WBXL - urged to take daily, will restart Trazodone as well, will follow    Orders:  -     buPROPion (WELLBUTRIN XL) 300 mg 24 hr tablet; Take 1 tablet (300 mg total) by mouth daily    3. Insomnia, unspecified type  Assessment & Plan:  Not at goal, restart Trazodone 50 mg qhs, re-eval in 2-4 wks    Orders:  -     traZODone (DESYREL) 50 mg tablet; Take 1 tablet (50 mg total) by mouth daily at bedtime  -     CBC and differential  -     Comprehensive metabolic panel  -     Hemoglobin A1C  -     Lipid panel  -     TSH, 3rd generation with Free T4 reflex  -     Iron; Future  -     Ferritin; Future  -     Vitamin B12  -     Vitamin D 25 hydroxy; Future  -     Iron  -     Ferritin  -     Vitamin D 25 hydroxy    4.  Chronic kidney disease, stage 3a [facility])  Assessment & Plan:  Lab Results   Component Value Date    EGFR 43 (L) 2022    EGFR 47 2022    EGFR 41 2022    CREATININE 1.27 (H) 2022    CREATININE 1.10 2022    CREATININE 1.25 2022   Monitor labs every 6 mos or so - BW order given, importance of BP/BS control and keeping hydrated reviewed, will follow    Orders:  -     CBC and differential  -     Comprehensive metabolic panel  -     Hemoglobin A1C  -     Lipid panel  -     TSH, 3rd generation with Free T4 reflex  -     Iron; Future  -     Ferritin; Future  -     Vitamin B12  -     Vitamin D 25 hydroxy; Future  -     Iron  -     Ferritin  -     Vitamin D 25 hydroxy    5. Hypothyroidism, unspecified type  Assessment & Plan:  Due for labs - BW order given, con't current levothyroxine for now    Orders:  -     CBC and differential  -     Comprehensive metabolic panel  -     Hemoglobin A1C  -     Lipid panel  -     TSH, 3rd generation with Free T4 reflex  -     Iron; Future  -     Ferritin; Future  -     Vitamin B12  -     Vitamin D 25 hydroxy; Future  -     Iron  -     Ferritin  -     Vitamin D 25 hydroxy    6. IFG (impaired fasting glucose)  Assessment & Plan:  Due for BW - BW order given, will follow    Orders:  -     CBC and differential  -     Comprehensive metabolic panel  -     Hemoglobin A1C  -     Lipid panel  -     TSH, 3rd generation with Free T4 reflex  -     Iron; Future  -     Ferritin; Future  -     Vitamin B12  -     Vitamin D 25 hydroxy; Future  -     Iron  -     Ferritin  -     Vitamin D 25 hydroxy    7. Primary hypertension  Assessment & Plan:  BP at goal, con't current meds, due for BW - order given, will follow    Orders:  -     CBC and differential  -     Comprehensive metabolic panel  -     Hemoglobin A1C  -     Lipid panel  -     TSH, 3rd generation with Free T4 reflex  -     Iron; Future  -     Ferritin; Future  -     Vitamin B12  -     Vitamin D 25 hydroxy; Future  -     Iron  -     Ferritin  -     Vitamin D 25 hydroxy    8. Dyslipidemia  Assessment & Plan:  Due for BW - order given, will follow    Orders:  -     CBC and differential  -     Comprehensive metabolic panel  -     Hemoglobin A1C  -     Lipid panel  -     TSH, 3rd generation with Free T4 reflex  -     Iron; Future  -     Ferritin;  Future  - Vitamin B12  -     Vitamin D 25 hydroxy; Future  -     Iron  -     Ferritin  -     Vitamin D 25 hydroxy    9. Hip pain, acute, left  Comments:  Fall 2-3 wks ago - likely contusion but very tender to ROM - will check L hip Xray, will follow  Orders:  -     XR hip/pelv 2-3 vws left if performed; Future; Expected date: 07/14/2023      Mammo 11/22    Dexa 11/21 - osteopenia     BW 11/22 (A1C 6.2)        Subjective      HPI Pt here for an acute visit    She is noting a lot of issues with depression. She is feeling down/sad but denies SI. She is anxious and stressed mood. She is not able to id any specific trigger or issues making mood worse. She is not sleeping well. She notes appetite is "iffy". She is taking her WBXL 450 mg but admits she is missing doses intermittently as it can make her constipated. She has not been taking the Trazodone. Notes no SE but didn't feel 25 mg was helping - never tried 50 mg. She had a fall 2-3 wks ago and landed on her L side. Has had L hip and low back pain. She had some bruising but it resolved. Review of Systems   Constitutional: Positive for appetite change and fatigue. Negative for chills and fever. HENT: Positive for sore throat. Negative for congestion and trouble swallowing. Eyes: Negative for pain and visual disturbance. Respiratory: Positive for shortness of breath. Negative for cough. Cardiovascular: Positive for chest pain and palpitations. Negative for leg swelling. Gastrointestinal: Positive for constipation and nausea. Negative for abdominal pain, diarrhea and vomiting. Genitourinary: Negative for difficulty urinating and dysuria. Musculoskeletal: Positive for arthralgias, back pain and gait problem. Negative for neck pain. Skin: Negative for rash and wound. Neurological: Positive for dizziness and headaches. Hematological: Does not bruise/bleed easily.    Psychiatric/Behavioral: Positive for confusion, dysphoric mood and sleep disturbance. Negative for suicidal ideas. The patient is nervous/anxious.         Current Outpatient Medications on File Prior to Visit   Medication Sig   • albuterol (PROVENTIL HFA,VENTOLIN HFA) 90 mcg/act inhaler INHALE 2 PUFFS BY MOUTH EVERY 6 HOURS AS NEEDED FOR WHEEZING OR SHORTNESS OF BREATH   • albuterol (Ventolin HFA) 90 mcg/act inhaler Inhale 2 puffs every 4 (four) hours as needed for wheezing or shortness of breath   • alendronate (FOSAMAX) 70 mg tablet Take 1 tablet by mouth once a week   • atorvastatin (LIPITOR) 20 mg tablet Take 1 tablet by mouth once daily   • bimatoprost (LUMIGAN) 0.01 % ophthalmic drops Administer 1 drop to both eyes daily (Patient taking differently: Administer 1 drop to both eyes 2 (two) times a day)   • buPROPion (WELLBUTRIN XL) 150 mg 24 hr tablet TAKE 1 TABLET BY MOUTH IN THE MORNING, ALONG WITH  MG DOSE (450 MG TOTAL)   • gabapentin (NEURONTIN) 800 mg tablet TAKE 1 TABLET BY MOUTH ONCE DAILY IN THE MORNING AND 1 TABLET AT BEDTIME   • glycopyrrolate (ROBINUL) 2 MG tablet Take 1 tablet by mouth twice daily   • losartan (COZAAR) 100 MG tablet Take 1 tablet by mouth once daily   • montelukast (SINGULAIR) 10 mg tablet TAKE 1 TABLET BY MOUTH ONCE DAILY AT BEDTIME   • omeprazole (PriLOSEC) 40 MG capsule Take 1 capsule (40 mg total) by mouth daily   • Synthroid 88 MCG tablet Take 1 tablet by mouth once daily   • Timolol Maleate 0.5 % (DAILY) SOLN Apply 1 drop to eye every 12 (twelve) hours   • tiZANidine (ZANAFLEX) 2 mg tablet TAKE 1 TABLET BY MOUTH EVERY 8 HOURS AS NEEDED FOR MUSCLE SPASM   • [DISCONTINUED] buPROPion (WELLBUTRIN XL) 300 mg 24 hr tablet Take 1 tablet by mouth once daily   • [DISCONTINUED] traZODone (DESYREL) 50 mg tablet Take 0.5 tablets (25 mg total) by mouth daily at bedtime as needed for sleep (Patient not taking: Reported on 7/5/2023)   • [DISCONTINUED] zolpidem (AMBIEN) 5 mg tablet Take 1 tablet (5 mg total) by mouth as needed for sleep (for use during sleep study) for up to 1 day (Patient not taking: Reported on 7/5/2023)       Objective     /60   Pulse 74   Temp (!) 96.5 °F (35.8 °C) (Tympanic)   Ht 5' (1.524 m)   Wt 75.5 kg (166 lb 6.4 oz)   BMI 32.50 kg/m²     Physical Exam  Vitals and nursing note reviewed. Constitutional:       General: She is not in acute distress. Appearance: She is well-developed. She is not ill-appearing. HENT:      Head: Normocephalic and atraumatic. Eyes:      General:         Right eye: No discharge. Left eye: No discharge. Conjunctiva/sclera: Conjunctivae normal.   Neck:      Trachea: No tracheal deviation. Cardiovascular:      Rate and Rhythm: Normal rate and regular rhythm. Heart sounds: Normal heart sounds. No murmur heard. No friction rub. Pulmonary:      Effort: Pulmonary effort is normal. No respiratory distress. Breath sounds: Normal breath sounds. No wheezing, rhonchi or rales. Abdominal:      General: There is no distension. Palpations: Abdomen is soft. Tenderness: There is no abdominal tenderness. There is no guarding or rebound. Musculoskeletal:         General: Tenderness present. Cervical back: Neck supple. Comments: L hip tender to palp, + pain with flex/internal and external rotation   Skin:     General: Skin is warm. Coloration: Skin is not pale. Findings: No bruising or rash. Neurological:      General: No focal deficit present. Mental Status: She is alert. Motor: No abnormal muscle tone. Gait: Gait abnormal.   Psychiatric:         Behavior: Behavior normal.         Thought Content:  Thought content normal.      Comments: Flat affect       Pedro Yu DO

## 2023-07-14 NOTE — ASSESSMENT & PLAN NOTE
Not at goal today and admittedly is missing doses of WBXL - urged to take daily, will restart Trazodone as well, will follow

## 2023-07-14 NOTE — ASSESSMENT & PLAN NOTE
Mood not at goal - admittedly is missing days of WBXL - importance of taking daily and SE of stopping abruptly reviewed, will restart Trazodone 50 mg every night, re-eval in 4 wks or s

## 2023-07-14 NOTE — ASSESSMENT & PLAN NOTE
Lab Results   Component Value Date    EGFR 43 (L) 11/29/2022    EGFR 47 09/23/2022    EGFR 41 07/09/2022    CREATININE 1.27 (H) 11/29/2022    CREATININE 1.10 09/23/2022    CREATININE 1.25 07/09/2022   Monitor labs every 6 mos or so - BW order given, importance of BP/BS control and keeping hydrated reviewed, will follow

## 2023-07-15 LAB
25(OH)D3+25(OH)D2 SERPL-MCNC: 35 NG/ML (ref 30–100)
ALBUMIN SERPL-MCNC: 4.1 G/DL (ref 3.8–4.8)
ALBUMIN/GLOB SERPL: 1.8 {RATIO} (ref 1.2–2.2)
ALP SERPL-CCNC: 115 IU/L (ref 44–121)
ALT SERPL-CCNC: 13 IU/L (ref 0–32)
AST SERPL-CCNC: 16 IU/L (ref 0–40)
BASOPHILS # BLD AUTO: 0 X10E3/UL (ref 0–0.2)
BASOPHILS NFR BLD AUTO: 1 %
BILIRUB SERPL-MCNC: 0.2 MG/DL (ref 0–1.2)
BUN SERPL-MCNC: 22 MG/DL (ref 8–27)
BUN/CREAT SERPL: 22 (ref 12–28)
CALCIUM SERPL-MCNC: 9.6 MG/DL (ref 8.7–10.3)
CHLORIDE SERPL-SCNC: 111 MMOL/L (ref 96–106)
CHOLEST SERPL-MCNC: 103 MG/DL (ref 100–199)
CHOLEST/HDLC SERPL: 2.4 RATIO (ref 0–4.4)
CO2 SERPL-SCNC: 21 MMOL/L (ref 20–29)
CREAT SERPL-MCNC: 1 MG/DL (ref 0.57–1)
EGFR: 57 ML/MIN/1.73
EOSINOPHIL # BLD AUTO: 0.5 X10E3/UL (ref 0–0.4)
EOSINOPHIL NFR BLD AUTO: 7 %
ERYTHROCYTE [DISTWIDTH] IN BLOOD BY AUTOMATED COUNT: 12.9 % (ref 11.7–15.4)
EST. AVERAGE GLUCOSE BLD GHB EST-MCNC: 120 MG/DL
FERRITIN SERPL-MCNC: 60 NG/ML (ref 15–150)
GLOBULIN SER-MCNC: 2.3 G/DL (ref 1.5–4.5)
GLUCOSE SERPL-MCNC: 89 MG/DL (ref 70–99)
HBA1C MFR BLD: 5.8 % (ref 4.8–5.6)
HCT VFR BLD AUTO: 33.9 % (ref 34–46.6)
HDLC SERPL-MCNC: 43 MG/DL
HGB BLD-MCNC: 11.3 G/DL (ref 11.1–15.9)
IMM GRANULOCYTES # BLD: 0 X10E3/UL (ref 0–0.1)
IMM GRANULOCYTES NFR BLD: 0 %
IRON SERPL-MCNC: 45 UG/DL (ref 27–139)
LDLC SERPL CALC-MCNC: 46 MG/DL (ref 0–99)
LYMPHOCYTES # BLD AUTO: 1.8 X10E3/UL (ref 0.7–3.1)
LYMPHOCYTES NFR BLD AUTO: 26 %
MCH RBC QN AUTO: 31.3 PG (ref 26.6–33)
MCHC RBC AUTO-ENTMCNC: 33.3 G/DL (ref 31.5–35.7)
MCV RBC AUTO: 94 FL (ref 79–97)
MONOCYTES # BLD AUTO: 0.6 X10E3/UL (ref 0.1–0.9)
MONOCYTES NFR BLD AUTO: 9 %
NEUTROPHILS # BLD AUTO: 4 X10E3/UL (ref 1.4–7)
NEUTROPHILS NFR BLD AUTO: 57 %
PLATELET # BLD AUTO: 198 X10E3/UL (ref 150–450)
POTASSIUM SERPL-SCNC: 4.6 MMOL/L (ref 3.5–5.2)
PROT SERPL-MCNC: 6.4 G/DL (ref 6–8.5)
RBC # BLD AUTO: 3.61 X10E6/UL (ref 3.77–5.28)
SL AMB VLDL CHOLESTEROL CALC: 14 MG/DL (ref 5–40)
SODIUM SERPL-SCNC: 144 MMOL/L (ref 134–144)
TRIGL SERPL-MCNC: 63 MG/DL (ref 0–149)
TSH SERPL DL<=0.005 MIU/L-ACNC: 1.8 UIU/ML (ref 0.45–4.5)
VIT B12 SERPL-MCNC: 133 PG/ML (ref 232–1245)
WBC # BLD AUTO: 6.9 X10E3/UL (ref 3.4–10.8)

## 2023-07-17 ENCOUNTER — OFFICE VISIT (OUTPATIENT)
Dept: FAMILY MEDICINE CLINIC | Facility: HOSPITAL | Age: 78
End: 2023-07-17
Payer: COMMERCIAL

## 2023-07-17 VITALS
HEIGHT: 60 IN | TEMPERATURE: 97.1 F | WEIGHT: 167.4 LBS | BODY MASS INDEX: 32.86 KG/M2 | DIASTOLIC BLOOD PRESSURE: 64 MMHG | HEART RATE: 68 BPM | SYSTOLIC BLOOD PRESSURE: 148 MMHG

## 2023-07-17 DIAGNOSIS — I10 PRIMARY HYPERTENSION: ICD-10-CM

## 2023-07-17 DIAGNOSIS — Z12.31 ENCOUNTER FOR SCREENING MAMMOGRAM FOR MALIGNANT NEOPLASM OF BREAST: ICD-10-CM

## 2023-07-17 DIAGNOSIS — E66.09 CLASS 1 OBESITY DUE TO EXCESS CALORIES WITH SERIOUS COMORBIDITY AND BODY MASS INDEX (BMI) OF 32.0 TO 32.9 IN ADULT: ICD-10-CM

## 2023-07-17 DIAGNOSIS — N18.31 CHRONIC KIDNEY DISEASE, STAGE 3A (HCC): ICD-10-CM

## 2023-07-17 DIAGNOSIS — R73.01 IFG (IMPAIRED FASTING GLUCOSE): Primary | ICD-10-CM

## 2023-07-17 DIAGNOSIS — E03.9 HYPOTHYROIDISM, UNSPECIFIED TYPE: ICD-10-CM

## 2023-07-17 DIAGNOSIS — E28.39 MENOPAUSE OVARIAN FAILURE: ICD-10-CM

## 2023-07-17 DIAGNOSIS — G47.00 INSOMNIA, UNSPECIFIED TYPE: ICD-10-CM

## 2023-07-17 DIAGNOSIS — E53.8 VITAMIN B12 DEFICIENCY: ICD-10-CM

## 2023-07-17 DIAGNOSIS — E78.5 DYSLIPIDEMIA: ICD-10-CM

## 2023-07-17 PROCEDURE — 99214 OFFICE O/P EST MOD 30 MIN: CPT | Performed by: INTERNAL MEDICINE

## 2023-07-17 RX ORDER — LANOLIN ALCOHOL/MO/W.PET/CERES
1000 CREAM (GRAM) TOPICAL DAILY
Qty: 30 TABLET | Refills: 5 | Status: SHIPPED | OUTPATIENT
Start: 2023-07-17

## 2023-07-17 RX ORDER — MELATONIN
1000 DAILY
COMMUNITY

## 2023-07-17 NOTE — ASSESSMENT & PLAN NOTE
FBS wnl but A1C still in IFG range but improved, urged to watch sugars/carbs and start regular exercise, recheck in 6 mo - BW order given

## 2023-07-17 NOTE — ASSESSMENT & PLAN NOTE
LDL at goal but HDL still low, con't current statin, urged to start regular exercise, recheck FLP annually

## 2023-07-17 NOTE — PROGRESS NOTES
Name: Anali Conley      : 1945      MRN: [id number]  Encounter Provider: Yoon Hahn DO  Encounter Date: 2023   Encounter department: [address]     1. IFG (impaired fasting glucose)  Assessment & Plan:  FBS wnl but A1C still in IFG range but improved, urged to watch sugars/carbs and start regular exercise, recheck in 6 mo - BW order given    Orders:  -     Hemoglobin A1C  -     Basic metabolic panel; Future  -     Basic metabolic panel    2. Hypothyroidism, unspecified type  Assessment & Plan:  TSH at goal, cont' current levothyroxine, recheck TFT's annually      3. Dyslipidemia  Assessment & Plan:  LDL at goal but HDL still low, con't current statin, urged to start regular exercise, recheck FLP annually      4. Vitamin B12 deficiency  Assessment & Plan:  Start PO B12 1000 mcg 1 tab daily, recheck levels in 6 mos - if still low will need to consider injections    Orders:  -     vitamin B-12 (VITAMIN B-12) 1,000 mcg tablet; Take 1 tablet (1,000 mcg total) by mouth daily  -     Vitamin B12    5. Chronic kidney disease, stage 3a [facility])  Assessment & Plan:  Lab Results   Component Value Date    EGFR 57 (L) 2023    EGFR 43 (L) 2022    EGFR 47 2022    CREATININE 1.00 2023    CREATININE 1.27 (H) 2022    CREATININE 1.10 2022   Def of CKD reviewed, urged BP/BS control as well as avoid NSAIDs and dehydration, recheck in 6 mos - BW order given      6. Primary hypertension  Assessment & Plan:  BP a bit elevated, con't current regimen, recheck in 6 mos, urged regular exercise and watch sodium in diet      7. Insomnia, unspecified type  Assessment & Plan:  Doing much better with Trazodone, con't current regimen, recheck in 6 mos or sooner if needed - call with new/worse symptoms      8.  Class 1 obesity due to excess calories with serious comorbidity and body mass index (BMI) of 32.0 to 32.9 in adult  Comments:  healthy diet/regular exercise/healthy wgt encouraged    9. Encounter for screening mammogram for malignant neoplasm of breast  -     Mammo screening bilateral w 3d & cad; Future; Expected date: 11/22/2023    10. Menopause ovarian failure  -     DXA bone density spine hip and pelvis; Future; Expected date: 11/18/2023    BMI Counseling: Body mass index is 32.69 kg/m². The BMI is above normal. Nutrition recommendations include decreasing portion sizes, encouraging healthy choices of fruits and vegetables, consuming healthier snacks, moderation in carbohydrate intake, increasing intake of lean protein, reducing intake of saturated and trans fat and reducing intake of cholesterol. Exercise recommendations include exercising 3-5 times per week. No pharmacotherapy was ordered. Rationale for BMI follow-up plan is due to patient being overweight or obese. Mammo 11/22 - order for 2023 given    Dexa 11/21 - osteopenia - on Fosamax - order for 2023 given    BW 7/23         Subjective      HPI Pt here for follow up appt and BW results    BW results were d/w pt in detail: FBS/A1C 89/5.8, BUN/Cr 22/1.00 (GFR 57), Cl 111, H/H 11.3/33.9 (MCV 94), HDL 43, rest of CMP/CBC/TSH/FLP/Vit D/iron/ferritin/Vit D were all wnl, B12 was low at 133. Def of nml vs IFG vs DM was d/w pt in detail. Diet/exercise was reviewed - wgt down 1 lb from Sept 22. BMI reviewed. She has a sweet tooth and eats a lot of rice. She feels she does good with fruits and veggies but is not getting 3-5 servings - closer to 2 a day. She does no formal exercise. Pt is taking their thyroid medication daily w/o any other meds and prior to eating. They deny any significant wgt changes/C/D/tremor/palp/hair loss or skin changes. She has fatigue. Goal FLP was d/w pt in detail. Diet/exercise reviewed as noted above. She is taking her Atorvastatin daily as directed w/o Se. She notes no stroke/TIA symptoms/CP. Nml range for H/H reviewed.   Nml iron/ferritin reviewed. Low B12 reviewed. Current supplements reviewed. CKD stage 3 reviewed. Again importance of BP/BS control reviewed. She is on an ARB. BP a bit above goal today and meds were reviewed and no changes have occurred. She denies missing doses of meds or SE with the meds. She does not check her BP outside the office. She notes no frequent Ha's/dizziness/double vision/CP. Pt saw Cardio (Dr. Rafy Snow) early July 23 for f/u HTN/MVR/RBBB/AV dz - OV note reviewed. ECG was done in office. No meds were changed. She was told to f/u in 6 mos and stated Echo would be checked at next appt. Pt has started taking Trazodone 50 mg 1 tab qhs w/o SE. She is sleeping much better and feeling much better. Review of Systems   Constitutional: Positive for fatigue. Negative for chills and fever. HENT: Negative for congestion and sore throat. Eyes: Negative for pain and visual disturbance. Respiratory: Negative for cough, shortness of breath and wheezing. Cardiovascular: Negative for chest pain and palpitations. Gastrointestinal: Negative for abdominal pain, constipation, diarrhea, nausea and vomiting. Genitourinary: Negative for difficulty urinating and dysuria. Musculoskeletal: Positive for arthralgias, back pain and gait problem. Skin: Negative for rash and wound. Neurological: Negative for dizziness, light-headedness and headaches. Hematological: Does not bruise/bleed easily. Psychiatric/Behavioral: Positive for dysphoric mood. Negative for confusion and sleep disturbance.        Current Outpatient Medications on File Prior to Visit   Medication Sig   • cholecalciferol (VITAMIN D3) 1,000 units tablet Take 1,000 Units by mouth daily   • albuterol (PROVENTIL HFA,VENTOLIN HFA) 90 mcg/act inhaler INHALE 2 PUFFS BY MOUTH EVERY 6 HOURS AS NEEDED FOR WHEEZING OR SHORTNESS OF BREATH   • albuterol (Ventolin HFA) 90 mcg/act inhaler Inhale 2 puffs every 4 (four) hours as needed for wheezing or shortness of breath   • alendronate (FOSAMAX) 70 mg tablet Take 1 tablet by mouth once a week   • atorvastatin (LIPITOR) 20 mg tablet Take 1 tablet by mouth once daily   • bimatoprost (LUMIGAN) 0.01 % ophthalmic drops Administer 1 drop to both eyes daily (Patient taking differently: Administer 1 drop to both eyes 2 (two) times a day)   • buPROPion (WELLBUTRIN XL) 150 mg 24 hr tablet TAKE 1 TABLET BY MOUTH IN THE MORNING, ALONG WITH  MG DOSE (450 MG TOTAL)   • buPROPion (WELLBUTRIN XL) 300 mg 24 hr tablet Take 1 tablet (300 mg total) by mouth daily   • gabapentin (NEURONTIN) 800 mg tablet TAKE 1 TABLET BY MOUTH ONCE DAILY IN THE MORNING AND 1 TABLET AT BEDTIME   • glycopyrrolate (ROBINUL) 2 MG tablet Take 1 tablet by mouth twice daily   • losartan (COZAAR) 100 MG tablet Take 1 tablet by mouth once daily   • montelukast (SINGULAIR) 10 mg tablet TAKE 1 TABLET BY MOUTH ONCE DAILY AT BEDTIME   • omeprazole (PriLOSEC) 40 MG capsule Take 1 capsule (40 mg total) by mouth daily   • Synthroid 88 MCG tablet Take 1 tablet by mouth once daily   • Timolol Maleate 0.5 % (DAILY) SOLN Apply 1 drop to eye every 12 (twelve) hours   • tiZANidine (ZANAFLEX) 2 mg tablet TAKE 1 TABLET BY MOUTH EVERY 8 HOURS AS NEEDED FOR MUSCLE SPASM   • traZODone (DESYREL) 50 mg tablet Take 1 tablet (50 mg total) by mouth daily at bedtime       Objective     /64   Pulse 68   Temp (!) 97.1 °F (36.2 °C) (Tympanic)   Ht 5' (1.524 m)   Wt 75.9 kg (167 lb 6.4 oz)   BMI 32.69 kg/m²     Physical Exam  Vitals and nursing note reviewed. Constitutional:       General: She is not in acute distress. Appearance: She is well-developed. She is not ill-appearing. HENT:      Head: Normocephalic and atraumatic. Eyes:      General:         Right eye: No discharge. Left eye: No discharge. Conjunctiva/sclera: Conjunctivae normal.   Neck:      Trachea: No tracheal deviation.    Cardiovascular:      Rate and Rhythm: Normal rate and regular rhythm. Heart sounds: Murmur heard. No friction rub. Pulmonary:      Effort: Pulmonary effort is normal. No respiratory distress. Breath sounds: Normal breath sounds. No wheezing, rhonchi or rales. Musculoskeletal:      Cervical back: Neck supple. Right lower leg: No edema. Left lower leg: No edema. Skin:     General: Skin is warm. Coloration: Skin is not pale. Findings: No bruising or rash. Neurological:      General: No focal deficit present. Mental Status: She is alert. Motor: No abnormal muscle tone. Gait: Gait normal.   Psychiatric:         Behavior: Behavior normal.         Thought Content:  Thought content normal.         Judgment: Judgment normal.       Eliza Malcolm, DO

## 2023-07-17 NOTE — ASSESSMENT & PLAN NOTE
Lab Results   Component Value Date    EGFR 57 (L) 07/14/2023    EGFR 43 (L) 11/29/2022    EGFR 47 09/23/2022    CREATININE 1.00 07/14/2023    CREATININE 1.27 (H) 11/29/2022    CREATININE 1.10 09/23/2022   Def of CKD reviewed, urged BP/BS control as well as avoid NSAIDs and dehydration, recheck in 6 mos - BW order given

## 2023-07-17 NOTE — ASSESSMENT & PLAN NOTE
BP a bit elevated, con't current regimen, recheck in 6 mos, urged regular exercise and watch sodium in diet

## 2023-07-17 NOTE — ASSESSMENT & PLAN NOTE
Doing much better with Trazodone, con't current regimen, recheck in 6 mos or sooner if needed - call with new/worse symptoms

## 2023-07-17 NOTE — ASSESSMENT & PLAN NOTE
Dr. Dhillon called back situation discussed with him and states that it will fine for pt to use silver nitrate, monocor acidic acid, solicylic acid in treatment of plantar wart. Per Dr. Dhillon advise pt that solicylic acid will burn skin for a few minutes but will go away.     Called pt name and  verified pt informed of Dr. Dhillon response and recommendation. Pt verbalizes understanding and has no other questions. Advised pt to f/u with MD providing rx and care for plantar wart and f/u with Dr. melchor for PRN care as scheduled for 2020 pt verbalizes understanding and has no other questions.    Start PO B12 1000 mcg 1 tab daily, recheck levels in 6 mos - if still low will need to consider injections

## 2023-07-19 ENCOUNTER — HOSPITAL ENCOUNTER (OUTPATIENT)
Dept: RADIOLOGY | Facility: HOSPITAL | Age: 78
Discharge: HOME | End: 2023-07-19
Payer: COMMERCIAL

## 2023-07-19 DIAGNOSIS — M25.552 HIP PAIN, ACUTE, LEFT: ICD-10-CM

## 2023-07-19 PROCEDURE — 73502 X-RAY EXAM HIP UNI 2-3 VIEWS: CPT

## 2023-08-05 DIAGNOSIS — I34.0 MITRAL VALVE INSUFFICIENCY, UNSPECIFIED ETIOLOGY: ICD-10-CM

## 2023-08-05 DIAGNOSIS — E78.00 PURE HYPERCHOLESTEROLEMIA: ICD-10-CM

## 2023-08-05 DIAGNOSIS — M81.0 OSTEOPOROSIS, UNSPECIFIED OSTEOPOROSIS TYPE, UNSPECIFIED PATHOLOGICAL FRACTURE PRESENCE: ICD-10-CM

## 2023-08-05 DIAGNOSIS — G89.29 CHRONIC BILATERAL LOW BACK PAIN WITH LEFT-SIDED SCIATICA: ICD-10-CM

## 2023-08-05 DIAGNOSIS — J45.20 MILD INTERMITTENT ASTHMA WITHOUT COMPLICATION: ICD-10-CM

## 2023-08-05 DIAGNOSIS — J45.909 UNCOMPLICATED ASTHMA, UNSPECIFIED ASTHMA SEVERITY, UNSPECIFIED WHETHER PERSISTENT: ICD-10-CM

## 2023-08-05 DIAGNOSIS — I35.9 AORTIC VALVE DISEASE: ICD-10-CM

## 2023-08-05 DIAGNOSIS — G89.4 CHRONIC PAIN SYNDROME: ICD-10-CM

## 2023-08-05 DIAGNOSIS — I10 ESSENTIAL (PRIMARY) HYPERTENSION: ICD-10-CM

## 2023-08-05 DIAGNOSIS — M54.42 CHRONIC BILATERAL LOW BACK PAIN WITH LEFT-SIDED SCIATICA: ICD-10-CM

## 2023-08-05 DIAGNOSIS — K21.9 GERD WITHOUT ESOPHAGITIS: ICD-10-CM

## 2023-08-05 DIAGNOSIS — H40.9 GLAUCOMA, UNSPECIFIED GLAUCOMA TYPE, UNSPECIFIED LATERALITY: ICD-10-CM

## 2023-08-05 DIAGNOSIS — E03.9 HYPOTHYROIDISM, UNSPECIFIED TYPE: ICD-10-CM

## 2023-08-05 DIAGNOSIS — I45.10 RBBB: ICD-10-CM

## 2023-08-07 RX ORDER — LOSARTAN POTASSIUM 100 MG/1
100 TABLET ORAL DAILY
Qty: 90 TABLET | Refills: 0 | Status: SHIPPED | OUTPATIENT
Start: 2023-08-07

## 2023-08-08 RX ORDER — BIMATOPROST 0.1 MG/ML
1 SOLUTION/ DROPS OPHTHALMIC DAILY
Qty: 5 ML | Refills: 0 | Status: SHIPPED | OUTPATIENT
Start: 2023-08-08

## 2023-08-08 RX ORDER — ALENDRONATE SODIUM 70 MG/1
70 TABLET ORAL WEEKLY
Qty: 4 TABLET | Refills: 0 | Status: SHIPPED | OUTPATIENT
Start: 2023-08-08

## 2023-08-08 RX ORDER — OMEPRAZOLE 40 MG/1
40 CAPSULE, DELAYED RELEASE ORAL DAILY
Qty: 90 CAPSULE | Refills: 0 | Status: SHIPPED | OUTPATIENT
Start: 2023-08-08

## 2023-08-08 RX ORDER — GLYCOPYRROLATE 2 MG/1
2 TABLET ORAL 2 TIMES DAILY
Qty: 60 TABLET | Refills: 0 | Status: SHIPPED | OUTPATIENT
Start: 2023-08-08

## 2023-08-08 RX ORDER — ALBUTEROL SULFATE 90 UG/1
2 AEROSOL, METERED RESPIRATORY (INHALATION) EVERY 6 HOURS PRN
Qty: 18 G | Refills: 0 | Status: SHIPPED | OUTPATIENT
Start: 2023-08-08

## 2023-08-08 RX ORDER — LEVOTHYROXINE SODIUM 88 MCG
88 TABLET ORAL DAILY
Qty: 30 TABLET | Refills: 0 | Status: SHIPPED | OUTPATIENT
Start: 2023-08-08

## 2023-08-08 RX ORDER — GABAPENTIN 800 MG/1
TABLET ORAL
Qty: 180 TABLET | Refills: 0 | Status: SHIPPED | OUTPATIENT
Start: 2023-08-08

## 2023-08-08 RX ORDER — TIZANIDINE 2 MG/1
2 TABLET ORAL EVERY 8 HOURS PRN
Qty: 30 TABLET | Refills: 0 | Status: SHIPPED | OUTPATIENT
Start: 2023-08-08

## 2023-08-08 RX ORDER — TIMOLOL MALEATE 6.8 MG/ML
1 SOLUTION/ DROPS OPHTHALMIC EVERY 12 HOURS
Qty: 5 ML | Refills: 0 | Status: SHIPPED | OUTPATIENT
Start: 2023-08-08

## 2023-08-08 RX ORDER — MONTELUKAST SODIUM 10 MG/1
10 TABLET ORAL
Qty: 30 TABLET | Refills: 0 | Status: SHIPPED | OUTPATIENT
Start: 2023-08-08

## 2023-08-14 ENCOUNTER — TELEPHONE (OUTPATIENT)
Dept: FAMILY MEDICINE CLINIC | Facility: HOSPITAL | Age: 78
End: 2023-08-14

## 2023-08-31 DIAGNOSIS — K21.9 GERD WITHOUT ESOPHAGITIS: ICD-10-CM

## 2023-08-31 DIAGNOSIS — M81.0 OSTEOPOROSIS, UNSPECIFIED OSTEOPOROSIS TYPE, UNSPECIFIED PATHOLOGICAL FRACTURE PRESENCE: ICD-10-CM

## 2023-08-31 DIAGNOSIS — M54.42 CHRONIC BILATERAL LOW BACK PAIN WITH LEFT-SIDED SCIATICA: ICD-10-CM

## 2023-08-31 DIAGNOSIS — E03.9 HYPOTHYROIDISM, UNSPECIFIED TYPE: ICD-10-CM

## 2023-08-31 DIAGNOSIS — G89.29 CHRONIC BILATERAL LOW BACK PAIN WITH LEFT-SIDED SCIATICA: ICD-10-CM

## 2023-08-31 DIAGNOSIS — J45.909 UNCOMPLICATED ASTHMA, UNSPECIFIED ASTHMA SEVERITY, UNSPECIFIED WHETHER PERSISTENT: ICD-10-CM

## 2023-08-31 DIAGNOSIS — F32.A ANXIETY AND DEPRESSION: ICD-10-CM

## 2023-08-31 DIAGNOSIS — J45.20 MILD INTERMITTENT ASTHMA WITHOUT COMPLICATION: ICD-10-CM

## 2023-08-31 DIAGNOSIS — F41.9 ANXIETY AND DEPRESSION: ICD-10-CM

## 2023-08-31 DIAGNOSIS — H40.9 GLAUCOMA, UNSPECIFIED GLAUCOMA TYPE, UNSPECIFIED LATERALITY: ICD-10-CM

## 2023-08-31 DIAGNOSIS — G47.00 INSOMNIA, UNSPECIFIED TYPE: ICD-10-CM

## 2023-08-31 DIAGNOSIS — G89.4 CHRONIC PAIN SYNDROME: ICD-10-CM

## 2023-08-31 RX ORDER — BUPROPION HYDROCHLORIDE 150 MG/1
150 TABLET ORAL EVERY MORNING
Qty: 90 TABLET | Refills: 1 | Status: SHIPPED | OUTPATIENT
Start: 2023-08-31

## 2023-08-31 RX ORDER — OMEPRAZOLE 40 MG/1
40 CAPSULE, DELAYED RELEASE ORAL DAILY
Qty: 90 CAPSULE | Refills: 1 | Status: SHIPPED | OUTPATIENT
Start: 2023-08-31

## 2023-08-31 RX ORDER — BIMATOPROST 0.1 MG/ML
1 SOLUTION/ DROPS OPHTHALMIC DAILY
Qty: 5 ML | Refills: 0 | OUTPATIENT
Start: 2023-08-31

## 2023-08-31 RX ORDER — GLYCOPYRROLATE 2 MG/1
2 TABLET ORAL 2 TIMES DAILY
Qty: 180 TABLET | Refills: 1 | Status: SHIPPED | OUTPATIENT
Start: 2023-08-31

## 2023-08-31 RX ORDER — MONTELUKAST SODIUM 10 MG/1
10 TABLET ORAL
Qty: 90 TABLET | Refills: 1 | Status: SHIPPED | OUTPATIENT
Start: 2023-08-31

## 2023-08-31 RX ORDER — LEVOTHYROXINE SODIUM 88 MCG
88 TABLET ORAL DAILY
Qty: 90 TABLET | Refills: 1 | Status: SHIPPED | OUTPATIENT
Start: 2023-08-31

## 2023-08-31 RX ORDER — ALBUTEROL SULFATE 90 UG/1
2 AEROSOL, METERED RESPIRATORY (INHALATION) EVERY 6 HOURS PRN
Qty: 18 G | Refills: 5 | Status: SHIPPED | OUTPATIENT
Start: 2023-08-31

## 2023-08-31 RX ORDER — ALENDRONATE SODIUM 70 MG/1
70 TABLET ORAL WEEKLY
Qty: 12 TABLET | Refills: 3 | Status: SHIPPED | OUTPATIENT
Start: 2023-08-31

## 2023-08-31 RX ORDER — TRAZODONE HYDROCHLORIDE 50 MG/1
50 TABLET ORAL
Qty: 30 TABLET | Refills: 0 | Status: SHIPPED | OUTPATIENT
Start: 2023-08-31

## 2023-08-31 RX ORDER — GABAPENTIN 800 MG/1
TABLET ORAL
Qty: 180 TABLET | Refills: 1 | Status: SHIPPED | OUTPATIENT
Start: 2023-08-31

## 2023-08-31 RX ORDER — TIZANIDINE 2 MG/1
2 TABLET ORAL EVERY 8 HOURS PRN
Qty: 30 TABLET | Refills: 0 | Status: SHIPPED | OUTPATIENT
Start: 2023-08-31

## 2023-08-31 NOTE — TELEPHONE ENCOUNTER
Please notify pt that her eye drops need to be refilled through eye doc.   All other med refills approved

## 2023-09-05 DIAGNOSIS — I34.0 MITRAL VALVE INSUFFICIENCY, UNSPECIFIED ETIOLOGY: ICD-10-CM

## 2023-09-05 DIAGNOSIS — I45.10 RBBB: ICD-10-CM

## 2023-09-05 DIAGNOSIS — I10 ESSENTIAL (PRIMARY) HYPERTENSION: ICD-10-CM

## 2023-09-05 DIAGNOSIS — E78.00 PURE HYPERCHOLESTEROLEMIA: ICD-10-CM

## 2023-09-05 DIAGNOSIS — I35.9 AORTIC VALVE DISEASE: ICD-10-CM

## 2023-09-05 RX ORDER — LOSARTAN POTASSIUM 100 MG/1
100 TABLET ORAL DAILY
Qty: 90 TABLET | Refills: 0 | Status: SHIPPED | OUTPATIENT
Start: 2023-09-05

## 2023-09-05 RX ORDER — LOSARTAN POTASSIUM 100 MG/1
100 TABLET ORAL DAILY
Qty: 90 TABLET | Refills: 0 | OUTPATIENT
Start: 2023-09-05

## 2023-09-12 ENCOUNTER — VBI (OUTPATIENT)
Dept: ADMINISTRATIVE | Facility: OTHER | Age: 78
End: 2023-09-12

## 2023-09-24 DIAGNOSIS — E78.5 DYSLIPIDEMIA: ICD-10-CM

## 2023-09-24 RX ORDER — ATORVASTATIN CALCIUM 20 MG/1
TABLET, FILM COATED ORAL
Qty: 90 TABLET | Refills: 0 | Status: SHIPPED | OUTPATIENT
Start: 2023-09-24

## 2023-10-07 DIAGNOSIS — F32.A ANXIETY AND DEPRESSION: ICD-10-CM

## 2023-10-07 DIAGNOSIS — F41.9 ANXIETY AND DEPRESSION: ICD-10-CM

## 2023-10-09 RX ORDER — BUPROPION HYDROCHLORIDE 300 MG/1
300 TABLET ORAL DAILY
Qty: 90 TABLET | Refills: 0 | Status: SHIPPED | OUTPATIENT
Start: 2023-10-09

## 2023-11-12 ENCOUNTER — RA CDI HCC (OUTPATIENT)
Dept: OTHER | Facility: HOSPITAL | Age: 78
End: 2023-11-12

## 2023-11-12 NOTE — PROGRESS NOTES
[address] [facility] coding opportunities       Chart reviewed, no opportunity found: [address]        Patients Insurance     Medicare Insurance: [employer]

## 2023-11-13 DIAGNOSIS — G47.00 INSOMNIA, UNSPECIFIED TYPE: ICD-10-CM

## 2023-11-13 RX ORDER — TRAZODONE HYDROCHLORIDE 50 MG/1
50 TABLET ORAL
Qty: 30 TABLET | Refills: 0 | Status: SHIPPED | OUTPATIENT
Start: 2023-11-13

## 2023-11-14 ENCOUNTER — APPOINTMENT (OUTPATIENT)
Dept: LAB | Facility: HOSPITAL | Age: 78
End: 2023-11-14
Payer: COMMERCIAL

## 2023-11-14 DIAGNOSIS — R73.01 IFG (IMPAIRED FASTING GLUCOSE): ICD-10-CM

## 2023-11-14 LAB
ANION GAP SERPL CALCULATED.3IONS-SCNC: 9 MMOL/L
BUN SERPL-MCNC: 14 MG/DL (ref 5–25)
CALCIUM SERPL-MCNC: 10.5 MG/DL (ref 8.4–10.2)
CHLORIDE SERPL-SCNC: 109 MMOL/L (ref 96–108)
CO2 SERPL-SCNC: 25 MMOL/L (ref 21–32)
CREAT SERPL-MCNC: 0.92 MG/DL (ref 0.6–1.3)
EST. AVERAGE GLUCOSE BLD GHB EST-MCNC: 123 MG/DL
GFR SERPL CREATININE-BSD FRML MDRD: 58 ML/MIN/1.73SQ M
GLUCOSE P FAST SERPL-MCNC: 96 MG/DL (ref 65–99)
HBA1C MFR BLD: 5.9 %
POTASSIUM SERPL-SCNC: 3.8 MMOL/L (ref 3.5–5.3)
PTH-INTACT SERPL-MCNC: 144.8 PG/ML (ref 12–88)
SODIUM SERPL-SCNC: 143 MMOL/L (ref 135–147)
VIT B12 SERPL-MCNC: 551 PG/ML (ref 180–914)

## 2023-11-14 PROCEDURE — 83036 HEMOGLOBIN GLYCOSYLATED A1C: CPT

## 2023-11-14 PROCEDURE — 83970 ASSAY OF PARATHORMONE: CPT

## 2023-11-14 PROCEDURE — 82607 VITAMIN B-12: CPT

## 2023-11-14 PROCEDURE — 80048 BASIC METABOLIC PNL TOTAL CA: CPT

## 2023-11-14 PROCEDURE — 36415 COLL VENOUS BLD VENIPUNCTURE: CPT

## 2023-11-15 DIAGNOSIS — E83.52 HYPERCALCEMIA: Primary | ICD-10-CM

## 2023-11-21 DIAGNOSIS — E83.52 HYPERCALCEMIA: Primary | ICD-10-CM

## 2023-11-21 LAB
CALCIUM 24H UR-MCNC: 5.2 MG/DL
CALCIUM 24H UR-MRATE: 94 MG/24 HR (ref 0–320)

## 2023-11-26 DIAGNOSIS — I45.10 RBBB: ICD-10-CM

## 2023-11-26 DIAGNOSIS — I35.9 AORTIC VALVE DISEASE: ICD-10-CM

## 2023-11-26 DIAGNOSIS — E78.5 DYSLIPIDEMIA: ICD-10-CM

## 2023-11-26 DIAGNOSIS — I34.0 MITRAL VALVE INSUFFICIENCY, UNSPECIFIED ETIOLOGY: ICD-10-CM

## 2023-11-26 DIAGNOSIS — I10 ESSENTIAL (PRIMARY) HYPERTENSION: ICD-10-CM

## 2023-11-26 DIAGNOSIS — F32.A ANXIETY AND DEPRESSION: ICD-10-CM

## 2023-11-26 DIAGNOSIS — F41.9 ANXIETY AND DEPRESSION: ICD-10-CM

## 2023-11-26 DIAGNOSIS — E78.00 PURE HYPERCHOLESTEROLEMIA: ICD-10-CM

## 2023-11-27 RX ORDER — BUPROPION HYDROCHLORIDE 300 MG/1
300 TABLET ORAL DAILY
Qty: 90 TABLET | Refills: 0 | Status: SHIPPED | OUTPATIENT
Start: 2023-11-27

## 2023-11-27 RX ORDER — ATORVASTATIN CALCIUM 20 MG/1
TABLET, FILM COATED ORAL
Qty: 90 TABLET | Refills: 0 | Status: SHIPPED | OUTPATIENT
Start: 2023-11-27

## 2023-11-28 ENCOUNTER — OFFICE VISIT (OUTPATIENT)
Dept: FAMILY MEDICINE CLINIC | Facility: HOSPITAL | Age: 78
End: 2023-11-28
Payer: COMMERCIAL

## 2023-11-28 VITALS
OXYGEN SATURATION: 97 % | WEIGHT: 167 LBS | TEMPERATURE: 96.8 F | HEART RATE: 66 BPM | HEIGHT: 60 IN | BODY MASS INDEX: 32.79 KG/M2 | DIASTOLIC BLOOD PRESSURE: 76 MMHG | SYSTOLIC BLOOD PRESSURE: 152 MMHG

## 2023-11-28 DIAGNOSIS — R13.10 DYSPHAGIA, UNSPECIFIED TYPE: ICD-10-CM

## 2023-11-28 DIAGNOSIS — F33.9 DEPRESSION, RECURRENT (HCC): ICD-10-CM

## 2023-11-28 DIAGNOSIS — Z00.00 ENCOUNTER FOR SUBSEQUENT ANNUAL WELLNESS VISIT IN MEDICARE PATIENT: Primary | ICD-10-CM

## 2023-11-28 DIAGNOSIS — L29.9 ITCHY SKIN: ICD-10-CM

## 2023-11-28 DIAGNOSIS — F51.04 PSYCHOPHYSIOLOGICAL INSOMNIA: ICD-10-CM

## 2023-11-28 DIAGNOSIS — Z23 ENCOUNTER FOR IMMUNIZATION: ICD-10-CM

## 2023-11-28 PROCEDURE — 90662 IIV NO PRSV INCREASED AG IM: CPT

## 2023-11-28 PROCEDURE — 99214 OFFICE O/P EST MOD 30 MIN: CPT | Performed by: NURSE PRACTITIONER

## 2023-11-28 PROCEDURE — G0439 PPPS, SUBSEQ VISIT: HCPCS | Performed by: NURSE PRACTITIONER

## 2023-11-28 PROCEDURE — G0008 ADMIN INFLUENZA VIRUS VAC: HCPCS

## 2023-11-28 RX ORDER — LOSARTAN POTASSIUM 100 MG/1
100 TABLET ORAL DAILY
Qty: 90 TABLET | Refills: 2 | Status: SHIPPED | OUTPATIENT
Start: 2023-11-28

## 2023-11-28 RX ORDER — SERTRALINE HYDROCHLORIDE 25 MG/1
25 TABLET, FILM COATED ORAL DAILY
Qty: 30 TABLET | Refills: 1 | Status: SHIPPED | OUTPATIENT
Start: 2023-11-28 | End: 2024-05-26

## 2023-11-28 NOTE — ASSESSMENT & PLAN NOTE
PHQ-9=14  Her depression is uncontrolled. Will add in sertraline. Continue on current Wellbutrin dose. F/U in 4 weeks with DR. Vargas.

## 2023-11-28 NOTE — ASSESSMENT & PLAN NOTE
Due to underlying depression. Trazodone not overly effective. Will try to improve depression symptoms with sertraline and hopefully this will help with insomnia. Keep trazodone dose where it is. F/U in 4 weeks with Dr. Alba Cooper.

## 2023-11-28 NOTE — PATIENT INSTRUCTIONS
Medicare Preventive Visit Patient Instructions  Thank you for completing your Welcome to Medicare Visit or Medicare Annual Wellness Visit today. Your next wellness visit will be due in one year (11/28/2024). The screening/preventive services that you may require over the next 5-10 years are detailed below. Some tests may not apply to you based off risk factors and/or age. Screening tests ordered at today's visit but not completed yet may show as past due. Also, please note that scanned in results may not display below. Preventive Screenings:  Service Recommendations Previous Testing/Comments   Colorectal Cancer Screening  * Colonoscopy    * Fecal Occult Blood Test (FOBT)/Fecal Immunochemical Test (FIT)  * Fecal DNA/Cologuard Test  * Flexible Sigmoidoscopy Age: 43-73 years old   Colonoscopy: every 10 years (may be performed more frequently if at higher risk)  OR  FOBT/FIT: every 1 year  OR  Cologuard: every 3 years  OR  Sigmoidoscopy: every 5 years  Screening may be recommended earlier than age 39 if at higher risk for colorectal cancer. Also, an individualized decision between you and your healthcare provider will decide whether screening between the ages of 77-80 would be appropriate. Colonoscopy: 07/01/2014  FOBT/FIT: Not on file  Cologuard: Not on file  Sigmoidoscopy: Not on file          Breast Cancer Screening Age: 36 years old  Frequency: every 1-2 years  Not required if history of left and right mastectomy Mammogram: 11/21/2022    Screening Current   Cervical Cancer Screening Between the ages of 21-29, pap smear recommended once every 3 years. Between the ages of 32-69, can perform pap smear with HPV co-testing every 5 years.    Recommendations may differ for women with a history of total hysterectomy, cervical cancer, or abnormal pap smears in past. Pap Smear: Not on file    Screening Not Indicated   Hepatitis C Screening Once for adults born between 1945 and 1965  More frequently in patients at high risk for Hepatitis C Hep C Antibody: Not on file        Diabetes Screening 1-2 times per year if you're at risk for diabetes or have pre-diabetes Fasting glucose: 96 mg/dL (11/14/2023)  A1C: 5.9 % (11/14/2023)  Screening Current   Cholesterol Screening Once every 5 years if you don't have a lipid disorder. May order more often based on risk factors. Lipid panel: 07/14/2023    Screening Not Indicated  History Lipid Disorder     Other Preventive Screenings Covered by Medicare:  Abdominal Aortic Aneurysm (AAA) Screening: covered once if your at risk. You're considered to be at risk if you have a family history of AAA. Lung Cancer Screening: covers low dose CT scan once per year if you meet all of the following conditions: (1) Age 48-67; (2) No signs or symptoms of lung cancer; (3) Current smoker or have quit smoking within the last 15 years; (4) You have a tobacco smoking history of at least 20 pack years (packs per day multiplied by number of years you smoked); (5) You get a written order from a healthcare provider. Glaucoma Screening: covered annually if you're considered high risk: (1) You have diabetes OR (2) Family history of glaucoma OR (3)  aged 48 and older OR (3)  American aged 72 and older  Osteoporosis Screening: covered every 2 years if you meet one of the following conditions: (1) You're estrogen deficient and at risk for osteoporosis based off medical history and other findings; (2) Have a vertebral abnormality; (3) On glucocorticoid therapy for more than 3 months; (4) Have primary hyperparathyroidism; (5) On osteoporosis medications and need to assess response to drug therapy. Last bone density test (DXA Scan): 11/17/2021. HIV Screening: covered annually if you're between the age of 14-79. Also covered annually if you are younger than 13 and older than 72 with risk factors for HIV infection.  For pregnant patients, it is covered up to 3 times per pregnancy. Immunizations:  Immunization Recommendations   Influenza Vaccine Annual influenza vaccination during flu season is recommended for all persons aged >= 6 months who do not have contraindications   Pneumococcal Vaccine   * Pneumococcal conjugate vaccine = PCV13 (Prevnar 13), PCV15 (Vaxneuvance), PCV20 (Prevnar 20)  * Pneumococcal polysaccharide vaccine = PPSV23 (Pneumovax) Adults 36-73 yo with certain risk factors or if 69+ yo  If never received any pneumonia vaccine: recommend Prevnar 20 (PCV20)  Give PCV20 if previously received 1 dose of PCV13 or PPSV23   Hepatitis B Vaccine 3 dose series if at intermediate or high risk (ex: diabetes, end stage renal disease, liver disease)   Respiratory syncytial virus (RSV) Vaccine - COVERED BY MEDICARE PART D  * RSVPreF3 (Arexvy) CDC recommends that adults 61years of age and older may receive a single dose of RSV vaccine using shared clinical decision-making (SCDM)   Tetanus (Td) Vaccine - COST NOT COVERED BY MEDICARE PART B Following completion of primary series, a booster dose should be given every 10 years to maintain immunity against tetanus. Td may also be given as tetanus wound prophylaxis. Tdap Vaccine - COST NOT COVERED BY MEDICARE PART B Recommended at least once for all adults. For pregnant patients, recommended with each pregnancy. Shingles Vaccine (Shingrix) - COST NOT COVERED BY MEDICARE PART B  2 shot series recommended in those 19 years and older who have or will have weakened immune systems or those 50 years and older     Health Maintenance Due:  There are no preventive care reminders to display for this patient. Immunizations Due:      Topic Date Due   • COVID-19 Vaccine (1) Never done   • Influenza Vaccine (1) 09/01/2023     Advance Directives   What are advance directives? Advance directives are legal documents that state your wishes and plans for medical care.  These plans are made ahead of time in case you lose your ability to make decisions for yourself. Advance directives can apply to any medical decision, such as the treatments you want, and if you want to donate organs. What are the types of advance directives? There are many types of advance directives, and each state has rules about how to use them. You may choose a combination of any of the following:  Living will: This is a written record of the treatment you want. You can also choose which treatments you do not want, which to limit, and which to stop at a certain time. This includes surgery, medicine, IV fluid, and tube feedings. Durable power of  for [facility]): This is a written record that states who you want to make healthcare choices for you when you are unable to make them for yourself. This person, called a proxy, is usually a family member or a friend. You may choose more than 1 proxy. Do not resuscitate (DNR) order:  A DNR order is used in case your heart stops beating or you stop breathing. It is a request not to have certain forms of treatment, such as CPR. A DNR order may be included in other types of advance directives. Medical directive: This covers the care that you want if you are in a coma, near death, or unable to make decisions for yourself. You can list the treatments you want for each condition. Treatment may include pain medicine, surgery, blood transfusions, dialysis, IV or tube feedings, and a ventilator (breathing machine). Values history: This document has questions about your views, beliefs, and how you feel and think about life. This information can help others choose the care that you would choose. Why are advance directives important? An advance directive helps you control your care. Although spoken wishes may be used, it is better to have your wishes written down. Spoken wishes can be misunderstood, or not followed. Treatments may be given even if you do not want them.  An advance directive may make it easier for your family to make difficult choices about your care. Fall Prevention    Fall prevention  includes ways to make your home and other areas safer. It also includes ways you can move more carefully to prevent a fall. Health conditions that cause changes in your blood pressure, vision, or muscle strength and coordination may increase your risk for falls. Medicines may also increase your risk for falls if they make you dizzy, weak, or sleepy. Fall prevention tips:   Stand or sit up slowly. Use assistive devices as directed. Wear shoes that fit well and have soles that . Wear a personal alarm. Stay active. Manage your medical conditions. Home Safety Tips:  Add items to prevent falls in the bathroom. Keep paths clear. Install bright lights in your home. Keep items you use often on shelves within reach. Paint or place reflective tape on the edges of your stairs. Urinary Incontinence   Urinary incontinence (UI)  is when you lose control of your bladder. UI develops because your bladder cannot store or empty urine properly. The 3 most common types of UI are stress incontinence, urge incontinence, or both. Medicines:   May be given to help strengthen your bladder control. Report any side effects of medication to your healthcare provider. Do pelvic muscle exercises often:  Your pelvic muscles help you stop urinating. Squeeze these muscles tight for 5 seconds, then relax for 5 seconds. Gradually work up to squeezing for 10 seconds. Do 3 sets of 15 repetitions a day, or as directed. This will help strengthen your pelvic muscles and improve bladder control. Train your bladder:  Go to the bathroom at set times, such as every 2 hours, even if you do not feel the urge to go. You can also try to hold your urine when you feel the urge to go. For example, hold your urine for 5 minutes when you feel the urge to go. As that becomes easier, hold your urine for 10 minutes. Self-care:   Keep a UI record. Write down how often you leak urine and how much you leak. Make a note of what you were doing when you leaked urine. Drink liquids as directed. You may need to limit the amount of liquid you drink to help control your urine leakage. Do not drink any liquid right before you go to bed. Limit or do not have drinks that contain caffeine or alcohol. Prevent constipation. Eat a variety of high-fiber foods. Good examples are high-fiber cereals, beans, vegetables, and whole-grain breads. Walking is the best way to trigger your intestines to have a bowel movement. Exercise regularly and maintain a healthy weight. Weight loss and exercise will decrease pressure on your bladder and help you control your leakage. Use a catheter as directed  to help empty your bladder. A catheter is a tiny, plastic tube that is put into your bladder to drain your urine. Go to behavior therapy as directed. Behavior therapy may be used to help you learn to control your urge to urinate. Weight Management   Why it is important to manage your weight:  Being overweight increases your risk of health conditions such as heart disease, high blood pressure, type 2 diabetes, and certain types of cancer. It can also increase your risk for osteoarthritis, sleep apnea, and other respiratory problems. Aim for a slow, steady weight loss. Even a small amount of weight loss can lower your risk of health problems. How to lose weight safely:  A safe and healthy way to lose weight is to eat fewer calories and get regular exercise. You can lose up about 1 pound a week by decreasing the number of calories you eat by 500 calories each day. Healthy meal plan for weight management:  A healthy meal plan includes a variety of foods, contains fewer calories, and helps you stay healthy. A healthy meal plan includes the following:  Eat whole-grain foods more often. A healthy meal plan should contain fiber.  Fiber is the part of grains, fruits, and vegetables that is not broken down by your body. Whole-grain foods are healthy and provide extra fiber in your diet. Some examples of whole-grain foods are whole-wheat breads and pastas, oatmeal, brown rice, and bulgur. Eat a variety of vegetables every day. Include dark, leafy greens such as spinach, kale, cindy greens, and mustard greens. Eat yellow and orange vegetables such as carrots, sweet potatoes, and winter squash. Eat a variety of fruits every day. Choose fresh or canned fruit (canned in its own juice or light syrup) instead of juice. Fruit juice has very little or no fiber. Eat low-fat dairy foods. Drink fat-free (skim) milk or 1% milk. Eat fat-free yogurt and low-fat cottage cheese. Try low-fat cheeses such as mozzarella and other reduced-fat cheeses. Choose meat and other protein foods that are low in fat. Choose beans or other legumes such as split peas or lentils. Choose fish, skinless poultry (chicken or turkey), or lean cuts of red meat (beef or pork). Before you cook meat or poultry, cut off any visible fat. Use less fat and oil. Try baking foods instead of frying them. Add less fat, such as margarine, sour cream, regular salad dressing and mayonnaise to foods. Eat fewer high-fat foods. Some examples of high-fat foods include french fries, doughnuts, ice cream, and cakes. Eat fewer sweets. Limit foods and drinks that are high in sugar. This includes candy, cookies, regular soda, and sweetened drinks. Exercise:  Exercise at least 30 minutes per day on most days of the week. Some examples of exercise include walking, biking, dancing, and swimming. You can also fit in more physical activity by taking the stairs instead of the elevator or parking farther away from stores. Ask your healthcare provider about the best exercise plan for you. © Copyright [employer] 2018 Information is for End User's use only and may not be sold, redistributed or otherwise used for commercial purposes.  All illustrations and images included in CareNotes® are the copyrighted property of [email or web address] or [address] [place] [address]

## 2023-11-28 NOTE — ASSESSMENT & PLAN NOTE
This is an ongoing problem that seems to be worsening affecting appetite. No weight loss since last visit. She did have barium swallow last year which showed mild stasis mid esophagus with clearing with water. Since this is worsening will refer to GI for further evaluation.

## 2023-11-28 NOTE — PROGRESS NOTES
Assessment and Plan:     Problem List Items Addressed This Visit          Digestive    Dysphagia     This is an ongoing problem that seems to be worsening affecting appetite. No weight loss since last visit. She did have barium swallow last year which showed mild stasis mid esophagus with clearing with water. Since this is worsening will refer to GI for further evaluation. Relevant Orders    Ambulatory Referral to Gastroenterology       Other    Depression, recurrent ([address])     PHQ-9=14  Her depression is uncontrolled. Will add in sertraline. Continue on current Wellbutrin dose. F/U in 4 weeks with DR. Vargas. Relevant Medications    sertraline (ZOLOFT) 25 mg tablet    Insomnia     Due to underlying depression. Trazodone not overly effective. Will try to improve depression symptoms with sertraline and hopefully this will help with insomnia. Keep trazodone dose where it is. F/U in 4 weeks with Dr. Aj Haskins. Other Visit Diagnoses       Encounter for subsequent annual wellness visit in Medicare patient    -  Primary    Itchy skin        No rash was noted. Her skin is very dry. Intensive moisturization with lotion twice /day. Falls Plan of Care: balance, strength, and gait training instructions were provided. Urinary Incontinence Plan of Care: counseling topics discussed: practice Kegel (pelvic floor strengthening) exercises and limiting fluid intake to 60 oz. per day. Preventive health issues were discussed with patient, and age appropriate screening tests were ordered as noted in patient's After Visit Summary. Personalized health advice and appropriate referrals for health education or preventive services given if needed, as noted in patient's After Visit Summary. History of Present Illness:     Patient presents for a Medicare Wellness Visit    Mood is not good. Has thoughts of being better off dead but denies any intention to hurt herself. Sadness stems from loss of  1 1/2 years ago and recent move to [address] [facility] from [address] [place] [address]. She struggles sleeping. She is on highest dose of Wellbutrin. Last appointment DR. Vargas added trazodone. Pt reports it was effective at first but stopped working recently. Daughter reports she is losing weight because she has difficulty swallowing. Food gets stuck mid esophagus. Needs water to wash it down. This has been ongoing for over 1 year. Last year had swallow study which showed some mild stasis mid esophagus with resolution with water. Daughter reports this has been getting worse. Has not had any subsequent follow up for this. Has itchy rash back of left lower leg. Patient Care Team:  Enma Nicholson, DO as PCP - [facility], DO as PCP - [address] [facility] ([facility])     Review of Systems:     Review of Systems   Constitutional:  Positive for appetite change and fatigue. HENT:  Positive for trouble swallowing. Skin:  Positive for rash. Psychiatric/Behavioral:  Positive for dysphoric mood and sleep disturbance. Negative for suicidal ideas.          Problem List:     Patient Active Problem List   Diagnosis    Tremor    Atrophic vaginitis    Osteoporosis    Obesity    Migraine headache    Hypothyroidism    Hypertension    Glaucoma    Esophageal reflux    Dyslipidemia    Cataract    Asthma    Lumbar spondylosis    IFG (impaired fasting glucose)    Chronic pain syndrome    Chronic bilateral low back pain with left-sided sciatica    Lumbar post-laminectomy syndrome    Lumbar radiculopathy    Chronic pain of left knee    Primary osteoarthritis of left knee    Gait abnormality    Weakness of left leg    Anserine bursitis    Depression, recurrent (HCC)    Anxiety and depression    Rib pain on left side    Intercostal neuropathy    Constipation    Vitamin D deficiency    Insomnia    SAGE (obstructive sleep apnea)    Daytime somnolence    Malfunction of spinal cord stimulator (HCC)    Chronic kidney disease, stage 3a ([address])    Vitamin B12 deficiency    Dysphagia      Past Medical and Surgical History:     Past Medical History:   Diagnosis Date    Asthma     Chronic pain disorder     Back    Dyslipidemia     Esophageal reflux     Frequent headaches     stress related    History of stomach ulcers     Hypercalcemia     Hyperlipidemia     Hypertension     Osteopenia     Tremor      Past Surgical History:   Procedure Laterality Date    BACK SURGERY      lower back    BILATERAL OOPHORECTOMY      BREAST BIOPSY Right     long ago, benign     SECTION      CHOLECYSTECTOMY      HYSTERECTOMY      age 43 per MRS    JOINT REPLACEMENT      OOPHORECTOMY Bilateral     age 43 per MRS    IN WEBB IMPLTJ NSTIM ELTRDS PLATE/PADDLE EDRL Left 2019    Procedure: LAMINECTOMY THORACIC FOR INSERTION DORSAL COLUMN SPINAL CORD STIMULATOR (DCS) W IMPLANTABLE PULSE GENERATOR, LEFT GLUTE;  Surgeon: Fuozia Martinez MD;  Location:  MAIN OR;  Service: Neurosurgery    IN REVJ/RMVL IMPLANTED SPINAL NEUROSTIM GENERATOR Left 2022    Procedure: Reopening of thoracic and left buttock incisions for removal of spinal cord stimulator system;  Surgeon: Micah Polo MD;  Location:  MAIN OR;  Service: Neurosurgery    ROTATOR CUFF REPAIR Right     SPINAL STIMULATOR PLACEMENT N/A 2019    Procedure: REVISION SPINAL CORD STIMULATOR PADDLE ELECTRODE, REPLACEMENT WITH PERCUTANEOUS ELECTRODES OR SMALLER PADDLE;  Surgeon: Fouzia Martinez MD;  Location: [facility];  Service: Neurosurgery      Family History:     Family History   Problem Relation Age of Onset    Hypertension Family     Stroke Family     Heart disease Family     Thyroid disease Family     No Known Problems Sister     No Known Problems Daughter     No Known Problems Sister     No Known Problems Sister     No Known Problems Sister     No Known Problems Sister     No Known Problems Daughter     No Known Problems Daughter     No Known Problems Mother     No Known Problems Father     No Known Problems Maternal Grandmother     No Known Problems Maternal Grandfather     No Known Problems Paternal Grandmother     No Known Problems Paternal Grandfather       Social History:     Social History     Socioeconomic History    Marital status:      Spouse name: None    Number of children: None    Years of education: None    Highest education level: None   Occupational History    Occupation: unemployed   Tobacco Use    Smoking status: Former     Types: Cigarettes     Start date:      Quit date: 1991     Years since quittin.6    Smokeless tobacco: Never   Vaping Use    Vaping Use: Never used   Substance and Sexual Activity    Alcohol use: Not Currently    Drug use: Never    Sexual activity: Not Currently   Other Topics Concern    None   Social History Narrative    None     Social Determinants of Health     Financial Resource Strain: Low Risk  (2023)    Overall Financial Resource Strain (CARDIA)     Difficulty of Paying Living Expenses: Not hard at all   Food Insecurity: Not on file   Transportation Needs: No Transportation Needs (2023)    PRAPARE - Transportation     Lack of Transportation (Medical): No     Lack of Transportation (Non-Medical):  No   Physical Activity: Not on file   Stress: Not on file   Social Connections: Not on file   Intimate Partner Violence: Not on file   Housing Stability: Not on file      Medications and Allergies:     Current Outpatient Medications   Medication Sig Dispense Refill    albuterol (PROVENTIL HFA,VENTOLIN HFA) 90 mcg/act inhaler Inhale 2 puffs every 6 (six) hours as needed for wheezing 18 g 5    alendronate (FOSAMAX) 70 mg tablet Take 1 tablet (70 mg total) by mouth once a week 12 tablet 3    atorvastatin (LIPITOR) 20 mg tablet Take 1 tablet by mouth once daily 90 tablet 0    bimatoprost (Lumigan) 0.01 % ophthalmic drops Administer 1 drop to both eyes daily 5 mL 0    buPROPion (WELLBUTRIN XL) 150 mg 24 hr tablet Take 1 tablet (150 mg total) by mouth every morning With 300 mg to equal 450 mg daily 90 tablet 1    buPROPion (WELLBUTRIN XL) 300 mg 24 hr tablet Take 1 tablet by mouth once daily 90 tablet 0    cholecalciferol (VITAMIN D3) 1,000 units tablet Take 1,000 Units by mouth daily      gabapentin (NEURONTIN) 800 mg tablet TAKE 1 TABLET BY MOUTH ONCE DAILY IN THE MORNING AND 1 TABLET AT BEDTIME 180 tablet 1    glycopyrrolate (ROBINUL) 2 MG tablet Take 1 tablet (2 mg total) by mouth 2 (two) times a day 180 tablet 1    losartan (COZAAR) 100 MG tablet Take 1 tablet by mouth once daily 90 tablet 0    montelukast (SINGULAIR) 10 mg tablet Take 1 tablet (10 mg total) by mouth daily at bedtime 90 tablet 1    omeprazole (PriLOSEC) 40 MG capsule Take 1 capsule (40 mg total) by mouth daily 90 capsule 1    sertraline (ZOLOFT) 25 mg tablet Take 1 tablet (25 mg total) by mouth daily 30 tablet 1    Synthroid 88 MCG tablet Take 1 tablet (88 mcg total) by mouth daily 90 tablet 1    Timolol Maleate, Once-Daily, 0.5 % SOLN Apply 1 drop to eye every 12 (twelve) hours 5 mL 0    tiZANidine (ZANAFLEX) 2 mg tablet Take 1 tablet (2 mg total) by mouth every 8 (eight) hours as needed for muscle spasms 30 tablet 0    traZODone (DESYREL) 50 mg tablet TAKE 1 TABLET BY MOUTH ONCE DAILY AT BEDTIME 30 tablet 0    vitamin B-12 (VITAMIN B-12) 1,000 mcg tablet Take 1 tablet (1,000 mcg total) by mouth daily 30 tablet 5    albuterol (Ventolin HFA) 90 mcg/act inhaler Inhale 2 puffs every 4 (four) hours as needed for wheezing or shortness of breath 6.7 g 2     No current facility-administered medications for this visit.      Allergies   Allergen Reactions    Iodinated Contrast Media Anaphylaxis     Contrast Dye      Immunizations:     Immunization History   Administered Date(s) Administered    INFLUENZA 09/12/2017, 09/23/2022    Influenza, high dose seasonal 0.7 mL 12/03/2019, 10/12/2021, 09/23/2022    Influenza, seasonal, injectable 09/12/2017    Pneumococcal Conjugate 13-Valent 04/23/2019 Pneumococcal Polysaccharide PPV23 10/12/2021      Health Maintenance: There are no preventive care reminders to display for this patient. Topic Date Due    COVID-19 Vaccine (1) Never done    Influenza Vaccine (1) 09/01/2023      Medicare Screening Tests and Risk Assessments:     Afshan Hill is here for her Subsequent Wellness visit. Health Risk Assessment:   Patient rates overall health as good. Patient feels that their physical health rating is slightly better. Patient is satisfied with their life. Eyesight was rated as slightly worse. Hearing was rated as slightly worse. Patient feels that their emotional and mental health rating is slightly better. Patients states they are sometimes angry. Patient states they are sometimes unusually tired/fatigued. Pain experienced in the last 7 days has been a lot. Patient's pain rating has been 10/10. Patient states that she has experienced weight loss or gain in last 6 months. Depression Screening:   PHQ-9 Score: 14      Fall Risk Screening: In the past year, patient has experienced: history of falling in past year    Number of falls: 2 or more  Injured during fall?: Yes    Feels unsteady when standing or walking?: Yes    Worried about falling?: Yes      Urinary Incontinence Screening:   Patient has leaked urine accidently in the last six months. Home Safety:  Patient does not have trouble with stairs inside or outside of their home. Patient has working smoke alarms and has working carbon monoxide detector. Home safety hazards include: none. Nutrition:   Current diet is Regular and Low Cholesterol. Medications:   Patient is currently taking over-the-counter supplements. OTC medications include: see medication list. Patient is able to manage medications.      Activities of Daily Living (ADLs)/Instrumental Activities of Daily Living (IADLs):   Walk and transfer into and out of bed and chair?: Yes  Dress and groom yourself?: Yes    Bathe or shower yourself?: Yes    Feed yourself?  Yes  Do your laundry/housekeeping?: Yes  Manage your money, pay your bills and track your expenses?: No  Make your own meals?: Yes    Do your own shopping?: Yes    Previous Hospitalizations:   Any hospitalizations or ED visits within the last 12 months?: No      Advance Care Planning:   Living will: No    ACP document given: Yes      Cognitive Screening:   Provider or family/friend/caregiver concerned regarding cognition?: No    PREVENTIVE SCREENINGS      Cardiovascular Screening:    General: Screening Not Indicated and History Lipid Disorder      Diabetes Screening:     General: Screening Current      Colorectal Cancer Screening:     General: Screening Not Indicated      Breast Cancer Screening:     General: Screening Current      Cervical Cancer Screening:    General: Screening Not Indicated      Osteoporosis Screening:    General: Screening Not Indicated and History Osteoporosis      Abdominal Aortic Aneurysm (AAA) Screening:        General: Screening Not Indicated      Lung Cancer Screening:     General: Screening Not Indicated      Hepatitis C Screening:    General: Screening Not Indicated    Screening, Brief Intervention, and Referral to Treatment (SBIRT)    Screening      AUDIT-C Screenin) How often did you have a drink containing alcohol in the past year? never  2) How many drinks did you have on a typical day when you were drinking in the past year? 0  3) How often did you have 6 or more drinks on one occasion in the past year? never    AUDIT-C Score: 0  Interpretation: Score 0-2 (female): Negative screen for alcohol misuse    Single Item Drug Screening:  How often have you used an illegal drug (including marijuana) or a prescription medication for non-medical reasons in the past year? never    Single Item Drug Screen Score: 0  Interpretation: Negative screen for possible drug use disorder    Vision Screening    Right eye Left eye Both eyes   Without correction 20/30 20/30 20/25   With correction           Physical Exam:     /76 (BP Location: Left arm, Patient Position: Sitting, Cuff Size: Standard)   Pulse 66   Temp (!) 96.8 °F (36 °C) (Tympanic)   Ht 5' (1.524 m)   Wt 75.8 kg (167 lb)   SpO2 97%   BMI 32.61 kg/m²     Physical Exam  Constitutional:       Appearance: Normal appearance. Cardiovascular:      Rate and Rhythm: Normal rate and regular rhythm. Heart sounds: Normal heart sounds. No murmur heard. Pulmonary:      Effort: Pulmonary effort is normal.      Breath sounds: Normal breath sounds. Skin:     General: Skin is warm and dry. Neurological:      Mental Status: She is alert and oriented to person, place, and time. Psychiatric:         Mood and Affect: Mood normal.         Behavior: Behavior normal.         Thought Content:  Thought content normal.         Judgment: Judgment normal.          CRISTINA Elkins

## 2023-12-06 ENCOUNTER — TELEPHONE (OUTPATIENT)
Dept: GASTROENTEROLOGY | Facility: CLINIC | Age: 78
End: 2023-12-06

## 2023-12-06 ENCOUNTER — OFFICE VISIT (OUTPATIENT)
Dept: GASTROENTEROLOGY | Facility: CLINIC | Age: 78
End: 2023-12-06
Payer: COMMERCIAL

## 2023-12-06 VITALS
WEIGHT: 158.8 LBS | SYSTOLIC BLOOD PRESSURE: 152 MMHG | HEIGHT: 60 IN | DIASTOLIC BLOOD PRESSURE: 64 MMHG | BODY MASS INDEX: 31.18 KG/M2

## 2023-12-06 DIAGNOSIS — K59.00 CONSTIPATION, UNSPECIFIED CONSTIPATION TYPE: Primary | ICD-10-CM

## 2023-12-06 DIAGNOSIS — Z98.890 HISTORY OF COLONOSCOPY: ICD-10-CM

## 2023-12-06 DIAGNOSIS — K21.9 GERD WITHOUT ESOPHAGITIS: ICD-10-CM

## 2023-12-06 DIAGNOSIS — R13.10 DYSPHAGIA, UNSPECIFIED TYPE: ICD-10-CM

## 2023-12-06 PROCEDURE — 99214 OFFICE O/P EST MOD 30 MIN: CPT | Performed by: NURSE PRACTITIONER

## 2023-12-06 RX ORDER — POLYETHYLENE GLYCOL 3350 17 G/17G
POWDER, FOR SOLUTION ORAL
Qty: 238 G | Refills: 0 | Status: SHIPPED | OUTPATIENT
Start: 2023-12-06

## 2023-12-06 RX ORDER — DOCUSATE SODIUM 100 MG/1
100 CAPSULE, LIQUID FILLED ORAL 2 TIMES DAILY
Qty: 180 CAPSULE | Refills: 3 | Status: SHIPPED | OUTPATIENT
Start: 2023-12-06

## 2023-12-06 RX ORDER — OMEPRAZOLE 40 MG/1
40 CAPSULE, DELAYED RELEASE ORAL DAILY
Qty: 90 CAPSULE | Refills: 3 | Status: SHIPPED | OUTPATIENT
Start: 2023-12-06

## 2023-12-06 NOTE — PATIENT INSTRUCTIONS
20 to 25 g of fiber per day  Adequate fluid was  Colace/docusate sodium 100 mg orally twice daily  If no bowel movement by third day, 1 capful of MiraLAX each day until bowel movement    Take the Meprazole 40 mg daily, make sure you are taking every day prior to breakfast.

## 2023-12-06 NOTE — PROGRESS NOTES
[facility] Rothman [facility] - Outpatient Follow-up Note  Shanthi Calvo 78 y.o. female MRN: [id number]  Encounter: [id number]    ASSESSMENT AND PLAN:      1. Dysphagia, unspecified type  Patient's dates that she has had ongoing symptoms of difficulty swallowing however they are progressively getting worse. Patient states food is getting stuck mid chest and sometimes if she drinks water it will go down and other times she ends up regurgitating the water. Barium swallow October 2022 noted no gross aspiration events on study. PES appears tight with slight retropulsion with hard solids. Recommended soft diet, thin liquids but avoid straws, medication whole with liquids. Consider mechanical versus obstructive, GERD, esophageal stricture. .  Follow-up and reevaluate after EGD. - Ambulatory Referral to Gastroenterology  - EGD; Future    2. GERD without esophagitis  Patient states she is having increased acid reflux symptoms especially since the dysphagia has gotten worse. Patient states she had only been taking the omeprazole when she felt she needed it. Instructed patient through her daughter that she needs to be taking the omeprazole 40 mg daily until EGD can be completed in reevaluation. - omeprazole (PriLOSEC) 40 MG capsule; Take 1 capsule (40 mg total) by mouth daily  Dispense: 90 capsule; Refill: 3    3. Constipation, unspecified constipation type  States she currently has 1-2 bowel movements per week. She states that she has been constipated for years. Increased education with higher fiber diet, adequate fluids, Colace twice daily and MiraLAX as needed. MiraLAX instructed to take on third day if no bowel movement and continued daily until bowel movement. Constipation may also be caused by slow transit reactive with some of her medications and/or diet. - docusate sodium (COLACE) 100 mg capsule;  Take 1 capsule (100 mg total) by mouth 2 (two) times a day  Dispense: 180 capsule; Refill: 3  - polyethylene glycol (GLYCOLAX) 17 GM/SCOOP powder; Bowel Prep: One (1) 238-gram container of Miralax (polyethylene glycol 3350) as directed  Dispense: 238 g; Refill: 0    4. History of colonoscopy  Per patient, colonoscopy was completed in 21 2014 in [place] with 10-year recall. Patient states no family history of colon cancer. Will defer colonoscopy due to age unless patient has complications. Followup Appointment: 3 weeks after EGD  ______________________________________________________________________      HPI: 58-year-old Slovak speaking female accompanied by her daughter Kulwinder Cheema to assist with interpretation. Patient with past medical history of GERD, hypothyroidism, SAGE, asthma, hypertension, CKD 3, depression and obesity presents with difficulty swallowing. Patient's dates that food is getting stuck in her esophagus and she tries to drink water. She states sometimes it goes down and other times she has to regurgitate the water. He states when these episodes occur she does have increased nausea. She denies vomiting other than what the water when food does not pass. She states she does have more increased chronic acid reflux symptoms however patient has not been taking the omeprazole 40 mg as prescribed daily. Instructed patient to make sure she is taking the omeprazole 40 mg daily as scheduled. On a.m., she does exhibit right lower quadrant tenderness with palpation. She is she is moving her bowels once or twice per week and has been going on for years with increased chronic constipation. Noting patient's med list she does take some medications that can cause slowing of the gastric system. She denies hematochezia or melena. Former smoker, denies EtOH use. Patient states her weight is stable. She denies family history of colon cancer. Patient had last colonoscopy in 2014 in [place] with a 10-year recall per patient.   Could not find results of actual seizure on chart. Patient had been seen in the past for symptoms and was recommended for EGD. Patient did have a video swallow completed in 2022 that recommended a regular soft diet. Discussed and instructed patient and patient's daughter with increased fiber diet, adequate fluids, Colace twice daily and MiraLAX as needed. All information and fiber diet intake will be attached to discharge summary.     Historical Information   Past Medical History:   Diagnosis Date    Asthma     Chronic pain disorder     Back    Dyslipidemia     Esophageal reflux     Frequent headaches     stress related    History of stomach ulcers     Hypercalcemia     Hyperlipidemia     Hypertension     Osteopenia     Tremor      Past Surgical History:   Procedure Laterality Date    BACK SURGERY      lower back    BILATERAL OOPHORECTOMY      BREAST BIOPSY Right     long ago, benign     SECTION      CHOLECYSTECTOMY      HYSTERECTOMY      age 43 per MRS    JOINT REPLACEMENT      OOPHORECTOMY Bilateral     age 43 per MRS    AK WEBB IMPLTJ NSTIM ELTRDS PLATE/PADDLE EDRL Left 2019    Procedure: LAMINECTOMY THORACIC FOR INSERTION DORSAL COLUMN SPINAL CORD STIMULATOR (DCS) W IMPLANTABLE PULSE GENERATOR, LEFT GLUTE;  Surgeon: Luli Batista MD;  Location: [facility];  Service: Neurosurgery    AK REVJ/RMVL IMPLANTED SPINAL NEUROSTIM GENERATOR Left 2022    Procedure: Reopening of thoracic and left buttock incisions for removal of spinal cord stimulator system;  Surgeon: Amada Bunn MD;  Location: [facility];  Service: Neurosurgery    ROTATOR CUFF REPAIR Right     SPINAL STIMULATOR PLACEMENT N/A 2019    Procedure: REVISION SPINAL CORD STIMULATOR PADDLE ELECTRODE, REPLACEMENT WITH PERCUTANEOUS ELECTRODES OR SMALLER PADDLE;  Surgeon: Luli Batista MD;  Location[facility];  Service: Neurosurgery     Social History     Substance and Sexual Activity   Alcohol Use Not Currently     Social History     Substance and Sexual Activity   Drug Use Never     Social History     Tobacco Use   Smoking Status Former    Types: Cigarettes    Start date:     Quit date: 1991    Years since quittin.6   Smokeless Tobacco Never     Family History   Problem Relation Age of Onset    No Known Problems Mother     No Known Problems Father     No Known Problems Sister     No Known Problems Sister     No Known Problems Sister     No Known Problems Sister     No Known Problems Sister     No Known Problems Maternal Grandmother     No Known Problems Maternal Grandfather     No Known Problems Paternal Grandmother     No Known Problems Paternal Grandfather     No Known Problems Daughter     No Known Problems Daughter     No Known Problems Daughter     Hypertension Family     Stroke Family     Heart disease Family     Thyroid disease Family     Colon cancer Neg Hx     Colon polyps Neg Hx          Current Outpatient Medications:     albuterol (PROVENTIL HFA,VENTOLIN HFA) 90 mcg/act inhaler    albuterol (Ventolin HFA) 90 mcg/act inhaler    alendronate (FOSAMAX) 70 mg tablet    atorvastatin (LIPITOR) 20 mg tablet    bimatoprost (Lumigan) 0.01 % ophthalmic drops    buPROPion (WELLBUTRIN XL) 150 mg 24 hr tablet    buPROPion (WELLBUTRIN XL) 300 mg 24 hr tablet    cholecalciferol (VITAMIN D3) 1,000 units tablet    docusate sodium (COLACE) 100 mg capsule    gabapentin (NEURONTIN) 800 mg tablet    glycopyrrolate (ROBINUL) 2 MG tablet    losartan (COZAAR) 100 MG tablet    montelukast (SINGULAIR) 10 mg tablet    omeprazole (PriLOSEC) 40 MG capsule    polyethylene glycol (GLYCOLAX) 17 GM/SCOOP powder    sertraline (ZOLOFT) 25 mg tablet    Synthroid 88 MCG tablet    Timolol Maleate, Once-Daily, 0.5 % SOLN    tiZANidine (ZANAFLEX) 2 mg tablet    traZODone (DESYREL) 50 mg tablet    vitamin B-12 (VITAMIN B-12) 1,000 mcg tablet  Allergies   Allergen Reactions    Iodinated Contrast Media Anaphylaxis     Contrast Dye     Reviewed medications and allergies and updated as indicated    PHYSICAL EXAM:    Blood pressure 152/64, height 5' (1.524 m), weight 72 kg (158 lb 12.8 oz), not currently breastfeeding. Body mass index is 31.01 kg/m². General Appearance: NAD, cooperative, alert  Eyes: Anicteric, PERRLA, EOMI  ENT:  Normocephalic, atraumatic, normal mucosa. Neck:  Supple, symmetrical, trachea midline  Resp:  Clear to auscultation bilaterally; no rales, rhonchi or wheezing; respirations unlabored   CV:  S1 S2, Regular rate and rhythm; no murmur, rub, or gallop. GI:  Soft, abdominal tenderness noted to right lower quadrant with palpation, non-distended; normal bowel sounds; no masses, no organomegaly   Rectal: Deferred  Musculoskeletal: No cyanosis, clubbing or edema. Normal ROM. Skin:  No jaundice, rashes, or lesions   Heme/Lymph: No palpable cervical lymphadenopathy  Psych: Normal affect, good eye contact  Neuro: No gross deficits, AAOx3    Lab Results:   Lab Results   Component Value Date    WBC 6.9 07/14/2023    HGB 11.3 07/14/2023    HCT 33.9 (L) 07/14/2023    MCV 94 07/14/2023     07/14/2023     Lab Results   Component Value Date    K 3.8 11/14/2023     (H) 11/14/2023    CO2 25 11/14/2023    BUN 14 11/14/2023    CREATININE 0.92 11/14/2023    GLUF 96 11/14/2023    CALCIUM 10.5 (H) 11/14/2023    AST 16 07/14/2023    ALT 13 07/14/2023    ALKPHOS 126 (H) 09/23/2022    EGFR 58 11/14/2023     Lab Results   Component Value Date    IRON 45 07/14/2023    FERRITIN 60 07/14/2023     Lab Results   Component Value Date    LIPASE 49 09/23/2022       Radiology Results:   No results found.

## 2023-12-06 NOTE — TELEPHONE ENCOUNTER
Scheduled date of EGD(as of today): 12-11-23  Physician performing EGD:  [facility]  Location of [facility]  Instructions reviewed with patient by:  ASIF  Clearances: no      daughter   Mike Damon  [phone]

## 2023-12-06 NOTE — H&P (VIEW-ONLY)
[facility] - Outpatient Follow-up Note  Shannon Ormond 78 y.o. female MRN: [id number]  Encounter: [id number]    ASSESSMENT AND PLAN:      1. Dysphagia, unspecified type  Patient's dates that she has had ongoing symptoms of difficulty swallowing however they are progressively getting worse. Patient states food is getting stuck mid chest and sometimes if she drinks water it will go down and other times she ends up regurgitating the water. Barium swallow October 2022 noted no gross aspiration events on study. PES appears tight with slight retropulsion with hard solids. Recommended soft diet, thin liquids but avoid straws, medication whole with liquids. Consider mechanical versus obstructive, GERD, esophageal stricture. .  Follow-up and reevaluate after EGD. - Ambulatory Referral to Gastroenterology  - EGD; Future    2. GERD without esophagitis  Patient states she is having increased acid reflux symptoms especially since the dysphagia has gotten worse. Patient states she had only been taking the omeprazole when she felt she needed it. Instructed patient through her daughter that she needs to be taking the omeprazole 40 mg daily until EGD can be completed in reevaluation. - omeprazole (PriLOSEC) 40 MG capsule; Take 1 capsule (40 mg total) by mouth daily  Dispense: 90 capsule; Refill: 3    3. Constipation, unspecified constipation type  States she currently has 1-2 bowel movements per week. She states that she has been constipated for years. Increased education with higher fiber diet, adequate fluids, Colace twice daily and MiraLAX as needed. MiraLAX instructed to take on third day if no bowel movement and continued daily until bowel movement. Constipation may also be caused by slow transit reactive with some of her medications and/or diet. - docusate sodium (COLACE) 100 mg capsule;  Take 1 capsule (100 mg total) by mouth 2 (two) times a day  Dispense: 180 capsule; Refill: 3  - polyethylene glycol (GLYCOLAX) 17 GM/SCOOP powder; Bowel Prep: One (1) 238-gram container of Miralax (polyethylene glycol 3350) as directed  Dispense: 238 g; Refill: 0    4. History of colonoscopy  Per patient, colonoscopy was completed in 21 2014 in [place] with 10-year recall. Patient states no family history of colon cancer. Will defer colonoscopy due to age unless patient has complications. Followup Appointment: 3 weeks after EGD  ______________________________________________________________________      HPI: 78-year-old Maltese speaking female accompanied by her daughter Behzad Hawkins to assist with interpretation. Patient with past medical history of GERD, hypothyroidism, SAGE, asthma, hypertension, CKD 3, depression and obesity presents with difficulty swallowing. Patient's dates that food is getting stuck in her esophagus and she tries to drink water. She states sometimes it goes down and other times she has to regurgitate the water. He states when these episodes occur she does have increased nausea. She denies vomiting other than what the water when food does not pass. She states she does have more increased chronic acid reflux symptoms however patient has not been taking the omeprazole 40 mg as prescribed daily. Instructed patient to make sure she is taking the omeprazole 40 mg daily as scheduled. On a.m., she does exhibit right lower quadrant tenderness with palpation. She is she is moving her bowels once or twice per week and has been going on for years with increased chronic constipation. Noting patient's med list she does take some medications that can cause slowing of the gastric system. She denies hematochezia or melena. Former smoker, denies EtOH use. Patient states her weight is stable. She denies family history of colon cancer. Patient had last colonoscopy in 2014 in [place] with a 10-year recall per patient.   Could not find results of actual seizure on chart. Patient had been seen in the past for symptoms and was recommended for EGD. Patient did have a video swallow completed in 2022 that recommended a regular soft diet. Discussed and instructed patient and patient's daughter with increased fiber diet, adequate fluids, Colace twice daily and MiraLAX as needed. All information and fiber diet intake will be attached to discharge summary.     Historical Information   Past Medical History:   Diagnosis Date    Asthma     Chronic pain disorder     Back    Dyslipidemia     Esophageal reflux     Frequent headaches     stress related    History of stomach ulcers     Hypercalcemia     Hyperlipidemia     Hypertension     Osteopenia     Tremor      Past Surgical History:   Procedure Laterality Date    BACK SURGERY      lower back    BILATERAL OOPHORECTOMY      BREAST BIOPSY Right     long ago, benign     SECTION      CHOLECYSTECTOMY      HYSTERECTOMY      age 43 per MRS    JOINT REPLACEMENT      OOPHORECTOMY Bilateral     age 43 per MRS    CA WEBB IMPLTJ NSTIM ELTRDS PLATE/PADDLE EDRL Left 2019    Procedure: LAMINECTOMY THORACIC FOR INSERTION DORSAL COLUMN SPINAL CORD STIMULATOR (DCS) W IMPLANTABLE PULSE GENERATOR, LEFT GLUTE;  Surgeon: Carlos Carbajal MD;  Location: [facility];  Service: Neurosurgery    CA REVJ/RMVL IMPLANTED SPINAL NEUROSTIM GENERATOR Left 2022    Procedure: Reopening of thoracic and left buttock incisions for removal of spinal cord stimulator system;  Surgeon: Cole Arnett MD;  Location: [facility];  Service: Neurosurgery    ROTATOR CUFF REPAIR Right     SPINAL STIMULATOR PLACEMENT N/A 2019    Procedure: REVISION SPINAL CORD STIMULATOR PADDLE ELECTRODE, REPLACEMENT WITH PERCUTANEOUS ELECTRODES OR SMALLER PADDLE;  Surgeon: Carlos Carbajal MD;  Location[facility];  Service: Neurosurgery     Social History     Substance and Sexual Activity   Alcohol Use Not Currently     Social History     Substance and Sexual Activity   Drug Use Never     Social History     Tobacco Use   Smoking Status Former    Types: Cigarettes    Start date:     Quit date: 1991    Years since quittin.6   Smokeless Tobacco Never     Family History   Problem Relation Age of Onset    No Known Problems Mother     No Known Problems Father     No Known Problems Sister     No Known Problems Sister     No Known Problems Sister     No Known Problems Sister     No Known Problems Sister     No Known Problems Maternal Grandmother     No Known Problems Maternal Grandfather     No Known Problems Paternal Grandmother     No Known Problems Paternal Grandfather     No Known Problems Daughter     No Known Problems Daughter     No Known Problems Daughter     Hypertension Family     Stroke Family     Heart disease Family     Thyroid disease Family     Colon cancer Neg Hx     Colon polyps Neg Hx          Current Outpatient Medications:     albuterol (PROVENTIL HFA,VENTOLIN HFA) 90 mcg/act inhaler    albuterol (Ventolin HFA) 90 mcg/act inhaler    alendronate (FOSAMAX) 70 mg tablet    atorvastatin (LIPITOR) 20 mg tablet    bimatoprost (Lumigan) 0.01 % ophthalmic drops    buPROPion (WELLBUTRIN XL) 150 mg 24 hr tablet    buPROPion (WELLBUTRIN XL) 300 mg 24 hr tablet    cholecalciferol (VITAMIN D3) 1,000 units tablet    docusate sodium (COLACE) 100 mg capsule    gabapentin (NEURONTIN) 800 mg tablet    glycopyrrolate (ROBINUL) 2 MG tablet    losartan (COZAAR) 100 MG tablet    montelukast (SINGULAIR) 10 mg tablet    omeprazole (PriLOSEC) 40 MG capsule    polyethylene glycol (GLYCOLAX) 17 GM/SCOOP powder    sertraline (ZOLOFT) 25 mg tablet    Synthroid 88 MCG tablet    Timolol Maleate, Once-Daily, 0.5 % SOLN    tiZANidine (ZANAFLEX) 2 mg tablet    traZODone (DESYREL) 50 mg tablet    vitamin B-12 (VITAMIN B-12) 1,000 mcg tablet  Allergies   Allergen Reactions    Iodinated Contrast Media Anaphylaxis     Contrast Dye     Reviewed medications and allergies and updated as indicated    PHYSICAL EXAM:    Blood pressure 152/64, height 5' (1.524 m), weight 72 kg (158 lb 12.8 oz), not currently breastfeeding. Body mass index is 31.01 kg/m². General Appearance: NAD, cooperative, alert  Eyes: Anicteric, PERRLA, EOMI  ENT:  Normocephalic, atraumatic, normal mucosa. Neck:  Supple, symmetrical, trachea midline  Resp:  Clear to auscultation bilaterally; no rales, rhonchi or wheezing; respirations unlabored   CV:  S1 S2, Regular rate and rhythm; no murmur, rub, or gallop. GI:  Soft, abdominal tenderness noted to right lower quadrant with palpation, non-distended; normal bowel sounds; no masses, no organomegaly   Rectal: Deferred  Musculoskeletal: No cyanosis, clubbing or edema. Normal ROM. Skin:  No jaundice, rashes, or lesions   Heme/Lymph: No palpable cervical lymphadenopathy  Psych: Normal affect, good eye contact  Neuro: No gross deficits, AAOx3    Lab Results:   Lab Results   Component Value Date    WBC 6.9 07/14/2023    HGB 11.3 07/14/2023    HCT 33.9 (L) 07/14/2023    MCV 94 07/14/2023     07/14/2023     Lab Results   Component Value Date    K 3.8 11/14/2023     (H) 11/14/2023    CO2 25 11/14/2023    BUN 14 11/14/2023    CREATININE 0.92 11/14/2023    GLUF 96 11/14/2023    CALCIUM 10.5 (H) 11/14/2023    AST 16 07/14/2023    ALT 13 07/14/2023    ALKPHOS 126 (H) 09/23/2022    EGFR 58 11/14/2023     Lab Results   Component Value Date    IRON 45 07/14/2023    FERRITIN 60 07/14/2023     Lab Results   Component Value Date    LIPASE 49 09/23/2022       Radiology Results:   No results found.

## 2023-12-11 ENCOUNTER — ANESTHESIA (OUTPATIENT)
Dept: GASTROENTEROLOGY | Facility: AMBULATORY SURGERY CENTER | Age: 78
End: 2023-12-11

## 2023-12-11 ENCOUNTER — HOSPITAL ENCOUNTER (OUTPATIENT)
Dept: GASTROENTEROLOGY | Facility: AMBULATORY SURGERY CENTER | Age: 78
Discharge: HOME | End: 2023-12-11
Payer: COMMERCIAL

## 2023-12-11 ENCOUNTER — ANESTHESIA EVENT (OUTPATIENT)
Dept: GASTROENTEROLOGY | Facility: AMBULATORY SURGERY CENTER | Age: 78
End: 2023-12-11

## 2023-12-11 VITALS
BODY MASS INDEX: 31.02 KG/M2 | TEMPERATURE: 96.4 F | HEIGHT: 60 IN | WEIGHT: 158 LBS | DIASTOLIC BLOOD PRESSURE: 66 MMHG | OXYGEN SATURATION: 99 % | HEART RATE: 62 BPM | RESPIRATION RATE: 16 BRPM | SYSTOLIC BLOOD PRESSURE: 146 MMHG

## 2023-12-11 DIAGNOSIS — R13.10 DYSPHAGIA, UNSPECIFIED TYPE: ICD-10-CM

## 2023-12-11 PROCEDURE — 43239 EGD BIOPSY SINGLE/MULTIPLE: CPT | Performed by: INTERNAL MEDICINE

## 2023-12-11 PROCEDURE — 88305 TISSUE EXAM BY PATHOLOGIST: CPT | Performed by: PATHOLOGY

## 2023-12-11 RX ORDER — PROPOFOL 10 MG/ML
INJECTION, EMULSION INTRAVENOUS AS NEEDED
Status: DISCONTINUED | OUTPATIENT
Start: 2023-12-11 | End: 2023-12-11

## 2023-12-11 RX ORDER — SODIUM CHLORIDE, SODIUM LACTATE, POTASSIUM CHLORIDE, CALCIUM CHLORIDE 600; 310; 30; 20 MG/100ML; MG/100ML; MG/100ML; MG/100ML
50 INJECTION, SOLUTION INTRAVENOUS CONTINUOUS
Status: DISCONTINUED | OUTPATIENT
Start: 2023-12-11 | End: 2023-12-15 | Stop reason: HOSPADM

## 2023-12-11 RX ADMIN — SODIUM CHLORIDE, SODIUM LACTATE, POTASSIUM CHLORIDE, CALCIUM CHLORIDE: 600; 310; 30; 20 INJECTION, SOLUTION INTRAVENOUS at 08:52

## 2023-12-11 RX ADMIN — PROPOFOL 50 MG: 10 INJECTION, EMULSION INTRAVENOUS at 08:49

## 2023-12-11 RX ADMIN — SODIUM CHLORIDE, SODIUM LACTATE, POTASSIUM CHLORIDE, CALCIUM CHLORIDE 50 ML/HR: 600; 310; 30; 20 INJECTION, SOLUTION INTRAVENOUS at 08:28

## 2023-12-11 RX ADMIN — PROPOFOL 100 MG: 10 INJECTION, EMULSION INTRAVENOUS at 08:47

## 2023-12-11 NOTE — INTERVAL H&P NOTE
H&P reviewed. After examining the patient I find no changes in the patients condition since the H&P had been written.     Vitals:    12/11/23 0806   BP: (!) 185/79   Pulse: 68   Resp: 20   Temp: (!) 96.4 °F (35.8 °C)   SpO2: 97%

## 2023-12-11 NOTE — ANESTHESIA PREPROCEDURE EVALUATION
Procedure:  EGD    Relevant Problems   CARDIO   (+) Hypertension   (+) Migraine headache   (+) Rib pain on left side      ENDO   (+) Hypothyroidism      GI/HEPATIC   (+) Dysphagia   (+) Esophageal reflux      /RENAL   (+) Chronic kidney disease, stage 3a (HCC)      MUSCULOSKELETAL   (+) Chronic bilateral low back pain with left-sided sciatica   (+) Lumbar spondylosis   (+) Primary osteoarthritis of left knee      NEURO/PSYCH   (+) Anxiety and depression   (+) Chronic bilateral low back pain with left-sided sciatica   (+) Chronic pain syndrome   (+) Depression, recurrent (HCC)   (+) Migraine headache   (+) Weakness of left leg      PULMONARY   (+) Asthma   (+) SAGE (obstructive sleep apnea)        Physical Exam    Airway    Mallampati score: II  TM Distance: >3 FB  Neck ROM: full     Dental   No notable dental hx     Cardiovascular      Pulmonary      Other Findings  post-pubertal.      Anesthesia Plan  ASA Score- 2     Anesthesia Type- IV sedation with anesthesia with ASA Monitors. Additional Monitors:     Airway Plan:     Comment: NPO, consent via interpr, daughter. .       Plan Factors-    Chart reviewed. Patient summary reviewed. Patient is not a current smoker. Induction- intravenous. Postoperative Plan-     Informed Consent- Anesthetic plan and risks discussed with patient. I personally reviewed this patient with the CRNA. Discussed and agreed on the Anesthesia Plan with the CRNA. Darylene Pipe

## 2023-12-11 NOTE — ANESTHESIA POSTPROCEDURE EVALUATION
Post-Op Assessment Note    CV Status:  Stable  Pain Score: 0    Pain management: adequate       Mental Status:  Alert and awake   Hydration Status:  Euvolemic   PONV Controlled:  Controlled   Airway Patency:  Patent     Post Op Vitals Reviewed: Yes      Staff: CRNA               BP  179/82   Temp  98   Pulse  77   Resp   16   SpO2   96

## 2023-12-12 DIAGNOSIS — E03.9 HYPOTHYROIDISM, UNSPECIFIED TYPE: ICD-10-CM

## 2023-12-12 RX ORDER — LEVOTHYROXINE SODIUM 88 MCG
88 TABLET ORAL DAILY
Qty: 90 TABLET | Refills: 0 | Status: SHIPPED | OUTPATIENT
Start: 2023-12-12

## 2023-12-14 PROCEDURE — 88305 TISSUE EXAM BY PATHOLOGIST: CPT | Performed by: PATHOLOGY

## 2023-12-19 ENCOUNTER — HOSPITAL ENCOUNTER (OUTPATIENT)
Dept: MAMMOGRAPHY | Facility: CLINIC | Age: 78
Discharge: HOME | End: 2023-12-19
Payer: COMMERCIAL

## 2023-12-19 VITALS — BODY MASS INDEX: 31.02 KG/M2 | WEIGHT: 158 LBS | HEIGHT: 60 IN

## 2023-12-19 DIAGNOSIS — Z12.31 ENCOUNTER FOR SCREENING MAMMOGRAM FOR MALIGNANT NEOPLASM OF BREAST: ICD-10-CM

## 2023-12-19 PROCEDURE — 77067 SCR MAMMO BI INCL CAD: CPT

## 2023-12-19 PROCEDURE — 77063 BREAST TOMOSYNTHESIS BI: CPT

## 2023-12-23 DIAGNOSIS — G47.00 INSOMNIA, UNSPECIFIED TYPE: ICD-10-CM

## 2023-12-24 RX ORDER — TRAZODONE HYDROCHLORIDE 50 MG/1
50 TABLET ORAL
Qty: 30 TABLET | Refills: 0 | Status: SHIPPED | OUTPATIENT
Start: 2023-12-24

## 2023-12-27 ENCOUNTER — HOSPITAL ENCOUNTER (OUTPATIENT)
Dept: NUCLEAR MEDICINE | Facility: HOSPITAL | Age: 78
Discharge: HOME | End: 2023-12-27
Payer: COMMERCIAL

## 2023-12-27 DIAGNOSIS — E83.52 HYPERCALCEMIA: ICD-10-CM

## 2023-12-27 PROCEDURE — A9500 TC99M SESTAMIBI: HCPCS

## 2023-12-27 PROCEDURE — G1004 CDSM NDSC: HCPCS

## 2023-12-27 PROCEDURE — 78071 PARATHYRD PLANAR W/WO SUBTRJ: CPT

## 2023-12-28 ENCOUNTER — OFFICE VISIT (OUTPATIENT)
Dept: FAMILY MEDICINE CLINIC | Facility: HOSPITAL | Age: 78
End: 2023-12-28
Payer: COMMERCIAL

## 2023-12-28 VITALS
HEIGHT: 60 IN | SYSTOLIC BLOOD PRESSURE: 138 MMHG | BODY MASS INDEX: 31.29 KG/M2 | HEART RATE: 74 BPM | DIASTOLIC BLOOD PRESSURE: 68 MMHG | WEIGHT: 159.4 LBS | TEMPERATURE: 96.6 F

## 2023-12-28 DIAGNOSIS — R73.01 IFG (IMPAIRED FASTING GLUCOSE): ICD-10-CM

## 2023-12-28 DIAGNOSIS — F33.9 DEPRESSION, RECURRENT (HCC): ICD-10-CM

## 2023-12-28 DIAGNOSIS — R22.2 LUMP OF SKIN OF BACK: ICD-10-CM

## 2023-12-28 DIAGNOSIS — K21.9 GERD WITHOUT ESOPHAGITIS: ICD-10-CM

## 2023-12-28 DIAGNOSIS — E83.52 HYPERCALCEMIA: Primary | ICD-10-CM

## 2023-12-28 DIAGNOSIS — R13.10 DYSPHAGIA, UNSPECIFIED TYPE: ICD-10-CM

## 2023-12-28 DIAGNOSIS — R94.6 ABNORMAL RADIONUCLIDE SCAN OF PARATHYROID GLAND: ICD-10-CM

## 2023-12-28 PROCEDURE — 99214 OFFICE O/P EST MOD 30 MIN: CPT | Performed by: INTERNAL MEDICINE

## 2023-12-28 RX ORDER — OMEPRAZOLE 40 MG/1
40 CAPSULE, DELAYED RELEASE ORAL 2 TIMES DAILY
Start: 2023-12-28

## 2023-12-28 NOTE — ASSESSMENT & PLAN NOTE
Off Ca/Vit D, Ca still elevated - PTH nml - 24 hr Ur Ca nml, parathyroid scan abnormal - poss thyroid or parathyroid adenoma - thyroid US ordered and referral to Endo placed, remain off all Ca/VIT D/MVI at this time, recheck [facility] in 4-6 wks - BW order given, will follow

## 2023-12-28 NOTE — ASSESSMENT & PLAN NOTE
Not much better with bid PPI, losing weight as afraid to eat - GI note said poss manometry if not better - will reach out to GI and see if they want me to order - has GI follow up in March, call with new/worse symptoms

## 2023-12-28 NOTE — ASSESSMENT & PLAN NOTE
Pt noting some benefit with Setraline, on max WBXL dose, still with more down days then good day - increase Sertraline from 25 mg to 50 mg - rx sent,  d/w pt that it takes 4-6 wks to get maximum benefit of med and that med has to be taken every day and to not miss doses of med, call with new/worse/mood/SE/SI, will re-eval in 4-6 wks

## 2023-12-28 NOTE — PROGRESS NOTES
Name: Cindy Cruz      : 1945      MRN: [id number]  Encounter Provider: Any Vargas DO  Encounter Date: 2023   Encounter department: [facility] PRIMARY CARE [address]     Assessment & Plan     1. Hypercalcemia  Assessment & Plan:  Off Ca/Vit D, Ca still elevated - PTH nml - 24 hr Ur Ca nml, parathyroid scan abnormal - poss thyroid or parathyroid adenoma - thyroid US ordered and referral to Endo placed, remain off all Ca/VIT D/MVI at this time, recheck Ica in 4-6 wks - BW order given, will follow    Orders:  -     Ambulatory Referral to Endocrinology; Future  -     Calcium, ionized; Future; Expected date: 2024    2. IFG (impaired fasting glucose)  Assessment & Plan:  FBS wnl but A1C still in IFG range, urged healthy low sugar/carb diet and keep active, con't fasting BW annually      3. Abnormal radionuclide scan of parathyroid gland  Comments:  Will check thyroid US and referr to Endo - orders placed, will follow  Orders:  -     US thyroid; Future; Expected date: 2023  -     Ambulatory Referral to Endocrinology; Future    4. Dysphagia, unspecified type  Assessment & Plan:  Not much better with bid PPI, losing weight as afraid to eat - GI note said poss manometry if not better - will reach out to GI and see if they want me to order - has GI follow up in March, call with new/worse symptoms      5. GERD without esophagitis  Comments:  Pt on PPI bid - med list updated, con't meds and f/u as per GI  Orders:  -     omeprazole (PriLOSEC) 40 MG capsule; Take 1 capsule (40 mg total) by mouth 2 (two) times a day    6. Depression, recurrent (HCC)  Assessment & Plan:  Pt noting some benefit with Setraline, on max WBXL dose, still with more down days then good day - increase Sertraline from 25 mg to 50 mg - rx sent,  d/w pt that it takes 4-6 wks to get maximum benefit of med and that med has to be taken every day and to not miss doses of med, call with new/worse/mood/SE/SI, will re-eval  in 4-6 wks      Orders:  -     sertraline (ZOLOFT) 50 mg tablet; Take 1 tablet (50 mg total) by mouth daily    7. Lump of skin of back  Comments:  Likely a benign lipoma but pt states it gets bigger and is painful at time - will check US  Orders:  -     US extremity soft tissue; Future; Expected date: 12/28/2023       Mammo 12/23    Dexa 11/21 - osteopenia - f/u Dexa has been ordered (July 23) and again urged to do    BW 11/23 (A1C 5.9)  FLP 7/23        Subjective      HPI Pt here for follow up appt and to review test results    BW from Nov reviewed: FBS/A1C 96/5.9, Ca 10.5, .8. Pt was notified of results via phone and 24 hr Ur Ca was done - wnl at 94.  Parathyroid scan was subsequently ordered and done this week - results reviewed with pt in detail today: 1. Persistent nodular activity overlying the posterior mid to lower left thyroid region, suspicious for parathyroid adenoma in the appropriate clinical setting. However, given immediate activity also seen in this area, the findings are less specific, as thyroid adenomas can also follow this pattern of activity. Therefore, recommend further evaluation with thyroid ultrasound. 2. On immediate images, there is mildly increased tracer activity in the right lower pole that demonstrates washout on the delayed images. Finding may represent a thyroid nodule or adenoma, which can also be further assessed with thyroid ultrasound. Pt had a nml TSH 7/14/23 at 1.800.  She is on levothyroxine as directed.  Def of nml vs IFG vs DM was d/w pt in detail. Diet/exercise was reviewed - wgt down 7 lbs from July 23. She is watching sugars/carbs but has not changed diet.  Feels wgt is down as she is afraid to eat as she feels it causes her to choke.     Pt saw Kateryna on 11/28/23 for persistent dysphagia and depression - OV note reviewed. She had had a recent swallow study and was therefore referred to GI. She was started on Sertraline 25 mg 1 tab PO q day for her depression.  "    Pt saw GI (Dr. Stewart) 12/6/23 for dysphagia - OV note reviewed.  She was instructed to take Omeprazole 40 mg daily and was referred for EGD.  She subsequently underwent an EGD on 12/11/23 and results reviewed: moderate erythema to the gastric antrum otherwise nml stomach/duodenum and esophagus.  Random bx were taken and all were benign (neg H. Pylori) Was told to consider esophageal manometry if dysphagia persists.  She notes persistent issues with swallowing but states they are not as strong as before.  She is taking her Omeprazole.  She denies any pyrosis but does have some regurgitation of food. She notes no abd pain. She has f/u with GI in March.     Pt has been taking her Sertraline daily since the end of Nov w/o significant SE.  She feels the medication has helped. She is taking her WBXL as directed as well. She still feels down and sad \"on and off\". She has more bad then good days.         Review of Systems   Constitutional:  Positive for fatigue and unexpected weight change. Negative for chills and fever.   HENT:  Positive for trouble swallowing. Negative for congestion.    Eyes:  Negative for pain and visual disturbance.   Respiratory:  Negative for cough and shortness of breath.    Cardiovascular:  Negative for chest pain, palpitations and leg swelling.   Gastrointestinal:  Positive for constipation. Negative for abdominal pain, diarrhea and nausea.   Genitourinary:  Negative for difficulty urinating and dysuria.   Musculoskeletal:  Positive for arthralgias and back pain. Negative for neck pain.   Skin:  Negative for rash and wound.   Neurological:  Negative for dizziness, light-headedness and headaches.   Hematological:  Does not bruise/bleed easily.   Psychiatric/Behavioral:  Positive for dysphoric mood.        Current Outpatient Medications on File Prior to Visit   Medication Sig    albuterol (PROVENTIL HFA,VENTOLIN HFA) 90 mcg/act inhaler Inhale 2 puffs every 6 (six) hours as needed for wheezing "    albuterol (Ventolin HFA) 90 mcg/act inhaler Inhale 2 puffs every 4 (four) hours as needed for wheezing or shortness of breath    alendronate (FOSAMAX) 70 mg tablet Take 1 tablet (70 mg total) by mouth once a week    atorvastatin (LIPITOR) 20 mg tablet Take 1 tablet by mouth once daily    bimatoprost (Lumigan) 0.01 % ophthalmic drops Administer 1 drop to both eyes daily    buPROPion (WELLBUTRIN XL) 150 mg 24 hr tablet Take 1 tablet (150 mg total) by mouth every morning With 300 mg to equal 450 mg daily    buPROPion (WELLBUTRIN XL) 300 mg 24 hr tablet Take 1 tablet by mouth once daily    docusate sodium (COLACE) 100 mg capsule Take 1 capsule (100 mg total) by mouth 2 (two) times a day    gabapentin (NEURONTIN) 800 mg tablet TAKE 1 TABLET BY MOUTH ONCE DAILY IN THE MORNING AND 1 TABLET AT BEDTIME    glycopyrrolate (ROBINUL) 2 MG tablet Take 1 tablet (2 mg total) by mouth 2 (two) times a day    losartan (COZAAR) 100 MG tablet Take 1 tablet by mouth once daily    montelukast (SINGULAIR) 10 mg tablet Take 1 tablet (10 mg total) by mouth daily at bedtime    polyethylene glycol (GLYCOLAX) 17 GM/SCOOP powder Bowel Prep: One (1) 238-gram container of Miralax (polyethylene glycol 3350) as directed    Synthroid 88 MCG tablet Take 1 tablet by mouth once daily    Timolol Maleate, Once-Daily, 0.5 % SOLN Apply 1 drop to eye every 12 (twelve) hours    tiZANidine (ZANAFLEX) 2 mg tablet Take 1 tablet (2 mg total) by mouth every 8 (eight) hours as needed for muscle spasms    traZODone (DESYREL) 50 mg tablet TAKE 1 TABLET BY MOUTH ONCE DAILY AT BEDTIME    vitamin B-12 (VITAMIN B-12) 1,000 mcg tablet Take 1 tablet (1,000 mcg total) by mouth daily    [DISCONTINUED] cholecalciferol (VITAMIN D3) 1,000 units tablet Take 1,000 Units by mouth daily    [DISCONTINUED] omeprazole (PriLOSEC) 40 MG capsule Take 1 capsule (40 mg total) by mouth daily    [DISCONTINUED] sertraline (ZOLOFT) 25 mg tablet Take 1 tablet (25 mg total) by mouth daily        Objective     /68   Pulse 74   Temp (!) 96.6 °F (35.9 °C) (Tympanic)   Ht 5' (1.524 m)   Wt 72.3 kg (159 lb 6.4 oz)   BMI 31.13 kg/m²     Physical Exam  Vitals and nursing note reviewed.   Constitutional:       General: She is not in acute distress.     Appearance: She is well-developed. She is not ill-appearing.   HENT:      Head: Normocephalic and atraumatic.   Eyes:      General:         Right eye: No discharge.         Left eye: No discharge.      Conjunctiva/sclera: Conjunctivae normal.   Neck:      Trachea: No tracheal deviation.   Cardiovascular:      Rate and Rhythm: Normal rate and regular rhythm.      Heart sounds: Murmur heard.      No friction rub.   Pulmonary:      Effort: Pulmonary effort is normal. No respiratory distress.      Breath sounds: Normal breath sounds. No wheezing, rhonchi or rales.   Musculoskeletal:         General: Tenderness present. No deformity or signs of injury.      Cervical back: Neck supple.      Comments: L paraspinal upper lumbar region with tender subcutaneous soft tissue nodule - soft and mobile w/o any overlying skin changes noted   Skin:     General: Skin is warm.      Coloration: Skin is not pale.      Findings: No rash.   Neurological:      General: No focal deficit present.      Mental Status: She is alert. Mental status is at baseline.      Motor: No abnormal muscle tone.      Gait: Gait normal.   Psychiatric:         Behavior: Behavior normal.         Thought Content: Thought content normal.         Judgment: Judgment normal.      Comments: Flat affect       Any Vargas DO

## 2023-12-28 NOTE — ASSESSMENT & PLAN NOTE
FBS wnl but A1C still in IFG range, urged healthy low sugar/carb diet and keep active, con't fasting BW annually

## 2023-12-29 ENCOUNTER — HOSPITAL ENCOUNTER (OUTPATIENT)
Dept: ULTRASOUND IMAGING | Facility: HOSPITAL | Age: 78
End: 2023-12-29
Payer: COMMERCIAL

## 2023-12-29 DIAGNOSIS — R22.2 LUMP OF SKIN OF BACK: ICD-10-CM

## 2023-12-29 DIAGNOSIS — R94.6 ABNORMAL RADIONUCLIDE SCAN OF PARATHYROID GLAND: ICD-10-CM

## 2023-12-29 PROCEDURE — 76705 ECHO EXAM OF ABDOMEN: CPT

## 2023-12-29 PROCEDURE — 76536 US EXAM OF HEAD AND NECK: CPT

## 2024-01-03 ENCOUNTER — TELEPHONE (OUTPATIENT)
Dept: ENDOCRINOLOGY | Facility: CLINIC | Age: 79
End: 2024-01-03

## 2024-01-03 NOTE — TELEPHONE ENCOUNTER
Received referral for Cindy Cruz.     Left voicemail for patient to contact office to schedule Consult/New Patient Appointment.

## 2024-01-07 DIAGNOSIS — E83.52 HYPERCALCEMIA: ICD-10-CM

## 2024-01-07 DIAGNOSIS — R94.6 ABNORMAL RADIONUCLIDE SCAN OF PARATHYROID GLAND: Primary | ICD-10-CM

## 2024-01-11 ENCOUNTER — HOSPITAL ENCOUNTER (OUTPATIENT)
Dept: BONE DENSITY | Facility: IMAGING CENTER | Age: 79
Discharge: HOME | End: 2024-01-11
Payer: COMMERCIAL

## 2024-01-11 VITALS — BODY MASS INDEX: 31.4 KG/M2 | WEIGHT: 149.6 LBS | HEIGHT: 58 IN

## 2024-01-11 DIAGNOSIS — E28.39 MENOPAUSE OVARIAN FAILURE: ICD-10-CM

## 2024-01-11 PROCEDURE — 77080 DXA BONE DENSITY AXIAL: CPT

## 2024-01-21 DIAGNOSIS — J45.20 MILD INTERMITTENT ASTHMA WITHOUT COMPLICATION: ICD-10-CM

## 2024-01-22 RX ORDER — ALBUTEROL SULFATE 90 UG/1
2 AEROSOL, METERED RESPIRATORY (INHALATION) EVERY 6 HOURS PRN
Qty: 18 G | Refills: 0 | Status: SHIPPED | OUTPATIENT
Start: 2024-01-22

## 2024-01-25 DIAGNOSIS — G47.00 INSOMNIA, UNSPECIFIED TYPE: ICD-10-CM

## 2024-01-26 RX ORDER — TRAZODONE HYDROCHLORIDE 50 MG/1
50 TABLET ORAL
Qty: 30 TABLET | Refills: 0 | Status: SHIPPED | OUTPATIENT
Start: 2024-01-26

## 2024-01-29 ENCOUNTER — TELEPHONE (OUTPATIENT)
Dept: FAMILY MEDICINE CLINIC | Facility: HOSPITAL | Age: 79
End: 2024-01-29

## 2024-01-29 NOTE — TELEPHONE ENCOUNTER
VM----pt woke with neck swelling this am and trouble breathing throughout the night. Asking if she should go to ED. CPB

## 2024-01-29 NOTE — TELEPHONE ENCOUNTER
Told daughter to take to the ED   will watch mom  if needing at trip to the ED    made an appt for tomorrow aníbal  6 pm

## 2024-01-30 ENCOUNTER — OFFICE VISIT (OUTPATIENT)
Dept: FAMILY MEDICINE CLINIC | Facility: HOSPITAL | Age: 79
End: 2024-01-30
Payer: COMMERCIAL

## 2024-01-30 VITALS
WEIGHT: 155.4 LBS | OXYGEN SATURATION: 97 % | TEMPERATURE: 97.9 F | HEART RATE: 70 BPM | SYSTOLIC BLOOD PRESSURE: 160 MMHG | HEIGHT: 58 IN | DIASTOLIC BLOOD PRESSURE: 64 MMHG | BODY MASS INDEX: 32.62 KG/M2

## 2024-01-30 DIAGNOSIS — R06.01 ORTHOPNEA: Primary | ICD-10-CM

## 2024-01-30 DIAGNOSIS — R22.1 NECK SWELLING: ICD-10-CM

## 2024-01-30 DIAGNOSIS — R07.9 CHEST PAIN, UNSPECIFIED TYPE: ICD-10-CM

## 2024-01-30 DIAGNOSIS — N18.31 CHRONIC KIDNEY DISEASE, STAGE 3A (HCC): ICD-10-CM

## 2024-01-30 DIAGNOSIS — R13.10 DYSPHAGIA, UNSPECIFIED TYPE: ICD-10-CM

## 2024-01-30 DIAGNOSIS — F33.9 DEPRESSION, RECURRENT (HCC): ICD-10-CM

## 2024-01-30 PROCEDURE — 3078F DIAST BP <80 MM HG: CPT | Performed by: INTERNAL MEDICINE

## 2024-01-30 PROCEDURE — 99214 OFFICE O/P EST MOD 30 MIN: CPT | Performed by: INTERNAL MEDICINE

## 2024-01-30 PROCEDURE — 1160F RVW MEDS BY RX/DR IN RCRD: CPT | Performed by: INTERNAL MEDICINE

## 2024-01-30 PROCEDURE — 3077F SYST BP >= 140 MM HG: CPT | Performed by: INTERNAL MEDICINE

## 2024-01-30 PROCEDURE — 1159F MED LIST DOCD IN RCRD: CPT | Performed by: INTERNAL MEDICINE

## 2024-01-30 PROCEDURE — 93000 ELECTROCARDIOGRAM COMPLETE: CPT | Performed by: INTERNAL MEDICINE

## 2024-01-30 NOTE — PROGRESS NOTES
Name: Cindy Cruz      : 1945      MRN: [id number]  Encounter Provider: Any Vargas DO  Encounter Date: 2024   Encounter department: [facility] PRIMARY [facility] [address]     Assessment & Plan     1. Orthopnea  Comments:  ECG in office today w/o acute ischemia or arrhythmia, faint wheeze on exam but no other abnormality on exam, will hold on diuretic for now and check Echo and BW (including BNP), will follow closely, to ED with new/worse symptoms  Orders:  -     Echo complete w/ contrast if indicated; Future; Expected date: 2024  -     CBC and differential  -     Comprehensive metabolic panel  -     TSH, 3rd generation with Free T4 reflex  -     B-Type Natriuretic Peptide(BNP); Future  - POCT ECG    2. Chest pain, unspecified type  Comments:  ECG done in office today w/o acute ischemia, will check Echo and BW, if persists will need further eval, to ED with new/worse symptoms or with typical CV red flag symptoms  Orders:  -     Echo complete w/ contrast if indicated; Future; Expected date: 2024  -     CBC and differential  -     Comprehensive metabolic panel  -     TSH, 3rd generation with Free T4 reflex  - POCT ECG    3. Neck swelling  Comments:  Just had thyroid US and parathyroid uptake scan and L posterior thyroid nodule noted, would benefit from further eval with MRI (no CT scan as allergy to contrast)  Orders:  -     FL barium swallow ROUTINE esophagus; Future; Expected date: 2024  -     MRI soft tissue neck wo and w contrast; Future; Expected date: 2024  -     CBC and differential  -     Comprehensive metabolic panel  -     TSH, 3rd generation with Free T4 reflex    4. Dysphagia, unspecified type  Comments:  Check swallow study, encouraged to cut food into small pieces and chew well, she is on Prilosec bid already  Orders:  -     FL barium swallow ROUTINE esophagus; Future; Expected date: 2024  -     MRI soft tissue neck wo and w contrast; Future;  Expected date: 01/30/2024  -     CBC and differential  -     Comprehensive metabolic panel  -     TSH, 3rd generation with Free T4 reflex    5. Chronic kidney disease, stage 3a (HCC)  Comments:  urged to keep hydrated and avoid NSAIDs, watch closely as may need diuretic in future  Orders:  -     CBC and differential  -     Comprehensive metabolic panel  -     TSH, 3rd generation with Free T4 reflex    6. Depression, recurrent (HCC)  Assessment & Plan:  Flat affect today, will address acute issues and re-eval mood in Feb after acute issues addressed, con't current mood meds for now      Mammo 12/23    Dexa 1/24 - osteopenia    BW 11/23 (A1c 5.9)  FLP 7/23  TSH 7/23       Subjective      HPI pt here for an acute visit    She has been noting SOB and L neck swelling    Pt notes SOB x 3 days. She notes SOB is worse with laying down and better with sitting up.  She notes CP when the breathing issues occur at night.  The pain is center of sternum and does not radiate.  She has a HA with the pain but has no dizziness/diaphoresis.  Yesterday she woke up with the L side of her neck swollen and tender to the touch.  She notes the area was not red or hot.  The swelling is better but is not resolved since yesterday. She notes issues swallowing pills/liquids/solids. She states this is different then when she had swallow study 2 yrs ago          Review of Systems   Constitutional:  Positive for fatigue. Negative for chills and fever.   HENT:  Positive for trouble swallowing. Negative for congestion and sore throat.    Eyes:  Negative for pain and visual disturbance.   Respiratory:  Positive for shortness of breath. Negative for cough and wheezing.    Cardiovascular:  Positive for chest pain. Negative for palpitations and leg swelling.   Gastrointestinal:  Negative for abdominal pain, blood in stool, constipation, diarrhea, nausea and vomiting.   Genitourinary:  Negative for difficulty urinating and dysuria.   Musculoskeletal:   Positive for arthralgias and back pain. Negative for neck pain.   Skin:  Negative for rash and wound.   Neurological:  Positive for headaches. Negative for dizziness and light-headedness.   Hematological:  Does not bruise/bleed easily.   Psychiatric/Behavioral:  Positive for dysphoric mood. Negative for confusion.        Current Outpatient Medications on File Prior to Visit   Medication Sig    albuterol (PROVENTIL HFA,VENTOLIN HFA) 90 mcg/act inhaler INHALE 2 PUFFS BY MOUTH EVERY 6 HOURS AS NEEDED FOR WHEEZING    albuterol (Ventolin HFA) 90 mcg/act inhaler Inhale 2 puffs every 4 (four) hours as needed for wheezing or shortness of breath    alendronate (FOSAMAX) 70 mg tablet Take 1 tablet (70 mg total) by mouth once a week    atorvastatin (LIPITOR) 20 mg tablet Take 1 tablet by mouth once daily    bimatoprost (Lumigan) 0.01 % ophthalmic drops Administer 1 drop to both eyes daily    buPROPion (WELLBUTRIN XL) 150 mg 24 hr tablet Take 1 tablet (150 mg total) by mouth every morning With 300 mg to equal 450 mg daily    buPROPion (WELLBUTRIN XL) 300 mg 24 hr tablet Take 1 tablet by mouth once daily    docusate sodium (COLACE) 100 mg capsule Take 1 capsule (100 mg total) by mouth 2 (two) times a day    gabapentin (NEURONTIN) 800 mg tablet TAKE 1 TABLET BY MOUTH ONCE DAILY IN THE MORNING AND 1 TABLET AT BEDTIME    glycopyrrolate (ROBINUL) 2 MG tablet Take 1 tablet (2 mg total) by mouth 2 (two) times a day    losartan (COZAAR) 100 MG tablet Take 1 tablet by mouth once daily    montelukast (SINGULAIR) 10 mg tablet Take 1 tablet (10 mg total) by mouth daily at bedtime    omeprazole (PriLOSEC) 40 MG capsule Take 1 capsule (40 mg total) by mouth 2 (two) times a day    polyethylene glycol (GLYCOLAX) 17 GM/SCOOP powder Bowel Prep: One (1) 238-gram container of Miralax (polyethylene glycol 3350) as directed    sertraline (ZOLOFT) 50 mg tablet Take 1 tablet (50 mg total) by mouth daily    Synthroid 88 MCG tablet Take 1 tablet by  "mouth once daily    Timolol Maleate, Once-Daily, 0.5 % SOLN Apply 1 drop to eye every 12 (twelve) hours    tiZANidine (ZANAFLEX) 2 mg tablet Take 1 tablet (2 mg total) by mouth every 8 (eight) hours as needed for muscle spasms    traZODone (DESYREL) 50 mg tablet TAKE 1 TABLET BY MOUTH ONCE DAILY AT BEDTIME    vitamin B-12 (VITAMIN B-12) 1,000 mcg tablet Take 1 tablet (1,000 mcg total) by mouth daily       Objective     /64   Pulse 70   Temp 97.9 °F (36.6 °C)   Ht 4' 9.5\" (1.461 m)   Wt 70.5 kg (155 lb 6.4 oz)   SpO2 97%   BMI 33.05 kg/m²     Physical Exam  Vitals and nursing note reviewed.   Constitutional:       General: She is not in acute distress.     Appearance: She is well-developed. She is not ill-appearing.   HENT:      Head: Normocephalic and atraumatic.   Eyes:      General:         Right eye: No discharge.         Left eye: No discharge.      Conjunctiva/sclera: Conjunctivae normal.   Neck:      Trachea: No tracheal deviation.      Comments: Mild L ant cervical adenopathy but L soft tissue tender around this region  Cardiovascular:      Rate and Rhythm: Normal rate and regular rhythm.      Heart sounds: Normal heart sounds. No murmur heard.     No friction rub.   Pulmonary:      Effort: Pulmonary effort is normal. No respiratory distress.      Breath sounds: Wheezing present. No rhonchi or rales.      Comments: Faint wheeze LLL  Abdominal:      General: There is no distension.      Palpations: Abdomen is soft.      Tenderness: There is no abdominal tenderness. There is no guarding or rebound.   Musculoskeletal:         General: No deformity or signs of injury.      Cervical back: Neck supple.   Lymphadenopathy:      Cervical: Cervical adenopathy present.   Skin:     General: Skin is warm.      Coloration: Skin is not pale.      Findings: No rash.   Neurological:      General: No focal deficit present.      Mental Status: She is alert. Mental status is at baseline.      Motor: No abnormal " muscle tone.      Gait: Gait normal.   Psychiatric:         Behavior: Behavior normal.         Thought Content: Thought content normal.         Judgment: Judgment normal.      Comments: Flat affect       Any Vargas,

## 2024-01-30 NOTE — ASSESSMENT & PLAN NOTE
Flat affect today, will address acute issues and re-eval mood in Feb after acute issues addressed, con't current mood meds for now

## 2024-01-31 ENCOUNTER — APPOINTMENT (OUTPATIENT)
Dept: LAB | Facility: HOSPITAL | Age: 79
End: 2024-01-31
Payer: COMMERCIAL

## 2024-01-31 DIAGNOSIS — R06.01 ORTHOPNEA: ICD-10-CM

## 2024-01-31 DIAGNOSIS — E83.52 HYPERCALCEMIA: ICD-10-CM

## 2024-01-31 LAB
ALBUMIN SERPL BCP-MCNC: 4 G/DL (ref 3.5–5)
ALP SERPL-CCNC: 69 U/L (ref 34–104)
ALT SERPL W P-5'-P-CCNC: 14 U/L (ref 7–52)
ANION GAP SERPL CALCULATED.3IONS-SCNC: 9 MMOL/L
AST SERPL W P-5'-P-CCNC: 14 U/L (ref 13–39)
BASOPHILS # BLD AUTO: 0.03 THOUSANDS/ÂΜL (ref 0–0.1)
BASOPHILS NFR BLD AUTO: 0 % (ref 0–1)
BILIRUB SERPL-MCNC: 0.46 MG/DL (ref 0.2–1)
BNP SERPL-MCNC: 315 PG/ML (ref 0–100)
BUN SERPL-MCNC: 17 MG/DL (ref 5–25)
CA-I BLD-SCNC: 1.28 MMOL/L (ref 1.12–1.32)
CALCIUM SERPL-MCNC: 10.2 MG/DL (ref 8.4–10.2)
CHLORIDE SERPL-SCNC: 106 MMOL/L (ref 96–108)
CO2 SERPL-SCNC: 26 MMOL/L (ref 21–32)
CREAT SERPL-MCNC: 1.1 MG/DL (ref 0.6–1.3)
EOSINOPHIL # BLD AUTO: 0.45 THOUSAND/ÂΜL (ref 0–0.61)
EOSINOPHIL NFR BLD AUTO: 6 % (ref 0–6)
ERYTHROCYTE [DISTWIDTH] IN BLOOD BY AUTOMATED COUNT: 14.8 % (ref 11.6–15.1)
GFR SERPL CREATININE-BSD FRML MDRD: 47 ML/MIN/1.73SQ M
GLUCOSE P FAST SERPL-MCNC: 86 MG/DL (ref 65–99)
HCT VFR BLD AUTO: 37.1 % (ref 34.8–46.1)
HGB BLD-MCNC: 11.7 G/DL (ref 11.5–15.4)
IMM GRANULOCYTES # BLD AUTO: 0.02 THOUSAND/UL (ref 0–0.2)
IMM GRANULOCYTES NFR BLD AUTO: 0 % (ref 0–2)
LYMPHOCYTES # BLD AUTO: 1.56 THOUSANDS/ÂΜL (ref 0.6–4.47)
LYMPHOCYTES NFR BLD AUTO: 21 % (ref 14–44)
MCH RBC QN AUTO: 30.3 PG (ref 26.8–34.3)
MCHC RBC AUTO-ENTMCNC: 31.5 G/DL (ref 31.4–37.4)
MCV RBC AUTO: 96 FL (ref 82–98)
MONOCYTES # BLD AUTO: 0.54 THOUSAND/ÂΜL (ref 0.17–1.22)
MONOCYTES NFR BLD AUTO: 7 % (ref 4–12)
NEUTROPHILS # BLD AUTO: 4.71 THOUSANDS/ÂΜL (ref 1.85–7.62)
NEUTS SEG NFR BLD AUTO: 66 % (ref 43–75)
NRBC BLD AUTO-RTO: 0 /100 WBCS
PLATELET # BLD AUTO: 198 THOUSANDS/UL (ref 149–390)
PMV BLD AUTO: 12.9 FL (ref 8.9–12.7)
POTASSIUM SERPL-SCNC: 4.1 MMOL/L (ref 3.5–5.3)
PROT SERPL-MCNC: 6.8 G/DL (ref 6.4–8.4)
RBC # BLD AUTO: 3.86 MILLION/UL (ref 3.81–5.12)
SODIUM SERPL-SCNC: 141 MMOL/L (ref 135–147)
TSH SERPL DL<=0.05 MIU/L-ACNC: 2.39 UIU/ML (ref 0.45–4.5)
WBC # BLD AUTO: 7.31 THOUSAND/UL (ref 4.31–10.16)

## 2024-01-31 PROCEDURE — 85025 COMPLETE CBC W/AUTO DIFF WBC: CPT | Performed by: INTERNAL MEDICINE

## 2024-01-31 PROCEDURE — 80053 COMPREHEN METABOLIC PANEL: CPT | Performed by: INTERNAL MEDICINE

## 2024-01-31 PROCEDURE — 84443 ASSAY THYROID STIM HORMONE: CPT | Performed by: INTERNAL MEDICINE

## 2024-01-31 PROCEDURE — 83880 ASSAY OF NATRIURETIC PEPTIDE: CPT

## 2024-01-31 PROCEDURE — 36415 COLL VENOUS BLD VENIPUNCTURE: CPT

## 2024-01-31 PROCEDURE — 82330 ASSAY OF CALCIUM: CPT

## 2024-02-09 ENCOUNTER — HOSPITAL ENCOUNTER (EMERGENCY)
Facility: HOSPITAL | Age: 79
Discharge: HOME | End: 2024-02-09
Attending: EMERGENCY MEDICINE
Payer: MEDICARE

## 2024-02-09 ENCOUNTER — TELEPHONE (OUTPATIENT)
Dept: FAMILY MEDICINE CLINIC | Facility: HOSPITAL | Age: 79
End: 2024-02-09

## 2024-02-09 ENCOUNTER — HOSPITAL ENCOUNTER (OUTPATIENT)
Dept: RADIOLOGY | Facility: HOSPITAL | Age: 79
End: 2024-02-09
Payer: MEDICARE

## 2024-02-09 ENCOUNTER — APPOINTMENT (EMERGENCY)
Dept: RADIOLOGY | Facility: HOSPITAL | Age: 79
End: 2024-02-09
Payer: MEDICARE

## 2024-02-09 VITALS
HEIGHT: 57 IN | OXYGEN SATURATION: 99 % | BODY MASS INDEX: 33.44 KG/M2 | WEIGHT: 155 LBS | TEMPERATURE: 98 F | HEART RATE: 78 BPM | RESPIRATION RATE: 17 BRPM | DIASTOLIC BLOOD PRESSURE: 79 MMHG | SYSTOLIC BLOOD PRESSURE: 168 MMHG

## 2024-02-09 DIAGNOSIS — R13.10 DYSPHAGIA, UNSPECIFIED TYPE: ICD-10-CM

## 2024-02-09 DIAGNOSIS — T17.900A SILENT ASPIRATION, INITIAL ENCOUNTER: ICD-10-CM

## 2024-02-09 DIAGNOSIS — M25.532 LEFT WRIST PAIN: Primary | ICD-10-CM

## 2024-02-09 DIAGNOSIS — R22.1 NECK SWELLING: ICD-10-CM

## 2024-02-09 DIAGNOSIS — R13.10 DYSPHAGIA, UNSPECIFIED TYPE: Primary | ICD-10-CM

## 2024-02-09 LAB
ALBUMIN SERPL BCP-MCNC: 3.9 G/DL (ref 3.5–5)
ALP SERPL-CCNC: 83 U/L (ref 34–104)
ALT SERPL W P-5'-P-CCNC: 11 U/L (ref 7–52)
ANION GAP SERPL CALCULATED.3IONS-SCNC: 8 MMOL/L
AST SERPL W P-5'-P-CCNC: 12 U/L (ref 13–39)
BASOPHILS # BLD AUTO: 0.04 THOUSANDS/ÂΜL (ref 0–0.1)
BASOPHILS NFR BLD AUTO: 1 % (ref 0–1)
BILIRUB SERPL-MCNC: 0.33 MG/DL (ref 0.2–1)
BUN SERPL-MCNC: 21 MG/DL (ref 5–25)
CALCIUM SERPL-MCNC: 10.2 MG/DL (ref 8.4–10.2)
CHLORIDE SERPL-SCNC: 108 MMOL/L (ref 96–108)
CO2 SERPL-SCNC: 24 MMOL/L (ref 21–32)
CREAT SERPL-MCNC: 1.12 MG/DL (ref 0.6–1.3)
CRP SERPL QL: 3.1 MG/L
EOSINOPHIL # BLD AUTO: 0.4 THOUSAND/ÂΜL (ref 0–0.61)
EOSINOPHIL NFR BLD AUTO: 5 % (ref 0–6)
ERYTHROCYTE [DISTWIDTH] IN BLOOD BY AUTOMATED COUNT: 14 % (ref 11.6–15.1)
ERYTHROCYTE [SEDIMENTATION RATE] IN BLOOD: 14 MM/HOUR (ref 0–29)
GFR SERPL CREATININE-BSD FRML MDRD: 46 ML/MIN/1.73SQ M
GLUCOSE SERPL-MCNC: 96 MG/DL (ref 65–140)
HCT VFR BLD AUTO: 34.3 % (ref 34.8–46.1)
HGB BLD-MCNC: 11.3 G/DL (ref 11.5–15.4)
IMM GRANULOCYTES # BLD AUTO: 0.05 THOUSAND/UL (ref 0–0.2)
IMM GRANULOCYTES NFR BLD AUTO: 1 % (ref 0–2)
LYMPHOCYTES # BLD AUTO: 1.61 THOUSANDS/ÂΜL (ref 0.6–4.47)
LYMPHOCYTES NFR BLD AUTO: 19 % (ref 14–44)
MCH RBC QN AUTO: 30.5 PG (ref 26.8–34.3)
MCHC RBC AUTO-ENTMCNC: 32.9 G/DL (ref 31.4–37.4)
MCV RBC AUTO: 93 FL (ref 82–98)
MONOCYTES # BLD AUTO: 0.78 THOUSAND/ÂΜL (ref 0.17–1.22)
MONOCYTES NFR BLD AUTO: 9 % (ref 4–12)
NEUTROPHILS # BLD AUTO: 5.74 THOUSANDS/ÂΜL (ref 1.85–7.62)
NEUTS SEG NFR BLD AUTO: 65 % (ref 43–75)
NRBC BLD AUTO-RTO: 0 /100 WBCS
PLATELET # BLD AUTO: 176 THOUSANDS/UL (ref 149–390)
PMV BLD AUTO: 11.7 FL (ref 8.9–12.7)
POTASSIUM SERPL-SCNC: 4.2 MMOL/L (ref 3.5–5.3)
PROT SERPL-MCNC: 6.9 G/DL (ref 6.4–8.4)
RBC # BLD AUTO: 3.71 MILLION/UL (ref 3.81–5.12)
SODIUM SERPL-SCNC: 140 MMOL/L (ref 135–147)
URATE SERPL-MCNC: 5.1 MG/DL (ref 2–7.5)
WBC # BLD AUTO: 8.62 THOUSAND/UL (ref 4.31–10.16)

## 2024-02-09 PROCEDURE — 86140 C-REACTIVE PROTEIN: CPT | Performed by: PHYSICIAN ASSISTANT

## 2024-02-09 PROCEDURE — 85652 RBC SED RATE AUTOMATED: CPT | Performed by: PHYSICIAN ASSISTANT

## 2024-02-09 PROCEDURE — 80053 COMPREHEN METABOLIC PANEL: CPT | Performed by: PHYSICIAN ASSISTANT

## 2024-02-09 PROCEDURE — 85025 COMPLETE CBC W/AUTO DIFF WBC: CPT | Performed by: PHYSICIAN ASSISTANT

## 2024-02-09 PROCEDURE — 74220 X-RAY XM ESOPHAGUS 1CNTRST: CPT

## 2024-02-09 PROCEDURE — 84550 ASSAY OF BLOOD/URIC ACID: CPT | Performed by: PHYSICIAN ASSISTANT

## 2024-02-09 PROCEDURE — 36415 COLL VENOUS BLD VENIPUNCTURE: CPT | Performed by: PHYSICIAN ASSISTANT

## 2024-02-09 PROCEDURE — 99285 EMERGENCY DEPT VISIT HI MDM: CPT | Performed by: PHYSICIAN ASSISTANT

## 2024-02-09 PROCEDURE — 73110 X-RAY EXAM OF WRIST: CPT

## 2024-02-09 PROCEDURE — 99283 EMERGENCY DEPT VISIT LOW MDM: CPT

## 2024-02-09 RX ORDER — ACETAMINOPHEN 325 MG/1
650 TABLET ORAL ONCE
Status: COMPLETED | OUTPATIENT
Start: 2024-02-09 | End: 2024-02-09

## 2024-02-09 RX ADMIN — ACETAMINOPHEN 650 MG: 325 TABLET, FILM COATED ORAL at 11:30

## 2024-02-09 NOTE — ED PROVIDER NOTES
History  Chief Complaint   Patient presents with    Wrist Pain     Patient reports to ED c/o left wrist pain and swelling x3 days. Denies injury.     History translated by patient's daughter as patient speaks Citizen of [place].    Patient is an 78-year-old  female with history of asthma, hyperlipidemia, hypertension, CKD who reports 3-day history of atraumatic left wrist pain and swelling.  Pain radiates into the left hand.  No fever or chills.  No left wrist redness.  No recent lifting or repetitive use.  No history of gout.  No other complaints.        Prior to Admission Medications   Prescriptions Last Dose Informant Patient Reported? Taking?   Synthroid 88 MCG tablet   No No   Sig: Take 1 tablet by mouth once daily   Timolol Maleate, Once-Daily, 0.5 % SOLN  Child No No   Sig: Apply 1 drop to eye every 12 (twelve) hours   albuterol (PROVENTIL HFA,VENTOLIN HFA) 90 mcg/act inhaler   No No   Sig: INHALE 2 PUFFS BY MOUTH EVERY 6 HOURS AS NEEDED FOR WHEEZING   albuterol (Ventolin HFA) 90 mcg/act inhaler  Child No No   Sig: Inhale 2 puffs every 4 (four) hours as needed for wheezing or shortness of breath   alendronate (FOSAMAX) 70 mg tablet  Child No No   Sig: Take 1 tablet (70 mg total) by mouth once a week   atorvastatin (LIPITOR) 20 mg tablet  Child No No   Sig: Take 1 tablet by mouth once daily   bimatoprost (Lumigan) 0.01 % ophthalmic drops  Child No No   Sig: Administer 1 drop to both eyes daily   buPROPion (WELLBUTRIN XL) 150 mg 24 hr tablet  Child No No   Sig: Take 1 tablet (150 mg total) by mouth every morning With 300 mg to equal 450 mg daily   buPROPion (WELLBUTRIN XL) 300 mg 24 hr tablet  Child No No   Sig: Take 1 tablet by mouth once daily   docusate sodium (COLACE) 100 mg capsule   No No   Sig: Take 1 capsule (100 mg total) by mouth 2 (two) times a day   gabapentin (NEURONTIN) 800 mg tablet  Child No No   Sig: TAKE 1 TABLET BY MOUTH ONCE DAILY IN THE MORNING AND 1 TABLET AT BEDTIME   glycopyrrolate  (ROBINUL) 2 MG tablet  Child No No   Sig: Take 1 tablet (2 mg total) by mouth 2 (two) times a day   losartan (COZAAR) 100 MG tablet  Child No No   Sig: Take 1 tablet by mouth once daily   montelukast (SINGULAIR) 10 mg tablet  Child No No   Sig: Take 1 tablet (10 mg total) by mouth daily at bedtime   omeprazole (PriLOSEC) 40 MG capsule   No No   Sig: Take 1 capsule (40 mg total) by mouth 2 (two) times a day   polyethylene glycol (GLYCOLAX) 17 GM/SCOOP powder   No No   Sig: Bowel Prep: One (1) 238-gram container of Miralax (polyethylene glycol 3350) as directed   sertraline (ZOLOFT) 50 mg tablet   No No   Sig: Take 1 tablet (50 mg total) by mouth daily   tiZANidine (ZANAFLEX) 2 mg tablet  Child No No   Sig: Take 1 tablet (2 mg total) by mouth every 8 (eight) hours as needed for muscle spasms   traZODone (DESYREL) 50 mg tablet   No No   Sig: TAKE 1 TABLET BY MOUTH ONCE DAILY AT BEDTIME   vitamin B-12 (VITAMIN B-12) 1,000 mcg tablet  Child No No   Sig: Take 1 tablet (1,000 mcg total) by mouth daily      Facility-Administered Medications: None       Past Medical History:   Diagnosis Date    Asthma     Chronic pain disorder     Back    Coronary artery disease     Depression     Disease of thyroid gland     Dyslipidemia     Esophageal reflux     Frequent headaches     stress related    GERD (gastroesophageal reflux disease)     Headache(784.0)     Heart murmur     History of stomach ulcers     Hypercalcemia     Hyperlipidemia     Hypertension     Osteopenia     Tremor        Past Surgical History:   Procedure Laterality Date    BACK SURGERY      lower back    BILATERAL OOPHORECTOMY      BREAST BIOPSY Right     long ago, benign     SECTION      CHOLECYSTECTOMY      COLONOSCOPY      HYSTERECTOMY      age 36 per MRS    JOINT REPLACEMENT Left     knee    OOPHORECTOMY Bilateral     age 36 per MRS    SC WEBB IMPLTJ NSTIM ELTRDS PLATE/PADDLE EDRL Left 2019    Procedure: LAMINECTOMY THORACIC FOR INSERTION DORSAL  COLUMN SPINAL CORD STIMULATOR (DCS) W IMPLANTABLE PULSE GENERATOR, LEFT GLUTE;  Surgeon: Lg Mccallum MD;  Location: [facility];  Service: Neurosurgery    WA REVJ/RMVL IMPL SPI NPG/RCVR DTCH CONNJ ELTRD RA Left 2022    Procedure: Reopening of thoracic and left buttock incisions for removal of spinal cord stimulator system;  Surgeon: Siva Goss MD;  Location: [facility];  Service: Neurosurgery    ROTATOR CUFF REPAIR Right     SPINAL STIMULATOR PLACEMENT N/A 2019    Procedure: REVISION SPINAL CORD STIMULATOR PADDLE ELECTRODE, REPLACEMENT WITH PERCUTANEOUS ELECTRODES OR SMALLER PADDLE;  Surgeon: Lg Mccallum MD;  Location: [facility];  Service: Neurosurgery       Family History   Problem Relation Age of Onset    No Known Problems Mother     Glaucoma Father     No Known Problems Sister     No Known Problems Sister     No Known Problems Sister     Depression Sister     No Known Problems Sister     No Known Problems Maternal Grandmother     No Known Problems Maternal Grandfather     No Known Problems Paternal Grandmother     No Known Problems Paternal Grandfather     No Known Problems Daughter     No Known Problems Daughter     No Known Problems Daughter     Hypertension Family     Stroke Family     Heart disease Family     Thyroid disease Family     Colon cancer Neg Hx     Colon polyps Neg Hx      I have reviewed and agree with the history as documented.    E-Cigarette/Vaping    E-Cigarette Use Never User      E-Cigarette/Vaping Substances    Nicotine No     THC No     CBD No     Flavoring No     Other No     Unknown No      Social History     Tobacco Use    Smoking status: Former     Current packs/day: 0.00     Types: Cigarettes     Start date:      Quit date: 1991     Years since quittin.8    Smokeless tobacco: Never   Vaping Use    Vaping status: Never Used   Substance Use Topics    Alcohol use: Not Currently    Drug use: Never       Review of Systems   Constitutional:  Negative for chills  and fever.   Respiratory:  Negative for shortness of breath.    Cardiovascular:  Negative for chest pain.   Gastrointestinal:  Negative for abdominal pain.   Musculoskeletal:  Positive for arthralgias and joint swelling. Negative for back pain and neck pain.   Neurological:  Negative for numbness.       Physical Exam  Physical Exam  Vitals and nursing note reviewed.   Constitutional:       Appearance: Normal appearance.   Cardiovascular:      Rate and Rhythm: Normal rate and regular rhythm.      Pulses: Normal pulses.      Heart sounds: Normal heart sounds.   Pulmonary:      Effort: Pulmonary effort is normal.      Breath sounds: Normal breath sounds.   Musculoskeletal:      Comments: L wrist swelling  Pain with minimal movement of L wrist. No erythema.    Skin:     General: Skin is warm and dry.      Capillary Refill: Capillary refill takes less than 2 seconds.   Neurological:      General: No focal deficit present.      Mental Status: She is alert and oriented to person, place, and time. Mental status is at baseline.         Vital Signs  ED Triage Vitals [02/09/24 0952]   Temperature Pulse Respirations Blood Pressure SpO2   98 °F (36.7 °C) 74 16 (!) 184/74 98 %      Temp src Heart Rate Source Patient Position - Orthostatic VS BP Location FiO2 (%)   -- Monitor Lying Right arm --      Pain Score       9           Vitals:    02/09/24 0952 02/09/24 1030 02/09/24 1130   BP: (!) 184/74 165/75 168/79   Pulse: 74 71 78   Patient Position - Orthostatic VS: Lying           Visual Acuity      ED Medications  Medications   acetaminophen (TYLENOL) tablet 650 mg (650 mg Oral Given 2/9/24 1130)       Diagnostic Studies  Results Reviewed       Procedure Component Value Units Date/Time    Sedimentation rate, automated [[id number]]  (Normal) Collected: 02/09/24 1025    Lab Status: Final result Specimen: Blood from Arm, Left Updated: 02/09/24 1059     Sed Rate 14 mm/hour     Comprehensive metabolic panel [[id number]]  (Abnormal)  Collected: 02/09/24 1025    Lab Status: Final result Specimen: Blood from Arm, Left Updated: 02/09/24 1044     Sodium 140 mmol/L      Potassium 4.2 mmol/L      Chloride 108 mmol/L      CO2 24 mmol/L      ANION GAP 8 mmol/L      BUN 21 mg/dL      Creatinine 1.12 mg/dL      Glucose 96 mg/dL      Calcium 10.2 mg/dL      AST 12 U/L      ALT 11 U/L      Alkaline Phosphatase 83 U/L      Total Protein 6.9 g/dL      Albumin 3.9 g/dL      Total Bilirubin 0.33 mg/dL      eGFR 46 ml/min/1.73sq m     Narrative:      National Kidney Disease Foundation guidelines for Chronic Kidney Disease (CKD):     Stage 1 with normal or high GFR (GFR > 90 mL/min/1.73 square meters)    Stage 2 Mild CKD (GFR = 60-89 mL/min/1.73 square meters)    Stage 3A Moderate CKD (GFR = 45-59 mL/min/1.73 square meters)    Stage 3B Moderate CKD (GFR = 30-44 mL/min/1.73 square meters)    Stage 4 Severe CKD (GFR = 15-29 mL/min/1.73 square meters)    Stage 5 End Stage CKD (GFR <15 mL/min/1.73 square meters)  Note: GFR calculation is accurate only with a steady state creatinine    Uric acid [[id number]]  (Normal) Collected: 02/09/24 1025    Lab Status: Final result Specimen: Blood from Arm, Left Updated: 02/09/24 1044     Uric Acid 5.1 mg/dL     Narrative:      N-acetyl-p-benzoquinone imine (metabolite of Acetaminophen) will generate erroneously low results in samples for patients that have taken an overdose of Acetaminophen.    C-reactive protein [[id number]]  (Abnormal) Collected: 02/09/24 1025    Lab Status: Final result Specimen: Blood from Arm, Left Updated: 02/09/24 1044     CRP 3.1 mg/L     CBC and differential [[id number]]  (Abnormal) Collected: 02/09/24 1025    Lab Status: Final result Specimen: Blood from Arm, Left Updated: 02/09/24 1029     WBC 8.62 Thousand/uL      RBC 3.71 Million/uL      Hemoglobin 11.3 g/dL      Hematocrit 34.3 %      MCV 93 fL      MCH 30.5 pg      MCHC 32.9 g/dL      RDW 14.0 %      MPV 11.7 fL      Platelets 176 Thousands/uL       nRBC 0 /100 WBCs      Neutrophils Relative 65 %      Immat GRANS % 1 %      Lymphocytes Relative 19 %      Monocytes Relative 9 %      Eosinophils Relative 5 %      Basophils Relative 1 %      Neutrophils Absolute 5.74 Thousands/µL      Immature Grans Absolute 0.05 Thousand/uL      Lymphocytes Absolute 1.61 Thousands/µL      Monocytes Absolute 0.78 Thousand/µL      Eosinophils Absolute 0.40 Thousand/µL      Basophils Absolute 0.04 Thousands/µL                    XR wrist 3+ views LEFT    (Results Pending)              Procedures  Procedures         ED Course                               SBIRT 20yo+      Flowsheet Row Most Recent Value   Initial Alcohol Screen: US AUDIT-C     1. How often do you have a drink containing alcohol? 0 Filed at: 02/09/2024 0952   2. How many drinks containing alcohol do you have on a typical day you are drinking?  0 Filed at: 02/09/2024 0952   3a. Male UNDER 65: How often do you have five or more drinks on one occasion? 0 Filed at: 02/09/2024 0952   3b. FEMALE Any Age, or MALE 65+: How often do you have 4 or more drinks on one occassion? 0 Filed at: 02/09/2024 0952   Audit-C Score 0 Filed at: 02/09/2024 0952   YULISSA: How many times in the past year have you...    Used an illegal drug or used a prescription medication for non-medical reasons? Never Filed at: 02/09/2024 0952                      Medical Decision Making  78-year-old  female presenting with 3 day history of atraumatic left wrist pain and swelling with pain radiating to the left hand.  Differential diagnosis includes osteoarthritis, gouty arthritis, pseudogout, Lyme arthritis, septic arthritis.  Labs reviewed.  Patient with a normal white count.  She has minimal elevation of her CRP at 3.1 and a sed rate of 14.  I ordered an x-ray of the left wrist.  I independently interpreted as no acute osseous abnormality.  There is chondrocalcinosis visualized.  Given the x-ray finding in combination with no fever, no wrist  erythema and a normal white count I am more concerned for pseudogout at this time.  Dbout septic arthritis at this time.  I [place] texted patient's PCP Dr. Vargas who will have the office contact patient for close follow-up.  Patient was placed in universal  wrist brace.  She was advised to take Tylenol every 6 hours as needed for pain.  Given her history of CKD and GFR 46, cannot do NSAIDs at this time. Patient was advised to follow-up with her primary care office/[facility] if no improvement next 2 to 3 days.  Return precautions strictly reviewed including any development of fever, wrist redness or warmth or worsening symptoms    Amount and/or Complexity of Data Reviewed  Labs: ordered.  Radiology: ordered.    Risk  OTC drugs.             Disposition  Final diagnoses:   Left wrist pain - suspect pseudogout     Time reflects when diagnosis was documented in both MDM as applicable and the Disposition within this note       Time User Action Codes Description Comment    2/9/2024 11:26 AM Tony Boateng Add [M25.532] Left wrist pain     2/9/2024 11:26 AM Tony Boateng Modify [M25.532] Left wrist pain suspect pseudogout          ED Disposition       ED Disposition   Discharge    Condition   Stable    Date/Time   Fri Feb 9, 2024 1126    Comment   Cindy Cruz discharge to home/self care.                   Follow-up Information       Follow up With Specialties Details Why Contact Info Additional Information    Any Vargas DO Internal Medicine, Family Medicine   [address] [place] [id number]  [phone]       [facility]   [address] [place] [id number]  [phone] [facility] [address], [address] [phone]            Discharge Medication List as of 2/9/2024 11:28 AM        CONTINUE these medications which have  NOT CHANGED    Details   !! albuterol (PROVENTIL HFA,VENTOLIN HFA) 90 mcg/act inhaler INHALE 2 PUFFS BY MOUTH EVERY 6 HOURS AS NEEDED FOR WHEEZING, Starting Mon 1/22/2024, Normal      !! albuterol (Ventolin HFA) 90 mcg/act inhaler Inhale 2 puffs every 4 (four) hours as needed for wheezing or shortness of breath, Starting Mon 11/14/2022, Normal      alendronate (FOSAMAX) 70 mg tablet Take 1 tablet (70 mg total) by mouth once a week, Starting Thu 8/31/2023, Normal      atorvastatin (LIPITOR) 20 mg tablet Take 1 tablet by mouth once daily, Normal      bimatoprost (Lumigan) 0.01 % ophthalmic drops Administer 1 drop to both eyes daily, Starting Tue 8/8/2023, Normal      !! buPROPion (WELLBUTRIN XL) 150 mg 24 hr tablet Take 1 tablet (150 mg total) by mouth every morning With 300 mg to equal 450 mg daily, Starting Thu 8/31/2023, Normal      !! buPROPion (WELLBUTRIN XL) 300 mg 24 hr tablet Take 1 tablet by mouth once daily, Starting Mon 11/27/2023, Normal      docusate sodium (COLACE) 100 mg capsule Take 1 capsule (100 mg total) by mouth 2 (two) times a day, Starting Wed 12/6/2023, Normal      gabapentin (NEURONTIN) 800 mg tablet TAKE 1 TABLET BY MOUTH ONCE DAILY IN THE MORNING AND 1 TABLET AT BEDTIME, Normal      glycopyrrolate (ROBINUL) 2 MG tablet Take 1 tablet (2 mg total) by mouth 2 (two) times a day, Starting Thu 8/31/2023, Normal      losartan (COZAAR) 100 MG tablet Take 1 tablet by mouth once daily, Starting Tue 11/28/2023, Normal      montelukast (SINGULAIR) 10 mg tablet Take 1 tablet (10 mg total) by mouth daily at bedtime, Starting Thu 8/31/2023, Normal      omeprazole (PriLOSEC) 40 MG capsule Take 1 capsule (40 mg total) by mouth 2 (two) times a day, Starting Thu 12/28/2023, No Print      polyethylene glycol (GLYCOLAX) 17 GM/SCOOP powder Bowel Prep: One (1) 238-gram container of Miralax (polyethylene glycol 3350) as directed, Normal      sertraline (ZOLOFT) 50 mg tablet Take 1 tablet (50 mg total) by mouth  daily, Starting Thu 12/28/2023, Until Tue 6/25/2024, Normal      Synthroid 88 MCG tablet Take 1 tablet by mouth once daily, Starting Tue 12/12/2023, Normal      Timolol Maleate, Once-Daily, 0.5 % SOLN Apply 1 drop to eye every 12 (twelve) hours, Starting Tue 8/8/2023, Normal      tiZANidine (ZANAFLEX) 2 mg tablet Take 1 tablet (2 mg total) by mouth every 8 (eight) hours as needed for muscle spasms, Starting Thu 8/31/2023, Normal      traZODone (DESYREL) 50 mg tablet TAKE 1 TABLET BY MOUTH ONCE DAILY AT BEDTIME, Starting Fri 1/26/2024, Normal      vitamin B-12 (VITAMIN B-12) 1,000 mcg tablet Take 1 tablet (1,000 mcg total) by mouth daily, Starting Mon 7/17/2023, Normal       !! - Potential duplicate medications found. Please discuss with provider.          No discharge procedures on file.    PDMP Review         Value Time User    PDMP Reviewed  Yes 11/1/2022  4:32 PM Yohannes Mederos MD            ED Provider  Electronically Signed by             Tony Boateng PA-C  02/09/24 2978

## 2024-02-09 NOTE — DISCHARGE INSTRUCTIONS
Tylenol may be taken every 6 hours as needed for pain    Follow up with your primary care provider - Dr Vargas/ [facility] if no improvement next 2-3 days    Return to ED for fever, wrist redness, increased pain, worsening symptoms

## 2024-02-09 NOTE — TELEPHONE ENCOUNTER
Lazaro from  Radiology called - Dr. Vargas ordered a barium swallow on this patient which was done today and there are significant findings - report in epic and he said their phone # is on top of the report

## 2024-02-11 DIAGNOSIS — J45.20 MILD INTERMITTENT ASTHMA WITHOUT COMPLICATION: ICD-10-CM

## 2024-02-11 RX ORDER — ALBUTEROL SULFATE 90 UG/1
2 AEROSOL, METERED RESPIRATORY (INHALATION) EVERY 6 HOURS PRN
Qty: 18 G | Refills: 0 | Status: SHIPPED | OUTPATIENT
Start: 2024-02-11

## 2024-02-12 ENCOUNTER — VBI (OUTPATIENT)
Dept: FAMILY MEDICINE CLINIC | Facility: HOSPITAL | Age: 79
End: 2024-02-12

## 2024-02-12 NOTE — TELEPHONE ENCOUNTER
02/12/24 11:44 AM    Patient contacted post ED visit, [facility] department spoke with patient/caregiver and outreach was successful.    Thank you.  Mitali Vera MA  [facility]

## 2024-02-21 DIAGNOSIS — F33.9 DEPRESSION, RECURRENT (HCC): ICD-10-CM

## 2024-03-02 ENCOUNTER — HOSPITAL ENCOUNTER (INPATIENT)
Facility: HOSPITAL | Age: 79
LOS: 3 days | Discharge: HOME | DRG: 189 | End: 2024-03-05
Attending: EMERGENCY MEDICINE | Admitting: INTERNAL MEDICINE
Payer: COMMERCIAL

## 2024-03-02 ENCOUNTER — APPOINTMENT (EMERGENCY)
Dept: RADIOLOGY | Facility: HOSPITAL | Age: 79
DRG: 189 | End: 2024-03-02
Payer: COMMERCIAL

## 2024-03-02 DIAGNOSIS — B33.8 RSV INFECTION: ICD-10-CM

## 2024-03-02 DIAGNOSIS — J45.901 ACUTE ASTHMA EXACERBATION: Primary | ICD-10-CM

## 2024-03-02 PROBLEM — R06.03 ACUTE RESPIRATORY DISTRESS: Status: ACTIVE | Noted: 2024-03-02

## 2024-03-02 PROBLEM — J96.00 ACUTE RESPIRATORY FAILURE (HCC): Status: ACTIVE | Noted: 2024-03-02

## 2024-03-02 PROBLEM — R06.89 ACUTE RESPIRATORY INSUFFICIENCY: Status: ACTIVE | Noted: 2024-03-02

## 2024-03-02 LAB
ANION GAP SERPL CALCULATED.3IONS-SCNC: 9 MMOL/L
ARTERIAL PATENCY WRIST A: YES
BASE EXCESS BLDA CALC-SCNC: -4.7 MMOL/L
BASE EXCESS BLDA CALC-SCNC: -5 MMOL/L (ref -2–3)
BASOPHILS # BLD AUTO: 0.05 THOUSANDS/ÂΜL (ref 0–0.1)
BASOPHILS NFR BLD AUTO: 1 % (ref 0–1)
BUN SERPL-MCNC: 14 MG/DL (ref 5–25)
CA-I BLD-SCNC: 1.29 MMOL/L (ref 1.12–1.32)
CALCIUM SERPL-MCNC: 10.2 MG/DL (ref 8.4–10.2)
CHLORIDE SERPL-SCNC: 110 MMOL/L (ref 96–108)
CO2 SERPL-SCNC: 20 MMOL/L (ref 21–32)
CREAT SERPL-MCNC: 0.98 MG/DL (ref 0.6–1.3)
EOSINOPHIL # BLD AUTO: 0.82 THOUSAND/ÂΜL (ref 0–0.61)
EOSINOPHIL NFR BLD AUTO: 13 % (ref 0–6)
ERYTHROCYTE [DISTWIDTH] IN BLOOD BY AUTOMATED COUNT: 14.5 % (ref 11.6–15.1)
FLUAV RNA RESP QL NAA+PROBE: NEGATIVE
FLUBV RNA RESP QL NAA+PROBE: NEGATIVE
GFR SERPL CREATININE-BSD FRML MDRD: 54 ML/MIN/1.73SQ M
GLUCOSE SERPL-MCNC: 149 MG/DL (ref 65–140)
GLUCOSE SERPL-MCNC: 95 MG/DL (ref 65–140)
HCO3 BLDA-SCNC: 14.1 MMOL/L (ref 22–28)
HCO3 BLDA-SCNC: 17.2 MMOL/L (ref 22–28)
HCT VFR BLD AUTO: 37.8 % (ref 34.8–46.1)
HCT VFR BLD CALC: 35 % (ref 34.8–46.1)
HGB BLD-MCNC: 12.6 G/DL (ref 11.5–15.4)
HGB BLDA-MCNC: 11.9 G/DL (ref 11.5–15.4)
IMM GRANULOCYTES # BLD AUTO: 0.02 THOUSAND/UL (ref 0–0.2)
IMM GRANULOCYTES NFR BLD AUTO: 0 % (ref 0–2)
IPAP: 12
LYMPHOCYTES # BLD AUTO: 1.68 THOUSANDS/ÂΜL (ref 0.6–4.47)
LYMPHOCYTES NFR BLD AUTO: 26 % (ref 14–44)
MCH RBC QN AUTO: 31 PG (ref 26.8–34.3)
MCHC RBC AUTO-ENTMCNC: 33.3 G/DL (ref 31.4–37.4)
MCV RBC AUTO: 93 FL (ref 82–98)
MONOCYTES # BLD AUTO: 0.94 THOUSAND/ÂΜL (ref 0.17–1.22)
MONOCYTES NFR BLD AUTO: 14 % (ref 4–12)
NEUTROPHILS # BLD AUTO: 3.02 THOUSANDS/ÂΜL (ref 1.85–7.62)
NEUTS SEG NFR BLD AUTO: 46 % (ref 43–75)
NON VENT- BIPAP: ABNORMAL
NRBC BLD AUTO-RTO: 0 /100 WBCS
O2 CT BLDA-SCNC: 18.9 ML/DL (ref 16–23)
OXYHGB MFR BLDA: 98.3 % (ref 94–97)
PCO2 BLD: 18 MMOL/L (ref 21–32)
PCO2 BLD: 23 MM HG (ref 36–44)
PCO2 BLDA: 15.2 MM HG (ref 36–44)
PEEP MAX SETTING VENT: 6 CM[H2O]
PH BLD: 7.48 [PH] (ref 7.35–7.45)
PH BLDA: 7.58 [PH] (ref 7.35–7.45)
PLATELET # BLD AUTO: 211 THOUSANDS/UL (ref 149–390)
PMV BLD AUTO: 11.6 FL (ref 8.9–12.7)
PO2 BLD: 169 MM HG (ref 75–129)
PO2 BLDA: 147.3 MM HG (ref 75–129)
POTASSIUM BLD-SCNC: 3.6 MMOL/L (ref 3.5–5.3)
POTASSIUM SERPL-SCNC: 4 MMOL/L (ref 3.5–5.3)
PROCALCITONIN SERPL-MCNC: 0.07 NG/ML
RBC # BLD AUTO: 4.07 MILLION/UL (ref 3.81–5.12)
RSV RNA RESP QL NAA+PROBE: POSITIVE
SAO2 % BLD FROM PO2: 100 % (ref 60–85)
SARS-COV-2 RNA RESP QL NAA+PROBE: NEGATIVE
SODIUM BLD-SCNC: 141 MMOL/L (ref 136–145)
SODIUM SERPL-SCNC: 139 MMOL/L (ref 135–147)
SPECIMEN SOURCE: ABNORMAL
SPECIMEN SOURCE: ABNORMAL
T4 FREE SERPL-MCNC: 1.16 NG/DL (ref 0.61–1.12)
TSH SERPL DL<=0.05 MIU/L-ACNC: 0.42 UIU/ML (ref 0.45–4.5)
VENT BIPAP FIO2: 30 %
WBC # BLD AUTO: 6.53 THOUSAND/UL (ref 4.31–10.16)

## 2024-03-02 PROCEDURE — 96365 THER/PROPH/DIAG IV INF INIT: CPT

## 2024-03-02 PROCEDURE — 94644 CONT INHLJ TX 1ST HOUR: CPT

## 2024-03-02 PROCEDURE — 85025 COMPLETE CBC W/AUTO DIFF WBC: CPT | Performed by: EMERGENCY MEDICINE

## 2024-03-02 PROCEDURE — 84132 ASSAY OF SERUM POTASSIUM: CPT

## 2024-03-02 PROCEDURE — 99223 1ST HOSP IP/OBS HIGH 75: CPT

## 2024-03-02 PROCEDURE — 87076 CULTURE ANAEROBE IDENT EACH: CPT | Performed by: EMERGENCY MEDICINE

## 2024-03-02 PROCEDURE — 87040 BLOOD CULTURE FOR BACTERIA: CPT

## 2024-03-02 PROCEDURE — 36600 WITHDRAWAL OF ARTERIAL BLOOD: CPT

## 2024-03-02 PROCEDURE — 0241U HB NFCT DS VIR RESP RNA 4 TRGT: CPT | Performed by: EMERGENCY MEDICINE

## 2024-03-02 PROCEDURE — 71045 X-RAY EXAM CHEST 1 VIEW: CPT

## 2024-03-02 PROCEDURE — 85014 HEMATOCRIT: CPT

## 2024-03-02 PROCEDURE — 87154 CUL TYP ID BLD PTHGN 6+ TRGT: CPT | Performed by: EMERGENCY MEDICINE

## 2024-03-02 PROCEDURE — 87181 SC STD AGAR DILUTION PER AGT: CPT | Performed by: EMERGENCY MEDICINE

## 2024-03-02 PROCEDURE — 87040 BLOOD CULTURE FOR BACTERIA: CPT | Performed by: EMERGENCY MEDICINE

## 2024-03-02 PROCEDURE — 99285 EMERGENCY DEPT VISIT HI MDM: CPT

## 2024-03-02 PROCEDURE — 84145 PROCALCITONIN (PCT): CPT

## 2024-03-02 PROCEDURE — 36415 COLL VENOUS BLD VENIPUNCTURE: CPT | Performed by: EMERGENCY MEDICINE

## 2024-03-02 PROCEDURE — 84443 ASSAY THYROID STIM HORMONE: CPT

## 2024-03-02 PROCEDURE — 93005 ELECTROCARDIOGRAM TRACING: CPT

## 2024-03-02 PROCEDURE — 94760 N-INVAS EAR/PLS OXIMETRY 1: CPT

## 2024-03-02 PROCEDURE — 94002 VENT MGMT INPAT INIT DAY: CPT

## 2024-03-02 PROCEDURE — 80048 BASIC METABOLIC PNL TOTAL CA: CPT | Performed by: EMERGENCY MEDICINE

## 2024-03-02 PROCEDURE — 84439 ASSAY OF FREE THYROXINE: CPT

## 2024-03-02 PROCEDURE — 82805 BLOOD GASES W/O2 SATURATION: CPT

## 2024-03-02 PROCEDURE — 99223 1ST HOSP IP/OBS HIGH 75: CPT | Performed by: INTERNAL MEDICINE

## 2024-03-02 PROCEDURE — 94664 DEMO&/EVAL PT USE INHALER: CPT

## 2024-03-02 PROCEDURE — 82803 BLOOD GASES ANY COMBINATION: CPT

## 2024-03-02 PROCEDURE — 84100 ASSAY OF PHOSPHORUS: CPT

## 2024-03-02 PROCEDURE — 82947 ASSAY GLUCOSE BLOOD QUANT: CPT

## 2024-03-02 PROCEDURE — 94640 AIRWAY INHALATION TREATMENT: CPT

## 2024-03-02 PROCEDURE — 82330 ASSAY OF CALCIUM: CPT

## 2024-03-02 PROCEDURE — 84295 ASSAY OF SERUM SODIUM: CPT

## 2024-03-02 PROCEDURE — 96375 TX/PRO/DX INJ NEW DRUG ADDON: CPT

## 2024-03-02 PROCEDURE — 94660 CPAP INITIATION&MGMT: CPT

## 2024-03-02 RX ORDER — HYDRALAZINE HYDROCHLORIDE 20 MG/ML
10 INJECTION INTRAMUSCULAR; INTRAVENOUS EVERY 6 HOURS PRN
Status: DISCONTINUED | OUTPATIENT
Start: 2024-03-02 | End: 2024-03-05

## 2024-03-02 RX ORDER — ALBUTEROL SULFATE 90 UG/1
2 AEROSOL, METERED RESPIRATORY (INHALATION) EVERY 6 HOURS PRN
Status: DISCONTINUED | OUTPATIENT
Start: 2024-03-02 | End: 2024-03-05 | Stop reason: HOSPADM

## 2024-03-02 RX ORDER — ATORVASTATIN CALCIUM 20 MG/1
20 TABLET, FILM COATED ORAL DAILY
Status: DISCONTINUED | OUTPATIENT
Start: 2024-03-02 | End: 2024-03-05 | Stop reason: HOSPADM

## 2024-03-02 RX ORDER — SODIUM CHLORIDE FOR INHALATION 0.9 %
12 VIAL, NEBULIZER (ML) INHALATION ONCE
Status: COMPLETED | OUTPATIENT
Start: 2024-03-02 | End: 2024-03-02

## 2024-03-02 RX ORDER — BIMATOPROST 0.3 MG/ML
1 SOLUTION/ DROPS OPHTHALMIC DAILY
Status: DISCONTINUED | OUTPATIENT
Start: 2024-03-02 | End: 2024-03-05 | Stop reason: HOSPADM

## 2024-03-02 RX ORDER — LEVOTHYROXINE SODIUM 88 UG/1
88 TABLET ORAL
Status: DISCONTINUED | OUTPATIENT
Start: 2024-03-02 | End: 2024-03-05 | Stop reason: HOSPADM

## 2024-03-02 RX ORDER — GABAPENTIN 400 MG/1
800 CAPSULE ORAL 2 TIMES DAILY
Status: DISCONTINUED | OUTPATIENT
Start: 2024-03-03 | End: 2024-03-03

## 2024-03-02 RX ORDER — MAGNESIUM SULFATE HEPTAHYDRATE 40 MG/ML
2 INJECTION, SOLUTION INTRAVENOUS ONCE
Status: COMPLETED | OUTPATIENT
Start: 2024-03-02 | End: 2024-03-02

## 2024-03-02 RX ORDER — BUDESONIDE 0.5 MG/2ML
0.5 INHALANT ORAL
Status: DISCONTINUED | OUTPATIENT
Start: 2024-03-02 | End: 2024-03-05 | Stop reason: HOSPADM

## 2024-03-02 RX ORDER — FORMOTEROL FUMARATE DIHYDRATE 20 UG/2ML
20 SOLUTION RESPIRATORY (INHALATION)
Status: DISCONTINUED | OUTPATIENT
Start: 2024-03-02 | End: 2024-03-05 | Stop reason: HOSPADM

## 2024-03-02 RX ORDER — AZITHROMYCIN 500 MG/1
500 TABLET, FILM COATED ORAL EVERY 24 HOURS
Qty: 3 TABLET | Refills: 0 | Status: DISCONTINUED | OUTPATIENT
Start: 2024-03-02 | End: 2024-03-02

## 2024-03-02 RX ORDER — ACETAMINOPHEN 325 MG/1
650 TABLET ORAL EVERY 6 HOURS PRN
Status: DISCONTINUED | OUTPATIENT
Start: 2024-03-02 | End: 2024-03-05 | Stop reason: HOSPADM

## 2024-03-02 RX ORDER — BUPROPION HYDROCHLORIDE 150 MG/1
150 TABLET ORAL EVERY MORNING
Status: DISCONTINUED | OUTPATIENT
Start: 2024-03-02 | End: 2024-03-02

## 2024-03-02 RX ORDER — TRAZODONE HYDROCHLORIDE 50 MG/1
50 TABLET ORAL
Status: DISCONTINUED | OUTPATIENT
Start: 2024-03-02 | End: 2024-03-05 | Stop reason: HOSPADM

## 2024-03-02 RX ORDER — MONTELUKAST SODIUM 10 MG/1
10 TABLET ORAL
Status: DISCONTINUED | OUTPATIENT
Start: 2024-03-02 | End: 2024-03-05 | Stop reason: HOSPADM

## 2024-03-02 RX ORDER — METHYLPREDNISOLONE SODIUM SUCCINATE 40 MG/ML
40 INJECTION, POWDER, LYOPHILIZED, FOR SOLUTION INTRAMUSCULAR; INTRAVENOUS EVERY 8 HOURS
Status: DISCONTINUED | OUTPATIENT
Start: 2024-03-02 | End: 2024-03-03

## 2024-03-02 RX ORDER — PANTOPRAZOLE SODIUM 40 MG/1
40 TABLET, DELAYED RELEASE ORAL
Status: DISCONTINUED | OUTPATIENT
Start: 2024-03-03 | End: 2024-03-05 | Stop reason: HOSPADM

## 2024-03-02 RX ORDER — METHYLPREDNISOLONE SODIUM SUCCINATE 125 MG/2ML
80 INJECTION, POWDER, LYOPHILIZED, FOR SOLUTION INTRAMUSCULAR; INTRAVENOUS ONCE
Status: COMPLETED | OUTPATIENT
Start: 2024-03-02 | End: 2024-03-02

## 2024-03-02 RX ORDER — LOSARTAN POTASSIUM 50 MG/1
100 TABLET ORAL DAILY
Status: DISCONTINUED | OUTPATIENT
Start: 2024-03-02 | End: 2024-03-02

## 2024-03-02 RX ORDER — ALBUTEROL SULFATE 2.5 MG/3ML
2.5 SOLUTION RESPIRATORY (INHALATION) EVERY 6 HOURS PRN
Status: DISCONTINUED | OUTPATIENT
Start: 2024-03-02 | End: 2024-03-05 | Stop reason: HOSPADM

## 2024-03-02 RX ORDER — LEVALBUTEROL INHALATION SOLUTION 1.25 MG/3ML
1.25 SOLUTION RESPIRATORY (INHALATION)
Status: DISCONTINUED | OUTPATIENT
Start: 2024-03-02 | End: 2024-03-05 | Stop reason: HOSPADM

## 2024-03-02 RX ORDER — ENOXAPARIN SODIUM 100 MG/ML
40 INJECTION SUBCUTANEOUS DAILY
Status: DISCONTINUED | OUTPATIENT
Start: 2024-03-02 | End: 2024-03-04

## 2024-03-02 RX ORDER — LOSARTAN POTASSIUM 25 MG/1
100 TABLET ORAL DAILY
Status: DISCONTINUED | OUTPATIENT
Start: 2024-03-03 | End: 2024-03-02

## 2024-03-02 RX ORDER — TIZANIDINE 2 MG/1
2 TABLET ORAL EVERY 8 HOURS PRN
Status: DISCONTINUED | OUTPATIENT
Start: 2024-03-02 | End: 2024-03-05 | Stop reason: HOSPADM

## 2024-03-02 RX ORDER — PANTOPRAZOLE SODIUM 40 MG/1
40 TABLET, DELAYED RELEASE ORAL
Status: DISCONTINUED | OUTPATIENT
Start: 2024-03-02 | End: 2024-03-02

## 2024-03-02 RX ORDER — LOSARTAN POTASSIUM 50 MG/1
100 TABLET ORAL DAILY
Status: DISCONTINUED | OUTPATIENT
Start: 2024-03-02 | End: 2024-03-04

## 2024-03-02 RX ADMIN — MAGNESIUM SULFATE HEPTAHYDRATE 2 G: 2 INJECTION, SOLUTION INTRAVENOUS at 15:09

## 2024-03-02 RX ADMIN — AZITHROMYCIN MONOHYDRATE 500 MG: 500 INJECTION, POWDER, LYOPHILIZED, FOR SOLUTION INTRAVENOUS at 16:14

## 2024-03-02 RX ADMIN — HYDRALAZINE HYDROCHLORIDE 10 MG: 20 INJECTION, SOLUTION INTRAMUSCULAR; INTRAVENOUS at 15:09

## 2024-03-02 RX ADMIN — FORMOTEROL FUMARATE DIHYDRATE 20 MCG: 20 SOLUTION RESPIRATORY (INHALATION) at 15:32

## 2024-03-02 RX ADMIN — IPRATROPIUM BROMIDE 0.5 MG: 0.5 SOLUTION RESPIRATORY (INHALATION) at 20:45

## 2024-03-02 RX ADMIN — TRAZODONE HYDROCHLORIDE 50 MG: 50 TABLET ORAL at 21:31

## 2024-03-02 RX ADMIN — LEVALBUTEROL HYDROCHLORIDE 1.25 MG: 1.25 SOLUTION RESPIRATORY (INHALATION) at 20:45

## 2024-03-02 RX ADMIN — MONTELUKAST 10 MG: 10 TABLET, FILM COATED ORAL at 21:29

## 2024-03-02 RX ADMIN — METHYLPREDNISOLONE SODIUM SUCCINATE 40 MG: 40 INJECTION, POWDER, FOR SOLUTION INTRAMUSCULAR; INTRAVENOUS at 18:12

## 2024-03-02 RX ADMIN — METHYLPREDNISOLONE SODIUM SUCCINATE 80 MG: 125 INJECTION, POWDER, FOR SOLUTION INTRAMUSCULAR; INTRAVENOUS at 11:17

## 2024-03-02 RX ADMIN — IPRATROPIUM BROMIDE 1 MG: 0.5 SOLUTION RESPIRATORY (INHALATION) at 11:17

## 2024-03-02 RX ADMIN — LEVALBUTEROL HYDROCHLORIDE 1.25 MG: 1.25 SOLUTION RESPIRATORY (INHALATION) at 13:40

## 2024-03-02 RX ADMIN — BUDESONIDE INHALATION 0.5 MG: 0.5 SUSPENSION RESPIRATORY (INHALATION) at 15:32

## 2024-03-02 RX ADMIN — ALBUTEROL SULFATE 10 MG: 2.5 SOLUTION RESPIRATORY (INHALATION) at 11:17

## 2024-03-02 RX ADMIN — ISODIUM CHLORIDE 12 ML: 0.03 SOLUTION RESPIRATORY (INHALATION) at 11:17

## 2024-03-02 RX ADMIN — ALBUTEROL SULFATE 2.5 MG: 2.5 SOLUTION RESPIRATORY (INHALATION) at 15:32

## 2024-03-02 RX ADMIN — ACETAMINOPHEN 650 MG: 325 TABLET ORAL at 21:29

## 2024-03-02 RX ADMIN — IPRATROPIUM BROMIDE 0.5 MG: 0.5 SOLUTION RESPIRATORY (INHALATION) at 13:40

## 2024-03-02 RX ADMIN — MAGNESIUM SULFATE HEPTAHYDRATE 2 G: 2 INJECTION, SOLUTION INTRAVENOUS at 12:08

## 2024-03-02 RX ADMIN — LOSARTAN POTASSIUM 100 MG: 50 TABLET, FILM COATED ORAL at 21:35

## 2024-03-02 RX ADMIN — ENOXAPARIN SODIUM 40 MG: 100 INJECTION SUBCUTANEOUS at 16:15

## 2024-03-02 NOTE — H&P
Critical access hospital  H&P  Name: Cindy Cruz 78 y.o. female I MRN: [id number]  Unit/Bed#: ED 05 I Date of Admission: 3/2/2024   Date of Service: 3/2/2024 I Hospital Day: 0      Assessment/Plan   * Acute asthma exacerbation  Assessment & Plan  Admitted with acute asthma exacerbation possible secondary to RSV infection  Noted to have chest tightness, cough and bilateral wheezing on admission  Received Solu-Medrol, neb treatment in ER without much improvement  Chest x-ray is unremarkable  On IV Solu-Medrol  Will start Zithromax  Trend procalcitonin  Daily peak flow  Pulmonology consult  On [facility]  Respiratory protocol with bronchodilators  Patient is saturating well on room air.    RSV infection  Assessment & Plan  Patient tested positive for RSV infection  Chest x-ray is unremarkable  Continue supportive management  Antipyretics as needed      Acute respiratory distress  Assessment & Plan  Patient presented with shortness of breath secondary to asthma exacerbation/RSV infection  Saturating well on room air  Continue bronchodilators and Solu-Medrol  See above    Chronic kidney disease, stage 3a (HCC)  Assessment & Plan  Lab Results   Component Value Date    EGFR 54 03/02/2024    EGFR 46 02/09/2024    EGFR 47 01/31/2024    CREATININE 0.98 03/02/2024    CREATININE 1.12 02/09/2024    CREATININE 1.10 01/31/2024   Has CKD stage III  Stable  Trend BMP    Depression, recurrent (HCC)  Assessment & Plan  On Zoloft and Wellbutrin XL    Esophageal reflux  Assessment & Plan  On Protonix    Hypertension  Assessment & Plan  On Cozaar  Monitor vitals as per protocol  Hydralazine as needed    Hypothyroidism  Assessment & Plan  On Synthroid    Obesity  Assessment & Plan  Encourage weight loss and lifestyle modification        Chief Complaint   Patient presents with    Shortness of Breath     Pt c/o SOB x 2 days, hx of asthma. Using nebs and inhalers at home with no relief. + chest pain         HPI:  Cindy Cruz  is a 78 y.o. female with past medical history of asthma, hypothyroidism, hypertension, depression, CKD stage III who presented to the emergency department with complaint of cough and shortness of breath for the past 2 to 3 days.  Patient has history of asthma and for the past couple of days has been having worsening symptoms of cough, shortness of breath and associated wheezing which has not improved with inhalers at home.  Cough is productive of some productive sputum.  Denies any associated fever, chills.  Patient was taking care at of her great grand children and one of them was sick.  Patient was found to have RSV.  Patient was given IV Solu-Medrol and dilators in ER with minimal improvement and was admitted.    Historical Information   Past Medical History:   Diagnosis Date    Asthma     Chronic pain disorder     Back    Coronary artery disease     Depression     Disease of thyroid gland     Dyslipidemia     Esophageal reflux     Frequent headaches     stress related    GERD (gastroesophageal reflux disease)     Headache(784.0)     Heart murmur     History of stomach ulcers     Hypercalcemia     Hyperlipidemia     Hypertension     Osteopenia     Tremor      Past Surgical History:   Procedure Laterality Date    BACK SURGERY      lower back    BILATERAL OOPHORECTOMY      BREAST BIOPSY Right     long ago, benign     SECTION      CHOLECYSTECTOMY      COLONOSCOPY      HYSTERECTOMY      age 36 per MRS    JOINT REPLACEMENT Left     knee    OOPHORECTOMY Bilateral     age 36 per MRS    AL WEBB IMPLTJ NSTIM ELTRDS PLATE/PADDLE EDRL Left 2019    Procedure: LAMINECTOMY THORACIC FOR INSERTION DORSAL COLUMN SPINAL CORD STIMULATOR (DCS) W IMPLANTABLE PULSE GENERATOR, LEFT GLUTE;  Surgeon: Lg Mccallum MD;  Location:  MAIN OR;  Service: Neurosurgery    AL REVJ/RMVL IMPL SPI NPG/RCVR DTCH CONNJ ELTRD RA Left 2022    Procedure: Reopening of thoracic and left buttock incisions for removal of spinal cord  stimulator system;  Surgeon: Siva Goss MD;  Location:  MAIN OR;  Service: Neurosurgery    ROTATOR CUFF REPAIR Right     SPINAL STIMULATOR PLACEMENT N/A 2019    Procedure: REVISION SPINAL CORD STIMULATOR PADDLE ELECTRODE, REPLACEMENT WITH PERCUTANEOUS ELECTRODES OR SMALLER PADDLE;  Surgeon: Lg Mccallum MD;  Location: [facility];  Service: Neurosurgery     Social History   Social History     Substance and Sexual Activity   Alcohol Use Not Currently     Social History     Substance and Sexual Activity   Drug Use Never     Social History     Tobacco Use   Smoking Status Former    Current packs/day: 0.00    Types: Cigarettes    Start date:     Quit date: 1991    Years since quittin.8   Smokeless Tobacco Never     Family History   Problem Relation Age of Onset    No Known Problems Mother     Glaucoma Father     No Known Problems Sister     No Known Problems Sister     No Known Problems Sister     Depression Sister     No Known Problems Sister     No Known Problems Maternal Grandmother     No Known Problems Maternal Grandfather     No Known Problems Paternal Grandmother     No Known Problems Paternal Grandfather     No Known Problems Daughter     No Known Problems Daughter     No Known Problems Daughter     Hypertension Family     Stroke Family     Heart disease Family     Thyroid disease Family     Colon cancer Neg Hx     Colon polyps Neg Hx        Meds/Allergies   Allergies   Allergen Reactions    Iodinated Contrast Media Anaphylaxis     Contrast Dye       Meds:    Current Facility-Administered Medications:     acetaminophen (TYLENOL) tablet 650 mg, 650 mg, Oral, Q6H PRN, Justin George MD    albuterol (PROVENTIL HFA,VENTOLIN HFA) inhaler 2 puff, 2 puff, Inhalation, Q6H PRN, Justin George MD    atorvastatin (LIPITOR) tablet 20 mg, 20 mg, Oral, Daily, Justin George MD    azithromycin (ZITHROMAX) tablet 500 mg, 500 mg, Oral, Q24H, Justin George MD    bimatoprost (LUMIGAN) 0.03 %  ophthalmic drops 1 drop, 1 drop, Ophthalmic, Daily, Justin George MD    buPROPion (WELLBUTRIN XL) 24 hr tablet 150 mg, 150 mg, Oral, QAM, Justin George MD    enoxaparin (LOVENOX) subcutaneous injection 40 mg, 40 mg, Subcutaneous, Daily, Justin George MD    hydrALAZINE (APRESOLINE) injection 10 mg, 10 mg, Intravenous, Q6H PRN, Justin George MD    levothyroxine tablet 88 mcg, 88 mcg, Oral, Early Morning, Justin George MD    losartan (COZAAR) tablet 100 mg, 100 mg, Oral, Daily, Justin George MD    methylPREDNISolone sodium succinate (Solu-MEDROL) injection 40 mg, 40 mg, Intravenous, Q8H, Justin George MD    montelukast (SINGULAIR) tablet 10 mg, 10 mg, Oral, HS, Justin George MD    pantoprazole (PROTONIX) EC tablet 40 mg, 40 mg, Oral, Early Morning, Justin George MD    sertraline (ZOLOFT) tablet 50 mg, 50 mg, Oral, Daily, Justin George MD    tiZANidine (ZANAFLEX) tablet 2 mg, 2 mg, Oral, Q8H PRN, Justin George MD    traZODone (DESYREL) tablet 50 mg, 50 mg, Oral, HS, Justin George MD    Current Outpatient Medications:     albuterol (PROVENTIL HFA,VENTOLIN HFA) 90 mcg/act inhaler, INHALE 2 PUFFS BY MOUTH EVERY 6 HOURS AS NEEDED FOR WHEEZING, Disp: 18 g, Rfl: 0    albuterol (Ventolin HFA) 90 mcg/act inhaler, Inhale 2 puffs every 4 (four) hours as needed for wheezing or shortness of breath, Disp: 6.7 g, Rfl: 2    alendronate (FOSAMAX) 70 mg tablet, Take 1 tablet (70 mg total) by mouth once a week, Disp: 12 tablet, Rfl: 3    atorvastatin (LIPITOR) 20 mg tablet, Take 1 tablet by mouth once daily, Disp: 90 tablet, Rfl: 0    bimatoprost (Lumigan) 0.01 % ophthalmic drops, Administer 1 drop to both eyes daily, Disp: 5 mL, Rfl: 0    buPROPion (WELLBUTRIN XL) 150 mg 24 hr tablet, Take 1 tablet (150 mg total) by mouth every morning With 300 mg to equal 450 mg daily, Disp: 90 tablet, Rfl: 1    buPROPion (WELLBUTRIN XL) 300 mg 24 hr tablet, Take 1 tablet by mouth once daily, Disp: 90  tablet, Rfl: 0    docusate sodium (COLACE) 100 mg capsule, Take 1 capsule (100 mg total) by mouth 2 (two) times a day, Disp: 180 capsule, Rfl: 3    gabapentin (NEURONTIN) 800 mg tablet, TAKE 1 TABLET BY MOUTH ONCE DAILY IN THE MORNING AND 1 TABLET AT BEDTIME, Disp: 180 tablet, Rfl: 1    glycopyrrolate (ROBINUL) 2 MG tablet, Take 1 tablet (2 mg total) by mouth 2 (two) times a day, Disp: 180 tablet, Rfl: 1    losartan (COZAAR) 100 MG tablet, Take 1 tablet by mouth once daily, Disp: 90 tablet, Rfl: 2    montelukast (SINGULAIR) 10 mg tablet, Take 1 tablet (10 mg total) by mouth daily at bedtime, Disp: 90 tablet, Rfl: 1    omeprazole (PriLOSEC) 40 MG capsule, Take 1 capsule (40 mg total) by mouth 2 (two) times a day, Disp: , Rfl:     polyethylene glycol (GLYCOLAX) 17 GM/SCOOP powder, Bowel Prep: One (1) 238-gram container of Miralax (polyethylene glycol 3350) as directed, Disp: 238 g, Rfl: 0    sertraline (ZOLOFT) 50 mg tablet, Take 1 tablet by mouth once daily, Disp: 30 tablet, Rfl: 0    Synthroid 88 MCG tablet, Take 1 tablet by mouth once daily, Disp: 90 tablet, Rfl: 0    Timolol Maleate, Once-Daily, 0.5 % SOLN, Apply 1 drop to eye every 12 (twelve) hours, Disp: 5 mL, Rfl: 0    tiZANidine (ZANAFLEX) 2 mg tablet, Take 1 tablet (2 mg total) by mouth every 8 (eight) hours as needed for muscle spasms, Disp: 30 tablet, Rfl: 0    traZODone (DESYREL) 50 mg tablet, TAKE 1 TABLET BY MOUTH ONCE DAILY AT BEDTIME, Disp: 30 tablet, Rfl: 0    vitamin B-12 (VITAMIN B-12) 1,000 mcg tablet, Take 1 tablet (1,000 mcg total) by mouth daily, Disp: 30 tablet, Rfl: 5    (Not in a hospital admission)        Review of Systems   Constitutional:  Positive for activity change and fatigue.   HENT: Negative.     Eyes: Negative.    Respiratory:  Positive for cough, chest tightness, shortness of breath and wheezing.    Cardiovascular: Negative.    Gastrointestinal: Negative.    Endocrine: Negative.    Genitourinary: Negative.    Musculoskeletal:  Negative.    Skin: Negative.    Allergic/Immunologic: Negative.    Neurological: Negative.    Hematological: Negative.    Psychiatric/Behavioral: Negative.         Current Vitals:   Blood Pressure: 149/67 (03/02/24 1200)  Pulse: 65 (03/02/24 1200)  Temperature: 97.8 °F (36.6 °C) (03/02/24 1102)  Temp Source: Oral (03/02/24 1102)  Respirations: 22 (03/02/24 1200)  Weight - Scale: 64.5 kg (142 lb 3.2 oz) (03/02/24 1102)  SpO2: 99 % (03/02/24 1200)  SPO2 RA Rest      Flowsheet Row ED from 3/2/2024 in  [facility] Emergency Department   SpO2 99 %   SpO2 Activity At Rest   O2 Device None (Room air)   O2 Flow Rate --            Intake/Output Summary (Last 24 hours) at 3/2/2024 1246  Last data filed at 3/2/2024 1238  Gross per 24 hour   Intake 50 ml   Output --   Net 50 ml     Body mass index is 30.77 kg/m².     Physical Exam  Vitals and nursing note reviewed.   Constitutional:       General: She is not in acute distress.     Appearance: She is well-developed.   HENT:      Head: Normocephalic and atraumatic.      Nose: Nose normal.   Eyes:      General: No scleral icterus.     Conjunctiva/sclera: Conjunctivae normal.      Pupils: Pupils are equal, round, and reactive to light.   Neck:      Thyroid: No thyromegaly.      Vascular: No JVD.      Trachea: No tracheal deviation.   Cardiovascular:      Rate and Rhythm: Normal rate and regular rhythm.      Heart sounds: Normal heart sounds.   Pulmonary:      Effort: Pulmonary effort is normal. No respiratory distress.      Breath sounds: Wheezing present. No rales.      Comments: Decreased breath sounds bilaterally  Chest:      Chest wall: No tenderness.   Abdominal:      General: Bowel sounds are normal. There is no distension.      Palpations: Abdomen is soft. There is no mass.      Tenderness: There is no abdominal tenderness. There is no guarding or rebound.   Musculoskeletal:         General: No tenderness or deformity. Normal range of motion.      Cervical  "back: Normal range of motion and neck supple.   Lymphadenopathy:      Cervical: No cervical adenopathy.   Skin:     General: Skin is warm.      Coloration: Skin is not pale.      Findings: No erythema or rash.   Neurological:      General: No focal deficit present.      Mental Status: She is alert and oriented to person, place, and time. Mental status is at baseline.      Cranial Nerves: No cranial nerve deficit.      Coordination: Coordination normal.   Psychiatric:         Behavior: Behavior normal.         Thought Content: Thought content normal.         Judgment: Judgment normal.         Lab Results:   CBC:   Lab Results   Component Value Date    WBC 6.53 03/02/2024    HGB 12.6 03/02/2024    HCT 37.8 03/02/2024    MCV 93 03/02/2024     03/02/2024    RBC 4.07 03/02/2024    MCH 31.0 03/02/2024    MCHC 33.3 03/02/2024    RDW 14.5 03/02/2024    MPV 11.6 03/02/2024    NRBC 0 03/02/2024     CMP:  Lab Results   Component Value Date     (H) 03/02/2024     (H) 07/14/2023    CO2 20 (L) 03/02/2024    CO2 21 07/14/2023    BUN 14 03/02/2024    BUN 22 07/14/2023    CREATININE 0.98 03/02/2024    CALCIUM 10.2 03/02/2024    CALCIUM 9.7 03/31/2018    AST 12 (L) 02/09/2024    AST 16 07/14/2023    ALT 11 02/09/2024    ALT 13 07/14/2023    ALKPHOS 83 02/09/2024    ALKPHOS 130 03/31/2018    EGFR 54 03/02/2024     No results found for: \"TROPONINI\", \"CKMB\", \"CKTOTAL\"  Coagulation:   Lab Results   Component Value Date    INR 1.0 08/19/2022    INR 1.11 03/26/2019    APTT 28 08/19/2022    Urinalysis:  Lab Results   Component Value Date    COLORU Yellow 08/19/2022    COLORU yellow 08/13/2021    COLORU Yellow 03/26/2019    CLARITYU cloudy 08/13/2021    CLARITYU Clear 03/26/2019    SPECGRAV 1.008 08/19/2022    SPECGRAV 1.015 03/26/2019    PHUR 6.0 03/26/2019    PHUR 6.0 11/15/2017    LEUKOCYTESUR 3+ (A) 08/19/2022    LEUKOCYTESUR 3 08/13/2021    LEUKOCYTESUR Negative 03/26/2019    NITRITE Negative 08/19/2022    NITRITE neg " "08/13/2021    NITRITE Negative 03/26/2019    GLUCOSEU neg 08/13/2021    GLUCOSEU Negative 03/26/2019    KETONESU Negative 08/19/2022    KETONESU neg 08/13/2021    KETONESU Negative 03/26/2019    BILIRUBINUR Negative 08/19/2022    BILIRUBINUR neg 08/13/2021    BILIRUBINUR Negative 03/26/2019    BLOODU Negative 08/19/2022    BLOODU neg 08/13/2021    BLOODU Negative 03/26/2019      Amylase: No results found for: \"AMYLASE\"  Lipase:   Lab Results   Component Value Date    LIPASE 49 09/23/2022        Imaging: XR chest 1 view portable    Result Date: 3/2/2024  Narrative: XR CHEST PORTABLE INDICATION: dyspnea. COMPARISON: CXR 9/6/2022, abdomen CT 9/23/2022. FINDINGS: Clear lungs. No pneumothorax or pleural effusion. Normal cardiomediastinal silhouette. Old right clavicle fracture. Moderate left glenohumeral degenerative disease. Normal upper abdomen.     Impression: No acute cardiopulmonary disease. Workstation performed: [id number]     FL barium swallow ROUTINE esophagus    Result Date: 2/9/2024  Narrative: BARIUM SWALLOW-ESOPHAGRAM INDICATION:   R22.1: Localized swelling, mass and lump, neck R13.10: Dysphagia, unspecified. Dysphagia with solids, liquids, and pills. Left neck subjective fullness and known left posterior thyroid nodule. COMPARISON: Barium swallow 10/19/2022. CT abdomen 9/23/2022. IMAGES: 500 FLUOROSCOPY TIME:   4.1 mins TECHNIQUE: The patient was given effervescent granules and barium by mouth and images of the esophagus were obtained. A 13 mm barium pill was administered. Multiple fluoroscopic images were saved for subsequent interpretation. FINDINGS: The esophagus is normal in caliber.  Esophageal motility is normal and emptying of contrast from the esophagus is prompt. No mucosal lesion, ulceration or evidence of fold thickening is seen. The patient swallowed the 13 mm barium pill without difficulty which easily passed through the esophagus and into the stomach. Cine evaluation of the cervical esophagus " "was performed. Aspiration with thick barium liquids without a cough reflex, consistent with silent aspiration. Laryngeal vestibule penetration with thick and thin barium liquids. No aspiration identified with thin barium liquids. No nasopharyngeal reflux. Gastroesophageal reflux was not observed. There is a small hiatal hernia.     Impression: Small hiatal hernia. Silent aspiration with thick barium liquids. Laryngeal vestibule penetration with thick and thin barium liquids. The study was marked in EPIC for significant notification. Resident: LINDA MOONEY I, the attending radiologist, have reviewed the images and agree with the final report above. Workstation performed: [id number]     XR wrist 3+ views LEFT    Result Date: 2/9/2024  Narrative: XR WRIST 3+ VW LEFT INDICATION: L wrist swelling/pain. COMPARISON: None FINDINGS: No acute fracture or dislocation. First carpometacarpal osteoarthritis. Chondrocalcinosis TFCC and midcarpal joint. Calcification in the joint capsule at the third metacarpophalangeal joint laterally No lytic or blastic osseous lesion. Atherosclerotic calcifications. Otherwise unremarkable soft tissues.     Impression: No acute osseous abnormality. Workstation performed: [id number]     EKG, Pathology, and Other Studies: I have personally reviewed the results.  VTE Pharmacologic Prophylaxis: Enoxaparin (Lovenox)  VTE Mechanical Prophylaxis: sequential compression device    Code Status: Level 1 - Full Code    Anticipated Length of Stay:  Patient will be admitted on an Inpatient basis with an anticipated length of stay of greater than 2 midnights.     Counseling / Coordination of Care  Total floor / unit time spent today 75 minutes.  Greater than 50% of total time was spent with the patient and / or family counseling and / or coordination of care.     \"This note has been constructed using a voice recognition system\"      Justin George MD  3/2/2024, 12:46 PM            "

## 2024-03-02 NOTE — RESPIRATORY THERAPY NOTE
ABG repeated after 2.5 hrs on Bipap  ST   Latest Reference Range & Units 03/02/24 17:44   Glucose, i-STAT 65 - 140 mg/dl 149 (H)   pH, Art i-STAT 7.350 - 7.450  7.483 (H)   pCO2, Art i-STAT 36.0 - 44.0 mm HG 23.0 (LL)   pO2, ART i-STAT 75.0 - 129.0 mm .0 (H)   HCO3, Art i-STAT 22.0 - 28.0 mmol/L 17.2 (L)   Base Exc -2 - 3 mmol/L -5 (L)   O2 Sat, Istat 60 - 85 % 100 (H)   SODIUM, I-STAT 136 - 145 mmol/l 141   POTASSIUM,I-STAT 3.5 - 5.3 mmol/L 3.6   CO2, i-STAT 21 - 32 mmol/L 18 (L)   CALCIUM, IONIZED ISTAT 1.12 - 1.32 mmol/L 1.29   Hemoglobin, Istat 11.5 - 15.4 g/dl 11.9   SPECIMEN TYPE  ARTERIAL   (LL): Data is critically low  (H): Data is abnormally high  (L): Data is abnormally low 12/6 RR 20 30%    Pt was transitioned to 2 L NC as per provider order

## 2024-03-02 NOTE — ASSESSMENT & PLAN NOTE
Patient presents to ED with 2 days of difficulty breathing and increase use of rescue inhaler without relief.  Noted to have chest tightness, cough and bilateral wheezing on admission  In ED, received steroids, continuous neb treatment and IV Mag in ER without much improvement  She has a history of asthma that has not been active since 2012.     Plan  See plan above

## 2024-03-02 NOTE — ASSESSMENT & PLAN NOTE
Lab Results   Component Value Date    EGFR 54 03/02/2024    EGFR 46 02/09/2024    EGFR 47 01/31/2024    CREATININE 0.98 03/02/2024    CREATININE 1.12 02/09/2024    CREATININE 1.10 01/31/2024   Has CKD stage III  Stable  Trend BMP

## 2024-03-02 NOTE — CONSULTS
Consult Note - Pulmonary   Cindy Cruz 78 y.o. female MRN: [id number]  Unit/Bed#: -01 Encounter: [id number]      Reason for consultation: acute asthma exacerbation    Requesting physician: Justin George MD     Assessment:    Acute respiratory insufficiency  Severe Acute asthma exacerbation  RSV tracheobronchitis  Hypereosinophilia  Remote history of tobacco abuse    Plan:    Continue solumedrol 40 q8hrs  Continue atrovent/xopenex  Add pulmicort and perforomist  Given 2g Mg IVPB  Check sputum culture  Continue azithromycin  Check ABG, if work of breathing remains high, start Bipap  Recommend upgrade to step down 2, discussed with SLIM      History of Present Illness   HPI:  Cindy Cruz is a 78 y.o. female who presents with several days of shortness of breath and cough. She has a history of asthma that has not been active since 2012. She uses only albuterol rescue inhaler. She has a remote history of tobacco abuse. She tested positive for RSV and given her acute asthma exacerbation, Pulmonary was consulted for further evaluation.     Review of Systems   Constitutional:  Negative for chills and fever.   HENT:  Negative for congestion, postnasal drip and rhinorrhea.    Eyes:  Negative for itching.   Respiratory:  Positive for shortness of breath and wheezing. Negative for cough and stridor.    Cardiovascular:  Negative for chest pain, palpitations and leg swelling.   Gastrointestinal:  Negative for abdominal distention, abdominal pain, nausea and vomiting.   Genitourinary:  Negative for dysuria and flank pain.   Musculoskeletal:  Negative for arthralgias and myalgias.   Skin:  Negative for color change.   Neurological:  Negative for dizziness, light-headedness and headaches.   Psychiatric/Behavioral: Negative.           Historical Information   Past Medical History:   Diagnosis Date    Asthma     Chronic pain disorder     Back    Coronary artery disease     Depression     Disease of thyroid gland      Dyslipidemia     Esophageal reflux     Frequent headaches     stress related    GERD (gastroesophageal reflux disease)     Headache(784.0)     Heart murmur     History of stomach ulcers     Hypercalcemia     Hyperlipidemia     Hypertension     Osteopenia     Tremor      Past Surgical History:   Procedure Laterality Date    BACK SURGERY      lower back    BILATERAL OOPHORECTOMY      BREAST BIOPSY Right     long ago, benign     SECTION      CHOLECYSTECTOMY      COLONOSCOPY      HYSTERECTOMY      age 36 per [facility]    JOINT REPLACEMENT Left     knee    OOPHORECTOMY Bilateral     age 36 per MRS    IA WEBB IMPLTJ NSTIM ELTRDS PLATE/PADDLE EDRL Left 2019    Procedure: LAMINECTOMY THORACIC FOR INSERTION DORSAL COLUMN SPINAL CORD STIMULATOR (DCS) W IMPLANTABLE PULSE GENERATOR, LEFT GLUTE;  Surgeon: Lg Mccallum MD;  Location:  MAIN OR;  Service: Neurosurgery    IA REVJ/RMVL IMPL SPI NPG/RCVR DTCH CONNJ ELTRD RA Left 2022    Procedure: Reopening of thoracic and left buttock incisions for removal of spinal cord stimulator system;  Surgeon: Siva Goss MD;  Location:  MAIN OR;  Service: Neurosurgery    ROTATOR CUFF REPAIR Right     SPINAL STIMULATOR PLACEMENT N/A 2019    Procedure: REVISION SPINAL CORD STIMULATOR PADDLE ELECTRODE, REPLACEMENT WITH PERCUTANEOUS ELECTRODES OR SMALLER PADDLE;  Surgeon: Lg Mccallum MD;  Location: [facility];  Service: Neurosurgery     Family History   Problem Relation Age of Onset    No Known Problems Mother     Glaucoma Father     No Known Problems Sister     No Known Problems Sister     No Known Problems Sister     Depression Sister     No Known Problems Sister     No Known Problems Maternal Grandmother     No Known Problems Maternal Grandfather     No Known Problems Paternal Grandmother     No Known Problems Paternal Grandfather     No Known Problems Daughter     No Known Problems Daughter     No Known Problems Daughter     Hypertension Family     Stroke Family      Heart disease Family     Thyroid disease Family     Colon cancer Neg Hx     Colon polyps Neg Hx        Occupational History: noncontributory    Social History: remote history of tobacco abuse, denies drug or alcohol abuse    Meds/Allergies   Current Facility-Administered Medications   Medication Dose Route Frequency    acetaminophen (TYLENOL) tablet 650 mg  650 mg Oral Q6H PRN    albuterol (PROVENTIL HFA,VENTOLIN HFA) inhaler 2 puff  2 puff Inhalation Q6H PRN    albuterol inhalation solution 2.5 mg  2.5 mg Nebulization Q6H PRN    atorvastatin (LIPITOR) tablet 20 mg  20 mg Oral Daily    azithromycin (ZITHROMAX) tablet 500 mg  500 mg Oral Q24H    bimatoprost (LUMIGAN) 0.03 % ophthalmic drops 1 drop  1 drop Ophthalmic Daily    [START ON 3/3/2024] buPROPion (WELLBUTRIN XL) 24 hr tablet 450 mg  450 mg Oral QAM    enoxaparin (LOVENOX) subcutaneous injection 40 mg  40 mg Subcutaneous Daily    hydrALAZINE (APRESOLINE) injection 10 mg  10 mg Intravenous Q6H PRN    ipratropium (ATROVENT) 0.02 % inhalation solution 0.5 mg  0.5 mg Nebulization TID    levalbuterol (XOPENEX) inhalation solution 1.25 mg  1.25 mg Nebulization TID    levothyroxine tablet 88 mcg  88 mcg Oral Early Morning    losartan (COZAAR) tablet 100 mg  100 mg Oral Daily    methylPREDNISolone sodium succinate (Solu-MEDROL) injection 40 mg  40 mg Intravenous Q8H    montelukast (SINGULAIR) tablet 10 mg  10 mg Oral HS    [START ON 3/3/2024] pantoprazole (PROTONIX) EC tablet 40 mg  40 mg Oral Daily Before Breakfast    sertraline (ZOLOFT) tablet 50 mg  50 mg Oral Daily    tiZANidine (ZANAFLEX) tablet 2 mg  2 mg Oral Q8H PRN    traZODone (DESYREL) tablet 50 mg  50 mg Oral HS     Medications Prior to Admission   Medication    albuterol (PROVENTIL HFA,VENTOLIN HFA) 90 mcg/act inhaler    albuterol (Ventolin HFA) 90 mcg/act inhaler    alendronate (FOSAMAX) 70 mg tablet    atorvastatin (LIPITOR) 20 mg tablet    bimatoprost (Lumigan) 0.01 % ophthalmic drops    buPROPion  (WELLBUTRIN XL) 150 mg 24 hr tablet    buPROPion (WELLBUTRIN XL) 300 mg 24 hr tablet    docusate sodium (COLACE) 100 mg capsule    gabapentin (NEURONTIN) 800 mg tablet    glycopyrrolate (ROBINUL) 2 MG tablet    losartan (COZAAR) 100 MG tablet    montelukast (SINGULAIR) 10 mg tablet    omeprazole (PriLOSEC) 40 MG capsule    polyethylene glycol (GLYCOLAX) 17 GM/SCOOP powder    sertraline (ZOLOFT) 50 mg tablet    Synthroid 88 MCG tablet    Timolol Maleate, Once-Daily, 0.5 % SOLN    tiZANidine (ZANAFLEX) 2 mg tablet    traZODone (DESYREL) 50 mg tablet    vitamin B-12 (VITAMIN B-12) 1,000 mcg tablet     Allergies   Allergen Reactions    Iodinated Contrast Media Anaphylaxis     Contrast Dye       Vitals:    03/02/24 1130 03/02/24 1200 03/02/24 1300 03/02/24 1347   BP: 138/66 149/67 146/70 151/64   BP Location: Left arm Left arm Left arm    Pulse: 73 65 59    Resp: (!) 24 22 (!) 24 18   Temp:       TempSrc:       SpO2: 100% 99% 100%    Weight:           Physical Exam  Constitutional:       General: She is not in acute distress.     Appearance: She is not diaphoretic.   HENT:      Head: Normocephalic and atraumatic.      Nose: Nose normal.      Mouth/Throat:      Pharynx: No oropharyngeal exudate.   Eyes:      General: No scleral icterus.     Conjunctiva/sclera: Conjunctivae normal.      Pupils: Pupils are equal, round, and reactive to light.   Neck:      Thyroid: No thyromegaly.      Vascular: No JVD.      Trachea: No tracheal deviation.   Cardiovascular:      Rate and Rhythm: Normal rate and regular rhythm.      Heart sounds: Normal heart sounds. No murmur heard.     No friction rub. No gallop.   Pulmonary:      Effort: Pulmonary effort is normal. No respiratory distress.      Breath sounds: No stridor. No wheezing or rales.      Comments: Diffuse bilateral wheezing with increased work of breathing  Abdominal:      General: Bowel sounds are normal. There is no distension.      Palpations: Abdomen is soft.      Tenderness:  "There is no abdominal tenderness. There is no guarding or rebound.   Musculoskeletal:         General: No deformity. Normal range of motion.      Cervical back: Normal range of motion and neck supple.   Lymphadenopathy:      Cervical: No cervical adenopathy.   Skin:     General: Skin is warm.      Findings: No erythema or rash.   Neurological:      Mental Status: She is alert and oriented to person, place, and time.      Cranial Nerves: No cranial nerve deficit.      Sensory: No sensory deficit.         Labs: I have personally reviewed pertinent lab results.  Results from last 7 days   Lab Units 03/02/24  1113   WBC Thousand/uL 6.53   HEMOGLOBIN g/dL 12.6   HEMATOCRIT % 37.8   PLATELETS Thousands/uL 211   NEUTROS PCT % 46   MONOS PCT % 14*   EOS PCT % 13*      Results from last 7 days   Lab Units 03/02/24  1113   POTASSIUM mmol/L 4.0   CHLORIDE mmol/L 110*   CO2 mmol/L 20*   BUN mg/dL 14   CREATININE mg/dL 0.98   CALCIUM mg/dL 10.2                      No results found for: \"TROPONINI\"    Imaging and other studies: I have personally reviewed pertinent reports.   and I have personally reviewed pertinent films in PACS  Chest xray on 03/02/2024- clear bilateral lung fields    Pulmonary function testing: none on file    EKG, Pathology, and Other Studies: I have personally reviewed pertinent reports.   and I have personally reviewed pertinent films in PACS    Code Status: Level 1 - Full Code      Clint Blue MD  Pulmonary & Critical Care   [facility]    "

## 2024-03-02 NOTE — ASSESSMENT & PLAN NOTE
Lab Results   Component Value Date    EGFR 54 03/02/2024    EGFR 46 02/09/2024    EGFR 47 01/31/2024    CREATININE 0.98 03/02/2024    CREATININE 1.12 02/09/2024    CREATININE 1.10 01/31/2024     Baseline 0.90 - 1.12  Admission 0.98    Plan  I&Os  Avoid hypotension  Avoid nephrotoxins

## 2024-03-02 NOTE — ED PROVIDER NOTES
History  Chief Complaint   Patient presents with    Shortness of Breath     Pt c/o SOB x 2 days, hx of asthma. Using nebs and inhalers at home with no relief. + chest pain      78-year-old female, history of asthma, presents with shortness of breath.  Daughter reports patient with 2 days of difficulty breathing, has been using inhaler at home with minimal relief.  No known fevers or recent illness.  Patient does not smoke, is not on home oxygen.      History provided by:  Patient and relative   used: No    Shortness of Breath  Associated symptoms: no fever        Prior to Admission Medications   Prescriptions Last Dose Informant Patient Reported? Taking?   Synthroid 88 MCG tablet   No No   Sig: Take 1 tablet by mouth once daily   Timolol Maleate, Once-Daily, 0.5 % SOLN  Child No No   Sig: Apply 1 drop to eye every 12 (twelve) hours   albuterol (PROVENTIL HFA,VENTOLIN HFA) 90 mcg/act inhaler   No No   Sig: INHALE 2 PUFFS BY MOUTH EVERY 6 HOURS AS NEEDED FOR WHEEZING   albuterol (Ventolin HFA) 90 mcg/act inhaler  Child No No   Sig: Inhale 2 puffs every 4 (four) hours as needed for wheezing or shortness of breath   alendronate (FOSAMAX) 70 mg tablet  Child No No   Sig: Take 1 tablet (70 mg total) by mouth once a week   atorvastatin (LIPITOR) 20 mg tablet  Child No No   Sig: Take 1 tablet by mouth once daily   bimatoprost (Lumigan) 0.01 % ophthalmic drops  Child No No   Sig: Administer 1 drop to both eyes daily   buPROPion (WELLBUTRIN XL) 150 mg 24 hr tablet  Child No No   Sig: Take 1 tablet (150 mg total) by mouth every morning With 300 mg to equal 450 mg daily   buPROPion (WELLBUTRIN XL) 300 mg 24 hr tablet  Child No No   Sig: Take 1 tablet by mouth once daily   docusate sodium (COLACE) 100 mg capsule   No No   Sig: Take 1 capsule (100 mg total) by mouth 2 (two) times a day   gabapentin (NEURONTIN) 800 mg tablet  Child No No   Sig: TAKE 1 TABLET BY MOUTH ONCE DAILY IN THE MORNING AND 1 TABLET AT  BEDTIME   glycopyrrolate (ROBINUL) 2 MG tablet  Child No No   Sig: Take 1 tablet (2 mg total) by mouth 2 (two) times a day   losartan (COZAAR) 100 MG tablet  Child No No   Sig: Take 1 tablet by mouth once daily   montelukast (SINGULAIR) 10 mg tablet  Child No No   Sig: Take 1 tablet (10 mg total) by mouth daily at bedtime   omeprazole (PriLOSEC) 40 MG capsule   No No   Sig: Take 1 capsule (40 mg total) by mouth 2 (two) times a day   polyethylene glycol (GLYCOLAX) 17 GM/SCOOP powder   No No   Sig: Bowel Prep: One (1) 238-gram container of Miralax (polyethylene glycol 3350) as directed   sertraline (ZOLOFT) 50 mg tablet   No No   Sig: Take 1 tablet by mouth once daily   tiZANidine (ZANAFLEX) 2 mg tablet  Child No No   Sig: Take 1 tablet (2 mg total) by mouth every 8 (eight) hours as needed for muscle spasms   traZODone (DESYREL) 50 mg tablet   No No   Sig: TAKE 1 TABLET BY MOUTH ONCE DAILY AT BEDTIME   vitamin B-12 (VITAMIN B-12) 1,000 mcg tablet  Child No No   Sig: Take 1 tablet (1,000 mcg total) by mouth daily      Facility-Administered Medications: None       Past Medical History:   Diagnosis Date    Asthma     Chronic pain disorder     Back    Coronary artery disease     Depression     Disease of thyroid gland     Dyslipidemia     Esophageal reflux     Frequent headaches     stress related    GERD (gastroesophageal reflux disease)     Headache(784.0)     Heart murmur     History of stomach ulcers     Hypercalcemia     Hyperlipidemia     Hypertension     Osteopenia     Tremor        Past Surgical History:   Procedure Laterality Date    BACK SURGERY      lower back    BILATERAL OOPHORECTOMY      BREAST BIOPSY Right     long ago, benign     SECTION      CHOLECYSTECTOMY      COLONOSCOPY      HYSTERECTOMY      age 36 per MRS    JOINT REPLACEMENT Left     knee    OOPHORECTOMY Bilateral     age 36 per MRS    RI WEBB IMPLTJ NSTIM ELTRDS PLATE/PADDLE EDRL Left 2019    Procedure: LAMINECTOMY THORACIC FOR  INSERTION DORSAL COLUMN SPINAL CORD STIMULATOR (DCS) W IMPLANTABLE PULSE GENERATOR, LEFT GLUTE;  Surgeon: Lg Mccallum MD;  Location: [facility];  Service: Neurosurgery    LA REVJ/RMVL IMPL SPI NPG/RCVR DTCH CONNJ ELTRD RA Left 2022    Procedure: Reopening of thoracic and left buttock incisions for removal of spinal cord stimulator system;  Surgeon: Siva Goss MD;  Location: [facility];  Service: Neurosurgery    ROTATOR CUFF REPAIR Right     SPINAL STIMULATOR PLACEMENT N/A 2019    Procedure: REVISION SPINAL CORD STIMULATOR PADDLE ELECTRODE, REPLACEMENT WITH PERCUTANEOUS ELECTRODES OR SMALLER PADDLE;  Surgeon: Lg Mccallum MD;  Location: [facility];  Service: Neurosurgery       Family History   Problem Relation Age of Onset    No Known Problems Mother     Glaucoma Father     No Known Problems Sister     No Known Problems Sister     No Known Problems Sister     Depression Sister     No Known Problems Sister     No Known Problems Maternal Grandmother     No Known Problems Maternal Grandfather     No Known Problems Paternal Grandmother     No Known Problems Paternal Grandfather     No Known Problems Daughter     No Known Problems Daughter     No Known Problems Daughter     Hypertension Family     Stroke Family     Heart disease Family     Thyroid disease Family     Colon cancer Neg Hx     Colon polyps Neg Hx      I have reviewed and agree with the history as documented.    E-Cigarette/Vaping    E-Cigarette Use Never User      E-Cigarette/Vaping Substances    Nicotine No     THC No     CBD No     Flavoring No     Other No     Unknown No      Social History     Tobacco Use    Smoking status: Former     Current packs/day: 0.00     Types: Cigarettes     Start date:      Quit date: 1991     Years since quittin.8    Smokeless tobacco: Never   Vaping Use    Vaping status: Never Used   Substance Use Topics    Alcohol use: Not Currently    Drug use: Never       Review of Systems   Constitutional:  Negative.  Negative for fever.   Respiratory:  Positive for shortness of breath.    Gastrointestinal: Negative.    Neurological: Negative.        Physical Exam  Physical Exam  Vitals and nursing note reviewed.   Constitutional:       General: She is not in acute distress.  HENT:      Head: Normocephalic and atraumatic.      Mouth/Throat:      Mouth: Mucous membranes are moist.      Pharynx: Oropharynx is clear. No pharyngeal swelling.   Eyes:      Extraocular Movements: Extraocular movements intact.      Pupils: Pupils are equal, round, and reactive to light.   Cardiovascular:      Rate and Rhythm: Normal rate and regular rhythm.   Pulmonary:      Comments: Tachypneic, respiratory distress  Diffuse wheezing  Chest:      Chest wall: No tenderness.   Musculoskeletal:         General: Normal range of motion.      Right lower leg: No edema.      Left lower leg: No edema.   Skin:     General: Skin is warm and dry.   Neurological:      General: No focal deficit present.      Mental Status: She is alert and oriented to person, place, and time.         Vital Signs  ED Triage Vitals [03/02/24 1102]   Temperature Pulse Respirations Blood Pressure SpO2   97.8 °F (36.6 °C) 93 (!) 26 (!) 209/95 99 %      Temp Source Heart Rate Source Patient Position - Orthostatic VS BP Location FiO2 (%)   Oral Monitor Sitting Left arm --      Pain Score       --           Vitals:    03/02/24 1102 03/02/24 1130 03/02/24 1200   BP: (!) 209/95 138/66 149/67   Pulse: 93 73 65   Patient Position - Orthostatic VS: Sitting Sitting Sitting         Visual Acuity      ED Medications  Medications   magnesium sulfate 2 g/50 mL IVPB (premix) 2 g (2 g Intravenous New Bag 3/2/24 1208)   albuterol inhalation solution 10 mg (10 mg Nebulization Given 3/2/24 1117)   ipratropium (ATROVENT) 0.02 % inhalation solution 1 mg (1 mg Nebulization Given 3/2/24 1117)   sodium chloride 0.9 % inhalation solution 12 mL (12 mL Nebulization Given 3/2/24 1117)    methylPREDNISolone sodium succinate (Solu-MEDROL) injection 80 mg (80 mg Intravenous Given 3/2/24 1117)       Diagnostic Studies  Results Reviewed       Procedure Component Value Units Date/Time    FLU/RSV/COVID - if FLU/RSV clinically relevant [[id number]]  (Abnormal) Collected: 03/02/24 1113    Lab Status: Final result Specimen: Nares from Nose Updated: 03/02/24 1205     SARS-CoV-2 Negative     INFLUENZA A PCR Negative     INFLUENZA B PCR Negative     RSV PCR Positive    Narrative:      FOR PEDIATRIC PATIENTS - copy/paste COVID Guidelines URL to browser: [email or web address]    SARS-CoV-2 assay is a Nucleic Acid Amplification assay intended for the  qualitative detection of nucleic acid from SARS-CoV-2 in nasopharyngeal  swabs. Results are for the presumptive identification of SARS-CoV-2 RNA.    Positive results are indicative of infection with SARS-CoV-2, the virus  causing COVID-19, but do not rule out bacterial infection or co-infection  with other viruses. Laboratories within the [place] and its  territories are required to report all positive results to the appropriate  public health authorities. Negative results do not preclude SARS-CoV-2  infection and should not be used as the sole basis for treatment or other  patient management decisions. Negative results must be combined with  clinical observations, patient history, and epidemiological information.  This test has not been FDA cleared or approved.    This test has been authorized by FDA under an Emergency Use Authorization  (EUA). This test is only authorized for the duration of time the  declaration that circumstances exist justifying the authorization of the  emergency use of an in vitro diagnostic tests for detection of SARS-CoV-2  virus and/or diagnosis of COVID-19 infection under section 564(b)(1) of  the Act, 21 U.S.C. 360bbb-3(b)(1), unless the authorization is terminated  or revoked  sooner. The test has been validated but independent review by FDA  and CLIA is pending.    Test performed using [employer] GeneXpert: This RT-PCR assay targets N2,  a region unique to SARS-CoV-2. A conserved region in the E-gene was chosen  for pan-Sarbecovirus detection which includes SARS-CoV-2.    According to CMS-2020-01-R, this platform meets the definition of high-throughput technology.    Basic metabolic panel [[id number]]  (Abnormal) Collected: 03/02/24 1113    Lab Status: Final result Specimen: Blood from Arm, Left Updated: 03/02/24 1148     Sodium 139 mmol/L      Potassium 4.0 mmol/L      Chloride 110 mmol/L      CO2 20 mmol/L      ANION GAP 9 mmol/L      BUN 14 mg/dL      Creatinine 0.98 mg/dL      Glucose 95 mg/dL      Calcium 10.2 mg/dL      eGFR 54 ml/min/1.73sq m     Narrative:      National Kidney Disease Foundation guidelines for Chronic Kidney Disease (CKD):     Stage 1 with normal or high GFR (GFR > 90 mL/min/1.73 square meters)    Stage 2 Mild CKD (GFR = 60-89 mL/min/1.73 square meters)    Stage 3A Moderate CKD (GFR = 45-59 mL/min/1.73 square meters)    Stage 3B Moderate CKD (GFR = 30-44 mL/min/1.73 square meters)    Stage 4 Severe CKD (GFR = 15-29 mL/min/1.73 square meters)    Stage 5 End Stage CKD (GFR <15 mL/min/1.73 square meters)  Note: GFR calculation is accurate only with a steady state creatinine    CBC and differential [[id number]]  (Abnormal) Collected: 03/02/24 1113    Lab Status: Final result Specimen: Blood from Arm, Left Updated: 03/02/24 1130     WBC 6.53 Thousand/uL      RBC 4.07 Million/uL      Hemoglobin 12.6 g/dL      Hematocrit 37.8 %      MCV 93 fL      MCH 31.0 pg      MCHC 33.3 g/dL      RDW 14.5 %      MPV 11.6 fL      Platelets 211 Thousands/uL      nRBC 0 /100 WBCs      Neutrophils Relative 46 %      Immat GRANS % 0 %      Lymphocytes Relative 26 %      Monocytes Relative 14 %      Eosinophils Relative 13 %      Basophils Relative 1 %      Neutrophils Absolute 3.02  Thousands/µL      Immature Grans Absolute 0.02 Thousand/uL      Lymphocytes Absolute 1.68 Thousands/µL      Monocytes Absolute 0.94 Thousand/µL      Eosinophils Absolute 0.82 Thousand/µL      Basophils Absolute 0.05 Thousands/µL     Blood culture #2 [[id number]] Collected: 03/02/24 1113    Lab Status: In process Specimen: Blood from Arm, Left Updated: 03/02/24 1127    Blood culture #1 [[id number]]     Lab Status: No result Specimen: Blood                    XR chest 1 view portable   Final Result by Serena Mendez MD (03/02 1217)      No acute cardiopulmonary disease.            Workstation performed: [id number]                    Procedures  ECG 12 Lead Documentation Only    Date/Time: 3/2/2024 11:12 AM    Performed by: José Miguel Thurston MD  Authorized by: José Miguel Thurston MD    ECG reviewed by me, the ED Provider: yes    Patient location:  ED  Previous ECG:     Previous ECG:  Compared to current    Similarity:  No change  Rate:     ECG rate assessment: normal    Rhythm:     Rhythm: sinus rhythm    Ectopy:     Ectopy: none    QRS:     QRS axis:  Normal    QRS intervals:  Wide  Conduction:     Conduction: abnormal      Abnormal conduction: complete RBBB and LPFB    Comments:      Sinus rhythm at 82, no acute changes  CriticalCare Time    Date/Time: 3/2/2024 11:19 AM    Performed by: José Miguel Thurston MD  Authorized by: José Miguel Thurston MD    Critical care provider statement:     Critical care time (minutes):  40    Critical care time was exclusive of:  Separately billable procedures and treating other patients    Critical care was necessary to treat or prevent imminent or life-threatening deterioration of the following conditions:  Respiratory failure    Critical care was time spent personally by me on the following activities:  Obtaining history from patient or surrogate, development of treatment plan with patient or surrogate, discussions with consultants, evaluation of patient's response to treatment,  examination of patient, ordering and performing treatments and interventions, ordering and review of laboratory studies, ordering and review of radiographic studies and re-evaluation of patient's condition           ED Course  ED Course as of 03/02/24 1235   Sat Mar 02, 2024   1128 Chest x-ray dependently reviewed by myself compared to previous, no infiltrate or effusion, no acute findings.   1230 Wheezing improved after patient received continuous nebulizer treatment and steroids, still present and patient still having tachypnea, given IV magnesium.  Patient reports feeling somewhat better but still short of breath, repeat examination lungs with improved air movement, expiratory wheezing still noted.   1232 Discussed with hospitalist, will keep in hospital for continued treatment and monitoring.                               SBIRT 22yo+      Flowsheet Row Most Recent Value   Initial Alcohol Screen: US AUDIT-C     1. How often do you have a drink containing alcohol? 0 Filed at: 03/02/2024 1102   2. How many drinks containing alcohol do you have on a typical day you are drinking?  0 Filed at: 03/02/2024 1102   3a. Male UNDER 65: How often do you have five or more drinks on one occasion? 0 Filed at: 03/02/2024 1102   3b. FEMALE Any Age, or MALE 65+: How often do you have 4 or more drinks on one occassion? 0 Filed at: 03/02/2024 1102   Audit-C Score 0 Filed at: 03/02/2024 1102   YULISSA: How many times in the past year have you...    Used an illegal drug or used a prescription medication for non-medical reasons? Never Filed at: 03/02/2024 1102                      Medical Decision Making  78-year-old female, history of asthma, presenting with dyspnea, respiratory distress.  Differential diagnosis includes acute asthma exacerbation, pneumonia, heart failure among other diagnoses.  Patient tachypneic with respiratory distress, diffuse wheezing.  Oxygen saturation 99% on room air, patient does not require supplemental oxygen  at this time.  Continuous nebulizer treatment with albuterol, ipratropium ordered with IV steroids.  Chest x-ray, EKG, labs ordered, will continue to monitor in ED and reevaluate.    Amount and/or Complexity of Data Reviewed  Labs: ordered. Decision-making details documented in ED Course.  Radiology: ordered and independent interpretation performed. Decision-making details documented in ED Course.  ECG/medicine tests: ordered and independent interpretation performed. Decision-making details documented in ED Course.    Risk  Prescription drug management.  Decision regarding hospitalization.             Disposition  Final diagnoses:   Acute asthma exacerbation     Time reflects when diagnosis was documented in both MDM as applicable and the Disposition within this note       Time User Action Codes Description Comment    3/2/2024 12:34 PM José Miguel Thurston Add [J45.901] Acute asthma exacerbation           ED Disposition       ED Disposition   Admit    Condition   Stable    Date/Time   Sat Mar 2, 2024 1234    Comment   Case was discussed with Dr. George and the patient's admission status was agreed to be Admission Status: inpatient status to the service of Dr. George .               Follow-up Information    None         Patient's Medications   Discharge Prescriptions    No medications on file       No discharge procedures on file.    PDMP Review         Value Time User    PDMP Reviewed  Yes 11/1/2022  4:32 PM Yohannes Mederos MD            ED Provider  Electronically Signed by             José Miguel Thurston MD  03/02/24 9270

## 2024-03-02 NOTE — PLAN OF CARE
Problem: Potential for Falls  Goal: Patient will remain free of falls  Description: INTERVENTIONS:  - Educate patient/family on patient safety including physical limitations  - Instruct patient to call for assistance with activity   - Consult OT/PT to assist with strengthening/mobility   - Keep Call bell within reach  - Keep bed low and locked with side rails adjusted as appropriate  - Keep care items and personal belongings within reach  - Initiate and maintain comfort rounds  - Make Fall Risk Sign visible to staff  - Apply yellow socks and bracelet for high fall risk patients  - Consider moving patient to room near nurses station  Outcome: Progressing     Problem: Prexisting or High Potential for Compromised Skin Integrity  Goal: Skin integrity is maintained or improved  Description: INTERVENTIONS:  - Identify patients at risk for skin breakdown  - Assess and monitor skin integrity  - Assess and monitor nutrition and hydration status  - Monitor labs   - Assess for incontinence   - Turn and reposition patient  - Assist with mobility/ambulation  - Relieve pressure over bony prominences  - Avoid friction and shearing  - Provide appropriate hygiene as needed including keeping skin clean and dry  - Evaluate need for skin moisturizer/barrier cream  - Collaborate with interdisciplinary team   - Patient/family teaching  - Consider wound care consult   Outcome: Progressing     Problem: PAIN - ADULT  Goal: Verbalizes/displays adequate comfort level or baseline comfort level  Description: Interventions:  - Encourage patient to monitor pain and request assistance  - Assess pain using appropriate pain scale  - Administer analgesics based on type and severity of pain and evaluate response  - Implement non-pharmacological measures as appropriate and evaluate response  - Consider cultural and social influences on pain and pain management  - Notify physician/advanced practitioner if interventions unsuccessful or patient reports  new pain  Outcome: Progressing     Problem: INFECTION - ADULT  Goal: Absence or prevention of progression during hospitalization  Description: INTERVENTIONS:  - Assess and monitor for signs and symptoms of infection  - Monitor lab/diagnostic results  - Monitor all insertion sites, i.e. indwelling lines, tubes, and drains  - Monitor endotracheal if appropriate and nasal secretions for changes in amount and color  - Powers appropriate cooling/warming therapies per order  - Administer medications as ordered  - Instruct and encourage patient and family to use good hand hygiene technique  - Identify and instruct in appropriate isolation precautions for identified infection/condition  Outcome: Progressing  Goal: Absence of fever/infection during neutropenic period  Description: INTERVENTIONS:  - Monitor WBC    Outcome: Progressing     Problem: SAFETY ADULT  Goal: Patient will remain free of falls  Description: INTERVENTIONS:  - Educate patient/family on patient safety including physical limitations  - Instruct patient to call for assistance with activity   - Consult OT/PT to assist with strengthening/mobility   - Keep Call bell within reach  - Keep bed low and locked with side rails adjusted as appropriate  - Keep care items and personal belongings within reach  - Initiate and maintain comfort rounds  - Make Fall Risk Sign visible to staff  - Apply yellow socks and bracelet for high fall risk patients  - Consider moving patient to room near nurses station  Outcome: Progressing  Goal: Maintain or return to baseline ADL function  Description: INTERVENTIONS:  -  Assess patient's ability to carry out ADLs; assess patient's baseline for ADL function and identify physical deficits which impact ability to perform ADLs (bathing, care of mouth/teeth, toileting, grooming, dressing, etc.)  - Assess/evaluate cause of self-care deficits   - Assess range of motion  - Assess patient's mobility; develop plan if impaired  - Assess  patient's need for assistive devices and provide as appropriate  - Encourage maximum independence but intervene and supervise when necessary  - Involve family in performance of ADLs  - Assess for home care needs following discharge   - Consider OT consult to assist with ADL evaluation and planning for discharge  - Provide patient education as appropriate  Outcome: Progressing  Goal: Maintains/Returns to pre admission functional level  Description: INTERVENTIONS:  - Perform AM-PAC 6 Click Basic Mobility/ Daily Activity assessment daily.  - Set and communicate daily mobility goal to care team and patient/family/caregiver.   - Collaborate with rehabilitation services on mobility goals if consulted  - Out of bed for toileting  - Record patient progress and toleration of activity level   Outcome: Progressing     Problem: DISCHARGE PLANNING  Goal: Discharge to home or other facility with appropriate resources  Description: INTERVENTIONS:  - Identify barriers to discharge w/patient and caregiver  - Arrange for needed discharge resources and transportation as appropriate  - Identify discharge learning needs (meds, wound care, etc.)  - Arrange for interpretive services to assist at discharge as needed  - Refer to Case Management Department for coordinating discharge planning if the patient needs post-hospital services based on physician/advanced practitioner order or complex needs related to functional status, cognitive ability, or social support system  Outcome: Progressing     Problem: Knowledge Deficit  Goal: Patient/family/caregiver demonstrates understanding of disease process, treatment plan, medications, and discharge instructions  Description: Complete learning assessment and assess knowledge base.  Interventions:  - Provide teaching at level of understanding  - Provide teaching via preferred learning methods  Outcome: Progressing

## 2024-03-02 NOTE — RESPIRATORY THERAPY NOTE
RT Protocol Note  Cindy Cruz 78 y.o. female MRN: [id number]  Unit/Bed#: -01 SDU Encounter: [id number]    Assessment    Principal Problem:    Acute asthma exacerbation  Active Problems:    Hypothyroidism    Hypertension    Esophageal reflux    Dyslipidemia    Depression, recurrent (HCC)    Chronic kidney disease, stage 3a (HCC)    RSV infection    Acute respiratory failure (HCC)           Past Medical History:   Diagnosis Date    Asthma     Chronic pain disorder     Back    Coronary artery disease     Depression     Disease of thyroid gland     Dyslipidemia     Esophageal reflux     Frequent headaches     stress related    GERD (gastroesophageal reflux disease)     Headache(784.0)     Heart murmur     History of stomach ulcers     Hypercalcemia     Hyperlipidemia     Hypertension     Osteopenia     Tremor      Social History     Socioeconomic History    Marital status:      Spouse name: None    Number of children: None    Years of education: None    Highest education level: None   Occupational History    Occupation: unemployed   Tobacco Use    Smoking status: Former     Current packs/day: 0.00     Types: Cigarettes     Start date:      Quit date: 1991     Years since quittin.8    Smokeless tobacco: Never   Vaping Use    Vaping status: Never Used   Substance and Sexual Activity    Alcohol use: Not Currently    Drug use: Never    Sexual activity: Not Currently     Partners: Male     Birth control/protection: Abstinence   Other Topics Concern    None   Social History Narrative    None     Social Determinants of Health     Financial Resource Strain: Low Risk  (2023)    Overall Financial Resource Strain (CARDIA)     Difficulty of Paying Living Expenses: Not hard at all   Food Insecurity: Not on file   Transportation Needs: No Transportation Needs (2023)    PRAPARE - Transportation     Lack of Transportation (Medical): No     Lack of Transportation (Non-Medical): No   Physical  Activity: Not on file   Stress: Not on file   Social Connections: Not on file   Intimate Partner Violence: Not on file   Housing Stability: Not on file       Subjective         Objective    Physical Exam:   Assessment Type: Assess only  General Appearance: Alert, Awake  Respiratory Pattern: Labored, Dyspnea at rest  Chest Assessment: Chest expansion symmetrical  Bilateral Breath Sounds: Diminished, Expiratory wheezes  Cough: Non-productive    Vitals:  Blood pressure (!) 177/93, pulse 75, temperature (!) 97.3 °F (36.3 °C), temperature source Oral, resp. rate 20, weight 64.5 kg (142 lb 3.2 oz), SpO2 99%, not currently breastfeeding.    Results from last 7 days   Lab Units 03/02/24  1532   PH ART  7.584*   PCO2 ART mm Hg 15.2*   PO2 ART mm Hg 147.3*   HCO3 ART mmol/L 14.1*   BASE EXC ART mmol/L -4.7   O2 CONTENT ART mL/dL 18.9   O2 HGB, ARTERIAL % 98.3*   ABG SOURCE  Radial, Right   TANESHA TEST  Yes     Pt hyperventilating prior to placing on Bipap    Imaging and other studies: I have personally reviewed pertinent reports.            Plan    Respiratory Plan: (S) Moderate/Severe Distress pathway

## 2024-03-02 NOTE — ASSESSMENT & PLAN NOTE
Patient presents to ED with 2 days of difficulty breathing and increase use of rescue inhaler without relief.  Noted to have chest tightness, cough and bilateral wheezing on admission  Multifactorial: acute asthma exacerbation and RSV+  In ED, received steroids, continuous neb treatment and IV Mag in ER without much improvement  Chest x-ray is unremarkable  Upon arrival to the ICU patient tachypneic (RR 30s) and dyspneic without hypoxia. Patient placed on BIPAP for increased WOB. Upgrade to SD1. I spoke with patient and her family who are agreeable to intubation if patient does not improve on BIPAP.     Plan  Wean supplemental oxygen, goal SpO2 >92%  Continue IV Zithromax  Check procal  Continue 40mg solumedrol Q8H  Continue Pulmicort/Performist  Continue Xopenex/Atrovent  Check ABG

## 2024-03-02 NOTE — CONSULTS
[facility]  Consult  Name: Cindy Cruz 78 y.o. female I MRN: [id number]  Unit/Bed#: -01 I Date of Admission: 3/2/2024   Date of Service: 3/2/2024 I Hospital Day: 0    Consults    Assessment/Plan   Acute respiratory failure (HCC)  Assessment & Plan  Patient presents to ED with 2 days of difficulty breathing and increase use of rescue inhaler without relief.  Noted to have chest tightness, cough and bilateral wheezing on admission  Multifactorial: acute asthma exacerbation and RSV+  In ED, received steroids, continuous neb treatment and IV Mag in ER without much improvement  Chest x-ray is unremarkable  Upon arrival to the ICU patient tachypneic (RR 30s) and dyspneic without hypoxia. Patient placed on BIPAP for increased WOB. Upgrade to SD1. I spoke with patient and her family who are agreeable to intubation if patient does not improve on BIPAP.     Plan  Wean supplemental oxygen, goal SpO2 >92%  Continue IV Zithromax  Check procal  Continue 40mg solumedrol Q8H  Continue Pulmicort/Performist  Continue Xopenex/Atrovent  Check ABG    * Acute asthma exacerbation  Assessment & Plan  Patient presents to ED with 2 days of difficulty breathing and increase use of rescue inhaler without relief.  Noted to have chest tightness, cough and bilateral wheezing on admission  In ED, received steroids, continuous neb treatment and IV Mag in ER without much improvement  She has a history of asthma that has not been active since 2012.     Plan  See plan above    RSV infection  Assessment & Plan  RSV+ in ED    Plan  See plan above    Chronic kidney disease, stage 3a (HCC)  Assessment & Plan  Lab Results   Component Value Date    EGFR 54 03/02/2024    EGFR 46 02/09/2024    EGFR 47 01/31/2024    CREATININE 0.98 03/02/2024    CREATININE 1.12 02/09/2024    CREATININE 1.10 01/31/2024     Baseline 0.90 - 1.12  Admission 0.98    Plan  I&Os  Avoid hypotension  Avoid nephrotoxins    Depression, recurrent  ([facility])  Assessment & Plan  Home regimen: Zoloft and Wellbutrin XL    Plan  Hold while NPO on BIPAP    Chronic pain syndrome  Assessment & Plan  Home regimen: Gabapentin    Plan  Hold while NPO on BIPAP    Dyslipidemia  Assessment & Plan  Home regimen: Lipitor    Plan  Hold while NPO on BIPAP    Esophageal reflux  Assessment & Plan  Home regimen: Omeprazole    Plan  IV Protonix while NPO    Hypertension  Assessment & Plan  Home regimen: Cozaar    Plan  Hold Cozaar while NPO on BIPAP  PRN hydralazine for SBP >180    Hypothyroidism  Assessment & Plan  Home regimen: Synthroid 88mcg    Plan  Check TSH  Hold synthroid while NPO on BIPAP           History of Present Illness     HPI: Cindy Cruz is a 78 y.o. with PMH of CKD, HLD, HTN, hypothyroid, depression, anxiety, chronic pain and asthma who presented to ED with 2-3 days of difficulty breathing, cough, SOB and increased use of rescue inhaler without symptom relief. In ED received steroids, IV mag and continuous neb treatments with minimal improvement.  Initially admitted to [facility] for acute asthma exacerbation and RSV+ on RA, however, shortly upgraded to SD1 due to tachypnea, dyspnea, and increased WOB requiring STAT BIPAP.     History obtained from daughters x2, chart review, and the patient.  Review of Systems   Constitutional:  Positive for fatigue.   HENT: Negative.     Eyes: Negative.    Respiratory:  Positive for shortness of breath and wheezing.    Cardiovascular: Negative.    Gastrointestinal: Negative.    Endocrine: Negative.    Genitourinary: Negative.    Musculoskeletal: Negative.    Skin: Negative.    Allergic/Immunologic: Negative.    Neurological:  Positive for numbness. Negative for seizures, weakness and headaches.   Hematological: Negative.    Psychiatric/Behavioral: Negative.       Disposition: Stepdown Level 1   Historical Information   Past Medical History:  No date: Asthma  No date: Chronic pain disorder      Comment:  Back  No date: Coronary artery  disease  No date: Depression  No date: Disease of thyroid gland  No date: Dyslipidemia  No date: Esophageal reflux  No date: Frequent headaches      Comment:  stress related  No date: GERD (gastroesophageal reflux disease)  No date: Headache(784.0)  No date: Heart murmur  No date: History of stomach ulcers  No date: Hypercalcemia  No date: Hyperlipidemia  No date: Hypertension  No date: Osteopenia  No date: Tremor Past Surgical History:  No date: BACK SURGERY      Comment:  lower back  No date: BILATERAL OOPHORECTOMY  No date: BREAST BIOPSY; Right      Comment:  long ago, benign  No date:  SECTION  No date: CHOLECYSTECTOMY  No date: COLONOSCOPY  No date: HYSTERECTOMY      Comment:  age 36 per MRS  No date: JOINT REPLACEMENT; Left      Comment:  knee  No date: OOPHORECTOMY; Bilateral      Comment:  age 36 per MRS  2019: NE WEBB IMPLTJ NSTIM ELTRDS PLATE/PADDLE EDRL; Left      Comment:  Procedure: LAMINECTOMY THORACIC FOR INSERTION DORSAL                COLUMN SPINAL CORD STIMULATOR (DCS) W IMPLANTABLE PULSE                GENERATOR, LEFT GLUTE;  Surgeon: Lg Mccallum MD;                 Location: [facility];  Service: Neurosurgery  2022: NE REVJ/RMVL IMPL SPI NPG/RCVR DTCH CONNJ ELTRD RA; Left      Comment:  Procedure: Reopening of thoracic and left buttock                incisions for removal of spinal cord stimulator system;                 Surgeon: Siva Goss MD;  Location: [facility];                 Service: Neurosurgery  No date: ROTATOR CUFF REPAIR; Right  2019: SPINAL STIMULATOR PLACEMENT; N/A      Comment:  Procedure: REVISION SPINAL CORD STIMULATOR PADDLE                ELECTRODE, REPLACEMENT WITH PERCUTANEOUS ELECTRODES OR                SMALLER PADDLE;  Surgeon: Lg Mccallum MD;  Location: [facility]                Main OR;  Service: Neurosurgery   Current Outpatient Medications   Medication Instructions    albuterol (PROVENTIL HFA,VENTOLIN HFA) 90 mcg/act inhaler 2 puffs, Inhalation,  Every 6 hours PRN    albuterol (Ventolin HFA) 90 mcg/act inhaler 2 puffs, Inhalation, Every 4 hours PRN    alendronate (FOSAMAX) 70 mg, Oral, Weekly    atorvastatin (LIPITOR) 20 mg tablet Take 1 tablet by mouth once daily    bimatoprost (Lumigan) 0.01 % ophthalmic drops 1 drop, Both Eyes, Daily    buPROPion (WELLBUTRIN XL) 150 mg, Oral, Every morning, With 300 mg to equal 450 mg daily    buPROPion (WELLBUTRIN XL) 300 mg, Oral, Daily    docusate sodium (COLACE) 100 mg, Oral, 2 times daily    gabapentin (NEURONTIN) 800 mg tablet TAKE 1 TABLET BY MOUTH ONCE DAILY IN THE MORNING AND 1 TABLET AT BEDTIME    glycopyrrolate (ROBINUL) 2 mg, Oral, 2 times daily    losartan (COZAAR) 100 mg, Oral, Daily    montelukast (SINGULAIR) 10 mg, Oral, Daily at bedtime    omeprazole (PRILOSEC) 40 mg, Oral, 2 times daily    polyethylene glycol (GLYCOLAX) 17 GM/SCOOP powder Bowel Prep: One (1) 238-gram container of Miralax (polyethylene glycol 3350) as directed    sertraline (ZOLOFT) 50 mg, Oral, Daily    Synthroid 88 mcg, Oral, Daily    Timolol Maleate, Once-Daily, 0.5 % SOLN 1 drop, Ophthalmic, Every 12 hours    tiZANidine (ZANAFLEX) 2 mg, Oral, Every 8 hours PRN    traZODone (DESYREL) 50 mg, Oral, Daily at bedtime    vitamin B-12 (VITAMIN B-12) 1,000 mcg, Oral, Daily    Allergies   Allergen Reactions    Iodinated Contrast Media Anaphylaxis     Contrast Dye      Social History     Tobacco Use    Smoking status: Former     Current packs/day: 0.00     Types: Cigarettes     Start date:      Quit date: 1991     Years since quittin.8    Smokeless tobacco: Never   Vaping Use    Vaping status: Never Used   Substance Use Topics    Alcohol use: Not Currently    Drug use: Never    Family History   Problem Relation Age of Onset    No Known Problems Mother     Glaucoma Father     No Known Problems Sister     No Known Problems Sister     No Known Problems Sister     Depression Sister     No Known Problems Sister     No Known Problems  Maternal Grandmother     No Known Problems Maternal Grandfather     No Known Problems Paternal Grandmother     No Known Problems Paternal Grandfather     No Known Problems Daughter     No Known Problems Daughter     No Known Problems Daughter     Hypertension Family     Stroke Family     Heart disease Family     Thyroid disease Family     Colon cancer Neg Hx     Colon polyps Neg Hx         Objective                            Vitals I/O      Most Recent Min/Max in 24hrs   Temp (!) 97.3 °F (36.3 °C) Temp  Min: 97.3 °F (36.3 °C)  Max: 97.8 °F (36.6 °C)   Pulse 75 Pulse  Min: 59  Max: 93   Resp 20 Resp  Min: 18  Max: 39   BP (!) 177/93 BP  Min: 138/66  Max: 209/95   O2 Sat 99 % SpO2  Min: 98 %  Max: 100 %      Intake/Output Summary (Last 24 hours) at 3/2/2024 1622  Last data filed at 3/2/2024 1238  Gross per 24 hour   Intake 50 ml   Output --   Net 50 ml       Diet NPO    Invasive Monitoring           Physical Exam   Physical Exam  Vitals and nursing note reviewed.   Eyes:      Extraocular Movements: Extraocular movements intact.      Pupils: Pupils are equal, round, and reactive to light.   Skin:     General: Skin is warm.      Capillary Refill: Capillary refill takes less than 2 seconds.      Coloration: Skin is not jaundiced or pale.   HENT:      Head: Normocephalic and atraumatic.      Mouth/Throat:      Mouth: Mucous membranes are moist.   Cardiovascular:      Rate and Rhythm: Normal rate and regular rhythm.      Pulses: Normal pulses.   Musculoskeletal:      Right lower leg: No edema.      Left lower leg: No edema.   Abdominal: General: Bowel sounds are normal. There is no distension.      Palpations: Abdomen is soft.      Tenderness: There is no abdominal tenderness.   Constitutional:       Appearance: She is well-developed and well-nourished.   Pulmonary:      Effort: Tachypnea, accessory muscle usage, respiratory distress and accessory muscle usage present.      Breath sounds: Wheezing present.   Neurological:       General: No focal deficit present.      Mental Status: She is alert and oriented to person, place and time. Mental status is at baseline.      Motor: Strength full and intact in all extremities.   Genitourinary/Anorectal:     Comments: Spontaneous voiding           Diagnostic Studies      EKG: SR  Imaging:  I have personally reviewed pertinent reports.       Medications:  Scheduled PRN   atorvastatin, 20 mg, Daily  azithromycin, 500 mg, Q24H  bimatoprost, 1 drop, Daily  budesonide, 0.5 mg, Q12H  [START ON 3/3/2024] buPROPion, 450 mg, QAM  enoxaparin, 40 mg, Daily  formoterol, 20 mcg, Q12H  ipratropium, 0.5 mg, TID  levalbuterol, 1.25 mg, TID  levothyroxine, 88 mcg, Early Morning  losartan, 100 mg, Daily  magnesium sulfate, 2 g, Once  methylPREDNISolone sodium succinate, 40 mg, Q8H  montelukast, 10 mg, HS  [START ON 3/3/2024] pantoprazole, 40 mg, Daily Before Breakfast  sertraline, 50 mg, Daily  traZODone, 50 mg, HS      acetaminophen, 650 mg, Q6H PRN  albuterol, 2 puff, Q6H PRN  albuterol, 2.5 mg, Q6H PRN  hydrALAZINE, 10 mg, Q6H PRN  tiZANidine, 2 mg, Q8H PRN       Continuous          Labs:    CBC    Recent Labs     03/02/24  1113   WBC 6.53   HGB 12.6   HCT 37.8        BMP    Recent Labs     03/02/24  1113   SODIUM 139   K 4.0   *   CO2 20*   AGAP 9   BUN 14   CREATININE 0.98   CALCIUM 10.2       Coags    No recent results     Additional Electrolytes  No recent results       Blood Gas    Recent Labs     03/02/24  1532   PHART 7.584*   [id number] 15.2*   PO2ART 147.3*   [id number] 14.1*   BEART -4.7   SOURCE Radial, Right     Recent Labs     03/02/24  1532   SOURCE Radial, Right    LFTs  No recent results    Infectious  No recent results  Glucose  Recent Labs     03/02/24  1113   GLUC 95               CRISTINA Lay

## 2024-03-02 NOTE — ASSESSMENT & PLAN NOTE
Patient tested positive for RSV infection  Chest x-ray is unremarkable  Continue supportive management  Antipyretics as needed

## 2024-03-02 NOTE — ASSESSMENT & PLAN NOTE
Admitted with acute asthma exacerbation possible secondary to RSV infection  Noted to have chest tightness, cough and bilateral wheezing on admission  Received Solu-Medrol, neb treatment in ER without much improvement  Chest x-ray is unremarkable  On IV Solu-Medrol  Will start Zithromax  Trend procalcitonin  Daily peak flow  Pulmonology consult  On [facility]  Respiratory protocol with bronchodilators  Patient is saturating well on room air.

## 2024-03-02 NOTE — ASSESSMENT & PLAN NOTE
Patient presented with shortness of breath secondary to asthma exacerbation/RSV infection  Saturating well on room air  Continue bronchodilators and Solu-Medrol  See above

## 2024-03-03 LAB
ALBUMIN SERPL BCP-MCNC: 3.9 G/DL (ref 3.5–5)
ALP SERPL-CCNC: 69 U/L (ref 34–104)
ALT SERPL W P-5'-P-CCNC: 14 U/L (ref 7–52)
ANION GAP SERPL CALCULATED.3IONS-SCNC: 8 MMOL/L
AST SERPL W P-5'-P-CCNC: 14 U/L (ref 13–39)
ATRIAL RATE: 82 BPM
BASE EX.OXY STD BLDV CALC-SCNC: 84.6 % (ref 60–80)
BASE EXCESS BLDV CALC-SCNC: -3.5 MMOL/L
BASOPHILS # BLD AUTO: 0.01 THOUSANDS/ÂΜL (ref 0–0.1)
BASOPHILS NFR BLD AUTO: 0 % (ref 0–1)
BILIRUB SERPL-MCNC: 0.46 MG/DL (ref 0.2–1)
BUN SERPL-MCNC: 22 MG/DL (ref 5–25)
CALCIUM SERPL-MCNC: 10.1 MG/DL (ref 8.4–10.2)
CHLORIDE SERPL-SCNC: 111 MMOL/L (ref 96–108)
CO2 SERPL-SCNC: 22 MMOL/L (ref 21–32)
CREAT SERPL-MCNC: 1.22 MG/DL (ref 0.6–1.3)
EOSINOPHIL # BLD AUTO: 0 THOUSAND/ÂΜL (ref 0–0.61)
EOSINOPHIL NFR BLD AUTO: 0 % (ref 0–6)
ERYTHROCYTE [DISTWIDTH] IN BLOOD BY AUTOMATED COUNT: 14.5 % (ref 11.6–15.1)
GFR SERPL CREATININE-BSD FRML MDRD: 41 ML/MIN/1.73SQ M
GLUCOSE SERPL-MCNC: 136 MG/DL (ref 65–140)
HCO3 BLDV-SCNC: 20.7 MMOL/L (ref 24–30)
HCT VFR BLD AUTO: 35 % (ref 34.8–46.1)
HGB BLD-MCNC: 11.4 G/DL (ref 11.5–15.4)
IMM GRANULOCYTES # BLD AUTO: 0.05 THOUSAND/UL (ref 0–0.2)
IMM GRANULOCYTES NFR BLD AUTO: 1 % (ref 0–2)
LYMPHOCYTES # BLD AUTO: 0.84 THOUSANDS/ÂΜL (ref 0.6–4.47)
LYMPHOCYTES NFR BLD AUTO: 11 % (ref 14–44)
MAGNESIUM SERPL-MCNC: 2.7 MG/DL (ref 1.9–2.7)
MCH RBC QN AUTO: 30.2 PG (ref 26.8–34.3)
MCHC RBC AUTO-ENTMCNC: 32.6 G/DL (ref 31.4–37.4)
MCV RBC AUTO: 93 FL (ref 82–98)
MONOCYTES # BLD AUTO: 0.24 THOUSAND/ÂΜL (ref 0.17–1.22)
MONOCYTES NFR BLD AUTO: 3 % (ref 4–12)
NASAL CANNULA: 2
NEUTROPHILS # BLD AUTO: 6.72 THOUSANDS/ÂΜL (ref 1.85–7.62)
NEUTS SEG NFR BLD AUTO: 85 % (ref 43–75)
NRBC BLD AUTO-RTO: 0 /100 WBCS
O2 CT BLDV-SCNC: 21.7 ML/DL
P AXIS: 71 DEGREES
PCO2 BLDV: 34.5 MM HG (ref 42–50)
PH BLDV: 7.39 [PH] (ref 7.3–7.4)
PHOSPHATE SERPL-MCNC: 3 MG/DL (ref 2.3–4.1)
PLATELET # BLD AUTO: 207 THOUSANDS/UL (ref 149–390)
PMV BLD AUTO: 11.5 FL (ref 8.9–12.7)
PO2 BLDV: 49.5 MM HG (ref 35–45)
POTASSIUM SERPL-SCNC: 4.4 MMOL/L (ref 3.5–5.3)
PR INTERVAL: 170 MS
PROCALCITONIN SERPL-MCNC: 0.08 NG/ML
PROT SERPL-MCNC: 7 G/DL (ref 6.4–8.4)
QRS AXIS: 125 DEGREES
QRSD INTERVAL: 128 MS
QT INTERVAL: 402 MS
QTC INTERVAL: 469 MS
RBC # BLD AUTO: 3.77 MILLION/UL (ref 3.81–5.12)
SODIUM SERPL-SCNC: 141 MMOL/L (ref 135–147)
T WAVE AXIS: 54 DEGREES
VENTRICULAR RATE: 82 BPM
WBC # BLD AUTO: 7.86 THOUSAND/UL (ref 4.31–10.16)

## 2024-03-03 PROCEDURE — 85025 COMPLETE CBC W/AUTO DIFF WBC: CPT

## 2024-03-03 PROCEDURE — 93010 ELECTROCARDIOGRAM REPORT: CPT | Performed by: INTERNAL MEDICINE

## 2024-03-03 PROCEDURE — 94760 N-INVAS EAR/PLS OXIMETRY 1: CPT

## 2024-03-03 PROCEDURE — 80053 COMPREHEN METABOLIC PANEL: CPT

## 2024-03-03 PROCEDURE — 99232 SBSQ HOSP IP/OBS MODERATE 35: CPT | Performed by: INTERNAL MEDICINE

## 2024-03-03 PROCEDURE — 84145 PROCALCITONIN (PCT): CPT

## 2024-03-03 PROCEDURE — 82805 BLOOD GASES W/O2 SATURATION: CPT

## 2024-03-03 PROCEDURE — 94640 AIRWAY INHALATION TREATMENT: CPT

## 2024-03-03 PROCEDURE — 83735 ASSAY OF MAGNESIUM: CPT

## 2024-03-03 PROCEDURE — NC001 PR NO CHARGE

## 2024-03-03 RX ORDER — GUAIFENESIN/DEXTROMETHORPHAN 100-10MG/5
10 SYRUP ORAL EVERY 4 HOURS PRN
Status: DISCONTINUED | OUTPATIENT
Start: 2024-03-03 | End: 2024-03-05

## 2024-03-03 RX ORDER — METHYLPREDNISOLONE SODIUM SUCCINATE 40 MG/ML
40 INJECTION, POWDER, LYOPHILIZED, FOR SOLUTION INTRAMUSCULAR; INTRAVENOUS EVERY 12 HOURS SCHEDULED
Status: DISCONTINUED | OUTPATIENT
Start: 2024-03-03 | End: 2024-03-04

## 2024-03-03 RX ORDER — GABAPENTIN 400 MG/1
800 CAPSULE ORAL 2 TIMES DAILY PRN
Status: DISCONTINUED | OUTPATIENT
Start: 2024-03-03 | End: 2024-03-05 | Stop reason: HOSPADM

## 2024-03-03 RX ADMIN — PANTOPRAZOLE SODIUM 40 MG: 40 TABLET, DELAYED RELEASE ORAL at 06:31

## 2024-03-03 RX ADMIN — BUDESONIDE INHALATION 0.5 MG: 0.5 SUSPENSION RESPIRATORY (INHALATION) at 08:16

## 2024-03-03 RX ADMIN — LEVALBUTEROL HYDROCHLORIDE 1.25 MG: 1.25 SOLUTION RESPIRATORY (INHALATION) at 13:21

## 2024-03-03 RX ADMIN — METHYLPREDNISOLONE SODIUM SUCCINATE 40 MG: 40 INJECTION, POWDER, FOR SOLUTION INTRAMUSCULAR; INTRAVENOUS at 04:00

## 2024-03-03 RX ADMIN — AZITHROMYCIN MONOHYDRATE 500 MG: 500 INJECTION, POWDER, LYOPHILIZED, FOR SOLUTION INTRAVENOUS at 16:28

## 2024-03-03 RX ADMIN — BUPROPION HYDROCHLORIDE 450 MG: 300 TABLET, EXTENDED RELEASE ORAL at 08:17

## 2024-03-03 RX ADMIN — FORMOTEROL FUMARATE DIHYDRATE 20 MCG: 20 SOLUTION RESPIRATORY (INHALATION) at 08:16

## 2024-03-03 RX ADMIN — FORMOTEROL FUMARATE DIHYDRATE 20 MCG: 20 SOLUTION RESPIRATORY (INHALATION) at 19:45

## 2024-03-03 RX ADMIN — LEVALBUTEROL HYDROCHLORIDE 1.25 MG: 1.25 SOLUTION RESPIRATORY (INHALATION) at 19:45

## 2024-03-03 RX ADMIN — BIMATOPROST 1 DROP: 0.3 SOLUTION/ DROPS OPHTHALMIC at 08:19

## 2024-03-03 RX ADMIN — IPRATROPIUM BROMIDE 0.5 MG: 0.5 SOLUTION RESPIRATORY (INHALATION) at 13:21

## 2024-03-03 RX ADMIN — BUDESONIDE INHALATION 0.5 MG: 0.5 SUSPENSION RESPIRATORY (INHALATION) at 19:45

## 2024-03-03 RX ADMIN — LEVOTHYROXINE SODIUM 88 MCG: 88 TABLET ORAL at 06:31

## 2024-03-03 RX ADMIN — METHYLPREDNISOLONE SODIUM SUCCINATE 40 MG: 40 INJECTION, POWDER, FOR SOLUTION INTRAMUSCULAR; INTRAVENOUS at 21:06

## 2024-03-03 RX ADMIN — IPRATROPIUM BROMIDE 0.5 MG: 0.5 SOLUTION RESPIRATORY (INHALATION) at 19:45

## 2024-03-03 RX ADMIN — LOSARTAN POTASSIUM 100 MG: 50 TABLET, FILM COATED ORAL at 21:06

## 2024-03-03 RX ADMIN — IPRATROPIUM BROMIDE 0.5 MG: 0.5 SOLUTION RESPIRATORY (INHALATION) at 08:16

## 2024-03-03 RX ADMIN — ATORVASTATIN CALCIUM 20 MG: 20 TABLET, FILM COATED ORAL at 08:17

## 2024-03-03 RX ADMIN — MONTELUKAST 10 MG: 10 TABLET, FILM COATED ORAL at 21:06

## 2024-03-03 RX ADMIN — ENOXAPARIN SODIUM 40 MG: 100 INJECTION SUBCUTANEOUS at 08:18

## 2024-03-03 RX ADMIN — GUAIFENESIN SYRUP AND DEXTROMETHORPHAN 10 ML: 100; 10 SYRUP ORAL at 17:44

## 2024-03-03 RX ADMIN — LEVALBUTEROL HYDROCHLORIDE 1.25 MG: 1.25 SOLUTION RESPIRATORY (INHALATION) at 08:16

## 2024-03-03 RX ADMIN — TRAZODONE HYDROCHLORIDE 50 MG: 50 TABLET ORAL at 21:06

## 2024-03-03 NOTE — ASSESSMENT & PLAN NOTE
Lab Results   Component Value Date    EGFR 31 03/04/2024    EGFR 41 03/03/2024    EGFR 54 03/02/2024    CREATININE 1.54 (H) 03/04/2024    CREATININE 1.22 03/03/2024    CREATININE 0.98 03/02/2024     Baseline 0.90 - 1.12  Admission 0.98     Plan  I&Os  Avoid hypotension  Avoid nephrotoxins  Renally dose medications

## 2024-03-03 NOTE — PROGRESS NOTES
[facility]  Progress Note  Name: Cindy Cruz I  MRN: [id number]  Unit/Bed#: -01 I Date of Admission: 3/2/2024   Date of Service: 3/3/2024 I Hospital Day: 1    Assessment/Plan   Acute respiratory failure (HCC)  Assessment & Plan  Patient presents to ED with 2 days of difficulty breathing and increase use of rescue inhaler without relief.  Noted to have chest tightness, cough and bilateral wheezing on admission  Multifactorial: acute asthma exacerbation and RSV+  In ED, received steroids, continuous neb treatment and IV Mag in ER without much improvement  Chest x-ray is unremarkable  Upon arrival to the ICU patient tachypneic (RR 30s) and dyspneic without hypoxia. Patient placed on BIPAP for increased WOB. Upgrade to SD1. I spoke with patient and her family who are agreeable to intubation if patient does not improve on BIPAP.   WOB improved after ~ 2 hours on BIPAP. ABG still with respiratory alkalosis but much improved than previous  Procal negative  Stable over the night not requiring BIPAP    Plan  Wean supplemental oxygen, goal SpO2 >92%  Continue IV Zithromax  Continue 40mg solumedrol Q8H  Continue Pulmicort/Performist  Continue Xopenex/Atrovent  Follow up on morning VBG    * Acute asthma exacerbation  Assessment & Plan  Patient presents to ED with 2 days of difficulty breathing and increase use of rescue inhaler without relief.  Noted to have chest tightness, cough and bilateral wheezing on admission  In ED, received steroids, continuous neb treatment and IV Mag in ER without much improvement  Family reported that patient usually takes her rescue inhaler twice a day for SOB. Becomes more SOB when she is active- Family shared that she usually needs her albuterol after taking a shower and getting dressed due to the degree of activity  No previous PFT  Family reports that patients asthma is managed by PCP  Family reports patient has a home nebulizer     Plan  See plan above for  acute exacerbation   Patient advised to follow up with pulmonologist OP for her asthma management. Due to her degree patient uses her rescue inhaler patient may benefit from being started on maintenance medications once stable for discharge.  Patient likely would benefit from PFT outpatient     RSV infection  Assessment & Plan  RSV+ in ED    Plan  See plan above    Chronic kidney disease, stage 3a (HCC)  Assessment & Plan  Lab Results   Component Value Date    EGFR 54 03/02/2024    EGFR 46 02/09/2024    EGFR 47 01/31/2024    CREATININE 0.98 03/02/2024    CREATININE 1.12 02/09/2024    CREATININE 1.10 01/31/2024     Baseline 0.90 - 1.12  Admission 0.98    Plan  I&Os  Avoid hypotension  Avoid nephrotoxins  Renally dose medications     Depression, recurrent (HCC)  Assessment & Plan  Home regimen: Zoloft and Wellbutrin XL    Plan  Continue home dose Zoloft and Wellbutrin     Chronic pain syndrome  Assessment & Plan  Home regimen: Gabapentin    Plan  Continue home dose Gabapentin     Dyslipidemia  Assessment & Plan  Home regimen: Lipitor    Plan  Continue home dose Lipitor     Esophageal reflux  Assessment & Plan  Home regimen: Omeprazole    Plan  Continue Protonix as formula alternative      Hypertension  Assessment & Plan  Home regimen: Cozaar    Plan  Continue home dose Losartan   PRN hydralazine for SBP >180    Hypothyroidism  Assessment & Plan  Home regimen: Synthroid 88mcg  TSH 0.424     Plan  Check T4  Continue home dose Synthroid              Disposition: Stepdown Level 1    ICU Core Measures     A: Assess, Prevent, and Manage Pain Has pain been assessed? Yes  Need for changes to pain regimen? No   B: Both SAT/SAT  N/A   C: Choice of Sedation RASS Goal: 0 Alert and Calm  Need for changes to sedation or analgesia regimen? No   D: Delirium CAM-ICU: Negative   E: Early Mobility  Plan for early mobility? Yes   F: Family Engagement Plan for family engagement today? Yes       Antibiotic Review: Awaiting culture results.        Prophylaxis:  VTE VTE covered by:  enoxaparin, Subcutaneous, 40 mg at 03/02/24 1615       Stress Ulcer  covered byomeprazole (PriLOSEC) 40 MG capsule [[id number]] (Long-Term Med), pantoprazole (PROTONIX) EC tablet 40 mg [[id number]]         Significant 24hr Events     24hr events: Patient admitted over the previous day for acute asthma exacerbation secondary to RSV.  Pulmonology consult the patient recommended upgrading to stepdown to for closer monitoring.  Patient required continuous BiPAP and was transferred over to the critical care service.  Patient tolerated BiPAP for approximately 2 hours and then work of breathing much improved and was able to be liberated from BiPAP.  ABG at this time showed a respiratory alkalosis but patient much improved.  Overnight patient remained off BiPAP stable on 2 L nasal cannula for comfort.  No further airway wheezing or respiratory distress.     Subjective   Review of Systems   Constitutional:  Negative for chills, fatigue and fever.   HENT: Negative.     Eyes: Negative.    Respiratory:  Positive for cough and wheezing. Negative for chest tightness and shortness of breath.    Cardiovascular:  Negative for chest pain and palpitations.   Gastrointestinal: Negative.    Genitourinary: Negative.    Musculoskeletal: Negative.    Neurological: Negative.    Hematological: Negative.    Psychiatric/Behavioral: Negative.        Objective                            Vitals I/O      Most Recent Min/Max in 24hrs   Temp 97.8 °F (36.6 °C) Temp  Min: 97.3 °F (36.3 °C)  Max: 97.8 °F (36.6 °C)   Pulse 82 Pulse  Min: 59  Max: 93   Resp (!) 25 Resp  Min: 18  Max: 39   /66 BP  Min: 133/62  Max: 209/95   O2 Sat 98 % SpO2  Min: 98 %  Max: 100 %      Intake/Output Summary (Last 24 hours) at 3/3/2024 0032  Last data filed at 3/2/2024 1742  Gross per 24 hour   Intake 100 ml   Output --   Net 100 ml       Diet Clear Liquid    Invasive Monitoring           Physical Exam   Physical Exam  Eyes:       General: Vision grossly intact. No scleral icterus.     Extraocular Movements: Extraocular movements intact.      Conjunctiva/sclera: Conjunctivae normal.      Pupils: Pupils are equal, round, and reactive to light.   Skin:     General: Skin is warm and dry.      Capillary Refill: Capillary refill takes less than 2 seconds.   HENT:      Head: Normocephalic and atraumatic.      Mouth/Throat:      Mouth: Mucous membranes are moist.   Cardiovascular:      Rate and Rhythm: Normal rate and regular rhythm.      Pulses: Normal pulses.      Heart sounds: Normal heart sounds.   Musculoskeletal:         General: Normal range of motion.      Right lower leg: No edema.      Left lower leg: No edema.   Abdominal:      Palpations: Abdomen is soft.   Constitutional:       General: She is not in acute distress.     Appearance: She is well-developed and well-nourished. She is not ill-appearing or toxic-appearing.   Pulmonary:      Effort: Pulmonary effort is normal.      Breath sounds: Normal breath sounds. No wheezing, rhonchi or rales.   Neurological:      General: No focal deficit present.      Mental Status: She is alert. Mental status is at baseline.      Sensory: Sensation is intact.      Motor: gross motor function is at baseline for patient. Strength full and intact in all extremities.        Corneal reflex present, cough reflex and gag reflex intact.            Diagnostic Studies      EKG: NSR  Imaging:  I have personally reviewed pertinent reports.   and I have personally reviewed pertinent films in PACS     Medications:  Scheduled PRN   atorvastatin, 20 mg, Daily  azithromycin, 500 mg, Q24H  bimatoprost, 1 drop, Daily  budesonide, 0.5 mg, Q12H  buPROPion, 450 mg, QAM  enoxaparin, 40 mg, Daily  formoterol, 20 mcg, Q12H  gabapentin, 800 mg, BID  ipratropium, 0.5 mg, TID  levalbuterol, 1.25 mg, TID  levothyroxine, 88 mcg, Early Morning  losartan, 100 mg, Daily  methylPREDNISolone sodium succinate, 40 mg, Q8H  montelukast, 10  mg, HS  pantoprazole, 40 mg, Daily Before Breakfast  sertraline, 50 mg, Daily  traZODone, 50 mg, HS      acetaminophen, 650 mg, Q6H PRN  albuterol, 2 puff, Q6H PRN  albuterol, 2.5 mg, Q6H PRN  dextromethorphan-guaiFENesin, 10 mL, Q4H PRN  hydrALAZINE, 10 mg, Q6H PRN  tiZANidine, 2 mg, Q8H PRN       Continuous          Labs:    CBC    Recent Labs     03/02/24  1113 03/02/24  1744   WBC 6.53  --    HGB 12.6 11.9   HCT 37.8 35     --      BMP    Recent Labs     03/02/24  1113 03/02/24  1744   SODIUM 139  --    K 4.0  --    *  --    CO2 20* 18*   AGAP 9  --    BUN 14  --    CREATININE 0.98  --    CALCIUM 10.2  --        Coags    No recent results     Additional Electrolytes  Recent Labs     03/02/24  1744   CAIONIZED 1.29          Blood Gas    Recent Labs     03/02/24  1532   PHART 7.584*   ABZ9NIX 15.2*   PO2ART 147.3*   ZNY0YXA 14.1*   BEART -4.7   SOURCE Radial, Right     Recent Labs     03/02/24  1532   SOURCE Radial, Right    LFTs  No recent results    Infectious  Recent Labs     03/02/24  1600   PROCALCITONI 0.07     Glucose  Recent Labs     03/02/24  1113   GLUC 95               CRISTINA Mandujano

## 2024-03-03 NOTE — ASSESSMENT & PLAN NOTE
Lab Results   Component Value Date    EGFR 54 03/02/2024    EGFR 46 02/09/2024    EGFR 47 01/31/2024    CREATININE 0.98 03/02/2024    CREATININE 1.12 02/09/2024    CREATININE 1.10 01/31/2024     Baseline 0.90 - 1.12  Admission 0.98    Plan  I&Os  Avoid hypotension  Avoid nephrotoxins  Renally dose medications

## 2024-03-03 NOTE — PROGRESS NOTES
Critical Care Interval Transfer Note:    Please refer to progress note from earlier today for full details.     Barriers to discharge:   Wean supplemental oxygen. Baseline room air  Continue neb tx including pulmicort and performist  Day 2/3 azithromycin  FU blood and sputum cultures  Pulm following.      Consults: IP CONSULT TO PULMONOLOGY  IP CONSULT TO CASE MANAGEMENT       Discharge Plan: Anticipate discharge in 48-72 hrs to home.      Patient seen and evaluated by Critical Care today and deemed to be appropriate for transfer to Stepdown Level 2. Spoke to Dr. Tinajero from [facility] to accept transfer. Critical care can be contacted via [employer] with any questions or concerns.

## 2024-03-03 NOTE — ASSESSMENT & PLAN NOTE
Patient presents to ED with 2 days of difficulty breathing and increase use of rescue inhaler without relief.  Noted to have chest tightness, cough and bilateral wheezing on admission  In ED, received steroids, continuous neb treatment and IV Mag in ER without much improvement  Family reported that patient usually takes her rescue inhaler twice a day for SOB. Becomes more SOB when she is active- Family shared that she usually needs her albuterol after taking a shower and getting dressed due to the degree of activity  No previous PFT  Family reports that patients asthma is managed by PCP  Family reports patient has a home nebulizer     Plan  See plan above for acute exacerbation   Patient advised to follow up with pulmonologist OP for her asthma management. Due to her degree patient uses her rescue inhaler patient may benefit from being started on maintenance medications once stable for discharge.  Patient likely would benefit from PFT outpatient

## 2024-03-03 NOTE — ASSESSMENT & PLAN NOTE
APatient presents to ED with 2 days of difficulty breathing and increase use of rescue inhaler without relief.  Noted to have chest tightness, cough and bilateral wheezing on admission  In ED, received steroids, continuous neb treatment and IV Mag in ER without much improvement  Family reported that patient usually takes her rescue inhaler twice a day for SOB. Becomes more SOB when she is active- Family shared that she usually needs her albuterol after taking a shower and getting dressed due to the degree of activity  No previous PFT  Family reports that patients asthma is managed by PCP  Family reports patient has a home nebulizer      Plan  See plan above for acute exacerbation   Patient advised to follow up with pulmonologist OP for her asthma management.   Patient likely would benefit from PFT outpatient   Pulmonology following, appreciate recommendations

## 2024-03-03 NOTE — ASSESSMENT & PLAN NOTE
"Home regimen: Cozaar  /58 (BP Location: Right arm)   Pulse 73   Temp (!) 96.8 °F (36 °C) (Temporal)   Resp 18   Ht 4' 9\" (1.448 m)   Wt 70.1 kg (154 lb 8.7 oz)   SpO2 97%   BMI 33.44 kg/m²       Plan  Continue home dose Losartan   PRN hydralazine for SBP >180  "

## 2024-03-03 NOTE — ASSESSMENT & PLAN NOTE
Patient presents to ED with 2 days of difficulty breathing and increase use of rescue inhaler without relief.  Noted to have chest tightness, cough and bilateral wheezing on admission  Multifactorial: acute asthma exacerbation and RSV+  In ED, received steroids, continuous neb treatment and IV Mag in ER without much improvement  Chest x-ray is unremarkable  Upon arrival to the ICU patient tachypneic (RR 30s) and dyspneic without hypoxia. Patient placed on BIPAP for increased WOB. Upgrade to SD1. I spoke with patient and her family who are agreeable to intubation if patient does not improve on BIPAP.   WOB improved after ~ 2 hours on BIPAP. ABG still with respiratory alkalosis but much improved than previous  Procal negative  Stable over the night not requiring BIPAP     Plan  Wean supplemental oxygen, goal SpO2 >92%  Continue IV Zithromax for 3 days   Continue IV Solu-Medrol  for today, changed to p.o. prednisone in a.m.   Continue Pulmicort/Performist  Continue Xopenex/Atrovent  Pulmonology following, appreciate recommendations  Acute hypoxic respiratory failure has now resolved, patient is on room air

## 2024-03-03 NOTE — ASSESSMENT & PLAN NOTE
Patient presents to ED with 2 days of difficulty breathing and increase use of rescue inhaler without relief.  Noted to have chest tightness, cough and bilateral wheezing on admission  Multifactorial: acute asthma exacerbation and RSV+  In ED, received steroids, continuous neb treatment and IV Mag in ER without much improvement  Chest x-ray is unremarkable  Upon arrival to the ICU patient tachypneic (RR 30s) and dyspneic without hypoxia. Patient placed on BIPAP for increased WOB. Upgrade to SD1. I spoke with patient and her family who are agreeable to intubation if patient does not improve on BIPAP.   WOB improved after ~ 2 hours on BIPAP. ABG still with respiratory alkalosis but much improved than previous  Procal negative  Stable over the night not requiring BIPAP    Plan  Wean supplemental oxygen, goal SpO2 >92%  Continue IV Zithromax  Continue 40mg solumedrol Q8H  Continue Pulmicort/Performist  Continue Xopenex/Atrovent  Follow up on morning VBG

## 2024-03-03 NOTE — PLAN OF CARE
Problem: Potential for Falls  Goal: Patient will remain free of falls  Description: INTERVENTIONS:  - Educate patient/family on patient safety including physical limitations  - Instruct patient to call for assistance with activity   - Consult OT/PT to assist with strengthening/mobility   - Keep Call bell within reach  - Keep bed low and locked with side rails adjusted as appropriate  - Keep care items and personal belongings within reach  - Initiate and maintain comfort rounds  - Make Fall Risk Sign visible to staff  - Apply yellow socks and bracelet for high fall risk patients  - Consider moving patient to room near nurses station  Outcome: Progressing     Problem: Prexisting or High Potential for Compromised Skin Integrity  Goal: Skin integrity is maintained or improved  Description: INTERVENTIONS:  - Identify patients at risk for skin breakdown  - Assess and monitor skin integrity  - Assess and monitor nutrition and hydration status  - Monitor labs   - Assess for incontinence   - Turn and reposition patient  - Assist with mobility/ambulation  - Relieve pressure over bony prominences  - Avoid friction and shearing  - Provide appropriate hygiene as needed including keeping skin clean and dry  - Evaluate need for skin moisturizer/barrier cream  - Collaborate with interdisciplinary team   - Patient/family teaching  - Consider wound care consult   Outcome: Progressing     Problem: PAIN - ADULT  Goal: Verbalizes/displays adequate comfort level or baseline comfort level  Description: Interventions:  - Encourage patient to monitor pain and request assistance  - Assess pain using appropriate pain scale  - Administer analgesics based on type and severity of pain and evaluate response  - Implement non-pharmacological measures as appropriate and evaluate response  - Consider cultural and social influences on pain and pain management  - Notify physician/advanced practitioner if interventions unsuccessful or patient reports  new pain  Outcome: Progressing     Problem: INFECTION - ADULT  Goal: Absence or prevention of progression during hospitalization  Description: INTERVENTIONS:  - Assess and monitor for signs and symptoms of infection  - Monitor lab/diagnostic results  - Monitor all insertion sites, i.e. indwelling lines, tubes, and drains  - Monitor endotracheal if appropriate and nasal secretions for changes in amount and color  - Harrison appropriate cooling/warming therapies per order  - Administer medications as ordered  - Instruct and encourage patient and family to use good hand hygiene technique  - Identify and instruct in appropriate isolation precautions for identified infection/condition  Outcome: Progressing  Goal: Absence of fever/infection during neutropenic period  Description: INTERVENTIONS:  - Monitor WBC    Outcome: Progressing     Problem: SAFETY ADULT  Goal: Patient will remain free of falls  Description: INTERVENTIONS:  - Educate patient/family on patient safety including physical limitations  - Instruct patient to call for assistance with activity   - Consult OT/PT to assist with strengthening/mobility   - Keep Call bell within reach  - Keep bed low and locked with side rails adjusted as appropriate  - Keep care items and personal belongings within reach  - Initiate and maintain comfort rounds  - Make Fall Risk Sign visible to staff  - Apply yellow socks and bracelet for high fall risk patients  - Consider moving patient to room near nurses station  Outcome: Progressing  Goal: Maintain or return to baseline ADL function  Description: INTERVENTIONS:  -  Assess patient's ability to carry out ADLs; assess patient's baseline for ADL function and identify physical deficits which impact ability to perform ADLs (bathing, care of mouth/teeth, toileting, grooming, dressing, etc.)  - Assess/evaluate cause of self-care deficits   - Assess range of motion  - Assess patient's mobility; develop plan if impaired  - Assess  patient's need for assistive devices and provide as appropriate  - Encourage maximum independence but intervene and supervise when necessary  - Involve family in performance of ADLs  - Assess for home care needs following discharge   - Consider OT consult to assist with ADL evaluation and planning for discharge  - Provide patient education as appropriate  Outcome: Progressing  Goal: Maintains/Returns to pre admission functional level  Description: INTERVENTIONS:  - Perform AM-PAC 6 Click Basic Mobility/ Daily Activity assessment daily.  - Set and communicate daily mobility goal to care team and patient/family/caregiver.   - Collaborate with rehabilitation services on mobility goals if consulted  - Out of bed for toileting  - Record patient progress and toleration of activity level   Outcome: Progressing     Problem: DISCHARGE PLANNING  Goal: Discharge to home or other facility with appropriate resources  Description: INTERVENTIONS:  - Identify barriers to discharge w/patient and caregiver  - Arrange for needed discharge resources and transportation as appropriate  - Identify discharge learning needs (meds, wound care, etc.)  - Arrange for interpretive services to assist at discharge as needed  - Refer to Case Management Department for coordinating discharge planning if the patient needs post-hospital services based on physician/advanced practitioner order or complex needs related to functional status, cognitive ability, or social support system  Outcome: Progressing     Problem: Knowledge Deficit  Goal: Patient/family/caregiver demonstrates understanding of disease process, treatment plan, medications, and discharge instructions  Description: Complete learning assessment and assess knowledge base.  Interventions:  - Provide teaching at level of understanding  - Provide teaching via preferred learning methods  Outcome: Progressing

## 2024-03-04 ENCOUNTER — APPOINTMENT (INPATIENT)
Dept: RADIOLOGY | Facility: HOSPITAL | Age: 79
DRG: 189 | End: 2024-03-04
Payer: COMMERCIAL

## 2024-03-04 LAB
ANION GAP SERPL CALCULATED.3IONS-SCNC: 8 MMOL/L
ANISOCYTOSIS BLD QL SMEAR: PRESENT
BASOPHILS # BLD AUTO: 0.01 THOUSANDS/ÂΜL (ref 0–0.1)
BASOPHILS NFR BLD AUTO: 0 % (ref 0–1)
BUN SERPL-MCNC: 37 MG/DL (ref 5–25)
CALCIUM SERPL-MCNC: 9.8 MG/DL (ref 8.4–10.2)
CHLORIDE SERPL-SCNC: 111 MMOL/L (ref 96–108)
CO2 SERPL-SCNC: 22 MMOL/L (ref 21–32)
CREAT SERPL-MCNC: 1.54 MG/DL (ref 0.6–1.3)
EOSINOPHIL # BLD AUTO: 0 THOUSAND/ÂΜL (ref 0–0.61)
EOSINOPHIL NFR BLD AUTO: 0 % (ref 0–6)
ERYTHROCYTE [DISTWIDTH] IN BLOOD BY AUTOMATED COUNT: 15.1 % (ref 11.6–15.1)
GFR SERPL CREATININE-BSD FRML MDRD: 31 ML/MIN/1.73SQ M
GLUCOSE SERPL-MCNC: 140 MG/DL (ref 65–140)
HCT VFR BLD AUTO: 34.3 % (ref 34.8–46.1)
HGB BLD-MCNC: 11.1 G/DL (ref 11.5–15.4)
IMM GRANULOCYTES # BLD AUTO: 0.19 THOUSAND/UL (ref 0–0.2)
IMM GRANULOCYTES NFR BLD AUTO: 1 % (ref 0–2)
LYMPHOCYTES # BLD AUTO: 0.99 THOUSANDS/ÂΜL (ref 0.6–4.47)
LYMPHOCYTES NFR BLD AUTO: 6 % (ref 14–44)
MAGNESIUM SERPL-MCNC: 2.5 MG/DL (ref 1.9–2.7)
MCH RBC QN AUTO: 30.8 PG (ref 26.8–34.3)
MCHC RBC AUTO-ENTMCNC: 32.4 G/DL (ref 31.4–37.4)
MCV RBC AUTO: 95 FL (ref 82–98)
MONOCYTES # BLD AUTO: 0.34 THOUSAND/ÂΜL (ref 0.17–1.22)
MONOCYTES NFR BLD AUTO: 2 % (ref 4–12)
NEUTROPHILS # BLD AUTO: 15.26 THOUSANDS/ÂΜL (ref 1.85–7.62)
NEUTS SEG NFR BLD AUTO: 91 % (ref 43–75)
NRBC BLD AUTO-RTO: 0 /100 WBCS
PLATELET # BLD AUTO: 204 THOUSANDS/UL (ref 149–390)
PLATELET BLD QL SMEAR: ADEQUATE
PMV BLD AUTO: 11.4 FL (ref 8.9–12.7)
POTASSIUM SERPL-SCNC: 4.8 MMOL/L (ref 3.5–5.3)
PROCALCITONIN SERPL-MCNC: 0.11 NG/ML
RBC # BLD AUTO: 3.6 MILLION/UL (ref 3.81–5.12)
RBC MORPH BLD: PRESENT
SODIUM SERPL-SCNC: 141 MMOL/L (ref 135–147)
WBC # BLD AUTO: 16.79 THOUSAND/UL (ref 4.31–10.16)

## 2024-03-04 PROCEDURE — 92610 EVALUATE SWALLOWING FUNCTION: CPT

## 2024-03-04 PROCEDURE — 92611 MOTION FLUOROSCOPY/SWALLOW: CPT

## 2024-03-04 PROCEDURE — 84145 PROCALCITONIN (PCT): CPT

## 2024-03-04 PROCEDURE — 85025 COMPLETE CBC W/AUTO DIFF WBC: CPT

## 2024-03-04 PROCEDURE — 94640 AIRWAY INHALATION TREATMENT: CPT

## 2024-03-04 PROCEDURE — 80048 BASIC METABOLIC PNL TOTAL CA: CPT

## 2024-03-04 PROCEDURE — 99232 SBSQ HOSP IP/OBS MODERATE 35: CPT | Performed by: INTERNAL MEDICINE

## 2024-03-04 PROCEDURE — 83735 ASSAY OF MAGNESIUM: CPT

## 2024-03-04 PROCEDURE — 94760 N-INVAS EAR/PLS OXIMETRY 1: CPT

## 2024-03-04 PROCEDURE — 74230 X-RAY XM SWLNG FUNCJ C+: CPT

## 2024-03-04 RX ORDER — PREDNISONE 20 MG/1
40 TABLET ORAL DAILY
Status: DISCONTINUED | OUTPATIENT
Start: 2024-03-05 | End: 2024-03-05 | Stop reason: HOSPADM

## 2024-03-04 RX ORDER — HEPARIN SODIUM 5000 [USP'U]/ML
5000 INJECTION, SOLUTION INTRAVENOUS; SUBCUTANEOUS EVERY 8 HOURS SCHEDULED
Status: DISCONTINUED | OUTPATIENT
Start: 2024-03-05 | End: 2024-03-05 | Stop reason: HOSPADM

## 2024-03-04 RX ORDER — SODIUM CHLORIDE, SODIUM GLUCONATE, SODIUM ACETATE, POTASSIUM CHLORIDE, MAGNESIUM CHLORIDE, SODIUM PHOSPHATE, DIBASIC, AND POTASSIUM PHOSPHATE .53; .5; .37; .037; .03; .012; .00082 G/100ML; G/100ML; G/100ML; G/100ML; G/100ML; G/100ML; G/100ML
75 INJECTION, SOLUTION INTRAVENOUS CONTINUOUS
Status: DISCONTINUED | OUTPATIENT
Start: 2024-03-04 | End: 2024-03-05

## 2024-03-04 RX ADMIN — ATORVASTATIN CALCIUM 20 MG: 20 TABLET, FILM COATED ORAL at 09:41

## 2024-03-04 RX ADMIN — ENOXAPARIN SODIUM 40 MG: 100 INJECTION SUBCUTANEOUS at 09:41

## 2024-03-04 RX ADMIN — LEVOTHYROXINE SODIUM 88 MCG: 88 TABLET ORAL at 05:14

## 2024-03-04 RX ADMIN — BUDESONIDE INHALATION 0.5 MG: 0.5 SUSPENSION RESPIRATORY (INHALATION) at 08:57

## 2024-03-04 RX ADMIN — AZITHROMYCIN MONOHYDRATE 500 MG: 500 INJECTION, POWDER, LYOPHILIZED, FOR SOLUTION INTRAVENOUS at 14:32

## 2024-03-04 RX ADMIN — PANTOPRAZOLE SODIUM 40 MG: 40 TABLET, DELAYED RELEASE ORAL at 05:14

## 2024-03-04 RX ADMIN — LEVALBUTEROL HYDROCHLORIDE 1.25 MG: 1.25 SOLUTION RESPIRATORY (INHALATION) at 08:58

## 2024-03-04 RX ADMIN — BUDESONIDE INHALATION 0.5 MG: 0.5 SUSPENSION RESPIRATORY (INHALATION) at 19:34

## 2024-03-04 RX ADMIN — FORMOTEROL FUMARATE DIHYDRATE 20 MCG: 20 SOLUTION RESPIRATORY (INHALATION) at 19:34

## 2024-03-04 RX ADMIN — TRAZODONE HYDROCHLORIDE 50 MG: 50 TABLET ORAL at 20:45

## 2024-03-04 RX ADMIN — METHYLPREDNISOLONE SODIUM SUCCINATE 40 MG: 40 INJECTION, POWDER, FOR SOLUTION INTRAMUSCULAR; INTRAVENOUS at 09:41

## 2024-03-04 RX ADMIN — SODIUM CHLORIDE, SODIUM GLUCONATE, SODIUM ACETATE, POTASSIUM CHLORIDE, MAGNESIUM CHLORIDE, SODIUM PHOSPHATE, DIBASIC, AND POTASSIUM PHOSPHATE 75 ML/HR: .53; .5; .37; .037; .03; .012; .00082 INJECTION, SOLUTION INTRAVENOUS at 14:32

## 2024-03-04 RX ADMIN — FORMOTEROL FUMARATE DIHYDRATE 20 MCG: 20 SOLUTION RESPIRATORY (INHALATION) at 08:57

## 2024-03-04 RX ADMIN — MONTELUKAST 10 MG: 10 TABLET, FILM COATED ORAL at 20:44

## 2024-03-04 RX ADMIN — GUAIFENESIN SYRUP AND DEXTROMETHORPHAN 10 ML: 100; 10 SYRUP ORAL at 20:45

## 2024-03-04 RX ADMIN — BIMATOPROST 1 DROP: 0.3 SOLUTION/ DROPS OPHTHALMIC at 09:49

## 2024-03-04 RX ADMIN — LEVALBUTEROL HYDROCHLORIDE 1.25 MG: 1.25 SOLUTION RESPIRATORY (INHALATION) at 15:06

## 2024-03-04 RX ADMIN — BUPROPION HYDROCHLORIDE 450 MG: 300 TABLET, EXTENDED RELEASE ORAL at 09:41

## 2024-03-04 RX ADMIN — IPRATROPIUM BROMIDE 0.5 MG: 0.5 SOLUTION RESPIRATORY (INHALATION) at 08:58

## 2024-03-04 RX ADMIN — IPRATROPIUM BROMIDE 0.5 MG: 0.5 SOLUTION RESPIRATORY (INHALATION) at 15:06

## 2024-03-04 RX ADMIN — IPRATROPIUM BROMIDE 0.5 MG: 0.5 SOLUTION RESPIRATORY (INHALATION) at 19:34

## 2024-03-04 RX ADMIN — LEVALBUTEROL HYDROCHLORIDE 1.25 MG: 1.25 SOLUTION RESPIRATORY (INHALATION) at 19:34

## 2024-03-04 NOTE — PHYSICAL THERAPY NOTE
PHYSICAL THERAPY SCREEN NOTE      Patient Name: Cinyd Cruz  Today's Date: 3/4/2024       03/04/24 1401   PT Last Visit   PT Visit Date 03/04/24   Note Type   Note type Screen   Additional Comments Spoke with RN, Jam who reports that pt is mobilizing independently within her room. RN expresses no concerns re: pt's mobility status. PT order will be discontinued. Please re-order if pt's mobility status changes.     Jeison Patterson, PT

## 2024-03-04 NOTE — PLAN OF CARE
Problem: Potential for Falls  Goal: Patient will remain free of falls  Description: INTERVENTIONS:  - Educate patient/family on patient safety including physical limitations  - Instruct patient to call for assistance with activity   - Consult OT/PT to assist with strengthening/mobility   - Keep Call bell within reach  - Keep bed low and locked with side rails adjusted as appropriate  - Keep care items and personal belongings within reach  - Initiate and maintain comfort rounds  - Make Fall Risk Sign visible to staff  - Offer Toileting every 2 Hours, in advance of need  - Initiate/Maintain alarm  - Obtain necessary fall risk management equipment  - Apply yellow socks and bracelet for high fall risk patients  - Consider moving patient to room near nurses station  Outcome: Progressing     Problem: Prexisting or High Potential for Compromised Skin Integrity  Goal: Skin integrity is maintained or improved  Description: INTERVENTIONS:  - Identify patients at risk for skin breakdown  - Assess and monitor skin integrity  - Assess and monitor nutrition and hydration status  - Monitor labs   - Assess for incontinence   - Turn and reposition patient  - Assist with mobility/ambulation  - Relieve pressure over bony prominences  - Avoid friction and shearing  - Provide appropriate hygiene as needed including keeping skin clean and dry  - Evaluate need for skin moisturizer/barrier cream  - Collaborate with interdisciplinary team   - Patient/family teaching  - Consider wound care consult   Outcome: Progressing     Problem: PAIN - ADULT  Goal: Verbalizes/displays adequate comfort level or baseline comfort level  Description: Interventions:  - Encourage patient to monitor pain and request assistance  - Assess pain using appropriate pain scale  - Administer analgesics based on type and severity of pain and evaluate response  - Implement non-pharmacological measures as appropriate and evaluate response  - Consider cultural and social  influences on pain and pain management  - Notify physician/advanced practitioner if interventions unsuccessful or patient reports new pain  Outcome: Progressing     Problem: INFECTION - ADULT  Goal: Absence or prevention of progression during hospitalization  Description: INTERVENTIONS:  - Assess and monitor for signs and symptoms of infection  - Monitor lab/diagnostic results  - Monitor all insertion sites, i.e. indwelling lines, tubes, and drains  - Monitor endotracheal if appropriate and nasal secretions for changes in amount and color  - [place] appropriate cooling/warming therapies per order  - Administer medications as ordered  - Instruct and encourage patient and family to use good hand hygiene technique  - Identify and instruct in appropriate isolation precautions for identified infection/condition  Outcome: Progressing  Goal: Absence of fever/infection during neutropenic period  Description: INTERVENTIONS:  - Monitor WBC    Outcome: Progressing     Problem: SAFETY ADULT  Goal: Patient will remain free of falls  Description: INTERVENTIONS:  - Educate patient/family on patient safety including physical limitations  - Instruct patient to call for assistance with activity   - Consult OT/PT to assist with strengthening/mobility   - Keep Call bell within reach  - Keep bed low and locked with side rails adjusted as appropriate  - Keep care items and personal belongings within reach  - Initiate and maintain comfort rounds  - Make Fall Risk Sign visible to staff  - Offer Toileting every 2 Hours, in advance of need  - Initiate/Maintain alarm  - Obtain necessary fall risk management equipment  - Apply yellow socks and bracelet for high fall risk patients  - Consider moving patient to room near nurses station  Outcome: Progressing  Goal: Maintain or return to baseline ADL function  Description: INTERVENTIONS:  -  Assess patient's ability to carry out ADLs; assess patient's baseline for ADL function and identify  physical deficits which impact ability to perform ADLs (bathing, care of mouth/teeth, toileting, grooming, dressing, etc.)  - Assess/evaluate cause of self-care deficits   - Assess range of motion  - Assess patient's mobility; develop plan if impaired  - Assess patient's need for assistive devices and provide as appropriate  - Encourage maximum independence but intervene and supervise when necessary  - Involve family in performance of ADLs  - Assess for home care needs following discharge   - Consider OT consult to assist with ADL evaluation and planning for discharge  - Provide patient education as appropriate  Outcome: Progressing  Goal: Maintains/Returns to pre admission functional level  Description: INTERVENTIONS:  - Perform AM-PAC 6 Click Basic Mobility/ Daily Activity assessment daily.  - Set and communicate daily mobility goal to care team and patient/family/caregiver.   - Collaborate with rehabilitation services on mobility goals if consulted  - Perform Range of Motion 3 times a day.  - Reposition patient every 3 hours.  - Dangle patient 3 times a day  - Stand patient 3 times a day  - Ambulate patient 3 times a day  - Out of bed to chair 3 times a day   - Out of bed for meals 3 times a day  - Out of bed for toileting  - Record patient progress and toleration of activity level   Outcome: Progressing     Problem: DISCHARGE PLANNING  Goal: Discharge to home or other facility with appropriate resources  Description: INTERVENTIONS:  - Identify barriers to discharge w/patient and caregiver  - Arrange for needed discharge resources and transportation as appropriate  - Identify discharge learning needs (meds, wound care, etc.)  - Arrange for interpretive services to assist at discharge as needed  - Refer to Case Management Department for coordinating discharge planning if the patient needs post-hospital services based on physician/advanced practitioner order or complex needs related to functional status, cognitive  ability, or social support system  Outcome: Progressing     Problem: Knowledge Deficit  Goal: Patient/family/caregiver demonstrates understanding of disease process, treatment plan, medications, and discharge instructions  Description: Complete learning assessment and assess knowledge base.  Interventions:  - Provide teaching at level of understanding  - Provide teaching via preferred learning methods  Outcome: Progressing     Problem: Nutrition/Hydration-ADULT  Goal: Nutrient/Hydration intake appropriate for improving, restoring or maintaining nutritional needs  Description: Monitor and assess patient's nutrition/hydration status for malnutrition. Collaborate with interdisciplinary team and initiate plan and interventions as ordered.  Monitor patient's weight and dietary intake as ordered or per policy. Utilize nutrition screening tool and intervene as necessary. Determine patient's food preferences and provide high-protein, high-caloric foods as appropriate.     INTERVENTIONS:  - Monitor oral intake, urinary output, labs, and treatment plans  - Assess nutrition and hydration status and recommend course of action  - Evaluate amount of meals eaten  - Assist patient with eating if necessary   - Allow adequate time for meals  - Recommend/ encourage appropriate diets, oral nutritional supplements, and vitamin/mineral supplements  - Order, calculate, and assess calorie counts as needed  - Recommend, monitor, and adjust tube feedings and TPN/PPN based on assessed needs  - Assess need for intravenous fluids  - Provide specific nutrition/hydration education as appropriate  - Include patient/family/caregiver in decisions related to nutrition  Outcome: Progressing

## 2024-03-04 NOTE — SPEECH THERAPY NOTE
Speech Language/Pathology  Speech/Language Pathology  Assessment    Patient Name: Cindy Cruz  Today's Date: 3/4/2024     Problem List  Principal Problem:    Acute asthma exacerbation  Active Problems:    Hypothyroidism    Hypertension    Esophageal reflux    Dyslipidemia    Depression, recurrent (HCC)    Chronic kidney disease, stage 3a (HCC)    RSV infection    Acute respiratory failure (HCC)    Past Medical History  Past Medical History:   Diagnosis Date    Asthma     Chronic pain disorder     Back    Coronary artery disease     Depression     Disease of thyroid gland     Dyslipidemia     Esophageal reflux     Frequent headaches     stress related    GERD (gastroesophageal reflux disease)     Headache(784.0)     Heart murmur     History of stomach ulcers     Hypercalcemia     Hyperlipidemia     Hypertension     Osteopenia     Tremor      Past Surgical History  Past Surgical History:   Procedure Laterality Date    BACK SURGERY      lower back    BILATERAL OOPHORECTOMY      BREAST BIOPSY Right     long ago, benign     SECTION      CHOLECYSTECTOMY      COLONOSCOPY      HYSTERECTOMY      age 36 per MRS    JOINT REPLACEMENT Left     knee    OOPHORECTOMY Bilateral     age 36 per MRS    SD WEBB IMPLTJ NSTIM ELTRDS PLATE/PADDLE EDRL Left 2019    Procedure: LAMINECTOMY THORACIC FOR INSERTION DORSAL COLUMN SPINAL CORD STIMULATOR (DCS) W IMPLANTABLE PULSE GENERATOR, LEFT GLUTE;  Surgeon: Lg Mccallum MD;  Location:  MAIN OR;  Service: Neurosurgery    SD REVJ/RMVL IMPL SPI NPG/RCVR DTCH CONNJ ELTRD RA Left 2022    Procedure: Reopening of thoracic and left buttock incisions for removal of spinal cord stimulator system;  Surgeon: Siva Goss MD;  Location:  MAIN OR;  Service: Neurosurgery    ROTATOR CUFF REPAIR Right     SPINAL STIMULATOR PLACEMENT N/A 2019    Procedure: REVISION SPINAL CORD STIMULATOR PADDLE ELECTRODE, REPLACEMENT WITH PERCUTANEOUS ELECTRODES OR SMALLER PADDLE;  Surgeon: Lg NUNEZ  MD Alessio;  [facility];  Service: Neurosurgery   Speech-Language Pathology Bedside Swallow Evaluation        Patient Name: Cindy Cruz    Today's Date: 3/4/2024     Problem List  Principal Problem:    Acute asthma exacerbation  Active Problems:    Hypothyroidism    Hypertension    Esophageal reflux    Dyslipidemia    Depression, recurrent (HCC)    Chronic kidney disease, stage 3a (HCC)    RSV infection    Acute respiratory failure (HCC)         Past Medical History  Past Medical History:   Diagnosis Date    Asthma     Chronic pain disorder     Back    Coronary artery disease     Depression     Disease of thyroid gland     Dyslipidemia     Esophageal reflux     Frequent headaches     stress related    GERD (gastroesophageal reflux disease)     Headache(784.0)     Heart murmur     History of stomach ulcers     Hypercalcemia     Hyperlipidemia     Hypertension     Osteopenia     Tremor        Past Surgical History  Past Surgical History:   Procedure Laterality Date    BACK SURGERY      lower back    BILATERAL OOPHORECTOMY      BREAST BIOPSY Right     long ago, benign     SECTION      CHOLECYSTECTOMY      COLONOSCOPY      HYSTERECTOMY      age 36 per MRS    JOINT REPLACEMENT Left     knee    OOPHORECTOMY Bilateral     age 36 per MRS    PA WEBB IMPLTJ NSTIM ELTRDS PLATE/PADDLE EDRL Left 2019    Procedure: LAMINECTOMY THORACIC FOR INSERTION DORSAL COLUMN SPINAL CORD STIMULATOR (DCS) W IMPLANTABLE PULSE GENERATOR, LEFT GLUTE;  Surgeon: Lg Mccallum MD;  Location:  MAIN OR;  Service: Neurosurgery    PA REVJ/RMVL IMPL SPI NPG/RCVR DTCH CONNJ ELTRD RA Left 2022    Procedure: Reopening of thoracic and left buttock incisions for removal of spinal cord stimulator system;  Surgeon: Siva Goss MD;  Location:  MAIN OR;  Service: Neurosurgery    ROTATOR CUFF REPAIR Right     SPINAL STIMULATOR PLACEMENT N/A 2019    Procedure: REVISION SPINAL CORD STIMULATOR PADDLE ELECTRODE, REPLACEMENT WITH  PERCUTANEOUS ELECTRODES OR SMALLER PADDLE;  Surgeon: Lg Mccallum MD;  [facility];  Service: Neurosurgery       Summary    Pt presents with oral phase of the swallow that appears WNL; suspect at least mod pharyngeal dysphagia, with suspected reduced hyolaryngeal movement and reduced airway protection. There was a cough response after every consistency trialed today; some coughs were immediate, some were delayed. Breath sounds were wheezy, cough sounds tight. ?UES dysfunction, with occasional belching, esophageal gurgling, ?reflux. Recent hx of silent aspiration of thick barium during esophagram. Pt would benefit from further assessment of the swallow with VBS.      Recommendations:   Diet: NPO   Meds: crushed with puree   Frequent Oral care: 4x/day  Aspiration precautions   Other Recommendations/ considerations: VBS later today to further assess swallow and aspiration risk.       Current Medical Status  Copied from admission/physician notes:  Cindy Cruz is a 78 y.o. female with past medical history of asthma, hypothyroidism, hypertension, depression, CKD stage III who presented to the emergency department with complaint of cough and shortness of breath for the past 2 to 3 days.  Patient has history of asthma and for the past couple of days has been having worsening symptoms of cough, shortness of breath and associated wheezing which has not improved with inhalers at home.  Cough is productive of some productive sputum.  Denies any associated fever, chills.  Patient was taking care at of her great grand children and one of them was sick.  Patient was found to have RSV.  Patient was given IV Solu-Medrol and dilators in ER with minimal improvement and was admitted.      Order received and chart reviewed. Discussed with RN. Pt's daughter was at bedside for assessment. Pt has been having increasing dysphagia for the past 2-3 months, with weight loss. Sx include globus sensation, and coughing with PO. Pt  recently had an esophagram that revealed silent aspiration of thick barium. Pt was previously seen by ST for VBS on 10/19/22:  Video Barium Swallow Study     Summary:  Images are on PACS for review.   Pt presents w/ min oropharyngeal dysphagia, functional at this time. Min-mild reduced oral control w/ premature spill of liquids to the pyriforms. Hyo-laryngeal excursion and airway closure mild reduced though ultimately adequate. Pharyngeal constriction is adequate, no significant pharyngeal retention. Transient aspiration x1 w/ straw sip of thin, immediately cleared without residue on vocal cords or laryngeal vestibule. No gross aspiration events on study today. PES appears tight w/ slight retropulsion w/ hard solids.      Per gross esophageal screen:  Appreciate barium tablet stasis at mid esophagus, cleared w/ subsequent bolus      Strategies:  Effortful swallow improves airway closure     VBS findings and recommendations immediately reviewed w/ pt and pt's daughter w/ use of images to aid in understanding. Education provided on strategies to optimize swallow safety, including the importance of oral care and s/s aspiration to monitor for and notify medical team of should they arise. Pt/pt's daughter verbalized understanding and denied questions at this time.     Recommendations:  Diet: Regular, soft selections as needed   Liquids: Thin, avoid straws    Meds: Whole w/ liquid   Strategies: Effortful swallows   Frequent oral care  Upright position  F/u ST tx: No at this time, consider OP if worsening dysphagia   Aspiration Precautions  Reflux Precautions  Consider consult with: -  Results reviewed with: pt, pt's daughter  Repeat VBS as necessary  If a dedicated assessment of the esophagus is desired, consider esophagram/barium swallow or EGD.          Past medical history:   Please see H&P for details    Special Studies:  CXR-  No acute cardiopulmonary disease.      EGD 12/6/23-  IMPRESSION:  Hiatal hernia  Moderate  erythematous mucosa in the antrum; performed cold forceps biopsy to rule out H. pylori  The duodenum appeared normal.  The esophagus appeared normal. Performed random biopsy to rule out eosinophilic esophagitis.        RECOMMENDATION:    Await pathology results          Consider esophageal manometry if continuing to have dysphagia.  Follow up in the office.      Resume home meds.  Resume previous diet.  Follow up with your primary care provider as previously scheduled.  Follow up with GI  The biopsy results will be available in [facility] in 1-2 weeks.         Esophagram 2/9/24-  FINDINGS:   The esophagus is normal in caliber.  Esophageal motility is normal and emptying of contrast from the esophagus is prompt. No mucosal lesion, ulceration or evidence of fold thickening is seen.     The patient swallowed the 13 mm barium pill without difficulty which easily passed through the esophagus and into the stomach.     Cine evaluation of the cervical esophagus was performed. Aspiration with thick barium liquids without a cough reflex, consistent with silent aspiration. Laryngeal vestibule penetration with thick and thin barium liquids. No aspiration identified with   thin barium liquids. No nasopharyngeal reflux.     Gastroesophageal reflux was not observed.     There is a small hiatal hernia.     IMPRESSION:     Small hiatal hernia.     Silent aspiration with thick barium liquids. Laryngeal vestibule penetration with thick and thin barium liquids.       Social/Education/Vocational Hx:  Pt lives with family    Swallow Information   Current Risks for Dysphagia & Aspiration: known history of dysphagia and known history of aspiration  Current Symptoms/Concerns: coughing with po and change in respiratory status  Current Diet: regular diet and thin liquids   Baseline Diet: regular diet and thin liquids    Baseline Assessment   Behavior/Cognition: alert  Speech/Language Status: able to participate in conversation and able to follow  commands  Patient Positioning: upright in bed     Swallow Mechanism Exam   Facial: symmetrical  Labial: WFL  Lingual: WFL  Velum: symmetrical  Mandible: adequate ROM  Dentition: adequate  Vocal quality:clear/adequate   Volitional Cough: strong/productive   Respiratory: RA, sats stable in the mid to upper 90s      Consistencies Assessed and Performance   Consistencies Administered: thin liquids, nectar thick, honey thick, puree, and hard solids    Oral Stage: Adequate bolus retrieval and draw from straw. Prompt mastication, bolus formation and transfer. No residue or pocketing. Adequate lip seal.     Pharyngeal Stage: Hyolaryngeal elevation observed and palpated. ?reduced hyolaryngeal movement. Swallows appeared fairly prompt. There was consistent strong cough after all consistencies assessed today. At times cough was immediate, other times delayed. Breathing immediately became audible and wheezy after the first sip of thin liquid. Pt c/o globus sensation, reflux.       Esophageal Concerns:  hx of HH , frequent belching observed, esophageal gurgling, ?reflux      Results Reviewed with: patient, RN, MD, and family   Dysphagia Goals: pt will participate in VBS  Discharge recommendation:  to be determined    Speech Therapy Prognosis   Prognosis: fair    Prognosis Considerations: age, medical status, respiratory status, and therapeutic potential

## 2024-03-04 NOTE — PROCEDURES
Video Swallow Study      Patient Name: Cindy Cruz  Today's Date: 3/4/2024        Past Medical History  Past Medical History:   Diagnosis Date    Asthma     Chronic pain disorder     Back    Coronary artery disease     Depression     Disease of thyroid gland     Dyslipidemia     Esophageal reflux     Frequent headaches     stress related    GERD (gastroesophageal reflux disease)     Headache(784.0)     Heart murmur     History of stomach ulcers     Hypercalcemia     Hyperlipidemia     Hypertension     Osteopenia     Tremor         Past Surgical History  Past Surgical History:   Procedure Laterality Date    BACK SURGERY      lower back    BILATERAL OOPHORECTOMY      BREAST BIOPSY Right     long ago, benign     SECTION      CHOLECYSTECTOMY      COLONOSCOPY      HYSTERECTOMY      age 36 per MRS    JOINT REPLACEMENT Left     knee    OOPHORECTOMY Bilateral     age 36 per MRS    AK WEBB IMPLTJ NSTIM ELTRDS PLATE/PADDLE EDRL Left 2019    Procedure: LAMINECTOMY THORACIC FOR INSERTION DORSAL COLUMN SPINAL CORD STIMULATOR (DCS) W IMPLANTABLE PULSE GENERATOR, LEFT GLUTE;  Surgeon: Lg Mccallum MD;  Location:  MAIN OR;  Service: Neurosurgery    AK REVJ/RMVL IMPL SPI NPG/RCVR DTCH CONNJ ELTRD RA Left 2022    Procedure: Reopening of thoracic and left buttock incisions for removal of spinal cord stimulator system;  Surgeon: Siva Goss MD;  Location:  MAIN OR;  Service: Neurosurgery    ROTATOR CUFF REPAIR Right     SPINAL STIMULATOR PLACEMENT N/A 2019    Procedure: REVISION SPINAL CORD STIMULATOR PADDLE ELECTRODE, REPLACEMENT WITH PERCUTANEOUS ELECTRODES OR SMALLER PADDLE;  Surgeon: Lg Mccallum MD;  Location: [facility];  Service: Neurosurgery       Speech-Language Pathology Bedside Swallow Evaluation        Patient Name: Cindy Cruz    Today's Date: 3/4/2024     Problem List  Principal Problem:    Acute asthma exacerbation  Active Problems:    Hypothyroidism     Hypertension    Esophageal reflux    Dyslipidemia    Depression, recurrent (HCC)    Chronic kidney disease, stage 3a (HCC)    RSV infection    Acute respiratory failure (HCC)         Past Medical History  Past Medical History:   Diagnosis Date    Asthma     Chronic pain disorder     Back    Coronary artery disease     Depression     Disease of thyroid gland     Dyslipidemia     Esophageal reflux     Frequent headaches     stress related    GERD (gastroesophageal reflux disease)     Headache(784.0)     Heart murmur     History of stomach ulcers     Hypercalcemia     Hyperlipidemia     Hypertension     Osteopenia     Tremor        Past Surgical History  Past Surgical History:   Procedure Laterality Date    BACK SURGERY      lower back    BILATERAL OOPHORECTOMY      BREAST BIOPSY Right     long ago, benign     SECTION      CHOLECYSTECTOMY      COLONOSCOPY      HYSTERECTOMY      age 36 per MRS    JOINT REPLACEMENT Left     knee    OOPHORECTOMY Bilateral     age 36 per MRS    OR WEBB IMPLTJ NSTIM ELTRDS PLATE/PADDLE EDRL Left 2019    Procedure: LAMINECTOMY THORACIC FOR INSERTION DORSAL COLUMN SPINAL CORD STIMULATOR (DCS) W IMPLANTABLE PULSE GENERATOR, LEFT GLUTE;  Surgeon: Lg Mccallum MD;  Location: [facility];  Service: Neurosurgery    OR REVJ/RMVL IMPL SPI NPG/RCVR DTCH CONNJ ELTRD RA Left 2022    Procedure: Reopening of thoracic and left buttock incisions for removal of spinal cord stimulator system;  Surgeon: Siva Goss MD;  Location: [facility];  Service: Neurosurgery    ROTATOR CUFF REPAIR Right     SPINAL STIMULATOR PLACEMENT N/A 2019    Procedure: REVISION SPINAL CORD STIMULATOR PADDLE ELECTRODE, REPLACEMENT WITH PERCUTANEOUS ELECTRODES OR SMALLER PADDLE;  Surgeon: Lg Mccallum MD;  Location: [facility];  Service: Neurosurgery     Modified (Video) Barium Swallow Study    Summary:  Images are on PACS for review.     Oral and pharyngeal stages of the swallow are WFL. There is mild  premature spill of thin liquids into the pharynx, reduced hyolaryngeal elevation and anterior motion, and reduced inversion of the epiglottis. This results in intermittent mild, transient penetration of thin liquids during the swallow. No aspiration was observed, even though pt coughed frequently throughout the study. No obvious TE fistula during esophageal scan, however if there is a concern for this recommend further testing. Recommend follow up with pulmonology.     Per gross esophageal screen:  Brief scan of the esophagus revealed retropulsion of thin liquids. Barium tablet moved quickly into the stomach.       Recommendations:  Diet: Regular  Liquids: Thin liquids  Meds: Whole with water  Strategies: Eat slowly, small bites/sips  Frequent oral care  Upright position  F/u ST tx: Yes, brief  Therapy Prognosis: Fair  Prognosis considerations: Age, therapeutic potential  Intermittent Supervision  Aspiration Precautions  Reflux Precautions  Consider consult with: Pt is being followed by Pulmonology, recommend follow up with them; unsure what is causing the frequent cough while eating/drinking since no aspiration occurred during study  Results reviewed with: pt, nursing, family, physician  Repeat MBS as necessary    Consider repeating esophagram or EGD If a dedicated assessment of the esophagus is desired      Goals:  Pt will tolerate regular diet and thin liquids with prompt oropharyngeal swallows and no overt s/s of aspiration.   Pt will tolerate least restrictive diet w/out s/s aspiration or oral/pharyngeal difficulties.      Patient's goal: Pt wants to find out the cause of her dysphagia.     Pt is an 77 y/o female admitted with SOB, hypoxia. Pt has had increasing dysphagia over the past 2-3 months, including increasing coughing with PO, globus sensation, and weight loss. Pt presented with frequent coughing during PO trials at bedside. Pt had a recent esophagram with silent aspiration of thick barium.        H&P/pertinent provider notes: (PMH noted above)  Cindy Cruz is a 78 y.o. female with past medical history of asthma, hypothyroidism, hypertension, depression, CKD stage III who presented to the emergency department with complaint of cough and shortness of breath for the past 2 to 3 days.  Patient has history of asthma and for the past couple of days has been having worsening symptoms of cough, shortness of breath and associated wheezing which has not improved with inhalers at home.  Cough is productive of some productive sputum.  Denies any associated fever, chills.  Patient was taking care at of her great grand children and one of them was sick.  Patient was found to have RSV.  Patient was given IV Solu-Medrol and dilators in ER with minimal improvement and was admitted.          Special Studies:  CXR-  No acute cardiopulmonary disease.    EGD 12/6/23-  IMPRESSION:  Hiatal hernia  Moderate erythematous mucosa in the antrum; performed cold forceps biopsy to rule out H. pylori  The duodenum appeared normal.  The esophagus appeared normal. Performed random biopsy to rule out eosinophilic esophagitis.        RECOMMENDATION:    Await pathology results          Consider esophageal manometry if continuing to have dysphagia.  Follow up in the office.      Resume home meds.  Resume previous diet.  Follow up with your primary care provider as previously scheduled.  Follow up with GI  The biopsy results will be available in [facility] in 1-2 weeks.     Esophagram 2/9/24-  FINDINGS:     The esophagus is normal in caliber.  Esophageal motility is normal and emptying of contrast from the esophagus is prompt. No mucosal lesion, ulceration or evidence of fold thickening is seen.     The patient swallowed the 13 mm barium pill without difficulty which easily passed through the esophagus and into the stomach.     Cine evaluation of the cervical esophagus was performed. Aspiration with thick barium liquids without a cough  reflex, consistent with silent aspiration. Laryngeal vestibule penetration with thick and thin barium liquids. No aspiration identified with   thin barium liquids. No nasopharyngeal reflux.     Gastroesophageal reflux was not observed.     There is a small hiatal hernia.     IMPRESSION:     Small hiatal hernia.     Silent aspiration with thick barium liquids. Laryngeal vestibule penetration with thick and thin barium liquids.       Previous MBS:  10/19/22-     Summary:  Images are on PACS for review.   Pt presents w/ min oropharyngeal dysphagia, functional at this time. Min-mild reduced oral control w/ premature spill of liquids to the pyriforms. Hyo-laryngeal excursion and airway closure mild reduced though ultimately adequate. Pharyngeal constriction is adequate, no significant pharyngeal retention. Transient aspiration x1 w/ straw sip of thin, immediately cleared without residue on vocal cords or laryngeal vestibule. No gross aspiration events on study today. PES appears tight w/ slight retropulsion w/ hard solids.      Per gross esophageal screen:  Appreciate barium tablet stasis at mid esophagus, cleared w/ subsequent bolus      Strategies:  Effortful swallow improves airway closure     VBS findings and recommendations immediately reviewed w/ pt and pt's daughter w/ use of images to aid in understanding. Education provided on strategies to optimize swallow safety, including the importance of oral care and s/s aspiration to monitor for and notify medical team of should they arise. Pt/pt's daughter verbalized understanding and denied questions at this time.     Recommendations:  Diet: Regular, soft selections as needed   Liquids: Thin, avoid straws    Meds: Whole w/ liquid   Strategies: Effortful swallows   Frequent oral care  Upright position  F/u ST tx: No at this time, consider OP if worsening dysphagia   Aspiration Precautions  Reflux Precautions  Consider consult with: -  Results reviewed with: pt, pt's  daughter  Repeat VBS as necessary  If a dedicated assessment of the esophagus is desired, consider esophagram/barium swallow or EGD.              Does the pt have pain? No  If yes, was nursing made aware/was it addressed?      Food allergies: No   Current diet: Regular and thin liquids   Premorbid diet: Regular and thin liquids   Dentition: Natural   O2 requirement: Room air, sats in the upper 90s   Oral mech: WNL   Vocal quality/speech: WNL   Cognitive status: Appears fairly intact     Precautions: Aspiration, reflux      Consistencies administered: Pudding, half shortbread cookie coated with barium, barium pill with thin barium, and thin, nectar, honey thick liquids. Liquids via teaspoon, cup, straw.     Pt was viewed seated laterally at 90 degrees.      Oral stage:  WNL  Lip closure: WNL  Mastication: WNL  Bolus formation: Cohesive  Bolus control: WNL  Transfer: Prompt  Residue: Min to none    Pharyngeal stage:  WFL  Swallow initiation: mostly prompt  Velar elevation: WNL  Laryngeal elevation: reduced  Anterior hyoid excursion: reduced  Epiglottic inversion: reduced  Laryngeal vestibular closure: intermittently reduced  Pharyngeal stripping wave/constriction: WNL  Tongue base retraction: Mildly reduced  Vallecular retention: Min to none  Pyriform retention: Min to none  PPW coating: -  Osteophytes: -  CP prominence: +  Retropulsion from prominence: -  Transient penetration: thin liquids, intermittently  Epiglottic undercoat: no  Penetration: transient, thin liquids, intermittently  Aspiration: no  Strategies: N/A  Response to aspiration: N/A    Screening of Esophageal stage:  Brief scan of the esophagus revealed retropulsion of thin liquids. Barium tablet moved quickly into the stomach.

## 2024-03-04 NOTE — OCCUPATIONAL THERAPY NOTE
Occupational Therapy Screen    Patient Name: Cindy Cruz  Today's Date: 3/4/2024     03/04/24 1422   OT Last Visit   OT Visit Date 03/04/24   Note Type   Note type Screen   Additional Comments OT orders received and chart reviewed. Spoke to RANDI Polk who states pt is currently independent in room. Family is at bedside. Recommend pt continue to be OOB for meals, ambulation to/from BR, perform self care tasks.  At this time, OT recommendations at time of discharge are return home at prior level of function. No acute OT needs identified at this time; please re-consult if OT needs arise during remainder of hospital stay.     Aneta Magdaleno MS, OTR/L

## 2024-03-04 NOTE — PROGRESS NOTES
"Progress Note - Pulmonary   Cindy Cruz 78 y.o. female MRN: [id number]  Unit/Bed#: -01 Encounter: [id number]    Assessment & Plan:  Acute hypoxic respiratory failure  Severe asthma with acute exacerbation secondary to RSV tracheobronchitis  Hypereosinophilia  History of tobacco abuse    Patient is now stable on room air  Decrease IV Solu-Medrol to daily today and start prednisone tomorrow, would taper by 10 mg 3 days  Continue nebulizer treatments  Azithromycin 500 mg x 3 days  Upon discharge, patient will need a maintenance inhaler/LABA such as Breo.  Her singular 10 mg should be continued along with as needed Ventolin.  If she does not already have 1, she should have a nebulizer machine to be used as needed ipratropium and albuterol nebulizer treatments    Discussed with Dr. Bry BAILEY    Subjective:   Cindy is feeling much better. Coughing last night but not today. Walking around the room without significant SOB    Objective:     Vitals: Blood pressure 136/58, pulse 73, temperature (!) 96.8 °F (36 °C), temperature source Temporal, resp. rate (!) 30, height 4' 9\" (1.448 m), weight 70.1 kg (154 lb 8.7 oz), SpO2 97%, not currently breastfeeding.,Body mass index is 33.44 kg/m².      Intake/Output Summary (Last 24 hours) at 3/4/2024 1059  Last data filed at 3/3/2024 2000  Gross per 24 hour   Intake 950 ml   Output 700 ml   Net 250 ml       Invasive Devices       Peripheral Intravenous Line  Duration             Peripheral IV 03/02/24 Left Antecubital 1 day    Peripheral IV 03/02/24 Left;Upper;Ventral (anterior) Arm 1 day                    Physical Exam:   General appearance: Alert and awake, in no acute distress  Head: Normocephalic, without obvious abnormality, atraumatic  Eyes: No scleral icterus   Lungs: + Wheezing  Heart: Regular rate  Abdomen:  No appreciable distension or tenderness  Extremities: No deformity  Skin: Warm and dry  Neurologic: No acute focal deficits are noted      Labs: I have personally " reviewed pertinent lab results.  Imaging and other studies: I have personally reviewed pertinent reports.

## 2024-03-04 NOTE — PLAN OF CARE
Problem: Potential for Falls  Goal: Patient will remain free of falls  Description: INTERVENTIONS:  - Educate patient/family on patient safety including physical limitations  - Instruct patient to call for assistance with activity   - Consult OT/PT to assist with strengthening/mobility   - Keep Call bell within reach  - Keep bed low and locked with side rails adjusted as appropriate  - Keep care items and personal belongings within reach  - Initiate and maintain comfort rounds  - Make Fall Risk Sign visible to staff  - Offer Toileting every  Hours, in advance of need  - Initiate/Maintain alarm  - Obtain necessary fall risk management equipment:   - Apply yellow socks and bracelet for high fall risk patients  - Consider moving patient to room near nurses station  Outcome: Progressing     Problem: Prexisting or High Potential for Compromised Skin Integrity  Goal: Skin integrity is maintained or improved  Description: INTERVENTIONS:  - Identify patients at risk for skin breakdown  - Assess and monitor skin integrity  - Assess and monitor nutrition and hydration status  - Monitor labs   - Assess for incontinence   - Turn and reposition patient  - Assist with mobility/ambulation  - Relieve pressure over bony prominences  - Avoid friction and shearing  - Provide appropriate hygiene as needed including keeping skin clean and dry  - Evaluate need for skin moisturizer/barrier cream  - Collaborate with interdisciplinary team   - Patient/family teaching  - Consider wound care consult   Outcome: Progressing     Problem: PAIN - ADULT  Goal: Verbalizes/displays adequate comfort level or baseline comfort level  Description: Interventions:  - Encourage patient to monitor pain and request assistance  - Assess pain using appropriate pain scale  - Administer analgesics based on type and severity of pain and evaluate response  - Implement non-pharmacological measures as appropriate and evaluate response  - Consider cultural and  social influences on pain and pain management  - Notify physician/advanced practitioner if interventions unsuccessful or patient reports new pain  Outcome: Progressing     Problem: INFECTION - ADULT  Goal: Absence or prevention of progression during hospitalization  Description: INTERVENTIONS:  - Assess and monitor for signs and symptoms of infection  - Monitor lab/diagnostic results  - Monitor all insertion sites, i.e. indwelling lines, tubes, and drains  - Monitor endotracheal if appropriate and nasal secretions for changes in amount and color  - [place] appropriate cooling/warming therapies per order  - Administer medications as ordered  - Instruct and encourage patient and family to use good hand hygiene technique  - Identify and instruct in appropriate isolation precautions for identified infection/condition  Outcome: Progressing  Goal: Absence of fever/infection during neutropenic period  Description: INTERVENTIONS:  - Monitor WBC    Outcome: Progressing     Problem: SAFETY ADULT  Goal: Patient will remain free of falls  Description: INTERVENTIONS:  - Educate patient/family on patient safety including physical limitations  - Instruct patient to call for assistance with activity   - Consult OT/PT to assist with strengthening/mobility   - Keep Call bell within reach  - Keep bed low and locked with side rails adjusted as appropriate  - Keep care items and personal belongings within reach  - Initiate and maintain comfort rounds  - Make Fall Risk Sign visible to staff  - Offer Toileting every  Hours, in advance of need  - Initiate/Maintain alarm  - Obtain necessary fall risk management equipment:   - Apply yellow socks and bracelet for high fall risk patients  - Consider moving patient to room near nurses station  Outcome: Progressing  Goal: Maintain or return to baseline ADL function  Description: INTERVENTIONS:  -  Assess patient's ability to carry out ADLs; assess patient's baseline for ADL function and  identify physical deficits which impact ability to perform ADLs (bathing, care of mouth/teeth, toileting, grooming, dressing, etc.)  - Assess/evaluate cause of self-care deficits   - Assess range of motion  - Assess patient's mobility; develop plan if impaired  - Assess patient's need for assistive devices and provide as appropriate  - Encourage maximum independence but intervene and supervise when necessary  - Involve family in performance of ADLs  - Assess for home care needs following discharge   - Consider OT consult to assist with ADL evaluation and planning for discharge  - Provide patient education as appropriate  Outcome: Progressing  Goal: Maintains/Returns to pre admission functional level  Description: INTERVENTIONS:  - Perform AM-PAC 6 Click Basic Mobility/ Daily Activity assessment daily.  - Set and communicate daily mobility goal to care team and patient/family/caregiver.   - Collaborate with rehabilitation services on mobility goals if consulted  - Perform Range of Motion  times a day.  - Reposition patient every  hours.  - Dangle patient  times a day  - Stand patient  times a day  - Ambulate patient  times a day  - Out of bed to chair  times a day   - Out of bed for meals  times a day  - Out of bed for toileting  - Record patient progress and toleration of activity level   Outcome: Progressing     Problem: DISCHARGE PLANNING  Goal: Discharge to home or other facility with appropriate resources  Description: INTERVENTIONS:  - Identify barriers to discharge w/patient and caregiver  - Arrange for needed discharge resources and transportation as appropriate  - Identify discharge learning needs (meds, wound care, etc.)  - Arrange for interpretive services to assist at discharge as needed  - Refer to Case Management Department for coordinating discharge planning if the patient needs post-hospital services based on physician/advanced practitioner order or complex needs related to functional status,  cognitive ability, or social support system  Outcome: Progressing     Problem: Knowledge Deficit  Goal: Patient/family/caregiver demonstrates understanding of disease process, treatment plan, medications, and discharge instructions  Description: Complete learning assessment and assess knowledge base.  Interventions:  - Provide teaching at level of understanding  - Provide teaching via preferred learning methods  Outcome: Progressing     Problem: Nutrition/Hydration-ADULT  Goal: Nutrient/Hydration intake appropriate for improving, restoring or maintaining nutritional needs  Description: Monitor and assess patient's nutrition/hydration status for malnutrition. Collaborate with interdisciplinary team and initiate plan and interventions as ordered.  Monitor patient's weight and dietary intake as ordered or per policy. Utilize nutrition screening tool and intervene as necessary. Determine patient's food preferences and provide high-protein, high-caloric foods as appropriate.     INTERVENTIONS:  - Monitor oral intake, urinary output, labs, and treatment plans  - Assess nutrition and hydration status and recommend course of action  - Evaluate amount of meals eaten  - Assist patient with eating if necessary   - Allow adequate time for meals  - Recommend/ encourage appropriate diets, oral nutritional supplements, and vitamin/mineral supplements  - Order, calculate, and assess calorie counts as needed  - Recommend, monitor, and adjust tube feedings and TPN/PPN based on assessed needs  - Assess need for intravenous fluids  - Provide specific nutrition/hydration education as appropriate  - Include patient/family/caregiver in decisions related to nutrition  Outcome: Progressing

## 2024-03-04 NOTE — PROGRESS NOTES
[facility]  Progress Note  Name: Cindy Cruz I  MRN: [id number]  Unit/Bed#: -01 I Date of Admission: 3/2/2024   Date of Service: 3/4/2024 I Hospital Day: 2    Assessment/Plan   Acute respiratory failure (HCC)  Assessment & Plan  Patient presents to ED with 2 days of difficulty breathing and increase use of rescue inhaler without relief.  Noted to have chest tightness, cough and bilateral wheezing on admission  Multifactorial: acute asthma exacerbation and RSV+  In ED, received steroids, continuous neb treatment and IV Mag in ER without much improvement  Chest x-ray is unremarkable  Upon arrival to the ICU patient tachypneic (RR 30s) and dyspneic without hypoxia. Patient placed on BIPAP for increased WOB. Upgrade to SD1. I spoke with patient and her family who are agreeable to intubation if patient does not improve on BIPAP.   WOB improved after ~ 2 hours on BIPAP. ABG still with respiratory alkalosis but much improved than previous  Procal negative  Stable over the night not requiring BIPAP     Plan  Wean supplemental oxygen, goal SpO2 >92%  Continue IV Zithromax for 3 days   Continue IV Solu-Medrol  for today, changed to p.o. prednisone in a.m.   Continue Pulmicort/Performist  Continue Xopenex/Atrovent  Pulmonology following, appreciate recommendations  Acute hypoxic respiratory failure has now resolved, patient is on room air    RSV infection  Assessment & Plan  RSV+ in ED     Plan  See plan above      Chronic kidney disease, stage 3a (HCC)  Assessment & Plan  Lab Results   Component Value Date    EGFR 31 03/04/2024    EGFR 41 03/03/2024    EGFR 54 03/02/2024    CREATININE 1.54 (H) 03/04/2024    CREATININE 1.22 03/03/2024    CREATININE 0.98 03/02/2024     Baseline 0.90 - 1.12  Admission 0.98     Plan  I&Os  Avoid hypotension  Avoid nephrotoxins  Renally dose medications     Depression, recurrent (HCC)  Assessment & Plan  Home regimen: Wellbutrin XL     Plan  Continue home dose  "Wellbutrin     Dyslipidemia  Assessment & Plan  Home regimen: Lipitor     Plan  Continue home dose Lipitor     Esophageal reflux  Assessment & Plan  Home regimen: Omeprazole     Plan  Continue Protonix as formula alternative    Hypertension  Assessment & Plan  Home regimen: Cozaar  /58 (BP Location: Right arm)   Pulse 73   Temp (!) 96.8 °F (36 °C) (Temporal)   Resp 18   Ht 4' 9\" (1.448 m)   Wt 70.1 kg (154 lb 8.7 oz)   SpO2 97%   BMI 33.44 kg/m²       Plan  Continue home dose Losartan   PRN hydralazine for SBP >180    Hypothyroidism  Assessment & Plan  Home regimen: Synthroid 88mcg  TSH 0.424      Plan  Check T4  Continue home dose Synthroid     * Acute asthma exacerbation  Assessment & Plan  APatient presents to ED with 2 days of difficulty breathing and increase use of rescue inhaler without relief.  Noted to have chest tightness, cough and bilateral wheezing on admission  In ED, received steroids, continuous neb treatment and IV Mag in ER without much improvement  Family reported that patient usually takes her rescue inhaler twice a day for SOB. Becomes more SOB when she is active- Family shared that she usually needs her albuterol after taking a shower and getting dressed due to the degree of activity  No previous PFT  Family reports that patients asthma is managed by PCP  Family reports patient has a home nebulizer      Plan  See plan above for acute exacerbation   Patient advised to follow up with pulmonologist OP for her asthma management.   Patient likely would benefit from PFT outpatient   Pulmonology following, appreciate recommendations               VTE Pharmacologic Prophylaxis: VTE Score: 6 Moderate Risk (Score 3-4) - Pharmacological DVT Prophylaxis Ordered: enoxaparin (Lovenox).    Mobility:   Basic Mobility Inpatient Raw Score: 18  JH-HLM Goal: 6: Walk 10 steps or more  JH-HLM Achieved: 6: Walk 10 steps or more  HLM Goal achieved. Continue to encourage appropriate mobility.    Patient " Centered Rounds: I performed bedside rounds with nursing staff today.   Discussions with Specialists or Other Care Team Provider: yes    Education and Discussions with Family / Patient: Updated  (daughter) at bedside.    Total Time Spent on Date of Encounter in care of patient: 39 mins. This time was spent on one or more of the following: performing physical exam; counseling and coordination of care; obtaining or reviewing history; documenting in the medical record; reviewing/ordering tests, medications or procedures; communicating with other healthcare professionals and discussing with patient's family/caregivers.    Current Length of Stay: 2 day(s)  Current Patient Status: Inpatient   Certification Statement: The patient will continue to require additional inpatient hospital stay due to pending stabilization  Discharge Plan: Anticipate discharge tomorrow to home with home services.    Code Status: Level 1 - Full Code    Subjective:   Seen and examined at bedside  Daughter also present at bedside  Awake and alert, on room air  Symptoms improved  No overnight events reported    Objective:     Vitals:   Temp (24hrs), Av.7 °F (36.5 °C), Min:96.8 °F (36 °C), Max:98.3 °F (36.8 °C)    Temp:  [96.8 °F (36 °C)-98.3 °F (36.8 °C)] 96.8 °F (36 °C)  HR:  [73-84] 73  Resp:  [17-30] 18  BP: (125-136)/(58-62) 136/58  SpO2:  [97 %-98 %] 97 %  Body mass index is 33.44 kg/m².     Input and Output Summary (last 24 hours):     Intake/Output Summary (Last 24 hours) at 3/4/2024 1356  Last data filed at 3/4/2024 0900  Gross per 24 hour   Intake 930 ml   Output 500 ml   Net 430 ml       Physical Exam:   Physical Exam  Vitals reviewed.   Constitutional:       Appearance: Normal appearance.   HENT:      Head: Atraumatic.   Eyes:      General: No scleral icterus.  Cardiovascular:      Rate and Rhythm: Normal rate and regular rhythm.      Heart sounds: Normal heart sounds.   Pulmonary:      Effort: No respiratory distress.    Abdominal:      General: Bowel sounds are normal.   Musculoskeletal:      Cervical back: Neck supple.      Right lower leg: No edema.      Left lower leg: No edema.   Skin:     General: Skin is dry.      Capillary Refill: Capillary refill takes less than 2 seconds.   Neurological:      Mental Status: She is alert. Mental status is at baseline.   Psychiatric:         Mood and Affect: Mood normal.          Additional Data:     Labs:  Results from last 7 days   Lab Units 03/04/24  0520   WBC Thousand/uL 16.79*   HEMOGLOBIN g/dL 11.1*   HEMATOCRIT % 34.3*   PLATELETS Thousands/uL 204   NEUTROS PCT % 91*   LYMPHS PCT % 6*   MONOS PCT % 2*   EOS PCT % 0     Results from last 7 days   Lab Units 03/04/24  0520 03/03/24  0402   SODIUM mmol/L 141 141   POTASSIUM mmol/L 4.8 4.4   CHLORIDE mmol/L 111* 111*   CO2 mmol/L 22 22   BUN mg/dL 37* 22   CREATININE mg/dL 1.54* 1.22   ANION GAP mmol/L 8 8   CALCIUM mg/dL 9.8 10.1   ALBUMIN g/dL  --  3.9   TOTAL BILIRUBIN mg/dL  --  0.46   ALK PHOS U/L  --  69   ALT U/L  --  14   AST U/L  --  14   GLUCOSE RANDOM mg/dL 140 136                 Results from last 7 days   Lab Units 03/04/24  0520 03/03/24  0402 03/02/24  1600   PROCALCITONIN ng/ml 0.11 0.08 0.07       Lines/Drains:  Invasive Devices       Peripheral Intravenous Line  Duration             Peripheral IV 03/02/24 Left Antecubital 2 days    Peripheral IV 03/02/24 Left;Upper;Ventral (anterior) Arm 1 day                          Imaging: Reviewed radiology reports from this admission including: chest xray    Recent Cultures (last 7 days):   Results from last 7 days   Lab Units 03/02/24  1600 03/02/24  1113   BLOOD CULTURE  No Growth at 24 hrs. No Growth at 24 hrs.       Last 24 Hours Medication List:   Current Facility-Administered Medications   Medication Dose Route Frequency Provider Last Rate    acetaminophen  650 mg Oral Q6H PRN CRISTINA Lay      albuterol  2 puff Inhalation Q6H PRN CRISTINA Lay       albuterol  2.5 mg Nebulization Q6H PRN Enma R Rex, CRISTINA      atorvastatin  20 mg Oral Daily Enma ELOINA Goodman, CRISTINA      azithromycin  500 mg Intravenous Q24H EnmaCRISTINA Alejandro Stopped (03/03/24 1815)    bimatoprost  1 drop Ophthalmic Daily Enma ELOINA Goodman, CRISTINA      budesonide  0.5 mg Nebulization Q12H Enma ELOINA Goodman, CRISTINA      buPROPion  450 mg Oral QAM Enma ELOINA Goodman, CRISTINA      dextromethorphan-guaiFENesin  10 mL Oral Q4H PRN Enma R Rex, CRISTINA      enoxaparin  40 mg Subcutaneous Daily Enma ELOINA Goodman, CRISTINA      formoterol  20 mcg Nebulization Q12H Enma ELOINA Goodman, CRISTINA      gabapentin  800 mg Oral BID PRN Enma R Rex, CRISTINA      hydrALAZINE  10 mg Intravenous Q6H PRN Enma ELOINA Goodman, CRISTINA      ipratropium  0.5 mg Nebulization TID Enma R Rex, CRISTINA      levalbuterol  1.25 mg Nebulization TID Enma CRISTINA Parada      levothyroxine  88 mcg Oral Early Morning Enma ELOINA Goodman, CRISTINA      losartan  100 mg Oral Daily Enma CRISTINA Parada      montelukast  10 mg Oral HS Enma ELOINA Goodman, CRISTINA      pantoprazole  40 mg Oral Daily Before Breakfast CRISTINA Lay      [START ON 3/5/2024] predniSONE  40 mg Oral Daily Tish Sol PA-C      tiZANidine  2 mg Oral Q8H PRN Enma ELOINA Goodman, CRISTINA      traZODone  50 mg Oral HS CRISTINA Lay          Today, Patient Was Seen By: Lina Londono MD    **Please Note: This note may have been constructed using a voice recognition system.**

## 2024-03-05 VITALS
OXYGEN SATURATION: 100 % | TEMPERATURE: 96.8 F | DIASTOLIC BLOOD PRESSURE: 64 MMHG | BODY MASS INDEX: 33.86 KG/M2 | SYSTOLIC BLOOD PRESSURE: 137 MMHG | WEIGHT: 156.97 LBS | HEART RATE: 74 BPM | HEIGHT: 57 IN | RESPIRATION RATE: 18 BRPM

## 2024-03-05 DIAGNOSIS — J45.909 UNCOMPLICATED ASTHMA, UNSPECIFIED ASTHMA SEVERITY, UNSPECIFIED WHETHER PERSISTENT: ICD-10-CM

## 2024-03-05 PROBLEM — J96.00 ACUTE RESPIRATORY FAILURE (HCC): Status: RESOLVED | Noted: 2024-03-02 | Resolved: 2024-03-05

## 2024-03-05 LAB
ANION GAP SERPL CALCULATED.3IONS-SCNC: 7 MMOL/L
BASOPHILS # BLD AUTO: 0.01 THOUSANDS/ÂΜL (ref 0–0.1)
BASOPHILS NFR BLD AUTO: 0 % (ref 0–1)
BUN SERPL-MCNC: 29 MG/DL (ref 5–25)
CALCIUM SERPL-MCNC: 9.5 MG/DL (ref 8.4–10.2)
CHLORIDE SERPL-SCNC: 112 MMOL/L (ref 96–108)
CO2 SERPL-SCNC: 21 MMOL/L (ref 21–32)
CREAT SERPL-MCNC: 1.21 MG/DL (ref 0.6–1.3)
DME PARACHUTE DELIVERY DATE REQUESTED: NORMAL
DME PARACHUTE DELIVERY NOTE: NORMAL
DME PARACHUTE ITEM DESCRIPTION: NORMAL
DME PARACHUTE ORDER STATUS: NORMAL
DME PARACHUTE SUPPLIER NAME: NORMAL
DME PARACHUTE SUPPLIER PHONE: NORMAL
EOSINOPHIL # BLD AUTO: 0 THOUSAND/ÂΜL (ref 0–0.61)
EOSINOPHIL NFR BLD AUTO: 0 % (ref 0–6)
ERYTHROCYTE [DISTWIDTH] IN BLOOD BY AUTOMATED COUNT: 15.3 % (ref 11.6–15.1)
GFR SERPL CREATININE-BSD FRML MDRD: 42 ML/MIN/1.73SQ M
GLUCOSE SERPL-MCNC: 86 MG/DL (ref 65–140)
HCT VFR BLD AUTO: 33.4 % (ref 34.8–46.1)
HGB BLD-MCNC: 10.4 G/DL (ref 11.5–15.4)
IMM GRANULOCYTES # BLD AUTO: 0.12 THOUSAND/UL (ref 0–0.2)
IMM GRANULOCYTES NFR BLD AUTO: 1 % (ref 0–2)
LYMPHOCYTES # BLD AUTO: 2.21 THOUSANDS/ÂΜL (ref 0.6–4.47)
LYMPHOCYTES NFR BLD AUTO: 15 % (ref 14–44)
MCH RBC QN AUTO: 31 PG (ref 26.8–34.3)
MCHC RBC AUTO-ENTMCNC: 31.1 G/DL (ref 31.4–37.4)
MCV RBC AUTO: 100 FL (ref 82–98)
MONOCYTES # BLD AUTO: 0.65 THOUSAND/ÂΜL (ref 0.17–1.22)
MONOCYTES NFR BLD AUTO: 5 % (ref 4–12)
NEUTROPHILS # BLD AUTO: 11.37 THOUSANDS/ÂΜL (ref 1.85–7.62)
NEUTS SEG NFR BLD AUTO: 79 % (ref 43–75)
NRBC BLD AUTO-RTO: 0 /100 WBCS
PLATELET # BLD AUTO: 212 THOUSANDS/UL (ref 149–390)
PMV BLD AUTO: 11.9 FL (ref 8.9–12.7)
POTASSIUM SERPL-SCNC: 4.4 MMOL/L (ref 3.5–5.3)
RBC # BLD AUTO: 3.35 MILLION/UL (ref 3.81–5.12)
SODIUM SERPL-SCNC: 140 MMOL/L (ref 135–147)
WBC # BLD AUTO: 14.36 THOUSAND/UL (ref 4.31–10.16)

## 2024-03-05 PROCEDURE — 94760 N-INVAS EAR/PLS OXIMETRY 1: CPT

## 2024-03-05 PROCEDURE — 85025 COMPLETE CBC W/AUTO DIFF WBC: CPT | Performed by: INTERNAL MEDICINE

## 2024-03-05 PROCEDURE — 80048 BASIC METABOLIC PNL TOTAL CA: CPT | Performed by: INTERNAL MEDICINE

## 2024-03-05 PROCEDURE — 99239 HOSP IP/OBS DSCHRG MGMT >30: CPT | Performed by: INTERNAL MEDICINE

## 2024-03-05 PROCEDURE — 94640 AIRWAY INHALATION TREATMENT: CPT

## 2024-03-05 RX ORDER — MONTELUKAST SODIUM 10 MG/1
10 TABLET ORAL
Qty: 90 TABLET | Refills: 0 | Status: SHIPPED | OUTPATIENT
Start: 2024-03-05 | End: 2024-03-08 | Stop reason: SDUPTHER

## 2024-03-05 RX ORDER — LEVALBUTEROL INHALATION SOLUTION 1.25 MG/3ML
1.25 SOLUTION RESPIRATORY (INHALATION) 3 TIMES DAILY PRN
Qty: 60 ML | Refills: 0 | Status: SHIPPED | OUTPATIENT
Start: 2024-03-05 | End: 2024-03-08 | Stop reason: SDUPTHER

## 2024-03-05 RX ORDER — BENZONATATE 100 MG/1
100 CAPSULE ORAL 3 TIMES DAILY PRN
Qty: 20 CAPSULE | Refills: 0 | Status: SHIPPED | OUTPATIENT
Start: 2024-03-05 | End: 2024-03-08 | Stop reason: SDUPTHER

## 2024-03-05 RX ORDER — FLUTICASONE FUROATE AND VILANTEROL 200; 25 UG/1; UG/1
1 POWDER RESPIRATORY (INHALATION) DAILY
Qty: 60 BLISTER | Refills: 0 | Status: SHIPPED | OUTPATIENT
Start: 2024-03-05 | End: 2024-03-08 | Stop reason: SDUPTHER

## 2024-03-05 RX ORDER — PREDNISONE 20 MG/1
TABLET ORAL DAILY
Qty: 15 TABLET | Refills: 0 | Status: SHIPPED | OUTPATIENT
Start: 2024-03-06 | End: 2024-03-18

## 2024-03-05 RX ADMIN — PREDNISONE 40 MG: 20 TABLET ORAL at 08:52

## 2024-03-05 RX ADMIN — FORMOTEROL FUMARATE DIHYDRATE 20 MCG: 20 SOLUTION RESPIRATORY (INHALATION) at 07:25

## 2024-03-05 RX ADMIN — BIMATOPROST 1 DROP: 0.3 SOLUTION/ DROPS OPHTHALMIC at 08:53

## 2024-03-05 RX ADMIN — LEVALBUTEROL HYDROCHLORIDE 1.25 MG: 1.25 SOLUTION RESPIRATORY (INHALATION) at 07:25

## 2024-03-05 RX ADMIN — ATORVASTATIN CALCIUM 20 MG: 20 TABLET, FILM COATED ORAL at 08:52

## 2024-03-05 RX ADMIN — BUDESONIDE INHALATION 0.5 MG: 0.5 SUSPENSION RESPIRATORY (INHALATION) at 07:25

## 2024-03-05 RX ADMIN — LEVOTHYROXINE SODIUM 88 MCG: 88 TABLET ORAL at 05:03

## 2024-03-05 RX ADMIN — HEPARIN SODIUM 5000 UNITS: 5000 INJECTION, SOLUTION INTRAVENOUS; SUBCUTANEOUS at 05:03

## 2024-03-05 RX ADMIN — SODIUM CHLORIDE, SODIUM GLUCONATE, SODIUM ACETATE, POTASSIUM CHLORIDE, MAGNESIUM CHLORIDE, SODIUM PHOSPHATE, DIBASIC, AND POTASSIUM PHOSPHATE 75 ML/HR: .53; .5; .37; .037; .03; .012; .00082 INJECTION, SOLUTION INTRAVENOUS at 05:12

## 2024-03-05 RX ADMIN — BUPROPION HYDROCHLORIDE 450 MG: 300 TABLET, EXTENDED RELEASE ORAL at 08:52

## 2024-03-05 RX ADMIN — IPRATROPIUM BROMIDE 0.5 MG: 0.5 SOLUTION RESPIRATORY (INHALATION) at 07:25

## 2024-03-05 RX ADMIN — PANTOPRAZOLE SODIUM 40 MG: 40 TABLET, DELAYED RELEASE ORAL at 05:03

## 2024-03-05 NOTE — PLAN OF CARE
Problem: Potential for Falls  Goal: Patient will remain free of falls  Description: INTERVENTIONS:  - Educate patient/family on patient safety including physical limitations  - Instruct patient to call for assistance with activity   - Consult OT/PT to assist with strengthening/mobility   - Keep Call bell within reach  - Keep bed low and locked with side rails adjusted as appropriate  - Keep care items and personal belongings within reach  - Initiate and maintain comfort rounds  - Make Fall Risk Sign visible to staff  - Offer Toileting every 2 Hours, in advance of need  - Initiate/Maintain bed alarm  - Obtain necessary fall risk management equipment:   - Apply yellow socks and bracelet for high fall risk patients  - Consider moving patient to room near nurses station  Outcome: Progressing     Problem: Prexisting or High Potential for Compromised Skin Integrity  Goal: Skin integrity is maintained or improved  Description: INTERVENTIONS:  - Identify patients at risk for skin breakdown  - Assess and monitor skin integrity  - Assess and monitor nutrition and hydration status  - Monitor labs   - Assess for incontinence   - Turn and reposition patient  - Assist with mobility/ambulation  - Relieve pressure over bony prominences  - Avoid friction and shearing  - Provide appropriate hygiene as needed including keeping skin clean and dry  - Evaluate need for skin moisturizer/barrier cream  - Collaborate with interdisciplinary team   - Patient/family teaching  - Consider wound care consult   Outcome: Progressing     Problem: PAIN - ADULT  Goal: Verbalizes/displays adequate comfort level or baseline comfort level  Description: Interventions:  - Encourage patient to monitor pain and request assistance  - Assess pain using appropriate pain scale  - Administer analgesics based on type and severity of pain and evaluate response  - Implement non-pharmacological measures as appropriate and evaluate response  - Consider cultural and  social influences on pain and pain management  - Notify physician/advanced practitioner if interventions unsuccessful or patient reports new pain  Outcome: Progressing

## 2024-03-05 NOTE — ASSESSMENT & PLAN NOTE
APatient presents to ED with 2 days of difficulty breathing and increase use of rescue inhaler without relief.  Noted to have chest tightness, cough and bilateral wheezing on admission  In ED, received steroids, continuous neb treatment and IV Mag in ER without much improvement  Family reported that patient usually takes her rescue inhaler twice a day for SOB. Becomes more SOB when she is active- Family shared that she usually needs her albuterol after taking a shower and getting dressed due to the degree of activity  No previous PFT  Family reports that patients asthma is managed by PCP  Family reports patient has a home nebulizer      Plan  Continue nebulizer regimen  Continue with Breo  Nebulizer machine arranged by care management, appreciate assistance  Discharged on prednisone taper  Patient advised to follow up with pulmonologist OP for her asthma management.   Patient likely would benefit from PFT outpatient   Pulmonology following, appreciate recommendations

## 2024-03-05 NOTE — CASE MANAGEMENT
Case Management Assessment & Discharge Planning Note    Patient name Cindy Cruz  Location /-01 MRN [id number]  : 1945 Date 3/5/2024       Current Admission Date: 3/2/2024  Current Admission Diagnosis:Acute asthma exacerbation   Patient Active Problem List    Diagnosis Date Noted    RSV infection 2024    Acute respiratory failure (HCC) 2024    Dysphagia 2023    Vitamin B12 deficiency 2023    Chronic kidney disease, stage 3a (HCC) 2022    Malfunction of spinal cord stimulator (HCC) 2022    SAGE (obstructive sleep apnea)     Daytime somnolence     Insomnia 2022    Vitamin D deficiency 10/12/2021    Constipation 2019    Intercostal neuropathy 2019    Rib pain on left side 2019    Anxiety and depression 2018    Depression, recurrent ([facility]) 2018    Hypercalcemia 2018    Anserine bursitis 2018    Chronic bilateral low back pain with left-sided sciatica 2018    Lumbar post-laminectomy syndrome 2018    Lumbar radiculopathy 2018    Chronic pain of left knee 2018    Primary osteoarthritis of left knee 2018    Gait abnormality 2018    Weakness of left leg 2018    Lumbar spondylosis 2018    IFG (impaired fasting glucose) 2018    Chronic pain syndrome 2018    Atrophic vaginitis 2017    Osteoporosis 2017    Obesity 2017    Cataract 2017    Glaucoma 2017    Tremor 10/18/2012    Hypothyroidism 2012    Dyslipidemia 2012    Migraine headache 2012    Esophageal reflux 2012    Acute asthma exacerbation 2012    Hypertension 2012      LOS (days): 3  Geometric Mean LOS (GMLOS) (days): 3.6  Days to GMLOS:0.7     OBJECTIVE:    Risk of Unplanned Readmission Score: 17.53         Current admission status: Inpatient       Preferred Pharmacy:   [id number] - [place] - [address]  [address]  [facility] [id number]  Phone: [phone] Fax: [phone]    Primary Care Provider: Any Vargas DO    Primary Insurance: MEDICARE  Secondary Insurance:     ASSESSMENT:  Active Health Care Proxies    There are no active Health Care Proxies on file.       Advance Directives  Does patient have a Health Care POA?: No  Was patient offered paperwork?: Yes (declined)  Does patient currently have a Health Care decision maker?: Yes, please see Health Care Proxy section  Does patient have Advance Directives?: No  Was patient offered paperwork?: Yes (declined)  Primary Contact: Dara Deras dtr         Readmission Root Cause  30 Day Readmission: No    Patient Information  Admitted from:: Home  Mental Status: Alert  During Assessment patient was accompanied by: Daughter  Assessment information provided by:: Daughter  Primary Caregiver: Self  Support Systems: Self, Friend, Daughter, Family members  County of Residence: [place]  What city do you live in?: [place]  Home entry access options. Select all that apply.: Stairs  Number of steps to enter home.: 4  Do the steps have railings?: Yes  Type of Current Residence: [address] [place] home  Upon entering residence, is there a bedroom on the main floor (no further steps)?: No  A bedroom is located on the following floor levels of residence (select all that apply):: 2nd Floor  Upon entering residence, is there a bathroom on the main floor (no further steps)?: Yes  Number of steps to 2nd floor from main floor:  (stairglide to 2nd flr)  Living Arrangements: Lives w/ Daughter    Activities of Daily Living Prior to Admission  Functional Status: Independent  Completes ADLs independently?: Yes  Ambulates independently?: Yes  Does patient use assisted devices?: Yes  Assisted Devices (DME) used: Stair Chair/[employer]  Does patient currently own DME?: Yes  What DME does the patient currently own?: Stair Chair/[employer]  Does patient have a history of Outpatient Therapy (PT/OT)?: No  Does  the patient have a history of Short-Term Rehab?: No  Does patient have a history of HHC?: No  Does patient currently have HHC?: No         Patient Information Continued  Income Source: Pension/[employer]  Does patient have prescription coverage?: Yes  Does patient receive dialysis treatments?: No  Does patient have a history of substance abuse?: No  Does patient have a history of Mental Health Diagnosis?: Yes (Anxiety, Depression)  Is patient receiving treatment for mental health?: Yes  Has patient received inpatient treatment related to mental health in the last 2 years?: No         Means of Transportation  Means of Transport to Appts:: Family transport      Social Determinants of Health (SDOH)      Flowsheet Row Most Recent Value   Housing Stability    In the last 12 months, was there a time when you were not able to pay the mortgage or rent on time? N   In the last 12 months, how many places have you lived? 1   In the last 12 months, was there a time when you did not have a steady place to sleep or slept in a shelter (including now)? N   Transportation Needs    In the past 12 months, has lack of transportation kept you from medical appointments or from getting medications? no   In the past 12 months, has lack of transportation kept you from meetings, work, or from getting things needed for daily living? No   Food Insecurity    Within the past 12 months, you worried that your food would run out before you got the money to buy more. Never true   Within the past 12 months, the food you bought just didn't last and you didn't have money to get more. Never true   Utilities    In the past 12 months has the electric, gas, oil, or water company threatened to shut off services in your home? No            DISCHARGE DETAILS:    Discharge planning discussed with:: PtDara carlson dtr  [place] of Choice: Yes     CM contacted family/caregiver?: Yes  Were Treatment Team discharge recommendations reviewed with patient/caregiver?:  Yes  Did patient/caregiver verbalize understanding of patient care needs?: Yes  Were patient/caregiver advised of the risks associated with not following Treatment Team discharge recommendations?: Yes    Contacts  Patient Contacts: Dara Deras dtr  Relationship to Patient:: Family  Contact Method: In Person  Reason/Outcome: Continuity of Care, Emergency Contact, Referral, Discharge Planning    Requested Home Health Care         Is the patient interested in HHC at discharge?: No    DME Referral Provided  Referral made for DME?: No              Treatment Team Recommendation: Home  Discharge Destination Plan:: Home  Transport at Discharge : Family                             IMM Given (Date):: 03/05/24 (1000am)  IMM Given to:: Family  Family notified:: Dara Deras dtr  Additional Comments: CM met with pt and dtr to discuss role of CM and any needs prior to discharge. Pt resides w/ dtr in 2SH w/ 4 JOE and stairglide to 2nd flr set up. Indp PTA. Hx anxiety and depression which is treated with medication. No IP Psych stays. No hx STR/HH/OP PT/DA. Pt prefers to use [facility] pharmacy. Pt needs a nebulizer per MD. CM ordered nebulizer via [facility]. Awaiting approval to deliver to pt. Dtr will transport at discharge.

## 2024-03-05 NOTE — ASSESSMENT & PLAN NOTE
"Home regimen: Cozaar  /64   Pulse 74   Temp (!) 96.8 °F (36 °C)   Resp 18   Ht 4' 9\" (1.448 m)   Wt 71.2 kg (156 lb 15.5 oz)   SpO2 100%   BMI 33.97 kg/m²       Plan  Continue home dose Losartan     "

## 2024-03-05 NOTE — PLAN OF CARE
Problem: INFECTION - ADULT  Goal: Absence or prevention of progression during hospitalization  Description: INTERVENTIONS:  - Assess and monitor for signs and symptoms of infection  - Monitor lab/diagnostic results  - Monitor all insertion sites, i.e. indwelling lines, tubes, and drains  - Monitor endotracheal if appropriate and nasal secretions for changes in amount and color  - [place] appropriate cooling/warming therapies per order  - Administer medications as ordered  - Instruct and encourage patient and family to use good hand hygiene technique  - Identify and instruct in appropriate isolation precautions for identified infection/condition  Outcome: Progressing  Goal: Absence of fever/infection during neutropenic period  Description: INTERVENTIONS:  - Monitor WBC    Outcome: Progressing     Problem: Potential for Falls  Goal: Patient will remain free of falls  Description: INTERVENTIONS:  - Educate patient/family on patient safety including physical limitations  - Instruct patient to call for assistance with activity   - Consult OT/PT to assist with strengthening/mobility   - Keep Call bell within reach  - Keep bed low and locked with side rails adjusted as appropriate  - Keep care items and personal belongings within reach  - Initiate and maintain comfort rounds  - Make Fall Risk Sign visible to staff  - Offer Toileting every 2 Hours, in advance of need  - Apply yellow socks and bracelet for high fall risk patients  - Consider moving patient to room near nurses station  Outcome: Progressing

## 2024-03-05 NOTE — ASSESSMENT & PLAN NOTE
Lab Results   Component Value Date    EGFR 42 03/05/2024    EGFR 31 03/04/2024    EGFR 41 03/03/2024    CREATININE 1.21 03/05/2024    CREATININE 1.54 (H) 03/04/2024    CREATININE 1.22 03/03/2024     Baseline 0.90 - 1.12  Admission 0.98     Plan  I&Os  Avoid hypotension  Avoid nephrotoxins  Renally dose medications

## 2024-03-05 NOTE — DISCHARGE SUMMARY
[facility]  Discharge- Cindy Cruz 1945, 78 y.o. female MRN: [id number]  Unit/Bed#: -01 Encounter: [id number]  Primary Care Provider: Any Vargas DO   Date and time admitted to hospital: 3/2/2024 10:57 AM    * Severe asthma with acute exacerbation  Assessment & Plan  APatient presents to ED with 2 days of difficulty breathing and increase use of rescue inhaler without relief.  Noted to have chest tightness, cough and bilateral wheezing on admission  In ED, received steroids, continuous neb treatment and IV Mag in ER without much improvement  Family reported that patient usually takes her rescue inhaler twice a day for SOB. Becomes more SOB when she is active- Family shared that she usually needs her albuterol after taking a shower and getting dressed due to the degree of activity  No previous PFT  Family reports that patients asthma is managed by PCP  Family reports patient has a home nebulizer      Plan  Continue nebulizer regimen  Continue with Breo  Nebulizer machine arranged by care management, appreciate assistance  Discharged on prednisone taper  Patient advised to follow up with pulmonologist OP for her asthma management.   Patient likely would benefit from PFT outpatient   Pulmonology following, appreciate recommendations    Acute respiratory failure (HCC)-resolved as of 3/5/2024  Assessment & Plan  Patient presents to ED with 2 days of difficulty breathing and increase use of rescue inhaler without relief.  Noted to have chest tightness, cough and bilateral wheezing on admission  Multifactorial: acute asthma exacerbation and RSV+  In ED, received steroids, continuous neb treatment and IV Mag in ER without much improvement  Chest x-ray is unremarkable  Upon arrival to the ICU patient tachypneic (RR 30s) and dyspneic without hypoxia. Patient placed on BIPAP for increased WOB. Upgrade to SD1. I spoke with patient and her family who are agreeable to intubation if  "patient does not improve on BIPAP.   WOB improved after ~ 2 hours on BIPAP. ABG still with respiratory alkalosis but much improved than previous  Procal negative  Stable over the night not requiring BIPAP     Plan  Wean supplemental oxygen, goal SpO2 >92%  Continue IV Zithromax for 3 days   Continue IV Solu-Medrol  for today, changed to p.o. prednisone in a.m.   Continue Pulmicort/Performist  Continue Xopenex/Atrovent  Pulmonology following, appreciate recommendations  Acute hypoxic respiratory failure has now resolved, patient is on room air    RSV infection  Assessment & Plan  RSV+ in ED     Plan  Improved with supportive care  Now on room air      Chronic kidney disease, stage 3a (HCC)  Assessment & Plan  Lab Results   Component Value Date    EGFR 42 03/05/2024    EGFR 31 03/04/2024    EGFR 41 03/03/2024    CREATININE 1.21 03/05/2024    CREATININE 1.54 (H) 03/04/2024    CREATININE 1.22 03/03/2024     Baseline 0.90 - 1.12  Admission 0.98     Plan  I&Os  Avoid hypotension  Avoid nephrotoxins  Renally dose medications     Depression, recurrent (HCC)  Assessment & Plan  Home regimen: Wellbutrin XL     Plan  Continue home dose Wellbutrin     Dyslipidemia  Assessment & Plan  Home regimen: Lipitor     Plan  Continue home dose Lipitor     Esophageal reflux  Assessment & Plan  Home regimen: Omeprazole  Outpatient GI follow-up, already scheduled    Hypertension  Assessment & Plan  Home regimen: Cozaar  /64   Pulse 74   Temp (!) 96.8 °F (36 °C)   Resp 18   Ht 4' 9\" (1.448 m)   Wt 71.2 kg (156 lb 15.5 oz)   SpO2 100%   BMI 33.97 kg/m²       Plan  Continue home dose Losartan       Hypothyroidism  Assessment & Plan  Home regimen: Synthroid 88mcg    Lab Results   Component Value Date    [id number] 0.424 (L) 03/02/2024    FREET4 1.16 (H) 03/02/2024      Outpatient PCP follow-up              Medical Problems       Resolved Problems  Date Reviewed: 3/5/2024            Resolved    Acute respiratory failure (HCC) " 3/5/2024     Resolved by  Lina Londono MD          Admission Date:   Admission Orders (From admission, onward)       Ordered        03/02/24 1235  INPATIENT ADMISSION  Once                            Admitting Diagnosis: SOB (shortness of breath) [R06.02]  Asthma [J45.909]  Acute asthma exacerbation [J45.901]    HPI: as per, Justin George MD , on 3/2 Cindy Cruz is a 78 y.o. female with past medical history of asthma, hypothyroidism, hypertension, depression, CKD stage III who presented to the emergency department with complaint of cough and shortness of breath for the past 2 to 3 days.  Patient has history of asthma and for the past couple of days has been having worsening symptoms of cough, shortness of breath and associated wheezing which has not improved with inhalers at home.  Cough is productive of some productive sputum.  Denies any associated fever, chills.  Patient was taking care at of her great grand children and one of them was sick.  Patient was found to have RSV.  Patient was given IV Solu-Medrol and dilators in ER with minimal improvement and was admitted.     Procedures Performed:   Orders Placed This Encounter   Procedures    Critical Care    ED ECG Documentation Only       Summary of Hospital Course:     Patient presented with acute hypoxic respiratory failure, severe asthma with acute exacerbation in the setting of RSV infection.  There was no concern for pneumonia.  She received IV steroids, scheduled nebulizations and supportive care, that resulted in improvement of her clinical presentation.  She was successfully weaned off from oxygen.  She remained stable throughout her inpatient stay.  She was followed closely by pulmonology.  She is been discharged with a nebulizer machine, albuterol nebulizations.  IV Solu-Medrol has been transition to p.o. prednisone taper.  She will have outpatient pulmonology follow-up.    For details, see daily progress notes.  Discharge plan was discussed in  detail with patient's daughter, present at bedside.  All questions were answered to satisfaction.    Significant Findings, Care, Treatment and Services Provided: see above     Complications: none    Condition at Discharge: stable         Discharge instructions/Information to patient and family:   See after visit summary for information provided to patient and family.      Provisions for Follow-Up Care:  See after visit summary for information related to follow-up care and any pertinent home health orders.      PCP: Any Vargas DO    Disposition: Home    Planned Readmission: No    Discharge Statement   I spent 37 minutes discharging the patient. This time was spent on the day of discharge. I had direct contact with the patient on the day of discharge. Additional documentation is required if more than 30 minutes were spent on discharge.     Discharge Medications:  See after visit summary for reconciled discharge medications provided to patient and family.

## 2024-03-05 NOTE — DISCHARGE INSTR - AVS FIRST PAGE
Please follow-up with your family doctor within 7-10 days of discharge to discuss recent hospitalization  Please follow-up with pulmonology  Please follow-up with gastroenterology  Continue to take prednisone taper, as directed  Continue to use Breo inhaler  Continue to use nebulizations 3 times daily as needed

## 2024-03-05 NOTE — CASE MANAGEMENT
Case Management Discharge Planning Note    Patient name Cindy Cruz  Location /-01 MRN [id number]  : 1945 Date 3/5/2024       Current Admission Date: 3/2/2024  Current Admission Diagnosis:Severe asthma with acute exacerbation   Patient Active Problem List    Diagnosis Date Noted    RSV infection 2024    Dysphagia 2023    Vitamin B12 deficiency 2023    Chronic kidney disease, stage 3a ([facility]) 2022    Malfunction of spinal cord stimulator ([facility]) 2022    SAGE (obstructive sleep apnea)     Daytime somnolence     Insomnia 2022    Vitamin D deficiency 10/12/2021    Constipation 2019    Intercostal neuropathy 2019    Rib pain on left side 2019    Anxiety and depression 2018    Depression, recurrent ([facility]) 2018    Hypercalcemia 2018    Anserine bursitis 2018    Chronic bilateral low back pain with left-sided sciatica 2018    Lumbar post-laminectomy syndrome 2018    Lumbar radiculopathy 2018    Chronic pain of left knee 2018    Primary osteoarthritis of left knee 2018    Gait abnormality 2018    Weakness of left leg 2018    Lumbar spondylosis 2018    IFG (impaired fasting glucose) 2018    Chronic pain syndrome 2018    Atrophic vaginitis 2017    Osteoporosis 2017    Obesity 2017    Cataract 2017    Glaucoma 2017    Tremor 10/18/2012    Hypothyroidism 2012    Dyslipidemia 2012    Migraine headache 2012    Esophageal reflux 2012    Severe asthma with acute exacerbation 2012    Hypertension 2012      LOS (days): 3  Geometric Mean LOS (GMLOS) (days): 3.6  Days to GMLOS:0.6     OBJECTIVE:  Risk of Unplanned Readmission Score: 18.5         Current admission status: Inpatient   Preferred Pharmacy:   [id number] - [place] - [address]  [address]  [place] [address]  Phone:  [phone] Fax: [phone]    Primary Care Provider: Any Vargas DO    Primary Insurance: MEDICARE  Secondary Insurance:     DISCHARGE DETAILS:     CM received approval to deliver nebulizer to pt from [facility]. CM delivered nebulizer to pt in her room and her dtr signed the delivery ticket. Pt made aware of Copay of $11.68. Delivery ticket placed in yellow folder and [facility] updated.

## 2024-03-05 NOTE — ASSESSMENT & PLAN NOTE
Home regimen: Synthroid 88mcg    Lab Results   Component Value Date    [id number] 0.424 (L) 03/02/2024    FREET4 1.16 (H) 03/02/2024      Outpatient PCP follow-up

## 2024-03-06 ENCOUNTER — TRANSITIONAL CARE MANAGEMENT (OUTPATIENT)
Dept: FAMILY MEDICINE CLINIC | Facility: HOSPITAL | Age: 79
End: 2024-03-06

## 2024-03-06 ENCOUNTER — TELEPHONE (OUTPATIENT)
Dept: FAMILY MEDICINE CLINIC | Facility: HOSPITAL | Age: 79
End: 2024-03-06

## 2024-03-06 DIAGNOSIS — Z71.89 COMPLEX CARE COORDINATION: Primary | ICD-10-CM

## 2024-03-07 ENCOUNTER — PATIENT OUTREACH (OUTPATIENT)
Dept: FAMILY MEDICINE CLINIC | Facility: HOSPITAL | Age: 79
End: 2024-03-07

## 2024-03-07 ENCOUNTER — APPOINTMENT (EMERGENCY)
Dept: RADIOLOGY | Facility: HOSPITAL | Age: 79
End: 2024-03-07
Payer: COMMERCIAL

## 2024-03-07 ENCOUNTER — HOSPITAL ENCOUNTER (EMERGENCY)
Facility: HOSPITAL | Age: 79
Discharge: HOME | End: 2024-03-07
Attending: EMERGENCY MEDICINE
Payer: COMMERCIAL

## 2024-03-07 VITALS
HEART RATE: 68 BPM | DIASTOLIC BLOOD PRESSURE: 80 MMHG | SYSTOLIC BLOOD PRESSURE: 165 MMHG | RESPIRATION RATE: 17 BRPM | OXYGEN SATURATION: 99 % | TEMPERATURE: 98 F

## 2024-03-07 DIAGNOSIS — R89.9 ABNORMAL LABORATORY TEST RESULT: Primary | ICD-10-CM

## 2024-03-07 LAB
ALBUMIN SERPL BCP-MCNC: 4.1 G/DL (ref 3.5–5)
ALP SERPL-CCNC: 64 U/L (ref 34–104)
ALT SERPL W P-5'-P-CCNC: 32 U/L (ref 7–52)
ANION GAP SERPL CALCULATED.3IONS-SCNC: 9 MMOL/L
ANISOCYTOSIS BLD QL SMEAR: PRESENT
AST SERPL W P-5'-P-CCNC: 23 U/L (ref 13–39)
BACTERIA UR QL AUTO: NORMAL /HPF
BASOPHILS # BLD MANUAL: 0 THOUSAND/UL (ref 0–0.1)
BASOPHILS NFR MAR MANUAL: 0 % (ref 0–1)
BILIRUB SERPL-MCNC: 0.6 MG/DL (ref 0.2–1)
BILIRUB UR QL STRIP: NEGATIVE
BUN SERPL-MCNC: 25 MG/DL (ref 5–25)
CALCIUM SERPL-MCNC: 10.6 MG/DL (ref 8.4–10.2)
CHLORIDE SERPL-SCNC: 105 MMOL/L (ref 96–108)
CLARITY UR: CLEAR
CO2 SERPL-SCNC: 26 MMOL/L (ref 21–32)
COLOR UR: ABNORMAL
CREAT SERPL-MCNC: 1.27 MG/DL (ref 0.6–1.3)
EOSINOPHIL # BLD MANUAL: 0.12 THOUSAND/UL (ref 0–0.4)
EOSINOPHIL NFR BLD MANUAL: 1 % (ref 0–6)
ERYTHROCYTE [DISTWIDTH] IN BLOOD BY AUTOMATED COUNT: 14.4 % (ref 11.6–15.1)
GFR SERPL CREATININE-BSD FRML MDRD: 39 ML/MIN/1.73SQ M
GLUCOSE SERPL-MCNC: 113 MG/DL (ref 65–140)
GLUCOSE UR STRIP-MCNC: NEGATIVE MG/DL
HCT VFR BLD AUTO: 38.7 % (ref 34.8–46.1)
HGB BLD-MCNC: 12.6 G/DL (ref 11.5–15.4)
HGB UR QL STRIP.AUTO: NEGATIVE
KETONES UR STRIP-MCNC: NEGATIVE MG/DL
LEUKOCYTE ESTERASE UR QL STRIP: ABNORMAL
LYMPHOCYTES # BLD AUTO: 1.27 THOUSAND/UL (ref 0.6–4.47)
LYMPHOCYTES # BLD AUTO: 11 % (ref 14–44)
MCH RBC QN AUTO: 30.2 PG (ref 26.8–34.3)
MCHC RBC AUTO-ENTMCNC: 32.6 G/DL (ref 31.4–37.4)
MCV RBC AUTO: 93 FL (ref 82–98)
MONOCYTES # BLD AUTO: 0.23 THOUSAND/UL (ref 0–1.22)
MONOCYTES NFR BLD: 2 % (ref 4–12)
NEUTROPHILS # BLD MANUAL: 9.96 THOUSAND/UL (ref 1.85–7.62)
NEUTS SEG NFR BLD AUTO: 86 % (ref 43–75)
NITRITE UR QL STRIP: NEGATIVE
NON-SQ EPI CELLS URNS QL MICRO: NORMAL /HPF
PH UR STRIP.AUTO: 5.5 [PH]
PLATELET # BLD AUTO: 235 THOUSANDS/UL (ref 149–390)
PLATELET BLD QL SMEAR: ADEQUATE
PMV BLD AUTO: 11.5 FL (ref 8.9–12.7)
POTASSIUM SERPL-SCNC: 4 MMOL/L (ref 3.5–5.3)
PROT SERPL-MCNC: 7 G/DL (ref 6.4–8.4)
PROT UR STRIP-MCNC: NEGATIVE MG/DL
RBC # BLD AUTO: 4.17 MILLION/UL (ref 3.81–5.12)
RBC #/AREA URNS AUTO: NORMAL /HPF
RBC MORPH BLD: PRESENT
SODIUM SERPL-SCNC: 140 MMOL/L (ref 135–147)
SP GR UR STRIP.AUTO: <1.005 (ref 1–1.03)
UROBILINOGEN UR STRIP-ACNC: <2 MG/DL
WBC # BLD AUTO: 11.58 THOUSAND/UL (ref 4.31–10.16)
WBC #/AREA URNS AUTO: NORMAL /HPF

## 2024-03-07 PROCEDURE — 87040 BLOOD CULTURE FOR BACTERIA: CPT

## 2024-03-07 PROCEDURE — 36415 COLL VENOUS BLD VENIPUNCTURE: CPT

## 2024-03-07 PROCEDURE — 85007 BL SMEAR W/DIFF WBC COUNT: CPT

## 2024-03-07 PROCEDURE — 99284 EMERGENCY DEPT VISIT MOD MDM: CPT

## 2024-03-07 PROCEDURE — 81001 URINALYSIS AUTO W/SCOPE: CPT

## 2024-03-07 PROCEDURE — 80053 COMPREHEN METABOLIC PANEL: CPT

## 2024-03-07 PROCEDURE — 71045 X-RAY EXAM CHEST 1 VIEW: CPT

## 2024-03-07 PROCEDURE — 85027 COMPLETE CBC AUTOMATED: CPT

## 2024-03-07 RX ORDER — CEFEPIME HYDROCHLORIDE 2 G/50ML
2000 INJECTION, SOLUTION INTRAVENOUS ONCE
Status: DISCONTINUED | OUTPATIENT
Start: 2024-03-07 | End: 2024-03-07

## 2024-03-07 NOTE — PROGRESS NOTES
Outpatient Care Management Note:    New [facility] referral received. Patient was hospitalized from 3/2-3/5/24 with severe asthma with acute exacerbation. She complained of chest tightness, cough and wheezing for 2 days that did not improve with increased use of her rescue inhaler. She did respond to IV steroids and nebulizer treatments. She will be discharged on prednisone taper and nebulizer treatments. She is to follow up with pulmonary, GI and her PCP.     CM called Cindy and spoke with her daughter, Dara. She is listed on patient's communication form. She states that her mother is still getting episodes of shortness of breath, but is improving. She is using her nebulizer 2 times per day.  CM reviewed that it is ordered 3 times per day as needed and recommended she increase use.      Med review completed. She is not taking the tizanidine, miralax or the gabapentin. She has the Breo inhaler and knows to rinse her mouth. She is taking her prednisone taper and denies any questions.     We reviewed all upcoming appts.  Dara was unaware of pulmonary appt scheduled for 3/22.  Cindy is planning on going to [place] on 3/17.  They will call pulmonary and attempt to move the appt sooner. They will talk with Dr Vargas about getting all needed refills.     Dara will monitor her mother closely. If she is having increased shortness of breath they will call pulmonary. If her respiratory status declines they will take her back to ER.     CHARIS gave Dara my contact information. She knows that my phone goes through my computer and I do not receive calls after hours. They will call the PCP office for any immediate questions or concerns.    Mohs Method Verbiage: An incision at a 45 degree angle following the standard Mohs approach was done and the specimen was harvested as a microscopic controlled layer.

## 2024-03-07 NOTE — ED PROVIDER NOTES
"History  Chief Complaint   Patient presents with    Abnormal Lab     Patient presents to the ED with c/o \"bacteria in her blood and she needs to be admitted\" states had a recent hospital admission and was d/c Tuesday but received a call today that she needed to come back      The patient is an 78-year-old female with PMH of HTN, HLD, and GERD who was recently hospitalized with RSV infection and acute asthma exacerbation.  Blood culture day 4 grew back gram-negative rods for which she was called back.  The patient and her daughter note that she overall is been doing well since discharge 2 days ago.  No new fevers or chills.  She continues with shortness of breath, however her cough and respiratory symptoms have overall improved.  She has been using nebulizers at home with relief of her shortness of breath.  She has been taking the steroids as directed.  She denies new headaches, sore throat or cough, chest pain, abdominal pain, N/V/D, and urinary complaints.      History provided by:  Patient and significant other   used: No        Prior to Admission Medications   Prescriptions Last Dose Informant Patient Reported? Taking?   Synthroid 88 MCG tablet   No No   Sig: Take 1 tablet by mouth once daily   Timolol Maleate, Once-Daily, 0.5 % SOLN  Child No No   Sig: Apply 1 drop to eye every 12 (twelve) hours   albuterol (PROVENTIL HFA,VENTOLIN HFA) 90 mcg/act inhaler   No No   Sig: INHALE 2 PUFFS BY MOUTH EVERY 6 HOURS AS NEEDED FOR WHEEZING   alendronate (FOSAMAX) 70 mg tablet  Child No No   Sig: Take 1 tablet (70 mg total) by mouth once a week   atorvastatin (LIPITOR) 20 mg tablet  Child No No   Sig: Take 1 tablet by mouth once daily   benzonatate (TESSALON PERLES) 100 mg capsule   No No   Sig: Take 1 capsule (100 mg total) by mouth 3 (three) times a day as needed for cough   bimatoprost (Lumigan) 0.01 % ophthalmic drops  Child No No   Sig: Administer 1 drop to both eyes daily   buPROPion (WELLBUTRIN " XL) 150 mg 24 hr tablet  Child No No   Sig: Take 1 tablet (150 mg total) by mouth every morning With 300 mg to equal 450 mg daily   buPROPion (WELLBUTRIN XL) 300 mg 24 hr tablet  Child No No   Sig: Take 1 tablet by mouth once daily   docusate sodium (COLACE) 100 mg capsule   No No   Sig: Take 1 capsule (100 mg total) by mouth 2 (two) times a day   fluticasone-vilanterol (Breo Ellipta) 200-25 mcg/actuation inhaler   No No   Sig: Inhale 1 puff daily Rinse mouth after use.   gabapentin (NEURONTIN) 800 mg tablet  Child No No   Sig: TAKE 1 TABLET BY MOUTH ONCE DAILY IN THE MORNING AND 1 TABLET AT BEDTIME   Patient not taking: Reported on 3/7/2024   ipratropium (ATROVENT) 0.02 % nebulizer solution   No No   Sig: Take 2.5 mL (0.5 mg total) by nebulization 3 (three) times a day as needed for wheezing or shortness of breath   levalbuterol (XOPENEX) 1.25 mg/3 mL nebulizer solution   No No   Sig: Take 3 mL (1.25 mg total) by nebulization 3 (three) times a day as needed for wheezing or shortness of breath   losartan (COZAAR) 100 MG tablet  Child No No   Sig: Take 1 tablet by mouth once daily   montelukast (SINGULAIR) 10 mg tablet   No No   Sig: TAKE 1 TABLET BY MOUTH ONCE DAILY AT BEDTIME   omeprazole (PriLOSEC) 40 MG capsule   No No   Sig: Take 1 capsule (40 mg total) by mouth 2 (two) times a day   polyethylene glycol (GLYCOLAX) 17 GM/SCOOP powder   No No   Sig: Bowel Prep: One (1) 238-gram container of Miralax (polyethylene glycol 3350) as directed   Patient not taking: Reported on 3/7/2024   predniSONE 20 mg tablet   No No   Sig: Take 2 tablets (40 mg total) by mouth daily for 3 days, THEN 1.5 tablets (30 mg total) daily for 3 days, THEN 1 tablet (20 mg total) daily for 3 days, THEN 0.5 tablets (10 mg total) daily for 3 days.   sertraline (ZOLOFT) 50 mg tablet   No No   Sig: Take 1 tablet by mouth once daily   tiZANidine (ZANAFLEX) 2 mg tablet  Child No No   Sig: Take 1 tablet (2 mg total) by mouth every 8 (eight) hours as  needed for muscle spasms   Patient not taking: Reported on 3/7/2024   traZODone (DESYREL) 50 mg tablet   No No   Sig: TAKE 1 TABLET BY MOUTH ONCE DAILY AT BEDTIME   vitamin B-12 (VITAMIN B-12) 1,000 mcg tablet  Child No No   Sig: Take 1 tablet (1,000 mcg total) by mouth daily      Facility-Administered Medications: None       Past Medical History:   Diagnosis Date    Asthma     Chronic pain disorder     Back    Coronary artery disease     Depression     Disease of thyroid gland     Dyslipidemia     Esophageal reflux     Frequent headaches     stress related    GERD (gastroesophageal reflux disease)     Headache(784.0)     Heart murmur     History of stomach ulcers     Hypercalcemia     Hyperlipidemia     Hypertension     Osteopenia     Tremor        Past Surgical History:   Procedure Laterality Date    BACK SURGERY      lower back    BILATERAL OOPHORECTOMY      BREAST BIOPSY Right     long ago, benign     SECTION      CHOLECYSTECTOMY      COLONOSCOPY      HYSTERECTOMY      age 36 per MRS    JOINT REPLACEMENT Left     knee    OOPHORECTOMY Bilateral     age 36 per MRS    MN WEBB IMPLTJ NSTIM ELTRDS PLATE/PADDLE EDRL Left 2019    Procedure: LAMINECTOMY THORACIC FOR INSERTION DORSAL COLUMN SPINAL CORD STIMULATOR (DCS) W IMPLANTABLE PULSE GENERATOR, LEFT GLUTE;  Surgeon: Lg Mccallum MD;  Location: [facility];  Service: Neurosurgery    MN REVJ/RMVL IMPL SPI NPG/RCVR DTCH CONNJ ELTRD RA Left 2022    Procedure: Reopening of thoracic and left buttock incisions for removal of spinal cord stimulator system;  Surgeon: Siva Goss MD;  Location: [facility];  Service: Neurosurgery    ROTATOR CUFF REPAIR Right     SPINAL STIMULATOR PLACEMENT N/A 2019    Procedure: REVISION SPINAL CORD STIMULATOR PADDLE ELECTRODE, REPLACEMENT WITH PERCUTANEOUS ELECTRODES OR SMALLER PADDLE;  Surgeon: Lg Mccallum MD;  Location: [facility];  Service: Neurosurgery       Family History   Problem Relation Age of Onset    No  Known Problems Mother     Glaucoma Father     No Known Problems Sister     No Known Problems Sister     No Known Problems Sister     Depression Sister     No Known Problems Sister     No Known Problems Maternal Grandmother     No Known Problems Maternal Grandfather     No Known Problems Paternal Grandmother     No Known Problems Paternal Grandfather     No Known Problems Daughter     No Known Problems Daughter     No Known Problems Daughter     Hypertension Family     Stroke Family     Heart disease Family     Thyroid disease Family     Colon cancer Neg Hx     Colon polyps Neg Hx      I have reviewed and agree with the history as documented.    E-Cigarette/Vaping    E-Cigarette Use Never User      E-Cigarette/Vaping Substances    Nicotine No     THC No     CBD No     Flavoring No     Other No     Unknown No      Social History     Tobacco Use    Smoking status: Former     Current packs/day: 0.00     Types: Cigarettes     Start date:      Quit date: 1991     Years since quittin.8    Smokeless tobacco: Never   Vaping Use    Vaping status: Never Used   Substance Use Topics    Alcohol use: Not Currently    Drug use: Never       Review of Systems   Constitutional:  Negative for chills and fever.   Respiratory:  Positive for shortness of breath (Response to nebulizers at home, improved since discharge) and wheezing. Negative for cough.    Cardiovascular:  Negative for chest pain, palpitations and leg swelling.   Gastrointestinal:  Negative for abdominal pain, diarrhea, nausea and vomiting.   All other systems reviewed and are negative.      Physical Exam  Physical Exam  Vitals and nursing note reviewed.   Constitutional:       General: She is awake. She is not in acute distress.     Appearance: Normal appearance. She is well-developed. She is not ill-appearing, toxic-appearing or diaphoretic.      Comments: Pleasant, well-appearing   HENT:      Head: Normocephalic and atraumatic.      Jaw: There is normal  jaw occlusion.      Nose: Nose normal.      Mouth/Throat:      Lips: Pink. No lesions.      Mouth: Mucous membranes are moist.      Pharynx: Oropharynx is clear. Uvula midline.   Eyes:      General: Lids are normal. Vision grossly intact. Gaze aligned appropriately.      Extraocular Movements: Extraocular movements intact.      Conjunctiva/sclera: Conjunctivae normal.   Neck:      Trachea: Phonation normal. No abnormal tracheal secretions.   Cardiovascular:      Rate and Rhythm: Normal rate.      Pulses:           Radial pulses are 2+ on the right side and 2+ on the left side.        Dorsalis pedis pulses are 2+ on the right side and 2+ on the left side.        Posterior tibial pulses are 2+ on the right side and 2+ on the left side.      Heart sounds: Normal heart sounds, S1 normal and S2 normal. No murmur heard.  Pulmonary:      Effort: Pulmonary effort is normal. No tachypnea or respiratory distress.      Breath sounds: Normal breath sounds and air entry. No stridor, decreased air movement or transmitted upper airway sounds. No decreased breath sounds.   Abdominal:      Palpations: Abdomen is soft.      Tenderness: There is no abdominal tenderness. There is no guarding or rebound.   Musculoskeletal:         General: Normal range of motion.      Cervical back: Neck supple.      Right lower leg: No edema.      Left lower leg: No edema.      Comments: MAY, 5/5 strength throughout, sensation intact, no focal joint swelling. Ambulatory with steady gait.   Skin:     General: Skin is warm and dry.      Capillary Refill: Capillary refill takes less than 2 seconds.      Findings: No rash or wound.   Neurological:      General: No focal deficit present.      Mental Status: She is alert and oriented to person, place, and time. Mental status is at baseline.   Psychiatric:         Behavior: Behavior is cooperative.         Vital Signs  ED Triage Vitals   Temperature Pulse Respirations Blood Pressure SpO2   03/07/24 1230  03/07/24 1206 03/07/24 1206 03/07/24 1206 03/07/24 1206   98 °F (36.7 °C) 77 18 (!) 176/89 94 %      Temp Source Heart Rate Source Patient Position - Orthostatic VS BP Location FiO2 (%)   03/07/24 1230 03/07/24 1206 03/07/24 1206 03/07/24 1206 --   Oral Monitor Sitting Right arm       Pain Score       03/07/24 1206       No Pain           Vitals:    03/07/24 1206 03/07/24 1230 03/07/24 1400   BP: (!) 176/89 170/85 165/80   Pulse: 77 71 68   Patient Position - Orthostatic VS: Sitting Sitting Sitting         Visual Acuity      ED Medications  Medications - No data to display    Diagnostic Studies  Results Reviewed       Procedure Component Value Units Date/Time    RBC Morphology Reflex Test [[id number]] Collected: 03/07/24 1249    Lab Status: Final result Specimen: Blood from Arm, Left Updated: 03/07/24 1401    Urine Microscopic [[id number]]  (Normal) Collected: 03/07/24 1312    Lab Status: Final result Specimen: Urine, Clean Catch Updated: 03/07/24 1355     RBC, UA None Seen /hpf      WBC, UA 0-1 /hpf      Epithelial Cells Occasional /hpf      Bacteria, UA None Seen /hpf     UA w Reflex to Microscopic w Reflex to Culture [[id number]]  (Abnormal) Collected: 03/07/24 1312    Lab Status: Final result Specimen: Urine, Clean Catch Updated: 03/07/24 1349     Color, UA Light Yellow     Clarity, UA Clear     Specific Gravity, UA <1.005     pH, UA 5.5     Leukocytes, UA Trace     Nitrite, UA Negative     Protein, UA Negative mg/dl      Glucose, UA Negative mg/dl      Ketones, UA Negative mg/dl      Urobilinogen, UA <2.0 mg/dl      Bilirubin, UA Negative     Occult Blood, UA Negative    CBC and differential [[id number]]  (Abnormal) Collected: 03/07/24 1249    Lab Status: Final result Specimen: Blood from Arm, Left Updated: 03/07/24 1325     WBC 11.58 Thousand/uL      RBC 4.17 Million/uL      Hemoglobin 12.6 g/dL      Hematocrit 38.7 %      MCV 93 fL      MCH 30.2 pg      MCHC 32.6 g/dL      RDW 14.4 %      MPV 11.5 fL       Platelets 235 Thousands/uL     Manual Differential(PHLEBS Do Not Order) [[id number]]  (Abnormal) Collected: 03/07/24 1249    Lab Status: Final result Specimen: Blood from Arm, Left Updated: 03/07/24 1325     Segmented % 86 %      Lymphocytes % 11 %      Monocytes % 2 %      Eosinophils, % 1 %      Basophils % 0 %      Absolute Neutrophils 9.96 Thousand/uL      Lymphocytes Absolute 1.27 Thousand/uL      Monocytes Absolute 0.23 Thousand/uL      Eosinophils Absolute 0.12 Thousand/uL      Basophils Absolute 0.00 Thousand/uL      Total Counted --     RBC Morphology Present     Platelet Estimate Adequate     Anisocytosis Present    Comprehensive metabolic panel [[id number]]  (Abnormal) Collected: 03/07/24 1249    Lab Status: Final result Specimen: Blood from Arm, Left Updated: 03/07/24 1317     Sodium 140 mmol/L      Potassium 4.0 mmol/L      Chloride 105 mmol/L      CO2 26 mmol/L      ANION GAP 9 mmol/L      BUN 25 mg/dL      Creatinine 1.27 mg/dL      Glucose 113 mg/dL      Calcium 10.6 mg/dL      AST 23 U/L      ALT 32 U/L      Alkaline Phosphatase 64 U/L      Total Protein 7.0 g/dL      Albumin 4.1 g/dL      Total Bilirubin 0.60 mg/dL      eGFR 39 ml/min/1.73sq m     Narrative:      National Kid[employer] Foundation guidelines for Chronic Kidney Disease (CKD):     Stage 1 with normal or high GFR (GFR > 90 mL/min/1.73 square meters)    Stage 2 Mild CKD (GFR = 60-89 mL/min/1.73 square meters)    Stage 3A Moderate CKD (GFR = 45-59 mL/min/1.73 square meters)    Stage 3B Moderate CKD (GFR = 30-44 mL/min/1.73 square meters)    Stage 4 Severe CKD (GFR = 15-29 mL/min/1.73 square meters)    Stage 5 End Stage CKD (GFR <15 mL/min/1.73 square meters)  Note: GFR calculation is accurate only with a steady state creatinine    Blood culture #2 [[id number]] Collected: 03/07/24 1249    Lab Status: In process Specimen: Blood from Arm, Right Updated: 03/07/24 1255    Blood culture #1 [[id number]] Collected: 03/07/24 1249    Lab Status: In  process Specimen: Blood from Arm, Left Updated: 03/07/24 1255                   XR chest portable   ED Interpretation by CRISTINA Lombardi (03/07 1320)   No acute cardiopulmonary disease identified by me.      Final Result by Jose Manuel Carrero MD (03/07 1341)      No acute cardiopulmonary disease.            Workstation performed: [id number]                    Procedures  Procedures         ED Course  ED Course as of 03/07/24 1715   Thu Mar 07, 2024   1246 Discussed case with Duarte who advises for discussion with infectious disease.   1246 Infectious disease Dr. Peralta message regarding patient case, he is reviewing now   1250 Discussed case with Dr. Varela from infectious disease, he does not believe this is a true bacteremia.  He believes this is either a contaminant and/or organism that we would not treat with antibiotics.  He advises for repeat labs, if stable, patient may be discharged home   1319 Comprehensive metabolic panel(!)  Stable when compared to prior   1350 WBC(!): 11.58  Mild leukocytosis, no gross anemia   1350 UA w Reflex to Microscopic w Reflex to Culture(!)  Negative for infection   1351 XR chest portable  IMPRESSION:     No acute cardiopulmonary disease.     1351 Given no new fevers or chills, patient doing well at home, appears nontoxic, will discharged home with new set of blood cultures pending.  Patient and her daughter given strict return precautions and they demonstrate understanding by teach back method.                               SBIRT 20yo+      Flowsheet Row Most Recent Value   Initial Alcohol Screen: US AUDIT-C     1. How often do you have a drink containing alcohol? 0 Filed at: 03/07/2024 1207   2. How many drinks containing alcohol do you have on a typical day you are drinking?  0 Filed at: 03/07/2024 1207   3a. Male UNDER 65: How often do you have five or more drinks on one occasion? 0 Filed at: 03/07/2024 1207   3b. FEMALE Any Age, or MALE 65+: How often do you have 4 or  more drinks on one occassion? 0 Filed at: 03/07/2024 1207   Audit-C Score 0 Filed at: 03/07/2024 1207   YULISSA: How many times in the past year have you...    Used an illegal drug or used a prescription medication for non-medical reasons? Never Filed at: 03/07/2024 1207                      Medical Decision Making  DDx including but not limited to: metabolic abnormality, lab error, contaminated blood culture, bacteremia    Discussed case with slim as well as on-call infectious disease.  Patient appears well and is without signs or symptoms of infection.  No fever here, mild leukocytosis which may be related to recent steroid dosing, chest x-ray without pneumonia.  UA without evidence of infection.  At this time, suspect contaminated blood culture.  New set obtained and will be sent.  No indication for antibiotics at this time.    Discussed with patient and her daughter who feel good with going home.  They have no further questions or concerns at this time.  They are aware that these new blood cultures will pend for 5 days and will call if any need for return to the ER.    Amount and/or Complexity of Data Reviewed  Labs: ordered. Decision-making details documented in ED Course.  Radiology: ordered and independent interpretation performed. Decision-making details documented in ED Course.    Risk  Prescription drug management.             Disposition  Final diagnoses:   Abnormal laboratory test result     Time reflects when diagnosis was documented in both MDM as applicable and the Disposition within this note       Time User Action Codes Description Comment    3/7/2024  2:04 PM Ericka Stahl Add [R89.9] Abnormal laboratory test result           ED Disposition       ED Disposition   Discharge    Condition   Stable    Date/Time   Thu Mar 7, 2024 1403    Comment   Cindy Cruz discharge to home/self care.                   Follow-up Information       Follow up With Specialties Details Why Contact Info Additional  Information    Any Vargas,  Internal Medicine, Family Medicine Schedule an appointment as soon as possible for a visit on 3/11/2024  [address] [place] [id number]  [phone]        [facility] Emergency Medicine Go to  If symptoms worsen [address] [facility] [address] [phone] [facility], [address] [place], [place] [address]            Discharge Medication List as of 3/7/2024  2:04 PM        CONTINUE these medications which have NOT CHANGED    Details   albuterol (PROVENTIL HFA,VENTOLIN HFA) 90 mcg/act inhaler INHALE 2 PUFFS BY MOUTH EVERY 6 HOURS AS NEEDED FOR WHEEZING, Starting Sun 2/11/2024, Normal      alendronate (FOSAMAX) 70 mg tablet Take 1 tablet (70 mg total) by mouth once a week, Starting Thu 8/31/2023, Normal      atorvastatin (LIPITOR) 20 mg tablet Take 1 tablet by mouth once daily, Normal      benzonatate (TESSALON PERLES) 100 mg capsule Take 1 capsule (100 mg total) by mouth 3 (three) times a day as needed for cough, Starting Tue 3/5/2024, Normal      bimatoprost (Lumigan) 0.01 % ophthalmic drops Administer 1 drop to both eyes daily, Starting Tue 8/8/2023, Normal      !! buPROPion (WELLBUTRIN XL) 150 mg 24 hr tablet Take 1 tablet (150 mg total) by mouth every morning With 300 mg to equal 450 mg daily, Starting Thu 8/31/2023, Normal      !! buPROPion (WELLBUTRIN XL) 300 mg 24 hr tablet Take 1 tablet by mouth once daily, Starting Mon 11/27/2023, Normal      docusate sodium (COLACE) 100 mg capsule Take 1 capsule (100 mg total) by mouth 2 (two) times a day, Starting Wed 12/6/2023, Normal      fluticasone-vilanterol (Breo Ellipta) 200-25 mcg/actuation inhaler Inhale 1 puff daily Rinse mouth after use., Starting Tue 3/5/2024, Until Thu 4/4/2024, Normal      gabapentin (NEURONTIN) 800 mg tablet TAKE 1 TABLET BY MOUTH ONCE DAILY IN THE MORNING AND 1  TABLET AT BEDTIME, Normal      ipratropium (ATROVENT) 0.02 % nebulizer solution Take 2.5 mL (0.5 mg total) by nebulization 3 (three) times a day as needed for wheezing or shortness of breath, Starting Tue 3/5/2024, Normal      levalbuterol (XOPENEX) 1.25 mg/3 mL nebulizer solution Take 3 mL (1.25 mg total) by nebulization 3 (three) times a day as needed for wheezing or shortness of breath, Starting Tue 3/5/2024, Normal      losartan (COZAAR) 100 MG tablet Take 1 tablet by mouth once daily, Starting Tue 11/28/2023, Normal      montelukast (SINGULAIR) 10 mg tablet TAKE 1 TABLET BY MOUTH ONCE DAILY AT BEDTIME, Starting Tue 3/5/2024, Normal      omeprazole (PriLOSEC) 40 MG capsule Take 1 capsule (40 mg total) by mouth 2 (two) times a day, Starting Thu 12/28/2023, No Print      polyethylene glycol (GLYCOLAX) 17 GM/SCOOP powder Bowel Prep: One (1) 238-gram container of Miralax (polyethylene glycol 3350) as directed, Normal      predniSONE 20 mg tablet Multiple Dosages:Starting Wed 3/6/2024, Until Fri 3/8/2024, THEN Starting Sat 3/9/2024, Until Mon 3/11/2024, THEN Starting Tue 3/12/2024, Until Thu 3/14/2024, THEN Starting Fri 3/15/2024, Until Sun 3/17/2024Take 2 tablets (40 mg total) by mouth suzanne y for 3 days, THEN 1.5 tablets (30 mg total) daily for 3 days, THEN 1 tablet (20 mg total) daily for 3 days, THEN 0.5 tablets (10 mg total) daily for 3 days., Normal      sertraline (ZOLOFT) 50 mg tablet Take 1 tablet by mouth once daily, Starting Wed 2/21/2024, Normal      Synthroid 88 MCG tablet Take 1 tablet by mouth once daily, Starting Tue 12/12/2023, Normal      Timolol Maleate, Once-Daily, 0.5 % SOLN Apply 1 drop to eye every 12 (twelve) hours, Starting Tue 8/8/2023, Normal      tiZANidine (ZANAFLEX) 2 mg tablet Take 1 tablet (2 mg total) by mouth every 8 (eight) hours as needed for muscle spasms, Starting Thu 8/31/2023, Normal      traZODone (DESYREL) 50 mg tablet TAKE 1 TABLET BY MOUTH ONCE DAILY AT BEDTIME, Starting Fri  1/26/2024, Normal      vitamin B-12 (VITAMIN B-12) 1,000 mcg tablet Take 1 tablet (1,000 mcg total) by mouth daily, Starting Mon 7/17/2023, Normal       !! - Potential duplicate medications found. Please discuss with provider.          No discharge procedures on file.    PDMP Review         Value Time User    PDMP Reviewed  Yes 3/5/2024 10:12 AM Lina Londono MD            ED Provider  Electronically Signed by             CRISTINA Lombardi  03/07/24 [id number]

## 2024-03-07 NOTE — RESULT ENCOUNTER NOTE
Discussed with patient's daughter Dara.  She notes her mom is overall been doing well continues to be slightly weak.  I instructed her to bring her mom back for repeat blood work and admission for need of IV antibiotics given gram-negative rods from blood culture #2.  She is in agreement with plan and will bring her mom in.

## 2024-03-08 ENCOUNTER — PATIENT OUTREACH (OUTPATIENT)
Dept: FAMILY MEDICINE CLINIC | Facility: HOSPITAL | Age: 79
End: 2024-03-08

## 2024-03-08 ENCOUNTER — OFFICE VISIT (OUTPATIENT)
Age: 79
End: 2024-03-08
Payer: COMMERCIAL

## 2024-03-08 VITALS
HEART RATE: 80 BPM | OXYGEN SATURATION: 97 % | WEIGHT: 150.3 LBS | TEMPERATURE: 96.9 F | SYSTOLIC BLOOD PRESSURE: 134 MMHG | DIASTOLIC BLOOD PRESSURE: 70 MMHG | BODY MASS INDEX: 32.52 KG/M2

## 2024-03-08 DIAGNOSIS — B33.8 RSV INFECTION: ICD-10-CM

## 2024-03-08 DIAGNOSIS — J45.909 UNCOMPLICATED ASTHMA, UNSPECIFIED ASTHMA SEVERITY, UNSPECIFIED WHETHER PERSISTENT: ICD-10-CM

## 2024-03-08 DIAGNOSIS — G47.33 OSA (OBSTRUCTIVE SLEEP APNEA): ICD-10-CM

## 2024-03-08 DIAGNOSIS — J45.901 ACUTE ASTHMA EXACERBATION: ICD-10-CM

## 2024-03-08 DIAGNOSIS — J45.51 SEVERE PERSISTENT ASTHMA WITH ACUTE EXACERBATION: Primary | ICD-10-CM

## 2024-03-08 DIAGNOSIS — J45.20 MILD INTERMITTENT ASTHMA WITHOUT COMPLICATION: ICD-10-CM

## 2024-03-08 LAB — BACTERIA BLD CULT: NORMAL

## 2024-03-08 PROCEDURE — 99214 OFFICE O/P EST MOD 30 MIN: CPT | Performed by: NURSE PRACTITIONER

## 2024-03-08 RX ORDER — ALBUTEROL SULFATE 90 UG/1
2 AEROSOL, METERED RESPIRATORY (INHALATION) EVERY 6 HOURS PRN
Qty: 18 G | Refills: 3 | Status: SHIPPED | OUTPATIENT
Start: 2024-03-08

## 2024-03-08 RX ORDER — BENZONATATE 100 MG/1
100 CAPSULE ORAL 3 TIMES DAILY PRN
Qty: 60 CAPSULE | Refills: 1 | Status: SHIPPED | OUTPATIENT
Start: 2024-03-08

## 2024-03-08 RX ORDER — DEXTROMETHORPHAN HYDROBROMIDE AND PROMETHAZINE HYDROCHLORIDE 15; 6.25 MG/5ML; MG/5ML
5 SYRUP ORAL 4 TIMES DAILY PRN
Qty: 250 ML | Refills: 3 | Status: SHIPPED | OUTPATIENT
Start: 2024-03-08

## 2024-03-08 RX ORDER — FLUTICASONE FUROATE AND VILANTEROL 200; 25 UG/1; UG/1
1 POWDER RESPIRATORY (INHALATION) DAILY
Qty: 60 BLISTER | Refills: 2 | Status: SHIPPED | OUTPATIENT
Start: 2024-03-08 | End: 2024-06-06

## 2024-03-08 RX ORDER — MONTELUKAST SODIUM 10 MG/1
10 TABLET ORAL
Qty: 90 TABLET | Refills: 1 | Status: SHIPPED | OUTPATIENT
Start: 2024-03-08

## 2024-03-08 RX ORDER — LEVALBUTEROL INHALATION SOLUTION 1.25 MG/3ML
1.25 SOLUTION RESPIRATORY (INHALATION) EVERY 6 HOURS PRN
Qty: 240 ML | Refills: 1 | Status: SHIPPED | OUTPATIENT
Start: 2024-03-08 | End: 2024-03-12 | Stop reason: SDUPTHER

## 2024-03-08 NOTE — PROGRESS NOTES
Pulmonary Follow-Up Note   Cindy Cruz 78 y.o. female MRN: [id number]  3/8/2024      Assessment/Plan:    Diagnoses and all orders for this visit:    Severe persistent asthma with acute exacerbation  With recent hospitalization due to RSV tracheobronchitis. She has a lingering cough and wheezing on exam today. Not sleeping well due to persistent coughing.  She is still on prednisone 40mg today; tomorrow starts 30mg - continue taper  Continue Breo 1 puff every day  Nebulizer up to 4x per day   Promethazine DM cough syrup, 5mL at bedtime if needed.  Obtain PFT - will call with results  She is going to [place] for 3 months on 3/17/24    SAGE (obstructive sleep apnea)  Untreated; did not have subsequent CPAP study after sleep study in 2022  Pretreatment ADITYA was 15  Patient declines further testing or PAP therapy    Vaccines: Up to date    Return in about 4 months (around 7/8/2024).    All of Cindy's questions were answered prior to leaving the office today.  She is aware to call our office with any further questions or concerns.    History of Present Illness   Reason for Visit: HFU  Chief Complaint: cough  HPI: Cindy Cruz is a 78 y.o. female who presents to the office today for hospital follow up after RSV tracheobronchitis admission 3/2/24-3/5/24.    She has a prior history of asthma that is generally intermittent however prone to exacerbation with viral illness. Other triggers include showering, excess anxiety or stress. Denies allergic or irritant triggers.    She is still coughing, feels it is from the throat and not productive of mucus. She denies wheezing, tight chest. She does have shortness of breath with exertion at her baseline.    She is taking Breo 200 daily - did not have daily inhaler prior to hospitalization.  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  Highest level of education is HS.    Tobacco Use: smoked off and on for 15 years; much less than pack per day  Drug use: denies  Alcohol use: denies    Advance Directives Status: no advance care documents    Functional status: Independent / family assist        Review of Systems   Constitutional:  Positive for fatigue. Negative for chills and fever.   HENT:  Negative for congestion and postnasal drip.    Respiratory:  Positive for cough and shortness of breath. Negative for chest tightness and wheezing.    Cardiovascular:  Negative for chest pain, palpitations and leg swelling.   Gastrointestinal:  Negative for abdominal pain.   Allergic/Immunologic: Negative for environmental allergies.   All other systems reviewed and are negative.      Historical Information   Past Medical History:   Diagnosis Date    Asthma     Chronic pain disorder     Back    Coronary artery disease     Depression     Disease of thyroid gland     Dyslipidemia     Esophageal reflux     Frequent headaches     stress related    GERD (gastroesophageal reflux disease)     Headache(784.0)     Heart murmur     History of stomach ulcers     Hypercalcemia     Hyperlipidemia     Hypertension     Osteopenia     Tremor      Past Surgical History:   Procedure Laterality Date    BACK SURGERY      lower back    BILATERAL OOPHORECTOMY      BREAST BIOPSY Right     long ago, benign     SECTION      CHOLECYSTECTOMY      COLONOSCOPY      HYSTERECTOMY      age 36 per MRS    JOINT REPLACEMENT Left     knee    OOPHORECTOMY Bilateral     age 36 per MRS    RI WEBB IMPLTJ NSTIM ELTRDS PLATE/PADDLE EDRL Left 2019    Procedure: LAMINECTOMY THORACIC FOR INSERTION DORSAL COLUMN SPINAL CORD STIMULATOR (DCS) W IMPLANTABLE PULSE GENERATOR, LEFT GLUTE;  Surgeon: Lg Mccallum MD;  Location:  MAIN OR;  Service: Neurosurgery    RI REVJ/RMVL IMPL SPI NPG/RCVR DTCH CONNJ ELTRD RA Left  2022    Procedure: Reopening of thoracic and left buttock incisions for removal of spinal cord stimulator system;  Surgeon: Siva Goss MD;  Location: [facility];  Service: Neurosurgery    ROTATOR CUFF REPAIR Right     SPINAL STIMULATOR PLACEMENT N/A 2019    Procedure: REVISION SPINAL CORD STIMULATOR PADDLE ELECTRODE, REPLACEMENT WITH PERCUTANEOUS ELECTRODES OR SMALLER PADDLE;  Surgeon: Lg Mccallum MD;  Location: [facility];  Service: Neurosurgery     Family History   Problem Relation Age of Onset    No Known Problems Mother     Glaucoma Father     No Known Problems Sister     No Known Problems Sister     No Known Problems Sister     Depression Sister     No Known Problems Sister     No Known Problems Maternal Grandmother     No Known Problems Maternal Grandfather     No Known Problems Paternal Grandmother     No Known Problems Paternal Grandfather     No Known Problems Daughter     No Known Problems Daughter     No Known Problems Daughter     Hypertension Family     Stroke Family     Heart disease Family     Thyroid disease Family     Colon cancer Neg Hx     Colon polyps Neg Hx      Social History   Social History     Substance and Sexual Activity   Alcohol Use Not Currently     Social History     Substance and Sexual Activity   Drug Use Never     Social History     Tobacco Use   Smoking Status Former    Current packs/day: 0.00    Types: Cigarettes    Start date: 1988    Quit date: 1991    Years since quittin.8   Smokeless Tobacco Never     E-Cigarette/Vaping    E-Cigarette Use Never User      E-Cigarette/Vaping Substances    Nicotine No     THC No     CBD No     Flavoring No     Other No     Unknown No        Meds/Allergies     Current Outpatient Medications:     albuterol (PROVENTIL HFA,VENTOLIN HFA) 90 mcg/act inhaler, Inhale 2 puffs every 6 (six) hours as needed for wheezing, Disp: 18 g, Rfl: 3    alendronate (FOSAMAX) 70 mg tablet, Take 1 tablet (70 mg total) by mouth once a week,  Disp: 12 tablet, Rfl: 3    atorvastatin (LIPITOR) 20 mg tablet, Take 1 tablet by mouth once daily, Disp: 90 tablet, Rfl: 0    benzonatate (TESSALON PERLES) 100 mg capsule, Take 1 capsule (100 mg total) by mouth 3 (three) times a day as needed for cough, Disp: 60 capsule, Rfl: 1    bimatoprost (Lumigan) 0.01 % ophthalmic drops, Administer 1 drop to both eyes daily, Disp: 5 mL, Rfl: 0    buPROPion (WELLBUTRIN XL) 150 mg 24 hr tablet, Take 1 tablet (150 mg total) by mouth every morning With 300 mg to equal 450 mg daily, Disp: 90 tablet, Rfl: 1    buPROPion (WELLBUTRIN XL) 300 mg 24 hr tablet, Take 1 tablet by mouth once daily, Disp: 90 tablet, Rfl: 0    docusate sodium (COLACE) 100 mg capsule, Take 1 capsule (100 mg total) by mouth 2 (two) times a day, Disp: 180 capsule, Rfl: 3    fluticasone-vilanterol (Breo Ellipta) 200-25 mcg/actuation inhaler, Inhale 1 puff daily Rinse mouth after use., Disp: 60 blister, Rfl: 2    ipratropium (ATROVENT) 0.02 % nebulizer solution, Take 2.5 mL (0.5 mg total) by nebulization every 6 (six) hours as needed for wheezing or shortness of breath, Disp: 240 mL, Rfl: 3    levalbuterol (XOPENEX) 1.25 mg/3 mL nebulizer solution, Take 3 mL (1.25 mg total) by nebulization every 6 (six) hours as needed for wheezing or shortness of breath, Disp: 240 mL, Rfl: 1    losartan (COZAAR) 100 MG tablet, Take 1 tablet by mouth once daily, Disp: 90 tablet, Rfl: 2    montelukast (SINGULAIR) 10 mg tablet, Take 1 tablet (10 mg total) by mouth daily at bedtime, Disp: 90 tablet, Rfl: 1    omeprazole (PriLOSEC) 40 MG capsule, Take 1 capsule (40 mg total) by mouth 2 (two) times a day, Disp: , Rfl:     predniSONE 20 mg tablet, Take 2 tablets (40 mg total) by mouth daily for 3 days, THEN 1.5 tablets (30 mg total) daily for 3 days, THEN 1 tablet (20 mg total) daily for 3 days, THEN 0.5 tablets (10 mg total) daily for 3 days., Disp: 15 tablet, Rfl: 0    promethazine-dextromethorphan (PHENERGAN-DM) 6.25-15 mg/5 mL oral  syrup, Take 5 mL by mouth 4 (four) times a day as needed for cough, Disp: 250 mL, Rfl: 3    sertraline (ZOLOFT) 50 mg tablet, Take 1 tablet by mouth once daily, Disp: 30 tablet, Rfl: 0    Synthroid 88 MCG tablet, Take 1 tablet by mouth once daily, Disp: 90 tablet, Rfl: 0    Timolol Maleate, Once-Daily, 0.5 % SOLN, Apply 1 drop to eye every 12 (twelve) hours, Disp: 5 mL, Rfl: 0    traZODone (DESYREL) 50 mg tablet, TAKE 1 TABLET BY MOUTH ONCE DAILY AT BEDTIME, Disp: 30 tablet, Rfl: 0    vitamin B-12 (VITAMIN B-12) 1,000 mcg tablet, Take 1 tablet (1,000 mcg total) by mouth daily, Disp: 30 tablet, Rfl: 5    gabapentin (NEURONTIN) 800 mg tablet, TAKE 1 TABLET BY MOUTH ONCE DAILY IN THE MORNING AND 1 TABLET AT BEDTIME (Patient not taking: Reported on 3/7/2024), Disp: 180 tablet, Rfl: 1    polyethylene glycol (GLYCOLAX) 17 GM/SCOOP powder, Bowel Prep: One (1) 238-gram container of Miralax (polyethylene glycol 3350) as directed (Patient not taking: Reported on 3/7/2024), Disp: 238 g, Rfl: 0    tiZANidine (ZANAFLEX) 2 mg tablet, Take 1 tablet (2 mg total) by mouth every 8 (eight) hours as needed for muscle spasms (Patient not taking: Reported on 3/7/2024), Disp: 30 tablet, Rfl: 0  No current facility-administered medications for this visit.  Allergies   Allergen Reactions    Iodinated Contrast Media Anaphylaxis     Contrast Dye       Vitals: Blood pressure 134/70, pulse 80, temperature (!) 96.9 °F (36.1 °C), temperature source Tympanic, weight 68.2 kg (150 lb 4.8 oz), SpO2 97%, not currently breastfeeding. Body mass index is 32.52 kg/m². Oxygen Therapy  SpO2: 97 %  Oxygen Therapy: None (Room air)      Physical Exam  Vitals reviewed.   Constitutional:       Appearance: Normal appearance.   HENT:      Head: Normocephalic.      Nose: Nose normal.      Mouth/Throat:      Mouth: Mucous membranes are moist.      Pharynx: Oropharynx is clear.   Eyes:      Conjunctiva/sclera: Conjunctivae normal.      Pupils: Pupils are equal,  "round, and reactive to light.   Cardiovascular:      Rate and Rhythm: Normal rate and regular rhythm.      Pulses: Normal pulses.      Heart sounds: Normal heart sounds.   Pulmonary:      Effort: Pulmonary effort is normal.      Breath sounds: Wheezing present.   Abdominal:      General: Abdomen is flat. Bowel sounds are normal.      Palpations: Abdomen is soft.   Musculoskeletal:         General: Normal range of motion.   Skin:     General: Skin is warm and dry.      Capillary Refill: Capillary refill takes less than 2 seconds.   Neurological:      General: No focal deficit present.      Mental Status: She is alert and oriented to person, place, and time. Mental status is at baseline.   Psychiatric:         Mood and Affect: Mood normal.         Behavior: Behavior normal.         Thought Content: Thought content normal.         Judgment: Judgment normal.         Labs:   Lab Results   Component Value Date    WBC 11.58 (H) 03/07/2024    HGB 12.6 03/07/2024    HCT 38.7 03/07/2024    MCV 93 03/07/2024     03/07/2024    EOSPCT 1 03/07/2024    EOSABS 0.12 03/07/2024    SEGSPCT 86 (H) 03/07/2024    MONOPCT 2 (L) 03/07/2024    MONOABS 0.23 03/07/2024    BANDSPCT 3 03/26/2019     Lab Results   Component Value Date    GLUCOSE 149 (H) 03/02/2024    CALCIUM 10.6 (H) 03/07/2024    K 4.0 03/07/2024    CO2 26 03/07/2024     03/07/2024    BUN 25 03/07/2024    CREATININE 1.27 03/07/2024     No results found for: \"IGE\", \"RAST\"  Lab Results   Component Value Date    ALT 32 03/07/2024    AST 23 03/07/2024    ALKPHOS 64 03/07/2024         Imaging and other studies: I have personally reviewed pertinent reports.   and I have personally reviewed pertinent films in PACS  CXR 3/7/24  No acute cardiopulmonary disease.     Pulmonary Results (PFTs, PSG): no PFT on record  2022 sleep study: ADITYA 15; was advised to have CPAP study        CRISTINA Correa  [facility]        Portions of the record " "may have been created with voice recognition software.  Occasional wrong word or \"sound a like\" substitutions may have occurred due to the inherent limitations of voice recognition software.  Read the chart carefully and recognize, using context, where substitutions have occurred or contact the dictating provider.  "

## 2024-03-08 NOTE — RESULT ENCOUNTER NOTE
Patient returned to ER, and attending d/w ID, they feel this is contaminant, repeat BC pending.  Will continue to follow.

## 2024-03-08 NOTE — PROGRESS NOTES
Outpatient Care Management Note:    ADT alert received that patient was in ER on 3/7/24. She received a call about having bacteria in her blood and needed to be admitted. Chart reviewed: previous blood culture showed gram negative rods. Notes state case was discussed with ID who did not feel it was true bacteremia. They repeated labs. Patient denied fevers, chills and was non toxic appearing. New blood cultures were drawn and pending. She was discharged to home.     CHARIS called Cindy and spoke with her daughter, Dara. She states that her mom is feeling fine. She will monitor her mother for any signs of infection: fever, chills, lethargy, confusion, etc. She will call Dr Vargas with any questions or concerns. Her mom was currently at a doctor appt, and she had to hang up.

## 2024-03-10 LAB
ALL TARGETS: NOT DETECTED
BACTERIA BLD CULT: ABNORMAL
BACTERIA BLD CULT: NORMAL
BACTERIA BLD CULT: NORMAL
GRAM STN SPEC: ABNORMAL

## 2024-03-11 ENCOUNTER — TELEPHONE (OUTPATIENT)
Dept: GASTROENTEROLOGY | Facility: CLINIC | Age: 79
End: 2024-03-11

## 2024-03-11 ENCOUNTER — OFFICE VISIT (OUTPATIENT)
Dept: GASTROENTEROLOGY | Facility: CLINIC | Age: 79
End: 2024-03-11
Payer: COMMERCIAL

## 2024-03-11 VITALS
WEIGHT: 150 LBS | SYSTOLIC BLOOD PRESSURE: 138 MMHG | DIASTOLIC BLOOD PRESSURE: 78 MMHG | BODY MASS INDEX: 32.36 KG/M2 | HEIGHT: 57 IN

## 2024-03-11 DIAGNOSIS — R13.10 DYSPHAGIA, UNSPECIFIED TYPE: ICD-10-CM

## 2024-03-11 DIAGNOSIS — K59.04 CHRONIC IDIOPATHIC CONSTIPATION: ICD-10-CM

## 2024-03-11 DIAGNOSIS — K21.9 GASTROESOPHAGEAL REFLUX DISEASE, UNSPECIFIED WHETHER ESOPHAGITIS PRESENT: Primary | ICD-10-CM

## 2024-03-11 DIAGNOSIS — Z12.11 SCREENING FOR COLON CANCER: ICD-10-CM

## 2024-03-11 DIAGNOSIS — Z98.890 HX OF COLONOSCOPY: ICD-10-CM

## 2024-03-11 PROCEDURE — 99214 OFFICE O/P EST MOD 30 MIN: CPT | Performed by: NURSE PRACTITIONER

## 2024-03-11 RX ORDER — POLYETHYLENE GLYCOL 3350 17 G/17G
238 POWDER, FOR SOLUTION ORAL ONCE
Qty: 238 G | Refills: 0 | Status: SHIPPED | OUTPATIENT
Start: 2024-03-11 | End: 2024-03-11

## 2024-03-11 NOTE — PROGRESS NOTES
[facility] - Outpatient Follow-up Note  Cindy Cruz 78 y.o. female MRN: [id number]  Encounter: [id number]    ASSESSMENT AND PLAN:      1. Dysphagia, unspecified type  Patient states that she is having no difficulty swallowing at this time.  Patient did have EGD with dilatation on 12/11/2023.   Reinforced to patient importance of cutting food into smaller portions and chewing food adequately.  Continue omeprazole 40 mg daily or twice daily depending on medication.    - Ambulatory Referral to Gastroenterology    2. Gastroesophageal reflux disease, unspecified whether esophagitis present  Patient currently taking omeprazole 40 mg twice daily and denies any weight through acid reflux symptoms.  Discussed with patient and her daughter weaning to omeprazole 40 mg daily.  If symptoms return she should go back to taking medication twice daily until  follow-up.  GERD vs functional dyspepsia.    3. Chronic idiopathic constipation  Patient with history of chronic idiopathic constipation.  Patient was educated on fiber and fluids with last office visit.  Was also started on Colace 100 mg twice daily.  Patient states she routinely is having normal bowel movements however states she does have periods of constipation with bowel movement every third day.    -MiraLAX 1/2-1 capful if no bowel movement by third day and daily until bowel movement.    4. Hx of colonoscopy  5. Screening for colon cancer  Patient states she had a colonoscopy in 2014 with a 10-year recall however, there is no documentation of colonoscopy and or Cologuard noted in patient's EMR.    - Colonoscopy scheduled at  [facility]  - polyethylene glycol (MiraLax) 17 GM/SCOOP powder; Take 238 g by mouth once for 1 dose Take 238 g my mouth. Use as directed  Dispense: 238 g; Refill: 0      Followup Appointment: 2 weeks after procedure  ______________________________________________________________________      HPI:    78-year-old Hungarian  speaking female accompanied by her daughter to assist with interpretation.  Patient with past medical history of GERD, hypothyroidism, SAGE, asthma, hypertension, CKD 3, depression and obesity presents for follow-up GERD, dysphagia and constipation.  Patient states since last office visit her symptoms are under control with medication and dietary discretion.  She did have EGD with dilatation completed on 12/11/2023 which also noted hiatal hernia, biopsies negative.  On exam, patient denies nausea, vomiting or abdominal pain.  States her acid reflux symptoms are well-controlled with omeprazole 40 mg twice daily and dietary discretion.  Discussed with patient and her daughter that she could attempt to drop down to omeprazole 40 mg daily and if symptoms remain controlled continue however if symptoms return with acid reflux breakthrough she should go back to taking the medication twice daily.  She states her constipation symptoms have been resolved with fiber as well as Colace 100 mg twice daily.  Patient did however states that sometimes she does have bowel movements every third day.  Instructed patient that if no bowel movement by third day she should use MiraLAX as needed daily until bowel movement.  Patient noted she did have a colonoscopy in 2014 in [place] and was given a 10-year recall.  Will schedule patient for colonoscopy upon her return from vacation.  Primarily following up with colonoscopy due to patient not having a documented colonoscopy here in [place] and no official record of history of colonoscopy.  She does deny family history of colon cancer.    Historical Information   Past Medical History:   Diagnosis Date    Asthma     Chronic pain disorder     Back    Coronary artery disease     Depression     Disease of thyroid gland     Dyslipidemia     Esophageal reflux     Frequent headaches     stress related    GERD (gastroesophageal reflux disease)     Headache(784.0)     Heart murmur     History  of stomach ulcers     Hypercalcemia     Hyperlipidemia     Hypertension     Osteopenia     Tremor      Past Surgical History:   Procedure Laterality Date    BACK SURGERY      lower back    BILATERAL OOPHORECTOMY      BREAST BIOPSY Right     long ago, benign     SECTION      CHOLECYSTECTOMY      COLONOSCOPY      HYSTERECTOMY      age 36 per MRS    JOINT REPLACEMENT Left     knee    OOPHORECTOMY Bilateral     age 36 per MRS    RI WEBB IMPLTJ NSTIM ELTRDS PLATE/PADDLE EDRL Left 2019    Procedure: LAMINECTOMY THORACIC FOR INSERTION DORSAL COLUMN SPINAL CORD STIMULATOR (DCS) W IMPLANTABLE PULSE GENERATOR, LEFT GLUTE;  Surgeon: Lg Mccallum MD;  Location: [facility];  Service: Neurosurgery    RI REVJ/RMVL IMPL SPI NPG/RCVR DTCH CONNJ ELTRD RA Left 2022    Procedure: Reopening of thoracic and left buttock incisions for removal of spinal cord stimulator system;  Surgeon: Siva Goss MD;  Location:  MAIN OR;  Service: Neurosurgery    ROTATOR CUFF REPAIR Right     SPINAL STIMULATOR PLACEMENT N/A 2019    Procedure: REVISION SPINAL CORD STIMULATOR PADDLE ELECTRODE, REPLACEMENT WITH PERCUTANEOUS ELECTRODES OR SMALLER PADDLE;  Surgeon: Lg Mccallum MD;  Location[facility];  Service: Neurosurgery     Social History     Substance and Sexual Activity   Alcohol Use Not Currently     Social History     Substance and Sexual Activity   Drug Use Never     Social History     Tobacco Use   Smoking Status Former    Current packs/day: 0.00    Types: Cigarettes    Start date: 1988    Quit date: 1991    Years since quittin.9   Smokeless Tobacco Never     Family History   Problem Relation Age of Onset    No Known Problems Mother     Glaucoma Father     No Known Problems Sister     No Known Problems Sister     No Known Problems Sister     Depression Sister     No Known Problems Sister     No Known Problems Maternal Grandmother     No Known Problems Maternal Grandfather     No Known Problems Paternal  "Grandmother     No Known Problems Paternal Grandfather     No Known Problems Daughter     No Known Problems Daughter     No Known Problems Daughter     Hypertension Family     Stroke Family     Heart disease Family     Thyroid disease Family     Colon cancer Neg Hx     Colon polyps Neg Hx          Current Outpatient Medications:     albuterol (PROVENTIL HFA,VENTOLIN HFA) 90 mcg/act inhaler    alendronate (FOSAMAX) 70 mg tablet    atorvastatin (LIPITOR) 20 mg tablet    benzonatate (TESSALON PERLES) 100 mg capsule    bimatoprost (Lumigan) 0.01 % ophthalmic drops    buPROPion (WELLBUTRIN XL) 150 mg 24 hr tablet    buPROPion (WELLBUTRIN XL) 300 mg 24 hr tablet    docusate sodium (COLACE) 100 mg capsule    fluticasone-vilanterol (Breo Ellipta) 200-25 mcg/actuation inhaler    ipratropium (ATROVENT) 0.02 % nebulizer solution    levalbuterol (XOPENEX) 1.25 mg/3 mL nebulizer solution    losartan (COZAAR) 100 MG tablet    montelukast (SINGULAIR) 10 mg tablet    omeprazole (PriLOSEC) 40 MG capsule    polyethylene glycol (MiraLax) 17 GM/SCOOP powder    predniSONE 20 mg tablet    promethazine-dextromethorphan (PHENERGAN-DM) 6.25-15 mg/5 mL oral syrup    sertraline (ZOLOFT) 50 mg tablet    Synthroid 88 MCG tablet    traZODone (DESYREL) 50 mg tablet    vitamin B-12 (VITAMIN B-12) 1,000 mcg tablet    gabapentin (NEURONTIN) 800 mg tablet    Timolol Maleate, Once-Daily, 0.5 % SOLN    tiZANidine (ZANAFLEX) 2 mg tablet  Allergies   Allergen Reactions    Iodinated Contrast Media Anaphylaxis     Contrast Dye     Reviewed medications and allergies and updated as indicated    PHYSICAL EXAM:    Blood pressure 138/78, height 4' 9\" (1.448 m), weight 68 kg (150 lb), not currently breastfeeding. Body mass index is 32.46 kg/m².    Normal exam    General Appearance: NAD, cooperative, alert  Eyes: Anicteric, PERRLA, EOMI  ENT:  Normocephalic, atraumatic, normal mucosa.    Neck:  Supple, symmetrical, trachea midline  Resp:  Clear to auscultation " bilaterally; no rales, rhonchi or wheezing; respirations unlabored   CV:  S1 S2, Regular rate and rhythm; no murmur, rub, or gallop.  GI:  Soft, non-tender, non-distended; normal bowel sounds; no masses, no organomegaly   Rectal: Deferred  Musculoskeletal: No cyanosis, clubbing or edema. Normal ROM.  Skin:  No jaundice, rashes, or lesions   Heme/Lymph: No palpable cervical lymphadenopathy  Psych: Normal affect, good eye contact  Neuro: No gross deficits, AAOx3    Lab Results:   Lab Results   Component Value Date    WBC 11.58 (H) 03/07/2024    HGB 12.6 03/07/2024    HCT 38.7 03/07/2024    MCV 93 03/07/2024     03/07/2024     Lab Results   Component Value Date    K 4.0 03/07/2024     03/07/2024    CO2 26 03/07/2024    BUN 25 03/07/2024    CREATININE 1.27 03/07/2024    GLUCOSE 149 (H) 03/02/2024    GLUF 86 01/31/2024    CALCIUM 10.6 (H) 03/07/2024    AST 23 03/07/2024    ALT 32 03/07/2024    ALKPHOS 64 03/07/2024    EGFR 39 03/07/2024     Lab Results   Component Value Date    IRON 45 07/14/2023    FERRITIN 60 07/14/2023     Lab Results   Component Value Date    LIPASE 49 09/23/2022       Radiology Results:   XR chest portable    Result Date: 3/7/2024  Narrative: XR CHEST PORTABLE INDICATION: Cough. COMPARISON: 3/2/2024; 7/6/2022 FINDINGS: Clear lungs. No pneumothorax or pleural effusion. Unremarkable cardiomediastinal silhouette.  Atherosclerotic vascular calcifications noted. Mild spondylotic changes noted. Normal upper abdomen.     Impression: No acute cardiopulmonary disease. Workstation performed: [id number]     FL barium swallow video w speech    Result Date: 3/4/2024  Narrative: A video barium swallow study was performed by the Department of Speech Pathology. Please refer to the report for the official interpretation. The images are stored for archival purposes only. Study images were not formally reviewed by the Radiology Department.    XR chest 1 view portable    Result Date: 3/2/2024  Narrative:  XR CHEST PORTABLE INDICATION: dyspnea. COMPARISON: CXR 9/6/2022, abdomen CT 9/23/2022. FINDINGS: Clear lungs. No pneumothorax or pleural effusion. Normal cardiomediastinal silhouette. Old right clavicle fracture. Moderate left glenohumeral degenerative disease. Normal upper abdomen.     Impression: No acute cardiopulmonary disease. Workstation performed: [id number]

## 2024-03-11 NOTE — TELEPHONE ENCOUNTER
Scheduled date of colonoscopy (as of today):07/08/24  Physician performing colonoscopy:Jayshree  Location of colonoscopy:[facility]  Bowel prep reviewed with patient:miralax  Instructions reviewed with patient by:JOSH  Clearances: None

## 2024-03-11 NOTE — PATIENT INSTRUCTIONS
Food diary  20 to 25 g fiber and per day  Adequate fluids    MiraLAX, as needed, no bowel movement by third day, 1 capful daily until bowel movement    Additional prep prior to colonoscopy; 1 capful of MiraLAX daily 5 days prior to colonoscopy

## 2024-03-12 ENCOUNTER — OFFICE VISIT (OUTPATIENT)
Dept: FAMILY MEDICINE CLINIC | Facility: HOSPITAL | Age: 79
End: 2024-03-12
Payer: COMMERCIAL

## 2024-03-12 VITALS
HEIGHT: 57 IN | HEART RATE: 82 BPM | SYSTOLIC BLOOD PRESSURE: 118 MMHG | BODY MASS INDEX: 32.36 KG/M2 | DIASTOLIC BLOOD PRESSURE: 54 MMHG | OXYGEN SATURATION: 98 % | WEIGHT: 150 LBS | TEMPERATURE: 97.4 F

## 2024-03-12 DIAGNOSIS — E03.9 HYPOTHYROIDISM, UNSPECIFIED TYPE: ICD-10-CM

## 2024-03-12 DIAGNOSIS — J45.901 ACUTE ASTHMA EXACERBATION: ICD-10-CM

## 2024-03-12 DIAGNOSIS — J45.51 SEVERE PERSISTENT ASTHMA WITH ACUTE EXACERBATION: ICD-10-CM

## 2024-03-12 DIAGNOSIS — B33.8 RSV INFECTION: ICD-10-CM

## 2024-03-12 DIAGNOSIS — R73.01 IFG (IMPAIRED FASTING GLUCOSE): ICD-10-CM

## 2024-03-12 DIAGNOSIS — R79.9 CONTAMINATION OF BLOOD CULTURE: ICD-10-CM

## 2024-03-12 DIAGNOSIS — G47.00 INSOMNIA, UNSPECIFIED TYPE: ICD-10-CM

## 2024-03-12 DIAGNOSIS — Z09 HOSPITAL DISCHARGE FOLLOW-UP: Primary | ICD-10-CM

## 2024-03-12 DIAGNOSIS — J96.01 ACUTE RESPIRATORY FAILURE WITH HYPOXIA (HCC): ICD-10-CM

## 2024-03-12 DIAGNOSIS — R13.10 DYSPHAGIA, UNSPECIFIED TYPE: ICD-10-CM

## 2024-03-12 DIAGNOSIS — K21.9 GERD WITHOUT ESOPHAGITIS: ICD-10-CM

## 2024-03-12 DIAGNOSIS — E78.5 DYSLIPIDEMIA: ICD-10-CM

## 2024-03-12 PROBLEM — F41.1 GENERALIZED ANXIETY DISORDER: Status: ACTIVE | Noted: 2018-07-24

## 2024-03-12 LAB
BACTERIA BLD CULT: NORMAL
BACTERIA BLD CULT: NORMAL

## 2024-03-12 PROCEDURE — 99496 TRANSJ CARE MGMT HIGH F2F 7D: CPT | Performed by: INTERNAL MEDICINE

## 2024-03-12 RX ORDER — OMEPRAZOLE 40 MG/1
40 CAPSULE, DELAYED RELEASE ORAL 2 TIMES DAILY
Qty: 180 CAPSULE | Refills: 1 | Status: SHIPPED | OUTPATIENT
Start: 2024-03-12

## 2024-03-12 RX ORDER — TRAZODONE HYDROCHLORIDE 50 MG/1
50 TABLET ORAL
Qty: 90 TABLET | Refills: 1 | Status: SHIPPED | OUTPATIENT
Start: 2024-03-12

## 2024-03-12 RX ORDER — LEVALBUTEROL INHALATION SOLUTION 1.25 MG/3ML
1.25 SOLUTION RESPIRATORY (INHALATION) EVERY 6 HOURS PRN
Qty: 240 ML | Refills: 1 | Status: SHIPPED | OUTPATIENT
Start: 2024-03-12

## 2024-03-12 RX ORDER — LEVOTHYROXINE SODIUM 88 MCG
88 TABLET ORAL DAILY
Qty: 90 TABLET | Refills: 1 | Status: SHIPPED | OUTPATIENT
Start: 2024-03-12

## 2024-03-12 NOTE — ASSESSMENT & PLAN NOTE
TFT's nml in Jan 24 but a bit low in Feb 24 - likely d/t acute illness, con't current Synthroid for now, recheck TFT's with labs in June - BW order given

## 2024-03-12 NOTE — ASSESSMENT & PLAN NOTE
"Resolving, on prednisone taper and feeling better but not yet back to baseline, has seen pulm for f/u, has PFT\"s later this week, has f/u this summer, call with relapse/new/worse symptoms  "

## 2024-03-12 NOTE — ASSESSMENT & PLAN NOTE
Resolved, had swallow study as OP and had VBS while admitted, just saw GI, on PPI q day - can use up to bid prn symptoms

## 2024-03-12 NOTE — ASSESSMENT & PLAN NOTE
Resolving, still with cough, d/w pt that cough can last 4-6 wks, call with relapse in symptoms, can con't Tessalon Perles prn

## 2024-03-12 NOTE — PROGRESS NOTES
"Assessment & Plan     1. Hospital discharge follow-up    2. Severe persistent asthma with acute exacerbation  Assessment & Plan:  Resolving, on prednisone taper and feeling better but not yet back to baseline, has seen pulm for f/u, has PFT\"s later this week, has f/u this summer, call with relapse/new/worse symptoms      3. Acute respiratory failure with hypoxia (HCC)  Assessment & Plan:  Resolved with tx of asthma exacerbation      4. RSV infection  Assessment & Plan:  Resolving, still with cough, d/w pt that cough can last 4-6 wks, call with relapse in symptoms, can con't Tessalon Perles prn      5. Dysphagia, unspecified type  Assessment & Plan:  Resolved, had swallow study as OP and had VBS while admitted, just saw GI, on PPI q day - can use up to bid prn symptoms      6. Contamination of blood culture  Comments:  Repeat BC neg x 2, no further w/u needed    7. Insomnia, unspecified type  Assessment & Plan:  Trazodone refilled upon request    Orders:  -     traZODone (DESYREL) 50 mg tablet; Take 1 tablet (50 mg total) by mouth daily at bedtime    8. Hypothyroidism, unspecified type  Assessment & Plan:  TFT's nml in Jan 24 but a bit low in Feb 24 - likely d/t acute illness, con't current Synthroid for now, recheck TFT's with labs in June -  order given    Orders:  -     Synthroid 88 MCG tablet; Take 1 tablet (88 mcg total) by mouth daily  -     CBC and differential  -     Comprehensive metabolic panel  -     Hemoglobin A1C  -     Lipid panel  -     TSH, 3rd generation with Free T4 reflex    9. GERD without esophagitis  Comments:  Pt on PPI bid - med list updated, con't meds and f/u as per GI  Orders:  -     omeprazole (PriLOSEC) 40 MG capsule; Take 1 capsule (40 mg total) by mouth 2 (two) times a day    10. Dyslipidemia  Assessment & Plan:  FLP due in June -  order given, will follow, encouraged healthy diet and keep active    Orders:  -     CBC and differential  -     Comprehensive metabolic panel  -     " Hemoglobin A1C  -     Lipid panel  -     TSH, 3rd generation with Free T4 reflex    11. IFG (impaired fasting glucose)  Assessment & Plan:  BW due in June - order given, will follow    Orders:  -     CBC and differential  -     Comprehensive metabolic panel  -     Hemoglobin A1C  -     Lipid panel  -     TSH, 3rd generation with Free T4 reflex    12. Acute asthma exacerbation  -     levalbuterol (XOPENEX) 1.25 mg/3 mL nebulizer solution; Take 3 mL (1.25 mg total) by nebulization every 6 (six) hours as needed for wheezing or shortness of breath  -     ipratropium (ATROVENT) 0.02 % nebulizer solution; Take 2.5 mL (0.5 mg total) by nebulization every 6 (six) hours as needed for wheezing or shortness of breath    Mammo 12/23    Dexa 1/24 - improved from osteoporosis to osteopenia - on Fosamax once weekly    Fasting BW 11/23 (A1C 5.9) - wgt is down 9 lbs from Dec 23 - order given for June when they return from [place]  FLP 7/23  TSH 1/24         Subjective     Transitional Care Management Review:   Cindy Cruz is a 78 y.o. female here for TCM follow up. Pt was admitted to [facility] from 3/2/24 to 3/5/24.  Records were reviewed by myself in detail and events are summarized below.    During the TCM phone call patient stated:  TCM Call       Date and time call was made  3/8/2024  7:53 AM    Hospital care reviewed  Records reviewed    Patient was hospitialized at  [facility]    Date of Admission  03/02/24    Date of discharge  03/05/24    Diagnosis  Severe asthma with acute exacerbation    Disposition  Home    Were the patients medications reviewed and updated  No    Current Symptoms  None          TCM Call       Post hospital issues  None    Should patient be enrolled in anticoag monitoring?  No    Scheduled for follow up?  Yes    Did you obtain your prescribed medications  Yes    Do you need help managing your prescriptions or medications  No    Is transportation to your appointment needed  No    I have advised the  patient to call PCP with any new or worsening symptoms  Laura Coronado MA          HPI Pt presented to [facility] ED on 3/2/24 with c/o SOB x 2 days and CP.  In ED BP was 209/95 and RR 26 .  Pt appeared in distress and was tachypneic with diffuse wheezing. RSV was + but COVID and flu neg.  BMP/CBC and CXR were wnl.  Pt was given continuous nebulizer, IV steroids and IV Mag in the ED.  She was admitted for asthma exacerbation, acute resp distress and RSV infection. She was given SoluMedrol/Zithromax/neb tx.  She did require BiPAP for a short period of time.  Pulm was consulted. She was started on Pulmicort and Perforomist.  OP PFT's were recommended. Was recommended to discharge on Breo and nebulizer.  Pt improved clinically and supplemental O2 was weaned down. VBS was done and no aspiration was noted.  Regular diet with thin liquids was recommended.  Pt was discharged home 3/5/24.  Med list was reviewed.  Pt did receive a call 3/7/24 for 1/2 BC +.  Case was d/w ID and they felt this was like contaminant.  Repeat BC done and both were neg.  Pt was discharged home from the ED 3/7/24.  No abx needed as OP.     Pt has been feeling better since discharge. She feels she is getting better and is breathing better.  She still has a cough.  She denies SOB/wheezing.  She notes no F/C.  She notes no SE with the steroids.  She is taking her Breo daily as directed. She is rinsing after use of Breo.  She has not had to use her rescue inhaler at all. She is using her nebulizer bid. She has PFT's scheduled for later this week.  She had f/u with Pulm NP (Mariana Hawthorne) 3/8/24 - OV note reviewed. She was told to f/u in 4 mos. She has appt for June after they return from [place].      Pt saw GI yesterday (Clinton Stewart) yesterday for f/u - OV note reviewed.  Dysphagia has resolved. She was told to con't Omeprazole up to bid prn.  She has constipation and is scheduled for a colonoscopy July 8th. She notes no abd pain/blood in stool.  She denies  "N/V.          Review of Systems   Constitutional:  Positive for unexpected weight change. Negative for chills and fever.   HENT:  Negative for congestion and trouble swallowing.    Eyes:  Negative for pain and visual disturbance.   Respiratory:  Positive for cough. Negative for shortness of breath and wheezing.    Cardiovascular:  Negative for chest pain, palpitations and leg swelling.   Gastrointestinal:  Positive for constipation. Negative for abdominal pain, blood in stool, diarrhea, nausea and vomiting.   Genitourinary:  Negative for difficulty urinating and dysuria.   Musculoskeletal:  Negative for back pain and neck pain.   Skin:  Negative for rash and wound.   Neurological:  Negative for dizziness, light-headedness and headaches.   Hematological:  Does not bruise/bleed easily.   Psychiatric/Behavioral:  Negative for confusion and dysphoric mood.        Objective     /54   Pulse 82   Temp (!) 97.4 °F (36.3 °C)   Ht 4' 9\" (1.448 m)   Wt 68 kg (150 lb)   SpO2 98%   BMI 32.46 kg/m²      Physical Exam  Vitals and nursing note reviewed.   Constitutional:       General: She is not in acute distress.     Appearance: She is well-developed. She is not ill-appearing.   HENT:      Head: Normocephalic and atraumatic.      Right Ear: External ear normal.      Left Ear: External ear normal.      Mouth/Throat:      Mouth: Mucous membranes are moist.      Pharynx: Oropharynx is clear. No oropharyngeal exudate.   Eyes:      General:         Right eye: No discharge.         Left eye: No discharge.      Conjunctiva/sclera: Conjunctivae normal.   Neck:      Thyroid: No thyromegaly.      Trachea: No tracheal deviation.   Cardiovascular:      Rate and Rhythm: Normal rate and regular rhythm.      Heart sounds: Normal heart sounds. No murmur heard.  Pulmonary:      Effort: Pulmonary effort is normal. No respiratory distress.      Breath sounds: Rhonchi present. No wheezing or rales.      Comments: Rare L sided " rhonchi  Abdominal:      General: There is no distension.      Palpations: Abdomen is soft.      Tenderness: There is no abdominal tenderness. There is no guarding or rebound.   Musculoskeletal:         General: No deformity or signs of injury.      Cervical back: Neck supple.   Lymphadenopathy:      Cervical: No cervical adenopathy.   Skin:     General: Skin is warm and dry.      Coloration: Skin is not pale.      Findings: No bruising or rash.   Neurological:      General: No focal deficit present.      Mental Status: She is alert. Mental status is at baseline.      Motor: No abnormal muscle tone.      Gait: Gait normal.   Psychiatric:         Behavior: Behavior normal.         Thought Content: Thought content normal.         Judgment: Judgment normal.       Medications have been reviewed by provider in current encounter    Any Vargas DO

## 2024-03-12 NOTE — UTILIZATION REVIEW
Initial Clinical Review    Admission: Date/Time/Statement:   Admission Orders (From admission, onward)       Ordered        03/02/24 1235  INPATIENT ADMISSION  Once                          Orders Placed This Encounter   Procedures    INPATIENT ADMISSION     Standing Status:   Standing     Number of Occurrences:   1     Order Specific Question:   Level of Care     Answer:   Med Surg [16]     Order Specific Question:   Estimated length of stay     Answer:   More than 2 Midnights     Order Specific Question:   Certification     Answer:   I certify that inpatient services are medically necessary for this patient for a duration of greater than two midnights. See H&P and MD Progress Notes for additional information about the patient's course of treatment.     ED Arrival Information       Expected   -    Arrival   3/2/2024 10:56    Acuity   Emergent              Means of arrival   Wheelchair    Escorted by   Family Member    Service   Hospitalist    Admission type   Emergency              Arrival complaint   SOB, asthma             Chief Complaint   Patient presents with    Shortness of Breath     Pt c/o SOB x 2 days, hx of asthma. Using nebs and inhalers at home with no relief. + chest pain        Initial Presentation: 78 y.o. female  female to ED via private vehicle from home.    Admitted to inpatient with Dx: acute asthma exacerbation/RSV infection.  Presented to ED with shortness of breath and cough starting 2 days prior to arrival.  Using inhaler with minimal relief.   PMHx:asthma, hypothyroidism, hypertension, depression, CKD stage III . On exam: respiratory distress.  tachypnea.  RSV positive  Imaging shows no acute disease per CxR. ED treatment continuous neb and IV steroids with some improvement, given Mg. Placed on oxygen 2 liters .    Plan includes continued IV steroids, start Azithromycin.   Continue Singular.  Consult pulmonary.  Monitor oxygen sat, wean oxygen as able.      Per Pulmonary 3/2/24:   Acute  respiratory insufficiency/Severe Acute asthma exacerbation  RSV tracheobronchitis/Hypereosinophilia/Remote history of tobacco abuse.  Recommendation:  Continue solumedrol 40 q8hrs/Continue atrovent/xopenex/Add pulmicort and perforomist/Given 2g Mg IVPB/Check sputum culture/Continue azithromycin  Check ABG, if work of breathing remains high, start Bipap/Recommend upgrade to step down 2    Date: 3/3/24   Day 2:  after seen by Pulmonary yesterday, placed on Bipap.  Was able to tolerate about 2 hours with improvement and weaned.  Overnight remained on nasal cannula.  Today continued cough and wheeze.  Plan is  Solu medrol.  Continue scheduled nebs.   Continue Azithromycin.  Monitor oxygen sat and wean as able.       Patient has crossed 3 midnights and requires ongoing care    3/4/2024 .  Patient presents with coughing when tries to swallowing.  Breathing improving.     On exam:  lungs Diminished breath sounds bilateral with occasional wheeze   Abnormal labs or imaging:  wbc 16.79  Diagnosis/Plan     Severe asthma with acute exacerbation/RSV viral infection/tracheobronchitis/Nicotine dependence in remission/Acute hypoxic respiratory failure -resolved.  Continued IV azithromycin.    Decrease IV Solu-Medrol to daily today and start prednisone tomorrow.  Continued nebulizers.     ED Triage Vitals   Temperature Pulse Respirations Blood Pressure SpO2   24 1102 24 1102 24 1102 24 1102 24 1102   97.8 °F (36.6 °C) 93 (!) 26 (!) 209/95 99 %      Temp Source Heart Rate Source Patient Position - Orthostatic VS BP Location FiO2 (%)   24 1102 24 1102 24 1102 24 1102 --   Oral Monitor Sitting Left arm       Pain Score       24 1300       No Pain          Wt Readings from Last 1 Encounters:   24 68 kg (150 lb)     Additional Vital Signs:   3/04/24 18:59:58 97 °F (36.1 °C) Abnormal  82 -- 127/49 Abnormal  75 97 % -- -- -- --   24 1508 -- -- -- -- -- 97 % --  -- None (Room air) --   03/04/24 1100 -- -- -- -- -- 97 % -- -- None (Room air) --   03/04/24 0859 -- -- -- -- -- 97 % -- -- None (Room air) --   03/04/24 07:28:27 96.8 °F (36 °C) Abnormal  73 18 136/58 84 97 % -- -- None (Room air) Sitting   03/03/24 2110 -- 84 30 Abnormal  127/58 84 98 % -- -- -- --   03/03/24 1945 -- -- -- -- -- 97 % -- -- None (Room air) --   03/03/24 1940 98.3 °F (36.8 °C) 81 17 125/62 89 98 % -- -- None (Room air) --   03/03/24 1547 97.9 °F (36.6 °C) 75 24 Abnormal  134/60 87 98 % -- -- None (Room air) Lying   03/03/24 1409 -- 81 24 Abnormal  -- -- 98 % -- -- None (Room air) --   03/03/24 1321 -- 79 29 Abnormal  -- -- 98 % 28 2 L/min Nasal cannula --   03/03/24 1221 98 °F (36.7 °C) 82 24 Abnormal  114/73 87 99 % 28 2 L/min Nasal cannula Lying   03/03/24 0816 -- 65 27 Abnormal  -- -- 99 % 28 2 L/min Nasal cannula --   03/03/24 0749 97.1 °F (36.2 °C) Abnormal  66 24 Abnormal  135/63 91 99 % 28 2 L/min Nasal cannula Lying     Row Name 03/02/24 1747 03/02/24 1745 03/02/24 1730 03/02/24 1715 03/02/24 1700   Vital Signs   Temp 97.3 °F (36.3 °C) Abnormal   -MA -- -- -- --   Temp src Oral  -MA -- -- -- --   Pulse 74  -MA 74  -MA 85  -MA 77  -MA 80  -MA   Heart Rate Source Monitor  -MA -- -- -- --   Resp 22  -MA 22  -MA 22  -MA 22  -MA 21  -MA   /67  -/76 Abnormal   -/88 Abnormal   -/68  -/75  -MA   MAP (mmHg) 97  -  -  -MA 98  -  -MA   Oxygen Therapy   SpO2 99 %  -MA 99 %  -MA 99 %  -MA 99 %  -MA 98 %  -MA     Pertinent Labs/Diagnostic Test Results:   FL barium swallow video w speech   Final Result by SYSTEMGENERATED, NATALIA (03/04 1522)      XR chest 1 view portable   Final Result by Serena Mendez MD (03/02 1217)      No acute cardiopulmonary disease.            Workstation performed: [id number]           3/2/24 ecg Right bundle branch block  Left posterior fascicular block   Bifascicular block   Abnormal ECG  When compared with ECG of  09-JUL-2022 10:39,  Premature supraventricular complexes are no longer Present      Latest Reference Range & Units 03/02/24 11:13   WBC 4.31 - 10.16 Thousand/uL 6.53   RBC 3.81 - 5.12 Million/uL 4.07   Hemoglobin 11.5 - 15.4 g/dL 12.6   Hematocrit 34.8 - 46.1 % 37.8   MCV 82 - 98 fL 93   MCH 26.8 - 34.3 pg 31.0   MCHC 31.4 - 37.4 g/dL 33.3   RDW 11.6 - 15.1 % 14.5   Platelet Count 149 - 390 Thousands/uL 211      Latest Reference Range & Units 03/03/24 04:02 03/04/24 05:20 03/05/24 05:08   WBC 4.31 - 10.16 Thousand/uL 7.86 16.79 (H) 14.36 (H)   RBC 3.81 - 5.12 Million/uL 3.77 (L) 3.60 (L) 3.35 (L)   Hemoglobin 11.5 - 15.4 g/dL 11.4 (L) 11.1 (L) 10.4 (L)   Hematocrit 34.8 - 46.1 % 35.0 34.3 (L) 33.4 (L)   MCV 82 - 98 fL 93 95 100 (H)   MCH 26.8 - 34.3 pg 30.2 30.8 31.0   MCHC 31.4 - 37.4 g/dL 32.6 32.4 31.1 (L)   RDW 11.6 - 15.1 % 14.5 15.1 15.3 (H)   Platelet Count 149 - 390 Thousands/uL 207 204 212   (H): Data is abnormally high  (L): Data is abnormally low   Latest Reference Range & Units 03/02/24 15:32   pH, Arterial 7.350 - 7.450  7.584 (H)   pCO2, Arterial 36.0 - 44.0 mm Hg 15.2 (LL)   pO2, Arterial 75.0 - 129.0 mm Hg 147.3 (H)   HCO3, Arterial 22.0 - 28.0 mmol/L 14.1 (L)   Base Excess, Arterial mmol/L -4.7   O2 Content, Arterial 16.0 - 23.0 mL/dL 18.9   O2 HGB,Arterial 94.0 - 97.0 % 98.3 (H)   ABG SOURCE  Radial, Right   Non Vent type BIPAP  BIPAP   IPAP  12   EPAP  6   BIPAP fio2 % 30   (LL): Data is critically low  (H): Data is abnormally high  (L): Data is abnormally low   Latest Reference Range & Units 03/02/24 17:44   Glucose, i-STAT 65 - 140 mg/dl 149 (H)   pH, Art i-STAT 7.350 - 7.450  7.483 (H)   pCO2, Art i-STAT 36.0 - 44.0 mm HG 23.0 (LL)   pO2, ART i-STAT 75.0 - 129.0 mm .0 (H)   HCO3, Art i-STAT 22.0 - 28.0 mmol/L 17.2 (L)   Base Exc -2 - 3 mmol/L -5 (L)   O2 Sat, Istat 60 - 85 % 100 (H)   SODIUM, I-STAT 136 - 145 mmol/l 141   POTASSIUM,I-STAT 3.5 - 5.3 mmol/L 3.6   CO2, i-STAT 21 - 32 mmol/L  18 (L)   CALCIUM, IONIZED ISTAT 1.12 - 1.32 mmol/L 1.29   Hemoglobin, Istat 11.5 - 15.4 g/dl 11.9   SPECIMEN TYPE  ARTERIAL   (LL): Data is critically low  (H): Data is abnormally high  (L): Data is abnormally low   Latest Reference Range & Units 03/03/24 04:02   pH, Keegan 7.300 - 7.400  7.395   pCO2, Keegan 42.0 - 50.0 mm Hg 34.5 (L)   pO2, Keegan 35.0 - 45.0 mm Hg 49.5 (H)   HCO3, Keegan 24 - 30 mmol/L 20.7 (L)   Base Excess, Keegan mmol/L -3.5   O2 Content, Keegan ml/dL 21.7   O2 HGB, VENOUS 60.0 - 80.0 % 84.6 (H)   (L): Data is abnormally low  (H): Data is abnormally high   Latest Reference Range & Units 03/02/24 11:13   Sodium 135 - 147 mmol/L 139   Potassium 3.5 - 5.3 mmol/L 4.0   Chloride 96 - 108 mmol/L 110 (H)   Carbon Dioxide 21 - 32 mmol/L 20 (L)   ANION GAP mmol/L 9   BUN 5 - 25 mg/dL 14   Creatinine 0.60 - 1.30 mg/dL 0.98   GLUCOSE 65 - 140 mg/dL 95   Calcium 8.4 - 10.2 mg/dL 10.2   (H): Data is abnormally high  (L): Data is abnormally low   Latest Reference Range & Units 03/03/24 04:02 03/04/24 05:20 03/05/24 05:08   Sodium 135 - 147 mmol/L 141 141 140   Potassium 3.5 - 5.3 mmol/L 4.4 4.8 4.4   Chloride 96 - 108 mmol/L 111 (H) 111 (H) 112 (H)   Carbon Dioxide 21 - 32 mmol/L 22 22 21   ANION GAP mmol/L 8 8 7   BUN 5 - 25 mg/dL 22 37 (H) 29 (H)   Creatinine 0.60 - 1.30 mg/dL 1.22 1.54 (H) 1.21   GLUCOSE 65 - 140 mg/dL 136 140 86   Calcium 8.4 - 10.2 mg/dL 10.1 9.8 9.5   AST 13 - 39 U/L 14     ALT 7 - 52 U/L 14     ALK PHOS 34 - 104 U/L 69     Total Protein 6.4 - 8.4 g/dL 7.0     Albumin 3.5 - 5.0 g/dL 3.9     Total Bilirubin 0.20 - 1.00 mg/dL 0.46     GFR, Calculated ml/min/1.73sq m 41 31 42   (H): Data is abnormally high  Results from last 7 days   Lab Units 03/07/24  1312   CLARITY UA  Clear   COLOR UA  Light Yellow   SPEC GRAV UA  <1.005*   PH UA  5.5   GLUCOSE UA mg/dl Negative   KETONES UA mg/dl Negative   BLOOD UA  Negative   PROTEIN UA mg/dl Negative   NITRITE UA  Negative   BILIRUBIN UA  Negative   UROBILINOGEN  UA (BE) mg/dl <2.0   LEUKOCYTES UA  Trace*   WBC UA /hpf 0-1   RBC UA /hpf None Seen   BACTERIA UA /hpf None Seen   EPITHELIAL CELLS WET PREP /hpf Occasional     Results from last 7 days   Lab Units 03/07/24  1249   BLOOD CULTURE  No Growth After 4 Days.  No Growth After 4 Days.       ED Treatment:   Medication Administration from 03/02/2024 1055 to 03/02/2024 1312         Date/Time Order Dose Route Action Comments     03/02/2024 1117 EST albuterol inhalation solution 10 mg 10 mg Nebulization Given --     03/02/2024 1117 EST ipratropium (ATROVENT) 0.02 % inhalation solution 1 mg 1 mg Nebulization Given --     03/02/2024 1117 EST sodium chloride 0.9 % inhalation solution 12 mL 12 mL Nebulization Given --     03/02/2024 1117 EST methylPREDNISolone sodium succinate (Solu-MEDROL) injection 80 mg 80 mg Intravenous Given --     03/02/2024 1208 EST magnesium sulfate 2 g/50 mL IVPB (premix) 2 g 2 g Intravenous New Bag --          Past Medical History:   Diagnosis Date    Asthma     Chronic pain disorder     Back    Coronary artery disease     Depression     Disease of thyroid gland     Dyslipidemia     Esophageal reflux     Frequent headaches     stress related    GERD (gastroesophageal reflux disease)     Headache(784.0)     Heart murmur     History of stomach ulcers     Hypercalcemia     Hyperlipidemia     Hypertension     Osteopenia     Tremor      Present on Admission:   Severe asthma with acute exacerbation   Hypothyroidism   Hypertension   Esophageal reflux   Chronic kidney disease, stage 3a (HCC)   Depression, recurrent (HCC)   Dyslipidemia      Admitting Diagnosis: SOB (shortness of breath) [R06.02]  Asthma [J45.909]  Acute asthma exacerbation [J45.901]  Age/Sex: 78 y.o. female  Admission Orders:  3/2/24 1235 inpatient   Scheduled Medications:  Scheduled PRN   atorvastatin, 20 mg, Daily  azithromycin, 500 mg, Q24H  IV  bimatoprost, 1 drop, Daily  budesonide, 0.5 mg, Q12H  buPROPion, 450 mg, QAM  enoxaparin, 40 mg,  Daily  formoterol, 20 mcg, Q12H  gabapentin, 800 mg, BID  ipratropium, 0.5 mg, TID  levalbuterol, 1.25 mg, TID  levothyroxine, 88 mcg, Early Morning  losartan, 100 mg, Daily  methylPREDNISolone sodium succinate, 40 mg, Q8H  IV decreased to every 12 hours on 3/3/24  montelukast, 10 mg, HS  pantoprazole, 40 mg, Daily Before Breakfast  sertraline, 50 mg, Daily  traZODone, 50 mg, HS       acetaminophen, 650 mg, Q6H PRN x 1 3/2  albuterol, 2 puff, Q6H PRN  albuterol, 2.5 mg, Q6H PRN inhaler.  X 1 3/2  dextromethorphan-guaiFENesin, 10 mL, Q4H PRN  x 1 3/3.  X 1 3/4  hydrALAZINE, 10 mg, Q6H PRN x 1 3/2  tiZANidine, 2 mg, Q8H PRN           multi-electrolyte (PLASMALYTE-A/ISOLYTE-S PH 7.4) IV solution  Rate: 75 mL/hr Dose: 75 mL/hr  Freq: Continuous Route: IV  Indications of Use: IV Hydration  Last Dose: Stopped (03/05/24 1005)  Start: 03/04/24 1400 End: 03/05/24 0740   magnesium sulfate 2 g/50 mL IVPB (premix) 2 g  Dose: 2 g  Freq: Once Route: IV  Last Dose: Stopped (03/02/24 1742)  Start: 03/02/24 1415 End: 03/02/24 1742     IP CONSULT TO PULMONOLOGY  IP CONSULT TO CASE MANAGEMENT    Network Utilization Review Department  ATTENTION: Please call with any questions or concerns to [phone] and carefully listen to the prompts so that you are directed to the right person. All voicemails are confidential.   For Discharge needs, contact Care Management DC Support Team at [phone] opt. 2  Send all requests for admission clinical reviews, approved or denied determinations and any other requests to dedicated fax number below belonging to the campus where the patient is receiving treatment. List of dedicated fax numbers for the Facilities:  FACILITY NAME UR FAX NUMBER   ADMISSION DENIALS (Administrative/Medical Necessity) [phone]   DISCHARGE SUPPORT TEAM (NETWORK) [phone]   PARENT CHILD HEALTH (Maternity/NICU/Pediatrics) [phone]   [facility] [phone]   [facility]  [facility] [phone]   [facility] [phone]   [facility] [phone]   [facility] [phone]   [facility] [phone]   [facility] [phone]   [facility] [phone]   [facility] [phone]   [facility] [phone]   [facility] [phone]   [facility] [phone]

## 2024-03-14 ENCOUNTER — HOSPITAL ENCOUNTER (OUTPATIENT)
Dept: PULMONOLOGY | Facility: HOSPITAL | Age: 79
End: 2024-03-14
Payer: COMMERCIAL

## 2024-03-14 DIAGNOSIS — J45.51 SEVERE PERSISTENT ASTHMA WITH ACUTE EXACERBATION: ICD-10-CM

## 2024-03-14 PROCEDURE — 94729 DIFFUSING CAPACITY: CPT

## 2024-03-14 PROCEDURE — 94729 DIFFUSING CAPACITY: CPT | Performed by: INTERNAL MEDICINE

## 2024-03-14 PROCEDURE — 94060 EVALUATION OF WHEEZING: CPT

## 2024-03-14 PROCEDURE — 94726 PLETHYSMOGRAPHY LUNG VOLUMES: CPT

## 2024-03-14 PROCEDURE — 94060 EVALUATION OF WHEEZING: CPT | Performed by: INTERNAL MEDICINE

## 2024-03-14 PROCEDURE — 94760 N-INVAS EAR/PLS OXIMETRY 1: CPT

## 2024-03-14 PROCEDURE — 94726 PLETHYSMOGRAPHY LUNG VOLUMES: CPT | Performed by: INTERNAL MEDICINE

## 2024-03-14 RX ORDER — ALBUTEROL SULFATE 2.5 MG/3ML
2.5 SOLUTION RESPIRATORY (INHALATION) ONCE
Status: COMPLETED | OUTPATIENT
Start: 2024-03-14 | End: 2024-03-14

## 2024-03-14 RX ADMIN — ALBUTEROL SULFATE 2.5 MG: 2.5 SOLUTION RESPIRATORY (INHALATION) at 07:28

## 2024-03-15 ENCOUNTER — TELEPHONE (OUTPATIENT)
Age: 79
End: 2024-03-15

## 2024-03-15 ENCOUNTER — PATIENT OUTREACH (OUTPATIENT)
Dept: FAMILY MEDICINE CLINIC | Facility: HOSPITAL | Age: 79
End: 2024-03-15

## 2024-03-15 LAB
DME PARACHUTE DELIVERY DATE ACTUAL: NORMAL
DME PARACHUTE DELIVERY DATE REQUESTED: NORMAL
DME PARACHUTE DELIVERY NOTE: NORMAL
DME PARACHUTE ITEM DESCRIPTION: NORMAL
DME PARACHUTE ORDER STATUS: NORMAL
DME PARACHUTE SUPPLIER NAME: NORMAL
DME PARACHUTE SUPPLIER PHONE: NORMAL

## 2024-03-15 NOTE — TELEPHONE ENCOUNTER
Results were communicated to patient daughter Dara Deras via telephone. She verbalized understanding.

## 2024-03-15 NOTE — UTILIZATION REVIEW
NOTIFICATION OF ADMISSION DISCHARGE   This is a Notification of Discharge from [facility]. Please be advised that this patient has been discharge from our facility. Below you will find the admission and discharge date and time including the patient’s disposition.   UTILIZATION REVIEW CONTACT:  Steph Lin  Utilization   Network Utilization Review Department  Phone: [phone] carefully listen to the prompts. All voicemails are confidential.  Email: [email or web address]     ADMISSION INFORMATION  PRESENTATION DATE: 3/2/2024 10:57 AM  OBERVATION ADMISSION DATE:   INPATIENT ADMISSION DATE: 3/2/24 12:35 PM   DISCHARGE DATE: 3/5/2024 12:50 PM   DISPOSITION:Home/Self Care    Network Utilization Review Department  ATTENTION: Please call with any questions or concerns to [phone] and carefully listen to the prompts so that you are directed to the right person. All voicemails are confidential.   For Discharge needs, contact Care Management DC Support Team at [phone] opt. [phone]  Send all requests for admission clinical reviews, approved or denied determinations and any other requests to dedicated fax number below belonging to the campus where the patient is receiving treatment. List of dedicated fax numbers for the Facilities:  FACILITY NAME UR FAX NUMBER   ADMISSION DENIALS (Administrative/Medical Necessity) [phone]   DISCHARGE SUPPORT TEAM ([facility]) [phone]   PARENT CHILD HEALTH (Maternity/NICU/Pediatrics) [phone]   [facility] [phone]   [facility] [phone]   [facility] [phone]   [facility] [phone]   [facility] [phone]   [facility] [phone]   [facility] [phone]   [facility] [phone]   [facility]  [facility] [phone]   [facility] [phone]   [facility] [phone]   [facility], [place]) [phone]

## 2024-03-15 NOTE — PROGRESS NOTES
Outpatient Care Management Note:    Care manager called Cindy and spoke with her daughter, Dara. She states that her mother has seen GI, pulmonary and her PCP. She completed PFT testing.      Dara states her mom is doing well. She denies any further issues with shortness of breath. She is using her nebulizer and taking all medications as ordered.  She has not had any signs of infection.     [facility] is leaving for [place] on Sunday for 3 months. Dara states that that they have a 90 days supply of all medications. She then realized that she needed one more medication. She will call the office now to request it.     CM will close the referral since [facility] will not be in [place]. Dara will call when she returns if they have any questions.

## 2024-04-01 NOTE — TELEPHONE ENCOUNTER
Can you please call the patient and advise her that I was able to get her Nortriptyline APPROVED for 1 year through her insurance  Thank you  Applied

## 2024-04-04 DIAGNOSIS — F41.9 ANXIETY AND DEPRESSION: ICD-10-CM

## 2024-04-04 DIAGNOSIS — F32.A ANXIETY AND DEPRESSION: ICD-10-CM

## 2024-04-04 RX ORDER — BUPROPION HYDROCHLORIDE 300 MG/1
300 TABLET ORAL DAILY
Qty: 90 TABLET | Refills: 0 | Status: SHIPPED | OUTPATIENT
Start: 2024-04-04

## 2024-06-08 DIAGNOSIS — E78.5 DYSLIPIDEMIA: ICD-10-CM

## 2024-06-08 RX ORDER — ATORVASTATIN CALCIUM 20 MG/1
TABLET, FILM COATED ORAL
Qty: 90 TABLET | Refills: 1 | Status: ON HOLD | OUTPATIENT
Start: 2024-06-08

## 2024-06-09 ENCOUNTER — APPOINTMENT (EMERGENCY)
Dept: RADIOLOGY | Facility: HOSPITAL | Age: 79
DRG: 551 | End: 2024-06-09
Payer: COMMERCIAL

## 2024-06-09 ENCOUNTER — APPOINTMENT (EMERGENCY)
Dept: CT IMAGING | Facility: HOSPITAL | Age: 79
DRG: 551 | End: 2024-06-09
Payer: COMMERCIAL

## 2024-06-09 ENCOUNTER — HOSPITAL ENCOUNTER (INPATIENT)
Facility: HOSPITAL | Age: 79
LOS: 2 days | Discharge: HOME | DRG: 551 | End: 2024-06-11
Attending: EMERGENCY MEDICINE | Admitting: INTERNAL MEDICINE
Payer: COMMERCIAL

## 2024-06-09 DIAGNOSIS — R53.1 GENERALIZED WEAKNESS: Primary | ICD-10-CM

## 2024-06-09 DIAGNOSIS — R26.2 AMBULATORY DYSFUNCTION: ICD-10-CM

## 2024-06-09 DIAGNOSIS — U07.1 COVID-19: ICD-10-CM

## 2024-06-09 LAB
2HR DELTA HS TROPONIN: 1 NG/L
ALBUMIN SERPL BCP-MCNC: 4 G/DL (ref 3.5–5)
ALP SERPL-CCNC: 79 U/L (ref 34–104)
ALT SERPL W P-5'-P-CCNC: 78 U/L (ref 7–52)
ANION GAP SERPL CALCULATED.3IONS-SCNC: 5 MMOL/L (ref 4–13)
AST SERPL W P-5'-P-CCNC: 35 U/L (ref 13–39)
BASOPHILS # BLD AUTO: 0.04 THOUSANDS/ÂΜL (ref 0–0.1)
BASOPHILS NFR BLD AUTO: 0 % (ref 0–1)
BILIRUB SERPL-MCNC: 0.65 MG/DL (ref 0.2–1)
BILIRUB UR QL STRIP: NEGATIVE
BUN SERPL-MCNC: 23 MG/DL (ref 5–25)
CALCIUM SERPL-MCNC: 10.3 MG/DL (ref 8.4–10.2)
CARDIAC TROPONIN I PNL SERPL HS: 30 NG/L
CARDIAC TROPONIN I PNL SERPL HS: 31 NG/L
CHLORIDE SERPL-SCNC: 109 MMOL/L (ref 96–108)
CLARITY UR: CLEAR
CO2 SERPL-SCNC: 26 MMOL/L (ref 21–32)
COLOR UR: ABNORMAL
CREAT SERPL-MCNC: 1.09 MG/DL (ref 0.6–1.3)
EOSINOPHIL # BLD AUTO: 0.49 THOUSAND/ÂΜL (ref 0–0.61)
EOSINOPHIL NFR BLD AUTO: 5 % (ref 0–6)
ERYTHROCYTE [DISTWIDTH] IN BLOOD BY AUTOMATED COUNT: 13.9 % (ref 11.6–15.1)
FLUAV RNA RESP QL NAA+PROBE: NEGATIVE
FLUBV RNA RESP QL NAA+PROBE: NEGATIVE
GFR SERPL CREATININE-BSD FRML MDRD: 47 ML/MIN/1.73SQ M
GLUCOSE SERPL-MCNC: 116 MG/DL (ref 65–140)
GLUCOSE UR STRIP-MCNC: NEGATIVE MG/DL
HCT VFR BLD AUTO: 35.7 % (ref 34.8–46.1)
HGB BLD-MCNC: 11.7 G/DL (ref 11.5–15.4)
HGB UR QL STRIP.AUTO: NEGATIVE
IMM GRANULOCYTES # BLD AUTO: 0.08 THOUSAND/UL (ref 0–0.2)
IMM GRANULOCYTES NFR BLD AUTO: 1 % (ref 0–2)
KETONES UR STRIP-MCNC: NEGATIVE MG/DL
LEUKOCYTE ESTERASE UR QL STRIP: NEGATIVE
LIPASE SERPL-CCNC: 14 U/L (ref 11–82)
LYMPHOCYTES # BLD AUTO: 1.45 THOUSANDS/ÂΜL (ref 0.6–4.47)
LYMPHOCYTES NFR BLD AUTO: 14 % (ref 14–44)
MCH RBC QN AUTO: 31.3 PG (ref 26.8–34.3)
MCHC RBC AUTO-ENTMCNC: 32.8 G/DL (ref 31.4–37.4)
MCV RBC AUTO: 96 FL (ref 82–98)
MONOCYTES # BLD AUTO: 0.95 THOUSAND/ÂΜL (ref 0.17–1.22)
MONOCYTES NFR BLD AUTO: 9 % (ref 4–12)
NEUTROPHILS # BLD AUTO: 7.37 THOUSANDS/ÂΜL (ref 1.85–7.62)
NEUTS SEG NFR BLD AUTO: 71 % (ref 43–75)
NITRITE UR QL STRIP: NEGATIVE
NRBC BLD AUTO-RTO: 0 /100 WBCS
PH UR STRIP.AUTO: 6 [PH]
PLATELET # BLD AUTO: 188 THOUSANDS/UL (ref 149–390)
PMV BLD AUTO: 12.1 FL (ref 8.9–12.7)
POTASSIUM SERPL-SCNC: 4.7 MMOL/L (ref 3.5–5.3)
PROT SERPL-MCNC: 7 G/DL (ref 6.4–8.4)
PROT UR STRIP-MCNC: NEGATIVE MG/DL
RBC # BLD AUTO: 3.74 MILLION/UL (ref 3.81–5.12)
RSV RNA RESP QL NAA+PROBE: NEGATIVE
SARS-COV-2 RNA RESP QL NAA+PROBE: POSITIVE
SODIUM SERPL-SCNC: 140 MMOL/L (ref 135–147)
SP GR UR STRIP.AUTO: <1.005 (ref 1–1.03)
UROBILINOGEN UR STRIP-ACNC: <2 MG/DL
WBC # BLD AUTO: 10.38 THOUSAND/UL (ref 4.31–10.16)

## 2024-06-09 PROCEDURE — 94760 N-INVAS EAR/PLS OXIMETRY 1: CPT

## 2024-06-09 PROCEDURE — 83690 ASSAY OF LIPASE: CPT | Performed by: EMERGENCY MEDICINE

## 2024-06-09 PROCEDURE — 85025 COMPLETE CBC W/AUTO DIFF WBC: CPT | Performed by: EMERGENCY MEDICINE

## 2024-06-09 PROCEDURE — 71046 X-RAY EXAM CHEST 2 VIEWS: CPT

## 2024-06-09 PROCEDURE — 80053 COMPREHEN METABOLIC PANEL: CPT | Performed by: EMERGENCY MEDICINE

## 2024-06-09 PROCEDURE — 99285 EMERGENCY DEPT VISIT HI MDM: CPT

## 2024-06-09 PROCEDURE — 81003 URINALYSIS AUTO W/O SCOPE: CPT

## 2024-06-09 PROCEDURE — 96375 TX/PRO/DX INJ NEW DRUG ADDON: CPT

## 2024-06-09 PROCEDURE — 74176 CT ABD & PELVIS W/O CONTRAST: CPT

## 2024-06-09 PROCEDURE — 70450 CT HEAD/BRAIN W/O DYE: CPT

## 2024-06-09 PROCEDURE — 93005 ELECTROCARDIOGRAM TRACING: CPT

## 2024-06-09 PROCEDURE — 84484 ASSAY OF TROPONIN QUANT: CPT

## 2024-06-09 PROCEDURE — 36415 COLL VENOUS BLD VENIPUNCTURE: CPT

## 2024-06-09 PROCEDURE — 94640 AIRWAY INHALATION TREATMENT: CPT

## 2024-06-09 PROCEDURE — 96374 THER/PROPH/DIAG INJ IV PUSH: CPT

## 2024-06-09 PROCEDURE — 96361 HYDRATE IV INFUSION ADD-ON: CPT

## 2024-06-09 PROCEDURE — 0241U HB NFCT DS VIR RESP RNA 4 TRGT: CPT

## 2024-06-09 PROCEDURE — 99223 1ST HOSP IP/OBS HIGH 75: CPT | Performed by: PHYSICIAN ASSISTANT

## 2024-06-09 RX ORDER — TRAZODONE HYDROCHLORIDE 50 MG/1
25 TABLET ORAL
Status: DISCONTINUED | OUTPATIENT
Start: 2024-06-09 | End: 2024-06-11 | Stop reason: HOSPADM

## 2024-06-09 RX ORDER — DIAZEPAM 5 MG/ML
5 INJECTION, SOLUTION INTRAMUSCULAR; INTRAVENOUS ONCE
Status: COMPLETED | OUTPATIENT
Start: 2024-06-09 | End: 2024-06-09

## 2024-06-09 RX ORDER — LEVALBUTEROL INHALATION SOLUTION 1.25 MG/3ML
1.25 SOLUTION RESPIRATORY (INHALATION)
Status: DISCONTINUED | OUTPATIENT
Start: 2024-06-09 | End: 2024-06-11 | Stop reason: HOSPADM

## 2024-06-09 RX ORDER — LIDOCAINE 50 MG/G
1 PATCH TOPICAL DAILY
Status: DISCONTINUED | OUTPATIENT
Start: 2024-06-10 | End: 2024-06-11 | Stop reason: HOSPADM

## 2024-06-09 RX ORDER — ACETAMINOPHEN 325 MG/1
975 TABLET ORAL ONCE
Status: COMPLETED | OUTPATIENT
Start: 2024-06-09 | End: 2024-06-09

## 2024-06-09 RX ORDER — BIMATOPROST 0.3 MG/ML
1 SOLUTION/ DROPS OPHTHALMIC
Status: DISCONTINUED | OUTPATIENT
Start: 2024-06-09 | End: 2024-06-11 | Stop reason: HOSPADM

## 2024-06-09 RX ORDER — LOSARTAN POTASSIUM 50 MG/1
100 TABLET ORAL
Status: DISCONTINUED | OUTPATIENT
Start: 2024-06-09 | End: 2024-06-11 | Stop reason: HOSPADM

## 2024-06-09 RX ORDER — LEVOTHYROXINE SODIUM 88 UG/1
88 TABLET ORAL DAILY
Status: DISCONTINUED | OUTPATIENT
Start: 2024-06-10 | End: 2024-06-11 | Stop reason: HOSPADM

## 2024-06-09 RX ORDER — LABETALOL HYDROCHLORIDE 5 MG/ML
10 INJECTION, SOLUTION INTRAVENOUS ONCE
Status: COMPLETED | OUTPATIENT
Start: 2024-06-09 | End: 2024-06-09

## 2024-06-09 RX ORDER — MONTELUKAST SODIUM 10 MG/1
10 TABLET ORAL
Status: DISCONTINUED | OUTPATIENT
Start: 2024-06-09 | End: 2024-06-11 | Stop reason: HOSPADM

## 2024-06-09 RX ORDER — FLUTICASONE FUROATE AND VILANTEROL 200; 25 UG/1; UG/1
1 POWDER RESPIRATORY (INHALATION) DAILY
Status: DISCONTINUED | OUTPATIENT
Start: 2024-06-10 | End: 2024-06-11 | Stop reason: HOSPADM

## 2024-06-09 RX ORDER — DIAZEPAM 5 MG/ML
5 INJECTION, SOLUTION INTRAMUSCULAR; INTRAVENOUS EVERY 8 HOURS PRN
Status: DISCONTINUED | OUTPATIENT
Start: 2024-06-09 | End: 2024-06-10

## 2024-06-09 RX ORDER — ACETAMINOPHEN 325 MG/1
975 TABLET ORAL EVERY 8 HOURS SCHEDULED
Status: DISCONTINUED | OUTPATIENT
Start: 2024-06-10 | End: 2024-06-11 | Stop reason: HOSPADM

## 2024-06-09 RX ORDER — LOSARTAN POTASSIUM 25 MG/1
100 TABLET ORAL DAILY
Status: DISCONTINUED | OUTPATIENT
Start: 2024-06-10 | End: 2024-06-09

## 2024-06-09 RX ORDER — PANTOPRAZOLE SODIUM 40 MG/1
40 TABLET, DELAYED RELEASE ORAL
Status: DISCONTINUED | OUTPATIENT
Start: 2024-06-10 | End: 2024-06-11 | Stop reason: HOSPADM

## 2024-06-09 RX ORDER — ONDANSETRON 2 MG/ML
4 INJECTION INTRAMUSCULAR; INTRAVENOUS EVERY 6 HOURS PRN
Status: DISCONTINUED | OUTPATIENT
Start: 2024-06-09 | End: 2024-06-11 | Stop reason: HOSPADM

## 2024-06-09 RX ORDER — LEVALBUTEROL INHALATION SOLUTION 1.25 MG/3ML
1.25 SOLUTION RESPIRATORY (INHALATION) EVERY 6 HOURS PRN
Status: DISCONTINUED | OUTPATIENT
Start: 2024-06-09 | End: 2024-06-09

## 2024-06-09 RX ORDER — BENZONATATE 100 MG/1
100 CAPSULE ORAL 3 TIMES DAILY PRN
Status: DISCONTINUED | OUTPATIENT
Start: 2024-06-09 | End: 2024-06-11 | Stop reason: HOSPADM

## 2024-06-09 RX ORDER — LABETALOL HYDROCHLORIDE 5 MG/ML
10 INJECTION, SOLUTION INTRAVENOUS EVERY 6 HOURS PRN
Status: DISCONTINUED | OUTPATIENT
Start: 2024-06-09 | End: 2024-06-11 | Stop reason: HOSPADM

## 2024-06-09 RX ORDER — LIDOCAINE 50 MG/G
1 PATCH TOPICAL ONCE
Status: COMPLETED | OUTPATIENT
Start: 2024-06-09 | End: 2024-06-10

## 2024-06-09 RX ORDER — DOCUSATE SODIUM 100 MG/1
100 CAPSULE, LIQUID FILLED ORAL 2 TIMES DAILY
Status: DISCONTINUED | OUTPATIENT
Start: 2024-06-09 | End: 2024-06-09

## 2024-06-09 RX ORDER — ATORVASTATIN CALCIUM 20 MG/1
20 TABLET, FILM COATED ORAL
Status: DISCONTINUED | OUTPATIENT
Start: 2024-06-10 | End: 2024-06-09 | Stop reason: ALTCHOICE

## 2024-06-09 RX ORDER — ALBUTEROL SULFATE 2.5 MG/3ML
2.5 SOLUTION RESPIRATORY (INHALATION) EVERY 4 HOURS PRN
Status: DISCONTINUED | OUTPATIENT
Start: 2024-06-09 | End: 2024-06-11 | Stop reason: HOSPADM

## 2024-06-09 RX ORDER — HEPARIN SODIUM 5000 [USP'U]/ML
5000 INJECTION, SOLUTION INTRAVENOUS; SUBCUTANEOUS EVERY 8 HOURS SCHEDULED
Status: DISCONTINUED | OUTPATIENT
Start: 2024-06-09 | End: 2024-06-11 | Stop reason: HOSPADM

## 2024-06-09 RX ORDER — SENNOSIDES 8.6 MG
2 TABLET ORAL
Status: DISCONTINUED | OUTPATIENT
Start: 2024-06-09 | End: 2024-06-11 | Stop reason: HOSPADM

## 2024-06-09 RX ORDER — ALBUTEROL SULFATE 90 UG/1
2 AEROSOL, METERED RESPIRATORY (INHALATION) EVERY 4 HOURS PRN
Status: DISCONTINUED | OUTPATIENT
Start: 2024-06-09 | End: 2024-06-11 | Stop reason: HOSPADM

## 2024-06-09 RX ADMIN — BIMATOPROST 1 DROP: 0.3 SOLUTION/ DROPS OPHTHALMIC at 21:38

## 2024-06-09 RX ADMIN — LOSARTAN POTASSIUM 100 MG: 50 TABLET, FILM COATED ORAL at 21:37

## 2024-06-09 RX ADMIN — NIRMATRELVIR AND RITONAVIR 2 TABLET: KIT at 21:38

## 2024-06-09 RX ADMIN — ACETAMINOPHEN 975 MG: 325 TABLET, FILM COATED ORAL at 15:53

## 2024-06-09 RX ADMIN — HEPARIN SODIUM 5000 UNITS: 5000 INJECTION, SOLUTION INTRAVENOUS; SUBCUTANEOUS at 21:39

## 2024-06-09 RX ADMIN — TRAZODONE HYDROCHLORIDE 25 MG: 50 TABLET ORAL at 21:37

## 2024-06-09 RX ADMIN — LABETALOL HYDROCHLORIDE 10 MG: 5 INJECTION, SOLUTION INTRAVENOUS at 17:14

## 2024-06-09 RX ADMIN — IPRATROPIUM BROMIDE 0.5 MG: 0.5 SOLUTION RESPIRATORY (INHALATION) at 21:43

## 2024-06-09 RX ADMIN — LIDOCAINE 5% 1 PATCH: 700 PATCH TOPICAL at 15:53

## 2024-06-09 RX ADMIN — SENNOSIDES 17.2 MG: 8.6 TABLET, FILM COATED ORAL at 21:37

## 2024-06-09 RX ADMIN — LEVALBUTEROL HYDROCHLORIDE 1.25 MG: 1.25 SOLUTION RESPIRATORY (INHALATION) at 21:43

## 2024-06-09 RX ADMIN — DIAZEPAM 5 MG: 5 INJECTION, SOLUTION INTRAMUSCULAR; INTRAVENOUS at 21:52

## 2024-06-09 RX ADMIN — SODIUM CHLORIDE 500 ML: 0.9 INJECTION, SOLUTION INTRAVENOUS at 16:11

## 2024-06-09 RX ADMIN — MONTELUKAST 10 MG: 10 TABLET, FILM COATED ORAL at 21:37

## 2024-06-09 RX ADMIN — DIAZEPAM 5 MG: 5 INJECTION, SOLUTION INTRAMUSCULAR; INTRAVENOUS at 16:12

## 2024-06-09 NOTE — ED PROVIDER NOTES
History  Chief Complaint   Patient presents with    Flank Pain     Pt reports right back pain radiating to right leg. Pt reports increase in urination, with burning. Pt recently traveled back from [place] on thursday     The patient is an 79-year-old female with PMH of asthma, HTN, HLD, CAD, gastric ulcer, and GERD presenting for evaluation of 2 weeks of right-sided back pain.  Patient was recently in [place] for the past 3 months.  While still there, she developed right flank to right lower back pain described as constant, sharp/shooting/pressure, radiating to the right posterolateral thigh up into the knee, worsened with movement, and unrelieved with Tylenol and Jj back and body.  She returned from [place] on Thursday (4 days PTA).  She was seen that day in the ER while still in [place] and was given Toradol with some relief of her pain.  She was brought in by her daughter as she has ongoing pain as well as new urinary urgency, frequency, and burning with urination.  She notes the pain is so strong that it makes her dizzy and takes her breath away.  She notes a dry cough x 2 weeks which also worsens her back pain.  She denies fevers, chills, sore throat, chest pain, abdominal pain, N/V, change in bowel, leg swelling, and skin changes.      History provided by:  Patient and relative   used: No    Flank Pain  Associated symptoms: cough and dysuria    Associated symptoms: no chest pain, no chills, no diarrhea, no fever, no hematuria, no nausea and no vomiting        Prior to Admission Medications   Prescriptions Last Dose Informant Patient Reported? Taking?   Synthroid 88 MCG tablet   No No   Sig: Take 1 tablet (88 mcg total) by mouth daily   Timolol Maleate, Once-Daily, 0.5 % SOLN  Child No No   Sig: Apply 1 drop to eye every 12 (twelve) hours   Patient not taking: Reported on 3/11/2024   albuterol (PROVENTIL HFA,VENTOLIN HFA) 90 mcg/act inhaler  Child No No   Sig: Inhale 2 puffs every 6 (six)  hours as needed for wheezing   alendronate (FOSAMAX) 70 mg tablet  Child No No   Sig: Take 1 tablet (70 mg total) by mouth once a week   atorvastatin (LIPITOR) 20 mg tablet   No No   Sig: Take 1 tablet by mouth once daily   benzonatate (TESSALON PERLES) 100 mg capsule  Child No No   Sig: Take 1 capsule (100 mg total) by mouth 3 (three) times a day as needed for cough   bimatoprost (Lumigan) 0.01 % ophthalmic drops  Child No No   Sig: Administer 1 drop to both eyes daily   buPROPion (WELLBUTRIN XL) 150 mg 24 hr tablet  Child No No   Sig: Take 1 tablet (150 mg total) by mouth every morning With 300 mg to equal 450 mg daily   buPROPion (WELLBUTRIN XL) 300 mg 24 hr tablet   No No   Sig: Take 1 tablet by mouth once daily   docusate sodium (COLACE) 100 mg capsule  Child No No   Sig: Take 1 capsule (100 mg total) by mouth 2 (two) times a day   fluticasone-vilanterol (Breo Ellipta) 200-25 mcg/actuation inhaler  Child No No   Sig: Inhale 1 puff daily Rinse mouth after use.   ipratropium (ATROVENT) 0.02 % nebulizer solution   No No   Sig: Take 2.5 mL (0.5 mg total) by nebulization every 6 (six) hours as needed for wheezing or shortness of breath   levalbuterol (XOPENEX) 1.25 mg/3 mL nebulizer solution   No No   Sig: Take 3 mL (1.25 mg total) by nebulization every 6 (six) hours as needed for wheezing or shortness of breath   losartan (COZAAR) 100 MG tablet  Child No No   Sig: Take 1 tablet by mouth once daily   montelukast (SINGULAIR) 10 mg tablet  Child No No   Sig: Take 1 tablet (10 mg total) by mouth daily at bedtime   omeprazole (PriLOSEC) 40 MG capsule   No No   Sig: Take 1 capsule (40 mg total) by mouth 2 (two) times a day   polyethylene glycol (MiraLax) 17 GM/SCOOP powder   No No   Sig: Take 238 g by mouth once for 1 dose Take 238 g my mouth. Use as directed   promethazine-dextromethorphan (PHENERGAN-DM) 6.25-15 mg/5 mL oral syrup  Child No No   Sig: Take 5 mL by mouth 4 (four) times a day as needed for cough    sertraline (ZOLOFT) 50 mg tablet  Child No No   Sig: Take 1 tablet by mouth once daily   traZODone (DESYREL) 50 mg tablet   No No   Sig: Take 1 tablet (50 mg total) by mouth daily at bedtime      Facility-Administered Medications: None       Past Medical History:   Diagnosis Date    Asthma     Chronic pain disorder     Back    Coronary artery disease     Dyslipidemia     Esophageal reflux     Frequent headaches     stress related    Headache(784.0)     Heart murmur     History of stomach ulcers     Hypercalcemia     Hypertension     Osteopenia     Tremor        Past Surgical History:   Procedure Laterality Date    BACK SURGERY      lower back    BILATERAL OOPHORECTOMY      BREAST BIOPSY Right     long ago, benign     SECTION      CHOLECYSTECTOMY      COLONOSCOPY      HYSTERECTOMY      age 36 per MRS    JOINT REPLACEMENT Left     knee    OOPHORECTOMY Bilateral     age 36 per MRS    MA WEBB IMPLTJ NSTIM ELTRDS PLATE/PADDLE EDRL Left 2019    Procedure: LAMINECTOMY THORACIC FOR INSERTION DORSAL COLUMN SPINAL CORD STIMULATOR (DCS) W IMPLANTABLE PULSE GENERATOR, LEFT GLUTE;  Surgeon: Lg Mccallum MD;  Location:  MAIN OR;  Service: Neurosurgery    MA REVJ/RMVL IMPL SPI NPG/RCVR DTCH CONNJ ELTRD RA Left 2022    Procedure: Reopening of thoracic and left buttock incisions for removal of spinal cord stimulator system;  Surgeon: Siva Goss MD;  Location: [facility];  Service: Neurosurgery    ROTATOR CUFF REPAIR Right     SPINAL STIMULATOR PLACEMENT N/A 2019    Procedure: REVISION SPINAL CORD STIMULATOR PADDLE ELECTRODE, REPLACEMENT WITH PERCUTANEOUS ELECTRODES OR SMALLER PADDLE;  Surgeon: Lg Mccallum MD;  Location: [facility];  Service: Neurosurgery       Family History   Problem Relation Age of Onset    No Known Problems Mother     Glaucoma Father     No Known Problems Sister     No Known Problems Sister     No Known Problems Sister     Depression Sister     No Known Problems Sister     No Known  Problems Maternal Grandmother     No Known Problems Maternal Grandfather     No Known Problems Paternal Grandmother     No Known Problems Paternal Grandfather     No Known Problems Daughter     No Known Problems Daughter     No Known Problems Daughter     Hypertension Family     Stroke Family     Heart disease Family     Thyroid disease Family     Colon cancer Neg Hx     Colon polyps Neg Hx      I have reviewed and agree with the history as documented.    E-Cigarette/Vaping    E-Cigarette Use Never User      E-Cigarette/Vaping Substances    Nicotine No     THC No     CBD No     Flavoring No     Other No     Unknown No      Social History     Tobacco Use    Smoking status: Former     Current packs/day: 0.00     Types: Cigarettes     Start date: 1988     Quit date: 1991     Years since quittin.1    Smokeless tobacco: Never   Vaping Use    Vaping status: Never Used   Substance Use Topics    Alcohol use: Not Currently    Drug use: Never       Review of Systems   Constitutional:  Negative for chills and fever.   Respiratory:  Positive for cough.    Cardiovascular:  Negative for chest pain, palpitations and leg swelling.   Gastrointestinal:  Negative for abdominal pain, diarrhea, nausea and vomiting.   Genitourinary:  Positive for dysuria, flank pain, frequency and urgency. Negative for decreased urine volume, difficulty urinating and hematuria.   Musculoskeletal:  Positive for back pain and gait problem. Negative for neck pain and neck stiffness.   Skin:  Negative for rash and wound.   Neurological:  Positive for dizziness. Negative for syncope, weakness, light-headedness and headaches.   All other systems reviewed and are negative.      Physical Exam  Physical Exam  Vitals and nursing note reviewed.   Constitutional:       General: She is awake. She is in acute distress (Evidencing moderate discomfort and mild distress).      Appearance: Normal appearance. She is well-developed. She is not ill-appearing,  toxic-appearing or diaphoretic.   HENT:      Head: Normocephalic and atraumatic.      Jaw: There is normal jaw occlusion.      Nose: Nose normal.      Mouth/Throat:      Lips: Pink. No lesions.      Mouth: Mucous membranes are moist.   Eyes:      General: Lids are normal. Vision grossly intact. Gaze aligned appropriately.      Extraocular Movements: Extraocular movements intact.      Conjunctiva/sclera: Conjunctivae normal.   Neck:      Trachea: Phonation normal. No abnormal tracheal secretions.   Cardiovascular:      Rate and Rhythm: Normal rate and regular rhythm.      Pulses:           Radial pulses are 2+ on the right side and 2+ on the left side.        Dorsalis pedis pulses are 2+ on the right side and 2+ on the left side.        Posterior tibial pulses are 2+ on the right side and 2+ on the left side.      Heart sounds: Normal heart sounds, S1 normal and S2 normal. No murmur heard.  Pulmonary:      Effort: Pulmonary effort is normal. No tachypnea or respiratory distress.      Breath sounds: Normal breath sounds and air entry. No stridor, decreased air movement or transmitted upper airway sounds. No decreased breath sounds.   Abdominal:      Palpations: Abdomen is soft.      Tenderness: There is no abdominal tenderness. There is right CVA tenderness. There is no left CVA tenderness, guarding or rebound.   Musculoskeletal:      Right shoulder: Normal.      Left shoulder: Normal.      Cervical back: Normal and neck supple. No bony tenderness.      Thoracic back: Tenderness and bony tenderness (Diffuse thoracic spine tenderness) present. No swelling or deformity.      Lumbar back: Spasms, tenderness (Right paraspinal muscles and right buttocks) and bony tenderness (Diffuse lumbar spine tenderness) present. No swelling, edema, deformity or signs of trauma. Normal range of motion. Positive right straight leg raise test. Negative left straight leg raise test.        Back:       Right lower leg: No edema.      Left  lower leg: No edema.   Skin:     General: Skin is warm and dry.      Capillary Refill: Capillary refill takes less than 2 seconds.      Findings: No rash or wound.   Neurological:      General: No focal deficit present.      Mental Status: She is alert and oriented to person, place, and time. Mental status is at baseline.   Psychiatric:         Behavior: Behavior is cooperative.         Vital Signs  ED Triage Vitals [06/09/24 1328]   Temperature Pulse Respirations Blood Pressure SpO2   98.6 °F (37 °C) 79 20 (!) 209/83 97 %      Temp Source Heart Rate Source Patient Position - Orthostatic VS BP Location FiO2 (%)   Temporal Monitor Sitting Left arm --      Pain Score       10 - Worst Possible Pain           Vitals:    06/09/24 1630 06/09/24 1745 06/09/24 1830 06/09/24 1900   BP: (!) 232/92 147/67 163/66 162/73   Pulse: 80 71 64 68   Patient Position - Orthostatic VS:             Visual Acuity      ED Medications  Medications   lidocaine (LIDODERM) 5 % patch 1 patch (1 patch Topical Medication Applied 6/9/24 1553)   labetalol (NORMODYNE) injection 10 mg (has no administration in time range)   diazepam (VALIUM) injection 5 mg (has no administration in time range)   lidocaine (LIDODERM) 5 % patch 1 patch (has no administration in time range)   sodium chloride 0.9 % bolus 500 mL (0 mL Intravenous Stopped 6/9/24 1711)   acetaminophen (TYLENOL) tablet 975 mg (975 mg Oral Given 6/9/24 1553)   diazepam (VALIUM) injection 5 mg (5 mg Intravenous Given 6/9/24 1612)   labetalol (NORMODYNE) injection 10 mg (10 mg Intravenous Given 6/9/24 1714)       Diagnostic Studies  Results Reviewed       Procedure Component Value Units Date/Time    HS Troponin I 2hr [[id number]]  (Normal) Collected: 06/09/24 1820    Lab Status: Final result Specimen: Blood from Arm, Left Updated: 06/09/24 1858     hs TnI 2hr 31 ng/L      Delta 2hr hsTnI 1 ng/L     FLU/RSV/COVID - if FLU/RSV clinically relevant [[id number]]  (Abnormal) Collected: 06/09/24 1600     Lab Status: Final result Specimen: Nares from Nose Updated: 06/09/24 1653     SARS-CoV-2 Positive     INFLUENZA A PCR Negative     INFLUENZA B PCR Negative     RSV PCR Negative    Narrative:      FOR PEDIATRIC PATIENTS - copy/paste COVID Guidelines URL to browser: [email or web address]    SARS-CoV-2 assay is a Nucleic Acid Amplification assay intended for the  qualitative detection of nucleic acid from SARS-CoV-2 in nasopharyngeal  swabs. Results are for the presumptive identification of SARS-CoV-2 RNA.    Positive results are indicative of infection with SARS-CoV-2, the virus  causing COVID-19, but do not rule out bacterial infection or co-infection  with other viruses. Laboratories within the [place] and its  territories are required to report all positive results to the appropriate  public health authorities. Negative results do not preclude SARS-CoV-2  infection and should not be used as the sole basis for treatment or other  patient management decisions. Negative results must be combined with  clinical observations, patient history, and epidemiological information.  This test has not been FDA cleared or approved.    This test has been authorized by FDA under an Emergency Use Authorization  (EUA). This test is only authorized for the duration of time the  declaration that circumstances exist justifying the authorization of the  emergency use of an in vitro diagnostic tests for detection of SARS-CoV-2  virus and/or diagnosis of COVID-19 infection under section 564(b)(1) of  the Act, 21 U.S.C. 360bbb-3(b)(1), unless the authorization is terminated  or revoked sooner. The test has been validated but independent review by FDA  and CLIA is pending.    Test performed using [employer]: This RT-PCR assay targets N2,  a region unique to SARS-CoV-2. A conserved region in the E-gene was chosen  for pan-Sarbecovirus detection which includes  SARS-CoV-2.    According to CMS-2020-01-R, this platform meets the definition of high-throughput technology.    HS Troponin 0hr (reflex protocol) [[id number]]  (Normal) Collected: 06/09/24 1610    Lab Status: Final result Specimen: Blood from Arm, Left Updated: 06/09/24 1642     hs TnI 0hr 30 ng/L     UA w Reflex to Microscopic w Reflex to Culture [[id number]]  (Abnormal) Collected: 06/09/24 1603    Lab Status: Final result Specimen: Urine, Clean Catch Updated: 06/09/24 1619     Color, UA Light Yellow     Clarity, UA Clear     Specific Gravity, UA <1.005     pH, UA 6.0     Leukocytes, UA Negative     Nitrite, UA Negative     Protein, UA Negative mg/dl      Glucose, UA Negative mg/dl      Ketones, UA Negative mg/dl      Urobilinogen, UA <2.0 mg/dl      Bilirubin, UA Negative     Occult Blood, UA Negative    Lipase [[id number]]  (Normal) Collected: 06/09/24 1334    Lab Status: Final result Specimen: Blood from Arm, Left Updated: 06/09/24 1432     Lipase 14 u/L     Comprehensive metabolic panel [[id number]]  (Abnormal) Collected: 06/09/24 1334    Lab Status: Final result Specimen: Blood from Arm, Left Updated: 06/09/24 1432     Sodium 140 mmol/L      Potassium 4.7 mmol/L      Chloride 109 mmol/L      CO2 26 mmol/L      ANION GAP 5 mmol/L      BUN 23 mg/dL      Creatinine 1.09 mg/dL      Glucose 116 mg/dL      Calcium 10.3 mg/dL      AST 35 U/L      ALT 78 U/L      Alkaline Phosphatase 79 U/L      Total Protein 7.0 g/dL      Albumin 4.0 g/dL      Total Bilirubin 0.65 mg/dL      eGFR 47 ml/min/1.73sq m     Narrative:      National Kidney Disease Foundation guidelines for Chronic Kidney Disease (CKD):     Stage 1 with normal or high GFR (GFR > 90 mL/min/1.73 square meters)    Stage 2 Mild CKD (GFR = 60-89 mL/min/1.73 square meters)    Stage 3A Moderate CKD (GFR = 45-59 mL/min/1.73 square meters)    Stage 3B Moderate CKD (GFR = 30-44 mL/min/1.73 square meters)    Stage 4 Severe CKD (GFR = 15-29 mL/min/1.73 square meters)     Stage 5 End Stage CKD (GFR <15 mL/min/1.73 square meters)  Note: GFR calculation is accurate only with a steady state creatinine    CBC and differential [[id number]]  (Abnormal) Collected: 06/09/24 1334    Lab Status: Final result Specimen: Blood from Arm, Left Updated: 06/09/24 1406     WBC 10.38 Thousand/uL      RBC 3.74 Million/uL      Hemoglobin 11.7 g/dL      Hematocrit 35.7 %      MCV 96 fL      MCH 31.3 pg      MCHC 32.8 g/dL      RDW 13.9 %      MPV 12.1 fL      Platelets 188 Thousands/uL      nRBC 0 /100 WBCs      Segmented % 71 %      Immature Grans % 1 %      Lymphocytes % 14 %      Monocytes % 9 %      Eosinophils Relative 5 %      Basophils Relative 0 %      Absolute Neutrophils 7.37 Thousands/µL      Absolute Immature Grans 0.08 Thousand/uL      Absolute Lymphocytes 1.45 Thousands/µL      Absolute Monocytes 0.95 Thousand/µL      Eosinophils Absolute 0.49 Thousand/µL      Basophils Absolute 0.04 Thousands/µL                    CT head without contrast   Final Result by Omkar Hathaway MD (06/09 1903)      No acute intracranial abnormality.                  Workstation performed: [id number]         CT abdomen pelvis wo contrast   Final Result by Omkar Hathaway MD (06/09 1907)      No evidence of acute process in the abdomen or pelvis.      Workstation performed: [id number]         CT recon only lumbar spine (no charge)   Final Result by Omkar Hathaway MD (06/09 1908)      Postsurgical change from L4-5 laminectomies and decompression of the central canal.            Workstation performed: [id number]         XR chest 2 views   ED Interpretation by CRISTINA Lombardi (06/09 1855)   No focal consolidation identified by me.  No acute cardiopulmonary disease identified by me.                 Procedures  ECG 12 Lead Documentation Only    Date/Time: 6/9/2024 3:55 PM    Performed by: CRISTINA Lombardi  Authorized by: CRISTINA Lombardi    Indications / Diagnosis:  Dizziness  ECG reviewed by me, the  ED Provider: yes    Patient location:  ED  Previous ECG:     Previous ECG:  Compared to current    Comparison ECG info:  March 2, 2024    Similarity:  No change    Comparison to cardiac monitor: Yes    Interpretation:     Interpretation: abnormal    Rate:     ECG rate:  78    ECG rate assessment: normal    Rhythm:     Rhythm: sinus rhythm    Ectopy:     Ectopy: none    QRS:     QRS axis:  Normal    QRS intervals:  Wide  Conduction:     Conduction: abnormal      Abnormal conduction: complete RBBB    ST segments:     ST segments:  Non-specific  T waves:     T waves: non-specific and inverted      Inverted:  AVR, V1 and V2  Comments:      Normal sinus rhythm, normal axis, complete right bundle branch block, no acute ischemic changes read by me           ED Course  ED Course as of 06/09/24 1952   Sun Jun 09, 2024   1619 UA w Reflex to Microscopic w Reflex to Culture(!)  No evidence of infection or hematuria   1646 hs TnI 0hr: 30  Will delta   1656 SARS-COV-2(!): Positive   1656 On reevaluation, patient appears more comfortable.  She reports her pain is improving.  I discussed the blood work and UA findings as well as finding of COVID-19.  Suspect her dry irritative cough and generalized myalgias of the low back are related to COVID.  She is continue to have dizziness and feeling off balance when up moving around.  Neurologic exam nonfocal,will obtain CT head to further evaluate for acute intracranial abnormality.  Patient with right CVA tenderness and diffuse midline lower back pain, will obtain imaging to further evaluate for compression fracture, ureteral stone, and other structural cause the patient's pain   1906 CT head without contrast  IMPRESSION:     No acute intracranial abnormality.     1912 CT abdomen pelvis wo contrast  IMPRESSION:     No evidence of acute process in the abdomen or pelvis.     1912 CT recon only lumbar spine (no charge)  IMPRESSION:     Postsurgical change from L4-5 laminectomies and  decompression of the central canal.                                    SBIRT 20yo+      Flowsheet Row Most Recent Value   Initial Alcohol Screen: US AUDIT-C     1. How often do you have a drink containing alcohol? 0 Filed at: 06/09/2024 1608   2. How many drinks containing alcohol do you have on a typical day you are drinking?  0 Filed at: 06/09/2024 1608   3b. FEMALE Any Age, or MALE 65+: How often do you have 4 or more drinks on one occassion? 0 Filed at: 06/09/2024 1608   Audit-C Score 0 Filed at: 06/09/2024 1608   YULISSA: How many times in the past year have you...    Used an illegal drug or used a prescription medication for non-medical reasons? Never Filed at: 06/09/2024 1608                      Medical Decision Making  DDx including but not limited to: Sciatica, herniated disc, compression fracture, arthritis, spinal stenosis, strain, sprain, UTI, ureteral stone, renal colic, pyelonephritis; considered but doubt cauda equina syndrome or transverse myelitis or epidural abscess; considered but doubt atypical ACS or AAA    Workup with finding of COVID-19 infection.  Chest x-ray reviewed by myself and without evidence of focal consolidation to suggest superimposed pneumonia.  She does have dizziness mostly when getting up and moving around with otherwise stable nonfocal neurologic exam.  CT head without acute abnormality.  Patient with right-sided flank pain and urinary tract symptoms, CT abdomen pelvis with reconstruction of the lumbar spine obtained to further evaluate for ureteral stone, intra-abdominal pathology and/or compression fracture.  No acute abnormality noted on that imaging.  UA without evidence of infection.  The patient was hypertensive here in the ED for which a dose of IV labetalol was given.  Her daughter notes that she does take her antihypertensives at nighttime with last dosing yesterday evening.  She had good response to that one-time dose.  On discussing disposition planning, the patient's  daughter expresses concern for safety at home as she is notably more weak than normal and not at her baseline walking.  On ambulatory trial to the bathroom she uses a walker with heavy nursing assistance.  Discussed with SL[facility] and plan made for admission for supportive care of finding of COVID-19 and for PT/OT eval for amatory dysfunction.    Problems Addressed:  Ambulatory dysfunction: acute illness or injury  COVID-19: acute illness or injury  Generalized weakness: acute illness or injury    Amount and/or Complexity of Data Reviewed  Labs: ordered. Decision-making details documented in ED Course.  Radiology: ordered and independent interpretation performed. Decision-making details documented in ED Course.  ECG/medicine tests: ordered and independent interpretation performed.    Risk  OTC drugs.  Prescription drug management.  Decision regarding hospitalization.             Disposition  Final diagnoses:   Generalized weakness   COVID-19   Ambulatory dysfunction     Time reflects when diagnosis was documented in both MDM as applicable and the Disposition within this note       Time User Action Codes Description Comment    6/9/2024  7:38 PM Ericka Stahl [R53.1] Generalized weakness     6/9/2024  7:39 PM Ericka Stahl [U07.1] COVID-19     6/9/2024  7:39 PM Ericka Stahl [R26.2] Ambulatory dysfunction           ED Disposition       ED Disposition   Admit    Condition   Stable    Date/Time   Sun Jun 9, 2024 1939    Comment   Case was discussed with BIJU Lowery and the patient's admission status was agreed to be Admission Status: inpatient status to the service of Dr. Tinajero.               Follow-up Information    None         Patient's Medications   Discharge Prescriptions    No medications on file       No discharge procedures on file.    PDMP Review         Value Time User    PDMP Reviewed  Yes 3/5/2024 10:12 AM Lina Londono MD            ED Provider  Electronically Signed by             Ericka Stahl  CRISTINA  06/09/24 1952

## 2024-06-09 NOTE — ED NOTES
Pt on call bell stating she needs to urinate. Pt ambulated to bathroom independently with steady gait with standby assist of this RN.     Elsie Neff RN  06/09/24 [id number]

## 2024-06-09 NOTE — ASSESSMENT & PLAN NOTE
Incidentally positive for COVID-19 after recent trip to [place]  Patient is overall asymptomatic however falls into the high risk group given her severe persistent asthma and CKD  Per protocol we will start the patient on Paxlovid, she is agreeable  Monitor respiratory status closely  DVT prophylaxis with subcu heparin

## 2024-06-09 NOTE — ASSESSMENT & PLAN NOTE
With ambulatory dysfunction secondary to severe low back pain  PT and OT recommendations appreciated

## 2024-06-09 NOTE — H&P
Formerly [facility]  H&P  Name: Cindy Cruz 79 y.o. female I MRN: [id number]  Unit/Bed#: OVR 02 I Date of Admission: 6/9/2024   Date of Service: 6/9/2024 I Hospital Day: 0      Assessment & Plan   * Acute bilateral low back pain with left-sided sciatica  Assessment & Plan  She has acute on chronic low back pain presently, exacerbated by recent trip to [place], likely musculoskeletal injury of back  Overall improved presently with Tylenol, lidocaine, Valium  Continue analgesic regimen as above  PT and OT    Ambulatory dysfunction  Assessment & Plan  With ambulatory dysfunction secondary to severe low back pain  PT and OT recommendations appreciated    COVID-19  Assessment & Plan  Incidentally positive for COVID-19 after recent trip to [place]  Patient is overall asymptomatic however falls into the high risk group given her severe persistent asthma and CKD  Per protocol we will start the patient on Paxlovid, she is agreeable  Monitor respiratory status closely  DVT prophylaxis with subcu heparin    Chronic kidney disease, stage 3a (HCC)  Assessment & Plan  Lab Results   Component Value Date    EGFR 47 06/09/2024    EGFR 39 03/07/2024    EGFR 42 03/05/2024    CREATININE 1.09 06/09/2024    CREATININE 1.27 03/07/2024    CREATININE 1.21 03/05/2024   Creatinine is at baseline, monitor    Depression, recurrent (HCC)  Assessment & Plan  Continue home Zoloft, trazodone, Wellbutrin  Mood is stable    Severe persistent asthma without complication  Assessment & Plan  The patient has severe persistent asthma making her high risk given her COVID-19 infection  Continue home inhaler regimen  Xopenex/Atrovent as needed  Respiratory protocol  I do not feel that she is in acute asthma exacerbation presently    Hypertension  Assessment & Plan  Elevated BP upon arrival to the ER secondary to low back pain  Blood pressure is now improved into the 160s  Resume home losartan 100 mg daily  Labetalol as  needed  Control pain and monitor BP closely    Hypothyroidism  Assessment & Plan  Continue home Synthroid         VTE Pharmacologic Prophylaxis: VTE Score: 6 High Risk (Score >/= 5) - Pharmacological DVT Prophylaxis Ordered: heparin. Sequential Compression Devices Ordered.  Code Status: full code    Anticipated Length of Stay: Patient will be admitted on an inpatient basis with an anticipated length of stay of greater than 2 midnights secondary to acute low back pain and amatory dysfunction requiring pain control, PT and OT recommendations prior to discharge.    Total Time for Visit, including Counseling / Coordination of Care: 75 Minutes. Greater than 50% of this total time spent on direct patient counseling and coordination of care.    Chief Complaint: Low back pain    History of Present Illness:  Cindy Cruz is a 79 y.o. female with a PMH of hypertension, asthma, chronic low back pain, CKD who presents with chief complaint of low back pain which has been worsening since returning from [place].  Went into the ER in [place], received some Toradol with improvement of her symptoms.  Ever since coming home has had severe and persistent low back pain to the point where she is unable to ambulate.  Unable to ambulate in the ER although her pain is improved with multimodal pain regimen.  Incidentally patient is found to have COVID-19 infection.  She does not have any shortness of breath presently but does have severe persistent asthma.  Admitted for PT and OT and possible rehab placement.    Review of Systems:  Review of Systems   Constitutional:  Negative for activity change, appetite change, chills, fatigue and fever.   HENT:  Negative for congestion, rhinorrhea, sinus pressure and sore throat.    Eyes:  Negative for photophobia, pain and visual disturbance.   Respiratory:  Negative for cough, shortness of breath and wheezing.    Cardiovascular:  Negative for chest pain, palpitations and leg swelling.    Gastrointestinal:  Negative for abdominal distention, abdominal pain, constipation, diarrhea, nausea and vomiting.   Endocrine: Negative for cold intolerance, heat intolerance, polydipsia and polyuria.   Genitourinary:  Negative for difficulty urinating, dysuria, flank pain, frequency and hematuria.   Musculoskeletal:  Positive for back pain. Negative for arthralgias and joint swelling.   Skin:  Negative for color change, pallor and rash.   Allergic/Immunologic: Negative.    Neurological:  Negative for dizziness, syncope, weakness, light-headedness and headaches.   Hematological: Negative.    Psychiatric/Behavioral: Negative.         Past Medical and Surgical History:   Past Medical History:   Diagnosis Date    Asthma     Chronic pain disorder     Back    Coronary artery disease     Dyslipidemia     Esophageal reflux     Frequent headaches     stress related    Headache(784.0)     Heart murmur     History of stomach ulcers     Hypercalcemia     Hypertension     Osteopenia     Tremor        Past Surgical History:   Procedure Laterality Date    BACK SURGERY      lower back    BILATERAL OOPHORECTOMY      BREAST BIOPSY Right     long ago, benign     SECTION      CHOLECYSTECTOMY      COLONOSCOPY      HYSTERECTOMY      age 36 per MRS    JOINT REPLACEMENT Left     knee    OOPHORECTOMY Bilateral     age 36 per MRS    MA WEBB IMPLTJ NSTIM ELTRDS PLATE/PADDLE EDRL Left 2019    Procedure: LAMINECTOMY THORACIC FOR INSERTION DORSAL COLUMN SPINAL CORD STIMULATOR (DCS) W IMPLANTABLE PULSE GENERATOR, LEFT GLUTE;  Surgeon: Lg Mccallum MD;  Location:  MAIN OR;  Service: Neurosurgery    MA REVJ/RMVL IMPL SPI NPG/RCVR DTCH CONNJ ELTRD RA Left 2022    Procedure: Reopening of thoracic and left buttock incisions for removal of spinal cord stimulator system;  Surgeon: Siva Goss MD;  Location:  MAIN OR;  Service: Neurosurgery    ROTATOR CUFF REPAIR Right     SPINAL STIMULATOR PLACEMENT N/A 2019     Procedure: REVISION SPINAL CORD STIMULATOR PADDLE ELECTRODE, REPLACEMENT WITH PERCUTANEOUS ELECTRODES OR SMALLER PADDLE;  Surgeon: Lg Mccallum MD;  Location: [facility];  Service: Neurosurgery       Meds/Allergies:  Prior to Admission medications    Medication Sig Start Date End Date Taking? Authorizing Provider   albuterol (PROVENTIL HFA,VENTOLIN HFA) 90 mcg/act inhaler Inhale 2 puffs every 6 (six) hours as needed for wheezing 3/8/24   CRISTINA Correa   alendronate (FOSAMAX) 70 mg tablet Take 1 tablet (70 mg total) by mouth once a week 8/31/23   Any Vargas DO   atorvastatin (LIPITOR) 20 mg tablet Take 1 tablet by mouth once daily 6/8/24   Any Vargas DO   benzonatate (TESSALON PERLES) 100 mg capsule Take 1 capsule (100 mg total) by mouth 3 (three) times a day as needed for cough 3/8/24   CRISTINA Correa   bimatoprost (Lumigan) 0.01 % ophthalmic drops Administer 1 drop to both eyes daily 8/8/23   Chay Holman MD   buPROPion (WELLBUTRIN XL) 150 mg 24 hr tablet Take 1 tablet (150 mg total) by mouth every morning With 300 mg to equal 450 mg daily 8/31/23   nAy Vargas DO   buPROPion (WELLBUTRIN XL) 300 mg 24 hr tablet Take 1 tablet by mouth once daily 4/4/24   Any Vargas DO   docusate sodium (COLACE) 100 mg capsule Take 1 capsule (100 mg total) by mouth 2 (two) times a day 12/6/23   CRISTINA Ellison   fluticasone-vilanterol (Breo Ellipta) 200-25 mcg/actuation inhaler Inhale 1 puff daily Rinse mouth after use. 3/8/24 6/6/24  CRISTINA Correa   ipratropium (ATROVENT) 0.02 % nebulizer solution Take 2.5 mL (0.5 mg total) by nebulization every 6 (six) hours as needed for wheezing or shortness of breath 3/12/24   Any Vargas DO   levalbuterol (XOPENEX) 1.25 mg/3 mL nebulizer solution Take 3 mL (1.25 mg total) by nebulization every 6 (six) hours as needed for wheezing or shortness of breath 3/12/24   Anyalma Vargas DO   losartan (COZAAR) 100 MG tablet Take 1 tablet by mouth once  daily 23   Tony Blue MD   montelukast (SINGULAIR) 10 mg tablet Take 1 tablet (10 mg total) by mouth daily at bedtime 3/8/24   CRISTINA Correa   omeprazole (PriLOSEC) 40 MG capsule Take 1 capsule (40 mg total) by mouth 2 (two) times a day 3/12/24   Any Vargas DO   polyethylene glycol (MiraLax) 17 GM/SCOOP powder Take 238 g by mouth once for 1 dose Take 238 g my mouth. Use as directed 3/11/24 3/11/24  CRISTINA Ellison   promethazine-dextromethorphan (PHENERGAN-DM) 6.25-15 mg/5 mL oral syrup Take 5 mL by mouth 4 (four) times a day as needed for cough 3/8/24   CRISTINA Correa   sertraline (ZOLOFT) 50 mg tablet Take 1 tablet by mouth once daily 24   Any Vargas DO   Synthroid 88 MCG tablet Take 1 tablet (88 mcg total) by mouth daily 3/12/24   Any Vargas DO   Timolol Maleate, Once-Daily, 0.5 % SOLN Apply 1 drop to eye every 12 (twelve) hours  Patient not taking: Reported on 3/11/2024 8/8/23   Chay Holman MD   traZODone (DESYREL) 50 mg tablet Take 1 tablet (50 mg total) by mouth daily at bedtime 3/12/24   Any Vargas DO       All medications reviewed.    Allergies:   Allergies   Allergen Reactions    Iodinated Contrast Media Anaphylaxis     Contrast Dye       Social History:  Marital Status:      Substance Use History:   Social History     Substance and Sexual Activity   Alcohol Use Not Currently     Social History     Tobacco Use   Smoking Status Former    Current packs/day: 0.00    Types: Cigarettes    Start date: 1988    Quit date: 1991    Years since quittin.1   Smokeless Tobacco Never     Social History     Substance and Sexual Activity   Drug Use Never       Family History:  Non-contributory    Physical Exam:     Vitals:   Blood Pressure: 162/73 (24 1900)  Pulse: 68 (24 1900)  Temperature: 98.6 °F (37 °C) (24 1328)  Temp Source: Temporal (24 1328)  Respirations: 18 (24 1900)  SpO2: 96 % (24  1900)    Physical Exam  Vitals and nursing note reviewed.   Constitutional:       General: She is not in acute distress.     Appearance: Normal appearance.   HENT:      Head: Normocephalic and atraumatic.      Mouth/Throat:      Mouth: Mucous membranes are moist.   Eyes:      General: No scleral icterus.     Extraocular Movements: Extraocular movements intact.      Pupils: Pupils are equal, round, and reactive to light.   Cardiovascular:      Rate and Rhythm: Normal rate and regular rhythm.      Heart sounds: Normal heart sounds. No murmur heard.     No friction rub.   Pulmonary:      Effort: Pulmonary effort is normal.      Breath sounds: Normal breath sounds. No wheezing or rhonchi.   Abdominal:      General: Bowel sounds are normal. There is no distension.      Palpations: Abdomen is soft.      Tenderness: There is no abdominal tenderness. There is no guarding.   Musculoskeletal:         General: No swelling.      Cervical back: Neck supple.      Right lower leg: No edema.      Left lower leg: No edema.   Skin:     General: Skin is warm and dry.      Capillary Refill: Capillary refill takes less than 2 seconds.      Coloration: Skin is not jaundiced or pale.   Neurological:      General: No focal deficit present.      Mental Status: She is alert and oriented to person, place, and time. Mental status is at baseline.          Additional Data:     Lab Results:  Results from last 7 days   Lab Units 06/09/24  1334   WBC Thousand/uL 10.38*   HEMOGLOBIN g/dL 11.7   HEMATOCRIT % 35.7   PLATELETS Thousands/uL 188   SEGS PCT % 71   LYMPHO PCT % 14   MONO PCT % 9   EOS PCT % 5     Results from last 7 days   Lab Units 06/09/24  1334   SODIUM mmol/L 140   POTASSIUM mmol/L 4.7   CHLORIDE mmol/L 109*   CO2 mmol/L 26   BUN mg/dL 23   CREATININE mg/dL 1.09   ANION GAP mmol/L 5   CALCIUM mg/dL 10.3*   ALBUMIN g/dL 4.0   TOTAL BILIRUBIN mg/dL 0.65   ALK PHOS U/L 79   ALT U/L 78*   AST U/L 35   GLUCOSE RANDOM mg/dL 116                        Imaging: All pertinent imaging reviewed.  CT head without contrast   Final Result by Omkar Hathaway MD (06/09 1903)      No acute intracranial abnormality.                  Workstation performed: [id number]         CT abdomen pelvis wo contrast   Final Result by Omkar Hathaway MD (06/09 1907)      No evidence of acute process in the abdomen or pelvis.      Workstation performed: [id number]         CT recon only lumbar spine (no charge)   Final Result by Omkar Hathaway MD (06/09 1908)      Postsurgical change from L4-5 laminectomies and decompression of the central canal.            Workstation performed: [id number]         XR chest 2 views   ED Interpretation by CRISTINA Lombardi (06/09 1855)   No focal consolidation identified by me.  No acute cardiopulmonary disease identified by me.          EKG and Other Studies Reviewed on Admission:   Prior pertinent studies and records reviewed in [facility]Care Everywhere.    ** Please Note: This note has been constructed using a voice recognition system. **

## 2024-06-09 NOTE — ASSESSMENT & PLAN NOTE
Lab Results   Component Value Date    EGFR 47 06/09/2024    EGFR 39 03/07/2024    EGFR 42 03/05/2024    CREATININE 1.09 06/09/2024    CREATININE 1.27 03/07/2024    CREATININE 1.21 03/05/2024   Creatinine is at baseline, monitor

## 2024-06-09 NOTE — ASSESSMENT & PLAN NOTE
The patient has severe persistent asthma making her high risk given her COVID-19 infection  Continue home inhaler regimen  Xopenex/Atrovent as needed  Respiratory protocol  I do not feel that she is in acute asthma exacerbation presently

## 2024-06-09 NOTE — ASSESSMENT & PLAN NOTE
She has acute on chronic low back pain presently, exacerbated by recent trip to [place], likely musculoskeletal injury of back  Overall improved presently with Tylenol, lidocaine, Valium  Continue analgesic regimen as above  PT and OT

## 2024-06-09 NOTE — Clinical Note
Case was discussed with BIJU Lowery and the patient's admission status was agreed to be Admission Status: inpatient status to the service of Dr. BURT .

## 2024-06-09 NOTE — ASSESSMENT & PLAN NOTE
Elevated BP upon arrival to the ER secondary to low back pain  Blood pressure is now improved into the 160s  Resume home losartan 100 mg daily  Labetalol as needed  Control pain and monitor BP closely

## 2024-06-10 ENCOUNTER — APPOINTMENT (OUTPATIENT)
Dept: NON INVASIVE DIAGNOSTICS | Age: 79
DRG: 551 | End: 2024-06-10
Payer: COMMERCIAL

## 2024-06-10 LAB
ALBUMIN SERPL BCP-MCNC: 3.9 G/DL (ref 3.5–5)
ALP SERPL-CCNC: 74 U/L (ref 34–104)
ALT SERPL W P-5'-P-CCNC: 60 U/L (ref 7–52)
ANION GAP SERPL CALCULATED.3IONS-SCNC: 8 MMOL/L (ref 4–13)
AST SERPL W P-5'-P-CCNC: 23 U/L (ref 13–39)
ATRIAL RATE: 78 BPM
ATRIAL RATE: 85 BPM
BILIRUB SERPL-MCNC: 1 MG/DL (ref 0.2–1)
BUN SERPL-MCNC: 20 MG/DL (ref 5–25)
CALCIUM SERPL-MCNC: 10 MG/DL (ref 8.4–10.2)
CHLORIDE SERPL-SCNC: 111 MMOL/L (ref 96–108)
CO2 SERPL-SCNC: 20 MMOL/L (ref 21–32)
CREAT SERPL-MCNC: 0.95 MG/DL (ref 0.6–1.3)
GFR SERPL CREATININE-BSD FRML MDRD: 56 ML/MIN/1.73SQ M
GLUCOSE SERPL-MCNC: 94 MG/DL (ref 65–140)
P AXIS: 18 DEGREES
P AXIS: 36 DEGREES
POTASSIUM SERPL-SCNC: 3.7 MMOL/L (ref 3.5–5.3)
PR INTERVAL: 158 MS
PR INTERVAL: 166 MS
PROT SERPL-MCNC: 6.6 G/DL (ref 6.4–8.4)
QRS AXIS: 113 DEGREES
QRS AXIS: 115 DEGREES
QRSD INTERVAL: 132 MS
QRSD INTERVAL: 134 MS
QT INTERVAL: 376 MS
QT INTERVAL: 394 MS
QTC INTERVAL: 447 MS
QTC INTERVAL: 449 MS
SODIUM SERPL-SCNC: 139 MMOL/L (ref 135–147)
T WAVE AXIS: 53 DEGREES
T WAVE AXIS: 66 DEGREES
VENTRICULAR RATE: 78 BPM
VENTRICULAR RATE: 85 BPM

## 2024-06-10 PROCEDURE — 99232 SBSQ HOSP IP/OBS MODERATE 35: CPT | Performed by: INTERNAL MEDICINE

## 2024-06-10 PROCEDURE — 94640 AIRWAY INHALATION TREATMENT: CPT

## 2024-06-10 PROCEDURE — 80053 COMPREHEN METABOLIC PANEL: CPT | Performed by: INTERNAL MEDICINE

## 2024-06-10 PROCEDURE — 93010 ELECTROCARDIOGRAM REPORT: CPT | Performed by: INTERNAL MEDICINE

## 2024-06-10 PROCEDURE — 94760 N-INVAS EAR/PLS OXIMETRY 1: CPT

## 2024-06-10 RX ADMIN — ACETAMINOPHEN 975 MG: 325 TABLET, FILM COATED ORAL at 05:33

## 2024-06-10 RX ADMIN — SERTRALINE HYDROCHLORIDE 50 MG: 50 TABLET ORAL at 08:32

## 2024-06-10 RX ADMIN — LEVALBUTEROL HYDROCHLORIDE 1.25 MG: 1.25 SOLUTION RESPIRATORY (INHALATION) at 20:19

## 2024-06-10 RX ADMIN — BIMATOPROST 1 DROP: 0.3 SOLUTION/ DROPS OPHTHALMIC at 21:25

## 2024-06-10 RX ADMIN — TRAZODONE HYDROCHLORIDE 25 MG: 50 TABLET ORAL at 21:24

## 2024-06-10 RX ADMIN — IPRATROPIUM BROMIDE 0.5 MG: 0.5 SOLUTION RESPIRATORY (INHALATION) at 09:06

## 2024-06-10 RX ADMIN — NIRMATRELVIR AND RITONAVIR 2 TABLET: KIT at 17:33

## 2024-06-10 RX ADMIN — IPRATROPIUM BROMIDE 0.5 MG: 0.5 SOLUTION RESPIRATORY (INHALATION) at 14:56

## 2024-06-10 RX ADMIN — SENNOSIDES 17.2 MG: 8.6 TABLET, FILM COATED ORAL at 21:24

## 2024-06-10 RX ADMIN — PANTOPRAZOLE SODIUM 40 MG: 40 TABLET, DELAYED RELEASE ORAL at 15:10

## 2024-06-10 RX ADMIN — MONTELUKAST 10 MG: 10 TABLET, FILM COATED ORAL at 21:25

## 2024-06-10 RX ADMIN — HEPARIN SODIUM 5000 UNITS: 5000 INJECTION, SOLUTION INTRAVENOUS; SUBCUTANEOUS at 21:25

## 2024-06-10 RX ADMIN — ACETAMINOPHEN 975 MG: 325 TABLET, FILM COATED ORAL at 15:10

## 2024-06-10 RX ADMIN — LEVOTHYROXINE SODIUM 88 MCG: 88 TABLET ORAL at 05:33

## 2024-06-10 RX ADMIN — HEPARIN SODIUM 5000 UNITS: 5000 INJECTION, SOLUTION INTRAVENOUS; SUBCUTANEOUS at 05:33

## 2024-06-10 RX ADMIN — IPRATROPIUM BROMIDE 0.5 MG: 0.5 SOLUTION RESPIRATORY (INHALATION) at 20:19

## 2024-06-10 RX ADMIN — HEPARIN SODIUM 5000 UNITS: 5000 INJECTION, SOLUTION INTRAVENOUS; SUBCUTANEOUS at 15:10

## 2024-06-10 RX ADMIN — NIRMATRELVIR AND RITONAVIR 2 TABLET: KIT at 08:32

## 2024-06-10 RX ADMIN — LOSARTAN POTASSIUM 100 MG: 50 TABLET, FILM COATED ORAL at 21:24

## 2024-06-10 RX ADMIN — PANTOPRAZOLE SODIUM 40 MG: 40 TABLET, DELAYED RELEASE ORAL at 05:33

## 2024-06-10 RX ADMIN — FLUTICASONE FUROATE AND VILANTEROL TRIFENATATE 1 PUFF: 200; 25 POWDER RESPIRATORY (INHALATION) at 08:32

## 2024-06-10 RX ADMIN — LIDOCAINE 5% 1 PATCH: 700 PATCH TOPICAL at 08:32

## 2024-06-10 RX ADMIN — BUPROPION HYDROCHLORIDE 450 MG: 300 TABLET, EXTENDED RELEASE ORAL at 08:31

## 2024-06-10 RX ADMIN — LEVALBUTEROL HYDROCHLORIDE 1.25 MG: 1.25 SOLUTION RESPIRATORY (INHALATION) at 14:56

## 2024-06-10 RX ADMIN — ACETAMINOPHEN 975 MG: 325 TABLET, FILM COATED ORAL at 21:24

## 2024-06-10 RX ADMIN — LEVALBUTEROL HYDROCHLORIDE 1.25 MG: 1.25 SOLUTION RESPIRATORY (INHALATION) at 09:06

## 2024-06-10 NOTE — NURSING NOTE
Pt had refused repeat CBC blood collection for night nurse. Nurse went in to ask if pt would allow collection now. Importance of updated lab values for treatment explained, but pt still refusing saying they already took blood this morning and that's it.

## 2024-06-10 NOTE — NURSING NOTE
4 attempts were made to obtain lab work on pt. She was distressed during and afterwards. Labs obtained.. CBC rejected for clotting. Pt refused to be re-stuck at this time.

## 2024-06-10 NOTE — OCCUPATIONAL THERAPY NOTE
Occupational Therapy Screen Note     Patient Name: Cindy Cruz  Today's Date: 6/10/2024  Problem List  Principal Problem:    Acute bilateral low back pain with left-sided sciatica  Active Problems:    Hypothyroidism    Hypertension    Severe persistent asthma without complication    Depression, recurrent (HCC)    Chronic kidney disease, stage 3a (HCC)    COVID-19    Ambulatory dysfunction            06/10/24 0758   OT Last Visit   OT Visit Date 06/10/24   Note Type   Note type Screen   Additional Comments OT orders received, chart reviewed. Pt with AMPAC score of 24 daily, 22 basic & having acheived HLM goal of 7. No acute OT needs indicated at this time. DC OT orders.       Ami Carbajal, OTR/L

## 2024-06-10 NOTE — ASSESSMENT & PLAN NOTE
Lab Results   Component Value Date    EGFR 56 06/10/2024    EGFR 47 06/09/2024    EGFR 39 03/07/2024    CREATININE 0.95 06/10/2024    CREATININE 1.09 06/09/2024    CREATININE 1.27 03/07/2024   Creatinine is at baseline, monitor

## 2024-06-10 NOTE — ASSESSMENT & PLAN NOTE
With ambulatory dysfunction secondary to severe low back pain  PT and Ot eval completed, outpatient PT

## 2024-06-10 NOTE — PLAN OF CARE
Problem: PAIN - ADULT  Goal: Verbalizes/displays adequate comfort level or baseline comfort level  Description: Interventions:  - Encourage patient to monitor pain and request assistance  - Assess pain using appropriate pain scale  - Administer analgesics based on type and severity of pain and evaluate response  - Implement non-pharmacological measures as appropriate and evaluate response  - Consider cultural and social influences on pain and pain management  - Notify physician/advanced practitioner if interventions unsuccessful or patient reports new pain  Outcome: Progressing     Problem: INFECTION - ADULT  Goal: Absence or prevention of progression during hospitalization  Description: INTERVENTIONS:  - Assess and monitor for signs and symptoms of infection  - Monitor lab/diagnostic results  - Monitor all insertion sites, i.e. indwelling lines, tubes, and drains  - Monitor endotracheal if appropriate and nasal secretions for changes in amount and color  - [place] appropriate cooling/warming therapies per order  - Administer medications as ordered  - Instruct and encourage patient and family to use good hand hygiene technique  - Identify and instruct in appropriate isolation precautions for identified infection/condition  Outcome: Progressing  Goal: Absence of fever/infection during neutropenic period  Description: INTERVENTIONS:  - Monitor WBC    Outcome: Progressing     Problem: SAFETY ADULT  Goal: Patient will remain free of falls  Description: INTERVENTIONS:  - Educate patient/family on patient safety including physical limitations  - Instruct patient to call for assistance with activity   - Consult OT/PT to assist with strengthening/mobility   - Keep Call bell within reach  - Keep bed low and locked with side rails adjusted as appropriate  - Keep care items and personal belongings within reach  - Initiate and maintain comfort rounds  - Make Fall Risk Sign visible to staff  - Offer Toileting every 2 Hours,  in advance of need  - Initiate/Maintain bed alarm  - Obtain necessary fall risk management equipment:   - Apply yellow socks and bracelet for high fall risk patients  - Consider moving patient to room near nurses station  Outcome: Progressing  Goal: Maintain or return to baseline ADL function  Description: INTERVENTIONS:  -  Assess patient's ability to carry out ADLs; assess patient's baseline for ADL function and identify physical deficits which impact ability to perform ADLs (bathing, care of mouth/teeth, toileting, grooming, dressing, etc.)  - Assess/evaluate cause of self-care deficits   - Assess range of motion  - Assess patient's mobility; develop plan if impaired  - Assess patient's need for assistive devices and provide as appropriate  - Encourage maximum independence but intervene and supervise when necessary  - Involve family in performance of ADLs  - Assess for home care needs following discharge   - Consider OT consult to assist with ADL evaluation and planning for discharge  - Provide patient education as appropriate  Outcome: Progressing  Goal: Maintains/Returns to pre admission functional level  Description: INTERVENTIONS:  - Perform AM-PAC 6 Click Basic Mobility/ Daily Activity assessment daily.  - Set and communicate daily mobility goal to care team and patient/family/caregiver.   - Collaborate with rehabilitation services on mobility goals if consulted  - Perform Range of Motion 3 times a day.  - Reposition patient every 2 hours.  - Dangle patient 2 times a day  - Stand patient 2 times a day  - Ambulate patient 2 times a day  - Out of bed to chair 2 times a day   - Out of bed for meals 2 times a day  - Out of bed for toileting  - Record patient progress and toleration of activity level   Outcome: Progressing     Problem: DISCHARGE PLANNING  Goal: Discharge to home or other facility with appropriate resources  Description: INTERVENTIONS:  - Identify barriers to discharge w/patient and caregiver  -  Arrange for needed discharge resources and transportation as appropriate  - Identify discharge learning needs (meds, wound care, etc.)  - Arrange for interpretive services to assist at discharge as needed  - Refer to Case Management Department for coordinating discharge planning if the patient needs post-hospital services based on physician/advanced practitioner order or complex needs related to functional status, cognitive ability, or social support system  Outcome: Progressing     Problem: Knowledge Deficit  Goal: Patient/family/caregiver demonstrates understanding of disease process, treatment plan, medications, and discharge instructions  Description: Complete learning assessment and assess knowledge base.  Interventions:  - Provide teaching at level of understanding  - Provide teaching via preferred learning methods  Outcome: Progressing

## 2024-06-10 NOTE — PHYSICAL THERAPY NOTE
PHYSICAL THERAPY SCREEN NOTE    Patient Name: Cindy Cruz  Today's Date: 6/10/2024       06/10/24 1206   PT Last Visit   PT Visit Date 06/10/24   Note Type   Note type Screen   Additional Comments PT orders received, chart review performed. Pt w/ AMPAC of 22, achieved 7 on -HLM, ambulating independently around room. Spoke w/ CM Joan who relayed daughter's concerns, recommend an ambulatory referral for OPPT for patient's chronic LBP/sciatica, however patient w/ no acute IP needs, will DC PT.       Elsie Storm, PT, DPT

## 2024-06-10 NOTE — PROGRESS NOTES
"[facility]  Progress Note  Name: Cindy Cruz I  MRN: [id number]  Unit/Bed#: -Vanessa I Date of Admission: 6/9/2024   Date of Service: 6/10/2024 I Hospital Day: 1    Assessment & Plan   Severe persistent asthma without complication  Assessment & Plan  The patient has severe persistent asthma making her high risk given her COVID-19 infection  Continue home inhaler regimen  Xopenex/Atrovent as needed  Respiratory protocol      Ambulatory dysfunction  Assessment & Plan  With ambulatory dysfunction secondary to severe low back pain  PT and Ot eval completed, outpatient PT     COVID-19  Assessment & Plan  Incidentally positive for COVID-19 after recent trip to [place]  Patient is overall asymptomatic however falls into the high risk group given her severe persistent asthma and CKD  Per protocol we will start the patient on Paxlovid, she is agreeable  Monitor respiratory status closely  DVT prophylaxis with subcu heparin    Chronic kidney disease, stage 3a (HCC)  Assessment & Plan  Lab Results   Component Value Date    EGFR 56 06/10/2024    EGFR 47 06/09/2024    EGFR 39 03/07/2024    CREATININE 0.95 06/10/2024    CREATININE 1.09 06/09/2024    CREATININE 1.27 03/07/2024   Creatinine is at baseline, monitor    Depression, recurrent (HCC)  Assessment & Plan  Continue home Zoloft, trazodone, Wellbutrin  Mood is stable    Hypertension  Assessment & Plan  Elevated BP upon arrival to the ER secondary to low back pain  /52   Pulse 62   Temp 97.5 °F (36.4 °C) (Temporal)   Resp 22   Ht 4' 9\" (1.448 m)   Wt 68 kg (150 lb)   SpO2 96%   BMI 32.46 kg/m²     Resume home losartan 100 mg daily  Control pain and monitor BP closely    Hypothyroidism  Assessment & Plan  Continue home Synthroid    * Acute bilateral low back pain with left-sided sciatica  Assessment & Plan  She has acute on chronic low back pain presently, exacerbated by recent trip to [place], likely musculoskeletal injury " of back  Overall improved presently with Tylenol, lidocaine  Continue analgesic regimen as above  PT and OT               VTE Pharmacologic Prophylaxis: VTE Score: 6 Moderate Risk (Score 3-4) - Pharmacological DVT Prophylaxis Ordered: heparin.    Mobility:   Basic Mobility Inpatient Raw Score: 22  JH-HLM Goal: 7: Walk 25 feet or more  JH-HLM Achieved: 7: Walk 25 feet or more  JH-HLM Goal achieved. Continue to encourage appropriate mobility.    Patient Centered Rounds: I performed bedside rounds with nursing staff today.   Discussions with Specialists or Other Care Team Provider: YES    Education and Discussions with Family / Patient:  YES, daughter at bedside .     Total Time Spent on Date of Encounter in care of patient: 39 mins. This time was spent on one or more of the following: performing physical exam; counseling and coordination of care; obtaining or reviewing history; documenting in the medical record; reviewing/ordering tests, medications or procedures; communicating with other healthcare professionals and discussing with patient's family/caregivers.    Current Length of Stay: 1 day(s)  Current Patient Status: Inpatient   Certification Statement: pending eval  Discharge Plan: Anticipate discharge tomorrow to home.    Code Status: Level 1 - Full Code    Subjective:   Seen and examine at bedside  Stable  Daughter at bedside     Objective:     Vitals:   Temp (24hrs), Av.5 °F (36.4 °C), Min:97.5 °F (36.4 °C), Max:97.5 °F (36.4 °C)    Temp:  [97.5 °F (36.4 °C)] 97.5 °F (36.4 °C)  HR:  [62-80] 62  Resp:  [18-22] 22  BP: (138-232)/(52-92) 143/52  SpO2:  [94 %-98 %] 96 %  Body mass index is 32.46 kg/m².     Input and Output Summary (last 24 hours):     Intake/Output Summary (Last 24 hours) at 6/10/2024 1625  Last data filed at 6/10/2024 1215  Gross per 24 hour   Intake 2330 ml   Output 900 ml   Net 1430 ml       Physical Exam:   Physical Exam  Vitals reviewed.   Constitutional:       Appearance: Normal  appearance.   HENT:      Head: Atraumatic.      Mouth/Throat:      Mouth: Mucous membranes are dry.   Eyes:      General: No scleral icterus.  Cardiovascular:      Rate and Rhythm: Normal rate.      Heart sounds: Normal heart sounds.   Pulmonary:      Effort: No respiratory distress.   Abdominal:      General: Bowel sounds are normal.   Musculoskeletal:      Right lower leg: No edema.      Left lower leg: No edema.   Skin:     General: Skin is dry.      Capillary Refill: Capillary refill takes less than 2 seconds.   Neurological:      Mental Status: She is alert and oriented to person, place, and time.   Psychiatric:         Mood and Affect: Mood normal.          Additional Data:     Labs:  Results from last 7 days   Lab Units 06/09/24  1334   WBC Thousand/uL 10.38*   HEMOGLOBIN g/dL 11.7   HEMATOCRIT % 35.7   PLATELETS Thousands/uL 188   SEGS PCT % 71   LYMPHO PCT % 14   MONO PCT % 9   EOS PCT % 5     Results from last 7 days   Lab Units 06/10/24  0229   SODIUM mmol/L 139   POTASSIUM mmol/L 3.7   CHLORIDE mmol/L 111*   CO2 mmol/L 20*   BUN mg/dL 20   CREATININE mg/dL 0.95   ANION GAP mmol/L 8   CALCIUM mg/dL 10.0   ALBUMIN g/dL 3.9   TOTAL BILIRUBIN mg/dL 1.00   ALK PHOS U/L 74   ALT U/L 60*   AST U/L 23   GLUCOSE RANDOM mg/dL 94                       Lines/Drains:  Invasive Devices       Peripheral Intravenous Line  Duration             Peripheral IV 06/09/24 Left Antecubital 1 day                          Imaging: No pertinent imaging reviewed.    Recent Cultures (last 7 days):         Last 24 Hours Medication List:   Current Facility-Administered Medications   Medication Dose Route Frequency Provider Last Rate    acetaminophen  975 mg Oral Q8H [facility] Lemuel Lowery PA-C      albuterol  2 puff Inhalation Q4H PRN Allie Berman PA-C      albuterol  2.5 mg Nebulization Q4H PRN Steph Tinajero MD      benzonatate  100 mg Oral TID PRN Lemuel Lowery PA-C      bimatoprost  1 drop Ophthalmic HS Lemuel Lowery PA-C       buPROPion  450 mg Oral Daily Lemuel Lowery PA-C      fluticasone-vilanterol  1 puff Inhalation Daily Lemuel Lowery PA-C      heparin (porcine)  5,000 Units Subcutaneous Q8H DIAN Lemuel Lowery PA-C      ipratropium  0.5 mg Nebulization TID Steph Tinajero MD      labetalol  10 mg Intravenous Q6H PRN Lemuel Lowery PA-C      levalbuterol  1.25 mg Nebulization TID Steph Tinajero MD      levothyroxine  88 mcg Oral Daily Lemuel Lowery PA-C      lidocaine  1 patch Topical Daily Lemuel Lowery PA-C      losartan  100 mg Oral HS Steph Tinajero MD      montelukast  10 mg Oral HS Lemuel Lowery PA-C      nirmatrelvir & ritonavir  2 tablet Oral BID Lemuel Lowery PA-C      ondansetron  4 mg Intravenous Q6H PRN Lemuel Lowery PA-C      pantoprazole  40 mg Oral BID AC Lemuel Lowery PA-C      senna  2 tablet Oral HS Allie Berman PA-C      sertraline  50 mg Oral Daily Lemuel Lowery PA-C      traZODone  25 mg Oral HS Lemuel Lowery PA-C          Today, Patient Was Seen By: Lina Londono MD    **Please Note: This note may have been constructed using a voice recognition system.**

## 2024-06-10 NOTE — UTILIZATION REVIEW
Initial Clinical Review    Admission: Date/Time/Statement:   Admission Orders (From admission, onward)       Ordered        06/09/24 1939  INPATIENT ADMISSION  Once                          Orders Placed This Encounter   Procedures    Inpatient Admission     Standing Status:   Standing     Number of Occurrences:   1     Order Specific Question:   Level of Care     Answer:   Med Surg [16]     Order Specific Question:   Estimated length of stay     Answer:   More than 2 Midnights     Order Specific Question:   Certification     Answer:   I certify that inpatient services are medically necessary for this patient for a duration of greater than two midnights. See H&P and MD Progress Notes for additional information about the patient's course of treatment.     ED Arrival Information       Expected   -    Arrival   6/9/2024 13:20    Acuity   Urgent              Means of arrival   Wheelchair    Escorted by   Family Member    Service   Hospitalist    Admission type   Emergency              Arrival complaint   back pain             Chief Complaint   Patient presents with    Flank Pain     Pt reports right back pain radiating to right leg. Pt reports increase in urination, with burning. Pt recently traveled back from [place] on thursday       Initial Presentation: 79 y.o. female presents to the ED from home with c/o LBP with inabililty to ambulate since returning from trip, was seen in ED in [place] and treated with IV Toradol.  PMH: HTN, asthma, chronic LBP, CKD.  In the ED she is HTN.  Labs - Covid +, leukocytosis, elevated calcium, elevated ALT>  Imaging - no acute disease.  \Treated with IV fluids, Tylenol,  Lidoderm, IV Valium x 1, IV Labetalol.  On exam clear lungs, normal  heart sounds, no abd tenderness, no acute distress.  Admitted to INPATIENT status with acute Bilat LBP and L sciatica, ambulatory dysfunction, COVID 19, HTN - analgesia with Tylenol, lidocaine, Valium, therapy eval and tx, high risk group d/t asthma, Paxlovid,  DVT PPX, home meds - Zoloft, trazodone, Wellbutrin.      Anticipated Length of Stay/Certification Statement: Patient will be admitted on an inpatient basis with an anticipated length of stay of greater than 2 midnights secondary to acute low back pain and amatory dysfunction requiring pain control, PT and OT recommendations prior to discharge.    Date: 6/10   Day 2:   AM lab venipuncture unsuccessful, now pt refusing additional venipuncture.  PT note indicates pt is ambulating in room, will need OP PT f/u.  Continues with nebs and inhalers.  Continues on Paxlovid and is asymptomatic. No resp distress.  Normal bowel sounds.      Date: 6/11  Day 3: Has surpassed a 2nd midnight with active treatments and services.  Acute LBP with L side sciatica, severe asthma, ambulatory dysfunction, Covid - Respiratory status stable.  OP PT referral, complete Paxlovid tx.  Pt is written for d/c home today.      ED Triage Vitals [06/09/24 1328]   Temperature Pulse Respirations Blood Pressure SpO2   98.6 °F (37 °C) 79 20 (!) 209/83 97 %      Temp Source Heart Rate Source Patient Position - Orthostatic VS BP Location FiO2 (%)   Temporal Monitor Sitting Left arm --      Pain Score       10 - Worst Possible Pain          Wt Readings from Last 1 Encounters:   06/10/24 68 kg (150 lb)     Additional Vital Signs:   Date/Time Temp Pulse Resp BP MAP (mmHg) SpO2 O2 Device Patient Position - Orthostatic VS   06/11/24 0733 -- -- -- -- -- 98 % -- --   06/11/24 07:26:11 97.2 °F (36.2 °C) Abnormal  67 16 133/51 78 97 % -- --   06/10/24 2100 -- -- -- -- -- -- None (Room air) --   06/10/24 2020 -- -- -- -- -- 99 % None (Room air) --   06/10/24 19:14:27 97.3 °F (36.3 °C) Abnormal  70 -- 143/56 85 95 % None (Room air) Lying   06/10/24 1456 -- -- -- -- -- 96 % None (Room air) --   06/10/24 14:44:22 -- 62 22 143/52 82 98 % -- --     06/10/24 0924 97.5 °F (36.4 °C) 75 19 138/62 -- -- -- --   06/10/24 0906 -- -- -- -- -- 96 % None (Room air) --   06/10/24  07:15:16 -- 73 -- 138/62 87 96 % -- --   06/10/24 07:07:53 97.5 °F (36.4 °C) 68 21 -- -- 94 % -- --   06/09/24 2143 -- 77 -- -- -- 96 % None (Room air) --   06/09/24 21:30:24 97.5 °F (36.4 °C) 71 18 149/64 92 98 % -- --   06/09/24 2100 -- -- -- -- -- 97 % None (Room air) --   06/09/24 2000 -- 68 18 189/75 Abnormal  108 98 % None (Room air) --   06/09/24 1900 -- 68 18 162/73 105 96 % None (Room air) --   06/09/24 1830 -- 64 19 163/66 95 97 % None (Room air) --   06/09/24 1745 -- 71 18 147/67 96 96 % None (Room air) --   06/09/24 1630 -- 80 22 232/92 Abnormal  -- 96 % None (Room air) --   06/09/24 1627 -- -- -- 230/80 Abnormal  -- -- -- --   06/09/24 1530 -- 78 18 218/90 Abnormal  129 97 % -- --       Pertinent Labs/Diagnostic Test Results:     6/9 ECG - NSR with RBBB.  No ischemic changes.     CT head without contrast   Final Result by Omkar Hathaway MD (06/09 1903)      No acute intracranial abnormality.                  Workstation performed: [id number]         CT abdomen pelvis wo contrast   Final Result by Omkar Hathaway MD (06/09 1907)      No evidence of acute process in the abdomen or pelvis.      Workstation performed: [id number]         CT recon only lumbar spine (no charge)   Final Result by Omkar Hathaway MD (06/09 1908)      Postsurgical change from L4-5 laminectomies and decompression of the central canal.            Workstation performed: [id number]         XR chest 2 views   ED Interpretation by CRISTINA Lombardi (06/09 1855)   No focal consolidation identified by me.  No acute cardiopulmonary disease identified by me.      Final Result by Serena Mendez MD (06/09 2138)      No acute cardiopulmonary disease.            Workstation performed: [id number]           Results from last 7 days   Lab Units 06/09/24  1600   SARS-COV-2  Positive*     Results from last 7 days   Lab Units 06/11/24  0433 06/09/24  1334   WBC Thousand/uL 4.18* 10.38*   HEMOGLOBIN g/dL 11.3* 11.7   HEMATOCRIT %  34.6* 35.7   PLATELETS Thousands/uL 181 188   TOTAL NEUT ABS Thousands/µL 2.86 7.37         Results from last 7 days   Lab Units 06/11/24  0433 06/10/24  0229 06/09/24  1334   SODIUM mmol/L 139 139 140   POTASSIUM mmol/L 4.1 3.7 4.7   CHLORIDE mmol/L 110* 111* 109*   CO2 mmol/L 20* 20* 26   ANION GAP mmol/L 9 8 5   BUN mg/dL 21 20 23   CREATININE mg/dL 1.04 0.95 1.09   EGFR ml/min/1.73sq m 50 56 47   CALCIUM mg/dL 9.9 10.0 10.3*     Results from last 7 days   Lab Units 06/10/24  0229 06/09/24  1334   AST U/L 23 35   ALT U/L 60* 78*   ALK PHOS U/L 74 79   TOTAL PROTEIN g/dL 6.6 7.0   ALBUMIN g/dL 3.9 4.0   TOTAL BILIRUBIN mg/dL 1.00 0.65         Results from last 7 days   Lab Units 06/11/24  0433 06/10/24  0229 06/09/24  1334   GLUCOSE RANDOM mg/dL 97 94 116     Results from last 7 days   Lab Units 06/09/24  1820 06/09/24  1610   HS TNI 0HR ng/L  --  30   HS TNI 2HR ng/L 31  --    HSTNI D2 ng/L 1  --      Results from last 7 days   Lab Units 06/09/24  1334   LIPASE u/L 14                 Results from last 7 days   Lab Units 06/09/24  1603   CLARITY UA  Clear   COLOR UA  Light Yellow   SPEC GRAV UA  <1.005*   PH UA  6.0   GLUCOSE UA mg/dl Negative   KETONES UA mg/dl Negative   BLOOD UA  Negative   PROTEIN UA mg/dl Negative   NITRITE UA  Negative   BILIRUBIN UA  Negative   UROBILINOGEN UA (BE) mg/dl <2.0   LEUKOCYTES UA  Negative     Results from last 7 days   Lab Units 06/09/24  1600   INFLUENZA A PCR  Negative   INFLUENZA B PCR  Negative   RSV PCR  Negative     ED Treatment:   Medication Administration from 06/09/2024 1320 to 06/09/2024 2020         Date/Time Order Dose Route Action     06/09/2024 1611 EDT sodium chloride 0.9 % bolus 500 mL 500 mL Intravenous New Bag     06/09/2024 1553 EDT acetaminophen (TYLENOL) tablet 975 mg 975 mg Oral Given     06/09/2024 1553 EDT lidocaine (LIDODERM) 5 % patch 1 patch 1 patch Topical Medication Applied     06/09/2024 1612 EDT diazepam (VALIUM) injection 5 mg 5 mg Intravenous  Given     06/09/2024 1714 EDT labetalol (NORMODYNE) injection 10 mg 10 mg Intravenous Given          Past Medical History:   Diagnosis Date    Asthma     Chronic pain disorder     Back    Coronary artery disease     Dyslipidemia     Esophageal reflux     Frequent headaches     stress related    Headache(784.0)     Heart murmur     History of stomach ulcers     Hypercalcemia     Hypertension     Osteopenia     Tremor      Present on Admission:   Severe persistent asthma without complication   Depression, recurrent (HCC)   Chronic kidney disease, stage 3a (HCC)   Acute bilateral low back pain with left-sided sciatica   Hypothyroidism   Hypertension      Admitting Diagnosis: Back pain [M54.9]  Generalized weakness [R53.1]  Ambulatory dysfunction [R26.2]  COVID-19 [U07.1]  Age/Sex: 79 y.o. female  Admission Orders:  Scheduled Medications:  acetaminophen, 975 mg, Oral, Q8H DIAN  bimatoprost, 1 drop, Ophthalmic, HS  buPROPion, 450 mg, Oral, Daily  fluticasone-vilanterol, 1 puff, Inhalation, Daily  heparin (porcine), 5,000 Units, Subcutaneous, Q8H DIAN  ipratropium, 0.5 mg, Nebulization, TID  levalbuterol, 1.25 mg, Nebulization, TID  levothyroxine, 88 mcg, Oral, Daily  lidocaine, 1 patch, Topical, Daily  losartan, 100 mg, Oral, HS  montelukast, 10 mg, Oral, HS  nirmatrelvir & ritonavir, 2 tablet, Oral, BID  pantoprazole, 40 mg, Oral, BID AC  senna, 2 tablet, Oral, HS  sertraline, 50 mg, Oral, Daily  traZODone, 25 mg, Oral, HS      Continuous IV Infusions:     PRN Meds:  albuterol, 2 puff, Inhalation, Q4H PRN  albuterol, 2.5 mg, Nebulization, Q4H PRN  benzonatate, 100 mg, Oral, TID PRN  labetalol, 10 mg, Intravenous, Q6H PRN  ondansetron, 4 mg, Intravenous, Q6H PRN  Valium 5 mg PRN x 1 6/9 and d/c   Atrovent PRN X 1 6/9 and d/c   Xopenex PRN x 1 6/9 and d/c     Oxygen  Incentive spirometry  Paxlovid  Isolation    Network Utilization Review Department  ATTENTION: Please call with any questions or concerns to [phone] and  carefully listen to the prompts so that you are directed to the right person. All voicemails are confidential.   For Discharge needs, contact Care Management DC Support Team at [phone] opt. [phone]  Send all requests for admission clinical reviews, approved or denied determinations and any other requests to dedicated fax number below belonging to the campus where the patient is receiving treatment. List of dedicated fax numbers for the Facilities:  FACILITY NAME UR FAX NUMBER   ADMISSION DENIALS (Administrative/Medical Necessity) [phone]   DISCHARGE SUPPORT TEAM (NETWORK) [phone]   PARENT CHILD HEALTH (Maternity/NICU/Pediatrics) [phone]   [facility] [phone]   [facility] [phone]   [facility] [phone]   [facility] [phone]   [facility] [phone]   [facility] [phone]   [facility] [phone]   [facility] [phone]   [facility] [phone]   [facility] [phone]   [facility] [phone]   [facility] [phone]

## 2024-06-10 NOTE — ASSESSMENT & PLAN NOTE
She has acute on chronic low back pain presently, exacerbated by recent trip to [place], likely musculoskeletal injury of back  Overall improved presently with Tylenol, lidocaine  Continue analgesic regimen as above  PT and OT

## 2024-06-10 NOTE — PLAN OF CARE
Problem: PAIN - ADULT  Goal: Verbalizes/displays adequate comfort level or baseline comfort level  Description: Interventions:  - Encourage patient to monitor pain and request assistance  - Assess pain using appropriate pain scale  - Administer analgesics based on type and severity of pain and evaluate response  - Implement non-pharmacological measures as appropriate and evaluate response  - Consider cultural and social influences on pain and pain management  - Notify physician/advanced practitioner if interventions unsuccessful or patient reports new pain  Outcome: Progressing     Problem: INFECTION - ADULT  Goal: Absence or prevention of progression during hospitalization  Description: INTERVENTIONS:  - Assess and monitor for signs and symptoms of infection  - Monitor lab/diagnostic results  - Monitor all insertion sites, i.e. indwelling lines, tubes, and drains  - Monitor endotracheal if appropriate and nasal secretions for changes in amount and color  - [place] appropriate cooling/warming therapies per order  - Administer medications as ordered  - Instruct and encourage patient and family to use good hand hygiene technique  - Identify and instruct in appropriate isolation precautions for identified infection/condition  Outcome: Progressing  Goal: Absence of fever/infection during neutropenic period  Description: INTERVENTIONS:  - Monitor WBC    Outcome: Progressing     Problem: SAFETY ADULT  Goal: Patient will remain free of falls  Description: INTERVENTIONS:  - Educate patient/family on patient safety including physical limitations  - Instruct patient to call for assistance with activity   - Consult OT/PT to assist with strengthening/mobility   - Keep Call bell within reach  - Keep bed low and locked with side rails adjusted as appropriate  - Keep care items and personal belongings within reach  - Initiate and maintain comfort rounds  - Make Fall Risk Sign visible to staff  - Offer Toileting every 2 Hours,  in advance of need  - Initiate/Maintain bed alarm  - Obtain necessary fall risk management equipment:   - Apply yellow socks and bracelet for high fall risk patients  - Consider moving patient to room near nurses station  Outcome: Progressing  Goal: Maintain or return to baseline ADL function  Description: INTERVENTIONS:  -  Assess patient's ability to carry out ADLs; assess patient's baseline for ADL function and identify physical deficits which impact ability to perform ADLs (bathing, care of mouth/teeth, toileting, grooming, dressing, etc.)  - Assess/evaluate cause of self-care deficits   - Assess range of motion  - Assess patient's mobility; develop plan if impaired  - Assess patient's need for assistive devices and provide as appropriate  - Encourage maximum independence but intervene and supervise when necessary  - Involve family in performance of ADLs  - Assess for home care needs following discharge   - Consider OT consult to assist with ADL evaluation and planning for discharge  - Provide patient education as appropriate  Outcome: Progressing  Goal: Maintains/Returns to pre admission functional level  Description: INTERVENTIONS:  - Perform AM-PAC 6 Click Basic Mobility/ Daily Activity assessment daily.  - Set and communicate daily mobility goal to care team and patient/family/caregiver.   - Collaborate with rehabilitation services on mobility goals if consulted  - Perform Range of Motion 3 times a day.  - Reposition patient every 2 hours.  - Dangle patient 2 times a day  - Stand patient 2 times a day  - Ambulate patient 2 times a day  - Out of bed to chair 2 times a day   - Out of bed for meals 2 times a day  - Out of bed for toileting  - Record patient progress and toleration of activity level   Outcome: Progressing

## 2024-06-10 NOTE — ASSESSMENT & PLAN NOTE
"Elevated BP upon arrival to the ER secondary to low back pain  /52   Pulse 62   Temp 97.5 °F (36.4 °C) (Temporal)   Resp 22   Ht 4' 9\" (1.448 m)   Wt 68 kg (150 lb)   SpO2 96%   BMI 32.46 kg/m²     Resume home losartan 100 mg daily  Control pain and monitor BP closely  "

## 2024-06-10 NOTE — ASSESSMENT & PLAN NOTE
The patient has severe persistent asthma making her high risk given her COVID-19 infection  Continue home inhaler regimen  Xopenex/Atrovent as needed  Respiratory protocol

## 2024-06-10 NOTE — CASE MANAGEMENT
Case Management Assessment & Discharge Planning Note    Patient name Cindy Cruz  Location /-01 MRN [id number]  : 1945 Date 6/10/2024       Current Admission Date: 2024  Current Admission Diagnosis:Acute bilateral low back pain with left-sided sciatica   Patient Active Problem List    Diagnosis Date Noted Date Diagnosed    COVID-19 2024     Ambulatory dysfunction 2024     Dysphagia 2023     Vitamin B12 deficiency 2023     Chronic kidney disease, stage 3a ([facility]) 2022     Malfunction of spinal cord stimulator ([facility]) 2022     SAGE (obstructive sleep apnea)      Daytime somnolence      Insomnia 2022     Vitamin D deficiency 10/12/2021     Constipation 2019     Intercostal neuropathy 2019     Rib pain on left side 2019     Generalized anxiety disorder 2018     Depression, recurrent ([facility]) 2018     Hypercalcemia 2018     Anserine bursitis 2018     Acute bilateral low back pain with left-sided sciatica 2018     Lumbar post-laminectomy syndrome 2018     Lumbar radiculopathy 2018     Chronic pain of left knee 2018     Primary osteoarthritis of left knee 2018     Gait abnormality 2018     Weakness of left leg 2018     Lumbar spondylosis 2018     IFG (impaired fasting glucose) 2018     Chronic pain syndrome 2018     Atrophic vaginitis 2017     Osteoporosis 2017     Obesity 2017     Cataract 2017     Glaucoma 2017     Tremor 10/18/2012     Hypothyroidism 2012     Dyslipidemia 2012     Migraine headache 2012     Esophageal reflux 2012     Severe persistent asthma without complication 2012     Hypertension 2012       LOS (days): 1  Geometric Mean LOS (GMLOS) (days): 4.6  Days to GMLOS:3.9     OBJECTIVE:    Risk of Unplanned Readmission Score: 15.65         Current admission status: Inpatient        Preferred Pharmacy:   [id number]  [place] - [address]  [address]  [place] [address]  Phone: [phone] Fax: [phone]    Primary Care Provider: Any Vargas DO    Primary Insurance: [employer] REP  Secondary Insurance:     ASSESSMENT:  Active Health Care Proxies       Dara Deras Health Care Representative - Daughter   Primary Phone: [phone] (Mobile)                   Readmission Root Cause  30 Day Readmission: No    Patient Information  Admitted from:: Home  Mental Status: Alert  During Assessment patient was accompanied by: Daughter  Assessment information provided by:: Daughter  Support Systems: Self, Friend, Daughter, Family members  County of Residence: [place]  What city do you live in?: [place]  Home entry access options. Select all that apply.: Stairs  Number of steps to enter home.: 4  Type of Current Residence: 2 story home  Upon entering residence, is there a bedroom on the main floor (no further steps)?: No  A bedroom is located on the following floor levels of residence (select all that apply):: 2nd Floor  Upon entering residence, is there a bathroom on the main floor (no further steps)?: No  Indicate which floors of current residence have a bathroom (select all the apply):: 2nd Floor  Number of steps to 2nd floor from main floor: One Flight  Living Arrangements: Lives w/ Daughter    Activities of Daily Living Prior to Admission  Functional Status: Independent  Completes ADLs independently?: Yes  Ambulates independently?: Yes  Does patient use assisted devices?: No  Does patient currently own DME?: Yes  What DME does the patient currently own?: Stair Chair/Glide, Nebulizer  Does patient have a history of Outpatient Therapy (PT/OT)?: No  Does the patient have a history of Short-Term Rehab?: No  Does patient have a history of HHC?: No  Does patient currently have HHC?: No    Patient Information Continued  Does patient have prescription  coverage?: Yes  Does patient receive dialysis treatments?: No  Does patient have a history of substance abuse?: No  Does patient have a history of Mental Health Diagnosis?: No    Means of Transportation  Means of Transport to Appts:: Family transport      Social Determinants of Health (SDOH)      Flowsheet Row Most Recent Value   Housing Stability    In the last 12 months, was there a time when you were not able to pay the mortgage or rent on time? N   In the past 12 months, how many times have you moved where you were living? 1   At any time in the past 12 months, were you homeless or living in a shelter (including now)? N   Transportation Needs    In the past 12 months, has lack of transportation kept you from medical appointments or from getting medications? no   In the past 12 months, has lack of transportation kept you from meetings, work, or from getting things needed for daily living? No   Food Insecurity    Within the past 12 months, you worried that your food would run out before you got the money to buy more. Never true   Within the past 12 months, the food you bought just didn't last and you didn't have money to get more. Never true   Utilities    In the past 12 months has the electric, gas, oil, or water company threatened to shut off services in your home? No            DISCHARGE DETAILS:    Discharge planning discussed with:: Dara (daughter)  Freedom of Choice: Yes  Comments - Freedom of Choice: no anticipated dc needs  CM contacted family/caregiver?: Yes  Were Treatment Team discharge recommendations reviewed with patient/caregiver?: Yes  Did patient/caregiver verbalize understanding of patient care needs?: Yes  Were patient/caregiver advised of the risks associated with not following Treatment Team discharge recommendations?: Yes    Contacts  Patient Contacts: Dara Deras dtr  Relationship to Patient:: Family  Contact Method: Phone  Phone Number: [phone]  Reason/Outcome: Discharge  Planning    Requested Home Health Care         Is the patient interested in HHC at discharge?: No    DME Referral Provided  Referral made for DME?: No    Treatment Team Recommendation: Home  Discharge Destination Plan:: Home  Transport at Discharge : Family     Additional Comments: Spoke with pt's daughter Dara to discuss the role of CM and to discuss any help pt may need prior to dc. Pt lives with her Dara in a 2 story home with 4 JOE. Pt performed ADL's indptly pta, pt has a stairglide to the 2nd floor and a nebulizer. No hx of HHC or rehab. No hx of D&A treatment. Pt has a hx of anxiety and depression. Pt's preferred pharmacy is [facility]. Pt's daughter will transport home at dc.

## 2024-06-11 ENCOUNTER — TRANSITIONAL CARE MANAGEMENT (OUTPATIENT)
Dept: FAMILY MEDICINE CLINIC | Facility: HOSPITAL | Age: 79
End: 2024-06-11

## 2024-06-11 VITALS
RESPIRATION RATE: 16 BRPM | SYSTOLIC BLOOD PRESSURE: 133 MMHG | WEIGHT: 150 LBS | TEMPERATURE: 97.2 F | DIASTOLIC BLOOD PRESSURE: 51 MMHG | BODY MASS INDEX: 32.36 KG/M2 | HEART RATE: 67 BPM | HEIGHT: 57 IN | OXYGEN SATURATION: 98 %

## 2024-06-11 LAB
ANION GAP SERPL CALCULATED.3IONS-SCNC: 9 MMOL/L (ref 4–13)
BASOPHILS # BLD AUTO: 0.02 THOUSANDS/ÂΜL (ref 0–0.1)
BASOPHILS NFR BLD AUTO: 1 % (ref 0–1)
BUN SERPL-MCNC: 21 MG/DL (ref 5–25)
CALCIUM SERPL-MCNC: 9.9 MG/DL (ref 8.4–10.2)
CHLORIDE SERPL-SCNC: 110 MMOL/L (ref 96–108)
CO2 SERPL-SCNC: 20 MMOL/L (ref 21–32)
CREAT SERPL-MCNC: 1.04 MG/DL (ref 0.6–1.3)
EOSINOPHIL # BLD AUTO: 0.01 THOUSAND/ÂΜL (ref 0–0.61)
EOSINOPHIL NFR BLD AUTO: 0 % (ref 0–6)
ERYTHROCYTE [DISTWIDTH] IN BLOOD BY AUTOMATED COUNT: 13.7 % (ref 11.6–15.1)
GFR SERPL CREATININE-BSD FRML MDRD: 50 ML/MIN/1.73SQ M
GLUCOSE SERPL-MCNC: 97 MG/DL (ref 65–140)
HCT VFR BLD AUTO: 34.6 % (ref 34.8–46.1)
HGB BLD-MCNC: 11.3 G/DL (ref 11.5–15.4)
IMM GRANULOCYTES # BLD AUTO: 0.04 THOUSAND/UL (ref 0–0.2)
IMM GRANULOCYTES NFR BLD AUTO: 1 % (ref 0–2)
LYMPHOCYTES # BLD AUTO: 0.74 THOUSANDS/ÂΜL (ref 0.6–4.47)
LYMPHOCYTES NFR BLD AUTO: 18 % (ref 14–44)
MCH RBC QN AUTO: 30.5 PG (ref 26.8–34.3)
MCHC RBC AUTO-ENTMCNC: 32.7 G/DL (ref 31.4–37.4)
MCV RBC AUTO: 93 FL (ref 82–98)
MONOCYTES # BLD AUTO: 0.51 THOUSAND/ÂΜL (ref 0.17–1.22)
MONOCYTES NFR BLD AUTO: 12 % (ref 4–12)
NEUTROPHILS # BLD AUTO: 2.86 THOUSANDS/ÂΜL (ref 1.85–7.62)
NEUTS SEG NFR BLD AUTO: 68 % (ref 43–75)
NRBC BLD AUTO-RTO: 0 /100 WBCS
PLATELET # BLD AUTO: 181 THOUSANDS/UL (ref 149–390)
PMV BLD AUTO: 11.8 FL (ref 8.9–12.7)
POTASSIUM SERPL-SCNC: 4.1 MMOL/L (ref 3.5–5.3)
RBC # BLD AUTO: 3.71 MILLION/UL (ref 3.81–5.12)
SODIUM SERPL-SCNC: 139 MMOL/L (ref 135–147)
WBC # BLD AUTO: 4.18 THOUSAND/UL (ref 4.31–10.16)

## 2024-06-11 PROCEDURE — 94760 N-INVAS EAR/PLS OXIMETRY 1: CPT

## 2024-06-11 PROCEDURE — 80048 BASIC METABOLIC PNL TOTAL CA: CPT | Performed by: INTERNAL MEDICINE

## 2024-06-11 PROCEDURE — 85025 COMPLETE CBC W/AUTO DIFF WBC: CPT | Performed by: INTERNAL MEDICINE

## 2024-06-11 PROCEDURE — 94640 AIRWAY INHALATION TREATMENT: CPT

## 2024-06-11 PROCEDURE — 99239 HOSP IP/OBS DSCHRG MGMT >30: CPT | Performed by: INTERNAL MEDICINE

## 2024-06-11 RX ORDER — LIDOCAINE 50 MG/G
1 PATCH TOPICAL DAILY
Qty: 14 PATCH | Refills: 0 | Status: SHIPPED | OUTPATIENT
Start: 2024-06-12

## 2024-06-11 RX ORDER — CYCLOBENZAPRINE HCL 5 MG
5 TABLET ORAL 3 TIMES DAILY PRN
Qty: 15 TABLET | Refills: 0 | Status: SHIPPED | OUTPATIENT
Start: 2024-06-11

## 2024-06-11 RX ADMIN — LEVOTHYROXINE SODIUM 88 MCG: 88 TABLET ORAL at 05:10

## 2024-06-11 RX ADMIN — SERTRALINE HYDROCHLORIDE 50 MG: 50 TABLET ORAL at 09:10

## 2024-06-11 RX ADMIN — HEPARIN SODIUM 5000 UNITS: 5000 INJECTION, SOLUTION INTRAVENOUS; SUBCUTANEOUS at 05:10

## 2024-06-11 RX ADMIN — LIDOCAINE 5% 1 PATCH: 700 PATCH TOPICAL at 09:11

## 2024-06-11 RX ADMIN — LEVALBUTEROL HYDROCHLORIDE 1.25 MG: 1.25 SOLUTION RESPIRATORY (INHALATION) at 07:31

## 2024-06-11 RX ADMIN — BUPROPION HYDROCHLORIDE 450 MG: 300 TABLET, EXTENDED RELEASE ORAL at 09:10

## 2024-06-11 RX ADMIN — ACETAMINOPHEN 975 MG: 325 TABLET, FILM COATED ORAL at 05:10

## 2024-06-11 RX ADMIN — PANTOPRAZOLE SODIUM 40 MG: 40 TABLET, DELAYED RELEASE ORAL at 05:10

## 2024-06-11 RX ADMIN — FLUTICASONE FUROATE AND VILANTEROL TRIFENATATE 1 PUFF: 200; 25 POWDER RESPIRATORY (INHALATION) at 09:11

## 2024-06-11 RX ADMIN — NIRMATRELVIR AND RITONAVIR 2 TABLET: KIT at 09:11

## 2024-06-11 RX ADMIN — IPRATROPIUM BROMIDE 0.5 MG: 0.5 SOLUTION RESPIRATORY (INHALATION) at 07:31

## 2024-06-11 NOTE — ASSESSMENT & PLAN NOTE
Lab Results   Component Value Date    EGFR 50 06/11/2024    EGFR 56 06/10/2024    EGFR 47 06/09/2024    CREATININE 1.04 06/11/2024    CREATININE 0.95 06/10/2024    CREATININE 1.09 06/09/2024   Creatinine is at baseline, monitor

## 2024-06-11 NOTE — ASSESSMENT & PLAN NOTE
She has acute on chronic low back pain presently, exacerbated by recent trip to [place], likely musculoskeletal injury of back  CT lumbar spine-Postsurgical change from L4-5 laminectomies and decompression of the central canal   Continue with lidocaine patch, Tylenol and Flexeril as needed  PT referral for outpatient provided  Outpatient neurosurgery follow-up

## 2024-06-11 NOTE — ASSESSMENT & PLAN NOTE
The patient has severe persistent asthma making her high risk given her COVID-19 infection  Continue home inhaler regimen  I have personally counseled the patient on medication indication, compliance, utilization and side effects. Patient may be discharged home with Jen Goveata inhaler  Respiratory protocol

## 2024-06-11 NOTE — ASSESSMENT & PLAN NOTE
"Elevated BP upon arrival to the ER secondary to low back pain  /51   Pulse 67   Temp (!) 97.2 °F (36.2 °C)   Resp 16   Ht 4' 9\" (1.448 m)   Wt 68 kg (150 lb)   SpO2 98%   BMI 32.46 kg/m²     Resume home losartan 100 mg daily  Control pain and monitor BP closely  "

## 2024-06-11 NOTE — DISCHARGE SUMMARY
Formerly [facility], later [facility]  Discharge- Cindy Cruz 1945, 79 y.o. female MRN: [id number]  Unit/Bed#: -[facility] Encounter: [id number]  Primary Care Provider: Any Vargas DO   Date and time admitted to hospital: 6/9/2024  3:02 PM    Severe persistent asthma without complication  Assessment & Plan  The patient has severe persistent asthma making her high risk given her COVID-19 infection  Continue home inhaler regimen  I have personally counseled the patient on medication indication, compliance, utilization and side effects. Patient may be discharged home with Breo Ellipta inhaler  Respiratory protocol      * Acute bilateral low back pain with left-sided sciatica  Assessment & Plan  She has acute on chronic low back pain presently, exacerbated by recent trip to [place], likely musculoskeletal injury of back  CT lumbar spine-Postsurgical change from L4-5 laminectomies and decompression of the central canal   Continue with lidocaine patch, Tylenol and Flexeril as needed  PT referral for outpatient provided  Outpatient neurosurgery follow-up    Ambulatory dysfunction  Assessment & Plan  With ambulatory dysfunction secondary to severe low back pain  PT and Ot eval completed, outpatient PT     COVID-19  Assessment & Plan  Incidentally positive for COVID-19 after recent trip to [place]  Patient is overall asymptomatic however falls into the high risk group given her severe persistent asthma and CKD  Stable on room air  Will be discharged with Paxlovid, to complete course    Chronic kidney disease, stage 3a (HCC)  Assessment & Plan  Lab Results   Component Value Date    EGFR 50 06/11/2024    EGFR 56 06/10/2024    EGFR 47 06/09/2024    CREATININE 1.04 06/11/2024    CREATININE 0.95 06/10/2024    CREATININE 1.09 06/09/2024   Creatinine is at baseline, monitor    Depression, recurrent (HCC)  Assessment & Plan  Continue home Zoloft, trazodone, Wellbutrin  Mood is stable    Hypertension  Assessment &  "Plan  Elevated BP upon arrival to the ER secondary to low back pain  /51   Pulse 67   Temp (!) 97.2 °F (36.2 °C)   Resp 16   Ht 4' 9\" (1.448 m)   Wt 68 kg (150 lb)   SpO2 98%   BMI 32.46 kg/m²     Resume home losartan 100 mg daily  Control pain and monitor BP closely    Hypothyroidism  Assessment & Plan  Continue home Synthroid              Medical Problems       Resolved Problems  Date Reviewed: 6/11/2024   None         Admission Date:   Admission Orders (From admission, onward)       Ordered        06/09/24 1939  INPATIENT ADMISSION  Once            06/09/24 1940  Inpatient Admission  Once                            Admitting Diagnosis: Back pain [M54.9]  Generalized weakness [R53.1]  Ambulatory dysfunction [R26.2]  COVID-19 [U07.1]    HPI: as per, Lemuel Lowery PA-C  , on 6/9 Cindy Cruz is a 79 y.o. female with a PMH of hypertension, asthma, chronic low back pain, CKD who presents with chief complaint of low back pain which has been worsening since returning from [place].  Went into the ER in [place], received some Toradol with improvement of her symptoms.  Ever since coming home has had severe and persistent low back pain to the point where she is unable to ambulate.  Unable to ambulate in the ER although her pain is improved with multimodal pain regimen.  Incidentally patient is found to have COVID-19 infection.  She does not have any shortness of breath presently but does have severe persistent asthma.  Admitted for PT and OT and possible rehab placement     Procedures Performed:   Orders Placed This Encounter   Procedures    ED ECG Documentation Only       Summary of Hospital Course: patient presented with backache, CT scan with non emergent post operative findings, also noted to be positive for covid incidentally. No respiratory symptoms, remained stable throughout her inpatient stay    Evaluated by PT and was recommended outpatient PT    Patient and daughter agreeable with plan for " outpatient PT and neurosurgery follow up      Significant Findings, Care, Treatment and Services Provided: see above     Complications: none    Condition at Discharge: stable         Discharge instructions/Information to patient and family:   See after visit summary for information provided to patient and family.      Provisions for Follow-Up Care:  See after visit summary for information related to follow-up care and any pertinent home health orders.      PCP: Any Vargas DO    Disposition: Home    Planned Readmission: No    Discharge Statement   I spent 36 minutes discharging the patient. This time was spent on the day of discharge. I had direct contact with the patient on the day of discharge. Additional documentation is required if more than 30 minutes were spent on discharge.     Discharge Medications:  See after visit summary for reconciled discharge medications provided to patient and family.

## 2024-06-11 NOTE — PLAN OF CARE
Problem: PAIN - ADULT  Goal: Verbalizes/displays adequate comfort level or baseline comfort level  Description: Interventions:  - Encourage patient to monitor pain and request assistance  - Assess pain using appropriate pain scale  - Administer analgesics based on type and severity of pain and evaluate response  - Implement non-pharmacological measures as appropriate and evaluate response  - Consider cultural and social influences on pain and pain management  - Notify physician/advanced practitioner if interventions unsuccessful or patient reports new pain  Outcome: Progressing     Problem: INFECTION - ADULT  Goal: Absence or prevention of progression during hospitalization  Description: INTERVENTIONS:  - Assess and monitor for signs and symptoms of infection  - Monitor lab/diagnostic results  - Monitor all insertion sites, i.e. indwelling lines, tubes, and drains  - Monitor endotracheal if appropriate and nasal secretions for changes in amount and color  - [place] appropriate cooling/warming therapies per order  - Administer medications as ordered  - Instruct and encourage patient and family to use good hand hygiene technique  - Identify and instruct in appropriate isolation precautions for identified infection/condition  Outcome: Progressing  Goal: Absence of fever/infection during neutropenic period  Description: INTERVENTIONS:  - Monitor WBC    Outcome: Progressing     Problem: SAFETY ADULT  Goal: Maintain or return to baseline ADL function  Description: INTERVENTIONS:  -  Assess patient's ability to carry out ADLs; assess patient's baseline for ADL function and identify physical deficits which impact ability to perform ADLs (bathing, care of mouth/teeth, toileting, grooming, dressing, etc.)  - Assess/evaluate cause of self-care deficits   - Assess range of motion  - Assess patient's mobility; develop plan if impaired  - Assess patient's need for assistive devices and provide as appropriate  - Encourage  maximum independence but intervene and supervise when necessary  - Involve family in performance of ADLs  - Assess for home care needs following discharge   - Consider OT consult to assist with ADL evaluation and planning for discharge  - Provide patient education as appropriate  Outcome: Progressing

## 2024-06-11 NOTE — PLAN OF CARE
Problem: PAIN - ADULT  Goal: Verbalizes/displays adequate comfort level or baseline comfort level  Description: Interventions:  - Encourage patient to monitor pain and request assistance  - Assess pain using appropriate pain scale  - Administer analgesics based on type and severity of pain and evaluate response  - Implement non-pharmacological measures as appropriate and evaluate response  - Consider cultural and social influences on pain and pain management  - Notify physician/advanced practitioner if interventions unsuccessful or patient reports new pain  Outcome: Progressing     Problem: INFECTION - ADULT  Goal: Absence or prevention of progression during hospitalization  Description: INTERVENTIONS:  - Assess and monitor for signs and symptoms of infection  - Monitor lab/diagnostic results  - Monitor all insertion sites, i.e. indwelling lines, tubes, and drains  - Monitor endotracheal if appropriate and nasal secretions for changes in amount and color  - [place] appropriate cooling/warming therapies per order  - Administer medications as ordered  - Instruct and encourage patient and family to use good hand hygiene technique  - Identify and instruct in appropriate isolation precautions for identified infection/condition  Outcome: Progressing  Goal: Absence of fever/infection during neutropenic period  Description: INTERVENTIONS:  - Monitor WBC    Outcome: Progressing     Problem: SAFETY ADULT  Goal: Patient will remain free of falls  Description: INTERVENTIONS:  - Educate patient/family on patient safety including physical limitations  - Instruct patient to call for assistance with activity   - Consult OT/PT to assist with strengthening/mobility   - Keep Call bell within reach  - Keep bed low and locked with side rails adjusted as appropriate  - Keep care items and personal belongings within reach  - Initiate and maintain comfort rounds  - Make Fall Risk Sign visible to staff  - Offer Toileting every 2 Hours,  in advance of need  - Initiate/Maintain bed alarm  - Obtain necessary fall risk management equipment:   - Apply yellow socks and bracelet for high fall risk patients  - Consider moving patient to room near nurses station  Outcome: Progressing  Goal: Maintain or return to baseline ADL function  Description: INTERVENTIONS:  -  Assess patient's ability to carry out ADLs; assess patient's baseline for ADL function and identify physical deficits which impact ability to perform ADLs (bathing, care of mouth/teeth, toileting, grooming, dressing, etc.)  - Assess/evaluate cause of self-care deficits   - Assess range of motion  - Assess patient's mobility; develop plan if impaired  - Assess patient's need for assistive devices and provide as appropriate  - Encourage maximum independence but intervene and supervise when necessary  - Involve family in performance of ADLs  - Assess for home care needs following discharge   - Consider OT consult to assist with ADL evaluation and planning for discharge  - Provide patient education as appropriate  Outcome: Progressing  Goal: Maintains/Returns to pre admission functional level  Description: INTERVENTIONS:  - Perform AM-PAC 6 Click Basic Mobility/ Daily Activity assessment daily.  - Set and communicate daily mobility goal to care team and patient/family/caregiver.   - Collaborate with rehabilitation services on mobility goals if consulted  - Perform Range of Motion 3 times a day.  - Reposition patient every 2 hours.  - Dangle patient 2 times a day  - Stand patient 2 times a day  - Ambulate patient 2 times a day  - Out of bed to chair 2 times a day   - Out of bed for meals 2 times a day  - Out of bed for toileting  - Record patient progress and toleration of activity level   Outcome: Progressing     Problem: DISCHARGE PLANNING  Goal: Discharge to home or other facility with appropriate resources  Description: INTERVENTIONS:  - Identify barriers to discharge w/patient and caregiver  -  Arrange for needed discharge resources and transportation as appropriate  - Identify discharge learning needs (meds, wound care, etc.)  - Arrange for interpretive services to assist at discharge as needed  - Refer to Case Management Department for coordinating discharge planning if the patient needs post-hospital services based on physician/advanced practitioner order or complex needs related to functional status, cognitive ability, or social support system  Outcome: Progressing     Problem: Knowledge Deficit  Goal: Patient/family/caregiver demonstrates understanding of disease process, treatment plan, medications, and discharge instructions  Description: Complete learning assessment and assess knowledge base.  Interventions:  - Provide teaching at level of understanding  - Provide teaching via preferred learning methods  Outcome: Progressing     Problem: Nutrition/Hydration-ADULT  Goal: Nutrient/Hydration intake appropriate for improving, restoring or maintaining nutritional needs  Description: Monitor and assess patient's nutrition/hydration status for malnutrition. Collaborate with interdisciplinary team and initiate plan and interventions as ordered.  Monitor patient's weight and dietary intake as ordered or per policy. Utilize nutrition screening tool and intervene as necessary. Determine patient's food preferences and provide high-protein, high-caloric foods as appropriate.     INTERVENTIONS:  - Monitor oral intake, urinary output, labs, and treatment plans  - Assess nutrition and hydration status and recommend course of action  - Evaluate amount of meals eaten  - Assist patient with eating if necessary   - Allow adequate time for meals  - Recommend/ encourage appropriate diets, oral nutritional supplements, and vitamin/mineral supplements  - Order, calculate, and assess calorie counts as needed  - Recommend, monitor, and adjust tube feedings and TPN/PPN based on assessed needs  - Assess need for intravenous  fluids  - Provide specific nutrition/hydration education as appropriate  - Include patient/family/caregiver in decisions related to nutrition  Outcome: Progressing     Problem: Potential for Falls  Goal: Patient will remain free of falls  Description: INTERVENTIONS:  - Educate patient/family on patient safety including physical limitations  - Instruct patient to call for assistance with activity   - Consult OT/PT to assist with strengthening/mobility   - Keep Call bell within reach  - Keep bed low and locked with side rails adjusted as appropriate  - Keep care items and personal belongings within reach  - Initiate and maintain comfort rounds  - Make Fall Risk Sign visible to staff  - Offer Toileting every 2 Hours, in advance of need  - Initiate/Maintain bed alarm  - Obtain necessary fall risk management equipment:   - Apply yellow socks and bracelet for high fall risk patients  - Consider moving patient to room near nurses station  Outcome: Progressing

## 2024-06-11 NOTE — ASSESSMENT & PLAN NOTE
Incidentally positive for COVID-19 after recent trip to [place]  Patient is overall asymptomatic however falls into the high risk group given her severe persistent asthma and CKD  Stable on room air  Will be discharged with Paxlovid, to complete course

## 2024-06-11 NOTE — DISCHARGE INSTR - AVS FIRST PAGE
Please see your family doctor within 7-10 days of discharge to discuss recent hospitalization  Please follow-up with outpatient physical therapy  Please make appointment with neurosurgery  Continue to take lidocaine patches 12 hours on/12 hours off for backache  Tylenol as needed  Flexeril as needed

## 2024-06-12 DIAGNOSIS — Z71.89 COMPLEX CARE COORDINATION: Primary | ICD-10-CM

## 2024-06-12 NOTE — UTILIZATION REVIEW
NOTIFICATION OF ADMISSION DISCHARGE   This is a Notification of Discharge from [facility]. Please be advised that this patient has been discharge from our facility. Below you will find the admission and discharge date and time including the patient’s disposition.   UTILIZATION REVIEW CONTACT:  Jadyn Liu  Utilization   Network Utilization Review Department  Phone: [phone] x carefully listen to the prompts. All voicemails are confidential.  Email: [email or web address]     ADMISSION INFORMATION  PRESENTATION DATE: 6/9/2024  3:02 PM  OBERVATION ADMISSION DATE:   INPATIENT ADMISSION DATE: 6/9/24  7:39 PM   DISCHARGE DATE: 6/11/2024 12:07 PM   DISPOSITION:Home/Self Care    Network Utilization Review Department  ATTENTION: Please call with any questions or concerns to [phone] and carefully listen to the prompts so that you are directed to the right person. All voicemails are confidential.   For Discharge needs, contact Care Management DC Support Team at [phone] opt. [phone]  Send all requests for admission clinical reviews, approved or denied determinations and any other requests to dedicated fax number below belonging to the campus where the patient is receiving treatment. List of dedicated fax numbers for the Facilities:  FACILITY NAME UR FAX NUMBER   ADMISSION DENIALS (Administrative/Medical Necessity) [phone]   DISCHARGE SUPPORT TEAM ([facility]) [phone]   PARENT CHILD HEALTH (Maternity/NICU/Pediatrics) [phone]   [facility] [phone]   [facility] [phone]   [facility] [phone]   [facility] [phone]   [facility] [phone]   [facility] [phone]   [facility] [phone]   [facility] [phone]    [facility] [phone]   [facility] [phone]   [facility] [phone]   [facility] [phone]

## 2024-06-13 ENCOUNTER — PATIENT OUTREACH (OUTPATIENT)
Dept: FAMILY MEDICINE CLINIC | Facility: HOSPITAL | Age: 79
End: 2024-06-13

## 2024-06-13 NOTE — PROGRESS NOTES
Outpatient Care Management Note:    New HRR referral received. Patient was hospitalized from 6/9-6/11/24 with acute bilateral back pain with left sided sciatica. Her symptoms were exacerbated by a recent trip to [place]. Back pain is causing her to be unable to ambulate. She improved with tylenol, lidocaine, and valium. Incidentally found to be positive for covid. Asymptomatic but treated with paxlovid due there asthma/CKD history. She was given a referral to physical therapy and neurosurgery. She is scheduled to see her PCP on 6/17.     Voice mail message left for Cindy and her daughter, Dara, with my contact information, introducing myself and requesting a call back.

## 2024-06-17 ENCOUNTER — OFFICE VISIT (OUTPATIENT)
Dept: FAMILY MEDICINE CLINIC | Facility: HOSPITAL | Age: 79
End: 2024-06-17
Payer: COMMERCIAL

## 2024-06-17 VITALS
WEIGHT: 152.4 LBS | HEART RATE: 85 BPM | TEMPERATURE: 98.5 F | DIASTOLIC BLOOD PRESSURE: 60 MMHG | OXYGEN SATURATION: 97 % | BODY MASS INDEX: 32.88 KG/M2 | HEIGHT: 57 IN | SYSTOLIC BLOOD PRESSURE: 140 MMHG

## 2024-06-17 DIAGNOSIS — J45.51 SEVERE PERSISTENT ASTHMA WITH ACUTE EXACERBATION: ICD-10-CM

## 2024-06-17 DIAGNOSIS — E83.52 HYPERCALCEMIA: ICD-10-CM

## 2024-06-17 DIAGNOSIS — Z09 HOSPITAL DISCHARGE FOLLOW-UP: Primary | ICD-10-CM

## 2024-06-17 DIAGNOSIS — J45.909 PERSISTENT ASTHMA WITHOUT COMPLICATION, UNSPECIFIED ASTHMA SEVERITY: ICD-10-CM

## 2024-06-17 DIAGNOSIS — U07.1 COVID-19: ICD-10-CM

## 2024-06-17 DIAGNOSIS — M54.42 ACUTE BILATERAL LOW BACK PAIN WITH LEFT-SIDED SCIATICA: ICD-10-CM

## 2024-06-17 PROCEDURE — 99495 TRANSJ CARE MGMT MOD F2F 14D: CPT | Performed by: INTERNAL MEDICINE

## 2024-06-17 RX ORDER — MONTELUKAST SODIUM 10 MG/1
10 TABLET ORAL
Qty: 90 TABLET | Refills: 1 | Status: SHIPPED | OUTPATIENT
Start: 2024-06-17

## 2024-06-17 RX ORDER — PREDNISONE 10 MG/1
TABLET ORAL
Qty: 39 TABLET | Refills: 0 | Status: SHIPPED | OUTPATIENT
Start: 2024-06-17

## 2024-06-17 RX ORDER — BENZONATATE 100 MG/1
100 CAPSULE ORAL 3 TIMES DAILY PRN
Qty: 60 CAPSULE | Refills: 1 | Status: SHIPPED | OUTPATIENT
Start: 2024-06-17

## 2024-06-17 RX ORDER — BENZONATATE 100 MG/1
100 CAPSULE ORAL 3 TIMES DAILY PRN
Qty: 60 CAPSULE | Refills: 1 | Status: SHIPPED | OUTPATIENT
Start: 2024-06-17 | End: 2024-06-17 | Stop reason: SDUPTHER

## 2024-06-17 NOTE — PROGRESS NOTES
Transition of Care Visit  Name: Cindy Cruz      : 1945      MRN: [id number]  Encounter Provider: Any Vargas DO  Encounter Date: 2024   Encounter department: [facility] [address]     Assessment & Plan   1. Hospital discharge follow-up  2. Acute bilateral low back pain with left-sided sciatica  Assessment & Plan:  Acute on chronic LBP - CT Lumbar spine reviewed, she is on Tylenol, Lidocaine patch and Flexeril, will do course of Prednisone for breathing/asthma and LBP, SE of Prednisone reviewed, has appt with PT and Neurosurgery, will follow, red flag symptoms reviewed - to ED if they occur  3. Hypercalcemia  Assessment & Plan:  Back to normal by discharge, will monitor  4. COVID-19  Assessment & Plan:  Finished Paxlovid, still with cough - discussed cough can last 2-3 mos, is using rescue inhaler 2-3 x's a day - will rx Prednisone taper - SE reviewed, call with new/worse symptoms  Orders:  -     benzonatate (TESSALON PERLES) 100 mg capsule; Take 1 capsule (100 mg total) by mouth 3 (three) times a day as needed for cough  -     predniSONE 10 mg tablet; 3 tab PO bid x 3 days then 2 tab PO bid x 3 days then 1 tab PO bid x 3 days then 1 tab PO q day x 3 days then stop  5. Persistent asthma without complication, unspecified asthma severity  Assessment & Plan:  On Breo daily, using rescue inhaler 2-3 x's a day with concurrent COVID infection, pulm exam abnormal and with persistent cough, rx for Prednisone taper sent, SE of steroids reviewed, call with new/worse symptoms or continued frequent use of rescue inhaler  Orders:  -     montelukast (SINGULAIR) 10 mg tablet; Take 1 tablet (10 mg total) by mouth daily at bedtime  -     predniSONE 10 mg tablet; 3 tab PO bid x 3 days then 2 tab PO bid x 3 days then 1 tab PO bid x 3 days then 1 tab PO q day x 3 days then stop  6. Severe persistent asthma with acute exacerbation  Assessment & Plan:  On Breo daily, using rescue inhaler 2-3 x's  a day with concurrent COVID infection, pulm exam abnormal and with persistent cough, rx for Prednisone taper sent, SE of steroids reviewed, call with new/worse symptoms or continued frequent use of rescue inhaler     Mammo 12/23    Dexa 1/24 - osteopenia - improved from osteoporosis on Fosamax    BW 6/24  FLP 7/23    Will f/u in Sept before she goes back to [place]      History of Present Illness     Transitional Care Management Review:   Cindy Cruz is a 79 y.o. female here for TCM follow up.  Pt was admitted 6/9/24 to 6/11/24.  Records were reviewed by myself in detail and events are summarized below.     During the TCM phone call patient stated:  TCM Call       Date and time call was made  6/11/2024  3:31 PM    Hospital care reviewed  Records reviewed    Patient was hospitialized at  [facility]    Date of Admission  06/09/24    Date of discharge  06/11/24    Diagnosis  Acute bilateral low back pain with left-sided sciatica    Disposition  Home    Were the patients medications reviewed and updated  No    Current Symptoms  --  still having pain on side.          TCM Call       Post hospital issues  None    Should patient be enrolled in anticoag monitoring?  No    Scheduled for follow up?  Yes    Did you obtain your prescribed medications  Yes    Do you need help managing your prescriptions or medications  No    Is transportation to your appointment needed  No    I have advised the patient to call PCP with any new or worsening symptoms  Laura Coronado MA          HPI Pt presented to [facility] ED on 6/9/24 with SOB and c/o R back pain radiating to RLE.  She had some associated urinary symptoms as well.  In the ED her BP was 209/83 and rest of VSS. Exam notable for tenderness along thoracic and lumbar spine and + SLR test on the RLE.  Work up in ED noted + COVID test, Cl 109, Ca 10.3 and ALT 78.  WBC was up a bit at 10.38.  CT head showed no acute dz.  CT A/P showed no acute dz.  CT lumbar spine showed  post surgical changes of L4-5 laminectomies and decompression of central canal.  CXR showed NAD. ECG showed NSR with RBBB and NS ST and T wave changes. Pt was admitted for acute on chronic LBP, ambulatory dysfunction and COVID 19 infection. Pt was given labetalol for her BP and a lidocaine patch for her back pain.  PT/OT was consulted and no acute needs were identified - OP PT was recommended.  She was given Paxlovid for her COVID.  Resp tx were given as well.  Vitals remained stable and no supplemental O2 was needed.  Pts pain improved and she was discharged home on 6/11/24.  Med list was reviewed.     She continues to note R LBP.  She notes pain is worse with sitting or standing or walking long period so time.  She notes radiation of pain to the knee on the RLE.  She feels weak and has no numbness/tingling/loss of bowel or bladder control/saddle anesthesia. She is currently using Tylenol, Flexeril and Lidocaine patch.  She just got the patch last night.  She has an appt for PT in early July.  She has finished Paxlovid but con't to note a cough.  She notes no SOB/wheezing.  She is using her Breo daily as directed. She is using the rescue inhaler 2-3 x's a day yet.  She has not used the nebulizer.        Review of Systems   Constitutional:  Positive for fatigue. Negative for chills and fever.   HENT:  Negative for congestion and sore throat.    Eyes:  Negative for pain and visual disturbance.   Respiratory:  Positive for cough, shortness of breath and wheezing.    Cardiovascular:  Negative for chest pain and palpitations.   Gastrointestinal:  Positive for constipation. Negative for abdominal pain, blood in stool, diarrhea, nausea and vomiting.   Genitourinary:  Negative for difficulty urinating and dysuria.   Musculoskeletal:  Positive for back pain and gait problem. Negative for neck pain.   Skin:  Negative for rash and wound.   Neurological:  Positive for dizziness. Negative for headaches.   Hematological:   "Negative for adenopathy.   Psychiatric/Behavioral:  Negative for confusion and dysphoric mood.      Objective     /60   Pulse 85   Temp 98.5 °F (36.9 °C)   Ht 4' 9\" (1.448 m)   Wt 69.1 kg (152 lb 6.4 oz)   SpO2 97%   BMI 32.98 kg/m²     Physical Exam  Vitals and nursing note reviewed.   Constitutional:       General: She is not in acute distress.     Appearance: She is well-developed. She is not ill-appearing.   HENT:      Head: Normocephalic and atraumatic.      Right Ear: Tympanic membrane and external ear normal. There is no impacted cerumen.      Left Ear: Tympanic membrane and external ear normal. There is no impacted cerumen.      Mouth/Throat:      Mouth: Mucous membranes are moist.      Pharynx: Oropharynx is clear. No oropharyngeal exudate.   Eyes:      General:         Right eye: No discharge.         Left eye: No discharge.      Conjunctiva/sclera: Conjunctivae normal.   Neck:      Thyroid: No thyromegaly.      Trachea: No tracheal deviation.   Cardiovascular:      Rate and Rhythm: Normal rate and regular rhythm.      Heart sounds: Normal heart sounds. No murmur heard.  Pulmonary:      Effort: Pulmonary effort is normal. No respiratory distress.      Breath sounds: Wheezing and rhonchi present. No rales.   Abdominal:      General: There is no distension.      Palpations: Abdomen is soft.      Tenderness: There is no abdominal tenderness. There is no guarding or rebound.   Musculoskeletal:         General: No deformity or signs of injury.      Cervical back: Neck supple.   Lymphadenopathy:      Cervical: No cervical adenopathy.   Skin:     General: Skin is warm and dry.      Coloration: Skin is not pale.      Findings: No bruising or rash.   Neurological:      General: No focal deficit present.      Mental Status: She is alert.      Motor: No abnormal muscle tone.      Gait: Gait abnormal.      Comments: Stiff gait and needs assistance on and off exam table but ambulates w/o assistance "   Psychiatric:         Mood and Affect: Mood normal.         Behavior: Behavior normal.         Thought Content: Thought content normal.         Judgment: Judgment normal.       Medications have been reviewed by provider in current encounter    Administrative Statements

## 2024-06-17 NOTE — ASSESSMENT & PLAN NOTE
Acute on chronic LBP - CT Lumbar spine reviewed, she is on Tylenol, Lidocaine patch and Flexeril, will do course of Prednisone for breathing/asthma and LBP, SE of Prednisone reviewed, has appt with PT and Neurosurgery, will follow, red flag symptoms reviewed - to ED if they occur   rest and hydration  tylenol 650mg every 6 hours for pain as needed  Follow up with your GI doctor and General surgery next week  continue medication as prescribed  return to the ER immediately for fever, vomiting or worsening pain

## 2024-06-17 NOTE — ASSESSMENT & PLAN NOTE
On Breo daily, using rescue inhaler 2-3 x's a day with concurrent COVID infection, pulm exam abnormal and with persistent cough, rx for Prednisone taper sent, SE of steroids reviewed, call with new/worse symptoms or continued frequent use of rescue inhaler

## 2024-06-17 NOTE — ASSESSMENT & PLAN NOTE
Finished Paxlovid, still with cough - discussed cough can last 2-3 mos, is using rescue inhaler 2-3 x's a day - will rx Prednisone taper - SE reviewed, call with new/worse symptoms

## 2024-06-18 ENCOUNTER — OFFICE VISIT (OUTPATIENT)
Age: 79
End: 2024-06-18
Payer: COMMERCIAL

## 2024-06-18 ENCOUNTER — TELEPHONE (OUTPATIENT)
Dept: FAMILY MEDICINE CLINIC | Facility: HOSPITAL | Age: 79
End: 2024-06-18

## 2024-06-18 ENCOUNTER — APPOINTMENT (OUTPATIENT)
Dept: LAB | Facility: HOSPITAL | Age: 79
End: 2024-06-18
Payer: COMMERCIAL

## 2024-06-18 VITALS
DIASTOLIC BLOOD PRESSURE: 60 MMHG | HEIGHT: 57 IN | OXYGEN SATURATION: 98 % | WEIGHT: 152 LBS | HEART RATE: 78 BPM | SYSTOLIC BLOOD PRESSURE: 140 MMHG | TEMPERATURE: 97 F | BODY MASS INDEX: 32.79 KG/M2

## 2024-06-18 DIAGNOSIS — M54.50 ACUTE BILATERAL LOW BACK PAIN WITHOUT SCIATICA: Primary | ICD-10-CM

## 2024-06-18 DIAGNOSIS — R26.2 AMBULATORY DYSFUNCTION: ICD-10-CM

## 2024-06-18 LAB
ALBUMIN SERPL BCP-MCNC: 3.6 G/DL (ref 3.5–5)
ALP SERPL-CCNC: 96 U/L (ref 34–104)
ALT SERPL W P-5'-P-CCNC: 22 U/L (ref 7–52)
ANION GAP SERPL CALCULATED.3IONS-SCNC: 8 MMOL/L (ref 4–13)
AST SERPL W P-5'-P-CCNC: 16 U/L (ref 13–39)
BASOPHILS # BLD AUTO: 0.03 THOUSANDS/ÂΜL (ref 0–0.1)
BASOPHILS NFR BLD AUTO: 1 % (ref 0–1)
BILIRUB SERPL-MCNC: 0.28 MG/DL (ref 0.2–1)
BUN SERPL-MCNC: 28 MG/DL (ref 5–25)
CALCIUM SERPL-MCNC: 9.6 MG/DL (ref 8.4–10.2)
CHLORIDE SERPL-SCNC: 107 MMOL/L (ref 96–108)
CHOLEST SERPL-MCNC: 124 MG/DL
CO2 SERPL-SCNC: 25 MMOL/L (ref 21–32)
CREAT SERPL-MCNC: 1.09 MG/DL (ref 0.6–1.3)
EOSINOPHIL # BLD AUTO: 0.57 THOUSAND/ÂΜL (ref 0–0.61)
EOSINOPHIL NFR BLD AUTO: 11 % (ref 0–6)
ERYTHROCYTE [DISTWIDTH] IN BLOOD BY AUTOMATED COUNT: 13.9 % (ref 11.6–15.1)
EST. AVERAGE GLUCOSE BLD GHB EST-MCNC: 123 MG/DL
GFR SERPL CREATININE-BSD FRML MDRD: 47 ML/MIN/1.73SQ M
GLUCOSE P FAST SERPL-MCNC: 74 MG/DL (ref 65–99)
HBA1C MFR BLD: 5.9 %
HCT VFR BLD AUTO: 35.2 % (ref 34.8–46.1)
HDLC SERPL-MCNC: 51 MG/DL
HGB BLD-MCNC: 11.3 G/DL (ref 11.5–15.4)
IMM GRANULOCYTES # BLD AUTO: 0.06 THOUSAND/UL (ref 0–0.2)
IMM GRANULOCYTES NFR BLD AUTO: 1 % (ref 0–2)
LDLC SERPL CALC-MCNC: 57 MG/DL (ref 0–100)
LYMPHOCYTES # BLD AUTO: 2.17 THOUSANDS/ÂΜL (ref 0.6–4.47)
LYMPHOCYTES NFR BLD AUTO: 40 % (ref 14–44)
MCH RBC QN AUTO: 31.6 PG (ref 26.8–34.3)
MCHC RBC AUTO-ENTMCNC: 32.1 G/DL (ref 31.4–37.4)
MCV RBC AUTO: 98 FL (ref 82–98)
MONOCYTES # BLD AUTO: 0.66 THOUSAND/ÂΜL (ref 0.17–1.22)
MONOCYTES NFR BLD AUTO: 13 % (ref 4–12)
NEUTROPHILS # BLD AUTO: 1.77 THOUSANDS/ÂΜL (ref 1.85–7.62)
NEUTS SEG NFR BLD AUTO: 34 % (ref 43–75)
NONHDLC SERPL-MCNC: 73 MG/DL
NRBC BLD AUTO-RTO: 0 /100 WBCS
PLATELET # BLD AUTO: 233 THOUSANDS/UL (ref 149–390)
PMV BLD AUTO: 11.3 FL (ref 8.9–12.7)
POTASSIUM SERPL-SCNC: 4.7 MMOL/L (ref 3.5–5.3)
PROT SERPL-MCNC: 6 G/DL (ref 6.4–8.4)
RBC # BLD AUTO: 3.58 MILLION/UL (ref 3.81–5.12)
SODIUM SERPL-SCNC: 140 MMOL/L (ref 135–147)
TRIGL SERPL-MCNC: 79 MG/DL
TSH SERPL DL<=0.05 MIU/L-ACNC: 2.1 UIU/ML (ref 0.45–4.5)
WBC # BLD AUTO: 5.26 THOUSAND/UL (ref 4.31–10.16)

## 2024-06-18 PROCEDURE — 99213 OFFICE O/P EST LOW 20 MIN: CPT | Performed by: PHYSICIAN ASSISTANT

## 2024-06-18 NOTE — PATIENT INSTRUCTIONS
Ejercicios para la espalda baja   LO QUE NECESITA SABER:   Los ejercicios de la espalda baja ayudan a sanar y a fortalecer los músculos de pappas espalda para evitar otra lesión. Pregúntele a pappas médico si usted necesita acudir con un fisioterapeuta para que le indique ejercicios más avanzado.  INSTRUCCIONES SOBRE [facility]ITAL[facility]:   Regrese a la anshul de emergencias si:  Usted tiene dolor severo que le impide moverse.      Llame a pappas médico si:  Pappas dolor empeora.    Usted tiene un dolor nuevo.    Usted tiene preguntas o inquietudes acerca de pappas condición o cuidado.    Realice los ejercicios para la espalda baja de manera abreu:  Alberto los ejercicios sobre ade colchoneta o superficie firme (no sobre ade cama). Ade superficie firme sostendrá pappas columna y evitará el dolor lumbar.    Muévase lenta y suavemente. Evite movimientos rápidos o bruscos.    Respire normalmente. No contenga la respiración.    Deténgase si siente dolor. Es normal sentir alguna molestia al principio, karmen no debería sentir dolor. Practicar los ejercicios con regularidad ayudará a disminuir pappas incomodidad con el paso del tiempo.    Ejercicios para la espalda baja: Pappas médico podría recomendarle que realice ejercicios para la espalda de 10 a 30 minutos cada día. También podría recomendarle que alberto ejercicios de 1 a 3 veces cada día. Pregunte a pappas médico cuáles ejercicios son los mejores para usted y con qué frecuencia hacerlos.  Bombeo del tobillo: Acuéstese boca arriba. Levante pappas pie (con frances dedos apuntando hacia pappas holli). Luego, baje pappas pie (con los dedos apuntando lejos de usted). Repita frederick ejercicio 10 veces en cada lado.         Deslizamiento de talón: Acuéstese boca arriba. Muy despacio doble ade pierna y luego enderécela. Luego, doble la otra pierna y enderécela. Repita 10 veces en cada lado.         Inclinación pélvica: Acuéstese boca arriba con frances rodillas dobladas y frances pies planos sobre el piso. Coloque frances brazos en ade posición  relajada junto a pappas cuerpo. Contraiga los músculos de pappas abdomen y aplane pappas espalda contra el piso. Sostenga está posición por 5 segundos. Repita 5 veces.         Estiramiento de la espalda: Acuéstese boca arriba con frances glai detrás de pappas holli. Doble frances rodillas y gire la mitad de pappas cuerpo hacia un lado. Mantenga esta posición por 10 segundos. Repita 3 veces en cada lado.         Levantamiento de la pierna estirada: Acuéstese boca arriba con ade pierna estirada. Doble la otra rodilla. Contraiga pappas abdomen y luego levante lentamente la pierna estirada entre 6 a 12 pulgadas del piso. Mantenga esta posición por 1 a 5 segundos. Baje pappas pierna lentamente. Repita 10 veces en cada pierna.         Rodillas al pecho: Acuéstese boca arriba con frances rodillas dobladas y frances pies planos sobre el piso. Traiga ade de las rodillas hacia pappas pecho y sosténgala por 5 segundos. Regrese pappas pierna a la posición inicial. Levante la otra rodilla hacia el pecho y sosténgala por 5 segundos. [place] esto 5 veces en cada lado.         Posición becky camello: Coloque frances gali y rodillas sobre el piso. Arquee pappas espalda hacia arriba, hacia el techo y baje pappas holli. Arquee pappas luis dorsal lo más posible. Sostenga está posición por 5 segundos. Levante pappas holli hacia arriba y baje pappas pecho hacia el piso. Sostenga está posición por 5 segundos. [place] 3 series o ricky se le indique.         Posición de cuclillas contra la pared: Párese con pappas espalda contra la pared. Contraiga los músculos de pappas abdomen. Lentamente deslice pappas cuerpo hasta que frances rodillas queden dobladas en un ángulo de 45 grados. Mantenga esta posición por 5 segundos. Deslice lentamente pappas espalda hacia arriba hasta quedar de pie. Repita 10 veces.         Posición de acurrucarse: Acuéstese boca arriba con frances rodillas dobladas y frances pies planos sobre el piso. Coloque frances gali con las katlin hacia abajo debajo de la curva de la parte baja de pappas espalda. Después, con frances codos  sobre el piso, levante frances hombros y pecho entre 2 a 3 pulgadas. Mantenga pappas holli a la misma altura de frances hombros. Mantenga esta posición por 5 segundos. Cuando usted pueda hacer frederick ejercicio sin sentir dolor por 10 a 15 segundos, puede entonces añadir ade rotación. Mientras frances hombros y pappas pecho estén levantados del piso, voltee levemente hacia la izquierda y sostenga. Repita en el otro lado.         Ejercicio pájaro ansley: Coloque frances gali y rodillas sobre el piso. Mantenga frances muñecas directamente debajo de frances hombros y frances rodillas directamente debajo de frances caderas. Contraiga pappas ombligo hacia adentro en dirección a pappas columna. No estire ni arquee pappas espalda. Ponga tensos frances músculos abdominales. Levante un brazo extendido para que se alinee con pappas holli. Luego, levante la pierna opuesta a pappas brazo. Mantenga esta posición por 15 segundos. Baje pappas brazo y pierna lentamente y cambie de lado. [place] 5 series.       Acuda a la consulta de control con pappas médico según las indicaciones: Anote frances preguntas para que se acuerde de hacerlas martin frances visitas.  © Copyright [employer] 2023 Information is for End User's use only and may not be sold, redistributed or otherwise used for commercial purposes.  Esta información es sólo para uso en educación. Pappas intención no es darle un consejo médico sobre enfermedades o tratamientos. Colsulte con pappas médico, enfermera o farmacéutico antes de seguir cualquier régimen médico para saber si es seguro y efectivo para usted.

## 2024-06-18 NOTE — TELEPHONE ENCOUNTER
----- Message from Any Vargas DO sent at 6/18/2024  5:12 PM EDT -----  Please notify pt/dgtr that her protein level was a bit low as was her kidney function but her kidney's are stable from previous BW, her hemoglobin/blood count was a bit low but stable, rest of BW was normal. Will d/w pt in detail at next appt

## 2024-06-18 NOTE — PROGRESS NOTES
Neurosurgery Office Note  Cindy Cruz 79 y.o. female MRN: [id number]      Assessment & Plan     Acute bilateral low back pain  Presents for evaluation of ambulatory dysfunction with back and leg pain   History of L4-5 decompression in [place] possibly in 2016  More recently seen by the [facility] neurosurgical team for   Insertion of SCS paddle with Dr. Mccallum on 2/5/2019  Revision of SCS with removal of paddle electrode and insertion of percutaneous leads with Dr. Mccallum 3/29/2019  Completed removal of SCS system with Dr. Goss 9/6/2022    Imaging:   CT lumbar spine post surgical changes from L4-5 laminectomy and decompression of the central canal. Mild foraminal narrowing.     Plan:   Continue to monitor symptoms   Primarily low back pain without dermatomal radicular features. Chronic RLE numbness   Reviewed imaging with patient in room  Continue taking OTC pain medication  Referral to PT provided at [facility] from recent hospitalization  Scheduled to start 7/3  Continue to be active with walking and low impact activities  Stretches and exercises provided to patient   Recommend conservative non invasive measures at this time. If symptoms fail to improve despite medical therapies and PT, can have patient return for possible imaging studies and referral to PM if indicated.    Follow up as needed at this time. Encouraged to call with questions or concerns       Diagnoses and all orders for this visit:    Acute bilateral low back pain without sciatica    Ambulatory dysfunction  -     Ambulatory Referral to Neurosurgery          I have spent a total time of 25 minutes on 06/18/24 in caring for this patient including Diagnostic results, Prognosis, Risks and benefits of tx options, Instructions for management, Patient and family education, Importance of tx compliance, Risk factor reductions, Impressions, Counseling / Coordination of care, Documenting in the medical record, Reviewing / ordering tests, medicine, procedures  , and  Obtaining or reviewing history  .      CHIEF COMPLAINT    Chief Complaint   Patient presents with    Follow-up     LAST SEEN 2022  AMBULATORY DYSFUNCTION  CT LSPINE RECON ONLY 6/9/24 [facility]          HISTORY    History of Present Illness     79 y.o. year old female who presents the outpatient neurosurgical office for evaluation of the low back pain.  Patient states that began approximately 2 weeks ago.  Just prior to her presentation to the hospital patient was complaining of some generalized weakness and fatigue with some mild increased work of breathing and low back pain.  Patient's pain is primarily centered in her back but does occasionally have some radiation to her lateral thigh on the right.  She denies any pain distal to her right knee however she does complain of some numbness in the lateral calf.  She denies any left leg symptoms.  She denies any bowel or bladder dysfunction.     Patient has significant past surgical history for an L4-5 decompression which she had completed in [place].  More recently she underwent an insertion of a spinal cord stimulator and a revision from a paddle stimulator to percutaneous electrode stimulator in 2019.  In 2022 she had her spinal cord stimulator removed as she was no longer experiencing back pain following a left knee surgery.  Patient's daughter presents with her today and inquires as to whether or not right knee surgery may provide benefit for her back pain.  Patient is scheduled to begin physical therapy for which a referral was provided after her recent hospitalization on 6/9/24.  She is scheduled to start therapy on 7/3.  Patient was evaluated by her PCP yesterday and also provided with prescription for prednisone.  Since discharge patient has not been overly compliant with her medication regimen including topical therapies.  She has not started her prednisone  therapy.  Advised her to begin stretches and exercises at home for her low back in addition to medication  and upcoming physical therapy.  Will trial additional conservative measures prior to additional imaging or evaluations at this time.         See Discussion    REVIEW OF SYSTEMS    Review of Systems   Musculoskeletal:  Positive for back pain (lower back pain).   Neurological:  Positive for weakness (in both leg) and numbness (tingling and numbness in right leg).       ROS obtained by [facility]. Reviewed. See HPI.     Meds/Allergies     Current Outpatient Medications   Medication Sig Dispense Refill    albuterol (PROVENTIL HFA,VENTOLIN HFA) 90 mcg/act inhaler Inhale 2 puffs every 6 (six) hours as needed for wheezing 18 g 3    alendronate (FOSAMAX) 70 mg tablet Take 1 tablet (70 mg total) by mouth once a week 12 tablet 3    atorvastatin (LIPITOR) 20 mg tablet Take 1 tablet by mouth once daily 90 tablet 1    benzonatate (TESSALON PERLES) 100 mg capsule Take 1 capsule (100 mg total) by mouth 3 (three) times a day as needed for cough 60 capsule 1    bimatoprost (Lumigan) 0.01 % ophthalmic drops Administer 1 drop to both eyes daily 5 mL 0    buPROPion (WELLBUTRIN XL) 150 mg 24 hr tablet Take 1 tablet (150 mg total) by mouth every morning With 300 mg to equal 450 mg daily 90 tablet 1    buPROPion (WELLBUTRIN XL) 300 mg 24 hr tablet Take 1 tablet by mouth once daily 90 tablet 0    cyclobenzaprine (FLEXERIL) 5 mg tablet Take 1 tablet (5 mg total) by mouth 3 (three) times a day as needed for muscle spasms 15 tablet 0    ipratropium (ATROVENT) 0.02 % nebulizer solution Take 2.5 mL (0.5 mg total) by nebulization every 6 (six) hours as needed for wheezing or shortness of breath 240 mL 1    levalbuterol (XOPENEX) 1.25 mg/3 mL nebulizer solution Take 3 mL (1.25 mg total) by nebulization every 6 (six) hours as needed for wheezing or shortness of breath 240 mL 1    lidocaine (LIDODERM) 5 % Apply 1 patch topically over 12 hours daily Remove & Discard patch within 12 hours or as directed by MD 14 patch 0    losartan (COZAAR) 100 MG tablet  Take 1 tablet by mouth once daily 90 tablet 2    montelukast (SINGULAIR) 10 mg tablet Take 1 tablet (10 mg total) by mouth daily at bedtime 90 tablet 1    omeprazole (PriLOSEC) 40 MG capsule Take 1 capsule (40 mg total) by mouth 2 (two) times a day 180 capsule 1    predniSONE 10 mg tablet 3 tab PO bid x 3 days then 2 tab PO bid x 3 days then 1 tab PO bid x 3 days then 1 tab PO q day x 3 days then stop 39 tablet 0    promethazine-dextromethorphan (PHENERGAN-DM) 6.25-15 mg/5 mL oral syrup Take 5 mL by mouth 4 (four) times a day as needed for cough 250 mL 3    Synthroid 88 MCG tablet Take 1 tablet (88 mcg total) by mouth daily 90 tablet 1    traZODone (DESYREL) 50 mg tablet Take 1 tablet (50 mg total) by mouth daily at bedtime 90 tablet 1    fluticasone-vilanterol (Breo Ellipta) 200-25 mcg/actuation inhaler Inhale 1 puff daily Rinse mouth after use. 60 blister 2    Timolol Maleate, Once-Daily, 0.5 % SOLN Apply 1 drop to eye every 12 (twelve) hours (Patient not taking: Reported on 3/11/2024) 5 mL 0     No current facility-administered medications for this visit.       Allergies   Allergen Reactions    Iodinated Contrast Media Anaphylaxis     Contrast Dye       PAST HISTORY    Past Medical History:   Diagnosis Date    Chronic pain disorder     Back    Coronary artery disease     Dyslipidemia     Esophageal reflux     Frequent headaches     stress related    Headache(784.0)     Heart murmur     History of stomach ulcers     Hypercalcemia     Hypertension     Osteopenia     Tremor        Past Surgical History:   Procedure Laterality Date    BACK SURGERY      lower back    BILATERAL OOPHORECTOMY      BREAST BIOPSY Right     long ago, benign     SECTION      CHOLECYSTECTOMY      COLONOSCOPY      HYSTERECTOMY      age 36 per MRS    JOINT REPLACEMENT Left     knee    OOPHORECTOMY Bilateral     age 36 per MRS    NC WEBB IMPLTJ NSTIM ELTRDS PLATE/PADDLE EDRL Left 2019    Procedure: LAMINECTOMY THORACIC FOR INSERTION  "DORSAL COLUMN SPINAL CORD STIMULATOR (DCS) W IMPLANTABLE PULSE GENERATOR, LEFT GLUTE;  Surgeon: Lg Mccallum MD;  Location: [facility];  Service: Neurosurgery    TN REVJ/RMVL IMPL SPI NPG/RCVR DTCH CONNJ ELTRD RA Left 2022    Procedure: Reopening of thoracic and left buttock incisions for removal of spinal cord stimulator system;  Surgeon: Siva Goss MD;  Location: [facility];  Service: Neurosurgery    ROTATOR CUFF REPAIR Right     SPINAL STIMULATOR PLACEMENT N/A 2019    Procedure: REVISION SPINAL CORD STIMULATOR PADDLE ELECTRODE, REPLACEMENT WITH PERCUTANEOUS ELECTRODES OR SMALLER PADDLE;  Surgeon: Lg Mccallum MD;  Location: [facility];  Service: Neurosurgery       Social History     Tobacco Use    Smoking status: Former     Current packs/day: 0.00     Types: Cigarettes     Start date: 1988     Quit date: 1991     Years since quittin.1    Smokeless tobacco: Never   Vaping Use    Vaping status: Never Used   Substance Use Topics    Alcohol use: Not Currently    Drug use: Never       Family History   Problem Relation Age of Onset    No Known Problems Mother     Glaucoma Father     No Known Problems Sister     No Known Problems Sister     No Known Problems Sister     Depression Sister     No Known Problems Sister     No Known Problems Maternal Grandmother     No Known Problems Maternal Grandfather     No Known Problems Paternal Grandmother     No Known Problems Paternal Grandfather     No Known Problems Daughter     No Known Problems Daughter     No Known Problems Daughter     Hypertension Family     Stroke Family     Heart disease Family     Thyroid disease Family     Colon cancer Neg Hx     Colon polyps Neg Hx          Above history personally reviewed.       EXAM    Vitals:Blood pressure 140/60, pulse 78, temperature (!) 97 °F (36.1 °C), temperature source Temporal, height 4' 9\" (1.448 m), weight 68.9 kg (152 lb), SpO2 98%, not currently breastfeeding.,Body mass index is 32.89 kg/m². "     Physical Exam  Constitutional:       Appearance: Normal appearance. She is well-developed.   HENT:      Head: Normocephalic and atraumatic.   Eyes:      Extraocular Movements: Extraocular movements intact and EOM normal.   Neck:      Vascular: No JVD.      Trachea: No tracheal deviation.   Cardiovascular:      Rate and Rhythm: Normal rate.   Pulmonary:      Effort: Pulmonary effort is normal.   Musculoskeletal:         General: No tenderness or deformity. Normal range of motion.      Cervical back: Normal range of motion.   Skin:     General: Skin is warm and dry.   Neurological:      Mental Status: She is alert and oriented to person, place, and time.      Cranial Nerves: No cranial nerve deficit.      Sensory: Sensory deficit present.      Motor: Motor strength is normal.No weakness.      Gait: Gait is intact.      Deep Tendon Reflexes: Reflexes are normal and symmetric.   Psychiatric:         Speech: Speech normal.         Behavior: Behavior normal.         Thought Content: Thought content normal.         Neurologic Exam     Mental Status   Oriented to person, place, and time.   Attention: normal.   Speech: speech is normal   Level of consciousness: alert  Knowledge: good.   Normal comprehension.     Cranial Nerves     CN III, IV, VI   Extraocular motions are normal.   Upgaze: normal  Downgaze: normal    CN VII   Facial expression full, symmetric.     CN VIII   CN VIII normal.   Hearing: intact    Motor Exam   Muscle bulk: normal  Right arm tone: normal  Left arm tone: normal  Right leg tone: normal  Left leg tone: normal    Strength   Strength 5/5 throughout.     Sensory Exam   Right arm light touch: normal  Left arm light touch: normal  Right leg light touch: subjective numbness along the lateral aspect of the R leg.  Left leg light touch: normal    Gait, Coordination, and Reflexes     Gait  Gait: normal    Tremor   Resting tremor: absent  Action tremor: absent    MEDICAL DECISION MAKING    Imaging Studies:      XR chest 2 views    Result Date: 6/9/2024  Narrative: XR CHEST AP & LATERAL INDICATION: Cough. Abdominal pain. COMPARISON: Abdomen CT 6/9/2024, CXR 3/7/2024. FINDINGS: Clear lungs. No pneumothorax or pleural effusion. Normal cardiomediastinal silhouette. Mild curvature of the spine. Old right clavicle fracture. Upper abdomen normal. Cholecystectomy.     Impression: No acute cardiopulmonary disease. Workstation performed: [id number]     CT recon only lumbar spine (no charge)    Result Date: 6/9/2024  Narrative: CT RECON ONLY LUMBAR SPINE (NO CHARGE) INDICATION:   Midline back pain, new right sided radiculopathy. COMPARISON: 5/22/2018 TECHNIQUE: Axial CT examination of the lumbar spine was obtained utilizing reconstructed images from CT of the chest, abdomen and pelvis performed the same day.  Images were reformatted in the sagittal and coronal planes. This examination, like all CT scans performed in the [facility], was performed utilizing techniques to minimize radiation dose exposure, including the use of iterative reconstruction and automated exposure control. FINDINGS: ALIGNMENT: Normal alignment of the lumbar spine. VERTEBRAE: Postsurgical change from L4-5 laminectomies. DEGENERATIVE CHANGES: Disc and facet osteophytosis throughout the lumbar spine. Decompression of the central canal at L4-5. Multilevel mild neural foraminal narrowing. PREVERTEBRAL AND PARASPINAL SOFT TISSUES: No swelling, mass or fluid collection. OTHER: Unremarkable     Impression: Postsurgical change from L4-5 laminectomies and decompression of the central canal. Workstation performed: [id number]     CT abdomen pelvis wo contrast    Result Date: 6/9/2024  Narrative: CT ABDOMEN AND PELVIS WITHOUT IV CONTRAST INDICATION: Right flank and back pain. COMPARISON: None. TECHNIQUE: CT examination of the abdomen and pelvis was performed without intravenous contrast. Multiplanar 2D reformatted images were created from the source  data. This examination, like all CT scans performed in the [facility], was performed utilizing techniques to minimize radiation dose exposure, including the use of iterative reconstruction and automated exposure control. Radiation dose length product (DLP) for this visit: 562.86 mGy-cm Enteric Contrast: Not administered. FINDINGS: ABDOMEN LOWER CHEST: Clear lung bases. LIVER/BILIARY TREE: Unremarkable. GALLBLADDER: Cholecystectomy. SPLEEN: Unremarkable. PANCREAS: Unremarkable. ADRENAL GLANDS: Unremarkable. KIDNEYS/URETERS: Nonobstructing 2 mm right renal calculus. Mild fullness of the left ureter without hydrocephalus or distal obstructive uropathy. STOMACH AND BOWEL: Small hiatal hernia. Periumbilical 3 cm fat-containing hernia. Diverticulosis without evidence of diverticulitis or colitis. APPENDIX: No findings to suggest appendicitis. ABDOMINOPELVIC CAVITY: No ascites. No pneumoperitoneum. No lymphadenopathy. VESSELS: No abdominal aortic aneurysm. PELVIS REPRODUCTIVE ORGANS: No pelvic mass or cyst. URINARY BLADDER: Unremarkable. ABDOMINAL WALL/INGUINAL REGIONS: Unremarkable. BONES: Postsurgical change from lumbar laminectomies and decompression of the central canal at L4-5.     Impression: No evidence of acute process in the abdomen or pelvis. Workstation performed: [id number]     CT head without contrast    Result Date: 6/9/2024  Narrative: CT BRAIN - WITHOUT CONTRAST INDICATION:   Dizziness. COMPARISON:  None. TECHNIQUE:  CT examination of the brain was performed.  Multiplanar 2D reformatted images were created from the source data. Radiation dose length product (DLP) for this visit:  802.45 mGy-cm .  This examination, like all CT scans performed in the [facility], was performed utilizing techniques to minimize radiation dose exposure, including the use of iterative  reconstruction and automated exposure control. IMAGE QUALITY:  Diagnostic. FINDINGS: PARENCHYMA: Minimal  periventricular and subcortical hypoattenuating foci suggesting microangiopathic change. No intracranial hemorrhage or mass effect. VENTRICLES AND EXTRA-AXIAL SPACES: No hydrocephalus or extra-axial collection. VISUALIZED ORBITS: Intact. PARANASAL SINUSES: Clear. CALVARIUM AND EXTRACRANIAL SOFT TISSUES: Stable circumscribed sclerotic lesion in the right sphenoid bone.     Impression: No acute intracranial abnormality. Workstation performed: [id number]       I have personally reviewed pertinent reports.   and I have personally reviewed pertinent films in PACS

## 2024-06-18 NOTE — ASSESSMENT & PLAN NOTE
Presents for evaluation of ambulatory dysfunction with back and leg pain   History of L4-5 decompression in [place] possibly in 2016  More recently seen by the [facility] neurosurgical team for   Insertion of SCS paddle with Dr. Mccallum on 2/5/2019  Revision of SCS with removal of paddle electrode and insertion of percutaneous leads with Dr. Mccallum 3/29/2019  Completed removal of SCS system with Dr. Goss 9/6/2022    Imaging:   CT lumbar spine post surgical changes from L4-5 laminectomy and decompression of the central canal. Mild foraminal narrowing.     Plan:   Continue to monitor symptoms   Primarily low back pain without dermatomal radicular features. Chronic RLE numbness   Reviewed imaging with patient in room  Continue taking OTC pain medication  Referral to PT provided at [facility] from recent hospitalization  Scheduled to start 7/3  Continue to be active with walking and low impact activities  Stretches and exercises provided to patient   Recommend conservative non invasive measures at this time. If symptoms fail to improve despite medical therapies and PT, can have patient return for possible imaging studies and referral to PM if indicated.    Follow up as needed at this time. Encouraged to call with questions or concerns

## 2024-06-20 ENCOUNTER — PATIENT OUTREACH (OUTPATIENT)
Dept: FAMILY MEDICINE CLINIC | Facility: HOSPITAL | Age: 79
End: 2024-06-20

## 2024-06-20 NOTE — LETTER
Date: 06/20/24    Dear Cindy Cruz,   My name is Letty Aguilar. I am a registered nurse care manager working with   [facility] [address] [place] [id number].   I have not been able to reach you and would like to set a time that I can talk with you over the phone.  My work is to help patients that have complex medical conditions get the care they need. This includes patients who may have been in the hospital or emergency room.    Please call me with any questions you may have. I look forward to speaking with you.  Sincerely,  Letty Aguilar, RN  [phone]  Outpatient Care Manager  Copy:  (primary care physician name and address)

## 2024-06-20 NOTE — PROGRESS NOTES
Outpatient Care Management Note:    CM attempted to call Cindy and her daughter, Dara.  She did not answer and the voice mail is full. (2nd attempt)    Unable to reach letter sent via my chart.

## 2024-06-21 ENCOUNTER — OFFICE VISIT (OUTPATIENT)
Age: 79
End: 2024-06-21
Payer: COMMERCIAL

## 2024-06-21 VITALS
HEIGHT: 57 IN | SYSTOLIC BLOOD PRESSURE: 130 MMHG | HEART RATE: 66 BPM | BODY MASS INDEX: 32.97 KG/M2 | WEIGHT: 152.8 LBS | OXYGEN SATURATION: 98 % | DIASTOLIC BLOOD PRESSURE: 70 MMHG

## 2024-06-21 DIAGNOSIS — J45.51 SEVERE PERSISTENT ASTHMA WITH ACUTE EXACERBATION: Primary | ICD-10-CM

## 2024-06-21 DIAGNOSIS — J45.901 ACUTE ASTHMA EXACERBATION: ICD-10-CM

## 2024-06-21 DIAGNOSIS — U07.1 COVID-19: ICD-10-CM

## 2024-06-21 DIAGNOSIS — G47.33 OSA (OBSTRUCTIVE SLEEP APNEA): ICD-10-CM

## 2024-06-21 PROCEDURE — G2211 COMPLEX E/M VISIT ADD ON: HCPCS | Performed by: NURSE PRACTITIONER

## 2024-06-21 PROCEDURE — 99213 OFFICE O/P EST LOW 20 MIN: CPT | Performed by: NURSE PRACTITIONER

## 2024-06-21 RX ORDER — FLUTICASONE FUROATE AND VILANTEROL 200; 25 UG/1; UG/1
1 POWDER RESPIRATORY (INHALATION) DAILY
Qty: 60 BLISTER | Refills: 2 | Status: SHIPPED | OUTPATIENT
Start: 2024-06-21 | End: 2024-09-19

## 2024-06-21 NOTE — PROGRESS NOTES
Pulmonary Follow-Up Note   Cindy Cruz 79 y.o. female MRN: [id number]  6/21/2024      Assessment/Plan:    Problem List Items Addressed This Visit       SAGE (obstructive sleep apnea)     Untreated and she has refused PAP therapy         RESOLVED: COVID-19     She has completed Paxlovid and is now taking a subsequent steroid burst. I discussed with her the cough may linger to some degree over the next several weeks. She may treat the cough with OTC cough medicine like Delsym or Mucinex (plain) as needed per package instructions.         Severe persistent asthma with acute exacerbation - Primary     Recent PFT demonstrated normal spirometry with positive bronchodilator response consistent with diagnosis of asthma. She has had recent COVID19 infection and although she has lingering cough, lungs on exam today with mild wheezing  Continue Breo 200 and she is reminded to take every day  Continue Xopenex/Atrovent nebs up to 4x per day if needed  Albuterol HFA up to 4x per day if needed  Complete prednisone as directed  Montelukast to continue daily         Relevant Medications    fluticasone-vilanterol (Breo Ellipta) 200-25 mcg/actuation inhaler     Other Visit Diagnoses       Acute asthma exacerbation        Relevant Medications    fluticasone-vilanterol (Breo Ellipta) 200-25 mcg/actuation inhaler          Vaccines: up to date    Return in about 6 months (around 12/21/2024).    All of Cindy's questions were answered prior to leaving the office today.  She is aware to call our office with any further questions or concerns.    History of Present Illness   Reason for Visit: Follow up  Chief Complaint: asthma  HPI: Cindy Cruz is a 79 y.o. female with PMHx PMH of hypertension, asthma, chronic low back pain, CKD who presents to the office today for follow up; she recently returned from a prolonged visit to [place]. Upon her return developed significant back pain and was seen in a hospital visit 6/9/24-6/11/24; she was  positive for COVID19 infection and treated with 5-day course of Paxlovid in addition to analgesia.    PCP added prednisone and cough medicine in an office visit earlier this week. Today she feels lingering cough mainly in the evening with production of yellow-tinged sputum. Just started prednisone yesterday and does find it is helping.     Breo 200 - apparently taking if needed  Xopenex/Ipratropium PRN - none this week  Albuterol HFA 1-2x per day  Montelukast daily    Review of Systems   Constitutional:  Negative for chills, fatigue and fever.   HENT:  Negative for congestion and postnasal drip.    Respiratory:  Positive for cough and wheezing.    Allergic/Immunologic: Negative for environmental allergies.   All other systems reviewed and are negative.      Historical Information   Past Medical History:   Diagnosis Date    Chronic pain disorder     Back    Coronary artery disease     Dyslipidemia     Esophageal reflux     Frequent headaches     stress related    Headache(784.0)     Heart murmur     History of stomach ulcers     Hypercalcemia     Hypertension     Osteopenia     Tremor      Past Surgical History:   Procedure Laterality Date    BACK SURGERY      lower back    BILATERAL OOPHORECTOMY      BREAST BIOPSY Right     long ago, benign     SECTION      CHOLECYSTECTOMY      COLONOSCOPY      HYSTERECTOMY      age 36 per MRS    JOINT REPLACEMENT Left     knee    OOPHORECTOMY Bilateral     age 36 per MRS    OH WEBB IMPLTJ NSTIM ELTRDS PLATE/PADDLE EDRL Left 2019    Procedure: LAMINECTOMY THORACIC FOR INSERTION DORSAL COLUMN SPINAL CORD STIMULATOR (DCS) W IMPLANTABLE PULSE GENERATOR, LEFT GLUTE;  Surgeon: Lg Mccallum MD;  Location:  MAIN OR;  Service: Neurosurgery    OH REVJ/RMVL IMPL SPI NPG/RCVR DTCH CONNJ ELTRD RA Left 2022    Procedure: Reopening of thoracic and left buttock incisions for removal of spinal cord stimulator system;  Surgeon: Siva Goss MD;  Location:  MAIN OR;  Service:  Neurosurgery    ROTATOR CUFF REPAIR Right     SPINAL STIMULATOR PLACEMENT N/A 2019    Procedure: REVISION SPINAL CORD STIMULATOR PADDLE ELECTRODE, REPLACEMENT WITH PERCUTANEOUS ELECTRODES OR SMALLER PADDLE;  Surgeon: Lg Mccallum MD;  [facility];  Service: Neurosurgery     Family History   Problem Relation Age of Onset    No Known Problems Mother     Glaucoma Father     No Known Problems Sister     No Known Problems Sister     No Known Problems Sister     Depression Sister     No Known Problems Sister     No Known Problems Maternal Grandmother     No Known Problems Maternal Grandfather     No Known Problems Paternal Grandmother     No Known Problems Paternal Grandfather     No Known Problems Daughter     No Known Problems Daughter     No Known Problems Daughter     Hypertension Family     Stroke Family     Heart disease Family     Thyroid disease Family     Colon cancer Neg Hx     Colon polyps Neg Hx      Social History   Social History     Substance and Sexual Activity   Alcohol Use Not Currently     Social History     Substance and Sexual Activity   Drug Use Never     Social History     Tobacco Use   Smoking Status Former    Current packs/day: 0.00    Types: Cigarettes    Start date: 1988    Quit date: 1991    Years since quittin.1   Smokeless Tobacco Never     E-Cigarette/Vaping    E-Cigarette Use Never User      E-Cigarette/Vaping Substances    Nicotine No     THC No     CBD No     Flavoring No     Other No     Unknown No        Meds/Allergies     Current Outpatient Medications:     albuterol (PROVENTIL HFA,VENTOLIN HFA) 90 mcg/act inhaler, Inhale 2 puffs every 6 (six) hours as needed for wheezing, Disp: 18 g, Rfl: 3    alendronate (FOSAMAX) 70 mg tablet, Take 1 tablet (70 mg total) by mouth once a week, Disp: 12 tablet, Rfl: 3    atorvastatin (LIPITOR) 20 mg tablet, Take 1 tablet by mouth once daily, Disp: 90 tablet, Rfl: 1    benzonatate (TESSALON PERLES) 100 mg capsule, Take 1  capsule (100 mg total) by mouth 3 (three) times a day as needed for cough, Disp: 60 capsule, Rfl: 1    bimatoprost (Lumigan) 0.01 % ophthalmic drops, Administer 1 drop to both eyes daily, Disp: 5 mL, Rfl: 0    buPROPion (WELLBUTRIN XL) 150 mg 24 hr tablet, Take 1 tablet (150 mg total) by mouth every morning With 300 mg to equal 450 mg daily, Disp: 90 tablet, Rfl: 1    buPROPion (WELLBUTRIN XL) 300 mg 24 hr tablet, Take 1 tablet by mouth once daily, Disp: 90 tablet, Rfl: 0    cyclobenzaprine (FLEXERIL) 5 mg tablet, Take 1 tablet (5 mg total) by mouth 3 (three) times a day as needed for muscle spasms, Disp: 15 tablet, Rfl: 0    fluticasone-vilanterol (Breo Ellipta) 200-25 mcg/actuation inhaler, Inhale 1 puff daily Rinse mouth after use., Disp: 60 blister, Rfl: 2    ipratropium (ATROVENT) 0.02 % nebulizer solution, Take 2.5 mL (0.5 mg total) by nebulization every 6 (six) hours as needed for wheezing or shortness of breath, Disp: 240 mL, Rfl: 1    levalbuterol (XOPENEX) 1.25 mg/3 mL nebulizer solution, Take 3 mL (1.25 mg total) by nebulization every 6 (six) hours as needed for wheezing or shortness of breath, Disp: 240 mL, Rfl: 1    lidocaine (LIDODERM) 5 %, Apply 1 patch topically over 12 hours daily Remove & Discard patch within 12 hours or as directed by MD, Disp: 14 patch, Rfl: 0    losartan (COZAAR) 100 MG tablet, Take 1 tablet by mouth once daily, Disp: 90 tablet, Rfl: 2    montelukast (SINGULAIR) 10 mg tablet, Take 1 tablet (10 mg total) by mouth daily at bedtime, Disp: 90 tablet, Rfl: 1    omeprazole (PriLOSEC) 40 MG capsule, Take 1 capsule (40 mg total) by mouth 2 (two) times a day, Disp: 180 capsule, Rfl: 1    predniSONE 10 mg tablet, 3 tab PO bid x 3 days then 2 tab PO bid x 3 days then 1 tab PO bid x 3 days then 1 tab PO q day x 3 days then stop, Disp: 39 tablet, Rfl: 0    Synthroid 88 MCG tablet, Take 1 tablet (88 mcg total) by mouth daily, Disp: 90 tablet, Rfl: 1    Timolol Maleate, Once-Daily, 0.5 % SOLN,  "Apply 1 drop to eye every 12 (twelve) hours, Disp: 5 mL, Rfl: 0    traZODone (DESYREL) 50 mg tablet, Take 1 tablet (50 mg total) by mouth daily at bedtime, Disp: 90 tablet, Rfl: 1    promethazine-dextromethorphan (PHENERGAN-DM) 6.25-15 mg/5 mL oral syrup, Take 5 mL by mouth 4 (four) times a day as needed for cough (Patient not taking: Reported on 6/21/2024), Disp: 250 mL, Rfl: 3  Allergies   Allergen Reactions    Iodinated Contrast Media Anaphylaxis     Contrast Dye       Vitals: Blood pressure 130/70, pulse 66, height 4' 9\" (1.448 m), weight 69.3 kg (152 lb 12.8 oz), SpO2 98%, not currently breastfeeding. Body mass index is 33.07 kg/m². Oxygen Therapy  SpO2: 98 %      Physical Exam  Vitals reviewed.   Constitutional:       Appearance: Normal appearance.   HENT:      Head: Normocephalic.      Nose: Nose normal.      Mouth/Throat:      Mouth: Mucous membranes are moist.      Pharynx: Oropharynx is clear.   Cardiovascular:      Rate and Rhythm: Normal rate and regular rhythm.      Pulses: Normal pulses.      Heart sounds: Normal heart sounds.   Pulmonary:      Effort: Pulmonary effort is normal.      Breath sounds: Wheezing present.   Musculoskeletal:         General: Normal range of motion.   Skin:     General: Skin is warm and dry.      Capillary Refill: Capillary refill takes less than 2 seconds.   Neurological:      General: No focal deficit present.      Mental Status: She is alert and oriented to person, place, and time.   Psychiatric:         Mood and Affect: Mood normal.         Behavior: Behavior normal.         Labs:   Lab Results   Component Value Date    WBC 5.26 06/18/2024    HGB 11.3 (L) 06/18/2024    HCT 35.2 06/18/2024    MCV 98 06/18/2024     06/18/2024    EOSPCT 11 (H) 06/18/2024    EOSABS 0.57 06/18/2024    SEGSPCT 86 (H) 03/07/2024    MONOPCT 13 (H) 06/18/2024    MONOABS 0.23 03/07/2024    BANDSPCT 3 03/26/2019     Lab Results   Component Value Date    GLUCOSE 149 (H) 03/02/2024    CALCIUM " "9.6 06/18/2024    K 4.7 06/18/2024    CO2 25 06/18/2024     06/18/2024    BUN 28 (H) 06/18/2024    CREATININE 1.09 06/18/2024     No results found for: \"IGE\", \"RAST\"  Lab Results   Component Value Date    ALT 22 06/18/2024    AST 16 06/18/2024    ALKPHOS 96 06/18/2024         Imaging and other studies: I have personally reviewed pertinent reports.   and I have personally reviewed pertinent films in PACS  CXR 6/9/24  No acute cardiopulmonary disease    Pulmonary Results (PFTs, PSG): I have personally reviewed pertinent reports.    PFT 3/14/24  The spirometry showed normal FEV1, normal FVC and normal FEV1/FVC ratio. There was significant response to inhaled bronchodilator indicating reversibility. The FEF 25-75 was normal. The lung volumes showed increased RV and FRC. The total lung capacity was normal. The diffusion capacity was normal. The increased airway resistance and decreased specific conductance indicated a central airway disease. The flow-volume loop showed a curvature to the expiratory loop suggesting expiratory airflow obstruction.         CRISTINA Correa  [facility]        Portions of the record may have been created with voice recognition software.  Occasional wrong word or \"sound a like\" substitutions may have occurred due to the inherent limitations of voice recognition software.  Read the chart carefully and recognize, using context, where substitutions have occurred or contact the dictating provider.  "

## 2024-06-21 NOTE — ASSESSMENT & PLAN NOTE
Recent PFT demonstrated normal spirometry with positive bronchodilator response consistent with diagnosis of asthma. She has had recent COVID19 infection and although she has lingering cough, lungs on exam today with mild wheezing  Continue Breo 200 and she is reminded to take every day  Continue Xopenex/Atrovent nebs up to 4x per day if needed  Albuterol HFA up to 4x per day if needed  Complete prednisone as directed  Montelukast to continue daily

## 2024-06-21 NOTE — ASSESSMENT & PLAN NOTE
She has completed Paxlovid and is now taking a subsequent steroid burst. I discussed with her the cough may linger to some degree over the next several weeks. She may treat the cough with OTC cough medicine like Delsym or Mucinex (plain) as needed per package instructions.

## 2024-06-28 DIAGNOSIS — J45.20 MILD INTERMITTENT ASTHMA WITHOUT COMPLICATION: ICD-10-CM

## 2024-06-28 RX ORDER — ALBUTEROL SULFATE 90 UG/1
2 AEROSOL, METERED RESPIRATORY (INHALATION) EVERY 6 HOURS PRN
Qty: 18 G | Refills: 5 | Status: SHIPPED | OUTPATIENT
Start: 2024-06-28

## 2024-07-02 ENCOUNTER — TELEPHONE (OUTPATIENT)
Age: 79
End: 2024-07-02

## 2024-07-02 NOTE — TELEPHONE ENCOUNTER
Pts daughter asked for the isael/dul prep instructions to be sent to [employer]. I sent the instructions

## 2024-07-03 ENCOUNTER — EVALUATION (OUTPATIENT)
Facility: CLINIC | Age: 79
End: 2024-07-03
Payer: COMMERCIAL

## 2024-07-03 DIAGNOSIS — R26.2 AMBULATORY DYSFUNCTION: ICD-10-CM

## 2024-07-03 PROCEDURE — 97163 PT EVAL HIGH COMPLEX 45 MIN: CPT

## 2024-07-03 NOTE — PROGRESS NOTES
PT Evaluation     Today's date: 7/3/2024  Patient name: Cindy Cruz  : 1945  MRN: [id number]  Referring provider: Lina Londono MD  Dx:   Encounter Diagnosis     ICD-10-CM    1. Ambulatory dysfunction  R26.2 Ambulatory referral to Physical Therapy          Start Time: 1500  Stop Time: 1545  Total time in clinic (min): 45 minutes    Assessment  Impairments: abnormal muscle tone, abnormal or restricted ROM, abnormal movement, activity intolerance, impaired balance, impaired physical strength, lacks appropriate home exercise program, pain with function, safety issue and weight-bearing intolerance    Assessment details: Patient is a 79 y.o. year old female presenting to OPPT s/p diagnosis of ambulatory dysfunction. This is due to acute on chronic low back pain. Cindy is experiencing low back pain that interferes with her ADLs and puts her at risk for falls.  She demonstrates RLE weakness which may be from pain, no dermatomal pattern observed. Seems to respond better to flexion based exercises vs low back extension which causes more pain down her RLE. Patient requires assistance for ADLs and IADLs.  Given written HEP with focus on flexion based exercises and LE strengthening. She will benefit from skilled PT interventions to maximize return to PLOF and independence as able.  Thank you for this referral and the ability to participate in the care of this patient.    Barriers to therapy: Language barrier     Prognosis: good    Goals  In 4 weeks, patient will:  1.  Improve pain at worst by at least 3 points  2.  Improve activity tolerance to at least 10 minutes before needing to sit  3.  Improve RLE strength by at least 0.5/5    In 4-10 weeks, patient will:  1.  Meet FOTO predicted score to demonstrate improvement in functional mobility.   2.  Return to hobbies with minimal restrictions or rests needed.  3.  Improve ABC to at least above 67% to show reduced risk of falling and improved balance confidence.  4.  Improve  pain at worst by at least 5 points from IE      Plan  Patient would benefit from: skilled physical therapy    Planned therapy interventions: neuromuscular re-education, manual therapy, patient education, home exercise program, therapeutic exercise, therapeutic activities and gait training    Frequency: 2x week  Duration in weeks: 8  Plan of Care beginning date: 7/3/2024  Plan of Care expiration date: 2024  Treatment plan discussed with: patient        Subjective Evaluation    History of Present Illness  Mechanism of injury: Present at PT: Still experiencing weakness, if she gets up quickly she gets dizzy. If she's walking she will go sideways or be dizzy. Low back is still bothering her. Her back pain goes to her knee roughly - never goes below her knee. This limits her from doing things for herself around the house. Just got back from [place] in beginning of . Has gotten a little better with medication. Does exercises in the morning.    Exercises she is doing now seem to help a little bit. Used to have n/t but no longer present. Feels weak on RLE. Occasional dizziness and shaking.    Spinal cord stimulator removed in , was not working. No hx of neuropathy.  Patient Goals  Patient goals for therapy: increased strength, decreased pain, improved balance and independence with ADLs/IADLs    Pain  Current pain ratin  At best pain ratin  At worst pain rating: 10  Relieving factors: change in position and rest  Aggravating factors: walking  Progression: improved (better from medication, but pain is still present)    Social Support  Stairs in house: yes   Lives in: multiple-level home    Employment status: not working    Diagnostic Tests    FCE comments: CT RECON ONLY LUMBAR SPINE (NO CHARGE)     INDICATION:   Midline back pain, new right sided radiculopathy.     COMPARISON: 2018     TECHNIQUE: Axial CT examination of the lumbar spine was obtained utilizing reconstructed images from CT of the  chest, abdomen and pelvis performed the same day.  Images were reformatted in the sagittal and coronal planes.     This examination, like all CT scans performed [facility], was performed utilizing techniques to minimize radiation dose exposure, including the use of iterative reconstruction and automated exposure control.     FINDINGS:     ALIGNMENT: Normal alignment of the lumbar spine.     VERTEBRAE: Postsurgical change from L4-5 laminectomies.     DEGENERATIVE CHANGES: Disc and facet osteophytosis throughout the lumbar spine. Decompression of the central canal at L4-5. Multilevel mild neural foraminal narrowing.     PREVERTEBRAL AND PARASPINAL SOFT TISSUES: No swelling, mass or fluid collection.     OTHER: Unremarkable     IMPRESSION:     Postsurgical change from L4-5 laminectomies and decompression of the central canal.     Treatments  No previous or current treatments        Objective     Concurrent Complaints  Negative for night pain, bladder dysfunction and bowel dysfunction    Palpation     Additional Palpation Details  Tender L2-5 with increase in pain    Neurological Testing     Sensation     Lumbar   Left   Intact: light touch    Right   Intact: light touch    Reflexes   Left   Patellar (L4): normal (2+)  Achilles (S1): absent (0)  Babinski sign: negative    Right   Patellar (L4): normal (2+)  Achilles (S1): absent (0)  Babinski sign: negative    Active Range of Motion     Lumbar   Flexion:  with pain Restriction level: minimal  Extension:  Restriction level: moderate    Strength/Myotome Testing     Left Hip   Planes of Motion   Flexion: 4  Abduction: 4    Right Hip   Planes of Motion   Flexion: 3+  Abduction: 3+    Left Knee   Flexion: 3+  Extension: 4    Right Knee   Flexion: 3  Extension: 4-    Left Ankle/Foot   Dorsiflexion: 4  Plantar flexion: 5    Right Ankle/Foot   Dorsiflexion: 3+  Plantar flexion: 5    General Comments:      Lumbar Comments  Single knee to chest relieves LE  symptoms, more pain into low back.             Precautions: Falls, CKD3, Osteoporosis, HTN  Re-eval Date:  8/3/2024    Date 7/3       Visit Count 1       FOTO See IE       Pain In See IE       Pain Out                Manuals                                                Neuro Re-Ed        Core stability in supine                                                        Ther Ex        Bridge        Pball rollout        Step ups        SLRx4        Mini Squat                                Ther Activity        ADL                Gait Training        Divided Attention        Head turns        Modalities         prn

## 2024-07-05 ENCOUNTER — PATIENT OUTREACH (OUTPATIENT)
Dept: CASE MANAGEMENT | Facility: OTHER | Age: 79
End: 2024-07-05

## 2024-07-05 DIAGNOSIS — F41.9 ANXIETY AND DEPRESSION: ICD-10-CM

## 2024-07-05 DIAGNOSIS — F32.A ANXIETY AND DEPRESSION: ICD-10-CM

## 2024-07-05 RX ORDER — BUPROPION HYDROCHLORIDE 300 MG/1
300 TABLET ORAL DAILY
Qty: 90 TABLET | Refills: 1 | Status: SHIPPED | OUTPATIENT
Start: 2024-07-05

## 2024-07-08 ENCOUNTER — ANESTHESIA (OUTPATIENT)
Dept: GASTROENTEROLOGY | Facility: HOSPITAL | Age: 79
End: 2024-07-08

## 2024-07-08 ENCOUNTER — HOSPITAL ENCOUNTER (OUTPATIENT)
Dept: GASTROENTEROLOGY | Facility: HOSPITAL | Age: 79
Setting detail: OUTPATIENT SURGERY
Discharge: HOME | End: 2024-07-08
Payer: COMMERCIAL

## 2024-07-08 ENCOUNTER — ANESTHESIA EVENT (OUTPATIENT)
Dept: GASTROENTEROLOGY | Facility: HOSPITAL | Age: 79
End: 2024-07-08

## 2024-07-08 VITALS
HEIGHT: 60 IN | RESPIRATION RATE: 16 BRPM | DIASTOLIC BLOOD PRESSURE: 63 MMHG | WEIGHT: 152 LBS | SYSTOLIC BLOOD PRESSURE: 138 MMHG | HEART RATE: 60 BPM | BODY MASS INDEX: 29.84 KG/M2 | TEMPERATURE: 97.7 F | OXYGEN SATURATION: 98 %

## 2024-07-08 DIAGNOSIS — Z12.11 SCREENING FOR COLON CANCER: ICD-10-CM

## 2024-07-08 PROCEDURE — 88305 TISSUE EXAM BY PATHOLOGIST: CPT | Performed by: PATHOLOGY

## 2024-07-08 PROCEDURE — 45385 COLONOSCOPY W/LESION REMOVAL: CPT | Performed by: INTERNAL MEDICINE

## 2024-07-08 RX ORDER — PROPOFOL 10 MG/ML
INJECTION, EMULSION INTRAVENOUS AS NEEDED
Status: DISCONTINUED | OUTPATIENT
Start: 2024-07-08 | End: 2024-07-08

## 2024-07-08 RX ORDER — SODIUM CHLORIDE 9 MG/ML
INJECTION, SOLUTION INTRAVENOUS CONTINUOUS PRN
Status: DISCONTINUED | OUTPATIENT
Start: 2024-07-08 | End: 2024-07-08

## 2024-07-08 RX ADMIN — PROPOFOL 20 MG: 10 INJECTION, EMULSION INTRAVENOUS at 07:39

## 2024-07-08 RX ADMIN — PROPOFOL 20 MG: 10 INJECTION, EMULSION INTRAVENOUS at 07:45

## 2024-07-08 RX ADMIN — SODIUM CHLORIDE: 0.9 INJECTION, SOLUTION INTRAVENOUS at 07:37

## 2024-07-08 RX ADMIN — PROPOFOL 20 MG: 10 INJECTION, EMULSION INTRAVENOUS at 07:47

## 2024-07-08 RX ADMIN — PROPOFOL 20 MG: 10 INJECTION, EMULSION INTRAVENOUS at 07:37

## 2024-07-08 RX ADMIN — PROPOFOL 20 MG: 10 INJECTION, EMULSION INTRAVENOUS at 07:36

## 2024-07-08 RX ADMIN — PROPOFOL 20 MG: 10 INJECTION, EMULSION INTRAVENOUS at 07:43

## 2024-07-08 RX ADMIN — PROPOFOL 20 MG: 10 INJECTION, EMULSION INTRAVENOUS at 07:41

## 2024-07-08 RX ADMIN — PROPOFOL 80 MG: 10 INJECTION, EMULSION INTRAVENOUS at 07:35

## 2024-07-08 NOTE — H&P
History and Physical - [facility]  Cindy Cruz 79 y.o. female MRN: [id number]    HPI: Cindy Cruz is a 79 y.o. year old female who presents for colon cancer screening    REVIEW OF SYSTEMS: Per the HPI, and otherwise unremarkable.    Historical Information   Past Medical History:   Diagnosis Date    Chronic pain disorder     Back    Coronary artery disease     Dyslipidemia     Esophageal reflux     Frequent headaches     stress related    Headache(784.0)     Heart murmur     History of stomach ulcers     Hypercalcemia     Hypertension     Osteopenia     Tremor      Past Surgical History:   Procedure Laterality Date    BACK SURGERY      lower back    BILATERAL OOPHORECTOMY      BREAST BIOPSY Right     long ago, benign     SECTION      CHOLECYSTECTOMY      COLONOSCOPY      HYSTERECTOMY      age 36 per MRS    JOINT REPLACEMENT Left     knee    OOPHORECTOMY Bilateral     age 36 per MRS    MO WEBB IMPLTJ NSTIM ELTRDS PLATE/PADDLE EDRL Left 2019    Procedure: LAMINECTOMY THORACIC FOR INSERTION DORSAL COLUMN SPINAL CORD STIMULATOR (DCS) W IMPLANTABLE PULSE GENERATOR, LEFT GLUTE;  Surgeon: Lg Mccallum MD;  Location: [facility];  Service: Neurosurgery    MO REVJ/RMVL IMPL SPI NPG/RCVR DTCH CONNJ ELTRD RA Left 2022    Procedure: Reopening of thoracic and left buttock incisions for removal of spinal cord stimulator system;  Surgeon: Siva Goss MD;  Location: [facility];  Service: Neurosurgery    ROTATOR CUFF REPAIR Right     SPINAL STIMULATOR PLACEMENT N/A 2019    Procedure: REVISION SPINAL CORD STIMULATOR PADDLE ELECTRODE, REPLACEMENT WITH PERCUTANEOUS ELECTRODES OR SMALLER PADDLE;  Surgeon: Lg Mccallum MD;  Location: [facility];  Service: Neurosurgery     Social History   Social History     Substance and Sexual Activity   Alcohol Use Not Currently     Social History     Substance and Sexual Activity   Drug Use Never     Social History     Tobacco Use   Smoking Status Former     Current packs/day: 0.00    Types: Cigarettes    Start date: 1988    Quit date: 1991    Years since quittin.2   Smokeless Tobacco Never     Family History   Problem Relation Age of Onset    No Known Problems Mother     Glaucoma Father     No Known Problems Sister     No Known Problems Sister     No Known Problems Sister     Depression Sister     No Known Problems Sister     No Known Problems Maternal Grandmother     No Known Problems Maternal Grandfather     No Known Problems Paternal Grandmother     No Known Problems Paternal Grandfather     No Known Problems Daughter     No Known Problems Daughter     No Known Problems Daughter     Hypertension Family     Stroke Family     Heart disease Family     Thyroid disease Family     Colon cancer Neg Hx     Colon polyps Neg Hx        Meds/Allergies       Current Outpatient Medications:     bimatoprost (Lumigan) 0.01 % ophthalmic drops    fluticasone-vilanterol (Breo Ellipta) 200-25 mcg/actuation inhaler    losartan (COZAAR) 100 MG tablet    montelukast (SINGULAIR) 10 mg tablet    omeprazole (PriLOSEC) 40 MG capsule    predniSONE 10 mg tablet    Synthroid 88 MCG tablet    Timolol Maleate, Once-Daily, 0.5 % SOLN    traZODone (DESYREL) 50 mg tablet    albuterol (PROVENTIL HFA,VENTOLIN HFA) 90 mcg/act inhaler    alendronate (FOSAMAX) 70 mg tablet    atorvastatin (LIPITOR) 20 mg tablet    benzonatate (TESSALON PERLES) 100 mg capsule    buPROPion (WELLBUTRIN XL) 150 mg 24 hr tablet    buPROPion (WELLBUTRIN XL) 300 mg 24 hr tablet    cyclobenzaprine (FLEXERIL) 5 mg tablet    ipratropium (ATROVENT) 0.02 % nebulizer solution    levalbuterol (XOPENEX) 1.25 mg/3 mL nebulizer solution    lidocaine (LIDODERM) 5 %    promethazine-dextromethorphan (PHENERGAN-DM) 6.25-15 mg/5 mL oral syrup    Allergies   Allergen Reactions    Iodinated Contrast Media Anaphylaxis     Contrast Dye       Objective     BP (!) 180/73   Pulse 63   Temp 97.7 °F (36.5 °C) (Temporal)   Resp 18    Ht 5' (1.524 m)   Wt 68.9 kg (152 lb)   SpO2 98%   BMI 29.69 kg/m²     PHYSICAL EXAM    Gen: NAD AAOx3  Head: Normocephalic, Atraumatic  CV: S1S2 RRR no m/r/g  CHEST: Clear b/l no c/r/w  ABD: soft, +BS NT/ND  EXT: no edema    ASSESSMENT/PLAN:  This is a 79 y.o. year old female here for colonoscopy, and she is stable and optimized for her procedure.

## 2024-07-08 NOTE — ANESTHESIA PREPROCEDURE EVALUATION
Procedure:  COLONOSCOPY    Relevant Problems   CARDIO   (+) Hypertension   (+) Migraine headache   (+) Rib pain on left side      ENDO   (+) Hypothyroidism      GI/HEPATIC   (+) Dysphagia   (+) Esophageal reflux      /RENAL   (+) Chronic kidney disease, stage 3a (HCC)      MUSCULOSKELETAL   (+) Acute bilateral low back pain   (+) Acute bilateral low back pain with left-sided sciatica   (+) Lumbar spondylosis   (+) Primary osteoarthritis of left knee      NEURO/PSYCH   (+) Chronic pain syndrome   (+) Depression, recurrent (HCC)   (+) Generalized anxiety disorder   (+) Migraine headache   (+) Weakness of left leg      PULMONARY   (+) SAGE (obstructive sleep apnea)   (+) Severe persistent asthma with acute exacerbation   (+) Severe persistent asthma without complication        Physical Exam    Airway    Mallampati score: II  TM Distance: >3 FB  Neck ROM: full     Dental   Comment: None loose, No notable dental hx     Cardiovascular      Pulmonary      Other Findings  post-pubertal.      Anesthesia Plan  ASA Score- 2     Anesthesia Type- IV sedation with anesthesia with ASA Monitors.         Additional Monitors:     Airway Plan:     Comment: 02:30 - last of PO bowel prep    Patient educated on the possibility for awareness under sedation and of the possibility of airway intervention in the event of an airway or procedural emergency  .       Plan Factors-    Chart reviewed.    Patient summary reviewed.    Patient is not a current smoker.              Induction- intravenous.    Postoperative Plan-         Informed Consent- Anesthetic plan and risks discussed with patient.  I personally reviewed this patient with the CRNA. Discussed and agreed on the Anesthesia Plan with the CRNA..

## 2024-07-08 NOTE — ANESTHESIA POSTPROCEDURE EVALUATION
Post-Op Assessment Note    CV Status:  Stable  Pain Score: 0    Pain management: adequate       Mental Status:  Alert and awake   Hydration Status:  Euvolemic   PONV Controlled:  Controlled   Airway Patency:  Patent     Post Op Vitals Reviewed: Yes    No anethesia notable event occurred.    Staff: CRNA               BP   128/59   Temp   97.6   Pulse  63   Resp   18   SpO2   96

## 2024-07-11 ENCOUNTER — CONSULT (OUTPATIENT)
Dept: ENDOCRINOLOGY | Facility: HOSPITAL | Age: 79
End: 2024-07-11
Payer: COMMERCIAL

## 2024-07-11 VITALS
BODY MASS INDEX: 29.84 KG/M2 | DIASTOLIC BLOOD PRESSURE: 68 MMHG | HEIGHT: 60 IN | HEART RATE: 74 BPM | SYSTOLIC BLOOD PRESSURE: 132 MMHG | WEIGHT: 152 LBS

## 2024-07-11 DIAGNOSIS — R94.6 ABNORMAL RADIONUCLIDE SCAN OF PARATHYROID GLAND: ICD-10-CM

## 2024-07-11 DIAGNOSIS — R79.89 HIGH SERUM PARATHYROID HORMONE (PTH): Primary | ICD-10-CM

## 2024-07-11 DIAGNOSIS — E83.52 HYPERCALCEMIA: ICD-10-CM

## 2024-07-11 PROCEDURE — 88305 TISSUE EXAM BY PATHOLOGIST: CPT | Performed by: PATHOLOGY

## 2024-07-11 PROCEDURE — 99204 OFFICE O/P NEW MOD 45 MIN: CPT | Performed by: INTERNAL MEDICINE

## 2024-07-11 NOTE — PATIENT INSTRUCTIONS
We'll recheck blood work.     Drink plenty of water.     No need for parathyroid surgery at this time.     We follow calcium over time as long as it remains stable in the mid to upper 10 level or lower.     Follow up to be determined

## 2024-07-11 NOTE — PROGRESS NOTES
7/11/2024    Assessment & Plan      Diagnoses and all orders for this visit:    Hypercalcemia  -     Ambulatory Referral to Endocrinology  -     Ambulatory Referral to Endocrinology  -     Comprehensive metabolic panel  -     Phosphorus  -     PTH, intact  -     Magnesium  -     Vitamin D 25 hydroxy    Abnormal radionuclide scan of parathyroid gland  Comments:  Will check thyroid US and referr to Endo - orders placed, will follow  Orders:  -     Ambulatory Referral to Endocrinology  -     Ambulatory Referral to Endocrinology  -     Comprehensive metabolic panel  -     Phosphorus  -     PTH, intact  -     Magnesium  -     Vitamin D 25 hydroxy    Abnormal radionuclide scan of parathyroid gland  -     Ambulatory Referral to Endocrinology  -     Ambulatory Referral to Endocrinology  -     Comprehensive metabolic panel  -     Phosphorus  -     PTH, intact  -     Magnesium  -     Vitamin D 25 hydroxy    Other orders  -     Cholecalciferol (VITAMIN D-3 PO); Take by mouth 1000 Iu daily        Assessment/Plan:  Hyperparathyroidism   Will repeat CMP to assess for return of hypercalcemia  Ordered Phosphorus, Mag, PTH, Vit. D  Encouraged patient to drink plenty of water  Patient will follow-up as determined by lab results    CC: Elevated calcium    History of Present Illness     HPI: Cindy Cruz is a 79 y.o. year old female with a history of HLD, CAD, GERD, and osteopenia presenting for elevated calcium in the setting of abnormal parathyroid imaging. She is accompanied by her daughter. She does not have a history of recurrent nephrolithiasis but a CT scan this past June did reveal a non-obstructing 2mm stone in the right renal calculus. She reports polydipsia without polyuria. She fractured her clavicle years ago after a fall from a sitting position. Otherwise, she has no history of fractures. She does report occasional belly pain and chronic constipation. She also reports muscle weakness in the upper and lower extremities as  well as fatigue. Per daughter, she also experiences confusion. For example, she will be driving down a familiar road and not know where she is. She has no known family history of parathyroid disease.    Her diet is mostly dairy free, reporting no intake of milk, yogurt, or ice cream. She does occasionally eat cheese. She denies intake of broccoli, green leafy vegetables, or sardines. She denies frequent use of TUMS. She does not exercise.     Her most recent DEXA scan was in 2024. She is currently being treated with Fosamax for osteopenia which she has been taking for years. Her 10-year risk of major osteoporotic fracture was reported to be 8.3%. She was instructed to stop taking supplemental calcium by her PCP last winter, but does take vitamin D3.      Review of Systems   Constitutional:  Positive for fatigue. Negative for activity change.   Respiratory:  Negative for shortness of breath.    Cardiovascular:  Negative for chest pain.   Gastrointestinal:  Positive for abdominal pain and constipation. Negative for diarrhea, nausea and vomiting.   Endocrine: Positive for polydipsia. Negative for polyuria.   Musculoskeletal:  Positive for back pain.   Psychiatric/Behavioral:  Positive for confusion.        Historical Information   Past Medical History:   Diagnosis Date    Chronic pain disorder     Back    Coronary artery disease     Dyslipidemia     Esophageal reflux     Frequent headaches     stress related    Headache(784.0)     Heart murmur     History of stomach ulcers     Hypercalcemia     Hypertension     Osteopenia     Tremor      Past Surgical History:   Procedure Laterality Date    BACK SURGERY      lower back    BILATERAL OOPHORECTOMY      BREAST BIOPSY Right     long ago, benign     SECTION      CHOLECYSTECTOMY      COLONOSCOPY      HYSTERECTOMY      age 36 per MRS    JOINT REPLACEMENT Left     knee    OOPHORECTOMY Bilateral     age 36 per MRS    AK WEBB IMPLTJ NSTIM ELTRDS PLATE/PADDLE  EDRL Left 2019    Procedure: LAMINECTOMY THORACIC FOR INSERTION DORSAL COLUMN SPINAL CORD STIMULATOR (DCS) W IMPLANTABLE PULSE GENERATOR, LEFT GLUTE;  Surgeon: Lg Mccallum MD;  Location: [facility];  Service: Neurosurgery    ME REVJ/RMVL IMPL SPI NPG/RCVR DTCH CONNJ ELTRD RA Left 2022    Procedure: Reopening of thoracic and left buttock incisions for removal of spinal cord stimulator system;  Surgeon: Siva Goss MD;  Location: [facility];  Service: Neurosurgery    ROTATOR CUFF REPAIR Right     SPINAL STIMULATOR PLACEMENT N/A 2019    Procedure: REVISION SPINAL CORD STIMULATOR PADDLE ELECTRODE, REPLACEMENT WITH PERCUTANEOUS ELECTRODES OR SMALLER PADDLE;  Surgeon: Lg Mccallum MD;  Location: [facility];  Service: Neurosurgery     Social History   Social History     Substance and Sexual Activity   Alcohol Use Not Currently     Social History     Substance and Sexual Activity   Drug Use Never     Social History     Tobacco Use   Smoking Status Former    Current packs/day: 0.00    Types: Cigarettes    Start date: 1988    Quit date: 1991    Years since quittin.2   Smokeless Tobacco Never     Family History:   Family History   Problem Relation Age of Onset    No Known Problems Mother     Glaucoma Father     No Known Problems Sister     No Known Problems Sister     No Known Problems Sister     Depression Sister     No Known Problems Sister     No Known Problems Maternal Grandmother     No Known Problems Maternal Grandfather     No Known Problems Paternal Grandmother     No Known Problems Paternal Grandfather     No Known Problems Daughter     No Known Problems Daughter     No Known Problems Daughter     Hypertension Family     Stroke Family     Heart disease Family     Thyroid disease Family     Colon cancer Neg Hx     Colon polyps Neg Hx        Meds/Allergies   Current Outpatient Medications   Medication Sig Dispense Refill    albuterol (PROVENTIL HFA,VENTOLIN HFA) 90 mcg/act inhaler  INHALE 2 PUFFS BY MOUTH EVERY 6 HOURS AS NEEDED FOR WHEEZING 18 g 5    alendronate (FOSAMAX) 70 mg tablet Take 1 tablet (70 mg total) by mouth once a week 12 tablet 3    atorvastatin (LIPITOR) 20 mg tablet Take 1 tablet by mouth once daily 90 tablet 1    benzonatate (TESSALON PERLES) 100 mg capsule Take 1 capsule (100 mg total) by mouth 3 (three) times a day as needed for cough 60 capsule 1    bimatoprost (Lumigan) 0.01 % ophthalmic drops Administer 1 drop to both eyes daily 5 mL 0    buPROPion (WELLBUTRIN XL) 150 mg 24 hr tablet Take 1 tablet (150 mg total) by mouth every morning With 300 mg to equal 450 mg daily 90 tablet 1    buPROPion (WELLBUTRIN XL) 300 mg 24 hr tablet Take 1 tablet by mouth once daily 90 tablet 1    fluticasone-vilanterol (Breo Ellipta) 200-25 mcg/actuation inhaler Inhale 1 puff daily Rinse mouth after use. 60 blister 2    ipratropium (ATROVENT) 0.02 % nebulizer solution Take 2.5 mL (0.5 mg total) by nebulization every 6 (six) hours as needed for wheezing or shortness of breath 240 mL 1    levalbuterol (XOPENEX) 1.25 mg/3 mL nebulizer solution Take 3 mL (1.25 mg total) by nebulization every 6 (six) hours as needed for wheezing or shortness of breath 240 mL 1    losartan (COZAAR) 100 MG tablet Take 1 tablet by mouth once daily 90 tablet 2    montelukast (SINGULAIR) 10 mg tablet Take 1 tablet (10 mg total) by mouth daily at bedtime 90 tablet 1    omeprazole (PriLOSEC) 40 MG capsule Take 1 capsule (40 mg total) by mouth 2 (two) times a day 180 capsule 1    Synthroid 88 MCG tablet Take 1 tablet (88 mcg total) by mouth daily 90 tablet 1    Timolol Maleate, Once-Daily, 0.5 % SOLN Apply 1 drop to eye every 12 (twelve) hours 5 mL 0    traZODone (DESYREL) 50 mg tablet Take 1 tablet (50 mg total) by mouth daily at bedtime 90 tablet 1    cyclobenzaprine (FLEXERIL) 5 mg tablet Take 1 tablet (5 mg total) by mouth 3 (three) times a day as needed for muscle spasms (Patient not taking: Reported on 7/11/2024)  "15 tablet 0    lidocaine (LIDODERM) 5 % Apply 1 patch topically over 12 hours daily Remove & Discard patch within 12 hours or as directed by MD (Patient not taking: Reported on 7/11/2024) 14 patch 0    predniSONE 10 mg tablet 3 tab PO bid x 3 days then 2 tab PO bid x 3 days then 1 tab PO bid x 3 days then 1 tab PO q day x 3 days then stop (Patient not taking: Reported on 7/11/2024) 39 tablet 0    promethazine-dextromethorphan (PHENERGAN-DM) 6.25-15 mg/5 mL oral syrup Take 5 mL by mouth 4 (four) times a day as needed for cough (Patient not taking: Reported on 6/21/2024) 250 mL 3     No current facility-administered medications for this visit.     Allergies   Allergen Reactions    Iodinated Contrast Media Anaphylaxis     Contrast Dye       Objective   Vitals: Pulse 74, height 5' (1.524 m), weight 68.9 kg (152 lb), not currently breastfeeding.  Invasive Devices       None                   Physical Exam  Constitutional:       Appearance: Normal appearance.   Eyes:      Extraocular Movements: Extraocular movements intact.      Pupils: Pupils are equal, round, and reactive to light.   Neck:      Comments: Thyroid regular in size and feel. No nodules palpated.   Cardiovascular:      Rate and Rhythm: Normal rate and regular rhythm.   Pulmonary:      Breath sounds: Normal breath sounds.   Musculoskeletal:         General: Tenderness (Right paraspinal muscles) present.   Skin:     General: Skin is warm and dry.   Neurological:      Mental Status: She is alert.      Comments: Negative Chovstek sign.       The history was obtained from the review of the chart and from the patient and daughter.    Lab Results:      No components found for: \"HA1C\"  No components found for: \"GLU\"    Lab Results   Component Value Date    CREATININE 1.09 06/18/2024    CREATININE 1.04 06/11/2024    CREATININE 0.95 06/10/2024    BUN 28 (H) 06/18/2024    K 4.7 06/18/2024     06/18/2024    CO2 25 06/18/2024     eGFR   Date Value Ref Range Status " "  06/18/2024 47 ml/min/1.73sq m Final     No components found for: \"MALBCRER\"    Lab Results   Component Value Date    HDL 51 06/18/2024    TRIG 79 06/18/2024    CHOLHDL 2.4 07/14/2023       Lab Results   Component Value Date    ALT 22 06/18/2024    AST 16 06/18/2024    ALKPHOS 96 06/18/2024       Lab Results   Component Value Date    TSH 1.800 07/14/2023    FREET4 1.16 (H) 03/02/2024       Recent Results (from the past 84138 hour(s))   CBC and differential    Collection Time: 07/14/23  9:28 AM   Result Value Ref Range    White Blood Cell Count 6.9 3.4 - 10.8 x10E3/uL    Red Blood Cell Count 3.61 (L) 3.77 - 5.28 x10E6/uL    Hemoglobin 11.3 11.1 - 15.9 g/dL    HCT 33.9 (L) 34.0 - 46.6 %    MCV 94 79 - 97 fL    MCH 31.3 26.6 - 33.0 pg    MCHC 33.3 31.5 - 35.7 g/dL    RDW 12.9 11.7 - 15.4 %    Platelet Count 198 150 - 450 x10E3/uL    Neutrophils 57 Not Estab. %    Lymphocytes 26 Not Estab. %    Monocytes 9 Not Estab. %    Eosinophils 7 Not Estab. %    Basophils PCT 1 Not Estab. %    Neutrophils (Absolute) 4.0 1.4 - 7.0 x10E3/uL    Lymphocytes (Absolute) 1.8 0.7 - 3.1 x10E3/uL    Monocytes (Absolute) 0.6 0.1 - 0.9 x10E3/uL    Eosinophils (Absolute) 0.5 (H) 0.0 - 0.4 x10E3/uL    Basophils ABS 0.0 0.0 - 0.2 x10E3/uL    Immature Granulocytes 0 Not Estab. %    Immature Granulocytes (Absolute) 0.0 0.0 - 0.1 x10E3/uL   Comprehensive metabolic panel    Collection Time: 07/14/23  9:28 AM   Result Value Ref Range    Glucose, Random 89 70 - 99 mg/dL    BUN 22 8 - 27 mg/dL    Creatinine 1.00 0.57 - 1.00 mg/dL    eGFR 57 (L) >59 mL/min/1.73    SL AMB BUN/CREATININE RATIO 22 12 - 28    Sodium 144 134 - 144 mmol/L    Potassium 4.6 3.5 - 5.2 mmol/L    Chloride 111 (H) 96 - 106 mmol/L    CO2 21 20 - 29 mmol/L    CALCIUM 9.6 8.7 - 10.3 mg/dL    Protein, Total 6.4 6.0 - 8.5 g/dL    Albumin 4.1 3.8 - 4.8 g/dL    Globulin, Total 2.3 1.5 - 4.5 g/dL    Albumin/Globulin Ratio 1.8 1.2 - 2.2    TOTAL BILIRUBIN 0.2 0.0 - 1.2 mg/dL    Alk Phos " Isoenzymes 115 44 - 121 IU/L    AST 16 0 - 40 IU/L    ALT 13 0 - 32 IU/L   Hemoglobin A1C    Collection Time: 07/14/23  9:28 AM   Result Value Ref Range    Hemoglobin A1C 5.8 (H) 4.8 - 5.6 %    Estimated Average Glucose 120 mg/dL   Lipid panel    Collection Time: 07/14/23  9:28 AM   Result Value Ref Range    Cholesterol, Total 103 100 - 199 mg/dL    Triglycerides 63 0 - 149 mg/dL    HDL 43 >39 mg/dL    VLDL Cholesterol Calculated 14 5 - 40 mg/dL    LDL Calculated 46 0 - 99 mg/dL    T. Chol/HDL Ratio 2.4 0.0 - 4.4 ratio   TSH, 3rd generation with Free T4 reflex    Collection Time: 07/14/23  9:28 AM   Result Value Ref Range    TSH 1.800 0.450 - 4.500 uIU/mL   Vitamin B12    Collection Time: 07/14/23  9:28 AM   Result Value Ref Range    Vitamin B-12 133 (L) 232 - 1,245 pg/mL   Iron    Collection Time: 07/14/23  9:28 AM   Result Value Ref Range    Iron, Serum 45 27 - 139 ug/dL   Ferritin    Collection Time: 07/14/23  9:28 AM   Result Value Ref Range    Ferritin 60 15 - 150 ng/mL   Vitamin D 25 hydroxy    Collection Time: 07/14/23  9:28 AM   Result Value Ref Range    25-HYDROXY VIT D 35.0 30.0 - 100.0 ng/mL   Basic metabolic panel    Collection Time: 11/14/23  8:33 AM   Result Value Ref Range    Sodium 143 135 - 147 mmol/L    Potassium 3.8 3.5 - 5.3 mmol/L    Chloride 109 (H) 96 - 108 mmol/L    CO2 25 21 - 32 mmol/L    ANION GAP 9 mmol/L    BUN 14 5 - 25 mg/dL    Creatinine 0.92 0.60 - 1.30 mg/dL    Glucose, Fasting 96 65 - 99 mg/dL    Calcium 10.5 (H) 8.4 - 10.2 mg/dL    eGFR 58 ml/min/1.73sq m   Hemoglobin A1C    Collection Time: 11/14/23  8:33 AM   Result Value Ref Range    Hemoglobin A1C 5.9 (H) Normal 4.0-5.6%; PreDiabetic 5.7-6.4%; Diabetic >=6.5%; Glycemic control for adults with diabetes <7.0% %     mg/dl   Vitamin B12    Collection Time: 11/14/23  8:33 AM   Result Value Ref Range    Vitamin B-12 551 180 - 914 pg/mL   PTH, intact    Collection Time: 11/14/23  8:33 AM   Result Value Ref Range    .8  (H) 12.0 - 88.0 pg/mL   Calcium, urine, 24 hour    Collection Time: 11/19/23  9:30 AM   Result Value Ref Range    Calcium, Ur 5.2 Not Estab. mg/dL    Calcium 24HR Ur 94 0 - 320 mg/24 hr   Tissue Exam    Collection Time: 12/11/23  8:53 AM   Result Value Ref Range    Case Report       Surgical Pathology Report                         Case: [id number]                                   Authorizing Provider:  Fracisco Denise MD              Collected:           12/11/2023 5701              Ordering Location:     [facility] Received:            12/11/2023 2054              Pathologist:           Jadyn Raymond DO                                                     Specimens:   A) - Stomach, antral bx r/o h pylori                                                                B) - Esophagus, proximal esophagus bx r/o eoe                                              Final Diagnosis       A. Stomach, Antrum, Biopsy:  - Antral-type gastric mucosa with reactive (chemical) gastropathy.  - Negative for H. pylori organisms on H&E stain.    B. Esophagus, Proximal, Biopsy:  - Squamous epithelium with no specific pathologic change.      Note       The endoscopic report was reviewed.      Additional Information       All reported additional testing was performed with appropriately reactive controls.  These tests were developed and their performance characteristics determined by [facility] or appropriate performing facility, though some tests may be performed on tissues which have not been validated for performance characteristics (such as staining performed on alcohol exposed cell blocks and decalcified tissues).  Results should be interpreted with caution and in the context of the patients’ clinical condition. These tests may not be cleared or approved by the U.S. Food and Drug Administration, though the FDA has determined that such clearance or approval is not necessary. These tests are used  "for clinical purposes and they should not be regarded as investigational or for research. This laboratory has been approved by CLIA 88, designated as a high-complexity laboratory and is qualified to perform these tests.      Interpretation performed at [address], [address]      Gross Description       A. The specimen is received in formalin, labeled with the patient's name and hospital number, and is designated \" antral biopsy rule out H. pylori.\"  The specimen consists of 3 tan soft tissue fragments that measure 0.1 to 0.5 cm.  Totally submitted in one screen cassette.  B. The specimen is received in formalin, labeled with the patient's name and hospital number, and is designated \" proximal esophagus biopsy rule out EOE.\"  The specimen consists of 3 tan soft tissue fragments that measure 0.1 to 0.7 cm.  Specimen may not survive processing because of size and consistency.  Totally submitted in 1 screen cassette.    Note: The estimated total formalin fixation time based upon information provided by the submitting clinician and the standard processing schedule is under 72 hours.    TStevens     CBC and differential    Collection Time: 01/31/24 11:51 AM   Result Value Ref Range    WBC 7.31 4.31 - 10.16 Thousand/uL    RBC 3.86 3.81 - 5.12 Million/uL    Hemoglobin 11.7 11.5 - 15.4 g/dL    Hematocrit 37.1 34.8 - 46.1 %    MCV 96 82 - 98 fL    MCH 30.3 26.8 - 34.3 pg    MCHC 31.5 31.4 - 37.4 g/dL    RDW 14.8 11.6 - 15.1 %    MPV 12.9 (H) 8.9 - 12.7 fL    Platelets 198 149 - 390 Thousands/uL    nRBC 0 /100 WBCs    Segmented % 66 43 - 75 %    Immature Grans % 0 0 - 2 %    Lymphocytes % 21 14 - 44 %    Monocytes % 7 4 - 12 %    Eosinophils Relative 6 0 - 6 %    Basophils Relative 0 0 - 1 %    Absolute Neutrophils 4.71 1.85 - 7.62 Thousands/µL    Absolute Immature Grans 0.02 0.00 - 0.20 Thousand/uL    Absolute Lymphocytes 1.56 0.60 - 4.47 Thousands/µL    Absolute Monocytes 0.54 0.17 - 1.22 " Thousand/µL    Eosinophils Absolute 0.45 0.00 - 0.61 Thousand/µL    Basophils Absolute 0.03 0.00 - 0.10 Thousands/µL   Comprehensive metabolic panel    Collection Time: 01/31/24 11:51 AM   Result Value Ref Range    Sodium 141 135 - 147 mmol/L    Potassium 4.1 3.5 - 5.3 mmol/L    Chloride 106 96 - 108 mmol/L    CO2 26 21 - 32 mmol/L    ANION GAP 9 mmol/L    BUN 17 5 - 25 mg/dL    Creatinine 1.10 0.60 - 1.30 mg/dL    Glucose, Fasting 86 65 - 99 mg/dL    Calcium 10.2 8.4 - 10.2 mg/dL    AST 14 13 - 39 U/L    ALT 14 7 - 52 U/L    Alkaline Phosphatase 69 34 - 104 U/L    Total Protein 6.8 6.4 - 8.4 g/dL    Albumin 4.0 3.5 - 5.0 g/dL    Total Bilirubin 0.46 0.20 - 1.00 mg/dL    eGFR 47 ml/min/1.73sq m   TSH, 3rd generation with Free T4 reflex    Collection Time: 01/31/24 11:51 AM   Result Value Ref Range    TSH 3RD GENERATON 2.390 0.450 - 4.500 uIU/mL   Calcium, ionized    Collection Time: 01/31/24 11:51 AM   Result Value Ref Range    Calcium, Ionized 1.28 1.12 - 1.32 mmol/L   B-Type Natriuretic Peptide(BNP)    Collection Time: 01/31/24 11:51 AM   Result Value Ref Range     (H) 0 - 100 pg/mL   CBC and differential    Collection Time: 02/09/24 10:25 AM   Result Value Ref Range    WBC 8.62 4.31 - 10.16 Thousand/uL    RBC 3.71 (L) 3.81 - 5.12 Million/uL    Hemoglobin 11.3 (L) 11.5 - 15.4 g/dL    Hematocrit 34.3 (L) 34.8 - 46.1 %    MCV 93 82 - 98 fL    MCH 30.5 26.8 - 34.3 pg    MCHC 32.9 31.4 - 37.4 g/dL    RDW 14.0 11.6 - 15.1 %    MPV 11.7 8.9 - 12.7 fL    Platelets 176 149 - 390 Thousands/uL    nRBC 0 /100 WBCs    Segmented % 65 43 - 75 %    Immature Grans % 1 0 - 2 %    Lymphocytes % 19 14 - 44 %    Monocytes % 9 4 - 12 %    Eosinophils Relative 5 0 - 6 %    Basophils Relative 1 0 - 1 %    Absolute Neutrophils 5.74 1.85 - 7.62 Thousands/µL    Absolute Immature Grans 0.05 0.00 - 0.20 Thousand/uL    Absolute Lymphocytes 1.61 0.60 - 4.47 Thousands/µL    Absolute Monocytes 0.78 0.17 - 1.22 Thousand/µL    Eosinophils  Absolute 0.40 0.00 - 0.61 Thousand/µL    Basophils Absolute 0.04 0.00 - 0.10 Thousands/µL   Comprehensive metabolic panel    Collection Time: 02/09/24 10:25 AM   Result Value Ref Range    Sodium 140 135 - 147 mmol/L    Potassium 4.2 3.5 - 5.3 mmol/L    Chloride 108 96 - 108 mmol/L    CO2 24 21 - 32 mmol/L    ANION GAP 8 mmol/L    BUN 21 5 - 25 mg/dL    Creatinine 1.12 0.60 - 1.30 mg/dL    Glucose 96 65 - 140 mg/dL    Calcium 10.2 8.4 - 10.2 mg/dL    AST 12 (L) 13 - 39 U/L    ALT 11 7 - 52 U/L    Alkaline Phosphatase 83 34 - 104 U/L    Total Protein 6.9 6.4 - 8.4 g/dL    Albumin 3.9 3.5 - 5.0 g/dL    Total Bilirubin 0.33 0.20 - 1.00 mg/dL    eGFR 46 ml/min/1.73sq m   Uric acid    Collection Time: 02/09/24 10:25 AM   Result Value Ref Range    Uric Acid 5.1 2.0 - 7.5 mg/dL   C-reactive protein    Collection Time: 02/09/24 10:25 AM   Result Value Ref Range    CRP 3.1 (H) <3.0 mg/L   Sedimentation rate, automated    Collection Time: 02/09/24 10:25 AM   Result Value Ref Range    Sed Rate 14 0 - 29 mm/hour   ECG 12 lead    Collection Time: 03/02/24 11:08 AM   Result Value Ref Range    Ventricular Rate 82 BPM    Atrial Rate 82 BPM    MD Interval 170 ms    QRSD Interval 128 ms    QT Interval 402 ms    QTC Interval 469 ms    P Axis 71 degrees    QRS Axis 125 degrees    T Wave Axis 54 degrees   CBC and differential    Collection Time: 03/02/24 11:13 AM   Result Value Ref Range    WBC 6.53 4.31 - 10.16 Thousand/uL    RBC 4.07 3.81 - 5.12 Million/uL    Hemoglobin 12.6 11.5 - 15.4 g/dL    Hematocrit 37.8 34.8 - 46.1 %    MCV 93 82 - 98 fL    MCH 31.0 26.8 - 34.3 pg    MCHC 33.3 31.4 - 37.4 g/dL    RDW 14.5 11.6 - 15.1 %    MPV 11.6 8.9 - 12.7 fL    Platelets 211 149 - 390 Thousands/uL    nRBC 0 /100 WBCs    Segmented % 46 43 - 75 %    Immature Grans % 0 0 - 2 %    Lymphocytes % 26 14 - 44 %    Monocytes % 14 (H) 4 - 12 %    Eosinophils Relative 13 (H) 0 - 6 %    Basophils Relative 1 0 - 1 %    Absolute Neutrophils 3.02 1.85 -  7.62 Thousands/µL    Absolute Immature Grans 0.02 0.00 - 0.20 Thousand/uL    Absolute Lymphocytes 1.68 0.60 - 4.47 Thousands/µL    Absolute Monocytes 0.94 0.17 - 1.22 Thousand/µL    Eosinophils Absolute 0.82 (H) 0.00 - 0.61 Thousand/µL    Basophils Absolute 0.05 0.00 - 0.10 Thousands/µL   Basic metabolic panel    Collection Time: 03/02/24 11:13 AM   Result Value Ref Range    Sodium 139 135 - 147 mmol/L    Potassium 4.0 3.5 - 5.3 mmol/L    Chloride 110 (H) 96 - 108 mmol/L    CO2 20 (L) 21 - 32 mmol/L    ANION GAP 9 mmol/L    BUN 14 5 - 25 mg/dL    Creatinine 0.98 0.60 - 1.30 mg/dL    Glucose 95 65 - 140 mg/dL    Calcium 10.2 8.4 - 10.2 mg/dL    eGFR 54 ml/min/1.73sq m   FLU/RSV/COVID - if FLU/RSV clinically relevant    Collection Time: 03/02/24 11:13 AM    Specimen: Nose; Nares   Result Value Ref Range    SARS-CoV-2 Negative Negative    INFLUENZA A PCR Negative Negative    INFLUENZA B PCR Negative Negative    RSV PCR Positive (A) Negative   Blood culture #2    Collection Time: 03/02/24 11:13 AM    Specimen: Arm, Left; Blood   Result Value Ref Range    Blood Culture Fusobacterium nucleatum (A)     Gram Stain Result Gram negative rods (A)        Susceptibility    Fusobacterium nucleatum - VANITA     ZID Performed Yes       Penicillin 0.012 Susceptible ug/ml     Metronidazole ($) 0.016 Susceptible ug/ml   Blood Culture Identification Panel    Collection Time: 03/02/24 11:13 AM    Specimen: Arm, Left; Blood   Result Value Ref Range    ALL TARGETS Not Detected    Blood gas, arterial    Collection Time: 03/02/24  3:32 PM   Result Value Ref Range    pH, Arterial 7.584 (H) 7.350 - 7.450    pCO2, Arterial 15.2 (LL) 36.0 - 44.0 mm Hg    pO2, Arterial 147.3 (H) 75.0 - 129.0 mm Hg    HCO3, Arterial 14.1 (L) 22.0 - 28.0 mmol/L    Base Excess, Arterial -4.7 mmol/L    O2 Content, Arterial 18.9 16.0 - 23.0 mL/dL    O2 HGB,Arterial  98.3 (H) 94.0 - 97.0 %    SOURCE Radial, Right     TANESHA TEST Yes     Non Vent type BIPAP BIPAP     IPAP  12     EPAP 6     BIPAP fio2 30 %   Blood culture #1    Collection Time: 03/02/24  4:00 PM    Specimen: Arm, Left; Blood   Result Value Ref Range    Blood Culture No Growth After 5 Days.    Procalcitonin    Collection Time: 03/02/24  4:00 PM   Result Value Ref Range    Procalcitonin 0.07 <=0.25 ng/ml   TSH, 3rd generation with Free T4 reflex    Collection Time: 03/02/24  4:00 PM   Result Value Ref Range    TSH 3RD GENERATON 0.424 (L) 0.450 - 4.500 uIU/mL   T4, free    Collection Time: 03/02/24  4:00 PM   Result Value Ref Range    Free T4 1.16 (H) 0.61 - 1.12 ng/dL   Phosphorus    Collection Time: 03/02/24  4:00 PM   Result Value Ref Range    Phosphorus 3.0 2.3 - 4.1 mg/dL   POCT Blood Gas (CG8+)    Collection Time: 03/02/24  5:44 PM   Result Value Ref Range    pH, Art i-STAT 7.483 (H) 7.350 - 7.450    pCO2, Art i-STAT 23.0 (LL) 36.0 - 44.0 mm HG    pO2, ART i-STAT 169.0 (H) 75.0 - 129.0 mm HG    BE, i-STAT -5 (L) -2 - 3 mmol/L    HCO3, Art i-STAT 17.2 (L) 22.0 - 28.0 mmol/L    CO2, i-STAT 18 (L) 21 - 32 mmol/L    O2 Sat, i-STAT 100 (H) 60 - 85 %    SODIUM, I-STAT 141 136 - 145 mmol/l    Potassium, i-STAT 3.6 3.5 - 5.3 mmol/L    Calcium, Ionized i-STAT 1.29 1.12 - 1.32 mmol/L    Hct, i-STAT 35 34.8 - 46.1 %    Hgb, i-STAT 11.9 11.5 - 15.4 g/dl    Glucose, i-STAT 149 (H) 65 - 140 mg/dl    Specimen Type ARTERIAL    Blood gas, venous    Collection Time: 03/03/24  4:02 AM   Result Value Ref Range    pH, Keegan 7.395 7.300 - 7.400    pCO2, Keegan 34.5 (L) 42.0 - 50.0 mm Hg    pO2, Keegan 49.5 (H) 35.0 - 45.0 mm Hg    HCO3, Keegan 20.7 (L) 24 - 30 mmol/L    Base Excess, Keegan -3.5 mmol/L    O2 Content, Keegan 21.7 ml/dL    O2 HGB, VENOUS 84.6 (H) 60.0 - 80.0 %    Nasal Cannula 2    CBC and differential    Collection Time: 03/03/24  4:02 AM   Result Value Ref Range    WBC 7.86 4.31 - 10.16 Thousand/uL    RBC 3.77 (L) 3.81 - 5.12 Million/uL    Hemoglobin 11.4 (L) 11.5 - 15.4 g/dL    Hematocrit 35.0 34.8 - 46.1 %    MCV 93 82 - 98 fL    MCH  30.2 26.8 - 34.3 pg    MCHC 32.6 31.4 - 37.4 g/dL    RDW 14.5 11.6 - 15.1 %    MPV 11.5 8.9 - 12.7 fL    Platelets 207 149 - 390 Thousands/uL    nRBC 0 /100 WBCs    Segmented % 85 (H) 43 - 75 %    Immature Grans % 1 0 - 2 %    Lymphocytes % 11 (L) 14 - 44 %    Monocytes % 3 (L) 4 - 12 %    Eosinophils Relative 0 0 - 6 %    Basophils Relative 0 0 - 1 %    Absolute Neutrophils 6.72 1.85 - 7.62 Thousands/µL    Absolute Immature Grans 0.05 0.00 - 0.20 Thousand/uL    Absolute Lymphocytes 0.84 0.60 - 4.47 Thousands/µL    Absolute Monocytes 0.24 0.17 - 1.22 Thousand/µL    Eosinophils Absolute 0.00 0.00 - 0.61 Thousand/µL    Basophils Absolute 0.01 0.00 - 0.10 Thousands/µL   Comprehensive metabolic panel    Collection Time: 03/03/24  4:02 AM   Result Value Ref Range    Sodium 141 135 - 147 mmol/L    Potassium 4.4 3.5 - 5.3 mmol/L    Chloride 111 (H) 96 - 108 mmol/L    CO2 22 21 - 32 mmol/L    ANION GAP 8 mmol/L    BUN 22 5 - 25 mg/dL    Creatinine 1.22 0.60 - 1.30 mg/dL    Glucose 136 65 - 140 mg/dL    Calcium 10.1 8.4 - 10.2 mg/dL    AST 14 13 - 39 U/L    ALT 14 7 - 52 U/L    Alkaline Phosphatase 69 34 - 104 U/L    Total Protein 7.0 6.4 - 8.4 g/dL    Albumin 3.9 3.5 - 5.0 g/dL    Total Bilirubin 0.46 0.20 - 1.00 mg/dL    eGFR 41 ml/min/1.73sq m   Magnesium    Collection Time: 03/03/24  4:02 AM   Result Value Ref Range    Magnesium 2.7 1.9 - 2.7 mg/dL   Procalcitonin, Next Day AM Collection    Collection Time: 03/03/24  4:02 AM   Result Value Ref Range    Procalcitonin 0.08 <=0.25 ng/ml   CBC and differential    Collection Time: 03/04/24  5:20 AM   Result Value Ref Range    WBC 16.79 (H) 4.31 - 10.16 Thousand/uL    RBC 3.60 (L) 3.81 - 5.12 Million/uL    Hemoglobin 11.1 (L) 11.5 - 15.4 g/dL    Hematocrit 34.3 (L) 34.8 - 46.1 %    MCV 95 82 - 98 fL    MCH 30.8 26.8 - 34.3 pg    MCHC 32.4 31.4 - 37.4 g/dL    RDW 15.1 11.6 - 15.1 %    MPV 11.4 8.9 - 12.7 fL    Platelets 204 149 - 390 Thousands/uL    nRBC 0 /100 WBCs     Segmented % 91 (H) 43 - 75 %    Immature Grans % 1 0 - 2 %    Lymphocytes % 6 (L) 14 - 44 %    Monocytes % 2 (L) 4 - 12 %    Eosinophils Relative 0 0 - 6 %    Basophils Relative 0 0 - 1 %    Absolute Neutrophils 15.26 (H) 1.85 - 7.62 Thousands/µL    Absolute Immature Grans 0.19 0.00 - 0.20 Thousand/uL    Absolute Lymphocytes 0.99 0.60 - 4.47 Thousands/µL    Absolute Monocytes 0.34 0.17 - 1.22 Thousand/µL    Eosinophils Absolute 0.00 0.00 - 0.61 Thousand/µL    Basophils Absolute 0.01 0.00 - 0.10 Thousands/µL   Basic metabolic panel    Collection Time: 03/04/24  5:20 AM   Result Value Ref Range    Sodium 141 135 - 147 mmol/L    Potassium 4.8 3.5 - 5.3 mmol/L    Chloride 111 (H) 96 - 108 mmol/L    CO2 22 21 - 32 mmol/L    ANION GAP 8 mmol/L    BUN 37 (H) 5 - 25 mg/dL    Creatinine 1.54 (H) 0.60 - 1.30 mg/dL    Glucose 140 65 - 140 mg/dL    Calcium 9.8 8.4 - 10.2 mg/dL    eGFR 31 ml/min/1.73sq m   Magnesium    Collection Time: 03/04/24  5:20 AM   Result Value Ref Range    Magnesium 2.5 1.9 - 2.7 mg/dL   Procalcitonin    Collection Time: 03/04/24  5:20 AM   Result Value Ref Range    Procalcitonin 0.11 <=0.25 ng/ml   Smear Review(Phlebs Do Not Order)    Collection Time: 03/04/24  5:20 AM   Result Value Ref Range    RBC Morphology Present     Platelet Estimate Adequate Adequate    Anisocytosis Present    Basic metabolic panel    Collection Time: 03/05/24  5:08 AM   Result Value Ref Range    Sodium 140 135 - 147 mmol/L    Potassium 4.4 3.5 - 5.3 mmol/L    Chloride 112 (H) 96 - 108 mmol/L    CO2 21 21 - 32 mmol/L    ANION GAP 7 mmol/L    BUN 29 (H) 5 - 25 mg/dL    Creatinine 1.21 0.60 - 1.30 mg/dL    Glucose 86 65 - 140 mg/dL    Calcium 9.5 8.4 - 10.2 mg/dL    eGFR 42 ml/min/1.73sq m   CBC and differential    Collection Time: 03/05/24  5:08 AM   Result Value Ref Range    WBC 14.36 (H) 4.31 - 10.16 Thousand/uL    RBC 3.35 (L) 3.81 - 5.12 Million/uL    Hemoglobin 10.4 (L) 11.5 - 15.4 g/dL    Hematocrit 33.4 (L) 34.8 - 46.1 %      (H) 82 - 98 fL    MCH 31.0 26.8 - 34.3 pg    MCHC 31.1 (L) 31.4 - 37.4 g/dL    RDW 15.3 (H) 11.6 - 15.1 %    MPV 11.9 8.9 - 12.7 fL    Platelets 212 149 - 390 Thousands/uL    nRBC 0 /100 WBCs    Segmented % 79 (H) 43 - 75 %    Immature Grans % 1 0 - 2 %    Lymphocytes % 15 14 - 44 %    Monocytes % 5 4 - 12 %    Eosinophils Relative 0 0 - 6 %    Basophils Relative 0 0 - 1 %    Absolute Neutrophils 11.37 (H) 1.85 - 7.62 Thousands/µL    Absolute Immature Grans 0.12 0.00 - 0.20 Thousand/uL    Absolute Lymphocytes 2.21 0.60 - 4.47 Thousands/µL    Absolute Monocytes 0.65 0.17 - 1.22 Thousand/µL    Eosinophils Absolute 0.00 0.00 - 0.61 Thousand/µL    Basophils Absolute 0.01 0.00 - 0.10 Thousands/µL   Adult Nebulizer Package    Collection Time: 03/05/24  9:45 AM   Result Value Ref Range    Supplier Name [employer]     Supplier Phone Number [phone]     Order Status Delivery Successful     Delivery Note Will deliver from closet stock     Delivery Request Date 03/05/2024     Date Delivered  03/05/2024     Item Description Nebulizer Compressor with Mouthpiece Only     Item Description Nebulizer Set, Reusable     Item Description Mouthpiece Only, N/A    Comprehensive metabolic panel    Collection Time: 03/07/24 12:49 PM   Result Value Ref Range    Sodium 140 135 - 147 mmol/L    Potassium 4.0 3.5 - 5.3 mmol/L    Chloride 105 96 - 108 mmol/L    CO2 26 21 - 32 mmol/L    ANION GAP 9 mmol/L    BUN 25 5 - 25 mg/dL    Creatinine 1.27 0.60 - 1.30 mg/dL    Glucose 113 65 - 140 mg/dL    Calcium 10.6 (H) 8.4 - 10.2 mg/dL    AST 23 13 - 39 U/L    ALT 32 7 - 52 U/L    Alkaline Phosphatase 64 34 - 104 U/L    Total Protein 7.0 6.4 - 8.4 g/dL    Albumin 4.1 3.5 - 5.0 g/dL    Total Bilirubin 0.60 0.20 - 1.00 mg/dL    eGFR 39 ml/min/1.73sq m   CBC and differential    Collection Time: 03/07/24 12:49 PM   Result Value Ref Range    WBC 11.58 (H) 4.31 - 10.16 Thousand/uL    RBC 4.17 3.81 - 5.12 Million/uL     Hemoglobin 12.6 11.5 - 15.4 g/dL    Hematocrit 38.7 34.8 - 46.1 %    MCV 93 82 - 98 fL    MCH 30.2 26.8 - 34.3 pg    MCHC 32.6 31.4 - 37.4 g/dL    RDW 14.4 11.6 - 15.1 %    MPV 11.5 8.9 - 12.7 fL    Platelets 235 149 - 390 Thousands/uL   Blood culture #1    Collection Time: 03/07/24 12:49 PM    Specimen: Arm, Left; Blood   Result Value Ref Range    Blood Culture No Growth After 5 Days.    Blood culture #2    Collection Time: 03/07/24 12:49 PM    Specimen: Arm, Right; Blood   Result Value Ref Range    Blood Culture No Growth After 5 Days.    Manual Differential(PHLEBS Do Not Order)    Collection Time: 03/07/24 12:49 PM   Result Value Ref Range    Segmented % 86 (H) 43 - 75 %    Lymphocytes % 11 (L) 14 - 44 %    Monocytes % 2 (L) 4 - 12 %    Eosinophils % 1 0 - 6 %    Basophils % 0 0 - 1 %    Absolute Neutrophils 9.96 (H) 1.85 - 7.62 Thousand/uL    Absolute Lymphocytes 1.27 0.60 - 4.47 Thousand/uL    Absolute Monocytes 0.23 0.00 - 1.22 Thousand/uL    Absolute Eosinophils 0.12 0.00 - 0.40 Thousand/uL    Absolute Basophils 0.00 0.00 - 0.10 Thousand/uL    Total Counted      RBC Morphology Present     Platelet Estimate Adequate Adequate    Anisocytosis Present    UA w Reflex to Microscopic w Reflex to Culture    Collection Time: 03/07/24  1:12 PM    Specimen: Urine, Clean Catch   Result Value Ref Range    Color, UA Light Yellow     Clarity, UA Clear     Specific Gravity, UA <1.005 (L) 1.005 - 1.030    pH, UA 5.5 4.5, 5.0, 5.5, 6.0, 6.5, 7.0, 7.5, 8.0    Leukocytes, UA Trace (A) Negative    Nitrite, UA Negative Negative    Protein, UA Negative Negative mg/dl    Glucose, UA Negative Negative mg/dl    Ketones, UA Negative Negative mg/dl    Urobilinogen, UA <2.0 <2.0 mg/dl mg/dl    Bilirubin, UA Negative Negative    Occult Blood, UA Negative Negative   Urine Microscopic    Collection Time: 03/07/24  1:12 PM   Result Value Ref Range    RBC, UA None Seen None Seen, 0-1, 1-2, 2-4, 0-5 /hpf    WBC, UA 0-1 None Seen, 0-1, 1-2,  0-5, 2-4 /hpf    Epithelial Cells Occasional None Seen, Occasional /hpf    Bacteria, UA None Seen None Seen, Occasional /hpf   CBC and differential    Collection Time: 06/09/24  1:34 PM   Result Value Ref Range    WBC 10.38 (H) 4.31 - 10.16 Thousand/uL    RBC 3.74 (L) 3.81 - 5.12 Million/uL    Hemoglobin 11.7 11.5 - 15.4 g/dL    Hematocrit 35.7 34.8 - 46.1 %    MCV 96 82 - 98 fL    MCH 31.3 26.8 - 34.3 pg    MCHC 32.8 31.4 - 37.4 g/dL    RDW 13.9 11.6 - 15.1 %    MPV 12.1 8.9 - 12.7 fL    Platelets 188 149 - 390 Thousands/uL    nRBC 0 /100 WBCs    Segmented % 71 43 - 75 %    Immature Grans % 1 0 - 2 %    Lymphocytes % 14 14 - 44 %    Monocytes % 9 4 - 12 %    Eosinophils Relative 5 0 - 6 %    Basophils Relative 0 0 - 1 %    Absolute Neutrophils 7.37 1.85 - 7.62 Thousands/µL    Absolute Immature Grans 0.08 0.00 - 0.20 Thousand/uL    Absolute Lymphocytes 1.45 0.60 - 4.47 Thousands/µL    Absolute Monocytes 0.95 0.17 - 1.22 Thousand/µL    Eosinophils Absolute 0.49 0.00 - 0.61 Thousand/µL    Basophils Absolute 0.04 0.00 - 0.10 Thousands/µL   Comprehensive metabolic panel    Collection Time: 06/09/24  1:34 PM   Result Value Ref Range    Sodium 140 135 - 147 mmol/L    Potassium 4.7 3.5 - 5.3 mmol/L    Chloride 109 (H) 96 - 108 mmol/L    CO2 26 21 - 32 mmol/L    ANION GAP 5 4 - 13 mmol/L    BUN 23 5 - 25 mg/dL    Creatinine 1.09 0.60 - 1.30 mg/dL    Glucose 116 65 - 140 mg/dL    Calcium 10.3 (H) 8.4 - 10.2 mg/dL    AST 35 13 - 39 U/L    ALT 78 (H) 7 - 52 U/L    Alkaline Phosphatase 79 34 - 104 U/L    Total Protein 7.0 6.4 - 8.4 g/dL    Albumin 4.0 3.5 - 5.0 g/dL    Total Bilirubin 0.65 0.20 - 1.00 mg/dL    eGFR 47 ml/min/1.73sq m   Lipase    Collection Time: 06/09/24  1:34 PM   Result Value Ref Range    Lipase 14 11 - 82 u/L   ECG 12 lead    Collection Time: 06/09/24  3:53 PM   Result Value Ref Range    Ventricular Rate 78 BPM    Atrial Rate 78 BPM    OH Interval 166 ms    QRSD Interval 132 ms    QT Interval 394 ms    QTC  "Interval 449 ms    P Axis 36 degrees    QRS Axis 113 degrees    T Wave Axis 66 degrees   FLU/RSV/COVID - if FLU/RSV clinically relevant    Collection Time: 06/09/24  4:00 PM    Specimen: Nose; Nares   Result Value Ref Range    SARS-CoV-2 Positive (A) Negative    INFLUENZA A PCR Negative Negative    INFLUENZA B PCR Negative Negative    RSV PCR Negative Negative   UA w Reflex to Microscopic w Reflex to Culture    Collection Time: 06/09/24  4:03 PM    Specimen: Urine, Clean Catch   Result Value Ref Range    Color, UA Light Yellow     Clarity, UA Clear     Specific Gravity, UA <1.005 (L) 1.005 - 1.030    pH, UA 6.0 4.5, 5.0, 5.5, 6.0, 6.5, 7.0, 7.5, 8.0    Leukocytes, UA Negative Negative    Nitrite, UA Negative Negative    Protein, UA Negative Negative mg/dl    Glucose, UA Negative Negative mg/dl    Ketones, UA Negative Negative mg/dl    Urobilinogen, UA <2.0 <2.0 mg/dl mg/dl    Bilirubin, UA Negative Negative    Occult Blood, UA Negative Negative   HS Troponin 0hr (reflex protocol)    Collection Time: 06/09/24  4:10 PM   Result Value Ref Range    hs TnI 0hr 30 \"Refer to ACS Flowchart\"- see link ng/L   ECG 12 lead    Collection Time: 06/09/24  4:21 PM   Result Value Ref Range    Ventricular Rate 85 BPM    Atrial Rate 85 BPM    OK Interval 158 ms    QRSD Interval 134 ms    QT Interval 376 ms    QTC Interval 447 ms    P Axis 18 degrees    QRS Axis 115 degrees    T Wave [place] 53 degrees   HS Troponin I 2hr    Collection Time: 06/09/24  6:20 PM   Result Value Ref Range    hs TnI 2hr 31 \"Refer to ACS Flowchart\"- see link ng/L    Delta 2hr hsTnI 1 <20 ng/L   Comprehensive metabolic panel    Collection Time: 06/10/24  2:29 AM   Result Value Ref Range    Sodium 139 135 - 147 mmol/L    Potassium 3.7 3.5 - 5.3 mmol/L    Chloride 111 (H) 96 - 108 mmol/L    CO2 20 (L) 21 - 32 mmol/L    ANION GAP 8 4 - 13 mmol/L    BUN 20 5 - 25 mg/dL    Creatinine 0.95 0.60 - 1.30 mg/dL    Glucose 94 65 - 140 mg/dL    Calcium 10.0 8.4 - 10.2 mg/dL "    AST 23 13 - 39 U/L    ALT 60 (H) 7 - 52 U/L    Alkaline Phosphatase 74 34 - 104 U/L    Total Protein 6.6 6.4 - 8.4 g/dL    Albumin 3.9 3.5 - 5.0 g/dL    Total Bilirubin 1.00 0.20 - 1.00 mg/dL    eGFR 56 ml/min/1.73sq m   CBC and differential    Collection Time: 06/11/24  4:33 AM   Result Value Ref Range    WBC 4.18 (L) 4.31 - 10.16 Thousand/uL    RBC 3.71 (L) 3.81 - 5.12 Million/uL    Hemoglobin 11.3 (L) 11.5 - 15.4 g/dL    Hematocrit 34.6 (L) 34.8 - 46.1 %    MCV 93 82 - 98 fL    MCH 30.5 26.8 - 34.3 pg    MCHC 32.7 31.4 - 37.4 g/dL    RDW 13.7 11.6 - 15.1 %    MPV 11.8 8.9 - 12.7 fL    Platelets 181 149 - 390 Thousands/uL    nRBC 0 /100 WBCs    Segmented % 68 43 - 75 %    Immature Grans % 1 0 - 2 %    Lymphocytes % 18 14 - 44 %    Monocytes % 12 4 - 12 %    Eosinophils Relative 0 0 - 6 %    Basophils Relative 1 0 - 1 %    Absolute Neutrophils 2.86 1.85 - 7.62 Thousands/µL    Absolute Immature Grans 0.04 0.00 - 0.20 Thousand/uL    Absolute Lymphocytes 0.74 0.60 - 4.47 Thousands/µL    Absolute Monocytes 0.51 0.17 - 1.22 Thousand/µL    Eosinophils Absolute 0.01 0.00 - 0.61 Thousand/µL    Basophils Absolute 0.02 0.00 - 0.10 Thousands/µL   Basic metabolic panel    Collection Time: 06/11/24  4:33 AM   Result Value Ref Range    Sodium 139 135 - 147 mmol/L    Potassium 4.1 3.5 - 5.3 mmol/L    Chloride 110 (H) 96 - 108 mmol/L    CO2 20 (L) 21 - 32 mmol/L    ANION GAP 9 4 - 13 mmol/L    BUN 21 5 - 25 mg/dL    Creatinine 1.04 0.60 - 1.30 mg/dL    Glucose 97 65 - 140 mg/dL    Calcium 9.9 8.4 - 10.2 mg/dL    eGFR 50 ml/min/1.73sq m   CBC and differential    Collection Time: 06/18/24  9:09 AM   Result Value Ref Range    WBC 5.26 4.31 - 10.16 Thousand/uL    RBC 3.58 (L) 3.81 - 5.12 Million/uL    Hemoglobin 11.3 (L) 11.5 - 15.4 g/dL    Hematocrit 35.2 34.8 - 46.1 %    MCV 98 82 - 98 fL    MCH 31.6 26.8 - 34.3 pg    MCHC 32.1 31.4 - 37.4 g/dL    RDW 13.9 11.6 - 15.1 %    MPV 11.3 8.9 - 12.7 fL    Platelets 233 149 - 390  Thousands/uL    nRBC 0 /100 WBCs    Segmented % 34 (L) 43 - 75 %    Immature Grans % 1 0 - 2 %    Lymphocytes % 40 14 - 44 %    Monocytes % 13 (H) 4 - 12 %    Eosinophils Relative 11 (H) 0 - 6 %    Basophils Relative 1 0 - 1 %    Absolute Neutrophils 1.77 (L) 1.85 - 7.62 Thousands/µL    Absolute Immature Grans 0.06 0.00 - 0.20 Thousand/uL    Absolute Lymphocytes 2.17 0.60 - 4.47 Thousands/µL    Absolute Monocytes 0.66 0.17 - 1.22 Thousand/µL    Eosinophils Absolute 0.57 0.00 - 0.61 Thousand/µL    Basophils Absolute 0.03 0.00 - 0.10 Thousands/µL   Comprehensive metabolic panel    Collection Time: 06/18/24  9:09 AM   Result Value Ref Range    Sodium 140 135 - 147 mmol/L    Potassium 4.7 3.5 - 5.3 mmol/L    Chloride 107 96 - 108 mmol/L    CO2 25 21 - 32 mmol/L    ANION GAP 8 4 - 13 mmol/L    BUN 28 (H) 5 - 25 mg/dL    Creatinine 1.09 0.60 - 1.30 mg/dL    Glucose, Fasting 74 65 - 99 mg/dL    Calcium 9.6 8.4 - 10.2 mg/dL    AST 16 13 - 39 U/L    ALT 22 7 - 52 U/L    Alkaline Phosphatase 96 34 - 104 U/L    Total Protein 6.0 (L) 6.4 - 8.4 g/dL    Albumin 3.6 3.5 - 5.0 g/dL    Total Bilirubin 0.28 0.20 - 1.00 mg/dL    eGFR 47 ml/min/1.73sq m   Hemoglobin A1C    Collection Time: 06/18/24  9:09 AM   Result Value Ref Range    Hemoglobin A1C 5.9 (H) Normal 4.0-5.6%; PreDiabetic 5.7-6.4%; Diabetic >=6.5%; Glycemic control for adults with diabetes <7.0% %     mg/dl   Lipid panel    Collection Time: 06/18/24  9:09 AM   Result Value Ref Range    Cholesterol 124 See Comment mg/dL    Triglycerides 79 See Comment mg/dL    HDL, Direct 51 >=50 mg/dL    LDL Calculated 57 0 - 100 mg/dL    Non-HDL-Chol (CHOL-HDL) 73 mg/dl   TSH, 3rd generation with Free T4 reflex    Collection Time: 06/18/24  9:09 AM   Result Value Ref Range    TSH 3RD GENERATON 2.101 0.450 - 4.500 uIU/mL   Tissue Exam    Collection Time: 07/08/24  7:45 AM   Result Value Ref Range    Case Report       Surgical Pathology Report                         Case:  [id number]                                  Authorizing Provider:  Franklin Martell DO          Collected:           07/08/2024 0745              Ordering Location:     [facility]     Received:            07/08/2024 1450                                     [facility]                                                             Pathologist:           Valarie Cardozo MD                                                                 Specimen:    Large Intestine, Right/Ascending Colon, mid ascending polyp                                Final Diagnosis       A. Large Intestine, Right/Ascending Colon, mid ascending polyp:  - Benign polypoid colonic mucosa without specific histopathologic abnormality.  - No epithelial dysplasia and no evidence of malignancy.    Comment: Additional histologic levels were examined.         Note       Interpretation performed at [facility], [address] [facility] [address]        Additional Information       All reported additional testing was performed with appropriately reactive controls.  These tests were developed and their performance characteristics determined by [facility] or appropriate performing facility, though some tests may be performed on tissues which have not been validated for performance characteristics (such as staining performed on alcohol exposed cell blocks and decalcified tissues).  Results should be interpreted with caution and in the context of the patients’ clinical condition. These tests may not be cleared or approved by the U.S. Food and Drug Administration, though the FDA has determined that such clearance or approval is not necessary. These tests are used for clinical purposes and they should not be regarded as investigational or for research. This laboratory has been approved by CLIA 88, designated as a high-complexity laboratory and is qualified to perform these tests.  .      Synoptic Checklist          COLON/RECTUM POLYP  "FORM - GI - All Specimens          :    Other      Gross Description       A. The specimen is received in formalin, labeled with the patient's name and hospital number, and is designated \" mid ascending polyp\".  The specimen consists of 1 tan flat polypoid portion of tissue measuring 0.8 x 0.5 x 0.1 cm.  The apparent margin of resection is inked blue.  The polyp is bisected and entirely submitted in 1 cassette.    Note: The estimated total formalin fixation time based upon information provided by the submitting clinician and the standard processing schedule is under 72.0 hours.  RRavotti           Future Appointments   Date Time Provider Department Center   7/12/2024  2:30 PM Ministerio Harden, PT [facility] PT [facility]   7/16/2024  1:00 PM UB ECHO 1 [facility] CAR [facility]   7/16/2024  2:15 PM Vinh Cage, PT [facility] NPT [facility]   7/18/2024 10:15 AM Vinh Cage, PT [facility] NPT [facility]   7/23/2024 10:30 AM Ministerio Harden, PT [facility] PT [facility]   7/25/2024 10:00 AM Ministerio Harden, PT [facility] PT [facility]   7/29/2024 11:30 AM Ministerio Harden, PT [facility] PT [facility]   7/30/2024 10:30 AM CRISTINA Ellison GI [facility]   8/2/2024  8:15 AM Ministerio Harden, PT [facility] PT [facility]   8/6/2024 11:00 AM Vinh Cage, PT [facility] NPT [facility]   8/8/2024 10:15 AM Vinh Cage, PT [facility] NPT [facility]   9/3/2024  4:20 PM DO RAKESH Posadas  [address]     "

## 2024-07-12 ENCOUNTER — APPOINTMENT (OUTPATIENT)
Dept: LAB | Facility: HOSPITAL | Age: 79
End: 2024-07-12
Payer: COMMERCIAL

## 2024-07-12 ENCOUNTER — EVALUATION (OUTPATIENT)
Dept: PHYSICAL THERAPY | Facility: CLINIC | Age: 79
End: 2024-07-12
Payer: COMMERCIAL

## 2024-07-12 DIAGNOSIS — R26.2 AMBULATORY DYSFUNCTION: Primary | ICD-10-CM

## 2024-07-12 LAB
25(OH)D3 SERPL-MCNC: 33.5 NG/ML (ref 30–100)
ALBUMIN SERPL BCG-MCNC: 3.6 G/DL (ref 3.5–5)
ALP SERPL-CCNC: 62 U/L (ref 34–104)
ALT SERPL W P-5'-P-CCNC: 12 U/L (ref 7–52)
ANION GAP SERPL CALCULATED.3IONS-SCNC: 7 MMOL/L (ref 4–13)
AST SERPL W P-5'-P-CCNC: 11 U/L (ref 13–39)
BILIRUB SERPL-MCNC: 0.38 MG/DL (ref 0.2–1)
BUN SERPL-MCNC: 20 MG/DL (ref 5–25)
CALCIUM SERPL-MCNC: 9.7 MG/DL (ref 8.4–10.2)
CHLORIDE SERPL-SCNC: 107 MMOL/L (ref 96–108)
CO2 SERPL-SCNC: 27 MMOL/L (ref 21–32)
CREAT SERPL-MCNC: 0.98 MG/DL (ref 0.6–1.3)
GFR SERPL CREATININE-BSD FRML MDRD: 54 ML/MIN/1.73SQ M
GLUCOSE SERPL-MCNC: 79 MG/DL (ref 65–140)
MAGNESIUM SERPL-MCNC: 1.9 MG/DL (ref 1.9–2.7)
PHOSPHATE SERPL-MCNC: 2.6 MG/DL (ref 2.3–4.1)
POTASSIUM SERPL-SCNC: 4.8 MMOL/L (ref 3.5–5.3)
PROT SERPL-MCNC: 6.1 G/DL (ref 6.4–8.4)
PTH-INTACT SERPL-MCNC: 104.4 PG/ML (ref 12–88)
SODIUM SERPL-SCNC: 141 MMOL/L (ref 135–147)

## 2024-07-12 PROCEDURE — 83970 ASSAY OF PARATHORMONE: CPT | Performed by: INTERNAL MEDICINE

## 2024-07-12 PROCEDURE — 82306 VITAMIN D 25 HYDROXY: CPT | Performed by: INTERNAL MEDICINE

## 2024-07-12 PROCEDURE — 84100 ASSAY OF PHOSPHORUS: CPT | Performed by: INTERNAL MEDICINE

## 2024-07-12 PROCEDURE — 36415 COLL VENOUS BLD VENIPUNCTURE: CPT | Performed by: INTERNAL MEDICINE

## 2024-07-12 PROCEDURE — 80053 COMPREHEN METABOLIC PANEL: CPT | Performed by: INTERNAL MEDICINE

## 2024-07-12 PROCEDURE — 97110 THERAPEUTIC EXERCISES: CPT | Performed by: PHYSICAL THERAPIST

## 2024-07-12 PROCEDURE — 83735 ASSAY OF MAGNESIUM: CPT | Performed by: INTERNAL MEDICINE

## 2024-07-12 PROCEDURE — 97140 MANUAL THERAPY 1/> REGIONS: CPT | Performed by: PHYSICAL THERAPIST

## 2024-07-12 NOTE — PROGRESS NOTES
"Daily Note     Today's date: 2024  Patient name: Cindy Cruz  : 1945  MRN: [id number]  Referring provider: Lina Londono MD  Dx:   Encounter Diagnosis     ICD-10-CM    1. Ambulatory dysfunction  R26.2                      Subjective: Notes feeling continued pain down R LE.  Still with discomfort with walking and little relief overall since starting PT.       Objective: See treatment diary below      Assessment: Continues to have pain with deep pressure to gluteal piriformis improved with TFM and clamshells.  Noting (-) slump but pain with supine SLR performance.  No weakness with hip ABD most pain with performance of clamshell.  Will continue to benefit from strengthening bias for piriformis updated HEP to perform daily.        Plan: Continue per plan of care.      Precautions: Falls, CKD3, Osteoporosis, HTN  Re-eval Date:  8/3/2024    Date 7/3 7/12      Visit Count 1       FOTO See IE       Pain In See IE       Pain Out                Manuals        TFM to R gluteal mms  10 min      MFR R gluteal mms  10 min       Mini assesment  5 min                       Neuro Re-Ed        Core stability in supine                                                        Ther Ex        Bridge  2x10      Pball rollout        Step ups  NV      SLRx4  Unable      Mini Squat        Clamshells  30x2      Hip ABD  10x      Seated piriformsi  30\"x2      Ther Activity        ADL                Gait Training        Divided Attention        Head turns        Modalities         prn                        "

## 2024-07-16 ENCOUNTER — APPOINTMENT (OUTPATIENT)
Facility: CLINIC | Age: 79
End: 2024-07-16
Payer: COMMERCIAL

## 2024-07-16 ENCOUNTER — HOSPITAL ENCOUNTER (OUTPATIENT)
Dept: NON INVASIVE DIAGNOSTICS | Facility: HOSPITAL | Age: 79
Discharge: HOME | End: 2024-07-16
Payer: COMMERCIAL

## 2024-07-16 VITALS
WEIGHT: 152 LBS | SYSTOLIC BLOOD PRESSURE: 138 MMHG | BODY MASS INDEX: 29.84 KG/M2 | HEIGHT: 60 IN | HEART RATE: 71 BPM | DIASTOLIC BLOOD PRESSURE: 63 MMHG

## 2024-07-16 DIAGNOSIS — I34.0 SEVERE MITRAL VALVE REGURGITATION: Primary | ICD-10-CM

## 2024-07-16 DIAGNOSIS — R07.9 CHEST PAIN, UNSPECIFIED TYPE: ICD-10-CM

## 2024-07-16 DIAGNOSIS — R06.01 ORTHOPNEA: ICD-10-CM

## 2024-07-16 LAB
AORTIC ROOT: 3 CM
AORTIC VALVE MEAN VELOCITY: 16.7 M/S
APICAL FOUR CHAMBER EJECTION FRACTION: 66 %
AV AREA BY CONTINUOUS VTI: 1.5 CM2
AV LVOT MEAN GRADIENT: 3 MMHG
AV MEAN GRADIENT: 12 MMHG
AV REGURGITATION PRESSURE HALF TIME: 443 MS
AV VALVE AREA: 1.52 CM2
AV VELOCITY RATIO: 0.5
BSA FOR ECHO PROCEDURE: 1.66 M2
DOP CALC AO PEAK VEL: 2.4 M/S
DOP CALC AO VTI: 48.4 CM
DOP CALC LVOT AREA: 2.83
DOP CALC LVOT CARDIAC OUTPUT: 5.1 L/MIN
DOP CALC LVOT DIAMETER: 1.9 CM
DOP CALC LVOT PEAK VEL VTI: 26 CM
DOP CALC LVOT PEAK VEL: 1.2 M/S
DOP CALC LVOT STROKE INDEX: 44.6 ML/M2
DOP CALC LVOT STROKE VOLUME: 73.68
DOP CALC MV VTI: 39.2 CM
E WAVE DECELERATION TIME: 283 MS
E/A RATIO: 1.05
FRACTIONAL SHORTENING: 36 (ref 28–44)
INTERVENTRICULAR SEPTUM IN DIASTOLE (PARASTERNAL SHORT AXIS VIEW): 1.3 CM
INTERVENTRICULAR SEPTUM: 1.3 CM (ref 0.6–1.1)
LA/AORTA RATIO 2D: 1.43
LAAS-AP2: 25.1 CM2
LAAS-AP4: 27 CM2
LEFT ATRIUM SIZE: 4.3 CM
LEFT ATRIUM VOLUME (MOD BIPLANE): 91 ML
LEFT ATRIUM VOLUME INDEX (MOD BIPLANE): 54.8 ML/M2
LEFT INTERNAL DIMENSION IN SYSTOLE: 2.7 CM (ref 2.1–4)
LEFT VENTRICLE DIASTOLIC VOLUME (MOD BIPLANE): 72 ML
LEFT VENTRICLE DIASTOLIC VOLUME INDEX (MOD BIPLANE): 43.4 ML/M2
LEFT VENTRICLE SYSTOLIC VOLUME (MOD BIPLANE): 25 ML
LEFT VENTRICLE SYSTOLIC VOLUME INDEX (MOD BIPLANE): 15.1 ML/M2
LEFT VENTRICULAR INTERNAL DIMENSION IN DIASTOLE: 4.2 CM (ref 3.5–6)
LEFT VENTRICULAR POSTERIOR WALL IN END DIASTOLE: 0.9 CM
LEFT VENTRICULAR STROKE VOLUME: 53 ML
LV EF: 65 %
LVSV (TEICH): 53 ML
MITRAL REGURGITATION PEAK VELOCITY: 5.17 M/S
MITRAL VALVE MEAN INFLOW VELOCITY: 4.1 M/S
MITRAL VALVE REGURGITANT PEAK GRADIENT: 107 MMHG
MR PISA EROA: 0.39 CM2
MV E'TISSUE VEL-LAT: 9 CM/S
MV E'TISSUE VEL-SEP: 8 CM/S
MV MEAN GRADIENT: 3 MMHG
MV PEAK A VEL: 1.21 M/S
MV PEAK E VEL: 127 CM/S
MV PEAK GRADIENT: 8 MMHG
MV STENOSIS PRESSURE HALF TIME: 83 MS
MV VALVE AREA BY CONTINUITY EQUATION: 1.88 CM2
MV VALVE AREA P 1/2 METHOD: 2.65
PISA MRMAX VEL: 5.2 M/S
PISA RADIUS: 0.9 CM
PISA VNYQUIST: 0.4 M/S
RA PRESSURE ESTIMATED: 3 MMHG
RIGHT VENTRICLE ID DIMENSION: 3.6 CM
RV PSP: 29 MMHG
SL CV AV PEAK GRADIENT RETROGRADE: 71 MMHG
SL CV DOP CALC MV VTI RETROGRADE: 173 CM
SL CV LV EF: 65
SL CV MV MEAN GRADIENT RETROGRADE: 74 MMHG
SL CV PED ECHO LEFT VENTRICLE DIASTOLIC VOLUME (MOD BIPLANE) 2D: 79 ML
SL CV PED ECHO LEFT VENTRICLE SYSTOLIC VOLUME (MOD BIPLANE) 2D: 26 ML
TR MAX PG: 26 MMHG
TR PEAK VELOCITY: 2.6 M/S
TRICUSPID ANNULAR PLANE SYSTOLIC EXCURSION: 2.3 CM
TRICUSPID VALVE PEAK REGURGITATION VELOCITY: 2.57 M/S

## 2024-07-16 PROCEDURE — 93306 TTE W/DOPPLER COMPLETE: CPT | Performed by: INTERNAL MEDICINE

## 2024-07-16 PROCEDURE — 93306 TTE W/DOPPLER COMPLETE: CPT

## 2024-07-16 NOTE — PROGRESS NOTES
"Daily Note     Today's date: 2024  Patient name: Cindy Cruz  : 1945  MRN: [id number]  Referring provider: Lina Londono MD  Dx:   No diagnosis found.                 Subjective:       Objective: See treatment diary below      Assessment:        Plan: Continue per plan of care.      Precautions: Falls, CKD3, Osteoporosis, HTN  Re-eval Date:  8/3/2024    Date 7/3 7/12      Visit Count 1       FOTO See IE       Pain In See IE       Pain Out                Manuals        TFM to R gluteal mms  10 min      MFR R gluteal mms  10 min       Mini assesment  5 min                       Neuro Re-Ed        Core stability in supine                                                        Ther Ex        Bridge  2x10      Pball rollout        Step ups  NV      SLRx4  Unable      Mini Squat        Clamshells  30x2      Hip ABD  10x      Seated piriformsi  30\"x2      Ther Activity        ADL                Gait Training        Divided Attention        Head turns        Modalities         prn                        "

## 2024-07-18 ENCOUNTER — OFFICE VISIT (OUTPATIENT)
Facility: CLINIC | Age: 79
End: 2024-07-18
Payer: COMMERCIAL

## 2024-07-18 DIAGNOSIS — R26.2 AMBULATORY DYSFUNCTION: Primary | ICD-10-CM

## 2024-07-18 PROCEDURE — 97140 MANUAL THERAPY 1/> REGIONS: CPT

## 2024-07-18 PROCEDURE — 97112 NEUROMUSCULAR REEDUCATION: CPT

## 2024-07-18 NOTE — PROGRESS NOTES
"Daily Note     Today's date: 2024  Patient name: Cindy Cruz  : 1945  MRN: [id number]  Referring provider: Lina Londono MD  Dx:   Encounter Diagnosis     ICD-10-CM    1. Ambulatory dysfunction  R26.2             Start Time: 1015  Stop Time: 1055  Total time in clinic (min): 40 minutes    Subjective: Cindy reports her back pain has continued to limit her. It is primarily located in her R glute and toward her back.      Objective: See treatment diary below      Assessment:  Responds fairly to STM to R glute, continues to be sensitive without reduction in sensitivity. Seems to repsond well to piriformis stretching with slight overpressure. Able to complete most exercises with minimal pain, more so tension and fatigue. Clamshells produce most pain/sensitivity, discontinued for HEP. Added bridges and mini squats. Reports feeling looser after session.       Plan: Continue per plan of care.      Precautions: Falls, CKD3, Osteoporosis, HTN  Re-eval Date:  8/3/2024    Date 7/3 7/12 7/18     Visit Count 1  3     FOTO See IE       Pain In See IE       Pain Out                Manuals        TFM to R gluteal mms  10 min 8min     MFR R gluteal mms  10 min       Mini assesment  5 min                       Neuro Re-Ed        Core stability in supine                                                        Ther Ex        Bridge  2x10 2x10     Pball rollout        Step ups  NV X20 4'      Supine SLRx4  Unable Flex 2x10     Mini Squat   x12     Clamshells  30x2 x20     Hip ABD  10x 2x10     Seated piriformsi  30\"x2 30\" x3 supine     Ther Activity        ADL                Gait Training        Divided Attention        Head turns        Modalities         prn                        "

## 2024-07-19 ENCOUNTER — OFFICE VISIT (OUTPATIENT)
Dept: CARDIOLOGY CLINIC | Facility: CLINIC | Age: 79
End: 2024-07-19
Payer: COMMERCIAL

## 2024-07-19 VITALS
HEIGHT: 60 IN | SYSTOLIC BLOOD PRESSURE: 130 MMHG | BODY MASS INDEX: 30.04 KG/M2 | HEART RATE: 70 BPM | DIASTOLIC BLOOD PRESSURE: 54 MMHG | WEIGHT: 153 LBS

## 2024-07-19 DIAGNOSIS — I34.0 SEVERE MITRAL VALVE REGURGITATION: ICD-10-CM

## 2024-07-19 DIAGNOSIS — I35.1 NONRHEUMATIC AORTIC VALVE INSUFFICIENCY: Primary | ICD-10-CM

## 2024-07-19 PROCEDURE — 99214 OFFICE O/P EST MOD 30 MIN: CPT | Performed by: PHYSICIAN ASSISTANT

## 2024-07-19 RX ORDER — FUROSEMIDE 40 MG/1
40 TABLET ORAL DAILY
Qty: 30 TABLET | Refills: 0 | Status: SHIPPED | OUTPATIENT
Start: 2024-07-19

## 2024-07-19 RX ORDER — POTASSIUM CHLORIDE 20 MEQ/1
20 TABLET, EXTENDED RELEASE ORAL DAILY
Qty: 30 TABLET | Refills: 0 | Status: SHIPPED | OUTPATIENT
Start: 2024-07-19

## 2024-07-19 NOTE — PROGRESS NOTES
"Cardiology Office Follow Up  Cindy Cruz  1945  [id number]      ASSESSMENT:  Acute HFpEF   Valvular heart disease (Moderate AI, severe MR)  Hypertension   Hyperlipidemia   RBBB    PLAN:  Cindy appears hypervolemic on exam with JVD and mild bibasilar crackles  Start PO lasix 40mg QD + Kdur 20meq QD  Obtain BMP in 1 week to ensure stable lytes/ renal function   Restrict Na to <2GM/daily and <2L total fluids daily  RTO in 2 weeks or sooner if needed  Consider further evaluation with [facility] or CTA referral when closer to euvolemic   Remainder of Rx as ordered    Interval History/ HPI:   Cindy Cruz is an 78 yo F with pmhx as noted above presents for follow up visit. Known to Dr Blue with our office.     Underwent echo 7/16/2024: EF 65%, severe LA dilation, moderate AR, mild AS, severe MR, mild TR.     Valvular findings appear to be new compared to prior studies.   C/O exertional shortness of breath with exertion (I.e walking up stairs, pushing a shopping cart etc). Symptoms have been ongoing for a while ~2 years. Somewhat improved with her inhalers.   Developed difficulty breathing with laying flat over the past few months. Uses 2 pillows at baseline. Has been sitting completely upright the last few weeks due to \"choking\" sensation with feeling like she is unable to swallow.   Has not been following her wts or salt intake. Took a trip to [place] recently and noticed sharp increase in her symptoms after returning home. She denies abdominal distention or LE edema.     Recent BMP WNL 7/12/2024 reviewed: Na 148, K 4.8, Cr 0.98, Mg 1.9  Pharm MPI 2/2022: Negative findings for ischemia. EF 82%.    Prior FLP WNL 6/2023: , TG 79, HDL 51, LDL 57  Prior TSH WNL 7/2023: 1.8    Vitals:  130/54  70  153lbs    Past Medical History:   Diagnosis Date    Chronic pain disorder     Back    Coronary artery disease     Dyslipidemia     Esophageal reflux     Frequent headaches     stress related    Headache(784.0)     Heart " murmur     History of stomach ulcers     Hypercalcemia     Hypertension     Osteopenia     Tremor      Social History     Socioeconomic History    Marital status:      Spouse name: Not on file    Number of children: Not on file    Years of education: Not on file    Highest education level: Not on file   Occupational History    Occupation: unemployed   Tobacco Use    Smoking status: Former     Current packs/day: 0.00     Types: Cigarettes     Start date: 1988     Quit date: 1991     Years since quittin.2    Smokeless tobacco: Never   Vaping Use    Vaping status: Never Used   Substance and Sexual Activity    Alcohol use: Not Currently    Drug use: Never    Sexual activity: Not Currently     Partners: Male     Birth control/protection: Abstinence   Other Topics Concern    Not on file   Social History Narrative    Not on file     Social Determinants of Health     Financial Resource Strain: Low Risk  (2023)    Overall Financial Resource Strain (CARDIA)     Difficulty of Paying Living Expenses: Not hard at all   Food Insecurity: No Food Insecurity (6/10/2024)    Hunger Vital Sign     Worried About Running Out of Food in the Last Year: Never true     Ran Out of Food in the Last Year: Never true   Transportation Needs: No Transportation Needs (6/10/2024)    PRAPARE - Transportation     Lack of Transportation (Medical): No     Lack of Transportation (Non-Medical): No   Physical Activity: Not on file   Stress: Not on file   Social Connections: Not on file   Intimate Partner Violence: Not on file   Housing Stability: Low Risk  (6/10/2024)    Housing Stability Vital Sign     Unable to Pay for Housing in the Last Year: No     Number of Times Moved in the Last Year: 1     Homeless in the Last Year: No      Family History   Problem Relation Age of Onset    Thyroid disease unspecified Mother         post thyroidectomy    Glaucoma Father     No Known Problems Sister     No Known Problems Sister     No  Known Problems Sister     Depression Sister     No Known Problems Sister     No Known Problems Maternal Grandmother     No Known Problems Maternal Grandfather     No Known Problems Paternal Grandmother     No Known Problems Paternal Grandfather     Hypertension Family     Stroke Family     Heart disease Family     Thyroid disease Family     Thyroid disease unspecified Daughter         post thyroidectomy    No Known Problems Daughter     No Known Problems Daughter     Colon cancer Neg Hx     Colon polyps Neg Hx      Past Surgical History:   Procedure Laterality Date    BACK SURGERY      lower back    BREAST BIOPSY Right     long ago, benign     SECTION      x 3    CHOLECYSTECTOMY      COLONOSCOPY      HYSTERECTOMY      age 36 per [facility]    JOINT REPLACEMENT Left     knee    OOPHORECTOMY Bilateral     age 36 per [facility]    DE WEBB IMPLTJ NSTIM ELTRDS PLATE/PADDLE EDRL Left 2019    Procedure: LAMINECTOMY THORACIC FOR INSERTION DORSAL COLUMN SPINAL CORD STIMULATOR (DCS) W IMPLANTABLE PULSE GENERATOR, LEFT GLUTE;  Surgeon: Lg Mccallum MD;  Location: [facility];  Service: Neurosurgery    DE REVJ/RMVL IMPL SPI NPG/RCVR DTCH CONNJ ELTRD RA Left 2022    Procedure: Reopening of thoracic and left buttock incisions for removal of spinal cord stimulator system;  Surgeon: Siva Goss MD;  Location: [facility];  Service: Neurosurgery    ROTATOR CUFF REPAIR Right     SPINAL STIMULATOR PLACEMENT N/A 2019    Procedure: REVISION SPINAL CORD STIMULATOR PADDLE ELECTRODE, REPLACEMENT WITH PERCUTANEOUS ELECTRODES OR SMALLER PADDLE;  Surgeon: Lg Mccallum MD;  Location: [facility];  Service: Neurosurgery       Current Outpatient Medications:     albuterol (PROVENTIL HFA,VENTOLIN HFA) 90 mcg/act inhaler, INHALE 2 PUFFS BY MOUTH EVERY 6 HOURS AS NEEDED FOR WHEEZING, Disp: 18 g, Rfl: 5    alendronate (FOSAMAX) 70 mg tablet, Take 1 tablet (70 mg total) by mouth once a week, Disp: 12 tablet, Rfl: 3    atorvastatin (LIPITOR)  20 mg tablet, Take 1 tablet by mouth once daily, Disp: 90 tablet, Rfl: 1    benzonatate (TESSALON PERLES) 100 mg capsule, Take 1 capsule (100 mg total) by mouth 3 (three) times a day as needed for cough, Disp: 60 capsule, Rfl: 1    bimatoprost (Lumigan) 0.01 % ophthalmic drops, Administer 1 drop to both eyes daily, Disp: 5 mL, Rfl: 0    buPROPion (WELLBUTRIN XL) 150 mg 24 hr tablet, Take 1 tablet (150 mg total) by mouth every morning With 300 mg to equal 450 mg daily, Disp: 90 tablet, Rfl: 1    buPROPion (WELLBUTRIN XL) 300 mg 24 hr tablet, Take 1 tablet by mouth once daily, Disp: 90 tablet, Rfl: 1    Cholecalciferol (VITAMIN D-3 PO), Take by mouth 1000 Iu daily, Disp: , Rfl:     cyclobenzaprine (FLEXERIL) 5 mg tablet, Take 1 tablet (5 mg total) by mouth 3 (three) times a day as needed for muscle spasms, Disp: 15 tablet, Rfl: 0    fluticasone-vilanterol (Breo Ellipta) 200-25 mcg/actuation inhaler, Inhale 1 puff daily Rinse mouth after use., Disp: 60 blister, Rfl: 2    ipratropium (ATROVENT) 0.02 % nebulizer solution, Take 2.5 mL (0.5 mg total) by nebulization every 6 (six) hours as needed for wheezing or shortness of breath, Disp: 240 mL, Rfl: 1    levalbuterol (XOPENEX) 1.25 mg/3 mL nebulizer solution, Take 3 mL (1.25 mg total) by nebulization every 6 (six) hours as needed for wheezing or shortness of breath, Disp: 240 mL, Rfl: 1    lidocaine (LIDODERM) 5 %, Apply 1 patch topically over 12 hours daily Remove & Discard patch within 12 hours or as directed by MD, Disp: 14 patch, Rfl: 0    losartan (COZAAR) 100 MG tablet, Take 1 tablet by mouth once daily, Disp: 90 tablet, Rfl: 2    montelukast (SINGULAIR) 10 mg tablet, Take 1 tablet (10 mg total) by mouth daily at bedtime, Disp: 90 tablet, Rfl: 1    omeprazole (PriLOSEC) 40 MG capsule, Take 1 capsule (40 mg total) by mouth 2 (two) times a day, Disp: 180 capsule, Rfl: 1    Synthroid 88 MCG tablet, Take 1 tablet (88 mcg total) by mouth daily, Disp: 90 tablet, Rfl: 1     Timolol Maleate, Once-Daily, 0.5 % SOLN, Apply 1 drop to eye every 12 (twelve) hours (Patient taking differently: Apply 1 drop to eye every 12 (twelve) hours Recently changed to another formula with timolol by eye doctor), Disp: 5 mL, Rfl: 0    traZODone (DESYREL) 50 mg tablet, Take 1 tablet (50 mg total) by mouth daily at bedtime, Disp: 90 tablet, Rfl: 1    promethazine-dextromethorphan (PHENERGAN-DM) 6.25-15 mg/5 mL oral syrup, Take 5 mL by mouth 4 (four) times a day as needed for cough (Patient not taking: Reported on 6/21/2024), Disp: 250 mL, Rfl: 3      Review of Systems:  Review of Systems   Constitutional:  Positive for fatigue. Negative for appetite change, chills, diaphoresis and fever.   Respiratory:  Positive for chest tightness and shortness of breath. Negative for cough.    Cardiovascular:  Negative for chest pain, palpitations and leg swelling.   Gastrointestinal:  Negative for diarrhea, nausea and vomiting.   Endocrine: Negative for cold intolerance and heat intolerance.   Genitourinary:  Negative for difficulty urinating, dysuria and enuresis.   Musculoskeletal:  Negative for arthralgias, back pain and gait problem.   Allergic/Immunologic: Negative for environmental allergies and food allergies.   Neurological:  Negative for dizziness, facial asymmetry and headaches.   Hematological:  Negative for adenopathy. Does not bruise/bleed easily.   Psychiatric/Behavioral:  Negative for agitation, behavioral problems and confusion.          Physical Exam:  Physical Exam  Constitutional:       Appearance: She is well-developed.   HENT:      Right Ear: External ear normal.      Left Ear: External ear normal.   Eyes:      Extraocular Movements: EOM normal.      Pupils: Pupils are equal, round, and reactive to light.   Cardiovascular:      Rate and Rhythm: Normal rate and regular rhythm.      Heart sounds: Murmur heard.      No friction rub. No gallop.   Pulmonary:      Effort: Pulmonary effort is normal.       Breath sounds: Examination of the right-lower field reveals rales. Examination of the left-lower field reveals rales. Rales present.   Abdominal:      Palpations: Abdomen is soft.   Musculoskeletal:         General: Normal range of motion.      Cervical back: Normal range of motion.      Right lower leg: Edema present.      Left lower leg: Edema present.   Skin:     General: Skin is warm and dry.   Neurological:      Mental Status: She is alert and oriented to person, place, and time.      Deep Tendon Reflexes: Reflexes are normal and symmetric.   Psychiatric:         Mood and Affect: Mood and affect normal.         Behavior: Behavior normal.         Thought Content: Thought content normal.         Judgment: Judgment normal.         This note was completed in part utilizing M-Modal Fluency Direct Software.  Grammatical errors, random word insertions, spelling mistakes, and incomplete sentences can be an occasional consequence of this system secondary to software limitations, ambient noise, and hardware issues.  If you have any questions or concerns about the content, text, or information contained within the body of this dictation, please contact the provider for clarification.

## 2024-07-23 ENCOUNTER — APPOINTMENT (OUTPATIENT)
Dept: PHYSICAL THERAPY | Facility: CLINIC | Age: 79
End: 2024-07-23
Payer: COMMERCIAL

## 2024-07-23 DIAGNOSIS — E83.52 HYPERCALCEMIA: Primary | ICD-10-CM

## 2024-07-23 DIAGNOSIS — R79.89 HIGH SERUM PARATHYROID HORMONE (PTH): ICD-10-CM

## 2024-07-23 DIAGNOSIS — R94.6 ABNORMAL RADIONUCLIDE SCAN OF PARATHYROID GLAND: ICD-10-CM

## 2024-07-25 ENCOUNTER — OFFICE VISIT (OUTPATIENT)
Dept: PHYSICAL THERAPY | Facility: CLINIC | Age: 79
End: 2024-07-25
Payer: COMMERCIAL

## 2024-07-25 DIAGNOSIS — R26.2 AMBULATORY DYSFUNCTION: Primary | ICD-10-CM

## 2024-07-25 PROCEDURE — 97140 MANUAL THERAPY 1/> REGIONS: CPT

## 2024-07-25 PROCEDURE — 97112 NEUROMUSCULAR REEDUCATION: CPT

## 2024-07-25 NOTE — PROGRESS NOTES
"Daily Note     Today's date: 2024  Patient name: Cindy Cruz  : 1945  MRN: [id number]  Referring provider: Lina Londono MD  Dx:   Encounter Diagnosis     ICD-10-CM    1. Ambulatory dysfunction  R26.2                      Subjective: Cindy reports increased pain in the last few days- pt denies injury or new activities that would cause the flare up. Pain meds and hot showers have been helping a little.     She is mostly inactive, besides cooking at home.       Objective: See treatment diary below      Assessment: Tolerated treatment fair, low tolerance to TE d/t the increase in her pain. Patient reports mild relief with MHP and lumbar mobility tasks in supine. Moderate relief with seated lumbar flexion noted, better standing upright posture vs ant trunk lean. EDU on seated trunk flex to manage sx for home. Continued PT would be beneficial to improve function.          Plan: Continue per plan of care.       Precautions: Falls, CKD3, Osteoporosis, HTN  Re-eval Date:  8/3/2024    Date 7/3 7/12 7/18 7/25    Visit Count 1  3 4    FOTO See IE       Pain In See IE       Pain Out                Manuals      TFM to R gluteal mms  10 min 8min 10' gentle    MFR R gluteal mms  10 min   5' gentle    Mini assesment  5 min                       Neuro Re-Ed        Core stability in supine                                                        Ther Ex        Bridge  2x10 2x10     Pball rollout    5x5\" 2x fwd    Seated fwd L/s flex    2x5    DKTC    10x Dominique    Step ups  NV X20 4'      Supine SLRx4  Unable Flex 2x10     Mini Squat   x12     Clamshells  30x2 x20     Hip ABD  10x 2x10     Seated piriformsi  30\"x2 30\" x3 supine 3x20 sec    Ther Activity        ADL                Gait Training        Divided Attention        Head turns        Modalities         prn   MHP in prone, pre 8'                       "

## 2024-07-26 ENCOUNTER — APPOINTMENT (OUTPATIENT)
Dept: LAB | Facility: HOSPITAL | Age: 79
End: 2024-07-26
Payer: COMMERCIAL

## 2024-07-26 DIAGNOSIS — I35.1 NONRHEUMATIC AORTIC VALVE INSUFFICIENCY: ICD-10-CM

## 2024-07-26 LAB
ANION GAP SERPL CALCULATED.3IONS-SCNC: 9 MMOL/L (ref 4–13)
BNP SERPL-MCNC: 67 PG/ML (ref 0–100)
BUN SERPL-MCNC: 24 MG/DL (ref 5–25)
CALCIUM SERPL-MCNC: 9.6 MG/DL (ref 8.4–10.2)
CHLORIDE SERPL-SCNC: 104 MMOL/L (ref 96–108)
CO2 SERPL-SCNC: 24 MMOL/L (ref 21–32)
CREAT SERPL-MCNC: 1.42 MG/DL (ref 0.6–1.3)
GFR SERPL CREATININE-BSD FRML MDRD: 34 ML/MIN/1.73SQ M
GLUCOSE P FAST SERPL-MCNC: 84 MG/DL (ref 65–99)
POTASSIUM SERPL-SCNC: 4.8 MMOL/L (ref 3.5–5.3)
SODIUM SERPL-SCNC: 137 MMOL/L (ref 135–147)

## 2024-07-26 PROCEDURE — 83880 ASSAY OF NATRIURETIC PEPTIDE: CPT

## 2024-07-26 PROCEDURE — 36415 COLL VENOUS BLD VENIPUNCTURE: CPT

## 2024-07-26 PROCEDURE — 80048 BASIC METABOLIC PNL TOTAL CA: CPT

## 2024-07-29 ENCOUNTER — OFFICE VISIT (OUTPATIENT)
Dept: PHYSICAL THERAPY | Facility: CLINIC | Age: 79
End: 2024-07-29
Payer: COMMERCIAL

## 2024-07-29 DIAGNOSIS — R26.2 AMBULATORY DYSFUNCTION: Primary | ICD-10-CM

## 2024-07-29 PROCEDURE — 97140 MANUAL THERAPY 1/> REGIONS: CPT

## 2024-07-29 PROCEDURE — 97112 NEUROMUSCULAR REEDUCATION: CPT

## 2024-07-29 NOTE — PROGRESS NOTES
"Daily Note     Today's date: 2024  Patient name: Cindy Cruz  : 1945  MRN: [id number]  Referring provider: Lina Londono MD  Dx:   Encounter Diagnosis     ICD-10-CM    1. Ambulatory dysfunction  R26.2             Start Time: 1133  Stop Time: 1215  Total time in clinic (min): 42 minutes    Subjective: Cindy states since LV her pain in L-S has improved. She states that she is trying to do her exercise when her lower back is calmer. During session patient expresses she has R glute pain.          Objective: See treatment diary below      Assessment: Improved tolerance to activity this session. Continued with program this session to tolerance.focused on core stability with activities and LE strengthening. Patient fatigues quickly with LE strengthening, but tolerates with rest. Mod TTP in piriformis this session, with mild reduction post STM. Session end with MHP to L-S in seated. Patient encouraged to perform piriformis stretch more frequently at home.    Plan: Continue per plan of care. Progress if able. Monitor sxs.        Precautions: Falls, CKD3, Osteoporosis, HTN  Re-eval Date:  8/3/2024    Date 7/3 7/12 7/18 7/25 7/29   Visit Count 1  3 4 5   FOTO See IE       Pain In See IE       Pain Out                Manuals     TFM to R gluteal mms  10 min 8min 10' gentle 8 min + L-S   MFR R gluteal mms  10 min   5' gentle 2 min gentle   Mini assesment  5 min                       Neuro Re-Ed        Core stability in supine                                                        Ther Ex        Bridge  2x10 2x10     Pball rollout    5x5\" 2x fwd 5x5\" 2x fwd   Seated fwd L/s flex    2x5 10x10\"   DKTC    10x Dominique With towel 10\"x10   Step ups  NV X20 4'      Supine SLRx4  Unable Flex 2x10     Mini Squat   x12     Clamshells  30x2 x20  X10, 10\"  X10, 5\"   Hip ABD  10x 2x10     Seated piriformsi  30\"x2 30\" x3 supine 3x20 sec [id number] ea   Ther Activity        ADL                Gait Training        Divided " Attention        Head turns        Modalities         prn   MHP in prone, pre 8' MHP post 8'

## 2024-07-30 ENCOUNTER — OFFICE VISIT (OUTPATIENT)
Dept: GASTROENTEROLOGY | Facility: CLINIC | Age: 79
End: 2024-07-30
Payer: COMMERCIAL

## 2024-07-30 VITALS
HEIGHT: 60 IN | DIASTOLIC BLOOD PRESSURE: 70 MMHG | BODY MASS INDEX: 29.25 KG/M2 | WEIGHT: 149 LBS | SYSTOLIC BLOOD PRESSURE: 128 MMHG

## 2024-07-30 DIAGNOSIS — Z86.010 HISTORY OF COLON POLYPS: ICD-10-CM

## 2024-07-30 DIAGNOSIS — K21.9 GASTROESOPHAGEAL REFLUX DISEASE, UNSPECIFIED WHETHER ESOPHAGITIS PRESENT: Primary | ICD-10-CM

## 2024-07-30 DIAGNOSIS — R13.10 DYSPHAGIA, UNSPECIFIED TYPE: ICD-10-CM

## 2024-07-30 DIAGNOSIS — K59.04 CHRONIC IDIOPATHIC CONSTIPATION: ICD-10-CM

## 2024-07-30 PROCEDURE — 99213 OFFICE O/P EST LOW 20 MIN: CPT | Performed by: NURSE PRACTITIONER

## 2024-07-30 PROCEDURE — G2211 COMPLEX E/M VISIT ADD ON: HCPCS | Performed by: NURSE PRACTITIONER

## 2024-07-30 NOTE — PROGRESS NOTES
Formerly [facility] - Outpatient Follow-up Note  Cindy Cruz 79 y.o. female MRN: [id number]  Encounter: [id number]    ASSESSMENT AND PLAN:      1. Gastroesophageal reflux disease, unspecified whether esophagitis present  2. Dysphagia, unspecified type  Patient states no difficulty swallowing or acid reflux symptoms.  Discussed with patient and her daughter decreasing omeprazole to 40 mg daily.    Patient's daughter will send message via [email or web address] if decreasing to once a day is successful and changes to her prescription will be made especially since patient does live in [place] for the winter months.    -With refill of omeprazole increase to 90-day supply with patient living in [place] for winter months.  -Continue dietary discretion and lifestyle changes .  -Continue current bowel regimen    3. Chronic idiopathic constipation  Discussed with patient adequate fiber and adequate fluids at last office appointment.  Patient has had change of diet and states she is using MiraLAX as needed and states she is having daily normal bowel movements.    4. History of colon polyps  Colonoscopy 7/2024; no recall with age      Followup Appointment: 1 year  ______________________________________________________________________      HPI:    79-year-old Algerian-speaking female accompanied by her daughter to assist with interpretation presents with past medical history of GERD, hypothyroidism, SAGE, asthma, hypertension, CKD 3, depression, obesity, constipation, dysphagia and and acid reflux symptoms presents for follow-up colonoscopy and medication review.  Colonoscopy 7/8/2024 noted diverticula, hemorrhoid, polyp, no recall with age.  Patient states that since last office visit and taking omeprazole 40 mg twice daily her symptoms are relatively resolved.  She is moving her bowels daily, denies hematochezia or melena.  She denies nausea, vomiting or abdominal pain.  Acid reflux symptoms  well-controlled.  Non-smoker, denies EtOH use.  States her weight is stable.  Last EGD 2023 noted hiatal hernia, moderate erythematous mucosa.  Recommended to consider esophageal manometry if dysphagia symptoms continued however patient denies any difficulty swallowing.    Historical Information   Past Medical History:   Diagnosis Date    Chronic pain disorder     Back    Coronary artery disease     Dyslipidemia     Esophageal reflux     Frequent headaches     stress related    Headache(784.0)     Heart murmur     History of stomach ulcers     Hypercalcemia     Hypertension     Osteopenia     Tremor      Past Surgical History:   Procedure Laterality Date    BACK SURGERY      lower back    BREAST BIOPSY Right     long ago, benign     SECTION      x 3    CHOLECYSTECTOMY      COLONOSCOPY      HYSTERECTOMY      age 36 per MRS    JOINT REPLACEMENT Left     knee    OOPHORECTOMY Bilateral     age 36 per MRS    MI WEBB IMPLTJ NSTIM ELTRDS PLATE/PADDLE EDRL Left 2019    Procedure: LAMINECTOMY THORACIC FOR INSERTION DORSAL COLUMN SPINAL CORD STIMULATOR (DCS) W IMPLANTABLE PULSE GENERATOR, LEFT GLUTE;  Surgeon: Lg Mccallum MD;  Location: [facility];  Service: Neurosurgery    MI REVJ/RMVL IMPL SPI NPG/RCVR DTCH CONNJ ELTRD RA Left 2022    Procedure: Reopening of thoracic and left buttock incisions for removal of spinal cord stimulator system;  Surgeon: Siva Goss MD;  Location: [facility];  Service: Neurosurgery    ROTATOR CUFF REPAIR Right     SPINAL STIMULATOR PLACEMENT N/A 2019    Procedure: REVISION SPINAL CORD STIMULATOR PADDLE ELECTRODE, REPLACEMENT WITH PERCUTANEOUS ELECTRODES OR SMALLER PADDLE;  Surgeon: Lg Mccallum MD;  Location: [facility];  Service: Neurosurgery     Social History     Substance and Sexual Activity   Alcohol Use Not Currently     Social History     Substance and Sexual Activity   Drug Use Never     Social History     Tobacco Use   Smoking Status Former    Current  packs/day: 0.00    Types: Cigarettes    Start date: 1988    Quit date: 1991    Years since quittin.2   Smokeless Tobacco Never     Family History   Problem Relation Age of Onset    Thyroid disease unspecified Mother         post thyroidectomy    Glaucoma Father     No Known Problems Sister     No Known Problems Sister     No Known Problems Sister     Depression Sister     No Known Problems Sister     No Known Problems Maternal Grandmother     No Known Problems Maternal Grandfather     No Known Problems Paternal Grandmother     No Known Problems Paternal Grandfather     Hypertension Family     Stroke Family     Heart disease Family     Thyroid disease Family     Thyroid disease unspecified Daughter         post thyroidectomy    No Known Problems Daughter     No Known Problems Daughter     Colon cancer Neg Hx     Colon polyps Neg Hx          Current Outpatient Medications:     albuterol (PROVENTIL HFA,VENTOLIN HFA) 90 mcg/act inhaler    alendronate (FOSAMAX) 70 mg tablet    atorvastatin (LIPITOR) 20 mg tablet    benzonatate (TESSALON PERLES) 100 mg capsule    bimatoprost (Lumigan) 0.01 % ophthalmic drops    buPROPion (WELLBUTRIN XL) 150 mg 24 hr tablet    buPROPion (WELLBUTRIN XL) 300 mg 24 hr tablet    Cholecalciferol (VITAMIN D-3 PO)    cyclobenzaprine (FLEXERIL) 5 mg tablet    fluticasone-vilanterol (Breo Ellipta) 200-25 mcg/actuation inhaler    furosemide (LASIX) 40 mg tablet    ipratropium (ATROVENT) 0.02 % nebulizer solution    levalbuterol (XOPENEX) 1.25 mg/3 mL nebulizer solution    lidocaine (LIDODERM) 5 %    losartan (COZAAR) 100 MG tablet    montelukast (SINGULAIR) 10 mg tablet    omeprazole (PriLOSEC) 40 MG capsule    potassium chloride (Klor-Con M20) 20 mEq tablet    Synthroid 88 MCG tablet    Timolol Maleate, Once-Daily, 0.5 % SOLN    traZODone (DESYREL) 50 mg tablet    promethazine-dextromethorphan (PHENERGAN-DM) 6.25-15 mg/5 mL oral syrup  Allergies   Allergen Reactions    Iodinated  Contrast Media Anaphylaxis     Contrast Dye     Reviewed medications and allergies and updated as indicated    PHYSICAL EXAM:    Blood pressure 128/70, height 5' (1.524 m), weight 67.6 kg (149 lb), not currently breastfeeding. Body mass index is 29.1 kg/m².    Normal exam    General Appearance: NAD, cooperative, alert  Eyes: Anicteric, PERRLA, EOMI  ENT:  Normocephalic, atraumatic, normal mucosa.    Neck:  Supple, symmetrical, trachea midline  Resp:  Clear to auscultation bilaterally; no rales, rhonchi or wheezing; respirations unlabored   CV:  S1 S2, Regular rate and rhythm; no murmur, rub, or gallop.  GI:  Soft, non-tender, non-distended; normal bowel sounds; no masses, no organomegaly   Rectal: Deferred  Musculoskeletal: No cyanosis, clubbing or edema. Normal ROM.  Skin:  No jaundice, rashes, or lesions   Heme/Lymph: No palpable cervical lymphadenopathy  Psych: Normal affect, good eye contact  Neuro: No gross deficits, AAOx3    Lab Results:   Lab Results   Component Value Date    WBC 5.26 06/18/2024    HGB 11.3 (L) 06/18/2024    HCT 35.2 06/18/2024    MCV 98 06/18/2024     06/18/2024     Lab Results   Component Value Date    K 4.8 07/26/2024     07/26/2024    CO2 24 07/26/2024    BUN 24 07/26/2024    CREATININE 1.42 (H) 07/26/2024    GLUCOSE 149 (H) 03/02/2024    GLUF 84 07/26/2024    CALCIUM 9.6 07/26/2024    AST 11 (L) 07/12/2024    ALT 12 07/12/2024    ALKPHOS 62 07/12/2024    EGFR 34 07/26/2024     Lab Results   Component Value Date    IRON 45 07/14/2023    FERRITIN 60 07/14/2023     Lab Results   Component Value Date    LIPASE 14 06/09/2024       Radiology Results:   Echo complete w/ contrast if indicated    Result Date: 7/16/2024  Narrative:   Left Ventricle: Left ventricular cavity size is normal. Wall thickness is mildly increased. The left ventricular ejection fraction is 65% by biplane measurement. Systolic function is normal. Wall motion is normal. Diastolic function is abnormal.   Right  Ventricle: Right ventricular cavity size is normal. Systolic function is normal.   Left Atrium: The atrium is severely dilated.   Aortic Valve: The aortic valve is trileaflet. The leaflets are mildly thickened. The leaflets are mildly calcified. There is mildly reduced mobility. There is moderate regurgitation. There is mild stenosis.   Mitral Valve: There is moderate subvalvular calcification. There is severe regurgitation. The EROA by PISA is 0.39 cm2. The RVol by PISA is 67 mL/beat. There is mild stenosis. The mitral valve mean gradient is 3mmHg. The MVA by continuity is 1.9 cm2.   Tricuspid Valve: There is mild regurgitation.   Prior TTE study available for comparison. Prior study date: 9/22/2022. Changes noted when compared to prior study. Changes include: Valvular disease is now worse; mitral regurgitation is now severe; there is mild mitral stenosis; aortic regurgitation is now moderate; aortic stenosis remains mild. .     Colonoscopy    Result Date: 7/8/2024  Narrative: Table formatting from the original result was not included. [facility] [address] [id number] [phone] DATE OF SERVICE: 7/08/24 PHYSICIAN(S): Attending: Franklin Martell DO Fellow: No Staff Documented INDICATION: Screening for colon cancer POST-OP DIAGNOSIS: See the impression below. HISTORY: Prior colonoscopy: More than 10 years ago. BOWEL PREPARATION: Miralax/Dulcolax PREPROCEDURE: Informed consent was obtained for the procedure, including sedation. Risks including but not limited to bleeding, infection, perforation, adverse drug reaction and aspiration were explained in detail. Also explained about less than 100% sensitivity with the exam and other alternatives. The patient was placed in the left lateral decubitus position. Procedure: Colonoscopy DETAILS OF PROCEDURE: Patient was taken to the procedure room where a time out was performed to confirm correct patient and correct  procedure. The patient underwent monitored anesthesia care, which was administered by an anesthesia professional. The patient's blood pressure, ECG, ETCO2, heart rate, level of consciousness, oxygen and respirations were monitored throughout the procedure. A digital rectal exam was performed. The scope was introduced through the anus and advanced to the cecum. Retroflexion was performed in the cecum and rectum. The quality of bowel preparation was evaluated using the [place] Bowel Preparation Scale with scores of: right colon = 2, transverse colon = 2, left colon = 3. The total BBPS score was 7. Bowel prep was adequate. The patient experienced no blood loss. The procedure was not difficult. The patient tolerated the procedure well. There were no apparent adverse events. ANESTHESIA INFORMATION: ASA: II Anesthesia Type: IV Sedation with Anesthesia MEDICATIONS: No administrations occurring from 0716 to 0754 on 07/08/24 FINDINGS: Few scattered diverticula of moderate severity in the sigmoid colon Internal small (grade 2) hemorrhoids; no bleeding was identified 6 mm sessile polyp in the mid ascending colon; performed cold snare with complete en bloc removal and retrieved specimen EVENTS: Procedure Events Event Event Time ENDO CECUM REACHED 7/8/2024  7:41 AM ENDO SCOPE OUT TIME 7/8/2024  7:52 AM SPECIMENS: ID Type Source Tests Collected by Time Destination 1 : mid ascending polyp Tissue Large Intestine, Right/Ascending Colon TISSUE EXAM Franklin Martell DO 7/8/2024  7:45 AM  EQUIPMENT: Colonoscope -PCF [id number]     Impression: Scattered diverticulosis of moderate severity in the sigmoid colon Small (grade 2) hemorrhoids Subcentimeter polyp in the mid ascending colon was removed with cold snare RECOMMENDATION: No further screening colonoscopies necessary Age greater than 65   Full polyp results and recommendations will be released to the [facility] patient portal in 1 to 2 weeks  Franklin Martell DO

## 2024-08-02 ENCOUNTER — OFFICE VISIT (OUTPATIENT)
Dept: CARDIOLOGY CLINIC | Facility: CLINIC | Age: 79
End: 2024-08-02
Payer: COMMERCIAL

## 2024-08-02 ENCOUNTER — OFFICE VISIT (OUTPATIENT)
Dept: PHYSICAL THERAPY | Facility: CLINIC | Age: 79
End: 2024-08-02
Payer: COMMERCIAL

## 2024-08-02 VITALS
HEART RATE: 70 BPM | HEIGHT: 60 IN | BODY MASS INDEX: 30.04 KG/M2 | SYSTOLIC BLOOD PRESSURE: 120 MMHG | WEIGHT: 153 LBS | DIASTOLIC BLOOD PRESSURE: 52 MMHG

## 2024-08-02 DIAGNOSIS — I35.1 NONRHEUMATIC AORTIC VALVE INSUFFICIENCY: ICD-10-CM

## 2024-08-02 DIAGNOSIS — R26.2 AMBULATORY DYSFUNCTION: Primary | ICD-10-CM

## 2024-08-02 PROCEDURE — 1160F RVW MEDS BY RX/DR IN RCRD: CPT | Performed by: PHYSICIAN ASSISTANT

## 2024-08-02 PROCEDURE — 97110 THERAPEUTIC EXERCISES: CPT | Performed by: PHYSICAL THERAPIST

## 2024-08-02 PROCEDURE — 1159F MED LIST DOCD IN RCRD: CPT | Performed by: PHYSICIAN ASSISTANT

## 2024-08-02 PROCEDURE — 97140 MANUAL THERAPY 1/> REGIONS: CPT | Performed by: PHYSICAL THERAPIST

## 2024-08-02 PROCEDURE — 99214 OFFICE O/P EST MOD 30 MIN: CPT | Performed by: PHYSICIAN ASSISTANT

## 2024-08-02 RX ORDER — DORZOLAMIDE HYDROCHLORIDE AND TIMOLOL MALEATE 20; 5 MG/ML; MG/ML
1 SOLUTION/ DROPS OPHTHALMIC 2 TIMES DAILY
COMMUNITY
Start: 2024-07-17

## 2024-08-02 RX ORDER — FUROSEMIDE 40 MG/1
40 TABLET ORAL AS NEEDED
Start: 2024-08-02

## 2024-08-02 RX ORDER — POTASSIUM CHLORIDE 20 MEQ/1
20 TABLET, EXTENDED RELEASE ORAL AS NEEDED
Start: 2024-08-02

## 2024-08-02 NOTE — PROGRESS NOTES
"Daily Note     Today's date: 2024  Patient name: Cindy Cruz  : 1945  MRN: [id number]  Referring provider: Lina Londono MD  Dx:   Encounter Diagnosis     ICD-10-CM    1. Ambulatory dysfunction  R26.2                      Subjective: Notes feeling the same, no different overall- feels better after PT but no improvement that lasts.  Has been using voltaren gel at home for self relief with massage daily. Wishes to return to dancing in [place].       Objective: See treatment diary below      Assessment: Fair tolerance to leg press addition and standing hip 3 way and side stepping without referral or NT sxs.  Noting continued TrP at R gluteal region around piriformis origin improved with deep pressure and release as well as clamshell performance.  Still limited ability to perform hip ABD due to continued weakness overall of glute medius. No difficulty with SL bridge.     Plan: Continue per plan of care.      Precautions: Falls, CKD3, Osteoporosis, HTN  Re-eval Date:  8/3/2024    Date 7/3 7/12 7/18 7/25 7/29 80            Visit Count 1  3 4 5 6   FOTO See IE        Pain In See IE        Pain Out                 Manuals   80   TFM to R gluteal mms  10 min 8min 10' gentle 8 min + L-S 8 min    MFR R gluteal mms  10 min   5' gentle 2 min gentle 2 min    Mini assesment  5 min                          Neuro Re-Ed         Core stability in supine                                                               Ther Ex         Bridge  2x10 2x10   SL 2x10 R    Pball rollout    5x5\" 2x fwd 5x5\" 2x fwd    Seated fwd L/s flex    2x5 10x10\"    DKTC    10x Dominique With towel 10\"x10    Step ups  NV X20 4'       STD hip 3 way      10x ea    Supine SLRx4  Unable Flex 2x10   2x10    Side stepping      3 laps 15 ft   Mini Squat   x12   2x10   Leg press      75# 3x10   Clamshells  30x2 x20  X10, 10\"  X10, 5\" 20x   Hip ABD  10x 2x10   2x10   Seated piriformsi  30\"x2 30\" x3 supine 3x20 sec [id number] ea    Ther Activity "         ADL                  Gait Training         Divided Attention         Head turns         Modalities          prn   MHP in prone, pre 8' MHP post 8'

## 2024-08-02 NOTE — PROGRESS NOTES
"Cardiology Office Follow Up  Cindy Cruz  1945  [id number]      ASSESSMENT:  Chronic HFpEF   Valvular heart disease (Moderate AI, severe MR)  Hypertension   Hyperlipidemia   RBBB    PLAN:  Stop Lasix and Kdur for now given LASHAWN; switch to PRN for now based on wt gain/symptoms. Plan to recheck BMP in 2 weeks to recheck renal function.   Risk vs benefits of pursuing additional w/u for underlying valvular abnormalities discussed. Wishes to pursue BRENDON at this time. Will forward to scheduling team to arrange as she is now euvolemic and able to lay flat. They are agreeable for CTS referral for TAVR or MVR if needed based on BRENDON results.   RTO in 3-4 months or sooner if needed.     Interval History/ HPI:   Cindy Cruz is an 80 yo Yi speaking F with pmhx as noted above presents for 2-week follow up visit for symptom review. She presents with her daughter who assists with translating. Known to Dr Blue.     Per OV with me 7/19/2024:   Underwent echo 7/16/2024: EF 65%, severe LA dilation, moderate AR, mild AS, severe MR, mild TR.      Valvular findings appear to be new compared to prior studies.   C/O exertional shortness of breath with exertion (I.e walking up stairs, pushing a shopping cart etc). Symptoms have been ongoing for a while ~2 years. Somewhat improved with her inhalers.   Developed difficulty breathing with laying flat over the past few months. Uses 2 pillows at baseline. Has been sitting completely upright the last few weeks due to \"choking\" sensation with feeling like she is unable to swallow.   Has not been following her wts or salt intake. Took a trip to [place] recently and noticed sharp increase in her symptoms after returning home. She denies abdominal distention or LE edema.      Recent BMP WNL 7/12/2024 reviewed: Na 148, K 4.8, Cr 0.98, Mg 1.9  Pharm MPI 2/2022: Negative findings for ischemia. EF 82%.    Prior FLP WNL 6/2023: , TG 79, HDL 51, LDL 57  Prior TSH WNL 7/2023: 1.8   "   Cindy appears hypervolemic on exam with JVD and mild bibasilar crackles  Start PO lasix 40mg QD + Kdur 20meq QD  Obtain BMP in 1 week to ensure stable lytes/ renal function   Restrict Na to <2GM/daily and <2L total fluids daily  RTO in 2 weeks or sooner if needed    Returns today for symptom review.   Feels better overall. Shortness of breath has resolved since being started on Lasix. Sleeps now with 1-pillow comfortably. No exertional dyspnea. Lab work since her last visit shows normal BNP and mild LASHAWN w/ Cr 1.4.     Vitals:  120/52  70  153lbs    Past Medical History:   Diagnosis Date    Chronic pain disorder     Back    Coronary artery disease     Dyslipidemia     Esophageal reflux     Frequent headaches     stress related    Headache(784.0)     Heart murmur     History of stomach ulcers     Hypercalcemia     Hypertension     Osteopenia     Tremor      Social History     Socioeconomic History    Marital status:      Spouse name: Not on file    Number of children: Not on file    Years of education: Not on file    Highest education level: Not on file   Occupational History    Occupation: unemployed   Tobacco Use    Smoking status: Former     Current packs/day: 0.00     Types: Cigarettes     Start date: 1988     Quit date: 1991     Years since quittin.3    Smokeless tobacco: Never   Vaping Use    Vaping status: Never Used   Substance and Sexual Activity    Alcohol use: Not Currently    Drug use: Never    Sexual activity: Not Currently     Partners: Male     Birth control/protection: Abstinence   Other Topics Concern    Not on file   Social History Narrative    Not on file     Social Determinants of Health     Financial Resource Strain: Low Risk  (2023)    Overall Financial Resource Strain (CARDIA)     Difficulty of Paying Living Expenses: Not hard at all   Food Insecurity: No Food Insecurity (6/10/2024)    Hunger Vital Sign     Worried About Running Out of Food in the Last Year: Never true      Ran Out of Food in the Last Year: Never true   Transportation Needs: No Transportation Needs (6/10/2024)    PRAPARE - Transportation     Lack of Transportation (Medical): No     Lack of Transportation (Non-Medical): No   Physical Activity: Not on file   Stress: Not on file   Social Connections: Not on file   Intimate Partner Violence: Not on file   Housing Stability: Low Risk  (6/10/2024)    Housing Stability Vital Sign     Unable to Pay for Housing in the Last Year: No     Number of Times Moved in the Last Year: 1     Homeless in the Last Year: No      Family History   Problem Relation Age of Onset    Thyroid disease unspecified Mother         post thyroidectomy    Glaucoma Father     No Known Problems Sister     No Known Problems Sister     No Known Problems Sister     Depression Sister     No Known Problems Sister     No Known Problems Maternal Grandmother     No Known Problems Maternal Grandfather     No Known Problems Paternal Grandmother     No Known Problems Paternal Grandfather     Hypertension Family     Stroke Family     Heart disease Family     Thyroid disease Family     Thyroid disease unspecified Daughter         post thyroidectomy    No Known Problems Daughter     No Known Problems Daughter     Colon cancer Neg Hx     Colon polyps Neg Hx      Past Surgical History:   Procedure Laterality Date    BACK SURGERY      lower back    BREAST BIOPSY Right     long ago, benign     SECTION      x 3    CHOLECYSTECTOMY      COLONOSCOPY      HYSTERECTOMY      age 36 per M[facility]    JOINT REPLACEMENT Left     knee    OOPHORECTOMY Bilateral     age 36 per MRS    [facility] IMPLTJ NSTIM ELTRDS PLATE/PADDLE EDRL Left 2019    Procedure: LAMINECTOMY THORACIC FOR INSERTION DORSAL COLUMN SPINAL CORD STIMULATOR (DCS) W IMPLANTABLE PULSE GENERATOR, LEFT GLUTE;  Surgeon: Lg Mccallum MD;  Location:  MAIN OR;  Service: Neurosurgery    [facility] REVJ/RMVL IMPL SPI NPG/RCVR DTCH CONNJ ELTRD RA Left 2022    Procedure:  Reopening of thoracic and left buttock incisions for removal of spinal cord stimulator system;  Surgeon: Siva Goss MD;  Location: [facility];  Service: Neurosurgery    ROTATOR CUFF REPAIR Right     SPINAL STIMULATOR PLACEMENT N/A 03/29/2019    Procedure: REVISION SPINAL CORD STIMULATOR PADDLE ELECTRODE, REPLACEMENT WITH PERCUTANEOUS ELECTRODES OR SMALLER PADDLE;  Surgeon: Lg Mccallum MD;  Location: [facility];  Service: Neurosurgery       Current Outpatient Medications:     dorzolamide-timolol (COSOPT) 2-0.5 % ophthalmic solution, Administer 1 drop to both eyes 2 (two) times a day, Disp: , Rfl:     albuterol (PROVENTIL HFA,VENTOLIN HFA) 90 mcg/act inhaler, INHALE 2 PUFFS BY MOUTH EVERY 6 HOURS AS NEEDED FOR WHEEZING, Disp: 18 g, Rfl: 5    alendronate (FOSAMAX) 70 mg tablet, Take 1 tablet (70 mg total) by mouth once a week, Disp: 12 tablet, Rfl: 3    atorvastatin (LIPITOR) 20 mg tablet, Take 1 tablet by mouth once daily, Disp: 90 tablet, Rfl: 1    benzonatate (TESSALON PERLES) 100 mg capsule, Take 1 capsule (100 mg total) by mouth 3 (three) times a day as needed for cough, Disp: 60 capsule, Rfl: 1    bimatoprost (Lumigan) 0.01 % ophthalmic drops, Administer 1 drop to both eyes daily, Disp: 5 mL, Rfl: 0    buPROPion (WELLBUTRIN XL) 150 mg 24 hr tablet, Take 1 tablet (150 mg total) by mouth every morning With 300 mg to equal 450 mg daily, Disp: 90 tablet, Rfl: 1    buPROPion (WELLBUTRIN XL) 300 mg 24 hr tablet, Take 1 tablet by mouth once daily, Disp: 90 tablet, Rfl: 1    Cholecalciferol (VITAMIN D-3 PO), Take by mouth 1000 Iu daily, Disp: , Rfl:     cyclobenzaprine (FLEXERIL) 5 mg tablet, Take 1 tablet (5 mg total) by mouth 3 (three) times a day as needed for muscle spasms, Disp: 15 tablet, Rfl: 0    fluticasone-vilanterol (Breo Ellipta) 200-25 mcg/actuation inhaler, Inhale 1 puff daily Rinse mouth after use., Disp: 60 blister, Rfl: 2    furosemide (LASIX) 40 mg tablet, Take 1 tablet (40 mg total) by mouth daily,  Disp: 30 tablet, Rfl: 0    ipratropium (ATROVENT) 0.02 % nebulizer solution, Take 2.5 mL (0.5 mg total) by nebulization every 6 (six) hours as needed for wheezing or shortness of breath, Disp: 240 mL, Rfl: 1    levalbuterol (XOPENEX) 1.25 mg/3 mL nebulizer solution, Take 3 mL (1.25 mg total) by nebulization every 6 (six) hours as needed for wheezing or shortness of breath, Disp: 240 mL, Rfl: 1    lidocaine (LIDODERM) 5 %, Apply 1 patch topically over 12 hours daily Remove & Discard patch within 12 hours or as directed by MD, Disp: 14 patch, Rfl: 0    losartan (COZAAR) 100 MG tablet, Take 1 tablet by mouth once daily, Disp: 90 tablet, Rfl: 2    montelukast (SINGULAIR) 10 mg tablet, Take 1 tablet (10 mg total) by mouth daily at bedtime, Disp: 90 tablet, Rfl: 1    omeprazole (PriLOSEC) 40 MG capsule, Take 1 capsule (40 mg total) by mouth 2 (two) times a day, Disp: 180 capsule, Rfl: 1    potassium chloride (Klor-Con M20) 20 mEq tablet, Take 1 tablet (20 mEq total) by mouth daily, Disp: 30 tablet, Rfl: 0    promethazine-dextromethorphan (PHENERGAN-DM) 6.25-15 mg/5 mL oral syrup, Take 5 mL by mouth 4 (four) times a day as needed for cough (Patient not taking: Reported on 6/21/2024), Disp: 250 mL, Rfl: 3    Synthroid 88 MCG tablet, Take 1 tablet (88 mcg total) by mouth daily, Disp: 90 tablet, Rfl: 1    traZODone (DESYREL) 50 mg tablet, Take 1 tablet (50 mg total) by mouth daily at bedtime, Disp: 90 tablet, Rfl: 1    Review of Systems:  Review of Systems   Constitutional:  Negative for appetite change, chills, diaphoresis, fatigue and fever.   Respiratory:  Negative for cough, chest tightness and shortness of breath.    Cardiovascular:  Negative for chest pain, palpitations and leg swelling.   Gastrointestinal:  Negative for diarrhea, nausea and vomiting.   Endocrine: Negative for cold intolerance and heat intolerance.   Genitourinary:  Negative for difficulty urinating, dysuria and enuresis.   Musculoskeletal:  Negative  for arthralgias, back pain and gait problem.   Allergic/Immunologic: Negative for environmental allergies and food allergies.   Neurological:  Negative for dizziness, facial asymmetry and headaches.   Hematological:  Negative for adenopathy. Does not bruise/bleed easily.   Psychiatric/Behavioral:  Negative for agitation, behavioral problems and confusion.      Physical Exam:  Physical Exam  Constitutional:       Appearance: She is well-developed.   HENT:      Right Ear: External ear normal.      Left Ear: External ear normal.   Eyes:      Extraocular Movements: EOM normal.      Pupils: Pupils are equal, round, and reactive to light.   Cardiovascular:      Rate and Rhythm: Normal rate and regular rhythm.      Heart sounds: Murmur heard.      No friction rub. No gallop.   Pulmonary:      Effort: Pulmonary effort is normal.      Breath sounds: Normal breath sounds.   Abdominal:      Palpations: Abdomen is soft.   Musculoskeletal:         General: Normal range of motion.      Cervical back: Normal range of motion.   Skin:     General: Skin is warm and dry.   Neurological:      Mental Status: She is alert and oriented to person, place, and time.      Deep Tendon Reflexes: Reflexes are normal and symmetric.   Psychiatric:         Mood and Affect: Mood and affect normal.         Behavior: Behavior normal.         Thought Content: Thought content normal.         Judgment: Judgment normal.         This note was completed in part utilizing [employer] Direct Software.  Grammatical errors, random word insertions, spelling mistakes, and incomplete sentences can be an occasional consequence of this system secondary to software limitations, ambient noise, and hardware issues.  If you have any questions or concerns about the content, text, or information contained within the body of this dictation, please contact the provider for clarification.

## 2024-08-06 ENCOUNTER — OFFICE VISIT (OUTPATIENT)
Facility: CLINIC | Age: 79
End: 2024-08-06
Payer: COMMERCIAL

## 2024-08-06 DIAGNOSIS — R26.2 AMBULATORY DYSFUNCTION: Primary | ICD-10-CM

## 2024-08-06 PROCEDURE — 97110 THERAPEUTIC EXERCISES: CPT

## 2024-08-06 PROCEDURE — 97140 MANUAL THERAPY 1/> REGIONS: CPT

## 2024-08-06 NOTE — PROGRESS NOTES
Assessment/Plan:    Anxiety and depression  Pt admits mood is an issue and is agreeable to medication, did d/w pt and dgtr that I am concerned with SI but pt agrees to call with worsening mood/SI or if she develops a plan, will avoid SSRI's and SNRI's d/t risk of Serotonin Syndrome with current TCA as she is having benefit with the TCA, will try trial of WBXL - rx sent to pharmacy, number for Psych given as she needs eval for SCS as well, watch closely and re-eval in 4 wks, again urged to call with worsening mood/SI; d/w pt that she had to take med daily and that it can take 4-6 wks to get max benefit of med, advised not to stop on her own and to call if SE occur, meditation/yoga/deep breathing and exercise were encouraged as stress/coping mechanism as well       Diagnoses and all orders for this visit:    Anxiety and depression  -     buPROPion (WELLBUTRIN XL) 150 mg 24 hr tablet; Take 1 tablet (150 mg total) by mouth daily for 30 days  -     Ambulatory referral to Psychiatry      I have spent 25 minutes with Donna Garcia and her daughter Resa Cheadle today in which greater than 50% of this time was spent in counseling/coordination of care regarding Risks and benefits of tx options, Intructions for management, Patient and family education, Importance of tx compliance, Risk factor reductions and Impressions  Subjective:      Patient ID: Nakul Robles is a 74 y o  female  HPI Pt here with her dgtr for concerns over her mood  Pt mostly Thai speaking and dgtr helped to severe as  when needed  Pt has noted worsening mood over the past few mos  Dgtr states she has always had issues with depression/anger as she has a lot of personalities at home  She states her father is verbally degrading to her mother and her sister is verbally abusive as well  Pt gets overwhelmed with the dogs in the house and eventually lashes out and yells  She is tearful and crying a lot    She has in the past had SI with a plan but that Detail Level: Detailed has not occurred since moving here from [place]  She is very close to [place] and talks to her frequently  She states she would never harm herself as she would never do that to her children  She notes feeling anxious and irritable as well as down and sad  She has never been on mood meds in past   She is sleeping well  She was recently started on Nortriptyline by Pain Mgt and has had benefit with the med being increased  Review of Systems   Constitutional: Negative for chills  Respiratory: Negative for cough and shortness of breath  Cardiovascular: Negative for chest pain and palpitations  Musculoskeletal: Positive for arthralgias and back pain  Psychiatric/Behavioral:        See HPI   All other systems reviewed and are negative  Objective:    /60   Pulse 76   Temp 97 9 °F (36 6 °C)   Ht 5' (1 524 m)   Wt 76 7 kg (169 lb)   BMI 33 01 kg/m²        Physical Exam   Constitutional: She appears well-developed and well-nourished  No distress  Musculoskeletal:   nml gait   Psychiatric:   Tearful and very quiet, very difficult to pull answers to questions out of patient   Nursing note and vitals reviewed  General Sunscreen Counseling: I recommended a broad spectrum sunscreen with a SPF of 30 or higher.  I explained that SPF 30 sunscreens block approximately 97 percent of the sun's harmful rays.  Sunscreens should be applied at least 15 minutes prior to expected sun exposure and then every 2 hours after that as long as sun exposure continues. If swimming or exercising sunscreen should be reapplied every 45 minutes to an hour after getting wet or sweating.  One ounce, or the equivalent of a shot glass full of sunscreen, is adequate to protect the skin not covered by a bathing suit. I also recommended a lip balm with a sunscreen as well. Sun protective clothing can be used in lieu of sunscreen but must be worn the entire time you are exposed to the sun's rays.

## 2024-08-06 NOTE — PROGRESS NOTES
PT Re-Evaluation /unplanned discharge    Today's date: 2024  Patient name: Cindy Cruz  : 1945  MRN: [id number]  Referring provider: Lina Londono MD  Dx:   Encounter Diagnosis     ICD-10-CM    1. Ambulatory dysfunction  R26.2             Start Time: 1015  Stop Time: 1100  Total time in clinic (min): 45 minutes    Assessment  Impairments: abnormal muscle tone, abnormal or restricted ROM, abnormal movement, activity intolerance, impaired balance, impaired physical strength, lacks appropriate home exercise program, pain with function, safety issue and weight-bearing intolerance    Assessment details: Patient is a 79 y.o. year old female presenting to OPPT s/p diagnosis of ambulatory dysfunction. This is due to acute on chronic low back pain. Cindy reports that her pain remains relatively unchanged since starting therapy. She gets short term mild relief with therapy and her HEP, however, it is not long lasting. She still requires medication to assist with pain control. At this time she demonstrates slightly more tolerable range of motion in her low back and RLE as well as RLE strength. She feels that she can discontinue therapy and continue with her HEP to assist with pain relief. Instructed patient and daughter to follow up as needed prior to patient returning to [place].    Barriers to therapy: Language barrier     Prognosis: good    Goals  In 4 weeks, patient will:  1.  Improve pain at worst by at least 3 points - not met  2.  Improve activity tolerance to at least 10 minutes before needing to sit - partially met  3.  Improve RLE strength by at least 0.5/5 - nearly met    In 4-10 weeks, patient will:  1.  Meet FOTO predicted score to demonstrate improvement in functional mobility. - not met  2.  Return to hobbies with minimal restrictions or rests needed. - not met  3.  Improve ABC to at least above 67% to show reduced risk of falling and improved balance confidence. - not met  4.  Improve pain at worst  by at least 5 points from IE - not met      Plan  Patient would benefit from: skilled physical therapy    Planned therapy interventions: neuromuscular re-education, manual therapy, patient education, home exercise program, therapeutic exercise, therapeutic activities and gait training    Frequency: 2x week  Duration in weeks: 8  Plan of Care beginning date: 7/3/2024  Plan of Care expiration date: 2024  Treatment plan discussed with: patient        Subjective Evaluation    History of Present Illness  Mechanism of injury: : Cindy feels her pain is unchanged since starting PT. Her HEP helps short term for a few hours then her pain is back to what it was. Medication still best pain relief. Doing her home exercises.    Present at PT: Still experiencing weakness, if she gets up quickly she gets dizzy. If she's walking she will go sideways or be dizzy. Low back is still bothering her. Her back pain goes to her knee roughly - never goes below her knee. This limits her from doing things for herself around the house. Just got back from [place] in beginning of . Has gotten a little better with medication. Does exercises in the morning.    Exercises she is doing now seem to help a little bit. Used to have n/t but no longer present. Feels weak on RLE. Occasional dizziness and shaking.    Spinal cord stimulator removed in , was not working. No hx of neuropathy.  Patient Goals  Patient goals for therapy: increased strength, decreased pain, improved balance and independence with ADLs/IADLs    Pain  Current pain ratin  At best pain ratin  At worst pain rating: 10  Relieving factors: change in position and rest  Aggravating factors: walking  Progression: improved (better from medication, but pain is still present)    Social Support  Stairs in house: yes   Lives in: multiple-level home    Employment status: not working    Diagnostic Tests    FCE comments: CT RECON ONLY LUMBAR SPINE (NO CHARGE)     INDICATION:    Midline back pain, new right sided radiculopathy.     COMPARISON: 5/22/2018     TECHNIQUE: Axial CT examination of the lumbar spine was obtained utilizing reconstructed images from CT of the chest, abdomen and pelvis performed the same day.  Images were reformatted in the sagittal and coronal planes.     This examination, like all CT scans performed in the [facility], was performed utilizing techniques to minimize radiation dose exposure, including the use of iterative reconstruction and automated exposure control.     FINDINGS:     ALIGNMENT: Normal alignment of the lumbar spine.     VERTEBRAE: Postsurgical change from L4-5 laminectomies.     DEGENERATIVE CHANGES: Disc and facet osteophytosis throughout the lumbar spine. Decompression of the central canal at L4-5. Multilevel mild neural foraminal narrowing.     PREVERTEBRAL AND PARASPINAL SOFT TISSUES: No swelling, mass or fluid collection.     OTHER: Unremarkable     IMPRESSION:     Postsurgical change from L4-5 laminectomies and decompression of the central canal.     Treatments  No previous or current treatments        Objective     Concurrent Complaints  Negative for night pain, bladder dysfunction and bowel dysfunction    Palpation     Additional Palpation Details  Tender L2-5 with increase in pain    Neurological Testing     Sensation     Lumbar   Left   Intact: light touch    Right   Intact: light touch    Reflexes   Left   Patellar (L4): normal (2+)  Achilles (S1): absent (0)  Babinski sign: negative    Right   Patellar (L4): normal (2+)  Achilles (S1): absent (0)  Babinski sign: negative    Active Range of Motion     Lumbar   Flexion:  with pain Restriction level: minimal  Extension:  Restriction level: moderate    Strength/Myotome Testing     Left Hip   Planes of Motion   Flexion: 4+  Extension: 4+  Abduction: 4+    Right Hip   Planes of Motion   Flexion: 3+  Extension: 4-  Abduction: 4-    Left Knee   Flexion: 3+  Extension: 4    Right  Knee   Flexion: 3  Extension: 4-    Left Ankle/Foot   Dorsiflexion: 4  Plantar flexion: 5    Right Ankle/Foot   Dorsiflexion: 3+  Plantar flexion: 5    General Comments:      Lumbar Comments  Single knee to chest relieves LE symptoms, more pain into low back.             Precautions: Falls, CKD3, Osteoporosis, HTN  Re-eval Date:  8/3/2024    Date 8/6                 Visit Count 7        FOTO See IE        Pain In See IE        Pain Out                 Manuals         TFM to R gluteal mms         MFR R gluteal mms Piriformis MFR 5min total        Mini assesment         L sidelying R L/s opening 2min x1                 Neuro Re-Ed         Core stability in supine         Nerve glides RLE x10 cues for form                                                     Ther Ex         Bridge         Pball rollout         Seated fwd L/s flex         DKTC         Step ups X30 4' step        STD hip 3 way         Supine SLRx4         Side stepping Peach TB at ankle 15ft x6        Mini Squat         Leg press         Clamshells x20        Hip ABD         Seated piriformsi         Ther Activity         ADL                  Gait Training         Divided Attention         Head turns         Modalities

## 2024-08-07 ENCOUNTER — HOSPITAL ENCOUNTER (OUTPATIENT)
Dept: NON INVASIVE DIAGNOSTICS | Facility: HOSPITAL | Age: 79
Discharge: HOME | End: 2024-08-07
Payer: COMMERCIAL

## 2024-08-07 ENCOUNTER — ANESTHESIA EVENT (OUTPATIENT)
Dept: NON INVASIVE DIAGNOSTICS | Facility: HOSPITAL | Age: 79
End: 2024-08-07

## 2024-08-07 ENCOUNTER — ANESTHESIA (OUTPATIENT)
Dept: NON INVASIVE DIAGNOSTICS | Facility: HOSPITAL | Age: 79
End: 2024-08-07

## 2024-08-07 VITALS
WEIGHT: 150 LBS | BODY MASS INDEX: 29.45 KG/M2 | SYSTOLIC BLOOD PRESSURE: 131 MMHG | OXYGEN SATURATION: 97 % | HEIGHT: 60 IN | TEMPERATURE: 97.5 F | HEART RATE: 86 BPM | DIASTOLIC BLOOD PRESSURE: 61 MMHG | RESPIRATION RATE: 18 BRPM

## 2024-08-07 DIAGNOSIS — I35.1 NONRHEUMATIC AORTIC VALVE INSUFFICIENCY: ICD-10-CM

## 2024-08-07 LAB — SL CV LV EF: 60

## 2024-08-07 PROCEDURE — 93320 DOPPLER ECHO COMPLETE: CPT | Performed by: INTERNAL MEDICINE

## 2024-08-07 PROCEDURE — 76376 3D RENDER W/INTRP POSTPROCES: CPT | Performed by: INTERNAL MEDICINE

## 2024-08-07 PROCEDURE — 93312 ECHO TRANSESOPHAGEAL: CPT | Performed by: INTERNAL MEDICINE

## 2024-08-07 PROCEDURE — 93312 ECHO TRANSESOPHAGEAL: CPT

## 2024-08-07 PROCEDURE — 76376 3D RENDER W/INTRP POSTPROCES: CPT

## 2024-08-07 PROCEDURE — 93325 DOPPLER ECHO COLOR FLOW MAPG: CPT | Performed by: INTERNAL MEDICINE

## 2024-08-07 RX ORDER — KETAMINE HCL IN NACL, ISO-OSM 100MG/10ML
SYRINGE (ML) INJECTION AS NEEDED
Status: DISCONTINUED | OUTPATIENT
Start: 2024-08-07 | End: 2024-08-07

## 2024-08-07 RX ORDER — GLYCOPYRROLATE 0.2 MG/ML
INJECTION INTRAMUSCULAR; INTRAVENOUS AS NEEDED
Status: DISCONTINUED | OUTPATIENT
Start: 2024-08-07 | End: 2024-08-07

## 2024-08-07 RX ORDER — PROPOFOL 10 MG/ML
INJECTION, EMULSION INTRAVENOUS AS NEEDED
Status: DISCONTINUED | OUTPATIENT
Start: 2024-08-07 | End: 2024-08-07

## 2024-08-07 RX ORDER — SODIUM CHLORIDE 9 MG/ML
INJECTION, SOLUTION INTRAVENOUS CONTINUOUS PRN
Status: DISCONTINUED | OUTPATIENT
Start: 2024-08-07 | End: 2024-08-07

## 2024-08-07 RX ORDER — HYDRALAZINE HYDROCHLORIDE 20 MG/ML
INJECTION INTRAMUSCULAR; INTRAVENOUS AS NEEDED
Status: DISCONTINUED | OUTPATIENT
Start: 2024-08-07 | End: 2024-08-07

## 2024-08-07 RX ORDER — PROPOFOL 10 MG/ML
INJECTION, EMULSION INTRAVENOUS CONTINUOUS PRN
Status: DISCONTINUED | OUTPATIENT
Start: 2024-08-07 | End: 2024-08-07

## 2024-08-07 RX ORDER — LIDOCAINE HYDROCHLORIDE 10 MG/ML
INJECTION, SOLUTION EPIDURAL; INFILTRATION; INTRACAUDAL; PERINEURAL AS NEEDED
Status: DISCONTINUED | OUTPATIENT
Start: 2024-08-07 | End: 2024-08-07

## 2024-08-07 RX ADMIN — PROPOFOL 40 MG: 10 INJECTION, EMULSION INTRAVENOUS at 15:09

## 2024-08-07 RX ADMIN — PROPOFOL 60 MG: 10 INJECTION, EMULSION INTRAVENOUS at 14:47

## 2024-08-07 RX ADMIN — GLYCOPYRROLATE 0.1 MG: 0.2 INJECTION INTRAMUSCULAR; INTRAVENOUS at 14:47

## 2024-08-07 RX ADMIN — PROPOFOL 50 MCG/KG/MIN: 10 INJECTION, EMULSION INTRAVENOUS at 14:48

## 2024-08-07 RX ADMIN — LIDOCAINE HYDROCHLORIDE 70 MG: 10 INJECTION, SOLUTION EPIDURAL; INFILTRATION; INTRACAUDAL; PERINEURAL at 14:47

## 2024-08-07 RX ADMIN — SODIUM CHLORIDE: 9 INJECTION, SOLUTION INTRAVENOUS at 14:28

## 2024-08-07 RX ADMIN — Medication 15 MG: at 14:47

## 2024-08-07 RX ADMIN — PROPOFOL 20 MG: 10 INJECTION, EMULSION INTRAVENOUS at 14:59

## 2024-08-07 RX ADMIN — HYDRALAZINE HYDROCHLORIDE 10 MG: 20 INJECTION, SOLUTION INTRAMUSCULAR; INTRAVENOUS at 14:55

## 2024-08-07 NOTE — ANESTHESIA POSTPROCEDURE EVALUATION
Post-Op Assessment Note    CV Status:  Stable  Pain Score: 0    Pain management: adequate       Mental Status:  Awake and somnolent   Hydration Status:  Stable   PONV Controlled:  None   Airway Patency:  Patent     Post Op Vitals Reviewed: Yes    No anethesia notable event occurred.    Staff: CRNA, Anesthesiologist               /61 (08/07/24 1521)    Temp      Pulse 94 (08/07/24 1521)   Resp 18 (08/07/24 1521)    SpO2 96 % (08/07/24 1521)

## 2024-08-07 NOTE — ANESTHESIA PREPROCEDURE EVALUATION
Procedure:  BRENDON    Relevant Problems   CARDIO   (+) Hypertension   (+) Migraine headache   (+) Rib pain on left side      ENDO   (+) Hypothyroidism      GI/HEPATIC   (+) Dysphagia   (+) Esophageal reflux      /RENAL   (+) Chronic kidney disease, stage 3a (HCC)      MUSCULOSKELETAL   (+) Acute bilateral low back pain   (+) Acute bilateral low back pain with left-sided sciatica   (+) Lumbar spondylosis   (+) Primary osteoarthritis of left knee      NEURO/PSYCH   (+) Chronic pain syndrome   (+) Depression, recurrent (HCC)   (+) Generalized anxiety disorder   (+) Migraine headache   (+) Weakness of left leg      PULMONARY   (+) SAGE (obstructive sleep apnea)   (+) Severe persistent asthma with acute exacerbation   (+) Severe persistent asthma without complication        Physical Exam    Airway    Mallampati score: II  TM Distance: >3 FB  Neck ROM: full     Dental        Cardiovascular      Pulmonary      Other Findings  post-pubertal.      Anesthesia Plan  ASA Score- 3     Anesthesia Type- IV sedation with anesthesia with ASA Monitors.         Additional Monitors:     Airway Plan:            Plan Factors-    Chart reviewed.    Patient summary reviewed.    Patient is not a current smoker.              Induction- intravenous.    Postoperative Plan-         Informed Consent- Anesthetic plan and risks discussed with patient.  I personally reviewed this patient with the CRNA. Discussed and agreed on the Anesthesia Plan with the CRNA..

## 2024-08-08 ENCOUNTER — APPOINTMENT (OUTPATIENT)
Facility: CLINIC | Age: 79
End: 2024-08-08
Payer: COMMERCIAL

## 2024-08-11 NOTE — PROGRESS NOTES
Cardiology Follow Up    Cindy Cruz  1945  [id number]  [facility] [place]  [address]  [place] [id number] [phone]    1. Essential (primary) hypertension        2. Severe mitral valve regurgitation        3. Pure hypercholesterolemia        4. Nonrheumatic aortic valve insufficiency        5. RBBB          Interval History:  Patient is here for f/u visit.  Patient has HTN, HLD and DM.  Pharmacologic nuclear stress test 2/2022 demonstrated no ischemia.  Echocardiogram 9/2022 demonstrated LVEF of 70%.  There was moderate LAE.  There was mild mixed AVD  There was MAC with moderate MR. E  She is here today with her daughter who is very attentive. Echo 7/16/2024 showed LVEF of 65% with severe MR with moderate AI and severe LAE. 3D BRENDON 8/7/2024 ordered to provide better visualization showed moderate MR/AI. Lasix switched to PRN given rise in creat.  BMP 8/19/2024 showed drop in creatinine to 1.05 from 1.42.  Potassium is WNL.  Patient's daughter is here to translate today.  Patient has had no chest pain or significant dyspnea.  Patient's HTN and HLD are stable on her current regimen.    Patient Active Problem List   Diagnosis   • Tremor   • Atrophic vaginitis   • Osteoporosis   • Obesity   • Migraine headache   • Hypothyroidism   • Hypertension   • Glaucoma   • Esophageal reflux   • Dyslipidemia   • Cataract   • Severe persistent asthma without complication   • Lumbar spondylosis   • IFG (impaired fasting glucose)   • Chronic pain syndrome   • Acute bilateral low back pain with left-sided sciatica   • Lumbar post-laminectomy syndrome   • Acute bilateral low back pain   • Chronic pain of left knee   • Primary osteoarthritis of left knee   • Gait abnormality   • Weakness of left leg   • Anserine bursitis   • Depression, recurrent (HCC)   • Hypercalcemia   • Generalized anxiety disorder   • Rib pain on left side   • Intercostal  neuropathy   • Constipation   • Vitamin D deficiency   • Insomnia   • SAGE (obstructive sleep apnea)   • Daytime somnolence   • Malfunction of spinal cord stimulator (HCC)   • Chronic kidney disease, stage 3a (HCC)   • Vitamin B12 deficiency   • Dysphagia   • Ambulatory dysfunction   • Severe persistent asthma with acute exacerbation   • High serum parathyroid hormone (PTH)     Past Medical History:   Diagnosis Date   • Chronic pain disorder     Back   • Coronary artery disease    • Dyslipidemia    • Esophageal reflux    • Frequent headaches     stress related   • Headache(784.0)    • Heart murmur    • History of stomach ulcers    • Hypercalcemia    • Hypertension    • Osteopenia    • Tremor      Social History     Socioeconomic History   • Marital status:      Spouse name: Not on file   • Number of children: Not on file   • Years of education: Not on file   • Highest education level: Not on file   Occupational History   • Occupation: unemployed   Tobacco Use   • Smoking status: Former     Current packs/day: 0.00     Types: Cigarettes     Start date: 1988     Quit date: 1991     Years since quittin.3   • Smokeless tobacco: Never   Vaping Use   • Vaping status: Never Used   Substance and Sexual Activity   • Alcohol use: Not Currently   • Drug use: Never   • Sexual activity: Not Currently     Partners: Male     Birth control/protection: Abstinence   Other Topics Concern   • Not on file   Social History Narrative   • Not on file     Social Determinants of Health     Financial Resource Strain: Low Risk  (2023)    Overall Financial Resource Strain (CARDIA)    • Difficulty of Paying Living Expenses: Not hard at all   Food Insecurity: No Food Insecurity (6/10/2024)    Hunger Vital Sign    • Worried About Running Out of Food in the Last Year: Never true    • Ran Out of Food in the Last Year: Never true   Transportation Needs: No Transportation Needs (6/10/2024)    PRAPARE - Transportation    • Lack  of Transportation (Medical): No    • Lack of Transportation (Non-Medical): No   Physical Activity: Not on file   Stress: Not on file   Social Connections: Not on file   Intimate Partner Violence: Not on file   Housing Stability: Low Risk  (6/10/2024)    Housing Stability Vital Sign    • Unable to Pay for Housing in the Last Year: No    • Number of Times Moved in the Last Year: 1    • Homeless in the Last Year: No      Family History   Problem Relation Age of Onset   • Thyroid disease unspecified Mother         post thyroidectomy   • Glaucoma Father    • No Known Problems Sister    • No Known Problems Sister    • No Known Problems Sister    • Depression Sister    • No Known Problems Sister    • No Known Problems Maternal Grandmother    • No Known Problems Maternal Grandfather    • No Known Problems Paternal Grandmother    • No Known Problems Paternal Grandfather    • Hypertension Family    • Stroke Family    • Heart disease Family    • Thyroid disease Family    • Thyroid disease unspecified Daughter         post thyroidectomy   • No Known Problems Daughter    • No Known Problems Daughter    • Colon cancer Neg Hx    • Colon polyps Neg Hx      Past Surgical History:   Procedure Laterality Date   • BACK SURGERY      lower back   • BREAST BIOPSY Right     long ago, benign   •  SECTION      x 3   • CHOLECYSTECTOMY     • COLONOSCOPY     • HYSTERECTOMY      age 36 per MRS   • JOINT REPLACEMENT Left     knee   • OOPHORECTOMY Bilateral     age 36 per MRS   • NH WEBB IMPLTJ NSTIM ELTRDS PLATE/PADDLE EDRL Left 2019    Procedure: LAMINECTOMY THORACIC FOR INSERTION DORSAL COLUMN SPINAL CORD STIMULATOR (DCS) W IMPLANTABLE PULSE GENERATOR, LEFT GLUTE;  Surgeon: Lg Mccallum MD;  Location:  MAIN OR;  Service: Neurosurgery   • NH REVJ/RMVL IMPL SPI NPG/RCVR DTCH CONNJ ELTRD RA Left 2022    Procedure: Reopening of thoracic and left buttock incisions for removal of spinal cord stimulator system;  Surgeon: iSva  MD Wolfgang;  Location:  MAIN OR;  Service: Neurosurgery   • ROTATOR CUFF REPAIR Right    • SPINAL STIMULATOR PLACEMENT N/A 03/29/2019    Procedure: REVISION SPINAL CORD STIMULATOR PADDLE ELECTRODE, REPLACEMENT WITH PERCUTANEOUS ELECTRODES OR SMALLER PADDLE;  Surgeon: Lg Mccallum MD;  Location: [facility];  Service: Neurosurgery       Current Outpatient Medications:   •  albuterol (PROVENTIL HFA,VENTOLIN HFA) 90 mcg/act inhaler, INHALE 2 PUFFS BY MOUTH EVERY 6 HOURS AS NEEDED FOR WHEEZING, Disp: 18 g, Rfl: 5  •  alendronate (FOSAMAX) 70 mg tablet, Take 1 tablet by mouth once a week, Disp: 12 tablet, Rfl: 2  •  atorvastatin (LIPITOR) 20 mg tablet, Take 1 tablet by mouth once daily, Disp: 90 tablet, Rfl: 1  •  benzonatate (TESSALON PERLES) 100 mg capsule, Take 1 capsule (100 mg total) by mouth 3 (three) times a day as needed for cough, Disp: 60 capsule, Rfl: 1  •  bimatoprost (Lumigan) 0.01 % ophthalmic drops, Administer 1 drop to both eyes daily, Disp: 5 mL, Rfl: 0  •  buPROPion (WELLBUTRIN XL) 150 mg 24 hr tablet, Take 1 tablet (150 mg total) by mouth every morning With 300 mg to equal 450 mg daily, Disp: 90 tablet, Rfl: 1  •  buPROPion (WELLBUTRIN XL) 300 mg 24 hr tablet, Take 1 tablet by mouth once daily, Disp: 90 tablet, Rfl: 1  •  Cholecalciferol (VITAMIN D-3 PO), Take by mouth 1000 Iu daily, Disp: , Rfl:   •  cyclobenzaprine (FLEXERIL) 5 mg tablet, Take 1 tablet (5 mg total) by mouth 3 (three) times a day as needed for muscle spasms, Disp: 15 tablet, Rfl: 0  •  dorzolamide-timolol (COSOPT) 2-0.5 % ophthalmic solution, Administer 1 drop to both eyes 2 (two) times a day, Disp: , Rfl:   •  fluticasone-vilanterol (Breo Ellipta) 200-25 mcg/actuation inhaler, Inhale 1 puff daily Rinse mouth after use., Disp: 60 blister, Rfl: 2  •  furosemide (LASIX) 40 mg tablet, Take 1 tablet (40 mg total) by mouth if needed (For wt gain, LE edema), Disp: , Rfl:   •  ipratropium (ATROVENT) 0.02 % nebulizer solution, Take 2.5 mL  (0.5 mg total) by nebulization every 6 (six) hours as needed for wheezing or shortness of breath, Disp: 240 mL, Rfl: 1  •  levalbuterol (XOPENEX) 1.25 mg/3 mL nebulizer solution, Take 3 mL (1.25 mg total) by nebulization every 6 (six) hours as needed for wheezing or shortness of breath, Disp: 240 mL, Rfl: 1  •  lidocaine (LIDODERM) 5 %, Apply 1 patch topically over 12 hours daily Remove & Discard patch within 12 hours or as directed by MD, Disp: 14 patch, Rfl: 0  •  losartan (COZAAR) 100 MG tablet, Take 1 tablet by mouth once daily, Disp: 90 tablet, Rfl: 2  •  montelukast (SINGULAIR) 10 mg tablet, Take 1 tablet (10 mg total) by mouth daily at bedtime, Disp: 90 tablet, Rfl: 1  •  omeprazole (PriLOSEC) 40 MG capsule, Take 1 capsule (40 mg total) by mouth 2 (two) times a day (Patient taking differently: Take 40 mg by mouth daily), Disp: 180 capsule, Rfl: 1  •  potassium chloride (Klor-Con M20) 20 mEq tablet, Take 1 tablet (20 mEq total) by mouth if needed (For wt gain, LE edema. Take with Lasix.), Disp: , Rfl:   •  promethazine-dextromethorphan (PHENERGAN-DM) 6.25-15 mg/5 mL oral syrup, Take 5 mL by mouth 4 (four) times a day as needed for cough, Disp: 250 mL, Rfl: 3  •  Synthroid 88 MCG tablet, Take 1 tablet (88 mcg total) by mouth daily, Disp: 90 tablet, Rfl: 1  •  traZODone (DESYREL) 50 mg tablet, Take 1 tablet (50 mg total) by mouth daily at bedtime, Disp: 90 tablet, Rfl: 1  Allergies   Allergen Reactions   • Iodinated Contrast Media Anaphylaxis     Contrast Dye       Labs:not applicable  Imaging: BRENDON    Result Date: 8/7/2024  Narrative: •  Left Ventricle: Left ventricular cavity size is normal. Wall thickness is normal. The left ventricular ejection fraction is 60%. Systolic function is normal. Wall motion is normal. •  Left Atrium: The atrium is severely dilated. •  Right Atrium: The atrium is normal in size. •  Atrial Septum: No patent foramen ovale detected, confirmed at rest using color doppler. •  Left Atrial  Appendage: Left atrial appendage is normal in size. There is normal function. There is no thrombus. •  Aortic Valve: There is moderate regurgitation with a centrally directed jet. •  Mitral Valve: There is moderate focal thickening of the anterior leaflet involving the leaflet margin more than the base. There is leaflet calcification anterior > posterior, most prominently noted over the A1 segment. 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 There is no clearly evident structural pathology (prolapse or flail) to explain her valvular regurgitation. There is moderate regurgitation with a centrally directed jet. Early in the study, with patient's systolic blood pressure of ~200 mmHg the regurgitation appears more consistent with severe regurgitation. Upon receiving treatment for this with blood pressure as marked in the study at 155/69, regurgitation appears moderate. There is mild stenosis. •  Tricuspid Valve: There is mild regurgitation. •  Aorta: There is a calcified atheroma in the descending aorta. 3D was performed for (further investigation, better visualization, additional quantification) of the aortic valve and mitral valve. Results from the utilization of 3D are listed in the report below.     Echo complete w/ contrast if indicated    Result Date: 7/16/2024  Narrative: •  Left Ventricle: Left ventricular cavity size is normal. Wall thickness is mildly increased. The left ventricular ejection fraction is 65% by biplane measurement. Systolic function is normal. Wall motion is normal. Diastolic function is abnormal. •  Right Ventricle: Right ventricular cavity size is normal. Systolic function is normal. •  Left Atrium: The atrium is severely dilated. •  Aortic Valve: The aortic valve is trileaflet. The leaflets are mildly thickened. The leaflets are mildly calcified. There is mildly reduced mobility. There is moderate regurgitation. There is mild stenosis. •  Mitral Valve: There is moderate  subvalvular calcification. There is severe regurgitation. The EROA by PISA is 0.39 cm2. The RVol by PISA is 67 mL/beat. There is mild stenosis. The mitral valve mean gradient is 3mmHg. The MVA by continuity is 1.9 cm2. •  Tricuspid Valve: There is mild regurgitation. •  Prior TTE study available for comparison. Prior study date: 9/22/2022. Changes noted when compared to prior study. Changes include: Valvular disease is now worse; mitral regurgitation is now severe; there is mild mitral stenosis; aortic regurgitation is now moderate; aortic stenosis remains mild. .       Review of Systems:  Review of Systems   All other systems reviewed and are negative.      Physical Exam:  /58 (BP Location: Left arm, Patient Position: Sitting, Cuff Size: Standard)   Pulse 66   Ht 5' (1.524 m)   Wt 68.7 kg (151 lb 6.4 oz)   BMI 29.57 kg/m²   Physical Exam  Vitals reviewed.   Constitutional:       Appearance: She is well-developed.   HENT:      Head: Normocephalic and atraumatic.   Eyes:      Conjunctiva/sclera: Conjunctivae normal.      Pupils: Pupils are equal, round, and reactive to light.   Cardiovascular:      Rate and Rhythm: Normal rate.      Heart sounds: Normal heart sounds.   Pulmonary:      Effort: Pulmonary effort is normal.      Breath sounds: Normal breath sounds.   Musculoskeletal:      Cervical back: Normal range of motion and neck supple.   Skin:     General: Skin is warm and dry.   Neurological:      Mental Status: She is alert and oriented to person, place, and time.         Discussion/Summary:I will continue the patient's present medical regimen.  The patient appears well compensated.  I have asked the patient to call if there is a problem in the interim otherwise I will see the patient in six months time.

## 2024-08-12 ENCOUNTER — TELEPHONE (OUTPATIENT)
Dept: OTHER | Facility: OTHER | Age: 79
End: 2024-08-12

## 2024-08-12 NOTE — TELEPHONE ENCOUNTER
PT.'s daughter Dara called in to notify the PT needs to reschedule her up coming appointment for, 8/21 @ 1:00. Please call daughter back.

## 2024-08-15 ENCOUNTER — TELEPHONE (OUTPATIENT)
Dept: CARDIOLOGY CLINIC | Facility: CLINIC | Age: 79
End: 2024-08-15

## 2024-08-16 DIAGNOSIS — M81.0 OSTEOPOROSIS, UNSPECIFIED OSTEOPOROSIS TYPE, UNSPECIFIED PATHOLOGICAL FRACTURE PRESENCE: ICD-10-CM

## 2024-08-16 RX ORDER — ALENDRONATE SODIUM 70 MG/1
70 TABLET ORAL WEEKLY
Qty: 12 TABLET | Refills: 2 | Status: SHIPPED | OUTPATIENT
Start: 2024-08-16

## 2024-08-19 ENCOUNTER — APPOINTMENT (OUTPATIENT)
Dept: LAB | Facility: HOSPITAL | Age: 79
End: 2024-08-19
Payer: COMMERCIAL

## 2024-08-19 DIAGNOSIS — I35.1 NONRHEUMATIC AORTIC VALVE INSUFFICIENCY: ICD-10-CM

## 2024-08-19 LAB
ANION GAP SERPL CALCULATED.3IONS-SCNC: 9 MMOL/L (ref 4–13)
BUN SERPL-MCNC: 20 MG/DL (ref 5–25)
CALCIUM SERPL-MCNC: 9.5 MG/DL (ref 8.4–10.2)
CHLORIDE SERPL-SCNC: 107 MMOL/L (ref 96–108)
CO2 SERPL-SCNC: 22 MMOL/L (ref 21–32)
CREAT SERPL-MCNC: 1.05 MG/DL (ref 0.6–1.3)
GLUCOSE P FAST SERPL-MCNC: 82 MG/DL (ref 65–99)
POTASSIUM SERPL-SCNC: 4.5 MMOL/L (ref 3.5–5.3)
SODIUM SERPL-SCNC: 138 MMOL/L (ref 135–147)

## 2024-08-19 PROCEDURE — 36415 COLL VENOUS BLD VENIPUNCTURE: CPT

## 2024-08-19 PROCEDURE — 80048 BASIC METABOLIC PNL TOTAL CA: CPT

## 2024-08-21 ENCOUNTER — OFFICE VISIT (OUTPATIENT)
Dept: CARDIOLOGY CLINIC | Facility: CLINIC | Age: 79
End: 2024-08-21
Payer: COMMERCIAL

## 2024-08-21 VITALS
WEIGHT: 151.4 LBS | HEART RATE: 66 BPM | BODY MASS INDEX: 29.72 KG/M2 | HEIGHT: 60 IN | DIASTOLIC BLOOD PRESSURE: 58 MMHG | SYSTOLIC BLOOD PRESSURE: 120 MMHG

## 2024-08-21 DIAGNOSIS — E78.00 PURE HYPERCHOLESTEROLEMIA: ICD-10-CM

## 2024-08-21 DIAGNOSIS — I45.10 RBBB: ICD-10-CM

## 2024-08-21 DIAGNOSIS — I10 ESSENTIAL (PRIMARY) HYPERTENSION: Primary | ICD-10-CM

## 2024-08-21 DIAGNOSIS — I35.1 NONRHEUMATIC AORTIC VALVE INSUFFICIENCY: ICD-10-CM

## 2024-08-21 DIAGNOSIS — I34.0 SEVERE MITRAL VALVE REGURGITATION: ICD-10-CM

## 2024-08-21 PROCEDURE — 99214 OFFICE O/P EST MOD 30 MIN: CPT | Performed by: INTERNAL MEDICINE

## 2024-09-01 DIAGNOSIS — G47.00 INSOMNIA, UNSPECIFIED TYPE: ICD-10-CM

## 2024-09-02 RX ORDER — TRAZODONE HYDROCHLORIDE 50 MG/1
50 TABLET, FILM COATED ORAL
Qty: 90 TABLET | Refills: 1 | Status: SHIPPED | OUTPATIENT
Start: 2024-09-02

## 2024-09-03 ENCOUNTER — OFFICE VISIT (OUTPATIENT)
Dept: FAMILY MEDICINE CLINIC | Facility: HOSPITAL | Age: 79
End: 2024-09-03
Payer: COMMERCIAL

## 2024-09-03 VITALS
HEART RATE: 60 BPM | TEMPERATURE: 97.2 F | DIASTOLIC BLOOD PRESSURE: 58 MMHG | OXYGEN SATURATION: 98 % | BODY MASS INDEX: 29.45 KG/M2 | WEIGHT: 150 LBS | SYSTOLIC BLOOD PRESSURE: 128 MMHG | HEIGHT: 60 IN

## 2024-09-03 DIAGNOSIS — J45.50 SEVERE PERSISTENT ASTHMA WITHOUT COMPLICATION: ICD-10-CM

## 2024-09-03 DIAGNOSIS — F32.A ANXIETY AND DEPRESSION: ICD-10-CM

## 2024-09-03 DIAGNOSIS — I34.0 SEVERE MITRAL REGURGITATION: ICD-10-CM

## 2024-09-03 DIAGNOSIS — F41.9 ANXIETY AND DEPRESSION: ICD-10-CM

## 2024-09-03 DIAGNOSIS — R07.9 CHEST PAIN, UNSPECIFIED TYPE: ICD-10-CM

## 2024-09-03 DIAGNOSIS — Z13.31 POSITIVE DEPRESSION SCREENING: ICD-10-CM

## 2024-09-03 DIAGNOSIS — R73.01 IFG (IMPAIRED FASTING GLUCOSE): ICD-10-CM

## 2024-09-03 DIAGNOSIS — Z12.31 ENCOUNTER FOR SCREENING MAMMOGRAM FOR MALIGNANT NEOPLASM OF BREAST: ICD-10-CM

## 2024-09-03 DIAGNOSIS — G89.4 CHRONIC PAIN SYNDROME: Primary | ICD-10-CM

## 2024-09-03 DIAGNOSIS — E03.9 ACQUIRED HYPOTHYROIDISM: ICD-10-CM

## 2024-09-03 DIAGNOSIS — F33.9 DEPRESSION, RECURRENT (HCC): ICD-10-CM

## 2024-09-03 PROBLEM — I35.0 NONRHEUMATIC AORTIC VALVE STENOSIS: Status: ACTIVE | Noted: 2024-09-03

## 2024-09-03 PROBLEM — M85.80 OSTEOPENIA: Status: ACTIVE | Noted: 2018-06-26

## 2024-09-03 PROCEDURE — 93000 ELECTROCARDIOGRAM COMPLETE: CPT | Performed by: INTERNAL MEDICINE

## 2024-09-03 PROCEDURE — 99214 OFFICE O/P EST MOD 30 MIN: CPT | Performed by: INTERNAL MEDICINE

## 2024-09-03 PROCEDURE — 1160F RVW MEDS BY RX/DR IN RCRD: CPT | Performed by: INTERNAL MEDICINE

## 2024-09-03 PROCEDURE — 3725F SCREEN DEPRESSION PERFORMED: CPT | Performed by: INTERNAL MEDICINE

## 2024-09-03 PROCEDURE — 1159F MED LIST DOCD IN RCRD: CPT | Performed by: INTERNAL MEDICINE

## 2024-09-03 NOTE — PROGRESS NOTES
Ambulatory Visit  Name: Cindy Cruz      : 1945      MRN: [id number]  Encounter Provider: Any Vargas DO  Encounter Date: 9/3/2024   Encounter department: [facility] PRIMARY CARE [id number]     Assessment & Plan   1. Chronic pain syndrome  Assessment & Plan:  Noting benefit with using dgtr Diclofenac cream - rx sent upon request, has seen Neurosurgery and has had recent imaging of L-spine, T/C Pain Mgt referral with new/worse/persistent pain - deferring for now, will follow  2. Severe persistent asthma without complication  Assessment & Plan:  Back to baseline, following with Pulm, con't inhalers and f/u as per specialist, call with resp symptoms  3. Severe mitral regurgitation  Assessment & Plan:  Had recent TTE and BRENDON and f/u with Cardio, well compensated and taken off her diuretic/K-Dur, con't tx and f/u as per Cardio, call with new CV symptoms  4. Chest pain, unspecified type  Comments:  ECG w/o acute ischemic changes, red flag symptosm reviewed also reviewed females can present in atypical ways, pain reproducible with palp - likely muscular, call with persistent symptoms and would need stress test, red flag CV symptoms reviewed - to ED if they occur or if CP does not resolve  Orders:  -     POCT ECG  5. Depression, recurrent (HCC)  Assessment & Plan:  Flat affect again today and + depression screening, deferring need for med changes, going back to [place] tomorrow, call with new/worse mood  6. Positive depression screening  Comments:  Deferring need for med changes, call with new/worse mood  7. Acquired hypothyroidism  Comments:  Labs due in Dec - order given, con't current Synthroid for now  Assessment & Plan:  Will check TSH with labs in Dec - order given, con't current Synthroid for now, will follow  Orders:  -     CBC and differential; Future; Expected date: 2024  -     Comprehensive metabolic panel; Future; Expected date: 2024  -     Hemoglobin A1C; Future; Expected date:  12/18/2024  -     TSH, 3rd generation with Free T4 reflex; Future; Expected date: 12/18/2024  8. IFG (impaired fasting glucose)  Comments:  BW order for Dec given, encouraged healthy diet and keeping active, will follow  Assessment & Plan:  BW q 6 mos - order given for Dec, will follow  Orders:  -     CBC and differential; Future; Expected date: 12/18/2024  -     Comprehensive metabolic panel; Future; Expected date: 12/18/2024  -     Hemoglobin A1C; Future; Expected date: 12/18/2024  -     TSH, 3rd generation with Free T4 reflex; Future; Expected date: 12/18/2024  9. Encounter for screening mammogram for malignant neoplasm of breast  -     Mammo screening bilateral w 3d and cad; Future; Expected date: 12/19/2024      Depression Screening and Follow-up Plan: Patient's depression screening was positive with a PHQ-9 score of 6. Patient assessed for underlying major depression. Brief counseling provided and recommend additional follow-up/re-evaluation next office visit.       Colonoscopy 7/24 - no further needed d/t age    Mammo 12/23 - order given for 12/24    Dexa 1/24 - osteopenia    BW 6/24 (A1C 5.9) - order given for 12/24    Leaving for [place] tomorrow      History of Present Illness     HPI Pt here for follow up appt with her dgtr    Pt saw Neurosurgery PA (Gilmar Blue) in June for f/u LBP - OV note reviewed.  CT L-spine from June 24 was reviewed again.  She was referred to PT and encouraged to do home exercises. No surgical interventions were needed and she was told f/u was prn.  She did PT and didn't feel it helped. She states she is doing the HEP but pain is still limiting.  She is using her Lidocaine patch w/some benefit. She did not feel the Flexeril helped.  She is using Tylenol w/some benefit.      Pt saw Pulm NP (Mariana Hawthorne) in June for f/u asthma exacerbation - OV note reviewed.  She was told to finish up her Prednisone taper and con't her Breo and Xopenex/Atrovent nebs as directed. She has a  rescue inhaler for prn use.  She reports last rescue inhaler use was yesterday am - she reports only using as needed.    Pt had an Echo ordered at appt in Jan 24 d/t orthopnea.  She had her Echo in July and was noted to have EF 65% but had mod AR, mild AS and severe MR and mild MS.  Pt was notified of results and referred to Cardio.  Pt saw Cardio  PA (José Miguel Denton) in early Aug - OV note reviewed.  She was recommended to have a BRENDON and was advised to stop K-dur and Lasix as she appeared euvolemic.  She had her BRENDON 8/7/24 and was noted to have mod AR and mod MR and mild AS.  She saw Dr. Blue for f/u 8/21/24 and no surgical intervention was needed as she appeared well compensated.  No med changes were made at appt the end of Aug.  She was told to f/u in 6 mos.    Pt notes CP last night while in bed.  Pain was L side of the chest and did not radiate.  It lasted for about 30 min.  She had some dizziness/nausea with the pain.  She tried to check her BP and was dizzy and ran into the dresser but did not fall.     Pt notes overall mood is pretty good. Dgtr states she realized pt was only taking 300 mg of WBXL and had run out of the 150 mg - rx has been requested.  + depression screening reviewed.       Review of Systems   Constitutional:  Negative for chills and fever.   HENT:  Negative for congestion, sore throat and trouble swallowing.    Eyes:  Negative for pain and visual disturbance.   Respiratory:  Positive for shortness of breath. Negative for cough.    Cardiovascular:  Positive for chest pain. Negative for palpitations and leg swelling.   Gastrointestinal:  Negative for abdominal pain, blood in stool, constipation, diarrhea, nausea and vomiting.   Genitourinary:  Negative for difficulty urinating and dysuria.   Musculoskeletal:  Positive for arthralgias and back pain.   Skin:  Negative for rash and wound.   Neurological:  Positive for dizziness. Negative for headaches.   Hematological:  Does not bruise/bleed  easily.   Psychiatric/Behavioral:  Negative for confusion and dysphoric mood.        Objective     /58   Pulse 60   Temp (!) 97.2 °F (36.2 °C)   Ht 5' (1.524 m)   Wt 68 kg (150 lb)   SpO2 98%   BMI 29.29 kg/m²     Physical Exam  Vitals and nursing note reviewed.   Constitutional:       General: She is not in acute distress.     Appearance: She is well-developed. She is not ill-appearing.   HENT:      Head: Normocephalic and atraumatic.      Right Ear: External ear normal.      Left Ear: External ear normal.   Eyes:      General:         Right eye: No discharge.         Left eye: No discharge.      Conjunctiva/sclera: Conjunctivae normal.   Neck:      Trachea: No tracheal deviation.   Cardiovascular:      Rate and Rhythm: Normal rate and regular rhythm.      Heart sounds: Murmur heard.   Pulmonary:      Effort: Pulmonary effort is normal. No respiratory distress.      Breath sounds: Normal breath sounds. No wheezing, rhonchi or rales.      Comments: Tenderness L ant chest wall with palp  Chest:      Chest wall: Tenderness present.   Abdominal:      General: There is no distension.      Palpations: Abdomen is soft.      Tenderness: There is no abdominal tenderness. There is no guarding or rebound.   Musculoskeletal:      Cervical back: Neck supple.      Right lower leg: No edema.      Left lower leg: No edema.   Skin:     General: Skin is warm and dry.      Coloration: Skin is not pale.      Findings: No rash.   Neurological:      General: No focal deficit present.      Mental Status: She is alert. Mental status is at baseline.      Motor: No abnormal muscle tone.      Gait: Gait normal.   Psychiatric:         Behavior: Behavior normal.         Thought Content: Thought content normal.      Comments: Flat affect       Administrative Statements         Depression Screening Follow-up Plan: Patient's depression screening was positive with a PHQ-2 score of . Their PHQ-9 score was 6. Patient assessed for  underlying major depression. They have no active suicidal ideations. Brief counseling provided and recommend additional follow-up/re-evaluation next office visit.   Isotretinoin Pregnancy And Lactation Text: This medication is Pregnancy Category X and is considered extremely dangerous during pregnancy. It is unknown if it is excreted in breast milk. Spironolactone Counseling: Patient advised regarding risks of diarrhea, abdominal pain, hyperkalemia, birth defects (for female patients), liver toxicity and renal toxicity. The patient may need blood work to monitor liver and kidney function and potassium levels while on therapy. The patient verbalized understanding of the proper use and possible adverse effects of spironolactone.  All of the patient's questions and concerns were addressed. Dapsone Counseling: I discussed with the patient the risks of dapsone including but not limited to hemolytic anemia, agranulocytosis, rashes, methemoglobinemia, kidney failure, peripheral neuropathy, headaches, GI upset, and liver toxicity.  Patients who start dapsone require monitoring including baseline LFTs and weekly CBCs for the first month, then every month thereafter.  The patient verbalized understanding of the proper use and possible adverse effects of dapsone.  All of the patient's questions and concerns were addressed. Topical Sulfur Applications Counseling: Topical Sulfur Counseling: Patient counseled that this medication may cause skin irritation or allergic reactions.  In the event of skin irritation, the patient was advised to reduce the amount of the drug applied or use it less frequently.   The patient verbalized understanding of the proper use and possible adverse effects of topical sulfur application.  All of the patient's questions and concerns were addressed. Winlevi Counseling:  I discussed with the patient the risks of topical clascoterone including but not limited to erythema, scaling, itching, and stinging. Patient voiced their understanding. Azithromycin Pregnancy And Lactation Text: This medication is considered safe during pregnancy and is also secreted in breast milk. Doxycycline Counseling:  Patient counseled regarding possible photosensitivity and increased risk for sunburn.  Patient instructed to avoid sunlight, if possible.  When exposed to sunlight, patients should wear protective clothing, sunglasses, and sunscreen.  The patient was instructed to call the office immediately if the following severe adverse effects occur:  hearing changes, easy bruising/bleeding, severe headache, or vision changes.  The patient verbalized understanding of the proper use and possible adverse effects of doxycycline.  All of the patient's questions and concerns were addressed. Topical Retinoid Pregnancy And Lactation Text: This medication is Pregnancy Category C. It is unknown if this medication is excreted in breast milk. High Dose Vitamin A Pregnancy And Lactation Text: High dose vitamin A therapy is contraindicated during pregnancy and breast feeding. Tetracycline Counseling: Patient counseled regarding possible photosensitivity and increased risk for sunburn.  Patient instructed to avoid sunlight, if possible.  When exposed to sunlight, patients should wear protective clothing, sunglasses, and sunscreen.  The patient was instructed to call the office immediately if the following severe adverse effects occur:  hearing changes, easy bruising/bleeding, severe headache, or vision changes.  The patient verbalized understanding of the proper use and possible adverse effects of tetracycline.  All of the patient's questions and concerns were addressed. Patient understands to avoid pregnancy while on therapy due to potential birth defects. Benzoyl Peroxide Pregnancy And Lactation Text: This medication is Pregnancy Category C. It is unknown if benzoyl peroxide is excreted in breast milk. Winlevi Pregnancy And Lactation Text: This medication is considered safe during pregnancy and breastfeeding. Tazorac Counseling:  Patient advised that medication is irritating and drying.  Patient may need to apply sparingly and wash off after an hour before eventually leaving it on overnight.  The patient verbalized understanding of the proper use and possible adverse effects of tazorac.  All of the patient's questions and concerns were addressed. Bactrim Counseling:  I discussed with the patient the risks of sulfa antibiotics including but not limited to GI upset, allergic reaction, drug rash, diarrhea, dizziness, photosensitivity, and yeast infections.  Rarely, more serious reactions can occur including but not limited to aplastic anemia, agranulocytosis, methemoglobinemia, blood dyscrasias, liver or kidney failure, lung infiltrates or desquamative/blistering drug rashes. Doxycycline Pregnancy And Lactation Text: This medication is Pregnancy Category D and not consider safe during pregnancy. It is also excreted in breast milk but is considered safe for shorter treatment courses. Use Enhanced Medication Counseling?: No Aklief Pregnancy And Lactation Text: It is unknown if this medication is safe to use during pregnancy.  It is unknown if this medication is excreted in breast milk.  Breastfeeding women should use the topical cream on the smallest area of the skin for the shortest time needed while breastfeeding.  Do not apply to nipple and areola. Topical Clindamycin Counseling: Patient counseled that this medication may cause skin irritation or allergic reactions.  In the event of skin irritation, the patient was advised to reduce the amount of the drug applied or use it less frequently.   The patient verbalized understanding of the proper use and possible adverse effects of clindamycin.  All of the patient's questions and concerns were addressed. Azelaic Acid Pregnancy And Lactation Text: This medication is considered safe during pregnancy and breast feeding. Erythromycin Pregnancy And Lactation Text: This medication is Pregnancy Category B and is considered safe during pregnancy. It is also excreted in breast milk. Sarecycline Counseling: Patient advised regarding possible photosensitivity and discoloration of the teeth, skin, lips, tongue and gums.  Patient instructed to avoid sunlight, if possible.  When exposed to sunlight, patients should wear protective clothing, sunglasses, and sunscreen.  The patient was instructed to call the office immediately if the following severe adverse effects occur:  hearing changes, easy bruising/bleeding, severe headache, or vision changes.  The patient verbalized understanding of the proper use and possible adverse effects of sarecycline.  All of the patient's questions and concerns were addressed. Birth Control Pills Counseling: Birth Control Pill Counseling: I discussed with the patient the potential side effects of OCPs including but not limited to increased risk of stroke, heart attack, thrombophlebitis, deep venous thrombosis, hepatic adenomas, breast changes, GI upset, headaches, and depression.  The patient verbalized understanding of the proper use and possible adverse effects of OCPs. All of the patient's questions and concerns were addressed. Topical Clindamycin Pregnancy And Lactation Text: This medication is Pregnancy Category B and is considered safe during pregnancy. It is unknown if it is excreted in breast milk. Spironolactone Pregnancy And Lactation Text: This medication can cause feminization of the male fetus and should be avoided during pregnancy. The active metabolite is also found in breast milk. Benzoyl Peroxide Counseling: Patient counseled that medicine may cause skin irritation and bleach clothing.  In the event of skin irritation, the patient was advised to reduce the amount of the drug applied or use it less frequently.   The patient verbalized understanding of the proper use and possible adverse effects of benzoyl peroxide.  All of the patient's questions and concerns were addressed. Tetracycline Pregnancy And Lactation Text: This medication is Pregnancy Category D and not consider safe during pregnancy. It is also excreted in breast milk. Topical Retinoid counseling:  Patient advised to apply a pea-sized amount only at bedtime and wait 30 minutes after washing their face before applying.  If too drying, patient may add a non-comedogenic moisturizer. The patient verbalized understanding of the proper use and possible adverse effects of retinoids.  All of the patient's questions and concerns were addressed. Minocycline Counseling: Patient advised regarding possible photosensitivity and discoloration of the teeth, skin, lips, tongue and gums.  Patient instructed to avoid sunlight, if possible.  When exposed to sunlight, patients should wear protective clothing, sunglasses, and sunscreen.  The patient was instructed to call the office immediately if the following severe adverse effects occur:  hearing changes, easy bruising/bleeding, severe headache, or vision changes.  The patient verbalized understanding of the proper use and possible adverse effects of minocycline.  All of the patient's questions and concerns were addressed. Azithromycin Counseling:  I discussed with the patient the risks of azithromycin including but not limited to GI upset, allergic reaction, drug rash, diarrhea, and yeast infections. Dapsone Pregnancy And Lactation Text: This medication is Pregnancy Category C and is not considered safe during pregnancy or breast feeding. Topical Sulfur Applications Pregnancy And Lactation Text: This medication is Pregnancy Category C and has an unknown safety profile during pregnancy. It is unknown if this topical medication is excreted in breast milk. High Dose Vitamin A Counseling: Side effects reviewed, pt to contact office should one occur. Erythromycin Counseling:  I discussed with the patient the risks of erythromycin including but not limited to GI upset, allergic reaction, drug rash, diarrhea, increase in liver enzymes, and yeast infections. Aklief counseling:  Patient advised to apply a pea-sized amount only at bedtime and wait 30 minutes after washing their face before applying.  If too drying, patient may add a non-comedogenic moisturizer.  The most commonly reported side effects including irritation, redness, scaling, dryness, stinging, burning, itching, and increased risk of sunburn.  The patient verbalized understanding of the proper use and possible adverse effects of retinoids.  All of the patient's questions and concerns were addressed. Detail Level: Zone Isotretinoin Counseling: Patient should get monthly blood tests, not donate blood, not drive at night if vision affected, not share medication, and not undergo elective surgery for 6 months after tx completed. Side effects reviewed, pt to contact office should one occur. Birth Control Pills Pregnancy And Lactation Text: This medication should be avoided if pregnant and for the first 30 days post-partum. Azelaic Acid Counseling: Patient counseled that medicine may cause skin irritation and to avoid applying near the eyes.  In the event of skin irritation, the patient was advised to reduce the amount of the drug applied or use it less frequently.   The patient verbalized understanding of the proper use and possible adverse effects of azelaic acid.  All of the patient's questions and concerns were addressed. Tazorac Pregnancy And Lactation Text: This medication is not safe during pregnancy. It is unknown if this medication is excreted in breast milk. Bactrim Pregnancy And Lactation Text: This medication is Pregnancy Category D and is known to cause fetal risk.  It is also excreted in breast milk.

## 2024-09-03 NOTE — ASSESSMENT & PLAN NOTE
Flat affect again today and + depression screening, deferring need for med changes, going back to [place] tomorrow, call with new/worse mood

## 2024-09-03 NOTE — ASSESSMENT & PLAN NOTE
Back to baseline, following with Pulm, con't inhalers and f/u as per specialist, call with resp symptoms

## 2024-09-03 NOTE — ASSESSMENT & PLAN NOTE
Noting benefit with using dgtr Diclofenac cream - rx sent upon request, has seen Neurosurgery and has had recent imaging of L-spine, T/C Pain Mgt referral with new/worse/persistent pain - deferring for now, will follow

## 2024-09-03 NOTE — ASSESSMENT & PLAN NOTE
Had recent TTE and BRENDON and f/u with Cardio, well compensated and taken off her diuretic/K-Dur, con't tx and f/u as per Cardio, call with new CV symptoms

## 2024-09-03 NOTE — PATIENT INSTRUCTIONS
"Patient Education     Depresión en adultos   Conceptos Básicos   Redactado por los médicos y editores de UpTo[employer]   ¿Qué es la depresión? -- La depresión es un trastorno que hace que se sienta bev, kamren no es ade tristeza normal. La depresión puede hacer que le resulte difícil trabajar, estudiar o hacer frances tareas cotidianas.  ¿Cuál es la causa de la depresión? -- La depresión se debe a problemas en unas sustancias químicas del cerebro llamadas \"neurotransmisores\". Algunas personas podrían tener ade mayor posibilidad de tener depresión si hay antecedentes del padecimiento en la rosmery. Además, hay otros factores que podrían influir, ricky las hormonas, ciertos problemas de gurmeet, las medicinas, el estrés, babar recibido maltratos en la infancia, problemas familiares y problemas con amigos, en la escuela o el trabajo.  ¿Cómo sé si estoy deprimido? -- Las personas deprimidas se sienten tristes la mayor parte del tiempo, ricky mínimo martin 2 semanas. También tienen al menos 1 de estos 2 síntomas:   Ya no disfrutan ni les interesan cosas que antes les gustaban.   Están tristes, afligidos, desesperanzados o malhumorados la mayor parte del día, nichelle todos los días.  Las personas que tienen depresión también pueden presentar otros síntomas. Algunos ejemplos son:   Cambios en el apetito o el peso. Pueden comer muy poco o demasiado, o subir o bajar de peso involuntariamente.   Dormir demasiado o muy poco   Sentirse cansadas o sin energía   Sentirse culpables, indefensas o sentir que no herb nada   Tener dificultades con la concentración o la memoria   Estar inquietas o tener dificultad para estar quietas, o moverse o hablar más despacio de lo normal   Tener pensamientos recurrentes de muerte o suicidio  Si piensa que podría estar deprimido, consulte a pappas médico o enfermero. Únicamente alguien con capacitación en gurmeet mental puede determinar con seguridad si usted está deprimido.  ¿Cómo se diagnostica la " "depresión? -- Pappas médico o enfermero le hará un examen físico, le hará preguntas y podría solicitar pruebas. La depresión puede tener un gran impacto en pappas jordin. Afortunadamente, la depresión se puede tratar, y cuanto antes se inicie el tratamiento, mejor funcionará.  ¡Pida ayuda de inmediato si está pensando en lastimarse o suicidarse! -- Si alguna vez siente que podría hacerse daño o dañar a otra persona, puede obtener ayuda:   En [facility], comuníquese con la Línea de Prevención del Suicidio y Crisis 988:   Para hablar con alguien, envíe un mensaje de texto o llame al 988.   Para hablar con alguien en línea, visite [email or web address].   Llame a pappas médico o enfermero, y dígale que es urgente.   Pida ade ambulancia (en [facility] y [place], llame al 9-1-1).   Vaya al departamento de emergencias del hospital más cercano.  ¿Cuáles son los tratamientos para la depresión? -- Pappas médico o enfermero colaborará con usted para crear un plan de tratamiento. El tratamiento puede abarcar lo siguiente:   Ayudarlo a aprender más sobre la depresión   Terapia (con un psiquiatra, psicólogo, enfermero o trabajador social)   Medicinas que alivian la depresión   Crear un plan para limitar el acceso a los objetos que podría usar para hacerse daño   Otros tratamientos que transmiten ondas magnéticas o electricidad al cerebro  Además del tratamiento, realizar actividad física con regularidad también puede ayudarle a sentirse mejor.  Las personas que tienen ade depresión no muy grave pueden mejorar si usan medicinas o hablan con un consejero. Las personas que tienen depresión grave generalmente necesitan medicinas para sentirse mejor y es posible que también deban consultar a un consejero.  Otro tratamiento consiste en colocar un dispositivo en contacto con el cuero cabelludo para transmitir ondas magnéticas al cerebro. Heron se llama \"estimulación magnética transcraneal\" (\"EMT\"). Los médicos podrían sugerir que reciba estimulación " "magnética transcraneal si las medicinas y la terapia no basurto sido de ayuda.  Algunas personas con depresión grave podrían necesitar un tratamiento llamado \"terapia electroconvulsiva\" (\"DURGA\"). En la terapia electroconvulsiva, los médicos transmiten ade corriente eléctrica al cerebro del paciente de manera abreu.  ¿Cuándo me sentiré mejor? -- La mayoría de las opciones de tratamiento tardan un poco en hacer efecto.   Muchas personas que usan medicinas empiezan a sentirse mejor en 2 semanas, karmen pueden pasar entre 4 y 8 semanas hasta que la medicina alberto pleno efecto.   Muchas personas que consultan a un consejero empiezan a sentirse mejor a las pocas semanas, karmen pueden pasar entre 8 y 10 semanas hasta que se vean mayores beneficios.  Si el primer tratamiento que prueba no le da resultado, avísele a pappas médico o enfermero, karmen no se rinda. Algunas personas necesitan probar distintos tratamientos o combinaciones de tratamientos antes de hallar un método que funcione. Pappas médico, enfermero o consejero puede hablar con usted para hallar el tratamiento indicado. También puede ayudarlo a lidiar con el problema mientras busca el tratamiento adecuado o espera a que frederick alberto efecto.  ¿Cómo decido qué tratamiento seguir? -- Usted y pappas médico o enfermero deben trabajar juntos para elegir pappas tratamiento. Las medicinas podrían hacer efecto un poco más rápido que la terapia psicológica, karmen también pueden causar efectos secundarios. Además, a algunas personas no les gusta la idea de mary medicinas.  Consultar a un consejero implica hablar de frances sentimientos. [place] también es difícil para algunas personas.  ¿Qué ocurre si juarez medicinas para la depresión y quiero tener un bebé? -- Algunas medicinas para la depresión pueden causar problemas en el feto, karmen ade depresión no tratada también puede causar problemas martin el embarazo. Si quiere quedar embarazada, hable con pappas médico, karmen no deje de mary frances medicinas. Juntos " "pueden planificar la manera más abreu de tener un bebé.  También es importante que hable con pappas médico si desea amamantar a pappas bebé. Amamantar tiene muchos beneficios para la madre y para el bebé. Algunas medicinas para la depresión son más seguras de usar que otras mientras se amamanta, karmen tener ade depresión sin tratar después de clark a christiano también puede causar problemas, así que no deje de mary frances medicinas. El médico puede trabajar con usted para planificar la forma más abreu de alimentar a pappas bebé.  Todos los artículos se actualizan a medida que se descubre nueva evidencia y culmina nuestro proceso de evaluación por homólogos   Soco artículo se recuperó de UpToDate el: Mar 06, 2024.  Artículo 94606 Versión 22.0.es-419.1  Release: 32.2.4 - C32.64  © 2024 UpToDate, Inc. Todos los derechos reservados.  figura 1: Trastornos del estado de ánimo causados por problemas en el cerebro     Los trastornos del estado de ánimo, ricky la depresión y el trastorno bipolar, se deben a problemas en los \"neurotransmisores\", unas sustancias químicas del cerebro que pueden afectar las emociones. Parece que los tratamientos para los trastornos del estado de ánimo modifican los niveles de ciertos neurotransmisores.  Gráfico 91584 Versión 4.0  Exención de responsabilidad y uso de la información del consumidor   Descargo de responsabilidad: esta información generalizada es un resumen limitado de información sobre el diagnóstico, el tratamiento y/o los medicamentos. No pretende ser exhaustiva y se debe utilizar ricky herramienta para ayudar al usuario a comprender y/o evaluar las posibles opciones de diagnóstico y tratamiento. No incluye toda la información sobre afecciones, tratamientos, medicamentos, efectos secundarios o riesgos puedan ser aplicables a un paciente específico. No tiene el propósito de servir ricky recomendación médica ni de sustituir la recomendación médica, el diagnóstico o el tratamiento de un profesional de " atención médica que se base en el examen y la evaluación de frederick profesional de la gurmeet respecto a las circunstancias específicas y únicas del paciente. Los pacientes deben hablar con un profesional de atención médica para obtener información completa sobre pappas gurmeet, cuestiones médicas y opciones de tratamiento, incluidos los riesgos o los beneficios relacionados con el uso de medicamentos. Esta información no certifica que los tratamientos o medicamentos tremayne seguros, eficaces o estén aprobados para tratar a un paciente específico. "[employer], Inc. y frances afiliados renuncian a cualquier garantía o responsabilidad relacionada con esta información o el uso de la misma.El uso de esta información está sujeto a las Condiciones de uso, disponibles en https://www.[employer]ex[employer].com/en/know/clinical-effectiveness-terms. 2024© "[employer]te[employer] Inc[employer] y frances afiliados y/o licenciantes. Todos los derechos reservados.  Copyright   © 2024 "[employer]te[employer] Inc. Todos los derechos reservados.

## 2024-09-05 RX ORDER — BUPROPION HYDROCHLORIDE 300 MG/1
300 TABLET ORAL DAILY
Qty: 90 TABLET | Refills: 0 | Status: SHIPPED | OUTPATIENT
Start: 2024-09-05

## 2024-09-05 RX ORDER — BUPROPION HYDROCHLORIDE 150 MG/1
150 TABLET ORAL EVERY MORNING
Qty: 90 TABLET | Refills: 0 | Status: SHIPPED | OUTPATIENT
Start: 2024-09-05

## 2024-10-10 DIAGNOSIS — F32.A ANXIETY AND DEPRESSION: ICD-10-CM

## 2024-10-10 DIAGNOSIS — F41.9 ANXIETY AND DEPRESSION: ICD-10-CM

## 2024-10-11 DIAGNOSIS — I45.10 RBBB: ICD-10-CM

## 2024-10-11 DIAGNOSIS — I35.9 AORTIC VALVE DISEASE: ICD-10-CM

## 2024-10-11 DIAGNOSIS — I34.0 MITRAL VALVE INSUFFICIENCY, UNSPECIFIED ETIOLOGY: ICD-10-CM

## 2024-10-11 DIAGNOSIS — I10 ESSENTIAL (PRIMARY) HYPERTENSION: ICD-10-CM

## 2024-10-11 DIAGNOSIS — E78.00 PURE HYPERCHOLESTEROLEMIA: ICD-10-CM

## 2024-10-11 RX ORDER — LOSARTAN POTASSIUM 100 MG/1
100 TABLET ORAL DAILY
Qty: 90 TABLET | Refills: 1 | Status: SHIPPED | OUTPATIENT
Start: 2024-10-11

## 2024-10-11 RX ORDER — BUPROPION HYDROCHLORIDE 150 MG/1
TABLET ORAL
Qty: 90 TABLET | Refills: 1 | Status: SHIPPED | OUTPATIENT
Start: 2024-10-11

## 2024-10-17 ENCOUNTER — TELEPHONE (OUTPATIENT)
Age: 79
End: 2024-10-17

## 2024-11-07 DIAGNOSIS — R26.2 AMBULATORY DYSFUNCTION: ICD-10-CM

## 2024-11-07 DIAGNOSIS — U07.1 COVID-19: ICD-10-CM

## 2024-11-07 RX ORDER — BENZONATATE 100 MG/1
100 CAPSULE ORAL 3 TIMES DAILY PRN
Qty: 60 CAPSULE | Refills: 1 | Status: SHIPPED | OUTPATIENT
Start: 2024-11-07

## 2024-11-08 RX ORDER — LIDOCAINE 50 MG/G
1 PATCH TOPICAL DAILY
Qty: 14 PATCH | Refills: 0 | Status: SHIPPED | OUTPATIENT
Start: 2024-11-08

## 2024-11-29 DIAGNOSIS — E03.9 HYPOTHYROIDISM, UNSPECIFIED TYPE: ICD-10-CM

## 2024-11-29 DIAGNOSIS — E78.5 DYSLIPIDEMIA: ICD-10-CM

## 2024-11-29 RX ORDER — ATORVASTATIN CALCIUM 20 MG/1
20 TABLET, FILM COATED ORAL DAILY
Qty: 90 TABLET | Refills: 1 | Status: SHIPPED | OUTPATIENT
Start: 2024-11-29

## 2024-11-29 RX ORDER — LEVOTHYROXINE SODIUM 88 MCG
88 TABLET ORAL DAILY
Qty: 90 TABLET | Refills: 1 | Status: SHIPPED | OUTPATIENT
Start: 2024-11-29

## 2024-12-31 DIAGNOSIS — J45.51 SEVERE PERSISTENT ASTHMA WITH ACUTE EXACERBATION: ICD-10-CM

## 2024-12-31 DIAGNOSIS — J45.901 ACUTE ASTHMA EXACERBATION: ICD-10-CM

## 2024-12-31 RX ORDER — FLUTICASONE FUROATE AND VILANTEROL 200; 25 UG/1; UG/1
1 POWDER RESPIRATORY (INHALATION) DAILY
Qty: 60 BLISTER | Refills: 5 | Status: SHIPPED | OUTPATIENT
Start: 2024-12-31 | End: 2025-03-31

## 2024-12-31 RX ORDER — DEXTROMETHORPHAN HYDROBROMIDE AND PROMETHAZINE HYDROCHLORIDE 15; 6.25 MG/5ML; MG/5ML
5 SYRUP ORAL 4 TIMES DAILY PRN
Qty: 250 ML | Refills: 0 | Status: SHIPPED | OUTPATIENT
Start: 2024-12-31

## 2025-01-28 DIAGNOSIS — J45.51 SEVERE PERSISTENT ASTHMA WITH ACUTE EXACERBATION: ICD-10-CM

## 2025-01-28 RX ORDER — DEXTROMETHORPHAN HYDROBROMIDE AND PROMETHAZINE HYDROCHLORIDE 15; 6.25 MG/5ML; MG/5ML
5 SYRUP ORAL 4 TIMES DAILY PRN
Qty: 250 ML | Refills: 0 | OUTPATIENT
Start: 2025-01-28

## 2025-01-31 ENCOUNTER — TELEPHONE (OUTPATIENT)
Age: 80
End: 2025-01-31

## 2025-01-31 NOTE — TELEPHONE ENCOUNTER
Daughter called in regards to MyChart message and scheduled patient 2/6 appointment.  NFA needed at this time.

## 2025-02-06 ENCOUNTER — OFFICE VISIT (OUTPATIENT)
Dept: FAMILY MEDICINE CLINIC | Facility: HOSPITAL | Age: 80
End: 2025-02-06
Payer: COMMERCIAL

## 2025-02-06 VITALS
HEIGHT: 60 IN | WEIGHT: 145 LBS | OXYGEN SATURATION: 99 % | DIASTOLIC BLOOD PRESSURE: 69 MMHG | BODY MASS INDEX: 28.47 KG/M2 | TEMPERATURE: 97.8 F | SYSTOLIC BLOOD PRESSURE: 124 MMHG | HEART RATE: 67 BPM

## 2025-02-06 DIAGNOSIS — J45.50 SEVERE PERSISTENT ASTHMA WITHOUT COMPLICATION: ICD-10-CM

## 2025-02-06 DIAGNOSIS — N18.31 CHRONIC KIDNEY DISEASE, STAGE 3A (HCC): ICD-10-CM

## 2025-02-06 DIAGNOSIS — R05.2 SUBACUTE COUGH: Primary | ICD-10-CM

## 2025-02-06 DIAGNOSIS — F33.9 DEPRESSION, RECURRENT (HCC): ICD-10-CM

## 2025-02-06 PROCEDURE — 99214 OFFICE O/P EST MOD 30 MIN: CPT | Performed by: INTERNAL MEDICINE

## 2025-02-06 PROCEDURE — G2211 COMPLEX E/M VISIT ADD ON: HCPCS | Performed by: INTERNAL MEDICINE

## 2025-02-06 RX ORDER — METHYLPREDNISOLONE 4 MG/1
TABLET ORAL
Qty: 21 EACH | Refills: 0 | Status: SHIPPED | OUTPATIENT
Start: 2025-02-06

## 2025-02-06 NOTE — ASSESSMENT & PLAN NOTE
Lab Results   Component Value Date    EGFR 34 07/26/2024    EGFR 54 07/12/2024    EGFR 47 06/18/2024    CREATININE 1.05 08/19/2024    CREATININE 1.42 (H) 07/26/2024    CREATININE 0.98 07/12/2024   Encouraged fluids and urged to do BW prior to AWV next month, on an ARB, will follow

## 2025-02-06 NOTE — PROGRESS NOTES
Name: Cindy Cruz      : 1945      MRN: [id number]  Encounter Provider: Any Vargas DO  Encounter Date: 2025   Encounter department: [facility] [address]   :  Assessment & Plan  Subacute cough  D/dx reviewed: (1) cardiac/orthopnea - no other s/sx of HF and just had TTE and BRENDON summer 24 - Lasix on hold for now - will reach out to Cardio to discuss utility of repeating Echo so soon - call with red flag CV symptoms (2) Pulm/asthma - has cough and SOB and increase in rescue inhaler use - will check CT chest and trial Medrol dose pack, just had PFT's less then a year ago, con't current Breo Elliptia, call with red flag Pulm symptoms (3) medications - is on an ARB - discussed less likely to cause cough then an ACE but still poss - T/C changing ARB to alternative agent (4) GERD - currently on PPI once daily and denies reflux symptoms so this is less likely (5) Upper airwary cough syndrome/post nasal drip - denies any congestion/runny nose/cold symptoms - T/C antihistamine/nasal steroid in future if above w/u neg  Orders:    CT chest wo contrast; Future    methylPREDNISolone 4 MG tablet therapy pack; Use as directed on package    Severe persistent asthma without complication  Cough and increase in rescue inahler use - can be sign of early asthma exacerbation - trial of medrol dose pack - SE reviewed, call with new/worse symptoms, con't daily Breo Ellipta, just had PFT's 3/24, will follow   Orders:    methylPREDNISolone 4 MG tablet therapy pack; Use as directed on package    Depression, recurrent (HCC)  Mood seemingly better today, con't current meds, call with new/worse mood       Chronic kidney disease, stage 3a (HCC)  Lab Results   Component Value Date    EGFR 34 2024    EGFR 54 2024    EGFR 47 2024    CREATININE 1.05 2024    CREATININE 1.42 (H) 2024    CREATININE 0.98 2024   Encouraged fluids and urged to do BW prior to AWV next month,  on an ARB, will follow          Colonoscopy 7/24 - no further needed d/t age     Mammo 12/23 - order given for 12/24    Dexa 1/24 - osteopenia     BW 6/24 (A1C 5.9) - order given for 12/24    Addendum:     MD Any Gonzalez, DO  Good afternoon.  I agree with your concern about ordering an echo given done recently.  Lets see what the CAT scan shows.  You could also send a BNP as this would likely be up if the patient has CHF.  Thank you.      History of Present Illness   HPI Pt here for an acute visit    Pt noting dry cough since Nov.  She notes no associated resp illness at that time it just started with a dry cough. Cough is worse at night but does occur less frequently during the day. The cough is worse with laying down.  She notes some SOB and wheezing. She notes no changes/worsening in her CP but con't to have CP. She notes intermittent palp do occur with the cough. She notes no congestion/runny nose/ST/ear pain.  She notes no significant GERD/reflux/abd pain.  She has had some wgt loss - 5 lbs down from Sept w/o trying to lose wgt. She relates this to decrease in an appetite. She notes no abd pain/difficulty swallowing/pain with swallowing/blood in stools.  She has had some issues with constipation recently but today actually had some loose stools. She notes no swollen glands/night sweats.  She is taking her Breo Ellipta daily.  She is using her rescue inhaler at night approx 2-3 x's a week.          Review of Systems   Constitutional:  Positive for appetite change and unexpected weight change. Negative for chills and fever.   HENT:  Negative for congestion, ear pain, postnasal drip, rhinorrhea, sore throat and trouble swallowing.    Eyes:  Negative for pain and visual disturbance.   Respiratory:  Positive for cough and shortness of breath. Negative for wheezing.    Cardiovascular:  Positive for chest pain and palpitations. Negative for leg swelling.   Gastrointestinal:  Positive  for constipation and diarrhea. Negative for abdominal pain, blood in stool, nausea and vomiting.   Genitourinary:  Negative for difficulty urinating and dysuria.   Musculoskeletal:  Positive for arthralgias and back pain.   Skin:  Negative for rash and wound.   Neurological:  Negative for dizziness and headaches.   Hematological:  Negative for adenopathy.   Psychiatric/Behavioral:  Positive for dysphoric mood. Negative for confusion.        Objective   /69 (BP Location: Left arm, Patient Position: Sitting, Cuff Size: Standard)   Pulse 67   Temp 97.8 °F (36.6 °C) (Tympanic)   Ht 5' (1.524 m)   Wt 65.8 kg (145 lb)   SpO2 99%   BMI 28.32 kg/m²      Physical Exam  Vitals and nursing note reviewed.   Constitutional:       General: She is not in acute distress.     Appearance: She is well-developed. She is not ill-appearing.   HENT:      Head: Normocephalic and atraumatic.      Right Ear: External ear normal. There is impacted cerumen.      Left Ear: External ear normal. There is impacted cerumen.      Mouth/Throat:      Mouth: Mucous membranes are moist.      Pharynx: Oropharynx is clear. No oropharyngeal exudate.   Eyes:      General:         Right eye: No discharge.         Left eye: No discharge.      Conjunctiva/sclera: Conjunctivae normal.   Neck:      Thyroid: No thyromegaly.      Trachea: No tracheal deviation.   Cardiovascular:      Rate and Rhythm: Normal rate and regular rhythm.      Heart sounds: Murmur heard.   Pulmonary:      Effort: Pulmonary effort is normal. No respiratory distress.      Breath sounds: Normal breath sounds. No wheezing, rhonchi or rales.   Abdominal:      General: There is no distension.      Palpations: Abdomen is soft.      Tenderness: There is no abdominal tenderness. There is no guarding or rebound.   Musculoskeletal:         General: No deformity or signs of injury.      Cervical back: Neck supple.   Lymphadenopathy:      Cervical: Cervical adenopathy present.   Skin:      General: Skin is warm and dry.      Coloration: Skin is not pale.      Findings: No rash.   Neurological:      General: No focal deficit present.      Mental Status: She is alert. Mental status is at baseline.      Motor: No abnormal muscle tone.      Gait: Gait normal.   Psychiatric:         Mood and Affect: Mood normal.         Behavior: Behavior normal.         Thought Content: Thought content normal.         Judgment: Judgment normal.      Comments: Quiet but smiling and  more interactive today

## 2025-02-06 NOTE — ASSESSMENT & PLAN NOTE
Cough and increase in rescue inahler use - can be sign of early asthma exacerbation - trial of medrol dose pack - SE reviewed, call with new/worse symptoms, con't daily Jen Coe, just had PFT's 3/24, will follow   Orders:    methylPREDNISolone 4 MG tablet therapy pack; Use as directed on package

## 2025-02-07 DIAGNOSIS — R06.02 SOB (SHORTNESS OF BREATH): Primary | ICD-10-CM

## 2025-02-10 ENCOUNTER — APPOINTMENT (OUTPATIENT)
Dept: LAB | Facility: HOSPITAL | Age: 80
End: 2025-02-10
Payer: COMMERCIAL

## 2025-02-10 ENCOUNTER — RESULTS FOLLOW-UP (OUTPATIENT)
Dept: FAMILY MEDICINE CLINIC | Facility: HOSPITAL | Age: 80
End: 2025-02-10

## 2025-02-10 DIAGNOSIS — R94.6 ABNORMAL RADIONUCLIDE SCAN OF PARATHYROID GLAND: ICD-10-CM

## 2025-02-10 DIAGNOSIS — R06.02 SOB (SHORTNESS OF BREATH): ICD-10-CM

## 2025-02-10 DIAGNOSIS — R73.01 IFG (IMPAIRED FASTING GLUCOSE): ICD-10-CM

## 2025-02-10 DIAGNOSIS — E83.52 HYPERCALCEMIA: ICD-10-CM

## 2025-02-10 DIAGNOSIS — E03.9 ACQUIRED HYPOTHYROIDISM: ICD-10-CM

## 2025-02-10 DIAGNOSIS — R79.89 HIGH SERUM PARATHYROID HORMONE (PTH): ICD-10-CM

## 2025-02-10 LAB
ALBUMIN SERPL BCG-MCNC: 3.9 G/DL (ref 3.5–5)
ALP SERPL-CCNC: 73 U/L (ref 34–104)
ALT SERPL W P-5'-P-CCNC: 20 U/L (ref 7–52)
ANION GAP SERPL CALCULATED.3IONS-SCNC: 7 MMOL/L (ref 4–13)
AST SERPL W P-5'-P-CCNC: 18 U/L (ref 13–39)
BASOPHILS # BLD AUTO: 0.03 THOUSANDS/ΜL (ref 0–0.1)
BASOPHILS NFR BLD AUTO: 0 % (ref 0–1)
BILIRUB SERPL-MCNC: 0.36 MG/DL (ref 0.2–1)
BNP SERPL-MCNC: 552 PG/ML (ref 0–100)
BUN SERPL-MCNC: 16 MG/DL (ref 5–25)
CALCIUM SERPL-MCNC: 10.1 MG/DL (ref 8.4–10.2)
CHLORIDE SERPL-SCNC: 107 MMOL/L (ref 96–108)
CO2 SERPL-SCNC: 26 MMOL/L (ref 21–32)
CREAT SERPL-MCNC: 0.86 MG/DL (ref 0.6–1.3)
EOSINOPHIL # BLD AUTO: 0.09 THOUSAND/ΜL (ref 0–0.61)
EOSINOPHIL NFR BLD AUTO: 1 % (ref 0–6)
ERYTHROCYTE [DISTWIDTH] IN BLOOD BY AUTOMATED COUNT: 15.2 % (ref 11.6–15.1)
EST. AVERAGE GLUCOSE BLD GHB EST-MCNC: 120 MG/DL
GFR SERPL CREATININE-BSD FRML MDRD: 63 ML/MIN/1.73SQ M
GLUCOSE P FAST SERPL-MCNC: 88 MG/DL (ref 65–99)
HBA1C MFR BLD: 5.8 %
HCT VFR BLD AUTO: 35.7 % (ref 34.8–46.1)
HGB BLD-MCNC: 11.4 G/DL (ref 11.5–15.4)
IMM GRANULOCYTES # BLD AUTO: 0.05 THOUSAND/UL (ref 0–0.2)
IMM GRANULOCYTES NFR BLD AUTO: 1 % (ref 0–2)
LYMPHOCYTES # BLD AUTO: 2.38 THOUSANDS/ΜL (ref 0.6–4.47)
LYMPHOCYTES NFR BLD AUTO: 29 % (ref 14–44)
MCH RBC QN AUTO: 30.3 PG (ref 26.8–34.3)
MCHC RBC AUTO-ENTMCNC: 31.9 G/DL (ref 31.4–37.4)
MCV RBC AUTO: 95 FL (ref 82–98)
MONOCYTES # BLD AUTO: 0.46 THOUSAND/ΜL (ref 0.17–1.22)
MONOCYTES NFR BLD AUTO: 6 % (ref 4–12)
NEUTROPHILS # BLD AUTO: 5.35 THOUSANDS/ΜL (ref 1.85–7.62)
NEUTS SEG NFR BLD AUTO: 63 % (ref 43–75)
NRBC BLD AUTO-RTO: 0 /100 WBCS
PHOSPHATE SERPL-MCNC: 3.3 MG/DL (ref 2.3–4.1)
PLATELET # BLD AUTO: 217 THOUSANDS/UL (ref 149–390)
PMV BLD AUTO: 12.2 FL (ref 8.9–12.7)
POTASSIUM SERPL-SCNC: 4.4 MMOL/L (ref 3.5–5.3)
PROT SERPL-MCNC: 6.6 G/DL (ref 6.4–8.4)
PTH-INTACT SERPL-MCNC: 94.7 PG/ML (ref 12–88)
RBC # BLD AUTO: 3.76 MILLION/UL (ref 3.81–5.12)
SODIUM SERPL-SCNC: 140 MMOL/L (ref 135–147)
TSH SERPL DL<=0.05 MIU/L-ACNC: 1.42 UIU/ML (ref 0.45–4.5)
WBC # BLD AUTO: 8.36 THOUSAND/UL (ref 4.31–10.16)

## 2025-02-10 PROCEDURE — 83036 HEMOGLOBIN GLYCOSYLATED A1C: CPT

## 2025-02-10 PROCEDURE — 84100 ASSAY OF PHOSPHORUS: CPT

## 2025-02-10 PROCEDURE — 83880 ASSAY OF NATRIURETIC PEPTIDE: CPT

## 2025-02-10 PROCEDURE — 80053 COMPREHEN METABOLIC PANEL: CPT

## 2025-02-10 PROCEDURE — 85025 COMPLETE CBC W/AUTO DIFF WBC: CPT

## 2025-02-10 PROCEDURE — 36415 COLL VENOUS BLD VENIPUNCTURE: CPT

## 2025-02-10 PROCEDURE — 84443 ASSAY THYROID STIM HORMONE: CPT

## 2025-02-10 PROCEDURE — 83970 ASSAY OF PARATHORMONE: CPT

## 2025-02-11 DIAGNOSIS — I35.1 NONRHEUMATIC AORTIC VALVE INSUFFICIENCY: ICD-10-CM

## 2025-02-11 RX ORDER — FUROSEMIDE 40 MG/1
40 TABLET ORAL DAILY PRN
Qty: 30 TABLET | Refills: 1 | Status: SHIPPED | OUTPATIENT
Start: 2025-02-11

## 2025-02-16 NOTE — PROGRESS NOTES
Cardiology Follow Up    Cindy Cruz  1945  [id number]  [facility] [place]  [address]  [place] [address] [phone]    1. Essential (primary) hypertension  furosemide (LASIX) 20 mg tablet    potassium chloride (Klor-Con M10) 10 mEq tablet    Basic metabolic panel    Echo complete w/ contrast if indicated      2. Pure hypercholesterolemia  furosemide (LASIX) 20 mg tablet    potassium chloride (Klor-Con M10) 10 mEq tablet    Basic metabolic panel    Echo complete w/ contrast if indicated      3. Mitral valve insufficiency, unspecified etiology  furosemide (LASIX) 20 mg tablet    potassium chloride (Klor-Con M10) 10 mEq tablet    Basic metabolic panel    Echo complete w/ contrast if indicated      4. RBBB  furosemide (LASIX) 20 mg tablet    potassium chloride (Klor-Con M10) 10 mEq tablet    Basic metabolic panel    Echo complete w/ contrast if indicated      5. Nonrheumatic aortic valve insufficiency  potassium chloride (Klor-Con M20) 20 mEq tablet    furosemide (LASIX) 20 mg tablet    potassium chloride (Klor-Con M10) 10 mEq tablet    Basic metabolic panel    Echo complete w/ contrast if indicated    DISCONTINUED: furosemide (LASIX) 40 mg tablet      6. Chronic kidney disease, stage 3a (HCC)  furosemide (LASIX) 20 mg tablet    potassium chloride (Klor-Con M10) 10 mEq tablet    Basic metabolic panel    Echo complete w/ contrast if indicated        Interval History: Patient is here for f/u. Patient has HTN, HLD and DM.  Pharmacologic nuclear stress test 2/2022 demonstrated no ischemia.  Echocardiogram 9/2022 demonstrated LVEF of 70%.  There was moderate LAE.  There was mild mixed AVD  There was MAC with moderate MR. Echo 7/16/2024 showed LVEF of 65% with severe MR with moderate AI and severe LAE. 3D BRENDON 8/7/2024 ordered to provide better visualization showed moderate MR/AI. Lasix switched to PRN given rise in creat.  BMP  8/19/2024 showed drop in creatinine to 1.05 from 1.42.  Potassium is WNL. Patient's daughter is here to translate today.  Patient has had no CP or significant dyspnea.  Patient's HTN and HLD are stable on her current regimen.  Patient had BW 2/10/2025.  Her BNP was noted to be up.  Will restart low-dose furosemide and potassium.    Patient Active Problem List   Diagnosis   • Tremor   • Atrophic vaginitis   • Osteoporosis   • Obesity   • Migraine headache   • Hypothyroidism   • Hypertension   • Glaucoma   • Esophageal reflux   • Dyslipidemia   • Cataract   • Severe persistent asthma without complication   • Lumbar spondylosis   • IFG (impaired fasting glucose)   • Chronic pain syndrome   • Acute bilateral low back pain with left-sided sciatica   • Lumbar post-laminectomy syndrome   • Acute bilateral low back pain   • Chronic pain of left knee   • Primary osteoarthritis of left knee   • Gait abnormality   • Weakness of left leg   • Anserine bursitis   • Osteopenia   • Depression, recurrent ([facility])   • Hypercalcemia   • Generalized anxiety disorder   • Rib pain on left side   • Intercostal neuropathy   • Constipation   • Vitamin D deficiency   • Insomnia   • SAGE (obstructive sleep apnea)   • Daytime somnolence   • Malfunction of spinal cord stimulator (HCC)   • Chronic kidney disease, stage 3a ([facility])   • Vitamin B12 deficiency   • Dysphagia   • Ambulatory dysfunction   • High serum parathyroid hormone (PTH)   • Severe mitral regurgitation   • Nonrheumatic aortic valve stenosis     Past Medical History:   Diagnosis Date   • Dyslipidemia    • Esophageal reflux    • Frequent headaches     stress related   • Headache(784.0)    • Heart murmur    • History of stomach ulcers    • Hypercalcemia    • Hypertension    • Osteopenia    • Tremor      Social History     Socioeconomic History   • Marital status:      Spouse name: Not on file   • Number of children: Not on file   • Years of education: Not on file   • Highest  education level: Not on file   Occupational History   • Occupation: unemployed   Tobacco Use   • Smoking status: Former     Current packs/day: 0.00     Types: Cigarettes     Start date: 1988     Quit date: 1991     Years since quittin.8   • Smokeless tobacco: Never   Vaping Use   • Vaping status: Never Used   Substance and Sexual Activity   • Alcohol use: Not Currently   • Drug use: Never   • Sexual activity: Not Currently     Partners: Male     Birth control/protection: Abstinence   Other Topics Concern   • Not on file   Social History Narrative   • Not on file     Social Drivers of Health     Financial Resource Strain: Low Risk  (2023)    Overall Financial Resource Strain (CARDIA)    • Difficulty of Paying Living Expenses: Not hard at all   Food Insecurity: No Food Insecurity (6/10/2024)    Nursing - Inadequate Food Risk Classification    • Worried About Running Out of Food in the Last Year: Never true    • Ran Out of Food in the Last Year: Never true    • Ran Out of Food in the Last Year: Not on file   Transportation Needs: No Transportation Needs (6/10/2024)    PRAPARE - Transportation    • Lack of Transportation (Medical): No    • Lack of Transportation (Non-Medical): No   Physical Activity: Not on file   Stress: Not on file   Social Connections: Not on file   Intimate Partner Violence: Not on file   Housing Stability: Low Risk  (6/10/2024)    Housing Stability Vital Sign    • Unable to Pay for Housing in the Last Year: No    • Number of Times Moved in the Last Year: 1    • Homeless in the Last Year: No      Family History   Problem Relation Age of Onset   • Thyroid disease unspecified Mother         post thyroidectomy   • Glaucoma Father    • No Known Problems Sister    • No Known Problems Sister    • No Known Problems Sister    • Depression Sister    • No Known Problems Sister    • No Known Problems Maternal Grandmother    • No Known Problems Maternal Grandfather    • No Known Problems  Paternal Grandmother    • No Known Problems Paternal Grandfather    • Hypertension Family    • Stroke Family    • Heart disease Family    • Thyroid disease Family    • Thyroid disease unspecified Daughter         post thyroidectomy   • No Known Problems Daughter    • No Known Problems Daughter    • Colon cancer Neg Hx    • Colon polyps Neg Hx      Past Surgical History:   Procedure Laterality Date   • BACK SURGERY      lower back   • BREAST BIOPSY Right     long ago, benign   •  SECTION      x 3   • CHOLECYSTECTOMY     • COLONOSCOPY     • HYSTERECTOMY      age 36 per MRS   • JOINT REPLACEMENT Left     knee   • OOPHORECTOMY Bilateral     age 36 per MRS   • WV WEBB IMPLTJ NSTIM ELTRDS PLATE/PADDLE EDRL Left 2019    Procedure: LAMINECTOMY THORACIC FOR INSERTION DORSAL COLUMN SPINAL CORD STIMULATOR (DCS) W IMPLANTABLE PULSE GENERATOR, LEFT GLUTE;  Surgeon: Lg Mccallum MD;  Location: [facility];  Service: Neurosurgery   • WV REVJ/RMVL IMPL SPI NPG/RCVR DTCH CONNJ ELTRD RA Left 2022    Procedure: Reopening of thoracic and left buttock incisions for removal of spinal cord stimulator system;  Surgeon: Siva Goss MD;  Location: [facility];  Service: Neurosurgery   • ROTATOR CUFF REPAIR Right    • SPINAL STIMULATOR PLACEMENT N/A 2019    Procedure: REVISION SPINAL CORD STIMULATOR PADDLE ELECTRODE, REPLACEMENT WITH PERCUTANEOUS ELECTRODES OR SMALLER PADDLE;  Surgeon: Lg Mccallum MD;  Location: [facility];  Service: Neurosurgery       Current Outpatient Medications:   •  albuterol (PROVENTIL HFA,VENTOLIN HFA) 90 mcg/act inhaler, INHALE 2 PUFFS BY MOUTH EVERY 6 HOURS AS NEEDED FOR WHEEZING, Disp: 18 g, Rfl: 5  •  alendronate (FOSAMAX) 70 mg tablet, Take 1 tablet by mouth once a week, Disp: 12 tablet, Rfl: 2  •  atorvastatin (LIPITOR) 20 mg tablet, Take 1 tablet by mouth once daily, Disp: 90 tablet, Rfl: 1  •  benzonatate (TESSALON PERLES) 100 mg capsule, Take 1 capsule (100 mg total) by mouth 3  (three) times a day as needed for cough, Disp: 60 capsule, Rfl: 1  •  bimatoprost (Lumigan) 0.01 % ophthalmic drops, Administer 1 drop to both eyes daily, Disp: 5 mL, Rfl: 0  •  buPROPion (WELLBUTRIN XL) 150 mg 24 hr tablet, TAKE 1 TABLET BY MOUTH IN THE MORNING ALONG  WITH  300  MG  TABLET  FOR  A  TOTAL  DAILY  DOSE  OF  450  MG, Disp: 90 tablet, Rfl: 1  •  buPROPion (WELLBUTRIN XL) 300 mg 24 hr tablet, Take 1 tablet (300 mg total) by mouth daily, Disp: 90 tablet, Rfl: 0  •  Cholecalciferol (VITAMIN D-3 PO), Take by mouth 1000 Iu daily, Disp: , Rfl:   •  dorzolamide-timolol (COSOPT) 2-0.5 % ophthalmic solution, Administer 1 drop to both eyes 2 (two) times a day, Disp: , Rfl:   •  fluticasone-vilanterol (Breo Ellipta) 200-25 mcg/actuation inhaler, Inhale 1 puff daily Rinse mouth after use., Disp: 60 blister, Rfl: 5  •  furosemide (LASIX) 20 mg tablet, Take 1 tablet (20 mg total) by mouth daily, Disp: 90 tablet, Rfl: 3  •  ipratropium (ATROVENT) 0.02 % nebulizer solution, Take 2.5 mL (0.5 mg total) by nebulization every 6 (six) hours as needed for wheezing or shortness of breath, Disp: 240 mL, Rfl: 1  •  levalbuterol (XOPENEX) 1.25 mg/3 mL nebulizer solution, Take 3 mL (1.25 mg total) by nebulization every 6 (six) hours as needed for wheezing or shortness of breath, Disp: 240 mL, Rfl: 1  •  lidocaine (LIDODERM) 5 %, Apply 1 patch topically over 12 hours daily Remove & Discard patch within 12 hours or as directed by MD, Disp: 14 patch, Rfl: 0  •  losartan (COZAAR) 100 MG tablet, Take 1 tablet by mouth once daily, Disp: 90 tablet, Rfl: 1  •  montelukast (SINGULAIR) 10 mg tablet, Take 1 tablet (10 mg total) by mouth daily at bedtime, Disp: 90 tablet, Rfl: 1  •  omeprazole (PriLOSEC) 40 MG capsule, Take 1 capsule by mouth once daily, Disp: 90 capsule, Rfl: 0  •  potassium chloride (Klor-Con M10) 10 mEq tablet, Take 1 tablet (10 mEq total) by mouth daily, Disp: 90 tablet, Rfl: 3  •  potassium chloride (Klor-Con M20) 20  mEq tablet, Take 1 tablet (20 mEq total) by mouth daily, Disp: 90 tablet, Rfl: 3  •  Synthroid 88 MCG tablet, Take 1 tablet by mouth once daily, Disp: 90 tablet, Rfl: 1  •  traZODone (DESYREL) 50 mg tablet, TAKE 1 TABLET BY MOUTH ONCE DAILY AT BEDTIME, Disp: 90 tablet, Rfl: 0  •  Diclofenac Sodium (VOLTAREN) 1 %, Apply 2 g topically 3 (three) times a day as needed (joint pain), Disp: 100 g, Rfl: 1  •  methylPREDNISolone 4 MG tablet therapy pack, Use as directed on package (Patient not taking: Reported on 2/26/2025), Disp: 21 each, Rfl: 0  •  promethazine-dextromethorphan (PHENERGAN-DM) 6.25-15 mg/5 mL oral syrup, Take 5 mL by mouth 4 (four) times a day as needed for cough (Patient not taking: Reported on 2/26/2025), Disp: 250 mL, Rfl: 0  Allergies   Allergen Reactions   • Iodinated Contrast Media Anaphylaxis     Contrast Dye       Labs:not applicable  Imaging: No results found.    Review of Systems:  Review of Systems   All other systems reviewed and are negative.      Physical Exam:  /54 (BP Location: Left arm, Patient Position: Sitting, Cuff Size: Standard)   Pulse 68   Ht 5' (1.524 m)   Wt 65.8 kg (145 lb)   BMI 28.32 kg/m²   Physical Exam  Vitals reviewed.   Constitutional:       Appearance: She is well-developed.   HENT:      Head: Normocephalic and atraumatic.   Eyes:      Conjunctiva/sclera: Conjunctivae normal.      Pupils: Pupils are equal, round, and reactive to light.   Cardiovascular:      Rate and Rhythm: Normal rate.      Heart sounds: Murmur heard.   Pulmonary:      Effort: Pulmonary effort is normal.      Breath sounds: Normal breath sounds.   Musculoskeletal:      Cervical back: Normal range of motion and neck supple.   Skin:     General: Skin is warm and dry.   Neurological:      Mental Status: She is alert and oriented to person, place, and time.       Discussion/Summary: We will resume low-dose furosemide and potassium supplement.  Will recheck BMP in several weeks.  I have asked the  patient to call if there is an issue.  I will see her in follow-up in 6 months with follow-up echo prior to that visit.

## 2025-02-23 DIAGNOSIS — K21.9 GERD WITHOUT ESOPHAGITIS: ICD-10-CM

## 2025-02-23 DIAGNOSIS — G47.00 INSOMNIA, UNSPECIFIED TYPE: ICD-10-CM

## 2025-02-23 RX ORDER — TRAZODONE HYDROCHLORIDE 50 MG/1
50 TABLET ORAL
Qty: 90 TABLET | Refills: 0 | Status: SHIPPED | OUTPATIENT
Start: 2025-02-23

## 2025-02-25 RX ORDER — OMEPRAZOLE 40 MG/1
40 CAPSULE, DELAYED RELEASE ORAL DAILY
Qty: 90 CAPSULE | Refills: 0 | Status: SHIPPED | OUTPATIENT
Start: 2025-02-25

## 2025-02-26 ENCOUNTER — OFFICE VISIT (OUTPATIENT)
Dept: CARDIOLOGY CLINIC | Facility: CLINIC | Age: 80
End: 2025-02-26
Payer: COMMERCIAL

## 2025-02-26 VITALS
BODY MASS INDEX: 28.47 KG/M2 | HEART RATE: 68 BPM | SYSTOLIC BLOOD PRESSURE: 132 MMHG | HEIGHT: 60 IN | DIASTOLIC BLOOD PRESSURE: 54 MMHG | WEIGHT: 145 LBS

## 2025-02-26 DIAGNOSIS — I35.1 NONRHEUMATIC AORTIC VALVE INSUFFICIENCY: ICD-10-CM

## 2025-02-26 DIAGNOSIS — N18.31 CHRONIC KIDNEY DISEASE, STAGE 3A (HCC): ICD-10-CM

## 2025-02-26 DIAGNOSIS — I34.0 MITRAL VALVE INSUFFICIENCY, UNSPECIFIED ETIOLOGY: ICD-10-CM

## 2025-02-26 DIAGNOSIS — E78.00 PURE HYPERCHOLESTEROLEMIA: ICD-10-CM

## 2025-02-26 DIAGNOSIS — I45.10 RBBB: ICD-10-CM

## 2025-02-26 DIAGNOSIS — I10 ESSENTIAL (PRIMARY) HYPERTENSION: Primary | ICD-10-CM

## 2025-02-26 PROCEDURE — 99214 OFFICE O/P EST MOD 30 MIN: CPT | Performed by: INTERNAL MEDICINE

## 2025-02-26 RX ORDER — POTASSIUM CHLORIDE 750 MG/1
10 TABLET, EXTENDED RELEASE ORAL DAILY
Qty: 90 TABLET | Refills: 3 | Status: SHIPPED | OUTPATIENT
Start: 2025-02-26

## 2025-02-26 RX ORDER — FUROSEMIDE 20 MG/1
20 TABLET ORAL DAILY
Qty: 90 TABLET | Refills: 3 | Status: SHIPPED | OUTPATIENT
Start: 2025-02-26

## 2025-02-26 RX ORDER — FUROSEMIDE 40 MG/1
40 TABLET ORAL DAILY PRN
Qty: 90 TABLET | Refills: 3 | Status: SHIPPED | OUTPATIENT
Start: 2025-02-26 | End: 2025-02-26

## 2025-02-26 RX ORDER — POTASSIUM CHLORIDE 1500 MG/1
20 TABLET, EXTENDED RELEASE ORAL DAILY
Qty: 90 TABLET | Refills: 3 | Status: SHIPPED | OUTPATIENT
Start: 2025-02-26

## 2025-02-26 NOTE — PATIENT INSTRUCTIONS
Please start Lasix 20 mg/day and potassium 10 mEq/day.  Get blood test done in 2 to 3 weeks.  Have echo done sometime in the next 6 months.  Please call if there is a problem.

## 2025-03-04 ENCOUNTER — HOSPITAL ENCOUNTER (OUTPATIENT)
Dept: CT IMAGING | Facility: HOSPITAL | Age: 80
Discharge: HOME | End: 2025-03-04
Payer: COMMERCIAL

## 2025-03-04 DIAGNOSIS — R05.2 SUBACUTE COUGH: ICD-10-CM

## 2025-03-04 PROCEDURE — 71250 CT THORAX DX C-: CPT

## 2025-03-05 ENCOUNTER — PATIENT MESSAGE (OUTPATIENT)
Dept: FAMILY MEDICINE CLINIC | Facility: HOSPITAL | Age: 80
End: 2025-03-05

## 2025-03-05 ENCOUNTER — APPOINTMENT (OUTPATIENT)
Dept: LAB | Facility: HOSPITAL | Age: 80
End: 2025-03-05
Payer: COMMERCIAL

## 2025-03-05 DIAGNOSIS — I10 ESSENTIAL (PRIMARY) HYPERTENSION: ICD-10-CM

## 2025-03-05 DIAGNOSIS — I45.10 RBBB: ICD-10-CM

## 2025-03-05 DIAGNOSIS — I35.1 NONRHEUMATIC AORTIC VALVE INSUFFICIENCY: ICD-10-CM

## 2025-03-05 DIAGNOSIS — E78.00 PURE HYPERCHOLESTEROLEMIA: ICD-10-CM

## 2025-03-05 DIAGNOSIS — N18.31 CHRONIC KIDNEY DISEASE, STAGE 3A (HCC): ICD-10-CM

## 2025-03-05 DIAGNOSIS — I34.0 MITRAL VALVE INSUFFICIENCY, UNSPECIFIED ETIOLOGY: ICD-10-CM

## 2025-03-05 LAB
ANION GAP SERPL CALCULATED.3IONS-SCNC: 8 MMOL/L (ref 4–13)
BUN SERPL-MCNC: 22 MG/DL (ref 5–25)
CALCIUM SERPL-MCNC: 10.1 MG/DL (ref 8.4–10.2)
CHLORIDE SERPL-SCNC: 107 MMOL/L (ref 96–108)
CO2 SERPL-SCNC: 24 MMOL/L (ref 21–32)
CREAT SERPL-MCNC: 1 MG/DL (ref 0.6–1.3)
GFR SERPL CREATININE-BSD FRML MDRD: 52 ML/MIN/1.73SQ M
GLUCOSE SERPL-MCNC: 80 MG/DL (ref 65–140)
POTASSIUM SERPL-SCNC: 4.8 MMOL/L (ref 3.5–5.3)
SODIUM SERPL-SCNC: 139 MMOL/L (ref 135–147)

## 2025-03-05 PROCEDURE — 80048 BASIC METABOLIC PNL TOTAL CA: CPT

## 2025-03-05 PROCEDURE — 36415 COLL VENOUS BLD VENIPUNCTURE: CPT

## 2025-03-06 ENCOUNTER — OFFICE VISIT (OUTPATIENT)
Dept: FAMILY MEDICINE CLINIC | Facility: HOSPITAL | Age: 80
End: 2025-03-06
Payer: COMMERCIAL

## 2025-03-06 VITALS
HEIGHT: 60 IN | WEIGHT: 143.4 LBS | TEMPERATURE: 97.8 F | DIASTOLIC BLOOD PRESSURE: 62 MMHG | OXYGEN SATURATION: 98 % | HEART RATE: 74 BPM | BODY MASS INDEX: 28.16 KG/M2 | SYSTOLIC BLOOD PRESSURE: 134 MMHG

## 2025-03-06 DIAGNOSIS — J45.50 SEVERE PERSISTENT ASTHMA WITHOUT COMPLICATION: ICD-10-CM

## 2025-03-06 DIAGNOSIS — Z13.31 POSITIVE DEPRESSION SCREENING: ICD-10-CM

## 2025-03-06 DIAGNOSIS — G47.62 NOCTURNAL LEG CRAMPS: Primary | ICD-10-CM

## 2025-03-06 PROCEDURE — 99214 OFFICE O/P EST MOD 30 MIN: CPT | Performed by: INTERNAL MEDICINE

## 2025-03-06 PROCEDURE — G2211 COMPLEX E/M VISIT ADD ON: HCPCS | Performed by: INTERNAL MEDICINE

## 2025-03-06 RX ORDER — FLUTICASONE FUROATE AND VILANTEROL 200; 25 UG/1; UG/1
1 POWDER RESPIRATORY (INHALATION) DAILY
Qty: 60 BLISTER | Refills: 5 | Status: SHIPPED | OUTPATIENT
Start: 2025-03-06 | End: 2025-03-06 | Stop reason: SDUPTHER

## 2025-03-06 RX ORDER — MAGNESIUM 200 MG
200 TABLET ORAL
Qty: 30 TABLET | Refills: 5 | Status: SHIPPED | OUTPATIENT
Start: 2025-03-06

## 2025-03-06 RX ORDER — FLUTICASONE FUROATE AND VILANTEROL 200; 25 UG/1; UG/1
1 POWDER RESPIRATORY (INHALATION) DAILY
Qty: 60 BLISTER | Refills: 5 | Status: SHIPPED | OUTPATIENT
Start: 2025-03-06 | End: 2025-06-04

## 2025-03-06 RX ORDER — CYCLOBENZAPRINE HCL 5 MG
5 TABLET ORAL
Qty: 30 TABLET | Refills: 0 | Status: SHIPPED | OUTPATIENT
Start: 2025-03-06

## 2025-03-06 NOTE — ASSESSMENT & PLAN NOTE
Pt notes no recent resp infections, she needs a refill on Breo - rx sent upon request, call with resp concerns  Orders:    fluticasone-vilanterol (Breo Ellipta) 200-25 mcg/actuation inhaler; Inhale 1 puff daily Rinse mouth after use.

## 2025-03-06 NOTE — PROGRESS NOTES
Name: Cindy Cruz      : 1945      MRN: [id number]  Encounter Provider: Any Vargas DO  Encounter Date: 3/6/2025   Encounter department: [facility] [address]   :  Assessment & Plan  Nocturnal leg cramps  Stretches, keeping hydrated reviewed, hand out given on nocturnal  leg cramps, last K/Ca were normal, last  Mg was low nml, will start Mag 200 mg 1 tab PO qhs, if no better after a week may start low dose cyclobenzaprine, SE/sedation/confusion/fall risk reviewed - STOP rx right away if SE occur, will follow  Orders:    cyclobenzaprine (FLEXERIL) 5 mg tablet; Take 1 tablet (5 mg total) by mouth daily at bedtime as needed for muscle spasms    Magnesium 200 MG TABS; Take 1 tablet (200 mg total) by mouth daily at bedtime    Severe persistent asthma without complication  Pt notes no recent resp infections, she needs a refill on Breo - rx sent upon request, call with resp concerns  Orders:    fluticasone-vilanterol (Breo Ellipta) 200-25 mcg/actuation inhaler; Inhale 1 puff daily Rinse mouth after use.    Positive depression screening  Has appt end of the month, will con't current WBXL for now and re-eval at that time, call with new/worse mood/SI             Depression Screening and Follow-up Plan: Patient's depression screening was positive with a PHQ-9 score of 7.   Patient with underlying depression and was advised to continue current medications as prescribed.     Colonoscopy  - no further needed d/t age     Mammo  - scheduled for     Dexa  - osteopenia     BW  (A1C 5.8)      History of Present Illness   HPI Pt here for an acute visit    Pt noting B/L nocturnal leg cramps for some time. She has had worsening of symptoms and instead of occurring in the inner thigh it is now at the lower calf region.  She states she is drinking enough water during the day and is stretching at night regularly.   She is on Lasix/K-dur daily.  She had nml K/Ca in 3/25 and Mag  was normal 7/24.      Depression screening again +. Pt is taking his WBXL as directed. She has f/u later this month.     Review of Systems   Constitutional:  Positive for fatigue and unexpected weight change. Negative for chills and fever.   Eyes:  Negative for pain and visual disturbance.   Respiratory:  Negative for cough and wheezing.    Cardiovascular:  Negative for chest pain and palpitations.   Gastrointestinal:  Negative for abdominal pain, diarrhea, nausea and vomiting.   Genitourinary:  Negative for difficulty urinating and dysuria.   Musculoskeletal:  Positive for arthralgias and back pain.   Skin:  Negative for rash and wound.   Neurological:  Negative for dizziness and headaches.   Hematological:  Does not bruise/bleed easily.   Psychiatric/Behavioral:  Positive for dysphoric mood.        Objective   /62 (BP Location: Left arm, Patient Position: Sitting, Cuff Size: Standard)   Pulse 74   Temp 97.8 °F (36.6 °C)   Ht 5' (1.524 m)   Wt 65 kg (143 lb 6.4 oz)   SpO2 98%   BMI 28.01 kg/m²      Physical Exam  Vitals and nursing note reviewed.   Constitutional:       General: She is not in acute distress.     Appearance: She is well-developed. She is not ill-appearing.   HENT:      Head: Normocephalic and atraumatic.      Right Ear: External ear normal.      Left Ear: External ear normal.   Eyes:      General:         Right eye: No discharge.         Left eye: No discharge.      Conjunctiva/sclera: Conjunctivae normal.   Neck:      Thyroid: No thyromegaly.      Trachea: No tracheal deviation.   Cardiovascular:      Rate and Rhythm: Normal rate and regular rhythm.      Heart sounds: Normal heart sounds. No murmur heard.  Pulmonary:      Effort: Pulmonary effort is normal. No respiratory distress.      Breath sounds: Normal breath sounds. No wheezing, rhonchi or rales.   Abdominal:      General: There is no distension.      Palpations: Abdomen is soft.      Tenderness: There is no abdominal tenderness.  There is no guarding or rebound.   Musculoskeletal:         General: No deformity or signs of injury.      Cervical back: Neck supple.   Skin:     General: Skin is warm and dry.      Coloration: Skin is not pale.      Findings: No rash.   Neurological:      General: No focal deficit present.      Mental Status: She is alert. Mental status is at baseline.      Motor: No abnormal muscle tone.      Gait: Gait normal.   Psychiatric:         Mood and Affect: Mood normal.         Behavior: Behavior normal.         Thought Content: Thought content normal.         Judgment: Judgment normal.       Depression Screening Follow-up Plan: Patient's depression screening was positive with a PHQ-9 score of 7. Patient with underlying depression and was advised to continue current medications as prescribed.

## 2025-03-06 NOTE — PATIENT INSTRUCTIONS
"Patient Education     Depression in adults   The Basics   Written by the doctors and editors at [facility]   What is depression? -- Depression is a disorder that makes you sad, but it is different from normal sadness. Depression can make it hard for you to work, study, or do everyday tasks.  What causes depression? -- Depression is caused by problems with chemicals in the brain called \"neurotransmitters.\" Some people might be more likely to have depression if it runs in their family. Other things might also play a role, including hormones, certain health problems, medicines, stress, being mistreated as a child, family problems, and problems with friends or at school or work.  How do I know if I am depressed? -- People with depression feel down most of the time for at least 2 weeks. They also have at least 1 of these 2 symptoms:   They no longer enjoy or care about doing the things that they used to like to do.   They feel sad, down, hopeless, or cranky most of the day, almost every day.  People with depression can also have other symptoms. Examples include:   Changes in your appetite or weight. You might eat too little or too much, or gain or lose weight without trying.   Sleeping too much or too little   Feeling tired or like you have no energy   Feeling guilty, helpless, or like you are worth nothing   Trouble with concentration or memory   Acting restless or have trouble staying still, or moving or speaking more slowly than normal   Repeated thoughts of death or killing yourself  If you think that you might be depressed, see your doctor or nurse. Only someone trained in mental health can tell for sure if you are depressed.  How is depression diagnosed? -- Your doctor or nurse will do a physical exam, ask you questions, and might order tests. Depression can have a big impact on your life. Luckily, depression can be treated, and the sooner treatment is started, the better it works.  Get help right away if you are " "thinking of hurting or killing yourself! -- If you ever feel like you might hurt yourself or someone else, help is available:   In the [place], contact the 129 Suicide & Crisis Lifeline:   To speak to someone, call or text 717.   To talk to someone online, go to www.[employer].org/chat.   Call your doctor or nurse, and tell them it is urgent.   Call for an ambulance (in the [place] and [place], call 9-1-1).   Go to the emergency department at the nearest hospital.  What are the treatments for depression? -- Your doctor or nurse will work with you to make a treatment plan. Treatment can include:   Helping you learn more about depression   Counseling (with a psychiatrist, psychologist, nurse, or )   Medicines that relieve depression   Creating a plan to limit access to items that you might use to harm yourself   Other treatments that pass magnetic waves or electricity into the brain  In addition to treatment, getting regular physical activity can also help you feel better.  People with depression that is not too severe can get better by taking medicines or talking with a counselor. People with severe depression usually need medicines to get better, and might also need to see a counselor.  Another treatment involves placing a device against the scalp to pass magnetic waves into the brain. This is called \"transcranial magnetic stimulation\" (\"TMS\"). Doctors might suggest TMS if medicines and counseling have not helped.  Some people with severe depression might need a treatment called \"electroconvulsive therapy\" (\"ECT\"). During ECT, doctors pass an electric current through a person's brain in a safe way.  When will I feel better? -- Most treatment options take a little while to start working.   Many people who take medicines start to feel better within 2 weeks, but it might be 4 to 8 weeks before the medicine has its full effect.   Many people who see a counselor start to feel better within a few weeks, but it might " take 8 to 10 weeks to get the greatest benefit.  If the first treatment you try does not help you, tell your doctor or nurse, but do not give up. Some people need to try different treatments or combinations of treatments before they find an approach that works. Your doctor, nurse, or counselor can work with you to find the treatment that is right for you. They can also help you figure out how to cope while you search for the right treatment or are waiting for your treatment to start working.  How do I decide which treatment to have? -- You and your doctor or nurse will need to work together to choose a treatment for you. Medicines might work a little faster than counseling. But medicines can also cause side effects. Plus, some people do not like the idea of taking medicine.  Seeing a counselor involves talking about your feelings. That is also hard for some people.  What if I take medicine for depression and I want to have a baby? -- Some depression medicines can cause problems for an unborn baby. But having untreated depression during pregnancy can also cause problems. If you want to get pregnant, tell your doctor but do not stop taking your medicines. Together, you can plan the safest way for you to have your baby.  It's also important to talk with your doctor if you want to breastfeed after your baby is born. Breastfeeding has lots of benefits for both mother and baby. Some depression medicines are safer than others to use while breastfeeding. But having untreated depression after giving birth can also cause problems, so do not stop taking your medicines. Your doctor can work with you to plan the safest way for you to feed your baby.  All topics are updated as new evidence becomes available and our peer review process is complete.  This topic retrieved from [email or web address] on: Mar 06, 2024.  Topic 37688 Version 22.0  Release: 32.2.4 - C32.64  © 2024 UpToDate, Inc. and/or its affiliates. All rights reserved.  figure 1:  "Mood disorders caused by problems in the brain     Mooddisorders, such as depression and bipolar disorder, are caused by problems with\"neurotransmitters.\" These are chemicals in the brain that can affect your emotions.Treatments for mood disorders seem to work by changing the levels of certainneurotransmitters.  Graphic 47350 Version 4.0  Consumer Information Use and Disclaimer   Disclaimer: This generalized information is a limited summary of diagnosis, treatment, and/or medication information. It is not meant to be comprehensive and should be used as a tool to help the user understand and/or assess potential diagnostic and treatment options. It does NOT include all information about conditions, treatments, medications, side effects, or risks that may apply to a specific patient. It is not intended to be medical advice or a substitute for the medical advice, diagnosis, or treatment of a health care provider based on the health care provider's examination and assessment of a patient's specific and unique circumstances. Patients must speak with a health care provider for complete information about their health, medical questions, and treatment options, including any risks or benefits regarding use of medications. This information does not endorse any treatments or medications as safe, effective, or approved for treating a specific patient. UpToDate, Inc. and its affiliates disclaim any warranty or liability relating to this information or the use thereof.The use of this information is governed by the Terms of Use, available at https://www.[employer].com/en/know/clinical-effectiveness-terms. 2024© UpToDate, Inc. and its affiliates and/or licensors. All rights reserved.  Copyright   © 2024 UpToDate, Inc. and/or its affiliates. All rights reserved.    "

## 2025-03-09 ENCOUNTER — RESULTS FOLLOW-UP (OUTPATIENT)
Dept: FAMILY MEDICINE CLINIC | Facility: HOSPITAL | Age: 80
End: 2025-03-09

## 2025-03-10 DIAGNOSIS — F32.A ANXIETY AND DEPRESSION: ICD-10-CM

## 2025-03-10 DIAGNOSIS — F41.9 ANXIETY AND DEPRESSION: ICD-10-CM

## 2025-03-11 ENCOUNTER — HOSPITAL ENCOUNTER (OUTPATIENT)
Dept: NON INVASIVE DIAGNOSTICS | Facility: HOSPITAL | Age: 80
Discharge: HOME | End: 2025-03-11
Payer: COMMERCIAL

## 2025-03-11 VITALS
HEIGHT: 60 IN | DIASTOLIC BLOOD PRESSURE: 62 MMHG | WEIGHT: 143 LBS | HEART RATE: 72 BPM | BODY MASS INDEX: 28.07 KG/M2 | SYSTOLIC BLOOD PRESSURE: 134 MMHG

## 2025-03-11 DIAGNOSIS — I45.10 RBBB: ICD-10-CM

## 2025-03-11 DIAGNOSIS — N18.31 CHRONIC KIDNEY DISEASE, STAGE 3A (HCC): ICD-10-CM

## 2025-03-11 DIAGNOSIS — I35.1 NONRHEUMATIC AORTIC VALVE INSUFFICIENCY: ICD-10-CM

## 2025-03-11 DIAGNOSIS — E78.00 PURE HYPERCHOLESTEROLEMIA: ICD-10-CM

## 2025-03-11 DIAGNOSIS — I34.0 MITRAL VALVE INSUFFICIENCY, UNSPECIFIED ETIOLOGY: ICD-10-CM

## 2025-03-11 DIAGNOSIS — I10 ESSENTIAL (PRIMARY) HYPERTENSION: ICD-10-CM

## 2025-03-11 PROCEDURE — 93306 TTE W/DOPPLER COMPLETE: CPT | Performed by: INTERNAL MEDICINE

## 2025-03-11 PROCEDURE — 93306 TTE W/DOPPLER COMPLETE: CPT

## 2025-03-11 RX ORDER — BUPROPION HYDROCHLORIDE 300 MG/1
300 TABLET ORAL DAILY
Qty: 90 TABLET | Refills: 0 | Status: SHIPPED | OUTPATIENT
Start: 2025-03-11

## 2025-03-12 LAB
AORTIC ROOT: 2.9 CM
AORTIC VALVE MEAN VELOCITY: 16.6 M/S
ASCENDING AORTA: 2.8 CM
AV AREA BY CONTINUOUS VTI: 1.4 CM2
AV AREA PEAK VELOCITY: 1.4 CM2
AV LVOT MEAN GRADIENT: 3 MMHG
AV LVOT PEAK GRADIENT: 4 MMHG
AV MEAN PRESS GRAD SYS DOP V1V2: 12 MMHG
AV ORIFICE AREA US: 1.44 CM2
AV PEAK GRADIENT: 21 MMHG
AV REGURGITATION PRESSURE HALF TIME: 494 MS
AV VELOCITY RATIO: 0.46
AV VMAX SYS DOP: 2.31 M/S
BSA FOR ECHO PROCEDURE: 1.62 M2
DOP CALC AO VTI: 50.3 CM
DOP CALC LVOT AREA: 3.14 CM2
DOP CALC LVOT CARDIAC INDEX: 3.07 L/MIN/M2
DOP CALC LVOT CARDIAC OUTPUT: 4.98 L/MIN
DOP CALC LVOT DIAMETER: 2 CM
DOP CALC LVOT PEAK VEL VTI: 22.99 CM
DOP CALC LVOT PEAK VEL: 1.06 M/S
DOP CALC LVOT STROKE INDEX: 45.1 ML/M2
DOP CALC LVOT STROKE VOLUME: 73
DOP CALC MV VTI: 33.82 CM
E WAVE DECELERATION TIME: 420 MS
E/A RATIO: 1.07
FRACTIONAL SHORTENING: 42 (ref 28–44)
INTERVENTRICULAR SEPTUM IN DIASTOLE (PARASTERNAL SHORT AXIS VIEW): 1 CM
INTERVENTRICULAR SEPTUM: 1 CM (ref 0.6–1.1)
LAAS-AP2: 21 CM2
LAAS-AP4: 19.6 CM2
LEFT ATRIUM SIZE: 4.1 CM
LEFT ATRIUM VOLUME (MOD BIPLANE): 59 ML
LEFT ATRIUM VOLUME INDEX (MOD BIPLANE): 36.4 ML/M2
LEFT INTERNAL DIMENSION IN SYSTOLE: 2.6 CM (ref 2.1–4)
LEFT VENTRICULAR INTERNAL DIMENSION IN DIASTOLE: 4.5 CM (ref 3.5–6)
LEFT VENTRICULAR POSTERIOR WALL IN END DIASTOLE: 0.9 CM
LEFT VENTRICULAR STROKE VOLUME: 66 ML
LV EF US.2D.A4C+ESTIMATED: 63 %
LVSV (TEICH): 66 ML
MV E'TISSUE VEL-SEP: 6 CM/S
MV EROA: 0.1 CM2
MV MEAN GRADIENT: 1 MMHG
MV PEAK A VEL: 0.84 M/S
MV PEAK E VEL: 90 CM/S
MV PEAK GRADIENT: 5 MMHG
MV REGURGITANT VOLUME: 15 ML
MV STENOSIS PRESSURE HALF TIME: 122 MS
MV VALVE AREA BY CONTINUITY EQUATION: 2.13 CM2
MV VALVE AREA P 1/2 METHOD: 1.8
PISA MRMAX VEL: 0.6 M/S
PISA RADIUS: 0.4 CM
RA PRESSURE ESTIMATED: 3 MMHG
RIGHT ATRIUM AREA SYSTOLE A4C: 13.3 CM2
RIGHT VENTRICLE ID DIMENSION: 3.2 CM
RV PSP: 27 MMHG
SL CV AV DECELERATION TIME RETROGRADE: 1704 MS
SL CV AV PEAK GRADIENT RETROGRADE: 78 MMHG
SL CV LEFT ATRIUM LENGTH A2C: 5.6 CM
SL CV LV EF: 60
SL CV PED ECHO LEFT VENTRICLE DIASTOLIC VOLUME (MOD BIPLANE) 2D: 91 ML
SL CV PED ECHO LEFT VENTRICLE SYSTOLIC VOLUME (MOD BIPLANE) 2D: 25 ML
SL CV TR MAX PG ANTEGRADE: 23 MMHG
TR MAX PG: 24 MMHG
TR PEAK VELOCITY: 2.5 M/S
TRICUSPID ANNULAR PLANE SYSTOLIC EXCURSION: 2 CM
TRICUSPID VALVE PEAK REGURGITATION VELOCITY: 2.45 M/S
TV MEAN GRADIENT: 15 MMHG
TV MV D: 1.86 M/S
TV VALVE AREA BY CONTINUITY EQUATION: 0.95 CM2
TV VTI: 76.08 CM

## 2025-03-24 ENCOUNTER — TELEPHONE (OUTPATIENT)
Dept: FAMILY MEDICINE CLINIC | Facility: HOSPITAL | Age: 80
End: 2025-03-24

## 2025-03-24 DIAGNOSIS — G47.00 INSOMNIA, UNSPECIFIED TYPE: ICD-10-CM

## 2025-03-24 NOTE — TELEPHONE ENCOUNTER
Patient had called in and stated that she had threw out her medication by accident       traZODone (DESYREL) 50 mg tablet    She was requesting if a courtesy fill could be placed in the mean time. Please advise out

## 2025-03-25 DIAGNOSIS — G47.00 INSOMNIA, UNSPECIFIED TYPE: ICD-10-CM

## 2025-03-25 RX ORDER — TRAZODONE HYDROCHLORIDE 50 MG/1
50 TABLET ORAL
Qty: 90 TABLET | Refills: 0 | OUTPATIENT
Start: 2025-03-25

## 2025-03-25 RX ORDER — TRAZODONE HYDROCHLORIDE 50 MG/1
50 TABLET ORAL
Qty: 90 TABLET | Refills: 0 | Status: SHIPPED | OUTPATIENT
Start: 2025-03-25 | End: 2025-03-27

## 2025-03-27 ENCOUNTER — OFFICE VISIT (OUTPATIENT)
Dept: FAMILY MEDICINE CLINIC | Facility: HOSPITAL | Age: 80
End: 2025-03-27
Payer: COMMERCIAL

## 2025-03-27 VITALS
WEIGHT: 143.4 LBS | HEART RATE: 64 BPM | SYSTOLIC BLOOD PRESSURE: 139 MMHG | OXYGEN SATURATION: 99 % | DIASTOLIC BLOOD PRESSURE: 68 MMHG | HEIGHT: 60 IN | BODY MASS INDEX: 28.16 KG/M2 | TEMPERATURE: 97.7 F

## 2025-03-27 DIAGNOSIS — Z00.00 MEDICARE ANNUAL WELLNESS VISIT, SUBSEQUENT: ICD-10-CM

## 2025-03-27 DIAGNOSIS — G47.62 NOCTURNAL LEG CRAMPS: Primary | ICD-10-CM

## 2025-03-27 DIAGNOSIS — I10 PRIMARY HYPERTENSION: ICD-10-CM

## 2025-03-27 DIAGNOSIS — J45.50 SEVERE PERSISTENT ASTHMA WITHOUT COMPLICATION: ICD-10-CM

## 2025-03-27 DIAGNOSIS — G47.00 INSOMNIA, UNSPECIFIED TYPE: ICD-10-CM

## 2025-03-27 DIAGNOSIS — R73.01 IFG (IMPAIRED FASTING GLUCOSE): ICD-10-CM

## 2025-03-27 DIAGNOSIS — F33.9 DEPRESSION, RECURRENT (HCC): ICD-10-CM

## 2025-03-27 DIAGNOSIS — E78.5 DYSLIPIDEMIA: ICD-10-CM

## 2025-03-27 DIAGNOSIS — E03.9 ACQUIRED HYPOTHYROIDISM: ICD-10-CM

## 2025-03-27 DIAGNOSIS — Z13.31 POSITIVE DEPRESSION SCREENING: ICD-10-CM

## 2025-03-27 PROCEDURE — G0439 PPPS, SUBSEQ VISIT: HCPCS | Performed by: INTERNAL MEDICINE

## 2025-03-27 PROCEDURE — 99214 OFFICE O/P EST MOD 30 MIN: CPT | Performed by: INTERNAL MEDICINE

## 2025-03-27 RX ORDER — BENZONATATE 100 MG/1
100 CAPSULE ORAL 3 TIMES DAILY PRN
Qty: 60 CAPSULE | Refills: 0 | Status: SHIPPED | OUTPATIENT
Start: 2025-03-27

## 2025-03-27 RX ORDER — TRAZODONE HYDROCHLORIDE 100 MG/1
100 TABLET ORAL
Start: 2025-03-27

## 2025-03-27 RX ORDER — TRAZODONE HYDROCHLORIDE 50 MG/1
100 TABLET ORAL
Start: 2025-03-27 | End: 2025-03-27 | Stop reason: SDUPTHER

## 2025-03-27 NOTE — ASSESSMENT & PLAN NOTE
BW due in Aug - order given, con't current statin for now, will follow  Orders:    CBC and differential; Future    Comprehensive metabolic panel; Future    Hemoglobin A1C; Future    Lipid panel; Future    TSH, 3rd generation with Free T4 reflex; Future

## 2025-03-27 NOTE — PROGRESS NOTES
Name: Cindy Cruz      : 1945      MRN: [id number]  Encounter Provider: Any Vargas DO  Encounter Date: 3/27/2025   Encounter department: [facility] PRIMARY CARE [address]     Assessment & Plan  Nocturnal leg cramps  Much better with qhs magnesium and no SE noted, con't current regimen, has Flexeril for prn use but has not had to use it  Orders:    CBC and differential; Future    Comprehensive metabolic panel; Future    Hemoglobin A1C; Future    Lipid panel; Future    TSH, 3rd generation with Free T4 reflex; Future    Depression, recurrent (HCC)  Depression Screening Follow-up Plan: Patient's depression screening was positive with a PHQ-9 score of 6. Patient assessed for underlying major depression. They have no active suicidal ideations. Brief counseling provided and recommend additional follow-up/re-evaluation next office visit.  Mood still not at goal, on max WBXL, discussed adding SSRI/SNRI vs increase in Trazodone, will trial increase in Trazodone - go from 50 mg 1 tab qhs to 50 mg 2 tab qhs, SE/sedation reviewed - if tolerates call for refill and will refill at 100 mg 1 tab PO q day, will re-eval in Aug when she returns from [place] - call with new/worse mood/SI/SE in interm       Positive depression screening  See above, call with new/worse mood       Insomnia, unspecified type  Doing well with Trazodone, increase in rx for depression, call with sedation/SE  Orders:    traZODone (DESYREL) 100 mg tablet; Take 1 tablet (100 mg total) by mouth daily at bedtime    IFG (impaired fasting glucose)  BW due in Aug- order given, will follow  Orders:    CBC and differential; Future    Comprehensive metabolic panel; Future    Hemoglobin A1C; Future    Lipid panel; Future    TSH, 3rd generation with Free T4 reflex; Future    Primary hypertension  BP upper end of goal but acceptable, con't current meds for now, recheck in 5-6 mos when returns from [place]       Dyslipidemia  BW due in Aug - order  given, con't current statin for now, will follow  Orders:    CBC and differential; Future    Comprehensive metabolic panel; Future    Hemoglobin A1C; Future    Lipid panel; Future    TSH, 3rd generation with Free T4 reflex; Future    Acquired hypothyroidism  BW due in Aug - BW order given, con't current levothyroxine for now, will follow  Orders:    CBC and differential; Future    Comprehensive metabolic panel; Future    Hemoglobin A1C; Future    Lipid panel; Future    TSH, 3rd generation with Free T4 reflex; Future    Medicare annual wellness visit, subsequent         BMI 28.0-28.9,adult         Severe persistent asthma without complication    Orders:    benzonatate (TESSALON PERLES) 100 mg capsule; Take 1 capsule (100 mg total) by mouth 3 (three) times a day as needed for cough      Depression Screening and Follow-up Plan: Patient's depression screening was positive with a PHQ-9 score of 6.   Patient assessed for underlying major depression. Brief counseling provided and recommend additional follow-up/re-evaluation next office visit.       Preventive health issues were discussed with patient, and age appropriate screening tests were ordered as noted in patient's After Visit Summary. Personalized health advice and appropriate referrals for health education or preventive services given if needed, as noted in patient's After Visit Summary.    Colonoscopy 7/24 - no further needed d/t age     Mammo 12/23 - scheduled for 5/25    Dexa 1/24 - osteopenia     BW 2/25 (A1C 5.8)      History of Present Illness     HPI  Pt here for follow up appt and AWV    Last visit early March pt was noting nocturnal leg cramps. She was counseled on stretching/keeping hydrated and told to start Magnesium 200 mg 1 tab PO qhs.  A rx for Flexeril 5 mg 1 tab PO qhs prn cramps were given for prn use.  She has been taking the magnesium once daily w/o SE. She notes the leg cramps have improved with the magnesium. She has not had to use the muscle  relaxer.      Depression screening again + - results reviewed.  She notes down/sad mood and fatigue. She has no significant anxiety.  Pt remains on WBXL 450 mg total a day as well as Trazodone qhs for sleep.  She did not note great benefit with the depression with starting the Trazodone.      SBP at upper end of goal today and meds were reviewed and no changes have occurred.  She denies missing doses of meds or SE with the meds.  She does not check her BP outside the office.  She notes no frequent Ha's/double vision/CP/flushing/CP.       Patient Care Team:  Any Vargas DO as PCP - General  Any Vargas DO as PCP - [facility]CP-[facility]  Nannette Floyd MD as PCP - Endocrinology (Endocrinology)    Review of Systems   Constitutional:  Positive for fatigue. Negative for chills and fever.   HENT:  Negative for congestion and sore throat.    Eyes:  Negative for pain and visual disturbance.   Respiratory:  Negative for cough, shortness of breath and wheezing.    Cardiovascular:  Negative for chest pain, palpitations and leg swelling.   Gastrointestinal:  Positive for constipation. Negative for abdominal pain, diarrhea and nausea.   Musculoskeletal:  Positive for arthralgias and back pain.   Skin:  Negative for rash and wound.   Neurological:  Positive for dizziness. Negative for headaches.   Hematological:  Negative for adenopathy.   Psychiatric/Behavioral:  Positive for dysphoric mood. The patient is not nervous/anxious.      Medical History Reviewed by provider this encounter:  Tobacco  Allergies  Meds  Problems  Med Hx  Surg Hx  Fam Hx       Annual Wellness Visit Questionnaire   Cindy is here for her Subsequent Wellness visit. Last Medicare Wellness visit information reviewed, patient interviewed and updates made to the record today.      Health Risk Assessment:   Patient rates overall health as good. Patient feels that their physical health rating is slightly worse. Patient is  dissatisfied with their life. Eyesight was rated as same. Hearing was rated as same. Patient feels that their emotional and mental health rating is same. Patients states they are often angry. Patient states they are often unusually tired/fatigued. Pain experienced in the last 7 days has been some. Patient's pain rating has been 3/10. Patient states that she has experienced no weight loss or gain in last 6 months. Physical health worse d/t lack of desire to do anything     Depression Screening:   PHQ-9 Score: 6      Fall Risk Screening:   In the past year, patient has experienced: no history of falling in past year      Urinary Incontinence Screening:   Patient has not leaked urine accidently in the last six months.     Home Safety:  Patient does not have trouble with stairs inside or outside of their home. Patient has working smoke alarms and has working carbon monoxide detector. Home safety hazards include: none.     Nutrition:   Current diet is Regular.     Medications:   Patient is not currently taking any over-the-counter supplements. Patient is able to manage medications.     Activities of Daily Living (ADLs)/Instrumental Activities of Daily Living (IADLs):   Walk and transfer into and out of bed and chair?: Yes  Dress and groom yourself?: Yes    Bathe or shower yourself?: Yes    Feed yourself? Yes  Do your laundry/housekeeping?: No  Manage your money, pay your bills and track your expenses?: No  Make your own meals?: No    Do your own shopping?: No    ADL comments: Family assists with bills, shopping, house cleaning.     Previous Hospitalizations:   Any hospitalizations or ED visits within the last 12 months?: No      Advance Care Planning:   Living will: No    Durable POA for healthcare: No    Advanced directive: No      Cognitive Screening:   Provider or family/friend/caregiver concerned regarding cognition?: No    PREVENTIVE SCREENINGS      Cardiovascular Screening:    General: History Lipid Disorder,  Screening Current and Risks and Benefits Discussed    Due for: Lipid Panel      Diabetes Screening:     General: Screening Current and Risks and Benefits Discussed      Colorectal Cancer Screening:     General: Risks and Benefits Discussed and Screening Not Indicated      Breast Cancer Screening:     General: Risks and Benefits Discussed    Due for: Mammogram        Cervical Cancer Screening:    General: Screening Not Indicated and Risks and Benefits Discussed      Osteoporosis Screening:    General: History Osteoporosis, Risks and Benefits Discussed and Screening Current      Abdominal Aortic Aneurysm (AAA) Screening:        General: Risks and Benefits Discussed and Screening Current      Lung Cancer Screening:     General: Screening Not Indicated and Risks and Benefits Discussed      Hepatitis C Screening:    General: Risks and Benefits Discussed and Screening Not Indicated    Screening, Brief Intervention, and Referral to Treatment (SBIRT)     Screening  Typical number of drinks in a day: 0  Typical number of drinks in a week: 0  Interpretation: Low risk drinking behavior.    Single Item Drug Screening:  How often have you used an illegal drug (including marijuana) or a prescription medication for non-medical reasons in the past year? never    Single Item Drug Screen Score: 0  Interpretation: Negative screen for possible drug use disorder    SDOH Risk Assessment  Social determinants of health (SDOH) risk assesment tool was completed. The tool at a minimum covered housing stability, food insecurity, transportation needs, and utility difficulty. Patient had at risk responses for the following SDOH domains: food insecurity and housing stability.     Other Counseling Topics:   Calcium and vitamin D intake.     Social Drivers of Health     Financial Resource Strain: Low Risk  (11/28/2023)    Overall Financial Resource Strain (CARDIA)     Difficulty of Paying Living Expenses: Not hard at all   Food Insecurity: Food  Insecurity Present (3/27/2025)    Hunger Vital Sign     Worried About Running Out of Food in the Last Year: Sometimes true     Ran Out of Food in the Last Year: Sometimes true   Transportation Needs: No Transportation Needs (3/27/2025)    PRAPARE - Transportation     Lack of Transportation (Medical): No     Lack of Transportation (Non-Medical): No   Housing Stability: High Risk (3/27/2025)    Housing Stability Vital Sign     Unable to Pay for Housing in the Last Year: Yes     Number of Times Moved in the Last Year: 0     Homeless in the Last Year: No   Utilities: Not At Risk (3/27/2025)    [facility] Utilities     Threatened with loss of utilities: No     Vision Screening    Right eye Left eye Both eyes   Without correction 20/30 20/30 20/25   With correction          Objective   /68 (BP Location: Left arm, Patient Position: Sitting, Cuff Size: Standard)   Pulse 64   Temp 97.7 °F (36.5 °C)   Ht 5' (1.524 m)   Wt 65 kg (143 lb 6.4 oz)   SpO2 99%   BMI 28.01 kg/m²     Physical Exam  Vitals and nursing note reviewed.   Constitutional:       General: She is not in acute distress.     Appearance: She is well-developed. She is not ill-appearing.   HENT:      Head: Normocephalic and atraumatic.      Right Ear: External ear normal. There is impacted cerumen.      Left Ear: External ear normal. There is impacted cerumen.      Mouth/Throat:      Mouth: Mucous membranes are moist.      Pharynx: Oropharynx is clear. No oropharyngeal exudate.   Eyes:      General:         Right eye: No discharge.         Left eye: No discharge.      Conjunctiva/sclera: Conjunctivae normal.   Neck:      Thyroid: No thyromegaly.      Trachea: No tracheal deviation.   Cardiovascular:      Rate and Rhythm: Normal rate and regular rhythm.      Heart sounds: Murmur heard.   Pulmonary:      Effort: Pulmonary effort is normal. No respiratory distress.      Breath sounds: Normal breath sounds. No wheezing, rhonchi or rales.   Abdominal:       General: There is no distension.      Palpations: Abdomen is soft.      Tenderness: There is no abdominal tenderness. There is no guarding or rebound.   Musculoskeletal:         General: No deformity or signs of injury.      Cervical back: Neck supple.   Skin:     General: Skin is warm and dry.      Coloration: Skin is not pale.      Findings: No rash.   Neurological:      General: No focal deficit present.      Mental Status: She is alert. Mental status is at baseline.      Motor: No abnormal muscle tone.      Gait: Gait normal.   Psychiatric:         Behavior: Behavior normal.         Thought Content: Thought content normal.         Judgment: Judgment normal.      Comments: Flat affect       Depression Screening Follow-up Plan: Patient's depression screening was positive with a PHQ-9 score of 6. Patient assessed for underlying major depression. They have no active suicidal ideations. Brief counseling provided and recommend additional follow-up/re-evaluation next office visit.

## 2025-03-27 NOTE — ASSESSMENT & PLAN NOTE
Depression Screening Follow-up Plan: Patient's depression screening was positive with a PHQ-9 score of 6. Patient assessed for underlying major depression. They have no active suicidal ideations. Brief counseling provided and recommend additional follow-up/re-evaluation next office visit.  Mood still not at goal, on max WBXL, discussed adding SSRI/SNRI vs increase in Trazodone, will trial increase in Trazodone - go from 50 mg 1 tab qhs to 50 mg 2 tab qhs, SE/sedation reviewed - if tolerates call for refill and will refill at 100 mg 1 tab PO q day, will re-eval in Aug when she returns from [place] - call with new/worse mood/SI/SE in interm

## 2025-03-27 NOTE — ASSESSMENT & PLAN NOTE
BW due in Aug- order given, will follow  Orders:    CBC and differential; Future    Comprehensive metabolic panel; Future    Hemoglobin A1C; Future    Lipid panel; Future    TSH, 3rd generation with Free T4 reflex; Future

## 2025-03-27 NOTE — ASSESSMENT & PLAN NOTE
Doing well with Trazodone, increase in rx for depression, call with sedation/SE  Orders:    traZODone (DESYREL) 100 mg tablet; Take 1 tablet (100 mg total) by mouth daily at bedtime

## 2025-03-27 NOTE — ASSESSMENT & PLAN NOTE
BW due in Aug - BW order given, con't current levothyroxine for now, will follow  Orders:    CBC and differential; Future    Comprehensive metabolic panel; Future    Hemoglobin A1C; Future    Lipid panel; Future    TSH, 3rd generation with Free T4 reflex; Future

## 2025-03-27 NOTE — PATIENT INSTRUCTIONS
"Patient Education     Depression in adults   The Basics   Written by the doctors and editors at [facility]   What is depression? -- Depression is a disorder that makes you sad, but it is different from normal sadness. Depression can make it hard for you to work, study, or do everyday tasks.  What causes depression? -- Depression is caused by problems with chemicals in the brain called \"neurotransmitters.\" Some people might be more likely to have depression if it runs in their family. Other things might also play a role, including hormones, certain health problems, medicines, stress, being mistreated as a child, family problems, and problems with friends or at school or work.  How do I know if I am depressed? -- People with depression feel down most of the time for at least 2 weeks. They also have at least 1 of these 2 symptoms:   They no longer enjoy or care about doing the things that they used to like to do.   They feel sad, down, hopeless, or cranky most of the day, almost every day.  People with depression can also have other symptoms. Examples include:   Changes in your appetite or weight. You might eat too little or too much, or gain or lose weight without trying.   Sleeping too much or too little   Feeling tired or like you have no energy   Feeling guilty, helpless, or like you are worth nothing   Trouble with concentration or memory   Acting restless or have trouble staying still, or moving or speaking more slowly than normal   Repeated thoughts of death or killing yourself  If you think that you might be depressed, see your doctor or nurse. Only someone trained in mental health can tell for sure if you are depressed.  How is depression diagnosed? -- Your doctor or nurse will do a physical exam, ask you questions, and might order tests. Depression can have a big impact on your life. Luckily, depression can be treated, and the sooner treatment is started, the better it works.  Get help right away if you are " "thinking of hurting or killing yourself! -- If you ever feel like you might hurt yourself or someone else, help is available:   In the [place], contact the 099 Suicide & Crisis Lifeline:   To speak to someone, call or text 929.   To talk to someone online, go to www."[employer]org/chat.   Call your doctor or nurse, and tell them it is urgent.   Call for an ambulance (in the [place] and [place], call 9-1-1).   Go to the emergency department at the nearest hospital.  What are the treatments for depression? -- Your doctor or nurse will work with you to make a treatment plan. Treatment can include:   Helping you learn more about depression   Counseling (with a psychiatrist, psychologist, nurse, or )   Medicines that relieve depression   Creating a plan to limit access to items that you might use to harm yourself   Other treatments that pass magnetic waves or electricity into the brain  In addition to treatment, getting regular physical activity can also help you feel better.  People with depression that is not too severe can get better by taking medicines or talking with a counselor. People with severe depression usually need medicines to get better, and might also need to see a counselor.  Another treatment involves placing a device against the scalp to pass magnetic waves into the brain. This is called \"transcranial magnetic stimulation\" (\"TMS\"). Doctors might suggest TMS if medicines and counseling have not helped.  Some people with severe depression might need a treatment called \"electroconvulsive therapy\" (\"ECT\"). During ECT, doctors pass an electric current through a person's brain in a safe way.  When will I feel better? -- Most treatment options take a little while to start working.   Many people who take medicines start to feel better within 2 weeks, but it might be 4 to 8 weeks before the medicine has its full effect.   Many people who see a counselor start to feel better within a few weeks, but it might " take 8 to 10 weeks to get the greatest benefit.  If the first treatment you try does not help you, tell your doctor or nurse, but do not give up. Some people need to try different treatments or combinations of treatments before they find an approach that works. Your doctor, nurse, or counselor can work with you to find the treatment that is right for you. They can also help you figure out how to cope while you search for the right treatment or are waiting for your treatment to start working.  How do I decide which treatment to have? -- You and your doctor or nurse will need to work together to choose a treatment for you. Medicines might work a little faster than counseling. But medicines can also cause side effects. Plus, some people do not like the idea of taking medicine.  Seeing a counselor involves talking about your feelings. That is also hard for some people.  What if I take medicine for depression and I want to have a baby? -- Some depression medicines can cause problems for an unborn baby. But having untreated depression during pregnancy can also cause problems. If you want to get pregnant, tell your doctor but do not stop taking your medicines. Together, you can plan the safest way for you to have your baby.  It's also important to talk with your doctor if you want to breastfeed after your baby is born. Breastfeeding has lots of benefits for both mother and baby. Some depression medicines are safer than others to use while breastfeeding. But having untreated depression after giving birth can also cause problems, so do not stop taking your medicines. Your doctor can work with you to plan the safest way for you to feed your baby.  All topics are updated as new evidence becomes available and our peer review process is complete.  This topic retrieved from [employer] on: Mar 06, 2024.  Topic 37274 Version 22.0  Release: 32.2.4 - C32.64  © 2024 UpToDate, Inc. and/or its affiliates. All rights reserved.  figure 1:  "Mood disorders caused by problems in the brain     Mooddisorders, such as depression and bipolar disorder, are caused by problems with\"neurotransmitters.\" These are chemicals in the brain that can affect your emotions.Treatments for mood disorders seem to work by changing the levels of certainneurotransmitters.  Graphic 94491 Version 4.0  Consumer Information Use and Disclaimer   Disclaimer: This generalized information is a limited summary of diagnosis, treatment, and/or medication information. It is not meant to be comprehensive and should be used as a tool to help the user understand and/or assess potential diagnostic and treatment options. It does NOT include all information about conditions, treatments, medications, side effects, or risks that may apply to a specific patient. It is not intended to be medical advice or a substitute for the medical advice, diagnosis, or treatment of a health care provider based on the health care provider's examination and assessment of a patient's specific and unique circumstances. Patients must speak with a health care provider for complete information about their health, medical questions, and treatment options, including any risks or benefits regarding use of medications. This information does not endorse any treatments or medications as safe, effective, or approved for treating a specific patient. UpToDate, Inc. and its affiliates disclaim any warranty or liability relating to this information or the use thereof.The use of this information is governed by the Terms of Use, available at https://www.[employer].com/en/know/clinical-effectiveness-terms. 2024© UpToDate, Inc. and its affiliates and/or licensors. All rights reserved.  Copyright   © 2024 UpToDate, Inc. and/or its affiliates. All rights reserved.    Medicare Preventive Visit Patient Instructions  Thank you for completing your Welcome to Medicare Visit or Medicare Annual Wellness Visit today. Your next wellness " visit will be due in one year (3/28/2026).  The screening/preventive services that you may require over the next 5-10 years are detailed below. Some tests may not apply to you based off risk factors and/or age. Screening tests ordered at today's visit but not completed yet may show as past due. Also, please note that scanned in results may not display below.  Preventive Screenings:  Service Recommendations Previous Testing/Comments   Colorectal Cancer Screening  * Colonoscopy    * Fecal Occult Blood Test (FOBT)/Fecal Immunochemical Test (FIT)  * Fecal DNA/Cologuard Test  * Flexible Sigmoidoscopy Age: 45-75 years old   Colonoscopy: every 10 years (may be performed more frequently if at higher risk)  OR  FOBT/FIT: every 1 year  OR  Cologuard: every 3 years  OR  Sigmoidoscopy: every 5 years  Screening may be recommended earlier than age 45 if at higher risk for colorectal cancer. Also, an individualized decision between you and your healthcare provider will decide whether screening between the ages of 76-85 would be appropriate. Colonoscopy: 07/08/2024  FOBT/FIT: Not on file  Cologuard: Not on file  Sigmoidoscopy: Not on file          Breast Cancer Screening Age: 40+ years old  Frequency: every 1-2 years  Not required if history of left and right mastectomy Mammogram: 12/19/2023    Screening Current   Cervical Cancer Screening Between the ages of 21-29, pap smear recommended once every 3 years.   Between the ages of 30-65, can perform pap smear with HPV co-testing every 5 years.   Recommendations may differ for women with a history of total hysterectomy, cervical cancer, or abnormal pap smears in past. Pap Smear: Not on file    Screening Not Indicated   Hepatitis C Screening Once for adults born between 1945 and 1965  More frequently in patients at high risk for Hepatitis C Hep C Antibody: Not on file        Diabetes Screening 1-2 times per year if you're at risk for diabetes or have pre-diabetes Fasting glucose: 88  mg/dL (2/10/2025)  A1C: 5.8 % (2/10/2025)  Screening Current   Cholesterol Screening Once every 5 years if you don't have a lipid disorder. May order more often based on risk factors. Lipid panel: 06/18/2024    Screening Not Indicated  History Lipid Disorder     Other Preventive Screenings Covered by Medicare:  Abdominal Aortic Aneurysm (AAA) Screening: covered once if your at risk. You're considered to be at risk if you have a family history of AAA.  Lung Cancer Screening: covers low dose CT scan once per year if you meet all of the following conditions: (1) Age 55-77; (2) No signs or symptoms of lung cancer; (3) Current smoker or have quit smoking within the last 15 years; (4) You have a tobacco smoking history of at least 20 pack years (packs per day multiplied by number of years you smoked); (5) You get a written order from a healthcare provider.  Glaucoma Screening: covered annually if you're considered high risk: (1) You have diabetes OR (2) Family history of glaucoma OR (3)  aged 50 and older OR (4)  American aged 65 and older  Osteoporosis Screening: covered every 2 years if you meet one of the following conditions: (1) You're estrogen deficient and at risk for osteoporosis based off medical history and other findings; (2) Have a vertebral abnormality; (3) On glucocorticoid therapy for more than 3 months; (4) Have primary hyperparathyroidism; (5) On osteoporosis medications and need to assess response to drug therapy.   Last bone density test (DXA Scan): 01/11/2024.  HIV Screening: covered annually if you're between the age of 15-65. Also covered annually if you are younger than 15 and older than 65 with risk factors for HIV infection. For pregnant patients, it is covered up to 3 times per pregnancy.    Immunizations:  Immunization Recommendations   Influenza Vaccine Annual influenza vaccination during flu season is recommended for all persons aged >= 6 months who do not have  contraindications   Pneumococcal Vaccine   * Pneumococcal conjugate vaccine = PCV13 (Prevnar 13), PCV15 (Vaxneuvance), PCV20 (Prevnar 20)  * Pneumococcal polysaccharide vaccine = PPSV23 (Pneumovax) Adults 19-65 yo with certain risk factors or if 65+ yo  If never received any pneumonia vaccine: recommend Prevnar 20 (PCV20)  Give PCV20 if previously received 1 dose of PCV13 or PPSV23   Hepatitis B Vaccine 3 dose series if at intermediate or high risk (ex: diabetes, end stage renal disease, liver disease)   Respiratory syncytial virus (RSV) Vaccine - COVERED BY MEDICARE PART D  * RSVPreF3 (Arexvy) CDC recommends that adults 60 years of age and older may receive a single dose of RSV vaccine using shared clinical decision-making (SCDM)   Tetanus (Td) Vaccine - COST NOT COVERED BY MEDICARE PART B Following completion of primary series, a booster dose should be given every 10 years to maintain immunity against tetanus. Td may also be given as tetanus wound prophylaxis.   Tdap Vaccine - COST NOT COVERED BY MEDICARE PART B Recommended at least once for all adults. For pregnant patients, recommended with each pregnancy.   Shingles Vaccine (Shingrix) - COST NOT COVERED BY MEDICARE PART B  2 shot series recommended in those 19 years and older who have or will have weakened immune systems or those 50 years and older     Health Maintenance Due:  There are no preventive care reminders to display for this patient.  Immunizations Due:      Topic Date Due   • Influenza Vaccine (1) 09/01/2024   • COVID-19 Vaccine (2 - 2024-25 season) 09/01/2024     Advance Directives   What are advance directives?  Advance directives are legal documents that state your wishes and plans for medical care. These plans are made ahead of time in case you lose your ability to make decisions for yourself. Advance directives can apply to any medical decision, such as the treatments you want, and if you want to donate organs.   What are the types of advance  directives?  There are many types of advance directives, and each state has rules about how to use them. You may choose a combination of any of the following:  Living will:  This is a written record of the treatment you want. You can also choose which treatments you do not want, which to limit, and which to stop at a certain time. This includes surgery, medicine, IV fluid, and tube feedings.   Durable power of  for healthcare (DPAHC):  This is a written record that states who you want to make healthcare choices for you when you are unable to make them for yourself. This person, called a proxy, is usually a family member or a friend. You may choose more than 1 proxy.  Do not resuscitate (DNR) order:  A DNR order is used in case your heart stops beating or you stop breathing. It is a request not to have certain forms of treatment, such as CPR. A DNR order may be included in other types of advance directives.  Medical directive:  This covers the care that you want if you are in a coma, near death, or unable to make decisions for yourself. You can list the treatments you want for each condition. Treatment may include pain medicine, surgery, blood transfusions, dialysis, IV or tube feedings, and a ventilator (breathing machine).  Values history:  This document has questions about your views, beliefs, and how you feel and think about life. This information can help others choose the care that you would choose.  Why are advance directives important?  An advance directive helps you control your care. Although spoken wishes may be used, it is better to have your wishes written down. Spoken wishes can be misunderstood, or not followed. Treatments may be given even if you do not want them. An advance directive may make it easier for your family to make difficult choices about your care.   Weight Management   Why it is important to manage your weight:  Being overweight increases your risk of health conditions such as  heart disease, high blood pressure, type 2 diabetes, and certain types of cancer. It can also increase your risk for osteoarthritis, sleep apnea, and other respiratory problems. Aim for a slow, steady weight loss. Even a small amount of weight loss can lower your risk of health problems.  How to lose weight safely:  A safe and healthy way to lose weight is to eat fewer calories and get regular exercise. You can lose up about 1 pound a week by decreasing the number of calories you eat by 500 calories each day.   Healthy meal plan for weight management:  A healthy meal plan includes a variety of foods, contains fewer calories, and helps you stay healthy. A healthy meal plan includes the following:  Eat whole-grain foods more often.  A healthy meal plan should contain fiber. Fiber is the part of grains, fruits, and vegetables that is not broken down by your body. Whole-grain foods are healthy and provide extra fiber in your diet. Some examples of whole-grain foods are whole-wheat breads and pastas, oatmeal, brown rice, and bulgur.  Eat a variety of vegetables every day.  Include dark, leafy greens such as spinach, kale, cindy greens, and mustard greens. Eat yellow and orange vegetables such as carrots, sweet potatoes, and winter squash.   Eat a variety of fruits every day.  Choose fresh or canned fruit (canned in its own juice or light syrup) instead of juice. Fruit juice has very little or no fiber.  Eat low-fat dairy foods.  Drink fat-free (skim) milk or 1% milk. Eat fat-free yogurt and low-fat cottage cheese. Try low-fat cheeses such as mozzarella and other reduced-fat cheeses.  Choose meat and other protein foods that are low in fat.  Choose beans or other legumes such as split peas or lentils. Choose fish, skinless poultry (chicken or turkey), or lean cuts of red meat (beef or pork). Before you cook meat or poultry, cut off any visible fat.   Use less fat and oil.  Try baking foods instead of frying them. Add  less fat, such as margarine, sour cream, regular salad dressing and mayonnaise to foods. Eat fewer high-fat foods. Some examples of high-fat foods include french fries, doughnuts, ice cream, and cakes.  Eat fewer sweets.  Limit foods and drinks that are high in sugar. This includes candy, cookies, regular soda, and sweetened drinks.  Exercise:  Exercise at least 30 minutes per day on most days of the week. Some examples of exercise include walking, biking, dancing, and swimming. You can also fit in more physical activity by taking the stairs instead of the elevator or parking farther away from stores. Ask your healthcare provider about the best exercise plan for you.      © Copyright [employer] 2018 Information is for End User's use only and may not be sold, redistributed or otherwise used for commercial purposes. All illustrations and images included in CareNotes® are the copyrighted property of [employer] Inc[employer] or [employer]

## 2025-03-27 NOTE — ASSESSMENT & PLAN NOTE
BP upper end of goal but acceptable, con't current meds for now, recheck in 5-6 mos when returns from [place]

## 2025-04-09 DIAGNOSIS — I35.9 AORTIC VALVE DISEASE: ICD-10-CM

## 2025-04-09 DIAGNOSIS — E78.00 PURE HYPERCHOLESTEROLEMIA: ICD-10-CM

## 2025-04-09 DIAGNOSIS — I10 ESSENTIAL (PRIMARY) HYPERTENSION: ICD-10-CM

## 2025-04-09 DIAGNOSIS — I45.10 RBBB: ICD-10-CM

## 2025-04-09 DIAGNOSIS — I34.0 MITRAL VALVE INSUFFICIENCY, UNSPECIFIED ETIOLOGY: ICD-10-CM

## 2025-04-09 RX ORDER — LOSARTAN POTASSIUM 100 MG/1
100 TABLET ORAL DAILY
Qty: 90 TABLET | Refills: 1 | Status: SHIPPED | OUTPATIENT
Start: 2025-04-09

## 2025-04-20 DIAGNOSIS — M81.0 OSTEOPOROSIS, UNSPECIFIED OSTEOPOROSIS TYPE, UNSPECIFIED PATHOLOGICAL FRACTURE PRESENCE: ICD-10-CM

## 2025-04-20 RX ORDER — ALENDRONATE SODIUM 70 MG/1
70 TABLET ORAL WEEKLY
Qty: 12 TABLET | Refills: 0 | Status: SHIPPED | OUTPATIENT
Start: 2025-04-20

## 2025-04-22 ENCOUNTER — TELEPHONE (OUTPATIENT)
Age: 80
End: 2025-04-22

## 2025-04-29 DIAGNOSIS — J45.909 PERSISTENT ASTHMA WITHOUT COMPLICATION, UNSPECIFIED ASTHMA SEVERITY: ICD-10-CM

## 2025-04-29 RX ORDER — MONTELUKAST SODIUM 10 MG/1
10 TABLET ORAL
Qty: 90 TABLET | Refills: 0 | Status: SHIPPED | OUTPATIENT
Start: 2025-04-29

## 2025-05-14 ENCOUNTER — TELEPHONE (OUTPATIENT)
Dept: FAMILY MEDICINE CLINIC | Facility: HOSPITAL | Age: 80
End: 2025-05-14

## 2025-05-14 DIAGNOSIS — J45.50 SEVERE PERSISTENT ASTHMA WITHOUT COMPLICATION: ICD-10-CM

## 2025-05-14 RX ORDER — FLUTICASONE FUROATE AND VILANTEROL TRIFENATATE 200; 25 UG/1; UG/1
1 POWDER RESPIRATORY (INHALATION) DAILY
Qty: 60 BLISTER | Refills: 5 | Status: SHIPPED | OUTPATIENT
Start: 2025-05-14 | End: 2025-08-12

## 2025-05-14 NOTE — TELEPHONE ENCOUNTER
Please notify pt that her insurance notified us that her Fluticasone inhaler is not covered.  Breo, Symbicort, Dulera are alternatives that are covered.   Has she tried any of these in the past?  I can chose one and switch her over if she is agreeable to this.

## 2025-05-19 DIAGNOSIS — E03.9 HYPOTHYROIDISM, UNSPECIFIED TYPE: ICD-10-CM

## 2025-05-19 DIAGNOSIS — E78.5 DYSLIPIDEMIA: ICD-10-CM

## 2025-05-19 RX ORDER — ATORVASTATIN CALCIUM 20 MG/1
20 TABLET, FILM COATED ORAL DAILY
Qty: 90 TABLET | Refills: 1 | Status: SHIPPED | OUTPATIENT
Start: 2025-05-19

## 2025-05-19 RX ORDER — LEVOTHYROXINE SODIUM 88 MCG
88 TABLET ORAL DAILY
Qty: 90 TABLET | Refills: 1 | Status: SHIPPED | OUTPATIENT
Start: 2025-05-19

## 2025-05-22 DIAGNOSIS — K21.9 GERD WITHOUT ESOPHAGITIS: ICD-10-CM

## 2025-05-22 RX ORDER — OMEPRAZOLE 40 MG/1
40 CAPSULE, DELAYED RELEASE ORAL DAILY
Qty: 90 CAPSULE | Refills: 1 | Status: SHIPPED | OUTPATIENT
Start: 2025-05-22

## 2025-05-24 DIAGNOSIS — J45.20 MILD INTERMITTENT ASTHMA WITHOUT COMPLICATION: ICD-10-CM

## 2025-05-25 RX ORDER — ALBUTEROL SULFATE 90 UG/1
2 INHALANT RESPIRATORY (INHALATION) EVERY 6 HOURS PRN
Qty: 18 G | Refills: 1 | Status: SHIPPED | OUTPATIENT
Start: 2025-05-25

## 2025-06-10 ENCOUNTER — NURSE TRIAGE (OUTPATIENT)
Age: 80
End: 2025-06-10

## 2025-06-10 DIAGNOSIS — F41.9 ANXIETY AND DEPRESSION: ICD-10-CM

## 2025-06-10 DIAGNOSIS — F32.A ANXIETY AND DEPRESSION: ICD-10-CM

## 2025-06-10 RX ORDER — BUPROPION HYDROCHLORIDE 300 MG/1
300 TABLET ORAL DAILY
Qty: 90 TABLET | Refills: 1 | Status: SHIPPED | OUTPATIENT
Start: 2025-06-10

## 2025-06-10 NOTE — TELEPHONE ENCOUNTER
Regarding: Unilateral neurological symptom (parasthesia - tingling in face)  ----- Message from Talita IVEY sent at 6/10/2025  5:00 PM EDT -----  Via [employer] message:  Good afternoon Dr Vargas,      My mom is currently feeling like there's ants crawling on her forehead / face.  She has a slightly protruding greenish vein on her left temple/eye area that just appeared recently.   She has had some dizzy spells but feels she has been having them for some time.      My concern is the ant crawling tingly feeling she is experiencing.  Her neighbor, who takes care of elderly people, told her that what she is experiencing are mini strokes.  Should she go to the ER there or come home so she can get checked here?      Thank you in advance for your response.      Dara

## 2025-06-10 NOTE — TELEPHONE ENCOUNTER
"REASON FOR CONVERSATION: Neurologic Problem    SYMPTOMS: Patient's daughter called, patient has been experiencing constant left sided forehead tingling for 3 days. Also endorses dizziness. When asking clarifying questions, discovered patient is actually in [place]. Advised patient's daughter to have patient evaluated in ED. Daughter verbalized understanding.     OTHER HEALTH INFORMATION: N/A    PROTOCOL DISPOSITION: Call  Now    CARE ADVICE PROVIDED: Go to ED now    PRACTICE FOLLOW-UP: N/A    Reason for Disposition   Sudden onset of weakness of the face, arm or leg on one side of the body and present now (Exception: Bell's palsy suspected: weakness on one side of the face developing over hours to days, with no other symptoms.)    Answer Assessment - Initial Assessment Questions  1. SYMPTOM: \"What is the main symptom you are concerned about?\" (e.g., weakness, numbness)      L forehead tingling  2. ONSET: \"When did this start?\" (minutes, hours, days; while sleeping)      3 days ago  3. LAST NORMAL: \"When was the last time you (the patient) were normal (no symptoms)?\"      3 days ago  4. PATTERN \"Does this come and go, or has it been constant since it started?\"  \"Is it present now?\"      Constant  5. CARDIAC SYMPTOMS: \"Have you had any of the following symptoms: chest pain, difficulty breathing, palpitations?\"      Denies  6. NEUROLOGIC SYMPTOMS: \"Have you had any of the following symptoms: headache, dizziness, vision loss, double vision, changes in speech, unsteady on your feet?\"      Dizziness   7. OTHER SYMPTOMS: \"Do you have any other symptoms?\"      Denies  8. PREGNANCY: \"Is there any chance you are pregnant?\" \"When was your last menstrual period?\"          Protocols used: Neurologic Deficit-Adult-[place]    "

## 2025-06-20 DIAGNOSIS — G47.00 INSOMNIA, UNSPECIFIED TYPE: ICD-10-CM

## 2025-06-20 RX ORDER — TRAZODONE HYDROCHLORIDE 50 MG/1
50 TABLET ORAL
Qty: 90 TABLET | Refills: 0 | OUTPATIENT
Start: 2025-06-20

## 2025-07-12 DIAGNOSIS — M81.0 OSTEOPOROSIS, UNSPECIFIED OSTEOPOROSIS TYPE, UNSPECIFIED PATHOLOGICAL FRACTURE PRESENCE: ICD-10-CM

## 2025-07-13 DIAGNOSIS — J45.20 MILD INTERMITTENT ASTHMA WITHOUT COMPLICATION: ICD-10-CM

## 2025-07-14 RX ORDER — ALENDRONATE SODIUM 70 MG/1
70 TABLET ORAL WEEKLY
Qty: 4 TABLET | Refills: 0 | Status: SHIPPED | OUTPATIENT
Start: 2025-07-14

## 2025-07-14 RX ORDER — ALBUTEROL SULFATE 90 UG/1
2 INHALANT RESPIRATORY (INHALATION) EVERY 6 HOURS PRN
Qty: 18 G | Refills: 1 | Status: SHIPPED | OUTPATIENT
Start: 2025-07-14

## 2025-07-27 DIAGNOSIS — J45.909 PERSISTENT ASTHMA WITHOUT COMPLICATION, UNSPECIFIED ASTHMA SEVERITY: ICD-10-CM

## 2025-07-29 RX ORDER — MONTELUKAST SODIUM 10 MG/1
10 TABLET ORAL
Qty: 90 TABLET | Refills: 1 | Status: SHIPPED | OUTPATIENT
Start: 2025-07-29

## 2025-08-07 ENCOUNTER — OFFICE VISIT (OUTPATIENT)
Dept: FAMILY MEDICINE CLINIC | Facility: HOSPITAL | Age: 80
End: 2025-08-07
Payer: COMMERCIAL

## 2025-08-07 ENCOUNTER — NURSE TRIAGE (OUTPATIENT)
Age: 80
End: 2025-08-07

## 2025-08-07 VITALS
DIASTOLIC BLOOD PRESSURE: 60 MMHG | BODY MASS INDEX: 27.88 KG/M2 | HEIGHT: 60 IN | WEIGHT: 142 LBS | OXYGEN SATURATION: 99 % | HEART RATE: 57 BPM | SYSTOLIC BLOOD PRESSURE: 124 MMHG | TEMPERATURE: 97 F

## 2025-08-07 DIAGNOSIS — H57.89 PERIORBITAL SWELLING: Primary | ICD-10-CM

## 2025-08-07 DIAGNOSIS — H53.131 ACUTE LOSS OF VISION, RIGHT: ICD-10-CM

## 2025-08-07 PROCEDURE — 99214 OFFICE O/P EST MOD 30 MIN: CPT | Performed by: INTERNAL MEDICINE

## 2025-08-17 ENCOUNTER — APPOINTMENT (OUTPATIENT)
Dept: LAB | Facility: HOSPITAL | Age: 80
End: 2025-08-17
Payer: COMMERCIAL

## 2025-08-17 DIAGNOSIS — G47.62 NOCTURNAL LEG CRAMPS: ICD-10-CM

## 2025-08-17 DIAGNOSIS — E03.9 ACQUIRED HYPOTHYROIDISM: ICD-10-CM

## 2025-08-17 DIAGNOSIS — E78.5 DYSLIPIDEMIA: ICD-10-CM

## 2025-08-17 DIAGNOSIS — R73.01 IFG (IMPAIRED FASTING GLUCOSE): ICD-10-CM

## 2025-08-17 LAB
ALBUMIN SERPL BCG-MCNC: 4 G/DL (ref 3.5–5)
ALP SERPL-CCNC: 82 U/L (ref 34–104)
ALT SERPL W P-5'-P-CCNC: 11 U/L (ref 7–52)
ANION GAP SERPL CALCULATED.3IONS-SCNC: 5 MMOL/L (ref 4–13)
AST SERPL W P-5'-P-CCNC: 11 U/L (ref 13–39)
BASOPHILS # BLD AUTO: 0.04 THOUSANDS/ÂΜL (ref 0–0.1)
BASOPHILS NFR BLD AUTO: 1 % (ref 0–1)
BILIRUB SERPL-MCNC: 0.44 MG/DL (ref 0.2–1)
BUN SERPL-MCNC: 22 MG/DL (ref 5–25)
CALCIUM SERPL-MCNC: 9.5 MG/DL (ref 8.4–10.2)
CHLORIDE SERPL-SCNC: 112 MMOL/L (ref 96–108)
CHOLEST SERPL-MCNC: 109 MG/DL (ref ?–200)
CO2 SERPL-SCNC: 20 MMOL/L (ref 21–32)
CREAT SERPL-MCNC: 0.93 MG/DL (ref 0.6–1.3)
EOSINOPHIL # BLD AUTO: 0.86 THOUSAND/ÂΜL (ref 0–0.61)
EOSINOPHIL NFR BLD AUTO: 14 % (ref 0–6)
ERYTHROCYTE [DISTWIDTH] IN BLOOD BY AUTOMATED COUNT: 14 % (ref 11.6–15.1)
EST. AVERAGE GLUCOSE BLD GHB EST-MCNC: 117 MG/DL
GFR SERPL CREATININE-BSD FRML MDRD: 57 ML/MIN/1.73SQ M
GLUCOSE P FAST SERPL-MCNC: 87 MG/DL (ref 65–99)
HBA1C MFR BLD: 5.7 %
HCT VFR BLD AUTO: 32.7 % (ref 34.8–46.1)
HDLC SERPL-MCNC: 46 MG/DL
HGB BLD-MCNC: 11 G/DL (ref 11.5–15.4)
IMM GRANULOCYTES # BLD AUTO: 0.03 THOUSAND/UL (ref 0–0.2)
IMM GRANULOCYTES NFR BLD AUTO: 1 % (ref 0–2)
LDLC SERPL CALC-MCNC: 48 MG/DL (ref 0–100)
LYMPHOCYTES # BLD AUTO: 1.88 THOUSANDS/ÂΜL (ref 0.6–4.47)
LYMPHOCYTES NFR BLD AUTO: 30 % (ref 14–44)
MCH RBC QN AUTO: 31.5 PG (ref 26.8–34.3)
MCHC RBC AUTO-ENTMCNC: 33.6 G/DL (ref 31.4–37.4)
MCV RBC AUTO: 94 FL (ref 82–98)
MONOCYTES # BLD AUTO: 0.6 THOUSAND/ÂΜL (ref 0.17–1.22)
MONOCYTES NFR BLD AUTO: 10 % (ref 4–12)
NEUTROPHILS # BLD AUTO: 2.8 THOUSANDS/ÂΜL (ref 1.85–7.62)
NEUTS SEG NFR BLD AUTO: 44 % (ref 43–75)
NONHDLC SERPL-MCNC: 63 MG/DL
NRBC BLD AUTO-RTO: 0 /100 WBCS
PLATELET # BLD AUTO: 192 THOUSANDS/UL (ref 149–390)
PMV BLD AUTO: 11.1 FL (ref 8.9–12.7)
POTASSIUM SERPL-SCNC: 4.2 MMOL/L (ref 3.5–5.3)
PROT SERPL-MCNC: 6.8 G/DL (ref 6.4–8.4)
RBC # BLD AUTO: 3.49 MILLION/UL (ref 3.81–5.12)
SODIUM SERPL-SCNC: 137 MMOL/L (ref 135–147)
TRIGL SERPL-MCNC: 76 MG/DL (ref ?–150)
TSH SERPL DL<=0.05 MIU/L-ACNC: 1.09 UIU/ML (ref 0.45–4.5)
WBC # BLD AUTO: 6.21 THOUSAND/UL (ref 4.31–10.16)

## 2025-08-17 PROCEDURE — 84443 ASSAY THYROID STIM HORMONE: CPT

## 2025-08-17 PROCEDURE — 80061 LIPID PANEL: CPT

## 2025-08-17 PROCEDURE — 85025 COMPLETE CBC W/AUTO DIFF WBC: CPT

## 2025-08-17 PROCEDURE — 83036 HEMOGLOBIN GLYCOSYLATED A1C: CPT

## 2025-08-17 PROCEDURE — 80053 COMPREHEN METABOLIC PANEL: CPT

## 2025-08-17 PROCEDURE — 36415 COLL VENOUS BLD VENIPUNCTURE: CPT

## (undated) DEVICE — DRESSING MEPILEX AG BORDER 4 X 4 IN

## (undated) DEVICE — DRAPE C-ARM X-RAY

## (undated) DEVICE — ANTIBACTERIAL VIOLET BRAIDED (POLYGLACTIN 910), SYNTHETIC ABSORBABLE SUTURE: Brand: COATED VICRYL

## (undated) DEVICE — ELECTRODE BLADE MOD E-Z CLEAN  2.75IN 7CM -0012AM

## (undated) DEVICE — ADHESIVE SKIN HIGH VISCOSITY EXOFIN 1ML

## (undated) DEVICE — PENCIL ELECTROSURG E-Z CLEAN -0035H

## (undated) DEVICE — 3M™ TEGADERM™ TRANSPARENT FILM DRESSING FRAME STYLE, 1626W, 4 IN X 4-3/4 IN (10 CM X 12 CM), 50/CT 4CT/CASE: Brand: 3M™ TEGADERM™

## (undated) DEVICE — SUT SILK 2-0 SH 30 IN K833H

## (undated) DEVICE — GLOVE SRG BIOGEL 6.5

## (undated) DEVICE — BETHLEHEM UNIVERSAL SPINE, KIT: Brand: CARDINAL HEALTH

## (undated) DEVICE — GLOVE INDICATOR PI UNDERGLOVE SZ 7.5 BLUE

## (undated) DEVICE — BULB SYRINGE,IRRIGATION WITH PROTECTIVE CAP: Brand: DOVER

## (undated) DEVICE — PREP SURGICAL PURPREP 26ML

## (undated) DEVICE — FLOSEAL HEMOSTATIC MATRIX, 10 ML: Brand: FLOSEAL

## (undated) DEVICE — INTENDED FOR TISSUE SEPARATION, AND OTHER PROCEDURES THAT REQUIRE A SHARP SURGICAL BLADE TO PUNCTURE OR CUT.: Brand: BARD-PARKER SAFETY BLADES SIZE 10, STERILE

## (undated) DEVICE — PROGRAMMER PATIENT PROCLAIM

## (undated) DEVICE — SPONGE PVP SCRUB WING STERILE

## (undated) DEVICE — GLOVE INDICATOR PI UNDERGLOVE SZ 7 BLUE

## (undated) DEVICE — PAD GROUNDING ADULT

## (undated) DEVICE — ADHESIVE SKN CLSR HISTOACRYL FLEX 0.5ML LF

## (undated) DEVICE — SUT MONOCRYL 4-0 PS-2 18 IN Y496G

## (undated) DEVICE — PROXIMATE PLUS MD MULTI-DIRECTIONAL RELEASE SKIN STAPLERS CONTAINS 35 STAINLESS STEEL STAPLES APPROXIMATE CLOSED DIMENSIONS: 6.9MM X 3.9MM WIDE: Brand: PROXIMATE

## (undated) DEVICE — GLOVE SRG BIOGEL ECLIPSE 7

## (undated) DEVICE — 3M™ IOBAN™ 2 ANTIMICROBIAL INCISE DRAPE 6640EZ: Brand: IOBAN™ 2

## (undated) DEVICE — TUNNELING TOOL 20IN

## (undated) DEVICE — TELFA NON-ADHERENT ABSORBENT DRESSING: Brand: TELFA

## (undated) DEVICE — VIAL DECANTER

## (undated) DEVICE — GLOVE SRG BIOGEL 7

## (undated) DEVICE — NEEDLE 25G X 1 1/2

## (undated) DEVICE — Device

## (undated) DEVICE — PLUMEPEN PRO 10FT

## (undated) DEVICE — GLOVE INDICATOR PI UNDERGLOVE SZ 8 BLUE

## (undated) DEVICE — GLOVE INDICATOR PI UNDERGLOVE SZ 6.5 BLUE

## (undated) DEVICE — LIGHT HANDLE COVER SLEEVE DISP BLUE STELLAR

## (undated) DEVICE — HYDROGEN PEROXIDE 3 PCT 16 OZ

## (undated) DEVICE — NEUROSTIM MULTILEAD TRIAL CABLE

## (undated) DEVICE — DRAPE EQUIPMENT RF WAND

## (undated) DEVICE — ELECTRODE BLADE MOD E-Z CLEAN 2.5IN 6.4CM -0012M

## (undated) DEVICE — SUT MONOCRYL 4-0 PS-2 27 IN Y426H

## (undated) DEVICE — BETHLEHEM UNIVERSAL MINOR GEN: Brand: CARDINAL HEALTH

## (undated) DEVICE — SUT SILK 0 SH 30 IN K834H

## (undated) DEVICE — TIBURON SPLIT SHEET: Brand: CONVERTORS

## (undated) DEVICE — GLOVE SRG BIOGEL 8

## (undated) DEVICE — 3M™ IOBAN™ 2 ANTIMICROBIAL INCISE DRAPE 6650EZ: Brand: IOBAN™ 2